# Patient Record
Sex: MALE | Race: WHITE | NOT HISPANIC OR LATINO | Employment: OTHER | RURAL
[De-identification: names, ages, dates, MRNs, and addresses within clinical notes are randomized per-mention and may not be internally consistent; named-entity substitution may affect disease eponyms.]

---

## 2017-01-11 ENCOUNTER — OFFICE VISIT (OUTPATIENT)
Dept: RETAIL CLINIC | Facility: CLINIC | Age: 72
End: 2017-01-11

## 2017-01-11 VITALS
SYSTOLIC BLOOD PRESSURE: 140 MMHG | OXYGEN SATURATION: 98 % | WEIGHT: 200 LBS | RESPIRATION RATE: 16 BRPM | HEART RATE: 79 BPM | HEIGHT: 69 IN | DIASTOLIC BLOOD PRESSURE: 90 MMHG | TEMPERATURE: 97.8 F | BODY MASS INDEX: 29.62 KG/M2

## 2017-01-11 DIAGNOSIS — B02.9 HERPES ZOSTER WITHOUT COMPLICATION: Primary | ICD-10-CM

## 2017-01-11 PROCEDURE — 99213 OFFICE O/P EST LOW 20 MIN: CPT | Performed by: NURSE PRACTITIONER

## 2017-01-11 RX ORDER — GABAPENTIN 100 MG/1
CAPSULE ORAL
Qty: 30 CAPSULE | Refills: 0 | Status: SHIPPED | OUTPATIENT
Start: 2017-01-11 | End: 2017-02-03

## 2017-01-11 RX ORDER — ACYCLOVIR 400 MG/1
800 TABLET ORAL
Qty: 70 TABLET | Refills: 0 | Status: SHIPPED | OUTPATIENT
Start: 2017-01-11 | End: 2017-01-18

## 2017-01-11 NOTE — MR AVS SNAPSHOT
Aj Card JrJeremy   1/11/2017 4:00 PM   Office Visit    Dept Phone:  [phone]   Encounter #:  [id number]    Provider:  CRISTÓBAL MORALES [place]   Department:  [facility]                Your Full Care Plan              Today's Medication Changes          These changes are accurate as of: 1/11/17  5:18 PM.  If you have any questions, ask your nurse or doctor.               New Medication(s)Ordered:     acyclovir 400 MG tablet   Commonly known as:  ZOVIRAX   Take 2 tablets by mouth 5 (Five) Times a Day for 7 days. Take no more than 5 doses a day.       gabapentin 100 MG capsule   Commonly known as:  NEURONTIN   1 cap po QHS, may increase to 2 caps after 24 hours, 3 caps after 48 hours if needed for pain associated with shingles            Where to Get Your Medications      These medications were sent to [facility] [address] [place] [address] DRIVE - [phone] [facility] [phone] [address] [address]     Phone:  [phone]     acyclovir 400 MG tablet    gabapentin 100 MG capsule                  Your Updated Medication List          This list is accurate as of: 1/11/17  5:18 PM.  Always use your most recent med list.                acyclovir 400 MG tablet   Commonly known as:  ZOVIRAX   Take 2 tablets by mouth 5 (Five) Times a Day for 7 days. Take no more than 5 doses a day.       ALPRAZolam 0.5 MG tablet   Commonly known as:  XANAX   TAKE ONE TABLET BY MOUTH TWICE DAILY AS NEEDED FOR  NERVES       amLODIPine 10 MG tablet   Commonly known as:  NORVASC   Take 1 tablet by mouth Daily.       aspirin 81 MG EC tablet       azelastine 0.1 % nasal spray   Commonly known as:  ASTELIN       cholecalciferol 1000 UNITS tablet   Commonly known as:  VITAMIN D3       clopidogrel 75 MG tablet   Commonly known as:  PLAVIX   Take 1 tablet by mouth Daily.       coenzyme Q10 100 MG capsule       esomeprazole 20 MG capsule   Commonly known as:  nexIUM        gabapentin 100 MG capsule   Commonly known as:  NEURONTIN   1 cap po QHS, may increase to 2 caps after 24 hours, 3 caps after 48 hours if needed for pain associated with shingles       glucose blood test strip       KRILL OIL OMEGA-3 PO       lisinopril 40 MG tablet   Commonly known as:  PRINIVIL,ZESTRIL   Take 1 tablet by mouth Daily.       metFORMIN 500 MG tablet   Commonly known as:  GLUCOPHAGE   Take 2 tablets by mouth 2 (Two) Times a Day With Meals.       metoprolol tartrate 50 MG tablet   Commonly known as:  LOPRESSOR   Take 1 tablet by mouth 2 (Two) Times a Day.       pantoprazole 40 MG EC tablet   Commonly known as:  PROTONIX   Take 1 tablet by mouth Daily.       pitavastatin calcium 2 MG tablet tablet   Commonly known as:  LIVALO   Take 1 tablet by mouth Every Night.       SAW PALMETTO PO       sucralfate 1 G tablet   Commonly known as:  CARAFATE   Take 1 tablet by mouth 2 (Two) Times a Day.       vitamin B-12 2500 MCG sublingual tablet tablet   Commonly known as:  CYANOCOBALAMIN       zolpidem 5 MG tablet   Commonly known as:  AMBIEN   Take 1 tablet by mouth At Night As Needed for sleep.               You Were Diagnosed With        Codes Comments    Herpes zoster without complication    -  Primary ICD-10-CM: B02.9  ICD-9-CM: 053.9       Instructions    Shingles  Shingles, which is also known as herpes zoster, is an infection that causes a painful skin rash and fluid-filled blisters. Shingles is not related to genital herpes, which is a sexually transmitted infection.     Shingles only develops in people who:  · Have had chickenpox.  · Have received the chickenpox vaccine. (This is rare.)  CAUSES  Shingles is caused by varicella-zoster virus (VZV). This is the same virus that causes chickenpox. After exposure to VZV, the virus stays in the body in an inactive (dormant) state. Shingles develops if the virus reactivates. This can happen many years after the initial exposure to VZV. It is not known what  causes this virus to reactivate.  RISK FACTORS  People who have had chickenpox or received the chickenpox vaccine are at risk for shingles. Infection is more common in people who:  · Are older than age 50.  · Have a weakened defense (immune) system, such as those with HIV, AIDS, or cancer.  · Are taking medicines that weaken the immune system, such as transplant medicines.  · Are under great stress.  SYMPTOMS  Early symptoms of this condition include itching, tingling, and pain in an area on your skin. Pain may be described as burning, stabbing, or throbbing.  A few days or weeks after symptoms start, a painful red rash appears, usually on one side of the body in a bandlike or beltlike pattern. The rash eventually turns into fluid-filled blisters that break open, scab over, and dry up in about 2-3 weeks.  At any time during the infection, you may also develop:  · A fever.  · Chills.  · A headache.  · An upset stomach.  DIAGNOSIS  This condition is diagnosed with a skin exam. Sometimes, skin or fluid samples are taken from the blisters before a diagnosis is made. These samples are examined under a microscope or sent to a lab for testing.  TREATMENT  There is no specific cure for this condition. Your health care provider will probably prescribe medicines to help you manage pain, recover more quickly, and avoid long-term problems. Medicines may include:  · Antiviral drugs.  · Anti-inflammatory drugs.  · Pain medicines.  If the area involved is on your face, you may be referred to a specialist, such as an eye doctor (ophthalmologist) or an ear, nose, and throat (ENT) doctor to help you avoid eye problems, chronic pain, or disability.  HOME CARE INSTRUCTIONS  Medicines  · Take medicines only as directed by your health care provider.  · Apply an anti-itch or numbing cream to the affected area as directed by your health care provider.  Blister and Rash Care  · Take a cool bath or apply cool compresses to the area of the  rash or blisters as directed by your health care provider. This may help with pain and itching.  · Keep your rash covered with a loose bandage (dressing). Wear loose-fitting clothing to help ease the pain of material rubbing against the rash.  · Keep your rash and blisters clean with mild soap and cool water or as directed by your health care provider.  · Check your rash every day for signs of infection. These include redness, swelling, and pain that lasts or increases.  · Do not pick your blisters.  · Do not scratch your rash.  General Instructions  · Rest as directed by your health care provider.  · Keep all follow-up visits as directed by your health care provider. This is important.  · Until your blisters scab over, your infection can cause chickenpox in people who have never had it or been vaccinated against it. To prevent this from happening, avoid contact with other people, especially:    Babies.    Pregnant women.    Children who have eczema.    Elderly people who have transplants.    People who have chronic illnesses, such as leukemia or AIDS.  SEEK MEDICAL CARE IF:  · Your pain is not relieved with prescribed medicines.  · Your pain does not get better after the rash heals.  · Your rash looks infected. Signs of infection include redness, swelling, and pain that lasts or increases.  SEEK IMMEDIATE MEDICAL CARE IF:  · The rash is on your face or nose.  · You have facial pain, pain around your eye area, or loss of feeling on one side of your face.  · You have ear pain or you have ringing in your ear.  · You have loss of taste.  · Your condition gets worse.     This information is not intended to replace advice given to you by your health care provider. Make sure you discuss any questions you have with your health care provider.     Document Released: 12/18/2006 Document Revised: 01/08/2016 Document Reviewed: 10/29/2015  [employer] Interactive Patient Education ©2016 [employer]       Patient Instructions  "History      Upcoming Appointments     Visit Type Date Time Department    OFFICE VISIT 1/11/2017  4:00 PM [facility]    OFFICE VISIT 3/14/2017  1:15 PM [facility]    OFFICE VISIT 4/25/2017  1:20 PM [facility]    OFFICE VISIT 6/14/2017  1:00 PM [facility]      MyChart Signup     Our records indicate that you have declined [facility] MyChart signup. If you would like to sign up for MyChart, please email [employer] [facility] [email or web address] or call [phone] to obtain an activation code.             Other Info from Your Visit           Your Appointments     Mar 14, 2017  1:15 PM CDT   Office Visit with Steff Rodriguez MD   [facility] INTERNAL MEDICINE (--)    [address]  [address] [place] [id number] [phone]           Arrive 15 minutes prior to appointment.            Apr 25, 2017  1:20 PM CDT   Office Visit with Andreas Aldana PA-C   [facility] GASTROENTEROLOGY (--)    [address] [place] [id number] [phone]           Arrive 15 minutes prior to appointment.            Jun 14, 2017  1:00 PM CDT   Office Visit with Steff Rodriguez MD   [facility] INTERNAL MEDICINE (--)    [address]  [address] [place] [id number] [phone]           Arrive 15 minutes prior to appointment.              Allergies     Adhesive Tape      Brilinta [Ticagrelor]      Crestor [Rosuvastatin Calcium]      Effient [Prasugrel]      Lipitor [Atorvastatin]  Swelling    Other      Cipro: Hives    Warfarin And Related      Cardizem [Diltiazem Hcl]  Rash    Celexa [Citalopram Hydrobromide]  Rash    Floxin [Ofloxacin]  Rash    Penicillins  Rash    Sulfa Antibiotics  Rash      Reason for Visit     Rash           Vital Signs     Blood Pressure Pulse Temperature Respirations Height Weight    140/90 79 97.8 °F (36.6 °C) (Tympanic) 16 69\" (175.3 cm) 200 lb (90.7 kg)    Oxygen " Saturation Body Mass Index Smoking Status             98% 29.53 kg/m2 Never Smoker         Problems and Diagnoses Noted     Herpes zoster without complication    -  Primary

## 2017-01-11 NOTE — PATIENT INSTRUCTIONS
Shingles  Shingles, which is also known as herpes zoster, is an infection that causes a painful skin rash and fluid-filled blisters. Shingles is not related to genital herpes, which is a sexually transmitted infection.     Shingles only develops in people who:  · Have had chickenpox.  · Have received the chickenpox vaccine. (This is rare.)  CAUSES  Shingles is caused by varicella-zoster virus (VZV). This is the same virus that causes chickenpox. After exposure to VZV, the virus stays in the body in an inactive (dormant) state. Shingles develops if the virus reactivates. This can happen many years after the initial exposure to VZV. It is not known what causes this virus to reactivate.  RISK FACTORS  People who have had chickenpox or received the chickenpox vaccine are at risk for shingles. Infection is more common in people who:  · Are older than age 50.  · Have a weakened defense (immune) system, such as those with HIV, AIDS, or cancer.  · Are taking medicines that weaken the immune system, such as transplant medicines.  · Are under great stress.  SYMPTOMS  Early symptoms of this condition include itching, tingling, and pain in an area on your skin. Pain may be described as burning, stabbing, or throbbing.  A few days or weeks after symptoms start, a painful red rash appears, usually on one side of the body in a bandlike or beltlike pattern. The rash eventually turns into fluid-filled blisters that break open, scab over, and dry up in about 2-3 weeks.  At any time during the infection, you may also develop:  · A fever.  · Chills.  · A headache.  · An upset stomach.  DIAGNOSIS  This condition is diagnosed with a skin exam. Sometimes, skin or fluid samples are taken from the blisters before a diagnosis is made. These samples are examined under a microscope or sent to a lab for testing.  TREATMENT  There is no specific cure for this condition. Your health care provider will probably prescribe medicines to help you  manage pain, recover more quickly, and avoid long-term problems. Medicines may include:  · Antiviral drugs.  · Anti-inflammatory drugs.  · Pain medicines.  If the area involved is on your face, you may be referred to a specialist, such as an eye doctor (ophthalmologist) or an ear, nose, and throat (ENT) doctor to help you avoid eye problems, chronic pain, or disability.  HOME CARE INSTRUCTIONS  Medicines  · Take medicines only as directed by your health care provider.  · Apply an anti-itch or numbing cream to the affected area as directed by your health care provider.  Blister and Rash Care  · Take a cool bath or apply cool compresses to the area of the rash or blisters as directed by your health care provider. This may help with pain and itching.  · Keep your rash covered with a loose bandage (dressing). Wear loose-fitting clothing to help ease the pain of material rubbing against the rash.  · Keep your rash and blisters clean with mild soap and cool water or as directed by your health care provider.  · Check your rash every day for signs of infection. These include redness, swelling, and pain that lasts or increases.  · Do not pick your blisters.  · Do not scratch your rash.  General Instructions  · Rest as directed by your health care provider.  · Keep all follow-up visits as directed by your health care provider. This is important.  · Until your blisters scab over, your infection can cause chickenpox in people who have never had it or been vaccinated against it. To prevent this from happening, avoid contact with other people, especially:    Babies.    Pregnant women.    Children who have eczema.    Elderly people who have transplants.    People who have chronic illnesses, such as leukemia or AIDS.  SEEK MEDICAL CARE IF:  · Your pain is not relieved with prescribed medicines.  · Your pain does not get better after the rash heals.  · Your rash looks infected. Signs of infection include redness, swelling, and pain  that lasts or increases.  SEEK IMMEDIATE MEDICAL CARE IF:  · The rash is on your face or nose.  · You have facial pain, pain around your eye area, or loss of feeling on one side of your face.  · You have ear pain or you have ringing in your ear.  · You have loss of taste.  · Your condition gets worse.     This information is not intended to replace advice given to you by your health care provider. Make sure you discuss any questions you have with your health care provider.     Document Released: 12/18/2006 Document Revised: 01/08/2016 Document Reviewed: 10/29/2015  [employer] Interactive Patient Education ©2016 [employer]

## 2017-01-11 NOTE — PROGRESS NOTES
"Kath Card Jr. is a 71 y.o. male.     HPI Comments: Rates rash pain: 8/10    Rash   This is a new problem. Episode onset: about a month. The problem is unchanged. Location: LLE and RLE. The rash is characterized by blistering (burning, itching). He was exposed to nothing. Pertinent negatives include no anorexia, congestion, cough, diarrhea, eye pain, facial edema, fatigue, fever, joint pain, nail changes, rhinorrhea, shortness of breath, sore throat or vomiting. Treatments tried: some old Zovirax cream his wife had. The treatment provided no relief. His past medical history is significant for varicella ( as a child).        The following portions of the patient's history were reviewed and updated as appropriate: allergies, current medications, past medical history and past social history.    Review of Systems   Constitutional: Negative for activity change, appetite change, fatigue and fever.   HENT: Negative for congestion, rhinorrhea and sore throat.    Eyes: Negative for pain.   Respiratory: Negative.  Negative for cough and shortness of breath.    Cardiovascular: Negative.    Gastrointestinal: Negative for anorexia, diarrhea, nausea and vomiting.   Musculoskeletal: Negative for joint pain.   Skin: Positive for rash. Negative for nail changes.   Hematological: Negative for adenopathy.   Psychiatric/Behavioral: Negative.        Objective    Visit Vitals   • /90   • Pulse 79   • Temp 97.8 °F (36.6 °C) (Tympanic)   • Resp 16   • Ht 69\" (175.3 cm)   • Wt 200 lb (90.7 kg)   • SpO2 98%   • BMI 29.53 kg/m2       Physical Exam   Constitutional: He is oriented to person, place, and time. He appears well-developed and well-nourished. No distress.   Cardiovascular: Normal rate and regular rhythm.    Pulmonary/Chest: Effort normal and breath sounds normal.   Neurological: He is alert and oriented to person, place, and time.   Skin: Rash noted. Rash is vesicular ( with erythemic base in dermatonal " "pattern and \"deep burn and pain\" in a line.  Small area with vesicles and erythemic base to right lower leg. ).        Psychiatric: He has a normal mood and affect. His behavior is normal.   Nursing note and vitals reviewed.      Assessment/Plan   Aj was seen today for rash.    Diagnoses and all orders for this visit:    Herpes zoster without complication  -     acyclovir (ZOVIRAX) 400 MG tablet; Take 2 tablets by mouth 5 (Five) Times a Day for 7 days. Take no more than 5 doses a day.  -     gabapentin (NEURONTIN) 100 MG capsule; 1 cap po QHS, may increase to 2 caps after 24 hours, 3 caps after 48 hours if needed for pain associated with shingles      Discussed possibility of zoster vs simplex, but with \"burning pain\", age of patient and hx of varicella, feel this is probably more Zoster.      F/U with PCP if symptoms persist/worsen.            "

## 2017-01-25 RX ORDER — ALPRAZOLAM 0.5 MG/1
TABLET ORAL
Qty: 30 TABLET | Refills: 0 | Status: SHIPPED | OUTPATIENT
Start: 2017-01-25 | End: 2017-02-03

## 2017-01-25 RX ORDER — FLUTICASONE PROPIONATE 50 MCG
2 SPRAY, SUSPENSION (ML) NASAL DAILY
Qty: 1 EACH | Refills: 0 | Status: SHIPPED | OUTPATIENT
Start: 2017-01-25 | End: 2017-02-03

## 2017-02-03 ENCOUNTER — OFFICE VISIT (OUTPATIENT)
Dept: RETAIL CLINIC | Facility: CLINIC | Age: 72
End: 2017-02-03

## 2017-02-03 VITALS
HEIGHT: 69 IN | BODY MASS INDEX: 29.62 KG/M2 | OXYGEN SATURATION: 99 % | SYSTOLIC BLOOD PRESSURE: 130 MMHG | HEART RATE: 80 BPM | DIASTOLIC BLOOD PRESSURE: 70 MMHG | TEMPERATURE: 97.4 F | WEIGHT: 200 LBS

## 2017-02-03 DIAGNOSIS — J01.90 ACUTE SINUSITIS, RECURRENCE NOT SPECIFIED, UNSPECIFIED LOCATION: Primary | ICD-10-CM

## 2017-02-03 DIAGNOSIS — R05.9 COUGHING: ICD-10-CM

## 2017-02-03 PROCEDURE — 99213 OFFICE O/P EST LOW 20 MIN: CPT | Performed by: NURSE PRACTITIONER

## 2017-02-03 PROCEDURE — 96372 THER/PROPH/DIAG INJ SC/IM: CPT | Performed by: NURSE PRACTITIONER

## 2017-02-03 RX ORDER — CEFTRIAXONE 1 G/1
1 INJECTION, POWDER, FOR SOLUTION INTRAMUSCULAR; INTRAVENOUS ONCE
Status: COMPLETED | OUTPATIENT
Start: 2017-02-03 | End: 2017-02-03

## 2017-02-03 RX ORDER — BENZONATATE 100 MG/1
CAPSULE ORAL
Qty: 30 CAPSULE | Refills: 0 | Status: SHIPPED | OUTPATIENT
Start: 2017-02-03 | End: 2017-06-14

## 2017-02-03 RX ORDER — AZITHROMYCIN 250 MG/1
TABLET, FILM COATED ORAL
Qty: 6 TABLET | Refills: 0 | Status: SHIPPED | OUTPATIENT
Start: 2017-02-03 | End: 2017-03-14

## 2017-02-03 RX ADMIN — CEFTRIAXONE 1 G: 1 INJECTION, POWDER, FOR SOLUTION INTRAMUSCULAR; INTRAVENOUS at 15:24

## 2017-02-03 NOTE — PROGRESS NOTES
Subjective   Aj Card Jr. is a 71 y.o. male.     Fever    This is a new problem. The current episode started yesterday. The problem has been unchanged. Maximum temperature: reports 101 this AM and took Tylenol. Associated symptoms include congestion, coughing, headaches, muscle aches ( x 24 hours) and a sore throat. Pertinent negatives include no abdominal pain, chest pain, diarrhea, ear pain, nausea, rash, sleepiness, urinary pain, vomiting or wheezing. He has tried acetaminophen for the symptoms. The treatment provided no relief.   Risk factors: sick contacts ( family members have had URI recently.  Wife recently treated with Rocephin and Zpak and he is requesting the same.)    Risk factors: no recent sickness and no recent travel    Cough   This is a new problem. The current episode started in the past 7 days. The problem has been gradually worsening. The problem occurs hourly. The cough is productive of purulent sputum. Associated symptoms include chills, ear congestion, a fever, headaches, myalgias, nasal congestion, rhinorrhea ( yellow) and a sore throat. Pertinent negatives include no chest pain, ear pain, heartburn, hemoptysis, postnasal drip, rash, shortness of breath, sweats, weight loss or wheezing. Nothing aggravates the symptoms. He has tried nothing for the symptoms.        The following portions of the patient's history were reviewed and updated as appropriate: allergies, current medications, past medical history and past social history.    Review of Systems   Constitutional: Positive for appetite change, chills and fever. Negative for activity change and weight loss.   HENT: Positive for congestion, rhinorrhea ( yellow), sinus pressure ( frontal) and sore throat. Negative for ear pain, postnasal drip, sneezing and trouble swallowing.    Eyes: Negative.    Respiratory: Positive for cough and chest tightness. Negative for hemoptysis, shortness of breath and wheezing.    Cardiovascular:  "Negative for chest pain, palpitations and leg swelling.   Gastrointestinal: Negative for abdominal pain, diarrhea, heartburn, nausea and vomiting.   Genitourinary: Negative for dysuria.   Musculoskeletal: Positive for myalgias. Negative for neck pain and neck stiffness.   Skin: Negative.  Negative for rash.   Neurological: Positive for headaches. Negative for dizziness.   Hematological: Negative for adenopathy.   Psychiatric/Behavioral: Negative.        Objective    Visit Vitals   • /70 (BP Location: Left arm, Patient Position: Sitting, Cuff Size: Adult)   • Pulse 80   • Temp 97.4 °F (36.3 °C) (Tympanic)   • Ht 69\" (175.3 cm)   • Wt 200 lb (90.7 kg)   • SpO2 99%   • BMI 29.53 kg/m2       Physical Exam   Constitutional: He is oriented to person, place, and time. He appears well-developed. Distressed:  does not appear to feel well.   HENT:   Head: Normocephalic and atraumatic.   Right Ear: Ear canal normal. Tympanic membrane is bulging. Tympanic membrane is not erythematous. A middle ear effusion ( small clear) is present.   Left Ear: Ear canal normal. Tympanic membrane is bulging. Tympanic membrane is not erythematous. A middle ear effusion ( small, clear) is present.   Nose: Mucosal edema ( erythemic, swollen, stuffed) and rhinorrhea ( purulent) present. Right sinus exhibits frontal sinus tenderness. Right sinus exhibits no maxillary sinus tenderness. Left sinus exhibits frontal sinus tenderness. Left sinus exhibits no maxillary sinus tenderness.   Mouth/Throat: Uvula is midline and mucous membranes are normal. Posterior oropharyngeal erythema ( injected with PND) present.   Eyes:   Conjunctiva watery     Neck: Normal range of motion. Neck supple.   Cardiovascular: Normal rate and regular rhythm.    Pulmonary/Chest: Effort normal. He has no wheezes. He has no rales.   Tight, congested cough     Lymphadenopathy:     He has no cervical adenopathy.   Neurological: He is alert and oriented to person, place, and " time.   Psychiatric: He has a normal mood and affect. His behavior is normal.   Nursing note and vitals reviewed.      Assessment/Plan   Aj was seen today for sore throat, fever, cough and generalized body aches.    Diagnoses and all orders for this visit:    Acute sinusitis, recurrence not specified, unspecified location  -     azithromycin (ZITHROMAX Z-SUAD) 250 MG tablet; Take 2 tablets the first day, then 1 tablet daily for 4 days.  -     cefTRIAXone (ROCEPHIN) injection 1 g; Inject 1 g into the shoulder, thigh, or buttocks 1 (One) Time.    Coughing  -     benzonatate (TESSALON PERLES) 100 MG capsule; 1-2 caps po TID prn cough        Push fluids  Rest  Tylenol as needed    PCP if symptoms persist/worsen

## 2017-02-03 NOTE — PATIENT INSTRUCTIONS

## 2017-03-07 DIAGNOSIS — E11.9 TYPE 2 DIABETES MELLITUS WITHOUT COMPLICATION, WITHOUT LONG-TERM CURRENT USE OF INSULIN (HCC): Primary | ICD-10-CM

## 2017-03-08 ENCOUNTER — LAB (OUTPATIENT)
Dept: LAB | Facility: CLINIC | Age: 72
End: 2017-03-08

## 2017-03-08 DIAGNOSIS — E11.9 TYPE 2 DIABETES MELLITUS WITHOUT COMPLICATION, WITHOUT LONG-TERM CURRENT USE OF INSULIN (HCC): ICD-10-CM

## 2017-03-08 LAB
ANION GAP SERPL CALCULATED.3IONS-SCNC: 14 MMOL/L (ref 5–15)
BUN BLD-MCNC: 16 MG/DL (ref 7–21)
BUN/CREAT SERPL: 18 (ref 7–25)
CALCIUM SPEC-SCNC: 9.8 MG/DL (ref 8.4–10.2)
CHLORIDE SERPL-SCNC: 99 MMOL/L (ref 95–110)
CO2 SERPL-SCNC: 26 MMOL/L (ref 22–31)
CREAT BLD-MCNC: 0.89 MG/DL (ref 0.7–1.3)
GFR SERPL CREATININE-BSD FRML MDRD: 84 ML/MIN/1.73 (ref 42–98)
GLUCOSE BLD-MCNC: 183 MG/DL (ref 60–100)
HBA1C MFR BLD: 7.12 % (ref 4–5.6)
POTASSIUM BLD-SCNC: 4.6 MMOL/L (ref 3.5–5.1)
SODIUM BLD-SCNC: 139 MMOL/L (ref 137–145)

## 2017-03-08 PROCEDURE — 80048 BASIC METABOLIC PNL TOTAL CA: CPT | Performed by: INTERNAL MEDICINE

## 2017-03-08 PROCEDURE — 83036 HEMOGLOBIN GLYCOSYLATED A1C: CPT | Performed by: INTERNAL MEDICINE

## 2017-03-14 ENCOUNTER — OFFICE VISIT (OUTPATIENT)
Dept: INTERNAL MEDICINE | Facility: CLINIC | Age: 72
End: 2017-03-14

## 2017-03-14 VITALS
SYSTOLIC BLOOD PRESSURE: 120 MMHG | BODY MASS INDEX: 29.02 KG/M2 | HEIGHT: 69 IN | DIASTOLIC BLOOD PRESSURE: 70 MMHG | WEIGHT: 195.9 LBS

## 2017-03-14 DIAGNOSIS — E11.9 TYPE 2 DIABETES MELLITUS WITHOUT COMPLICATION, WITHOUT LONG-TERM CURRENT USE OF INSULIN (HCC): Primary | ICD-10-CM

## 2017-03-14 DIAGNOSIS — I10 ESSENTIAL HYPERTENSION: ICD-10-CM

## 2017-03-14 DIAGNOSIS — F41.1 GENERALIZED ANXIETY DISORDER: ICD-10-CM

## 2017-03-14 PROBLEM — E78.2 MIXED HYPERLIPIDEMIA: Status: ACTIVE | Noted: 2017-03-14

## 2017-03-14 PROCEDURE — 99213 OFFICE O/P EST LOW 20 MIN: CPT | Performed by: INTERNAL MEDICINE

## 2017-03-14 PROCEDURE — G0009 ADMIN PNEUMOCOCCAL VACCINE: HCPCS | Performed by: INTERNAL MEDICINE

## 2017-03-14 PROCEDURE — 90732 PPSV23 VACC 2 YRS+ SUBQ/IM: CPT | Performed by: INTERNAL MEDICINE

## 2017-03-14 RX ORDER — ALPRAZOLAM 0.5 MG/1
0.5 TABLET ORAL 2 TIMES DAILY PRN
COMMUNITY
End: 2017-03-14 | Stop reason: SDUPTHER

## 2017-03-14 RX ORDER — TRIAMCINOLONE ACETONIDE 1 MG/G
CREAM TOPICAL
COMMUNITY
Start: 2017-03-01 | End: 2017-09-14

## 2017-03-14 RX ORDER — ESOMEPRAZOLE MAGNESIUM 40 MG/1
40 CAPSULE, DELAYED RELEASE ORAL
COMMUNITY
Start: 2017-03-11 | End: 2017-04-25 | Stop reason: SDUPTHER

## 2017-03-14 RX ORDER — METOPROLOL TARTRATE 50 MG/1
50 TABLET, FILM COATED ORAL 2 TIMES DAILY
Qty: 180 TABLET | Refills: 1 | Status: SHIPPED | OUTPATIENT
Start: 2017-03-14 | End: 2017-06-14 | Stop reason: SDUPTHER

## 2017-03-14 RX ORDER — CLOPIDOGREL BISULFATE 75 MG/1
75 TABLET ORAL DAILY
Qty: 90 TABLET | Refills: 1 | Status: SHIPPED | OUTPATIENT
Start: 2017-03-14 | End: 2017-06-14 | Stop reason: SDUPTHER

## 2017-03-14 RX ORDER — ALPRAZOLAM 0.5 MG/1
0.5 TABLET ORAL 2 TIMES DAILY PRN
Qty: 30 TABLET | Refills: 0 | Status: SHIPPED | OUTPATIENT
Start: 2017-03-14 | End: 2017-06-14 | Stop reason: SDUPTHER

## 2017-03-14 RX ORDER — FLUTICASONE PROPIONATE 50 MCG
2 SPRAY, SUSPENSION (ML) NASAL DAILY
Qty: 1 EACH | Refills: 5 | Status: SHIPPED | OUTPATIENT
Start: 2017-03-14 | End: 2017-04-13

## 2017-03-14 RX ORDER — AMLODIPINE BESYLATE 10 MG/1
10 TABLET ORAL DAILY
Qty: 90 TABLET | Refills: 1 | Status: SHIPPED | OUTPATIENT
Start: 2017-03-14 | End: 2017-06-14 | Stop reason: SDUPTHER

## 2017-03-14 RX ORDER — LISINOPRIL 40 MG/1
40 TABLET ORAL DAILY
Qty: 90 TABLET | Refills: 1 | Status: SHIPPED | OUTPATIENT
Start: 2017-03-14 | End: 2017-06-14 | Stop reason: SDUPTHER

## 2017-03-14 NOTE — PROGRESS NOTES
Subjective   Aj Card Jr. is a 71 y.o. male     Patient is in for recheck on HTN, DM and anxiety.    DM, type II.  Duration 1 year.  ADA compliant.  Quality uncontrolled.  On Metformin 1000 mg qam, 500 mg qpm.  He could not handle higher doses due to the GI side effects. /diarrhea/.  FBS is running between above 120.  HgbA1c 7.12 /03/08/2017/.  On ACE inhibitor for HTN.    Lipids. , , LDL 76, HDL 35. /12/21/2016/.  Patient could not tolerate multiple statins in the past.  He is taking red yeast rice OTC and denies any side effects to the medications.    Microvascular complications related to DM. No retinopathy. No peripheral neuropathy.  Patient recently had eye exam done in [place].  Macrovascular complications. CAD. No PAD.    BP is well controlled on multiple medications. Patient denies chest pain, dyspnea, orthopnea or edema in lower extremites.  He is compliant with his medications and takes them as prescribed.  Patient is physically active and has been exercising on regular basis.      Patient is still having problems with anxiety and takes Xanax on PRN basis. He could not tolerate multiple SSRI medications in the past.          Past Medical History   Diagnosis Date   • Acute posthemorrhagic anemia    • Allergic rhinitis    • Anxiety state    • Chest pain    • Coronary arteriosclerosis    • Diabetes mellitus      type 2   • Diverticular disease of colon    • Dysphagia    • Epigastric pain    • GERD (gastroesophageal reflux disease)      esophagitis on Dexilant. Pt feels Nexium working better      • History of echocardiogram      Small left atrial enlargement with mild CLVH.Ef of 55-60%.Mitral valve mildly thickened.Av thickened.Left ventricle mildly dilated.Tricuspid intact.Mild aortic insufficiency. 12/07/2015       • History of echocardiogram      Mild diastolic dysfunction and mild left atrial enlargement, otherwise normal echo. EF> 55% 01/03/2012       • Hypercholesterolemia     • Hypertension    • Insomnia    • Irritable bowel syndrome    • Osteoarthritis of multiple joints      Past Surgical History   Procedure Laterality Date   • Coronary angioplasty       Successful PTCA & stenting LAD was done w/ deployment of a 2.5mm x23 Xience stent w/ reduction of stenosis from 90% to less than 0% stenosis with good LILLIAM 3 flow 02/17/2012       • Appendectomy        [facility] 1995       • Cardiac catheterization       Successful PTCA of the OMB#2.Unsuccessful PTCA of the 1st OMB, secondary to difficulty in advancing the balloon. 12/08/2015       • Cholecystectomy       [facility], [place] 1993       • Endoscopy and colonoscopy       Diverticulum in sigmoid colon & descending colon. Internal & external hemorrhoids found. 10/01/2012       • Other surgical history       CORONARY ARTERY STENT    • Endoscopy       with biopsy.  Mildly severe esophagitis. Gastritis. Normal examined duodenum.Several biopsies obtained in the lower third of the esophagus.Several biopsies obtained in the gastric antrum. 05/06/2016       • Endoscopy       w tube. Normal esophagus. Gastritis in stomach. Biopsy taken. Gastric polyp found. Biopsy taken. Normal duodenum. 10/01/2012       • Hand surgery        Corrective osteotomy of ring finger metacarpal. Malunited fracture of left ring finger 05/21/1985       • Hernia repair       Laparoscopic hernia repair. prepertitoneal bilateral inguinal hernia repair with mesh. 10/15/2009      • Skin tag removal       Excision, viral skin tag,right upper eyelid 05/08/1995    • Colonoscopy  10/01/2012       Social History   Substance Use Topics   • Smoking status: Never Smoker   • Smokeless tobacco: Never Used   • Alcohol use No     Family History   Problem Relation Age of Onset   • Heart disease Other    • Hypertension Other    • Stroke Other    • Schizophrenia Sister      Allergies   Allergen Reactions   • Adhesive Tape    • Brilinta [Ticagrelor]    • Crestor  [Rosuvastatin Calcium]    • Effient [Prasugrel]    • Lipitor [Atorvastatin] Swelling   • Other      Cipro: Hives   • Warfarin And Related    • Cardizem [Diltiazem Hcl] Rash   • Celexa [Citalopram Hydrobromide] Rash   • Floxin [Ofloxacin] Rash   • Penicillins Rash   • Sulfa Antibiotics Rash     Current Outpatient Prescriptions on File Prior to Visit   Medication Sig Dispense Refill   • aspirin 81 MG EC tablet Take 162 mg by mouth daily.     • azelastine (ASTELIN) 0.1 % nasal spray 2 sprays into each nostril 2 (two) times a day. Use in each nostril as directed     • benzonatate (TESSALON PERLES) 100 MG capsule 1-2 caps po TID prn cough 30 capsule 0   • cholecalciferol (VITAMIN D3) 1000 UNITS tablet Take 1,000 Units by mouth daily.     • coenzyme Q10 100 MG capsule Take 100 mg by mouth daily.     • esomeprazole (nexIUM) 20 MG capsule Take 40 mg by mouth Every Morning Before Breakfast.     • KRILL OIL OMEGA-3 PO Take  by mouth daily.     • Saw Palmetto, Serenoa repens, (SAW PALMETTO PO) Take  by mouth daily.     • sucralfate (CARAFATE) 1 G tablet Take 1 tablet by mouth 2 (Two) Times a Day. 60 tablet 3   • vitamin B-12 (CYANOCOBALAMIN) 2500 MCG sublingual tablet tablet Place  under the tongue daily.     • [DISCONTINUED] amLODIPine (NORVASC) 10 MG tablet Take 1 tablet by mouth Daily. 90 tablet 1   • [DISCONTINUED] azithromycin (ZITHROMAX Z-SUAD) 250 MG tablet Take 2 tablets the first day, then 1 tablet daily for 4 days. 6 tablet 0   • [DISCONTINUED] clopidogrel (PLAVIX) 75 MG tablet Take 1 tablet by mouth Daily. 90 tablet 1   • [DISCONTINUED] glucose blood test strip 1 each by Other route. Use as instructed     • [DISCONTINUED] lisinopril (PRINIVIL,ZESTRIL) 40 MG tablet Take 1 tablet by mouth Daily. 90 tablet 1   • [DISCONTINUED] metFORMIN (GLUCOPHAGE) 500 MG tablet Take 2 tablets by mouth 2 (Two) Times a Day With Meals. 360 tablet 1   • [DISCONTINUED] metoprolol tartrate (LOPRESSOR) 50 MG tablet Take 1 tablet by mouth 2  (Two) Times a Day. 180 tablet 1   • [DISCONTINUED] pantoprazole (PROTONIX) 40 MG EC tablet Take 1 tablet by mouth Daily. 30 tablet 3   • [DISCONTINUED] pitavastatin calcium (LIVALO) 2 MG tablet tablet Take 1 tablet by mouth Every Night. 30 tablet 5     No current facility-administered medications on file prior to visit.      Review of Systems   HENT: Negative.    Eyes: Negative for photophobia and visual disturbance.   Respiratory: Negative for chest tightness, shortness of breath and wheezing.    Cardiovascular: Negative for chest pain and leg swelling.   Gastrointestinal: Negative for abdominal pain, constipation and diarrhea.   Endocrine: Negative for cold intolerance, heat intolerance, polydipsia and polyuria.   Musculoskeletal: Negative for back pain and joint swelling.   Neurological: Negative for dizziness, syncope and light-headedness.   Psychiatric/Behavioral: Negative for sleep disturbance. The patient is nervous/anxious.        Objective   Physical Exam   Constitutional: He is oriented to person, place, and time. He appears well-developed and well-nourished.   HENT:   Head: Normocephalic and atraumatic.   Nose: Nose normal.   Mouth/Throat: Oropharynx is clear and moist.   Eyes: Conjunctivae are normal.   Neck: Neck supple. No JVD present.   Cardiovascular: Normal rate and regular rhythm.    No murmur heard.  Pulmonary/Chest: Effort normal and breath sounds normal.   Abdominal: Soft. Bowel sounds are normal. He exhibits no mass.   Musculoskeletal: Normal range of motion.   Neurological: He is alert and oriented to person, place, and time. He has normal reflexes.   Skin: Skin is warm and dry.   Psychiatric: He has a normal mood and affect. His behavior is normal.   Nursing note and vitals reviewed.          Patient Active Problem List   Diagnosis   • Type 2 diabetes mellitus without complication, without long-term current use of insulin   • Essential hypertension   • Mixed hyperlipidemia   • Generalized  anxiety disorder               Assessment    Diagnosis Plan   1. Type 2 diabetes mellitus without complication, without long-term current use of insulin     2. Essential hypertension     3. Generalized anxiety disorder           Plan       1. Patient will continue Metformin.      Will add Januvia 100 mg daily.      Side effects of the medication discussed with the patient.      ADA diet reviewed.Goals of diabetic control reviewed.  2. Patient will continue Lisinopril, Norvasc and Metoprolol.      DASH.      1.5 gr Sodium restriction.      Continue ASA and Plavix for CAD/history of cardiac stenting.  3.  Refill given on Xanax.       Side effects of the medication reviewed with the patient.       Jose Roberto reviewed.        Follow up in 3 months.

## 2017-04-25 ENCOUNTER — OFFICE VISIT (OUTPATIENT)
Dept: GASTROENTEROLOGY | Facility: CLINIC | Age: 72
End: 2017-04-25

## 2017-04-25 VITALS
HEART RATE: 75 BPM | DIASTOLIC BLOOD PRESSURE: 76 MMHG | HEIGHT: 69 IN | SYSTOLIC BLOOD PRESSURE: 144 MMHG | BODY MASS INDEX: 29.18 KG/M2 | WEIGHT: 197 LBS

## 2017-04-25 DIAGNOSIS — K57.30 DIVERTICULOSIS OF LARGE INTESTINE WITHOUT HEMORRHAGE: ICD-10-CM

## 2017-04-25 DIAGNOSIS — R10.13 EPIGASTRIC PAIN: ICD-10-CM

## 2017-04-25 DIAGNOSIS — K21.00 GASTROESOPHAGEAL REFLUX DISEASE WITH ESOPHAGITIS: Primary | ICD-10-CM

## 2017-04-25 DIAGNOSIS — R12 HEARTBURN: ICD-10-CM

## 2017-04-25 PROCEDURE — 99213 OFFICE O/P EST LOW 20 MIN: CPT | Performed by: PHYSICIAN ASSISTANT

## 2017-04-25 RX ORDER — PANTOPRAZOLE SODIUM 40 MG/1
40 TABLET, DELAYED RELEASE ORAL DAILY
Qty: 30 TABLET | Refills: 3 | Status: SHIPPED | OUTPATIENT
Start: 2017-04-25 | End: 2017-08-29 | Stop reason: SDUPTHER

## 2017-04-25 RX ORDER — PANTOPRAZOLE SODIUM 40 MG/1
40 TABLET, DELAYED RELEASE ORAL AS NEEDED
COMMUNITY
End: 2017-04-25 | Stop reason: SDUPTHER

## 2017-04-25 RX ORDER — SUCRALFATE 1 G/1
1 TABLET ORAL 2 TIMES DAILY
Qty: 60 TABLET | Refills: 3 | Status: SHIPPED | OUTPATIENT
Start: 2017-04-25 | End: 2017-08-29 | Stop reason: SDUPTHER

## 2017-04-25 RX ORDER — ESOMEPRAZOLE MAGNESIUM 40 MG/1
40 CAPSULE, DELAYED RELEASE ORAL
Qty: 30 CAPSULE | Refills: 3 | Status: SHIPPED | OUTPATIENT
Start: 2017-04-25 | End: 2017-08-29 | Stop reason: SDUPTHER

## 2017-06-14 ENCOUNTER — OFFICE VISIT (OUTPATIENT)
Dept: INTERNAL MEDICINE | Facility: CLINIC | Age: 72
End: 2017-06-14

## 2017-06-14 ENCOUNTER — APPOINTMENT (OUTPATIENT)
Dept: LAB | Facility: HOSPITAL | Age: 72
End: 2017-06-14

## 2017-06-14 VITALS
SYSTOLIC BLOOD PRESSURE: 140 MMHG | DIASTOLIC BLOOD PRESSURE: 90 MMHG | HEIGHT: 69 IN | WEIGHT: 198.6 LBS | BODY MASS INDEX: 29.41 KG/M2

## 2017-06-14 DIAGNOSIS — E11.65 TYPE 2 DIABETES MELLITUS WITH HYPERGLYCEMIA, WITHOUT LONG-TERM CURRENT USE OF INSULIN (HCC): Primary | ICD-10-CM

## 2017-06-14 DIAGNOSIS — F41.1 ANXIETY, GENERALIZED: ICD-10-CM

## 2017-06-14 DIAGNOSIS — I10 ESSENTIAL HYPERTENSION: ICD-10-CM

## 2017-06-14 DIAGNOSIS — Z79.899 LONG-TERM USE OF HIGH-RISK MEDICATION: ICD-10-CM

## 2017-06-14 LAB
AMPHET+METHAMPHET UR QL: NEGATIVE
BARBITURATES UR QL SCN: NEGATIVE
BENZODIAZ UR QL SCN: POSITIVE
CANNABINOIDS SERPL QL: NEGATIVE
COCAINE UR QL: NEGATIVE
METHADONE UR QL SCN: NEGATIVE
OPIATES UR QL: NEGATIVE
OXYCODONE UR QL SCN: NEGATIVE

## 2017-06-14 PROCEDURE — 80307 DRUG TEST PRSMV CHEM ANLYZR: CPT | Performed by: INTERNAL MEDICINE

## 2017-06-14 PROCEDURE — 99213 OFFICE O/P EST LOW 20 MIN: CPT | Performed by: INTERNAL MEDICINE

## 2017-06-14 RX ORDER — METOPROLOL TARTRATE 50 MG/1
50 TABLET, FILM COATED ORAL 2 TIMES DAILY
Qty: 180 TABLET | Refills: 1 | Status: SHIPPED | OUTPATIENT
Start: 2017-06-14 | End: 2017-09-14 | Stop reason: SDUPTHER

## 2017-06-14 RX ORDER — CLOPIDOGREL BISULFATE 75 MG/1
75 TABLET ORAL DAILY
Qty: 90 TABLET | Refills: 1 | Status: SHIPPED | OUTPATIENT
Start: 2017-06-14 | End: 2018-03-15 | Stop reason: SDUPTHER

## 2017-06-14 RX ORDER — LISINOPRIL 40 MG/1
40 TABLET ORAL DAILY
Qty: 90 TABLET | Refills: 1 | Status: SHIPPED | OUTPATIENT
Start: 2017-06-14 | End: 2018-03-15 | Stop reason: SDUPTHER

## 2017-06-14 RX ORDER — AMLODIPINE BESYLATE 10 MG/1
10 TABLET ORAL DAILY
Qty: 90 TABLET | Refills: 1 | Status: SHIPPED | OUTPATIENT
Start: 2017-06-14 | End: 2017-09-14 | Stop reason: SDUPTHER

## 2017-06-14 RX ORDER — ALPRAZOLAM 0.5 MG/1
0.5 TABLET ORAL 2 TIMES DAILY PRN
Qty: 30 TABLET | Refills: 2 | Status: SHIPPED | OUTPATIENT
Start: 2017-06-14 | End: 2017-09-14 | Stop reason: SDUPTHER

## 2017-06-14 RX ORDER — METFORMIN HYDROCHLORIDE 500 MG/1
1000 TABLET, EXTENDED RELEASE ORAL
Qty: 180 TABLET | Refills: 1 | Status: SHIPPED | OUTPATIENT
Start: 2017-06-14 | End: 2017-06-27

## 2017-06-14 RX ORDER — GLIMEPIRIDE 2 MG/1
2 TABLET ORAL
Qty: 90 TABLET | Refills: 1 | Status: SHIPPED | OUTPATIENT
Start: 2017-06-14 | End: 2017-09-14 | Stop reason: SDUPTHER

## 2017-06-14 NOTE — PROGRESS NOTES
Subjective   Aj Card Jr. is a 71 y.o. male   Patient is in for recheck on HTN, DM and hyperlipidemia.    BP is controlled on Lisinopril, Metoprolol and Norvasc. He checks his BP at home, and it has been in normal range.  Patient denies chest pain, dyspnea, orthopnea or edema in lower extremities.  History of cardiac stenting in 2012.  On ASA and Plavix. Plavix was lowered to qod due to bruising.    DM, type II.  On Metformin 500 mg bid.  Complains of diarrhea since being on Metformin.  He could not tolerate Januvia either and discontinued medication.  Glucose is running between 150 and 180.  Signs of hyperglycemia. Polydipsia and polyuria.  HgbA1c 7.12 /0303/08/2017/.  Lipids. , , HDL 35, HDL 78. /12/21/2016/.  He could not tolerate multiple statins in the past due to myalgia, including Crestor and Pitavastatin.  Patient is taking OTC red yeast rice.  Microvascular complications related to DM. No retinopathy. No peripheral neuropathy.  Macrovascular complications. CAD. No PAD.      Patient is still having problems with anxiety and takes Xanax on PRN basis.  He denies depressed mood or panic attacks.  Denies any side effects to the medication.    Past Medical History:   Diagnosis Date   • Acute posthemorrhagic anemia    • Allergic rhinitis    • Anxiety state    • Chest pain    • Coronary arteriosclerosis    • Diabetes mellitus     type 2   • Diverticular disease of colon    • Dysphagia    • Epigastric pain    • GERD (gastroesophageal reflux disease)     esophagitis on Dexilant. Pt feels Nexium working better      • History of echocardiogram     Small left atrial enlargement with mild CLVH.Ef of 55-60%.Mitral valve mildly thickened.Av thickened.Left ventricle mildly dilated.Tricuspid intact.Mild aortic insufficiency. 12/07/2015       • History of echocardiogram     Mild diastolic dysfunction and mild left atrial enlargement, otherwise normal echo. EF> 55% 01/03/2012       • Hypercholesterolemia     • Hypertension    • Insomnia    • Irritable bowel syndrome    • Osteoarthritis of multiple joints      Past Surgical History:   Procedure Laterality Date   • APPENDECTOMY       [facility] 1995       • CARDIAC CATHETERIZATION      Successful PTCA of the OMB#2.Unsuccessful PTCA of the 1st OMB, secondary to difficulty in advancing the balloon. 12/08/2015       • CHOLECYSTECTOMY      [facility] 1993       • COLONOSCOPY  10/01/2012   • CORONARY ANGIOPLASTY      Successful PTCA & stenting LAD was done w/ deployment of a 2.5mm x23 Xience stent w/ reduction of stenosis from 90% to less than 0% stenosis with good LILLIAM 3 flow 02/17/2012       • ENDOSCOPY      with biopsy.  Mildly severe esophagitis. Gastritis. Normal examined duodenum.Several biopsies obtained in the lower third of the esophagus.Several biopsies obtained in the gastric antrum. 05/06/2016       • ENDOSCOPY      w tube. Normal esophagus. Gastritis in stomach. Biopsy taken. Gastric polyp found. Biopsy taken. Normal duodenum. 10/01/2012       • ENDOSCOPY AND COLONOSCOPY      Diverticulum in sigmoid colon & descending colon. Internal & external hemorrhoids found. 10/01/2012       • HAND SURGERY       Corrective osteotomy of ring finger metacarpal. Malunited fracture of left ring finger 05/21/1985       • HERNIA REPAIR      Laparoscopic hernia repair. prepertitoneal bilateral inguinal hernia repair with mesh. 10/15/2009      • OTHER SURGICAL HISTORY      CORONARY ARTERY STENT    • SKIN TAG REMOVAL      Excision, viral skin tag,right upper eyelid 05/08/1995        Social History   Substance Use Topics   • Smoking status: Never Smoker   • Smokeless tobacco: Never Used   • Alcohol use No     Family History   Problem Relation Age of Onset   • Heart disease Other    • Hypertension Other    • Stroke Other    • Schizophrenia Sister      Allergies   Allergen Reactions   • Adhesive Tape    • Brilinta [Ticagrelor]    • Crestor [Rosuvastatin  Calcium]    • Effient [Prasugrel]    • Januvia [Sitagliptin] Dizziness   • Lipitor [Atorvastatin] Swelling   • Other      Cipro: Hives   • Warfarin And Related    • Cardizem [Diltiazem Hcl] Rash   • Celexa [Citalopram Hydrobromide] Rash   • Floxin [Ofloxacin] Rash   • Penicillins Rash   • Sulfa Antibiotics Rash     Current Outpatient Prescriptions on File Prior to Visit   Medication Sig Dispense Refill   • aspirin 81 MG EC tablet Take 162 mg by mouth daily.     • azelastine (ASTELIN) 0.1 % nasal spray 2 sprays into each nostril 2 (two) times a day. Use in each nostril as directed     • cholecalciferol (VITAMIN D3) 1000 UNITS tablet Take 1,000 Units by mouth daily.     • coenzyme Q10 100 MG capsule Take 100 mg by mouth daily.     • esomeprazole (nexIUM) 40 MG capsule Take 1 capsule by mouth Every Morning Before Breakfast. 30 capsule 3   • KRILL OIL OMEGA-3 PO Take  by mouth daily.     • pantoprazole (PROTONIX) 40 MG EC tablet Take 1 tablet by mouth Daily. 30 tablet 3   • Saw Palmetto, Serenoa repens, (SAW PALMETTO PO) Take  by mouth daily.     • sucralfate (CARAFATE) 1 G tablet Take 1 tablet by mouth 2 (Two) Times a Day. 60 tablet 3   • triamcinolone (KENALOG) 0.1 % cream      • vitamin B-12 (CYANOCOBALAMIN) 2500 MCG sublingual tablet tablet Place  under the tongue daily.     • [DISCONTINUED] ALPRAZolam (XANAX) 0.5 MG tablet Take 1 tablet by mouth 2 (Two) Times a Day As Needed for Anxiety. 30 tablet 0   • [DISCONTINUED] amLODIPine (NORVASC) 10 MG tablet Take 1 tablet by mouth Daily. 90 tablet 1   • [DISCONTINUED] benzonatate (TESSALON PERLES) 100 MG capsule 1-2 caps po TID prn cough 30 capsule 0   • [DISCONTINUED] clopidogrel (PLAVIX) 75 MG tablet Take 1 tablet by mouth Daily. 90 tablet 1   • [DISCONTINUED] glucose blood test strip 1 each by Other route Daily. Use as instructed 100 each 5   • [DISCONTINUED] lisinopril (PRINIVIL,ZESTRIL) 40 MG tablet Take 1 tablet by mouth Daily. 90 tablet 1   • [DISCONTINUED]  metFORMIN (GLUCOPHAGE) 500 MG tablet 2 PO qa, 1 PO qpm 270 tablet 1   • [DISCONTINUED] metoprolol tartrate (LOPRESSOR) 50 MG tablet Take 1 tablet by mouth 2 (Two) Times a Day. 180 tablet 1     No current facility-administered medications on file prior to visit.      Review of Systems   Constitutional: Negative for activity change and fatigue.   HENT: Negative for congestion, mouth sores and nosebleeds.    Eyes: Negative for photophobia and visual disturbance.   Respiratory: Negative for chest tightness, shortness of breath and wheezing.    Cardiovascular: Negative for chest pain, palpitations and leg swelling.   Gastrointestinal: Positive for diarrhea. Negative for abdominal pain, constipation, nausea and vomiting.   Endocrine: Positive for polydipsia and polyuria. Negative for cold intolerance and heat intolerance.   Skin: Negative for rash.   Neurological: Negative for dizziness, light-headedness and headaches.   Psychiatric/Behavioral: Negative for sleep disturbance. The patient is not nervous/anxious.        Objective   Physical Exam   Constitutional: He appears well-developed and well-nourished.   HENT:   Head: Normocephalic and atraumatic.   Nose: Nose normal.   Mouth/Throat: Oropharynx is clear and moist.   Eyes: Pupils are equal, round, and reactive to light.   Neck: Neck supple. No JVD present. No thyromegaly present.   Cardiovascular: Normal rate and regular rhythm.    No murmur heard.  Pulmonary/Chest: Effort normal and breath sounds normal.   Abdominal: Soft. Bowel sounds are normal. He exhibits no mass.   Musculoskeletal: Normal range of motion. He exhibits no edema.   Neurological: He is alert.   Skin: Skin is warm and dry.   Psychiatric: He has a normal mood and affect. His behavior is normal.   Nursing note and vitals reviewed.          Patient Active Problem List   Diagnosis   • Type 2 diabetes mellitus without complication, without long-term current use of insulin   • Essential hypertension   •  Mixed hyperlipidemia   • Generalized anxiety disorder         Lab on 03/08/2017   Component Date Value Ref Range Status   • Hemoglobin A1C 03/08/2017 7.12* 4 - 5.6 % Final   • Glucose 03/08/2017 183* 60 - 100 mg/dL Final   • BUN 03/08/2017 16  7 - 21 mg/dL Final   • Creatinine 03/08/2017 0.89  0.70 - 1.30 mg/dL Final   • Sodium 03/08/2017 139  137 - 145 mmol/L Final   • Potassium 03/08/2017 4.6  3.5 - 5.1 mmol/L Final   • Chloride 03/08/2017 99  95 - 110 mmol/L Final   • CO2 03/08/2017 26.0  22.0 - 31.0 mmol/L Final   • Calcium 03/08/2017 9.8  8.4 - 10.2 mg/dL Final   • eGFR Non African Amer 03/08/2017 84  42 - 98 mL/min/1.73 Final   • BUN/Creatinine Ratio 03/08/2017 18.0  7.0 - 25.0 Final   • Anion Gap 03/08/2017 14.0  5.0 - 15.0 mmol/L Final   Office Visit on 12/14/2016   Component Date Value Ref Range Status   • Sodium 12/21/2016 139  137 - 145 mmol/L Final   • Potassium 12/21/2016 4.2  3.5 - 5.1 mmol/L Final   • Chloride 12/21/2016 98  95 - 110 mmol/L Final   • CO2 12/21/2016 27  22 - 31 mmol/L Final   • Anion Gap 12/21/2016 14.0  5.0 - 15.0 mmol/L Final   • Glucose 12/21/2016 163* 60 - 100 mg/dl Final   • BUN 12/21/2016 16  7 - 21 mg/dl Final   • Creatinine 12/21/2016 0.9  0.7 - 1.3 mg/dl Final   • GFR MDRD Non  12/21/2016 83  42 - 98 mL/min/1.73 sq.M Final   • GFR MDRD  12/21/2016 100* 42 - 98 mL/min/1.73 sq.M Final   • Calcium 12/21/2016 9.2  8.4 - 10.2 mg/dl Final   • Total Protein 12/21/2016 7.1  6.3 - 8.6 gm/dl Final   • Albumin 12/21/2016 4.0  3.4 - 4.8 gm/dl Final   • Total Bilirubin 12/21/2016 0.7  0.2 - 1.3 mg/dl Final   • Alkaline Phosphatase 12/21/2016 39  38 - 126 U/L Final   • AST (SGOT) 12/21/2016 40  17 - 59 U/L Final   • ALT (SGPT) 12/21/2016 63  21 - 72 U/L Final   • Total Cholesterol 12/21/2016 150  0 - 199 mg/dl Final   • Triglycerides 12/21/2016 186  20 - 199 mg/dl Final   • HDL Cholesterol 12/21/2016 35* 60 - 200 mg/dl Final   • LDL Cholesterol  12/21/2016 78   0 - 129 mg/dl Final   • Hemoglobin A1C 12/21/2016 7.6* 4.0 - 5.6 %TotHgb Final   • Creatinine, Urine 12/21/2016 178.2  mg/dl Final   • Albumin, U 12/21/2016 0.6  0.0 - 1.7 mg/dl Final   • Microalbumin/Creatinine Ratio 12/21/2016 3  0 - 30 mg/g Final           Assessment    Diagnosis Plan   1. Type 2 diabetes mellitus with hyperglycemia, without long-term current use of insulin  Hemoglobin A1c    Comprehensive Metabolic Panel   2. Essential hypertension     3. Anxiety, generalized     4. Long-term use of high-risk medication  Urine Drug Screen         Plan      1. Patient will be placed on Glimepiride 2 mg daily.      Side effects of the medication discussed with the patient.      Will change Metformin to Metformin XR. Hopefully he can tolerate it better.      ADA diet.      Advised about goals of diabetic control.      Counseled on weight loss.  2. Patient will continue Lisinopril, Metoprolol and Amlodipine.      DASH.      1.5 gr Sodium restriction.  3. Refill given on Xanax.      Advised about habit forming nature of the medication.      Jose Roberto reviewed.      Urine drug screen ordered.        Follow up in 3 months.

## 2017-06-27 RX ORDER — METFORMIN HYDROCHLORIDE 500 MG/1
500 TABLET, EXTENDED RELEASE ORAL 2 TIMES DAILY WITH MEALS
Qty: 180 TABLET | Refills: 1 | Status: SHIPPED | OUTPATIENT
Start: 2017-06-27 | End: 2017-09-14 | Stop reason: SDUPTHER

## 2017-08-29 ENCOUNTER — OFFICE VISIT (OUTPATIENT)
Dept: GASTROENTEROLOGY | Facility: CLINIC | Age: 72
End: 2017-08-29

## 2017-08-29 VITALS
HEART RATE: 69 BPM | WEIGHT: 204 LBS | SYSTOLIC BLOOD PRESSURE: 139 MMHG | BODY MASS INDEX: 30.21 KG/M2 | HEIGHT: 69 IN | DIASTOLIC BLOOD PRESSURE: 77 MMHG

## 2017-08-29 DIAGNOSIS — R12 HEARTBURN: ICD-10-CM

## 2017-08-29 DIAGNOSIS — R10.13 EPIGASTRIC PAIN: ICD-10-CM

## 2017-08-29 DIAGNOSIS — R11.0 NAUSEA: ICD-10-CM

## 2017-08-29 DIAGNOSIS — K21.00 GASTROESOPHAGEAL REFLUX DISEASE WITH ESOPHAGITIS: Primary | ICD-10-CM

## 2017-08-29 PROCEDURE — 99213 OFFICE O/P EST LOW 20 MIN: CPT | Performed by: PHYSICIAN ASSISTANT

## 2017-08-29 RX ORDER — ESOMEPRAZOLE MAGNESIUM 40 MG/1
40 CAPSULE, DELAYED RELEASE ORAL
Qty: 90 CAPSULE | Refills: 3 | Status: SHIPPED | OUTPATIENT
Start: 2017-08-29 | End: 2018-07-31 | Stop reason: SDUPTHER

## 2017-08-29 RX ORDER — SUCRALFATE 1 G/1
1 TABLET ORAL 2 TIMES DAILY
Qty: 180 TABLET | Refills: 3 | Status: SHIPPED | OUTPATIENT
Start: 2017-08-29 | End: 2018-07-31 | Stop reason: SDUPTHER

## 2017-08-29 RX ORDER — ZOLPIDEM TARTRATE 5 MG/1
5 TABLET ORAL NIGHTLY PRN
COMMUNITY
End: 2018-10-03

## 2017-08-29 RX ORDER — PANTOPRAZOLE SODIUM 40 MG/1
40 TABLET, DELAYED RELEASE ORAL DAILY
Qty: 90 TABLET | Refills: 3 | Status: SHIPPED | OUTPATIENT
Start: 2017-08-29 | End: 2018-03-15

## 2017-09-14 ENCOUNTER — OFFICE VISIT (OUTPATIENT)
Dept: INTERNAL MEDICINE | Facility: CLINIC | Age: 72
End: 2017-09-14

## 2017-09-14 VITALS
HEIGHT: 69 IN | BODY MASS INDEX: 30.32 KG/M2 | HEART RATE: 68 BPM | SYSTOLIC BLOOD PRESSURE: 110 MMHG | DIASTOLIC BLOOD PRESSURE: 80 MMHG | OXYGEN SATURATION: 97 % | WEIGHT: 204.7 LBS

## 2017-09-14 DIAGNOSIS — I10 ESSENTIAL HYPERTENSION: ICD-10-CM

## 2017-09-14 DIAGNOSIS — F41.1 GENERALIZED ANXIETY DISORDER: ICD-10-CM

## 2017-09-14 DIAGNOSIS — E11.9 TYPE 2 DIABETES MELLITUS WITHOUT COMPLICATION, WITHOUT LONG-TERM CURRENT USE OF INSULIN (HCC): Primary | ICD-10-CM

## 2017-09-14 PROCEDURE — 99213 OFFICE O/P EST LOW 20 MIN: CPT | Performed by: INTERNAL MEDICINE

## 2017-09-14 RX ORDER — AMLODIPINE BESYLATE 10 MG/1
10 TABLET ORAL DAILY
Qty: 90 TABLET | Refills: 1 | Status: SHIPPED | OUTPATIENT
Start: 2017-09-14 | End: 2018-03-15 | Stop reason: SDUPTHER

## 2017-09-14 RX ORDER — ALPRAZOLAM 0.5 MG/1
0.5 TABLET ORAL 2 TIMES DAILY PRN
Qty: 30 TABLET | Refills: 2 | Status: SHIPPED | OUTPATIENT
Start: 2017-09-14 | End: 2018-03-15 | Stop reason: SDUPTHER

## 2017-09-14 RX ORDER — METFORMIN HYDROCHLORIDE 500 MG/1
500 TABLET, EXTENDED RELEASE ORAL 2 TIMES DAILY WITH MEALS
Qty: 180 TABLET | Refills: 1 | Status: SHIPPED | OUTPATIENT
Start: 2017-09-14 | End: 2018-03-15 | Stop reason: SDUPTHER

## 2017-09-14 RX ORDER — GLIMEPIRIDE 2 MG/1
2 TABLET ORAL
Qty: 90 TABLET | Refills: 1 | Status: SHIPPED | OUTPATIENT
Start: 2017-09-14 | End: 2018-03-15 | Stop reason: SINTOL

## 2017-09-14 RX ORDER — METOPROLOL TARTRATE 50 MG/1
50 TABLET, FILM COATED ORAL 2 TIMES DAILY
Qty: 180 TABLET | Refills: 1 | Status: SHIPPED | OUTPATIENT
Start: 2017-09-14 | End: 2018-03-15 | Stop reason: SDUPTHER

## 2017-09-14 NOTE — PROGRESS NOTES
Subjective   Aj Card Jr. is a 71 y.o. male     Patient presents for recheck on HTN, DM and anxiety.    Patient was started on Glimepiride last viist. Metformin was changed to Metformin XR which he tolerates better.  Glucose is better controlled since then and has been in the range of 100 to 150.  Patient denies any hypoglycemia.  HgbA1c 7.12 /03/08/2017/.  Lipids. , , LDL 78, HDL 35.  Not able to tolerate multiple statins but taking red yeast rice OTC.      BP is well controlled on multiple meds and denies any cardiovascular complaints.  History of CAD and cardiac stenting.  He is seeing his cardiologist on regular basis.      Patient is taking Xanax for anxiety and insomnia.  Denies depressed mood.  Under stress due to health issues in his family.  He was unable to take SSRIs in the past.      Past Medical History:   Diagnosis Date   • Acute posthemorrhagic anemia    • Allergic rhinitis    • Anxiety state    • Chest pain    • Coronary arteriosclerosis    • Diabetes mellitus     type 2   • Diverticular disease of colon    • Dysphagia    • Epigastric pain    • GERD (gastroesophageal reflux disease)     esophagitis on Dexilant. Pt feels Nexium working better      • History of echocardiogram     Small left atrial enlargement with mild CLVH.Ef of 55-60%.Mitral valve mildly thickened.Av thickened.Left ventricle mildly dilated.Tricuspid intact.Mild aortic insufficiency. 12/07/2015       • History of echocardiogram     Mild diastolic dysfunction and mild left atrial enlargement, otherwise normal echo. EF> 55% 01/03/2012       • Hypercholesterolemia    • Hypertension    • Insomnia    • Irritable bowel syndrome    • Osteoarthritis of multiple joints      Past Surgical History:   Procedure Laterality Date   • APPENDECTOMY       [facility] 1995       • CARDIAC CATHETERIZATION      Successful PTCA of the OMB#2.Unsuccessful PTCA of the 1st OMB, secondary to difficulty in advancing the  balloon. 12/08/2015       • CHOLECYSTECTOMY      [facility] 1993       • COLONOSCOPY  10/01/2012   • CORONARY ANGIOPLASTY      Successful PTCA & stenting LAD was done w/ deployment of a 2.5mm x23 Xience stent w/ reduction of stenosis from 90% to less than 0% stenosis with good LILLIAM 3 flow 02/17/2012       • ENDOSCOPY      with biopsy.  Mildly severe esophagitis. Gastritis. Normal examined duodenum.Several biopsies obtained in the lower third of the esophagus.Several biopsies obtained in the gastric antrum. 05/06/2016       • ENDOSCOPY      w tube. Normal esophagus. Gastritis in stomach. Biopsy taken. Gastric polyp found. Biopsy taken. Normal duodenum. 10/01/2012       • ENDOSCOPY AND COLONOSCOPY      Diverticulum in sigmoid colon & descending colon. Internal & external hemorrhoids found. 10/01/2012       • HAND SURGERY       Corrective osteotomy of ring finger metacarpal. Malunited fracture of left ring finger 05/21/1985       • HERNIA REPAIR      Laparoscopic hernia repair. prepertitoneal bilateral inguinal hernia repair with mesh. 10/15/2009      • OTHER SURGICAL HISTORY      CORONARY ARTERY STENT    • SKIN TAG REMOVAL      Excision, viral skin tag,right upper eyelid 05/08/1995        Social History   Substance Use Topics   • Smoking status: Never Smoker   • Smokeless tobacco: Never Used   • Alcohol use No     Family History   Problem Relation Age of Onset   • Heart disease Other    • Hypertension Other    • Stroke Other    • Schizophrenia Sister      Allergies   Allergen Reactions   • Adhesive Tape    • Brilinta [Ticagrelor]    • Crestor [Rosuvastatin Calcium]    • Effient [Prasugrel]    • Januvia [Sitagliptin] Dizziness   • Lipitor [Atorvastatin] Swelling   • Other      Cipro: Hives   • Warfarin And Related    • Cardizem [Diltiazem Hcl] Rash   • Celexa [Citalopram Hydrobromide] Rash   • Floxin [Ofloxacin] Rash   • Penicillins Rash   • Sulfa Antibiotics Rash     Current Outpatient Prescriptions on  File Prior to Visit   Medication Sig Dispense Refill   • aspirin 81 MG EC tablet Take 162 mg by mouth daily.     • azelastine (ASTELIN) 0.1 % nasal spray 2 sprays into each nostril 2 (two) times a day. Use in each nostril as directed     • cholecalciferol (VITAMIN D3) 1000 UNITS tablet Take 1,000 Units by mouth daily.     • clopidogrel (PLAVIX) 75 MG tablet Take 1 tablet by mouth Daily. 90 tablet 1   • coenzyme Q10 100 MG capsule Take 100 mg by mouth daily.     • esomeprazole (nexIUM) 40 MG capsule Take 1 capsule by mouth Every Morning Before Breakfast. 90 capsule 3   • KRILL OIL OMEGA-3 PO Take  by mouth daily.     • pantoprazole (PROTONIX) 40 MG EC tablet Take 1 tablet by mouth Daily. 90 tablet 3   • Red Yeast Rice Extract (RED YEAST RICE PO) Take  by mouth Daily.     • Saw Palmetto, Serenoa repens, (SAW PALMETTO PO) Take  by mouth daily.     • sucralfate (CARAFATE) 1 g tablet Take 1 tablet by mouth 2 (Two) Times a Day. 180 tablet 3   • vitamin B-12 (CYANOCOBALAMIN) 2500 MCG sublingual tablet tablet Place  under the tongue daily.     • zolpidem (AMBIEN) 5 MG tablet Take 5 mg by mouth At Night As Needed for Sleep.     • [DISCONTINUED] ALPRAZolam (XANAX) 0.5 MG tablet Take 1 tablet by mouth 2 (Two) Times a Day As Needed for Anxiety. 30 tablet 2   • [DISCONTINUED] amLODIPine (NORVASC) 10 MG tablet Take 1 tablet by mouth Daily. 90 tablet 1   • [DISCONTINUED] glimepiride (AMARYL) 2 MG tablet Take 1 tablet by mouth Every Morning Before Breakfast. 90 tablet 1   • [DISCONTINUED] metFORMIN ER (GLUCOPHAGE-XR) 500 MG 24 hr tablet Take 1 tablet by mouth 2 (Two) Times a Day With Meals. 180 tablet 1   • [DISCONTINUED] metoprolol tartrate (LOPRESSOR) 50 MG tablet Take 1 tablet by mouth 2 (Two) Times a Day. 180 tablet 1   • glucose blood test strip 1 each by Other route Daily. Use as instructed 100 each 5   • lisinopril (PRINIVIL,ZESTRIL) 40 MG tablet Take 1 tablet by mouth Daily. 90 tablet 1   • [DISCONTINUED] triamcinolone  (KENALOG) 0.1 % cream        No current facility-administered medications on file prior to visit.      Review of Systems   Constitutional: Negative for activity change.   HENT: Negative for facial swelling, mouth sores and nosebleeds.    Eyes: Negative for photophobia and visual disturbance.   Respiratory: Negative for chest tightness and shortness of breath.    Cardiovascular: Negative for palpitations and leg swelling.   Gastrointestinal: Negative for abdominal pain, constipation and diarrhea.   Endocrine: Negative for cold intolerance, heat intolerance, polydipsia and polyuria.   Musculoskeletal: Negative for arthralgias, back pain and myalgias.   Neurological: Negative for dizziness, syncope and light-headedness.   Psychiatric/Behavioral: Positive for sleep disturbance.       Objective   Physical Exam   Constitutional: He appears well-developed and well-nourished.   HENT:   Head: Normocephalic and atraumatic.   Eyes: Conjunctivae are normal.   Neck: Neck supple. No JVD present. No thyromegaly present.   Cardiovascular: Normal rate and regular rhythm.    Pulmonary/Chest: Effort normal and breath sounds normal.   Abdominal: Soft. Bowel sounds are normal. He exhibits no mass.   Neurological: He is alert.   Psychiatric: He has a normal mood and affect. His behavior is normal. Thought content normal.   Nursing note and vitals reviewed.          Patient Active Problem List   Diagnosis   • Type 2 diabetes mellitus without complication, without long-term current use of insulin   • Essential hypertension   • Mixed hyperlipidemia   • Generalized anxiety disorder     Office Visit on 06/14/2017   Component Date Value Ref Range Status   • Amphetamine Screen, Urine 06/14/2017 Negative  Negative Final   • Barbiturates Screen, Urine 06/14/2017 Negative  Negative Final   • Benzodiazepine Screen, Urine 06/14/2017 Positive* Negative Final   • Cocaine Screen, Urine 06/14/2017 Negative  Negative Final   • Methadone Screen, Urine  06/14/2017 Negative  Negative Final   • Opiate Screen 06/14/2017 Negative  Negative Final   • Oxycodone Screen, Urine 06/14/2017 Negative  Negative Final   • THC, Screen, Urine 06/14/2017 Negative  Negative Final   Lab on 03/08/2017   Component Date Value Ref Range Status   • Hemoglobin A1C 03/08/2017 7.12* 4 - 5.6 % Final   • Glucose 03/08/2017 183* 60 - 100 mg/dL Final   • BUN 03/08/2017 16  7 - 21 mg/dL Final   • Creatinine 03/08/2017 0.89  0.70 - 1.30 mg/dL Final   • Sodium 03/08/2017 139  137 - 145 mmol/L Final   • Potassium 03/08/2017 4.6  3.5 - 5.1 mmol/L Final   • Chloride 03/08/2017 99  95 - 110 mmol/L Final   • CO2 03/08/2017 26.0  22.0 - 31.0 mmol/L Final   • Calcium 03/08/2017 9.8  8.4 - 10.2 mg/dL Final   • eGFR Non African Amer 03/08/2017 84  42 - 98 mL/min/1.73 Final   • BUN/Creatinine Ratio 03/08/2017 18.0  7.0 - 25.0 Final   • Anion Gap 03/08/2017 14.0  5.0 - 15.0 mmol/L Final             Assessment   Diagnosis Plan   1. Type 2 diabetes mellitus without complication, without long-term current use of insulin     2. Essential hypertension     3. Generalized anxiety disorder         Plan      1. Patient will continue Metformin XR and Glimepiride.      ADA diet.      Will recheck HgbA1c.      Discussed goals of diabetic control.  2. Current antihypertensive therapy is continued.      Counseled on nutrition and physical activity.  3. Patient is given new prescription for Xanax.      Side effects of the medication reviewed.      Patient signed [facility] consent for treatment with controlled substance and provider agreement.      Urine drug screen reviewed.      Follow up in 6 months or earlier if any problems.                    This document has been electronically signed by Steff Rodriguez MD on September 14, 2017 1:27 PM

## 2017-09-15 ENCOUNTER — LAB (OUTPATIENT)
Dept: LAB | Facility: CLINIC | Age: 72
End: 2017-09-15

## 2017-09-15 DIAGNOSIS — E11.65 TYPE 2 DIABETES MELLITUS WITH HYPERGLYCEMIA, WITHOUT LONG-TERM CURRENT USE OF INSULIN (HCC): ICD-10-CM

## 2017-09-15 LAB
ALBUMIN SERPL-MCNC: 4.5 G/DL (ref 3.4–4.8)
ALBUMIN/GLOB SERPL: 1.6 G/DL (ref 1.1–1.8)
ALP SERPL-CCNC: 39 U/L (ref 38–126)
ALT SERPL W P-5'-P-CCNC: 54 U/L (ref 21–72)
ANION GAP SERPL CALCULATED.3IONS-SCNC: 13 MMOL/L (ref 5–15)
AST SERPL-CCNC: 39 U/L (ref 17–59)
BILIRUB SERPL-MCNC: 0.9 MG/DL (ref 0.2–1.3)
BUN BLD-MCNC: 13 MG/DL (ref 7–21)
BUN/CREAT SERPL: 14.4 (ref 7–25)
CALCIUM SPEC-SCNC: 9.8 MG/DL (ref 8.4–10.2)
CHLORIDE SERPL-SCNC: 100 MMOL/L (ref 95–110)
CO2 SERPL-SCNC: 25 MMOL/L (ref 22–31)
CREAT BLD-MCNC: 0.9 MG/DL (ref 0.7–1.3)
GFR SERPL CREATININE-BSD FRML MDRD: 83 ML/MIN/1.73 (ref 42–98)
GLOBULIN UR ELPH-MCNC: 2.9 GM/DL (ref 2.3–3.5)
GLUCOSE BLD-MCNC: 185 MG/DL (ref 60–100)
HBA1C MFR BLD: 7.1 % (ref 4–5.6)
POTASSIUM BLD-SCNC: 4.9 MMOL/L (ref 3.5–5.1)
PROT SERPL-MCNC: 7.4 G/DL (ref 6.3–8.6)
SODIUM BLD-SCNC: 138 MMOL/L (ref 137–145)

## 2017-09-15 PROCEDURE — 80053 COMPREHEN METABOLIC PANEL: CPT | Performed by: INTERNAL MEDICINE

## 2017-09-15 PROCEDURE — 36415 COLL VENOUS BLD VENIPUNCTURE: CPT | Performed by: FAMILY MEDICINE

## 2017-09-15 PROCEDURE — 83036 HEMOGLOBIN GLYCOSYLATED A1C: CPT | Performed by: INTERNAL MEDICINE

## 2017-09-18 ENCOUNTER — TELEPHONE (OUTPATIENT)
Dept: INTERNAL MEDICINE | Facility: CLINIC | Age: 72
End: 2017-09-18

## 2017-09-18 NOTE — TELEPHONE ENCOUNTER
Spoke with pt let him know lab shows his average glucose came back high ,DR MEDINA recommends if bs are running high at home he needs to double his glimepiride

## 2017-11-07 ENCOUNTER — OFFICE VISIT (OUTPATIENT)
Dept: GASTROENTEROLOGY | Facility: CLINIC | Age: 72
End: 2017-11-07

## 2017-11-07 VITALS
SYSTOLIC BLOOD PRESSURE: 134 MMHG | BODY MASS INDEX: 30.07 KG/M2 | HEIGHT: 69 IN | HEART RATE: 69 BPM | WEIGHT: 203 LBS | DIASTOLIC BLOOD PRESSURE: 76 MMHG

## 2017-11-07 DIAGNOSIS — R11.0 NAUSEA: ICD-10-CM

## 2017-11-07 DIAGNOSIS — Z86.010 HISTORY OF COLON POLYPS: ICD-10-CM

## 2017-11-07 DIAGNOSIS — R19.7 DIARRHEA, UNSPECIFIED TYPE: ICD-10-CM

## 2017-11-07 DIAGNOSIS — K57.30 DIVERTICULOSIS OF LARGE INTESTINE WITHOUT HEMORRHAGE: ICD-10-CM

## 2017-11-07 DIAGNOSIS — K21.00 GASTROESOPHAGEAL REFLUX DISEASE WITH ESOPHAGITIS: Primary | ICD-10-CM

## 2017-11-07 DIAGNOSIS — R10.13 EPIGASTRIC PAIN: ICD-10-CM

## 2017-11-07 PROCEDURE — 99213 OFFICE O/P EST LOW 20 MIN: CPT | Performed by: PHYSICIAN ASSISTANT

## 2017-11-07 RX ORDER — DEXTROSE AND SODIUM CHLORIDE 5; .45 G/100ML; G/100ML
30 INJECTION, SOLUTION INTRAVENOUS CONTINUOUS PRN
Status: CANCELLED | OUTPATIENT
Start: 2017-12-11

## 2017-11-07 RX ORDER — POLYETHYLENE GLYCOL 3350 17 G/17G
POWDER, FOR SOLUTION ORAL
Qty: 255 G | Refills: 0 | Status: SHIPPED | OUTPATIENT
Start: 2017-11-07 | End: 2018-02-13

## 2017-11-07 NOTE — PROGRESS NOTES
Chief Complaint   Patient presents with   • Heartburn   • Nausea   • Abdominal Pain       ENDO PROCEDURE ORDERED: COLON, bx, diarrhea, diverticular dz    Subjective    Aj Cr Card Jr. is a 72 y.o. male. he is here today for follow-up.    History of Present Illness    Patient seen on a recheck of his GERD, nausea, epigastric pain, diverticular disease.  Last seen 8/29/17.  Patient states his GERD is doing fairly well, he is on Nexium, Carafate, Protonix.  He denied nausea, vomiting.  He's been having bouts of diarrhea.  Spicy foods seem to make his diarrhea worse.  He is due for follow-up endoscopic evaluation, history of polyps.  Last colonoscopy was in 2012.  Weight is down 1 pound since last visit.  He seen no blood in his stool.    Laboratory on 9/15/17 CMP showed glucose 185, otherwise normal.  A1c 7.1%.    A/P: GERD appears stable on current regimen.  Patient is due for follow-up colonoscopy, he would like to do this before a year.  He'll follow-up after the above, further pending clinical course and the results of the above.  Continue current medications.     The following portions of the patient's history were reviewed and updated as appropriate:   Past Medical History:   Diagnosis Date   • Acute posthemorrhagic anemia    • Allergic rhinitis    • Anxiety state    • Chest pain    • Coronary arteriosclerosis    • Diabetes mellitus     type 2   • Diverticular disease of colon    • Dysphagia    • Epigastric pain    • GERD (gastroesophageal reflux disease)     esophagitis on Dexilant. Pt feels Nexium working better      • History of echocardiogram     Small left atrial enlargement with mild CLVH.Ef of 55-60%.Mitral valve mildly thickened.Av thickened.Left ventricle mildly dilated.Tricuspid intact.Mild aortic insufficiency. 12/07/2015       • History of echocardiogram     Mild diastolic dysfunction and mild left atrial enlargement, otherwise normal echo. EF> 55% 01/03/2012       • Hypercholesterolemia     • Hypertension    • Insomnia    • Irritable bowel syndrome    • Osteoarthritis of multiple joints      Past Surgical History:   Procedure Laterality Date   • APPENDECTOMY       [facility] 1995       • CARDIAC CATHETERIZATION      Successful PTCA of the OMB#2.Unsuccessful PTCA of the 1st OMB, secondary to difficulty in advancing the balloon. 12/08/2015       • CHOLECYSTECTOMY      [facility] 1993       • COLONOSCOPY  10/01/2012   • CORONARY ANGIOPLASTY      Successful PTCA & stenting LAD was done w/ deployment of a 2.5mm x23 Xience stent w/ reduction of stenosis from 90% to less than 0% stenosis with good LILLIAM 3 flow 02/17/2012       • ENDOSCOPY      with biopsy.  Mildly severe esophagitis. Gastritis. Normal examined duodenum.Several biopsies obtained in the lower third of the esophagus.Several biopsies obtained in the gastric antrum. 05/06/2016       • ENDOSCOPY      w tube. Normal esophagus. Gastritis in stomach. Biopsy taken. Gastric polyp found. Biopsy taken. Normal duodenum. 10/01/2012       • ENDOSCOPY AND COLONOSCOPY      Diverticulum in sigmoid colon & descending colon. Internal & external hemorrhoids found. 10/01/2012       • HAND SURGERY       Corrective osteotomy of ring finger metacarpal. Malunited fracture of left ring finger 05/21/1985       • HERNIA REPAIR      Laparoscopic hernia repair. prepertitoneal bilateral inguinal hernia repair with mesh. 10/15/2009      • OTHER SURGICAL HISTORY      CORONARY ARTERY STENT    • SKIN TAG REMOVAL      Excision, viral skin tag,right upper eyelid 05/08/1995      Family History   Problem Relation Age of Onset   • Heart disease Other    • Hypertension Other    • Stroke Other    • Schizophrenia Sister        Allergies   Allergen Reactions   • Adhesive Tape    • Brilinta [Ticagrelor]    • Crestor [Rosuvastatin Calcium]    • Effient [Prasugrel]    • Januvia [Sitagliptin] Dizziness   • Lipitor [Atorvastatin] Swelling   • Other      Cipro:  Hives   • Warfarin And Related    • Cardizem [Diltiazem Hcl] Rash   • Celexa [Citalopram Hydrobromide] Rash   • Floxin [Ofloxacin] Rash   • Penicillins Rash   • Sulfa Antibiotics Rash     Social History     Social History   • Marital status:      Spouse name: N/A   • Number of children: N/A   • Years of education: N/A     Social History Main Topics   • Smoking status: Never Smoker   • Smokeless tobacco: Never Used   • Alcohol use No   • Drug use: No   • Sexual activity: Not Asked     Other Topics Concern   • None     Social History Narrative       Current Outpatient Prescriptions:   •  ALPRAZolam (XANAX) 0.5 MG tablet, Take 1 tablet by mouth 2 (Two) Times a Day As Needed for Anxiety., Disp: 30 tablet, Rfl: 2  •  amLODIPine (NORVASC) 10 MG tablet, Take 1 tablet by mouth Daily., Disp: 90 tablet, Rfl: 1  •  aspirin 81 MG EC tablet, Take 162 mg by mouth daily., Disp: , Rfl:   •  azelastine (ASTELIN) 0.1 % nasal spray, 2 sprays into each nostril 2 (two) times a day. Use in each nostril as directed, Disp: , Rfl:   •  cholecalciferol (VITAMIN D3) 1000 UNITS tablet, Take 1,000 Units by mouth daily., Disp: , Rfl:   •  clopidogrel (PLAVIX) 75 MG tablet, Take 1 tablet by mouth Daily., Disp: 90 tablet, Rfl: 1  •  coenzyme Q10 100 MG capsule, Take 100 mg by mouth daily., Disp: , Rfl:   •  esomeprazole (nexIUM) 40 MG capsule, Take 1 capsule by mouth Every Morning Before Breakfast., Disp: 90 capsule, Rfl: 3  •  glimepiride (AMARYL) 2 MG tablet, Take 1 tablet by mouth Every Morning Before Breakfast., Disp: 90 tablet, Rfl: 1  •  glucose blood test strip, 1 each by Other route Daily. Use as instructed, Disp: 100 each, Rfl: 5  •  KRILL OIL OMEGA-3 PO, Take  by mouth daily., Disp: , Rfl:   •  lisinopril (PRINIVIL,ZESTRIL) 40 MG tablet, Take 1 tablet by mouth Daily., Disp: 90 tablet, Rfl: 1  •  metFORMIN ER (GLUCOPHAGE-XR) 500 MG 24 hr tablet, Take 1 tablet by mouth 2 (Two) Times a Day With Meals., Disp: 180 tablet, Rfl: 1  •   "metoprolol tartrate (LOPRESSOR) 50 MG tablet, Take 1 tablet by mouth 2 (Two) Times a Day., Disp: 180 tablet, Rfl: 1  •  pantoprazole (PROTONIX) 40 MG EC tablet, Take 1 tablet by mouth Daily., Disp: 90 tablet, Rfl: 3  •  Red Yeast Rice Extract (RED YEAST RICE PO), Take  by mouth Daily., Disp: , Rfl:   •  Saw Palmetto, Serenoa repens, (SAW PALMETTO PO), Take  by mouth daily., Disp: , Rfl:   •  sucralfate (CARAFATE) 1 g tablet, Take 1 tablet by mouth 2 (Two) Times a Day., Disp: 180 tablet, Rfl: 3  •  vitamin B-12 (CYANOCOBALAMIN) 2500 MCG sublingual tablet tablet, Place  under the tongue daily., Disp: , Rfl:   •  zolpidem (AMBIEN) 5 MG tablet, Take 5 mg by mouth At Night As Needed for Sleep., Disp: , Rfl:   •  polyethylene glycol (MIRALAX) powder, As directed per instruction sheet for colonoscopy, Disp: 255 g, Rfl: 0  Review of Systems  Review of Systems       Objective    /76 (BP Location: Left arm)  Pulse 69  Ht 69\" (175.3 cm)  Wt 203 lb (92.1 kg)  BMI 29.98 kg/m2  Physical Exam   Constitutional: He is oriented to person, place, and time. He appears well-developed and well-nourished. No distress.   HENT:   Head: Normocephalic and atraumatic.   Eyes: EOM are normal. Pupils are equal, round, and reactive to light.   Neck: Normal range of motion.   Cardiovascular: Normal rate, regular rhythm and normal heart sounds.    Pulmonary/Chest: Effort normal and breath sounds normal.   Abdominal: Soft. Bowel sounds are normal. He exhibits no shifting dullness, no distension, no abdominal bruit, no ascites and no mass. There is no hepatosplenomegaly. There is tenderness. There is no rigidity, no rebound, no guarding and no CVA tenderness. No hernia. Hernia confirmed negative in the ventral area.   Obese, mild upper   Musculoskeletal: Normal range of motion.   Neurological: He is alert and oriented to person, place, and time.   Skin: Skin is warm and dry.   Psychiatric: He has a normal mood and affect. His behavior is " normal. Judgment and thought content normal.   Nursing note and vitals reviewed.    Assessment/Plan      1. Gastroesophageal reflux disease with esophagitis    2. Epigastric pain    3. Nausea    4. Diverticulosis of large intestine without hemorrhage    5. Diarrhea, unspecified type    6. History of colon polyps    .   Aj was seen today for heartburn, nausea and abdominal pain.    Diagnoses and all orders for this visit:    Gastroesophageal reflux disease with esophagitis    Epigastric pain    Nausea    Diverticulosis of large intestine without hemorrhage  -     Case Request; Standing  -     dextrose 5 % and sodium chloride 0.45 % infusion; Infuse 30 mL/hr into a venous catheter Continuous As Needed (Start Prior to Procedure).  -     Case Request    Diarrhea, unspecified type  -     Case Request; Standing  -     dextrose 5 % and sodium chloride 0.45 % infusion; Infuse 30 mL/hr into a venous catheter Continuous As Needed (Start Prior to Procedure).  -     Case Request    History of colon polyps  -     Case Request; Standing  -     dextrose 5 % and sodium chloride 0.45 % infusion; Infuse 30 mL/hr into a venous catheter Continuous As Needed (Start Prior to Procedure).  -     Case Request    Other orders  -     Obtain Informed Consent; Standing  -     POC Glucose Fingerstick; Standing  -     polyethylene glycol (MIRALAX) powder; As directed per instruction sheet for colonoscopy        Orders placed during this encounter include:  No orders of the defined types were placed in this encounter.      Medications prescribed:  New Medications Ordered This Visit   Medications   • polyethylene glycol (MIRALAX) powder     Sig: As directed per instruction sheet for colonoscopy     Dispense:  255 g     Refill:  0       Requested Prescriptions     Signed Prescriptions Disp Refills   • polyethylene glycol (MIRALAX) powder 255 g 0     Sig: As directed per instruction sheet for colonoscopy       Review and/or summary of lab tests,  radiology, procedures, medications. Review and summary of old records and obtaining of history. The risks and benefits of my recommendations, as well as other treatment options were discussed with the patient today. Questions were answered.    Follow-up: Return in about 3 months (around 2/7/2018), or if symptoms worsen or fail to improve.     COLONOSCOPY (N/A)      This document has been electronically signed by Andreas Aldana PA-C on November 27, 2017 6:24 PM      Results for orders placed or performed in visit on 09/15/17   Hemoglobin A1c   Result Value Ref Range    Hemoglobin A1C 7.1 (H) 4 - 5.6 %   Comprehensive Metabolic Panel   Result Value Ref Range    Glucose 185 (H) 60 - 100 mg/dL    BUN 13 7 - 21 mg/dL    Creatinine 0.90 0.70 - 1.30 mg/dL    Sodium 138 137 - 145 mmol/L    Potassium 4.9 3.5 - 5.1 mmol/L    Chloride 100 95 - 110 mmol/L    CO2 25.0 22.0 - 31.0 mmol/L    Calcium 9.8 8.4 - 10.2 mg/dL    Total Protein 7.4 6.3 - 8.6 g/dL    Albumin 4.50 3.40 - 4.80 g/dL    ALT (SGPT) 54 21 - 72 U/L    AST (SGOT) 39 17 - 59 U/L    Alkaline Phosphatase 39 38 - 126 U/L    Total Bilirubin 0.9 0.2 - 1.3 mg/dL    eGFR Non African Amer 83 42 - 98 mL/min/1.73    Globulin 2.9 2.3 - 3.5 gm/dL    A/G Ratio 1.6 1.1 - 1.8 g/dL    BUN/Creatinine Ratio 14.4 7.0 - 25.0    Anion Gap 13.0 5.0 - 15.0 mmol/L   Results for orders placed or performed in visit on 06/14/17   Urine Drug Screen   Result Value Ref Range    Amphetamine Screen, Urine Negative Negative    Barbiturates Screen, Urine Negative Negative    Benzodiazepine Screen, Urine Positive (A) Negative    Cocaine Screen, Urine Negative Negative    Methadone Screen, Urine Negative Negative    Opiate Screen Negative Negative    Oxycodone Screen, Urine Negative Negative    THC, Screen, Urine Negative Negative   Results for orders placed or performed in visit on 03/08/17   Hemoglobin A1c   Result Value Ref Range    Hemoglobin A1C 7.12 (H) 4 - 5.6 %   Basic Metabolic Panel    Result Value Ref Range    Glucose 183 (H) 60 - 100 mg/dL    BUN 16 7 - 21 mg/dL    Creatinine 0.89 0.70 - 1.30 mg/dL    Sodium 139 137 - 145 mmol/L    Potassium 4.6 3.5 - 5.1 mmol/L    Chloride 99 95 - 110 mmol/L    CO2 26.0 22.0 - 31.0 mmol/L    Calcium 9.8 8.4 - 10.2 mg/dL    eGFR Non African Amer 84 42 - 98 mL/min/1.73    BUN/Creatinine Ratio 18.0 7.0 - 25.0    Anion Gap 14.0 5.0 - 15.0 mmol/L   Results for orders placed or performed in visit on 12/14/16   Microalbumin / Creatinine Urine Ratio   Result Value Ref Range    Creatinine, Urine 178.2 mg/dl    Albumin, U 0.6 0.0 - 1.7 mg/dl    Microalbumin/Creatinine Ratio 3 0 - 30 mg/g   Hemoglobin A1c   Result Value Ref Range    Hemoglobin A1C 7.6 (H) 4.0 - 5.6 %TotHgb   Lipid Panel   Result Value Ref Range    Total Cholesterol 150 0 - 199 mg/dl    Triglycerides 186 20 - 199 mg/dl    HDL Cholesterol 35 (L) 60 - 200 mg/dl    LDL Cholesterol  78 0 - 129 mg/dl   Comprehensive Metabolic Panel   Result Value Ref Range    Sodium 139 137 - 145 mmol/L    Potassium 4.2 3.5 - 5.1 mmol/L    Chloride 98 95 - 110 mmol/L    CO2 27 22 - 31 mmol/L    Anion Gap 14.0 5.0 - 15.0 mmol/L    Glucose 163 (H) 60 - 100 mg/dl    BUN 16 7 - 21 mg/dl    Creatinine 0.9 0.7 - 1.3 mg/dl    GFR MDRD Non African American 83 42 - 98 mL/min/1.73 sq.M    GFR MDRD  100 (H) 42 - 98 mL/min/1.73 sq.M    Calcium 9.2 8.4 - 10.2 mg/dl    Total Protein 7.1 6.3 - 8.6 gm/dl    Albumin 4.0 3.4 - 4.8 gm/dl    Total Bilirubin 0.7 0.2 - 1.3 mg/dl    Alkaline Phosphatase 39 38 - 126 U/L    AST (SGOT) 40 17 - 59 U/L    ALT (SGPT) 63 21 - 72 U/L   Results for orders placed or performed during the hospital encounter of 05/06/16   Converted Surgical Pathology   Result Value Ref Range    Spec Descr 1 SPECIMEN(S): A ANTRAL BIOPSY     Specimen description 2 SPECIMEN(S): B ESOPHAGEAL BIOPSY    POCT glucose fingerstick   Result Value Ref Range    Glucose 138 (H) 60 - 100 mg/dl   Results for orders placed or  performed during the hospital encounter of 03/14/16   Hemoglobin A1c   Result Value Ref Range    Hemoglobin A1C 6.9 (H) 4.0 - 5.6 %TotHgb   Basic metabolic panel   Result Value Ref Range    Sodium 138 137 - 145 mmol/L    Potassium 4.8 3.5 - 5.1 mmol/L    Chloride 99 95 - 110 mmol/L    CO2 24 22 - 31 mmol/L    Anion Gap 15.0 5.0 - 15.0 mmol/L    Glucose 120 (H) 60 - 100 mg/dl    BUN 14 7 - 21 mg/dl    Creatinine 0.8 0.7 - 1.3 mg/dl    GFR MDRD Non African American 95 42 - 98 mL/min/1.73 sq.M    GFR MDRD  115 (H) 42 - 98 mL/min/1.73 sq.M    Calcium 9.0 8.4 - 10.2 mg/dl   Results for orders placed or performed during the hospital encounter of 12/06/15   proBNP   Result Value Ref Range    NT-proBNP 159 0 - 900 pg/ml   proBNP   Result Value Ref Range    NT-proBNP 307 0 - 900 pg/ml   CK MB   Result Value Ref Range    CKMB 0.4 0.0 - 5.0 ng/ml   CK MB   Result Value Ref Range    CKMB 0.5 0.0 - 5.0 ng/ml   CK MB   Result Value Ref Range    CKMB 0.4 0.0 - 5.0 ng/ml   CK MB   Result Value Ref Range    CKMB 0.9 0.0 - 5.0 ng/ml   CK MB   Result Value Ref Range    CKMB 0.9 0.0 - 5.0 ng/ml   CK MB   Result Value Ref Range    CKMB 0.9 0.0 - 5.0 ng/ml   Troponin   Result Value Ref Range    Troponin I 0.021 0.000 - 0.034 ng/ml   Troponin   Result Value Ref Range    Troponin I 0.021 0.000 - 0.034 ng/ml   Troponin   Result Value Ref Range    Troponin I 0.025 0.000 - 0.034 ng/ml   Troponin   Result Value Ref Range    Troponin I 0.045 (H) 0.000 - 0.034 ng/ml   Troponin   Result Value Ref Range    Troponin I 0.045 (H) 0.000 - 0.034 ng/ml   Troponin   Result Value Ref Range    Troponin I 0.047 (H) 0.000 - 0.034 ng/ml   Manual Differential   Result Value Ref Range    Neutrophil Rel % 70 37 - 80 %    Lymphocyte Rel % 22 10 - 50 %    Monocyte Rel % 6 0 - 12 %    Eosinophil Rel % 2 0 - 7 %    RBC Morphology Normochromic Slight Anisocytosis     Platelet Estimate low normal     Total Counted 100      *Note: Due to a large number  of results and/or encounters for the requested time period, some results have not been displayed. A complete set of results can be found in Results Review.       Some portions of this note have been dictated using voice recognition software and may contain errors and/or omissions.

## 2017-12-11 ENCOUNTER — HOSPITAL ENCOUNTER (OUTPATIENT)
Facility: HOSPITAL | Age: 72
Setting detail: HOSPITAL OUTPATIENT SURGERY
Discharge: HOME | End: 2017-12-11
Attending: INTERNAL MEDICINE | Admitting: INTERNAL MEDICINE

## 2017-12-11 ENCOUNTER — ANESTHESIA (OUTPATIENT)
Dept: GASTROENTEROLOGY | Facility: HOSPITAL | Age: 72
End: 2017-12-11

## 2017-12-11 ENCOUNTER — ANESTHESIA EVENT (OUTPATIENT)
Dept: GASTROENTEROLOGY | Facility: HOSPITAL | Age: 72
End: 2017-12-11

## 2017-12-11 VITALS
WEIGHT: 198.85 LBS | TEMPERATURE: 97.7 F | OXYGEN SATURATION: 98 % | DIASTOLIC BLOOD PRESSURE: 57 MMHG | BODY MASS INDEX: 29.45 KG/M2 | RESPIRATION RATE: 16 BRPM | HEART RATE: 69 BPM | HEIGHT: 69 IN | SYSTOLIC BLOOD PRESSURE: 97 MMHG

## 2017-12-11 DIAGNOSIS — Z86.010 HISTORY OF COLON POLYPS: ICD-10-CM

## 2017-12-11 DIAGNOSIS — R19.7 DIARRHEA, UNSPECIFIED TYPE: ICD-10-CM

## 2017-12-11 DIAGNOSIS — K57.30 DIVERTICULOSIS OF LARGE INTESTINE WITHOUT HEMORRHAGE: ICD-10-CM

## 2017-12-11 LAB — GLUCOSE BLDC GLUCOMTR-MCNC: 233 MG/DL (ref 70–130)

## 2017-12-11 PROCEDURE — 82962 GLUCOSE BLOOD TEST: CPT

## 2017-12-11 PROCEDURE — G0121 COLON CA SCRN NOT HI RSK IND: HCPCS | Performed by: INTERNAL MEDICINE

## 2017-12-11 PROCEDURE — 25010000002 PROPOFOL 10 MG/ML EMULSION: Performed by: NURSE ANESTHETIST, CERTIFIED REGISTERED

## 2017-12-11 RX ORDER — LIDOCAINE HYDROCHLORIDE 10 MG/ML
INJECTION, SOLUTION INFILTRATION; PERINEURAL AS NEEDED
Status: DISCONTINUED | OUTPATIENT
Start: 2017-12-11 | End: 2017-12-11 | Stop reason: SURG

## 2017-12-11 RX ORDER — DEXTROSE AND SODIUM CHLORIDE 5; .45 G/100ML; G/100ML
30 INJECTION, SOLUTION INTRAVENOUS CONTINUOUS PRN
Status: DISCONTINUED | OUTPATIENT
Start: 2017-12-11 | End: 2017-12-11

## 2017-12-11 RX ORDER — SODIUM CHLORIDE 9 MG/ML
30 INJECTION, SOLUTION INTRAVENOUS CONTINUOUS
Status: DISCONTINUED | OUTPATIENT
Start: 2017-12-11 | End: 2017-12-11 | Stop reason: HOSPADM

## 2017-12-11 RX ORDER — PROPOFOL 10 MG/ML
VIAL (ML) INTRAVENOUS AS NEEDED
Status: DISCONTINUED | OUTPATIENT
Start: 2017-12-11 | End: 2017-12-11 | Stop reason: SURG

## 2017-12-11 RX ADMIN — PROPOFOL 50 MG: 10 INJECTION, EMULSION INTRAVENOUS at 13:07

## 2017-12-11 RX ADMIN — PROPOFOL 100 MG: 10 INJECTION, EMULSION INTRAVENOUS at 13:05

## 2017-12-11 RX ADMIN — SODIUM CHLORIDE 30 ML/HR: 900 INJECTION, SOLUTION INTRAVENOUS at 11:30

## 2017-12-11 RX ADMIN — LIDOCAINE HYDROCHLORIDE 50 MG: 10 INJECTION, SOLUTION INFILTRATION; PERINEURAL at 13:05

## 2017-12-11 RX ADMIN — PROPOFOL 100 MG: 10 INJECTION, EMULSION INTRAVENOUS at 13:10

## 2017-12-11 NOTE — PLAN OF CARE
Problem: Patient Care Overview (Adult)  Goal: Plan of Care Review  Outcome: Outcome(s) achieved Date Met:  12/11/17  vss

## 2017-12-11 NOTE — H&P
Progress Notes  Encounter Date: 12/11/2017  Bladimir ramos  Gastroenterology   Expand All Collapse All    []Hide copied text      Chief Complaint   Patient presents with   • Heartburn   • Nausea   • Abdominal Pain         ENDO PROCEDURE ORDERED: COLON, bx, diarrhea, diverticular dz        Subjective        Aj Cr Card Jr. is a 72 y.o. male. he is here today for follow-up.     History of Present Illness     Patient seen on a recheck of his GERD, nausea, epigastric pain, diverticular disease.  Last seen 8/29/17.  Patient states his GERD is doing fairly well, he is on Nexium, Carafate, Protonix.  He denied nausea, vomiting.  He's been having bouts of diarrhea.  Spicy foods seem to make his diarrhea worse.  He is due for follow-up endoscopic evaluation, history of polyps.  Last colonoscopy was in 2012.  Weight is down 1 pound since last visit.  He seen no blood in his stool.     Laboratory on 9/15/17 CMP showed glucose 185, otherwise normal.  A1c 7.1%.     A/P: GERD appears stable on current regimen.  Patient is due for follow-up colonoscopy, he would like to do this before a year.  He'll follow-up after the above, further pending clinical course and the results of the above.  Continue current medications.      The following portions of the patient's history were reviewed and updated as appropriate:    Medical History         Past Medical History:   Diagnosis Date   • Acute posthemorrhagic anemia     • Allergic rhinitis     • Anxiety state     • Chest pain     • Coronary arteriosclerosis     • Diabetes mellitus       type 2   • Diverticular disease of colon     • Dysphagia     • Epigastric pain     • GERD (gastroesophageal reflux disease)       esophagitis on Dexilant. Pt feels Nexium working better      • History of echocardiogram       Small left atrial enlargement with mild CLVH.Ef of 55-60%.Mitral valve mildly thickened.Av thickened.Left ventricle mildly dilated.Tricuspid intact.Mild aortic insufficiency.  12/07/2015       • History of echocardiogram       Mild diastolic dysfunction and mild left atrial enlargement, otherwise normal echo. EF> 55% 01/03/2012       • Hypercholesterolemia     • Hypertension     • Insomnia     • Irritable bowel syndrome     • Osteoarthritis of multiple joints            Surgical History          Past Surgical History:   Procedure Laterality Date   • APPENDECTOMY          [facility] 1995       • CARDIAC CATHETERIZATION         Successful PTCA of the OMB#2.Unsuccessful PTCA of the 1st OMB, secondary to difficulty in advancing the balloon. 12/08/2015       • CHOLECYSTECTOMY         [facility] 1993       • COLONOSCOPY   10/01/2012   • CORONARY ANGIOPLASTY         Successful PTCA & stenting LAD was done w/ deployment of a 2.5mm x23 Xience stent w/ reduction of stenosis from 90% to less than 0% stenosis with good LILLIAM 3 flow 02/17/2012       • ENDOSCOPY         with biopsy.  Mildly severe esophagitis. Gastritis. Normal examined duodenum.Several biopsies obtained in the lower third of the esophagus.Several biopsies obtained in the gastric antrum. 05/06/2016       • ENDOSCOPY         w tube. Normal esophagus. Gastritis in stomach. Biopsy taken. Gastric polyp found. Biopsy taken. Normal duodenum. 10/01/2012       • ENDOSCOPY AND COLONOSCOPY         Diverticulum in sigmoid colon & descending colon. Internal & external hemorrhoids found. 10/01/2012       • HAND SURGERY          Corrective osteotomy of ring finger metacarpal. Malunited fracture of left ring finger 05/21/1985       • HERNIA REPAIR         Laparoscopic hernia repair. prepertitoneal bilateral inguinal hernia repair with mesh. 10/15/2009      • OTHER SURGICAL HISTORY         CORONARY ARTERY STENT    • SKIN TAG REMOVAL         Excision, viral skin tag,right upper eyelid 05/08/1995                Family History   Problem Relation Age of Onset   • Heart disease Other     • Hypertension Other     • Stroke Other      • Schizophrenia Sister                 Allergies   Allergen Reactions   • Adhesive Tape     • Brilinta [Ticagrelor]     • Crestor [Rosuvastatin Calcium]     • Effient [Prasugrel]     • Januvia [Sitagliptin] Dizziness   • Lipitor [Atorvastatin] Swelling   • Other         Cipro: Hives   • Warfarin And Related     • Cardizem [Diltiazem Hcl] Rash   • Celexa [Citalopram Hydrobromide] Rash   • Floxin [Ofloxacin] Rash   • Penicillins Rash   • Sulfa Antibiotics Rash       Social History    Social History            Social History   • Marital status:        Spouse name: N/A   • Number of children: N/A   • Years of education: N/A           Social History Main Topics   • Smoking status: Never Smoker   • Smokeless tobacco: Never Used   • Alcohol use No   • Drug use: No   • Sexual activity: Not Asked           Other Topics Concern   • None      Social History Narrative            Current Outpatient Prescriptions:   •  ALPRAZolam (XANAX) 0.5 MG tablet, Take 1 tablet by mouth 2 (Two) Times a Day As Needed for Anxiety., Disp: 30 tablet, Rfl: 2  •  amLODIPine (NORVASC) 10 MG tablet, Take 1 tablet by mouth Daily., Disp: 90 tablet, Rfl: 1  •  aspirin 81 MG EC tablet, Take 162 mg by mouth daily., Disp: , Rfl:   •  azelastine (ASTELIN) 0.1 % nasal spray, 2 sprays into each nostril 2 (two) times a day. Use in each nostril as directed, Disp: , Rfl:   •  cholecalciferol (VITAMIN D3) 1000 UNITS tablet, Take 1,000 Units by mouth daily., Disp: , Rfl:   •  clopidogrel (PLAVIX) 75 MG tablet, Take 1 tablet by mouth Daily., Disp: 90 tablet, Rfl: 1  •  coenzyme Q10 100 MG capsule, Take 100 mg by mouth daily., Disp: , Rfl:   •  esomeprazole (nexIUM) 40 MG capsule, Take 1 capsule by mouth Every Morning Before Breakfast., Disp: 90 capsule, Rfl: 3  •  glimepiride (AMARYL) 2 MG tablet, Take 1 tablet by mouth Every Morning Before Breakfast., Disp: 90 tablet, Rfl: 1  •  glucose blood test strip, 1 each by Other route Daily. Use as instructed,  "Disp: 100 each, Rfl: 5  •  KRILL OIL OMEGA-3 PO, Take  by mouth daily., Disp: , Rfl:   •  lisinopril (PRINIVIL,ZESTRIL) 40 MG tablet, Take 1 tablet by mouth Daily., Disp: 90 tablet, Rfl: 1  •  metFORMIN ER (GLUCOPHAGE-XR) 500 MG 24 hr tablet, Take 1 tablet by mouth 2 (Two) Times a Day With Meals., Disp: 180 tablet, Rfl: 1  •  metoprolol tartrate (LOPRESSOR) 50 MG tablet, Take 1 tablet by mouth 2 (Two) Times a Day., Disp: 180 tablet, Rfl: 1  •  pantoprazole (PROTONIX) 40 MG EC tablet, Take 1 tablet by mouth Daily., Disp: 90 tablet, Rfl: 3  •  Red Yeast Rice Extract (RED YEAST RICE PO), Take  by mouth Daily., Disp: , Rfl:   •  Saw Palmetto, Serenoa repens, (SAW PALMETTO PO), Take  by mouth daily., Disp: , Rfl:   •  sucralfate (CARAFATE) 1 g tablet, Take 1 tablet by mouth 2 (Two) Times a Day., Disp: 180 tablet, Rfl: 3  •  vitamin B-12 (CYANOCOBALAMIN) 2500 MCG sublingual tablet tablet, Place  under the tongue daily., Disp: , Rfl:   •  zolpidem (AMBIEN) 5 MG tablet, Take 5 mg by mouth At Night As Needed for Sleep., Disp: , Rfl:   •  polyethylene glycol (MIRALAX) powder, As directed per instruction sheet for colonoscopy, Disp: 255 g, Rfl: 0  Review of Systems  Review of Systems                        Objective        /76 (BP Location: Left arm)  Pulse 69  Ht 69\" (175.3 cm)  Wt 203 lb (92.1 kg)  BMI 29.98 kg/m2  Physical Exam   Constitutional: He is oriented to person, place, and time. He appears well-developed and well-nourished. No distress.   HENT:   Head: Normocephalic and atraumatic.   Eyes: EOM are normal. Pupils are equal, round, and reactive to light.   Neck: Normal range of motion.   Cardiovascular: Normal rate, regular rhythm and normal heart sounds.    Pulmonary/Chest: Effort normal and breath sounds normal.   Abdominal: Soft. Bowel sounds are normal. He exhibits no shifting dullness, no distension, no abdominal bruit, no ascites and no mass. There is no hepatosplenomegaly. There is tenderness. There " is no rigidity, no rebound, no guarding and no CVA tenderness. No hernia. Hernia confirmed negative in the ventral area.   Obese, mild upper   Musculoskeletal: Normal range of motion.   Neurological: He is alert and oriented to person, place, and time.   Skin: Skin is warm and dry.   Psychiatric: He has a normal mood and affect. His behavior is normal. Judgment and thought content normal.   Nursing note and vitals reviewed.        Assessment/Plan           1. Gastroesophageal reflux disease with esophagitis    2. Epigastric pain    3. Nausea    4. Diverticulosis of large intestine without hemorrhage    5. Diarrhea, unspecified type    6. History of colon polyps    .   Aj was seen today for heartburn, nausea and abdominal pain.     Diagnoses and all orders for this visit:     Gastroesophageal reflux disease with esophagitis     Epigastric pain     Nausea     Diverticulosis of large intestine without hemorrhage  -     Case Request; Standing  -     dextrose 5 % and sodium chloride 0.45 % infusion; Infuse 30 mL/hr into a venous catheter Continuous As Needed (Start Prior to Procedure).  -     Case Request     Diarrhea, unspecified type  -     Case Request; Standing  -     dextrose 5 % and sodium chloride 0.45 % infusion; Infuse 30 mL/hr into a venous catheter Continuous As Needed (Start Prior to Procedure).  -     Case Request     History of colon polyps  -     Case Request; Standing  -     dextrose 5 % and sodium chloride 0.45 % infusion; Infuse 30 mL/hr into a venous catheter Continuous As Needed (Start Prior to Procedure).  -     Case Request     Other orders  -     Obtain Informed Consent; Standing  -     POC Glucose Fingerstick; Standing  -     polyethylene glycol (MIRALAX) powder; As directed per instruction sheet for colonoscopy           Orders placed during this encounter include:  No orders of the defined types were placed in this encounter.        Medications prescribed:       New Medications Ordered This  Visit   Medications   • polyethylene glycol (MIRALAX) powder       Sig: As directed per instruction sheet for colonoscopy       Dispense:  255 g       Refill:  0         Requested Prescriptions             Signed Prescriptions Disp Refills   • polyethylene glycol (MIRALAX) powder 255 g 0       Sig: As directed per instruction sheet for colonoscopy         Review and/or summary of lab tests, radiology, procedures, medications. Review and summary of old records and obtaining of history. The risks and benefits of my recommendations, as well as other treatment options were discussed with the patient today. Questions were answered.     Follow-up: Return in about 3 months (around 2/7/2018), or if symptoms worsen or fail to improve.     COLONOSCOPY (N/A)              This document has been electronically signed by Bladimir Michael MD on December 11, 2017 11:06 AM            Results for orders placed or performed in visit on 09/15/17   Hemoglobin A1c   Result Value Ref Range     Hemoglobin A1C 7.1 (H) 4 - 5.6 %   Comprehensive Metabolic Panel   Result Value Ref Range     Glucose 185 (H) 60 - 100 mg/dL     BUN 13 7 - 21 mg/dL     Creatinine 0.90 0.70 - 1.30 mg/dL     Sodium 138 137 - 145 mmol/L     Potassium 4.9 3.5 - 5.1 mmol/L     Chloride 100 95 - 110 mmol/L     CO2 25.0 22.0 - 31.0 mmol/L     Calcium 9.8 8.4 - 10.2 mg/dL     Total Protein 7.4 6.3 - 8.6 g/dL     Albumin 4.50 3.40 - 4.80 g/dL     ALT (SGPT) 54 21 - 72 U/L     AST (SGOT) 39 17 - 59 U/L     Alkaline Phosphatase 39 38 - 126 U/L     Total Bilirubin 0.9 0.2 - 1.3 mg/dL     eGFR Non African Amer 83 42 - 98 mL/min/1.73     Globulin 2.9 2.3 - 3.5 gm/dL     A/G Ratio 1.6 1.1 - 1.8 g/dL     BUN/Creatinine Ratio 14.4 7.0 - 25.0     Anion Gap 13.0 5.0 - 15.0 mmol/L   Results for orders placed or performed in visit on 06/14/17   Urine Drug Screen   Result Value Ref Range     Amphetamine Screen, Urine Negative Negative     Barbiturates Screen, Urine Negative Negative      Benzodiazepine Screen, Urine Positive (A) Negative     Cocaine Screen, Urine Negative Negative     Methadone Screen, Urine Negative Negative     Opiate Screen Negative Negative     Oxycodone Screen, Urine Negative Negative     THC, Screen, Urine Negative Negative   Results for orders placed or performed in visit on 03/08/17   Hemoglobin A1c   Result Value Ref Range     Hemoglobin A1C 7.12 (H) 4 - 5.6 %   Basic Metabolic Panel   Result Value Ref Range     Glucose 183 (H) 60 - 100 mg/dL     BUN 16 7 - 21 mg/dL     Creatinine 0.89 0.70 - 1.30 mg/dL     Sodium 139 137 - 145 mmol/L     Potassium 4.6 3.5 - 5.1 mmol/L     Chloride 99 95 - 110 mmol/L     CO2 26.0 22.0 - 31.0 mmol/L     Calcium 9.8 8.4 - 10.2 mg/dL     eGFR Non African Amer 84 42 - 98 mL/min/1.73     BUN/Creatinine Ratio 18.0 7.0 - 25.0     Anion Gap 14.0 5.0 - 15.0 mmol/L   Results for orders placed or performed in visit on 12/14/16   Microalbumin / Creatinine Urine Ratio   Result Value Ref Range     Creatinine, Urine 178.2 mg/dl     Albumin, U 0.6 0.0 - 1.7 mg/dl     Microalbumin/Creatinine Ratio 3 0 - 30 mg/g   Hemoglobin A1c   Result Value Ref Range     Hemoglobin A1C 7.6 (H) 4.0 - 5.6 %TotHgb   Lipid Panel   Result Value Ref Range     Total Cholesterol 150 0 - 199 mg/dl     Triglycerides 186 20 - 199 mg/dl     HDL Cholesterol 35 (L) 60 - 200 mg/dl     LDL Cholesterol  78 0 - 129 mg/dl   Comprehensive Metabolic Panel   Result Value Ref Range     Sodium 139 137 - 145 mmol/L     Potassium 4.2 3.5 - 5.1 mmol/L     Chloride 98 95 - 110 mmol/L     CO2 27 22 - 31 mmol/L     Anion Gap 14.0 5.0 - 15.0 mmol/L     Glucose 163 (H) 60 - 100 mg/dl     BUN 16 7 - 21 mg/dl     Creatinine 0.9 0.7 - 1.3 mg/dl     GFR MDRD Non African American 83 42 - 98 mL/min/1.73 sq.M     GFR MDRD  100 (H) 42 - 98 mL/min/1.73 sq.M     Calcium 9.2 8.4 - 10.2 mg/dl     Total Protein 7.1 6.3 - 8.6 gm/dl     Albumin 4.0 3.4 - 4.8 gm/dl     Total Bilirubin 0.7 0.2 - 1.3  mg/dl     Alkaline Phosphatase 39 38 - 126 U/L     AST (SGOT) 40 17 - 59 U/L     ALT (SGPT) 63 21 - 72 U/L   Results for orders placed or performed during the hospital encounter of 05/06/16   Converted Surgical Pathology   Result Value Ref Range     Spec Descr 1 SPECIMEN(S): A ANTRAL BIOPSY       Specimen description 2 SPECIMEN(S): B ESOPHAGEAL BIOPSY     POCT glucose fingerstick   Result Value Ref Range     Glucose 138 (H) 60 - 100 mg/dl   Results for orders placed or performed during the hospital encounter of 03/14/16   Hemoglobin A1c   Result Value Ref Range     Hemoglobin A1C 6.9 (H) 4.0 - 5.6 %TotHgb   Basic metabolic panel   Result Value Ref Range     Sodium 138 137 - 145 mmol/L     Potassium 4.8 3.5 - 5.1 mmol/L     Chloride 99 95 - 110 mmol/L     CO2 24 22 - 31 mmol/L     Anion Gap 15.0 5.0 - 15.0 mmol/L     Glucose 120 (H) 60 - 100 mg/dl     BUN 14 7 - 21 mg/dl     Creatinine 0.8 0.7 - 1.3 mg/dl     GFR MDRD Non African American 95 42 - 98 mL/min/1.73 sq.M     GFR MDRD  115 (H) 42 - 98 mL/min/1.73 sq.M     Calcium 9.0 8.4 - 10.2 mg/dl   Results for orders placed or performed during the hospital encounter of 12/06/15   proBNP   Result Value Ref Range     NT-proBNP 159 0 - 900 pg/ml   proBNP   Result Value Ref Range     NT-proBNP 307 0 - 900 pg/ml   CK MB   Result Value Ref Range     CKMB 0.4 0.0 - 5.0 ng/ml   CK MB   Result Value Ref Range     CKMB 0.5 0.0 - 5.0 ng/ml   CK MB   Result Value Ref Range     CKMB 0.4 0.0 - 5.0 ng/ml   CK MB   Result Value Ref Range     CKMB 0.9 0.0 - 5.0 ng/ml   CK MB   Result Value Ref Range     CKMB 0.9 0.0 - 5.0 ng/ml   CK MB   Result Value Ref Range     CKMB 0.9 0.0 - 5.0 ng/ml   Troponin   Result Value Ref Range     Troponin I 0.021 0.000 - 0.034 ng/ml   Troponin   Result Value Ref Range     Troponin I 0.021 0.000 - 0.034 ng/ml   Troponin   Result Value Ref Range     Troponin I 0.025 0.000 - 0.034 ng/ml   Troponin   Result Value Ref Range     Troponin I  0.045 (H) 0.000 - 0.034 ng/ml   Troponin   Result Value Ref Range     Troponin I 0.045 (H) 0.000 - 0.034 ng/ml   Troponin   Result Value Ref Range     Troponin I 0.047 (H) 0.000 - 0.034 ng/ml   Manual Differential   Result Value Ref Range     Neutrophil Rel % 70 37 - 80 %     Lymphocyte Rel % 22 10 - 50 %     Monocyte Rel % 6 0 - 12 %     Eosinophil Rel % 2 0 - 7 %     RBC Morphology Normochromic Slight Anisocytosis       Platelet Estimate low normal       Total Counted 100        *Note: Due to a large number of results and/or encounters for the requested time period, some results have not been displayed. A complete set of results can be found in Results Review.         Some portions of this note have been dictated using voice recognition software and may contain errors and/or omissions.            Office Visit on 11/7/2017              Detailed Report

## 2017-12-11 NOTE — PLAN OF CARE
Problem: Patient Care Overview (Adult)  Goal: Plan of Care Review    12/11/17 1312   Coping/Psychosocial Response Interventions   Plan Of Care Reviewed With patient   Patient Care Overview   Progress no change   Outcome Evaluation   Outcome Summary/Follow up Plan pt tolerated well         Problem: GI Endoscopy (Adult)  Goal: Signs and Symptoms of Listed Potential Problems Will be Absent or Manageable (GI Endoscopy)    12/11/17 1312   GI Endoscopy   Problems Assessed (GI Endoscopy) all   Problems Present (GI Endoscopy) none

## 2017-12-11 NOTE — ANESTHESIA PREPROCEDURE EVALUATION
Anesthesia Evaluation     NPO Solid Status: > 8 hours  NPO Liquid Status: > 6 hours     Airway   no difficulty expected  Dental      Pulmonary - normal exam   Cardiovascular     (+) hypertension well controlled, CAD, hyperlipidemia      Neuro/Psych  GI/Hepatic/Renal/Endo    (+)  GERD, diabetes mellitus,     Musculoskeletal     Abdominal  - normal exam   Substance History      OB/GYN          Other                                        Anesthesia Plan    ASA 3     MAC     intravenous induction     Plan discussed with CRNA.

## 2017-12-11 NOTE — ANESTHESIA POSTPROCEDURE EVALUATION
Patient: Aj Card Jr.    Procedure Summary     Date Anesthesia Start Anesthesia Stop Room / Location    12/11/17 1305 1317 [facility] ENDOSCOPY 3 / [facility] ENDOSCOPY       Procedure Diagnosis Surgeon Provider    COLONOSCOPY (N/A ) History of colon polyps; Diverticulosis of large intestine without hemorrhage; Diarrhea, unspecified type  (History of colon polyps [Z86.010]; Diverticulosis of large intestine without hemorrhage [K57.30]; Diarrhea, unspecified type [R19.7]) MD Vinicio Collado CRNA          Anesthesia Type: MAC  Last vitals  BP   146/76 (12/11/17 1126)   Temp   97.4 °F (36.3 °C) (12/11/17 1126)   Pulse   68 (12/11/17 1126)   Resp   18 (12/11/17 1126)     SpO2   98 % (12/11/17 1126)     Post Anesthesia Care and Evaluation    Patient location during evaluation: PACU  Patient participation: complete - patient participated  Level of consciousness: awake and alert  Pain score: 1  Pain management: adequate  Airway patency: patent  Anesthetic complications: No anesthetic complications  PONV Status: none  Cardiovascular status: acceptable  Respiratory status: acceptable  Hydration status: acceptable

## 2018-02-13 ENCOUNTER — OFFICE VISIT (OUTPATIENT)
Dept: GASTROENTEROLOGY | Facility: CLINIC | Age: 73
End: 2018-02-13

## 2018-02-13 VITALS
BODY MASS INDEX: 30.07 KG/M2 | HEIGHT: 69 IN | HEART RATE: 76 BPM | DIASTOLIC BLOOD PRESSURE: 84 MMHG | WEIGHT: 203 LBS | SYSTOLIC BLOOD PRESSURE: 134 MMHG

## 2018-02-13 DIAGNOSIS — K21.00 GASTROESOPHAGEAL REFLUX DISEASE WITH ESOPHAGITIS: Primary | ICD-10-CM

## 2018-02-13 DIAGNOSIS — R10.13 EPIGASTRIC PAIN: ICD-10-CM

## 2018-02-13 DIAGNOSIS — K57.30 DIVERTICULOSIS OF LARGE INTESTINE WITHOUT HEMORRHAGE: ICD-10-CM

## 2018-02-13 DIAGNOSIS — Z86.010 HISTORY OF COLON POLYPS: ICD-10-CM

## 2018-02-13 PROCEDURE — 99213 OFFICE O/P EST LOW 20 MIN: CPT | Performed by: PHYSICIAN ASSISTANT

## 2018-02-13 NOTE — PROGRESS NOTES
Chief Complaint   Patient presents with   • Heartburn   • Abdominal Pain   • Nausea   • Diarrhea   • Diverticulosis       ENDO PROCEDURE ORDERED:    Subjective    Aj Card Jr. is a 72 y.o. male. he is here today for follow-up.    History of Present Illness    HISTORY OF PRESENT ILLNESS: Patient seen on a recheck of his GERD, epigastric pain, diverticulosis, diarrhea. Last seen 11/07/2017. Patient underwent colonoscopy on 12/11/2017. Showed diverticula in the sigmoid colon, external and internal hemorrhoids, good prep. Recommend repeat colonoscopy in 5 years.    Patient states his GERD does reasonably well. Spicy foods seem to bother him. His wife states he gets up multiple times through the night to eat. Patient does have reflux at night. He takes Nexium, Protonix, Carafate. He denied dysphagia. He is on MiraLax for chronic constipation. Bowel movements are without blood or mucus. Weight is stable. He had previous colon polyps in 2012.    ASSESSMENT AND PLAN:   1.  Patient with diverticulosis. He is instructed on a high-fiber/diverticular diet. Recommend repeat colonoscopy in 5 years given his history of polyps.  2.  Longstanding GERD. Again discussed the importance of dietary modification and weight loss. I suggested he break his PPI's up and not take them at the same time.  3.  He will follow up in 4 months, sooner if needed.       The following portions of the patient's history were reviewed and updated as appropriate:   Past Medical History:   Diagnosis Date   • Acute posthemorrhagic anemia    • Allergic rhinitis    • Anxiety state    • Chest pain    • Coronary arteriosclerosis    • Diabetes mellitus     type 2   • Diverticular disease of colon    • Dysphagia    • Epigastric pain    • GERD (gastroesophageal reflux disease)     esophagitis on Dexilant. Pt feels Nexium working better      • History of echocardiogram     Small left atrial enlargement with mild CLVH.Ef of 55-60%.Mitral valve mildly  thickened.Av thickened.Left ventricle mildly dilated.Tricuspid intact.Mild aortic insufficiency. 12/07/2015       • History of echocardiogram     Mild diastolic dysfunction and mild left atrial enlargement, otherwise normal echo. EF> 55% 01/03/2012       • Hypercholesterolemia    • Hypertension    • Insomnia    • Irritable bowel syndrome    • Osteoarthritis of multiple joints      Past Surgical History:   Procedure Laterality Date   • APPENDECTOMY       [facility] 1995       • CARDIAC CATHETERIZATION      Successful PTCA of the OMB#2.Unsuccessful PTCA of the 1st OMB, secondary to difficulty in advancing the balloon. 12/08/2015       • CHOLECYSTECTOMY      [facility] 1993       • COLONOSCOPY  10/01/2012   • COLONOSCOPY N/A 12/11/2017    Procedure: COLONOSCOPY;  Surgeon: Bladimir Michael MD;  Location: [facility] ENDOSCOPY;  Service:    • CORONARY ANGIOPLASTY      Successful PTCA & stenting LAD was done w/ deployment of a 2.5mm x23 Xience stent w/ reduction of stenosis from 90% to less than 0% stenosis with good LILLIAM 3 flow 02/17/2012       • ENDOSCOPY      with biopsy.  Mildly severe esophagitis. Gastritis. Normal examined duodenum.Several biopsies obtained in the lower third of the esophagus.Several biopsies obtained in the gastric antrum. 05/06/2016       • ENDOSCOPY      w tube. Normal esophagus. Gastritis in stomach. Biopsy taken. Gastric polyp found. Biopsy taken. Normal duodenum. 10/01/2012       • ENDOSCOPY AND COLONOSCOPY      Diverticulum in sigmoid colon & descending colon. Internal & external hemorrhoids found. 10/01/2012       • HAND SURGERY       Corrective osteotomy of ring finger metacarpal. Malunited fracture of left ring finger 05/21/1985       • HERNIA REPAIR      Laparoscopic hernia repair. prepertitoneal bilateral inguinal hernia repair with mesh. 10/15/2009      • OTHER SURGICAL HISTORY      CORONARY ARTERY STENT    • SKIN TAG REMOVAL      Excision, viral skin tag,right  upper eyelid 05/08/1995      Family History   Problem Relation Age of Onset   • Heart disease Other    • Hypertension Other    • Stroke Other    • Schizophrenia Sister        Allergies   Allergen Reactions   • Brilinta [Ticagrelor] Shortness Of Breath   • Effient [Prasugrel] Shortness Of Breath   • Warfarin And Related Shortness Of Breath   • Januvia [Sitagliptin] Dizziness   • Lipitor [Atorvastatin] Swelling   • Other      Cipro: Hives   • Adhesive Tape Rash   • Cardizem [Diltiazem Hcl] Rash   • Celexa [Citalopram Hydrobromide] Rash   • Crestor [Rosuvastatin Calcium] Rash   • Floxin [Ofloxacin] Rash   • Penicillins Rash   • Sulfa Antibiotics Rash     Social History     Social History   • Marital status:      Spouse name: N/A   • Number of children: N/A   • Years of education: N/A     Social History Main Topics   • Smoking status: Never Smoker   • Smokeless tobacco: Never Used   • Alcohol use No   • Drug use: No   • Sexual activity: Not Asked     Other Topics Concern   • None     Social History Narrative       Current Outpatient Prescriptions:   •  ALPRAZolam (XANAX) 0.5 MG tablet, Take 1 tablet by mouth 2 (Two) Times a Day As Needed for Anxiety., Disp: 30 tablet, Rfl: 2  •  amLODIPine (NORVASC) 10 MG tablet, Take 1 tablet by mouth Daily., Disp: 90 tablet, Rfl: 1  •  aspirin 81 MG EC tablet, Take 162 mg by mouth daily., Disp: , Rfl:   •  azelastine (ASTELIN) 0.1 % nasal spray, 2 sprays into each nostril 2 (two) times a day. Use in each nostril as directed, Disp: , Rfl:   •  cholecalciferol (VITAMIN D3) 1000 UNITS tablet, Take 1,000 Units by mouth daily., Disp: , Rfl:   •  clopidogrel (PLAVIX) 75 MG tablet, Take 1 tablet by mouth Daily. (Patient taking differently: Take 75 mg by mouth Every Other Day.), Disp: 90 tablet, Rfl: 1  •  coenzyme Q10 100 MG capsule, Take 100 mg by mouth daily., Disp: , Rfl:   •  esomeprazole (nexIUM) 40 MG capsule, Take 1 capsule by mouth Every Morning Before Breakfast., Disp: 90  "capsule, Rfl: 3  •  glimepiride (AMARYL) 2 MG tablet, Take 1 tablet by mouth Every Morning Before Breakfast., Disp: 90 tablet, Rfl: 1  •  glucose blood test strip, 1 each by Other route Daily. Use as instructed, Disp: 100 each, Rfl: 5  •  KRILL OIL OMEGA-3 PO, Take  by mouth daily., Disp: , Rfl:   •  lisinopril (PRINIVIL,ZESTRIL) 40 MG tablet, Take 1 tablet by mouth Daily. (Patient taking differently: Take 40 mg by mouth 2 (Two) Times a Day.), Disp: 90 tablet, Rfl: 1  •  metFORMIN ER (GLUCOPHAGE-XR) 500 MG 24 hr tablet, Take 1 tablet by mouth 2 (Two) Times a Day With Meals., Disp: 180 tablet, Rfl: 1  •  metoprolol tartrate (LOPRESSOR) 50 MG tablet, Take 1 tablet by mouth 2 (Two) Times a Day., Disp: 180 tablet, Rfl: 1  •  pantoprazole (PROTONIX) 40 MG EC tablet, Take 1 tablet by mouth Daily., Disp: 90 tablet, Rfl: 3  •  Red Yeast Rice Extract (RED YEAST RICE PO), Take  by mouth Daily., Disp: , Rfl:   •  Saw Palmetto, Serenoa repens, (SAW PALMETTO PO), Take  by mouth daily., Disp: , Rfl:   •  sucralfate (CARAFATE) 1 g tablet, Take 1 tablet by mouth 2 (Two) Times a Day., Disp: 180 tablet, Rfl: 3  •  vitamin B-12 (CYANOCOBALAMIN) 2500 MCG sublingual tablet tablet, Place  under the tongue Every Other Day., Disp: , Rfl:   •  zolpidem (AMBIEN) 5 MG tablet, Take 5 mg by mouth At Night As Needed for Sleep., Disp: , Rfl:   Review of Systems  Review of Systems       Objective    /84 (BP Location: Left arm)  Pulse 76  Ht 175.3 cm (69.02\")  Wt 92.1 kg (203 lb)  BMI 29.96 kg/m2  Physical Exam   Constitutional: He is oriented to person, place, and time. He appears well-developed and well-nourished. No distress.   HENT:   Head: Normocephalic and atraumatic.   Eyes: EOM are normal. Pupils are equal, round, and reactive to light.   Neck: Normal range of motion.   Cardiovascular: Normal rate, regular rhythm and normal heart sounds.    Pulmonary/Chest: Effort normal and breath sounds normal.   Abdominal: Soft. Bowel sounds " are normal. He exhibits no shifting dullness, no distension, no abdominal bruit, no ascites and no mass. There is no hepatosplenomegaly. There is tenderness. There is no rigidity, no rebound, no guarding and no CVA tenderness. No hernia. Hernia confirmed negative in the ventral area.   Obese, mild upper   Musculoskeletal: Normal range of motion.   Neurological: He is alert and oriented to person, place, and time.   Skin: Skin is warm and dry.   Psychiatric: He has a normal mood and affect. His behavior is normal. Judgment and thought content normal.   Nursing note and vitals reviewed.    Assessment/Plan      1. Gastroesophageal reflux disease with esophagitis    2. Epigastric pain    3. Diverticulosis of large intestine without hemorrhage    4. History of colon polyps    .   Aj was seen today for heartburn, abdominal pain, nausea, diarrhea and diverticulosis.    Diagnoses and all orders for this visit:    Gastroesophageal reflux disease with esophagitis    Epigastric pain    Diverticulosis of large intestine without hemorrhage    History of colon polyps        Orders placed during this encounter include:  No orders of the defined types were placed in this encounter.      Medications prescribed:  No orders of the defined types were placed in this encounter.    Discontinued Medications       Reason for Discontinue    polyethylene glycol (MIRALAX) powder *Therapy completed        Requested Prescriptions      No prescriptions requested or ordered in this encounter       Review and/or summary of lab tests, radiology, procedures, medications. Review and summary of old records and obtaining of history. The risks and benefits of my recommendations, as well as other treatment options were discussed with the patient today. Questions were answered.    Follow-up: Return in about 4 months (around 6/13/2018), or if symptoms worsen or fail to improve.     * Surgery not found *      This document has been electronically signed  by Andreas Aldana PA-C on February 16, 2018 1:12 PM      Results for orders placed or performed during the hospital encounter of 12/11/17   POC Glucose Fingerstick   Result Value Ref Range    Glucose 233 (H) 70 - 130 mg/dL   Results for orders placed or performed in visit on 09/15/17   Hemoglobin A1c   Result Value Ref Range    Hemoglobin A1C 7.1 (H) 4 - 5.6 %   Comprehensive Metabolic Panel   Result Value Ref Range    Glucose 185 (H) 60 - 100 mg/dL    BUN 13 7 - 21 mg/dL    Creatinine 0.90 0.70 - 1.30 mg/dL    Sodium 138 137 - 145 mmol/L    Potassium 4.9 3.5 - 5.1 mmol/L    Chloride 100 95 - 110 mmol/L    CO2 25.0 22.0 - 31.0 mmol/L    Calcium 9.8 8.4 - 10.2 mg/dL    Total Protein 7.4 6.3 - 8.6 g/dL    Albumin 4.50 3.40 - 4.80 g/dL    ALT (SGPT) 54 21 - 72 U/L    AST (SGOT) 39 17 - 59 U/L    Alkaline Phosphatase 39 38 - 126 U/L    Total Bilirubin 0.9 0.2 - 1.3 mg/dL    eGFR Non African Amer 83 42 - 98 mL/min/1.73    Globulin 2.9 2.3 - 3.5 gm/dL    A/G Ratio 1.6 1.1 - 1.8 g/dL    BUN/Creatinine Ratio 14.4 7.0 - 25.0    Anion Gap 13.0 5.0 - 15.0 mmol/L   Results for orders placed or performed in visit on 06/14/17   Urine Drug Screen   Result Value Ref Range    Amphetamine Screen, Urine Negative Negative    Barbiturates Screen, Urine Negative Negative    Benzodiazepine Screen, Urine Positive (A) Negative    Cocaine Screen, Urine Negative Negative    Methadone Screen, Urine Negative Negative    Opiate Screen Negative Negative    Oxycodone Screen, Urine Negative Negative    THC, Screen, Urine Negative Negative   Results for orders placed or performed in visit on 03/08/17   Hemoglobin A1c   Result Value Ref Range    Hemoglobin A1C 7.12 (H) 4 - 5.6 %   Basic Metabolic Panel   Result Value Ref Range    Glucose 183 (H) 60 - 100 mg/dL    BUN 16 7 - 21 mg/dL    Creatinine 0.89 0.70 - 1.30 mg/dL    Sodium 139 137 - 145 mmol/L    Potassium 4.6 3.5 - 5.1 mmol/L    Chloride 99 95 - 110 mmol/L    CO2 26.0 22.0 - 31.0 mmol/L     Calcium 9.8 8.4 - 10.2 mg/dL    eGFR Non African Amer 84 42 - 98 mL/min/1.73    BUN/Creatinine Ratio 18.0 7.0 - 25.0    Anion Gap 14.0 5.0 - 15.0 mmol/L   Results for orders placed or performed in visit on 12/14/16   Microalbumin / Creatinine Urine Ratio   Result Value Ref Range    Creatinine, Urine 178.2 mg/dl    Albumin, U 0.6 0.0 - 1.7 mg/dl    Microalbumin/Creatinine Ratio 3 0 - 30 mg/g   Hemoglobin A1c   Result Value Ref Range    Hemoglobin A1C 7.6 (H) 4.0 - 5.6 %TotHgb   Lipid Panel   Result Value Ref Range    Total Cholesterol 150 0 - 199 mg/dl    Triglycerides 186 20 - 199 mg/dl    HDL Cholesterol 35 (L) 60 - 200 mg/dl    LDL Cholesterol  78 0 - 129 mg/dl   Comprehensive Metabolic Panel   Result Value Ref Range    Sodium 139 137 - 145 mmol/L    Potassium 4.2 3.5 - 5.1 mmol/L    Chloride 98 95 - 110 mmol/L    CO2 27 22 - 31 mmol/L    Anion Gap 14.0 5.0 - 15.0 mmol/L    Glucose 163 (H) 60 - 100 mg/dl    BUN 16 7 - 21 mg/dl    Creatinine 0.9 0.7 - 1.3 mg/dl    GFR MDRD Non African American 83 42 - 98 mL/min/1.73 sq.M    GFR MDRD  100 (H) 42 - 98 mL/min/1.73 sq.M    Calcium 9.2 8.4 - 10.2 mg/dl    Total Protein 7.1 6.3 - 8.6 gm/dl    Albumin 4.0 3.4 - 4.8 gm/dl    Total Bilirubin 0.7 0.2 - 1.3 mg/dl    Alkaline Phosphatase 39 38 - 126 U/L    AST (SGOT) 40 17 - 59 U/L    ALT (SGPT) 63 21 - 72 U/L   Results for orders placed or performed during the hospital encounter of 05/06/16   Converted Surgical Pathology   Result Value Ref Range    Spec Descr 1 SPECIMEN(S): A ANTRAL BIOPSY     Specimen description 2 SPECIMEN(S): B ESOPHAGEAL BIOPSY    POCT glucose fingerstick   Result Value Ref Range    Glucose 138 (H) 60 - 100 mg/dl   Results for orders placed or performed during the hospital encounter of 03/14/16   Hemoglobin A1c   Result Value Ref Range    Hemoglobin A1C 6.9 (H) 4.0 - 5.6 %TotHgb   Basic metabolic panel   Result Value Ref Range    Sodium 138 137 - 145 mmol/L    Potassium 4.8 3.5 - 5.1  mmol/L    Chloride 99 95 - 110 mmol/L    CO2 24 22 - 31 mmol/L    Anion Gap 15.0 5.0 - 15.0 mmol/L    Glucose 120 (H) 60 - 100 mg/dl    BUN 14 7 - 21 mg/dl    Creatinine 0.8 0.7 - 1.3 mg/dl    GFR MDRD Non African American 95 42 - 98 mL/min/1.73 sq.M    GFR MDRD  115 (H) 42 - 98 mL/min/1.73 sq.M    Calcium 9.0 8.4 - 10.2 mg/dl   Results for orders placed or performed during the hospital encounter of 12/06/15   proBNP   Result Value Ref Range    NT-proBNP 159 0 - 900 pg/ml   proBNP   Result Value Ref Range    NT-proBNP 307 0 - 900 pg/ml   CK MB   Result Value Ref Range    CKMB 0.4 0.0 - 5.0 ng/ml   CK MB   Result Value Ref Range    CKMB 0.5 0.0 - 5.0 ng/ml   CK MB   Result Value Ref Range    CKMB 0.4 0.0 - 5.0 ng/ml   CK MB   Result Value Ref Range    CKMB 0.9 0.0 - 5.0 ng/ml   CK MB   Result Value Ref Range    CKMB 0.9 0.0 - 5.0 ng/ml   CK MB   Result Value Ref Range    CKMB 0.9 0.0 - 5.0 ng/ml   Troponin   Result Value Ref Range    Troponin I 0.021 0.000 - 0.034 ng/ml   Troponin   Result Value Ref Range    Troponin I 0.021 0.000 - 0.034 ng/ml   Troponin   Result Value Ref Range    Troponin I 0.025 0.000 - 0.034 ng/ml   Troponin   Result Value Ref Range    Troponin I 0.045 (H) 0.000 - 0.034 ng/ml   Troponin   Result Value Ref Range    Troponin I 0.045 (H) 0.000 - 0.034 ng/ml   Troponin   Result Value Ref Range    Troponin I 0.047 (H) 0.000 - 0.034 ng/ml   Manual Differential   Result Value Ref Range    Neutrophil Rel % 70 37 - 80 %    Lymphocyte Rel % 22 10 - 50 %    Monocyte Rel % 6 0 - 12 %    Eosinophil Rel % 2 0 - 7 %    RBC Morphology Normochromic Slight Anisocytosis     Platelet Estimate low normal     Total Counted 100      *Note: Due to a large number of results and/or encounters for the requested time period, some results have not been displayed. A complete set of results can be found in Results Review.       Some portions of this note have been dictated using voice recognition software and  may contain errors and/or omissions.

## 2018-03-15 ENCOUNTER — OFFICE VISIT (OUTPATIENT)
Dept: INTERNAL MEDICINE | Facility: CLINIC | Age: 73
End: 2018-03-15

## 2018-03-15 VITALS
HEIGHT: 69 IN | DIASTOLIC BLOOD PRESSURE: 70 MMHG | BODY MASS INDEX: 29.51 KG/M2 | WEIGHT: 199.2 LBS | SYSTOLIC BLOOD PRESSURE: 110 MMHG

## 2018-03-15 DIAGNOSIS — I10 ESSENTIAL HYPERTENSION: ICD-10-CM

## 2018-03-15 DIAGNOSIS — E11.9 TYPE 2 DIABETES MELLITUS WITHOUT COMPLICATION, WITHOUT LONG-TERM CURRENT USE OF INSULIN (HCC): Primary | ICD-10-CM

## 2018-03-15 DIAGNOSIS — E78.2 MIXED HYPERLIPIDEMIA: ICD-10-CM

## 2018-03-15 DIAGNOSIS — Z23 IMMUNIZATION DUE: ICD-10-CM

## 2018-03-15 DIAGNOSIS — F41.1 GENERALIZED ANXIETY DISORDER: ICD-10-CM

## 2018-03-15 PROCEDURE — 90670 PCV13 VACCINE IM: CPT | Performed by: INTERNAL MEDICINE

## 2018-03-15 PROCEDURE — 99214 OFFICE O/P EST MOD 30 MIN: CPT | Performed by: INTERNAL MEDICINE

## 2018-03-15 PROCEDURE — G0009 ADMIN PNEUMOCOCCAL VACCINE: HCPCS | Performed by: INTERNAL MEDICINE

## 2018-03-15 RX ORDER — AMLODIPINE BESYLATE 10 MG/1
10 TABLET ORAL DAILY
Qty: 90 TABLET | Refills: 1 | Status: SHIPPED | OUTPATIENT
Start: 2018-03-15 | End: 2018-10-03 | Stop reason: SDUPTHER

## 2018-03-15 RX ORDER — CLOPIDOGREL BISULFATE 75 MG/1
TABLET ORAL
Qty: 90 TABLET | Refills: 1 | Status: SHIPPED | OUTPATIENT
Start: 2018-03-15 | End: 2018-10-03 | Stop reason: SDUPTHER

## 2018-03-15 RX ORDER — METOPROLOL TARTRATE 50 MG/1
50 TABLET, FILM COATED ORAL EVERY 12 HOURS SCHEDULED
Qty: 180 TABLET | Refills: 1 | Status: SHIPPED | OUTPATIENT
Start: 2018-03-15 | End: 2018-10-03 | Stop reason: SDUPTHER

## 2018-03-15 RX ORDER — ALPRAZOLAM 0.5 MG/1
0.5 TABLET ORAL 2 TIMES DAILY PRN
Qty: 30 TABLET | Refills: 2 | Status: SHIPPED | OUTPATIENT
Start: 2018-03-15 | End: 2018-07-16 | Stop reason: SDUPTHER

## 2018-03-15 RX ORDER — LISINOPRIL 40 MG/1
40 TABLET ORAL DAILY
Qty: 90 TABLET | Refills: 1 | Status: SHIPPED | OUTPATIENT
Start: 2018-03-15 | End: 2018-10-03 | Stop reason: SDUPTHER

## 2018-03-15 RX ORDER — AZELASTINE 1 MG/ML
2 SPRAY, METERED NASAL DAILY
Qty: 3 EACH | Refills: 1 | Status: SHIPPED | OUTPATIENT
Start: 2018-03-15 | End: 2018-04-16 | Stop reason: SDUPTHER

## 2018-03-15 RX ORDER — METFORMIN HYDROCHLORIDE 500 MG/1
500 TABLET, EXTENDED RELEASE ORAL 2 TIMES DAILY WITH MEALS
Qty: 180 TABLET | Refills: 1 | Status: SHIPPED | OUTPATIENT
Start: 2018-03-15 | End: 2018-10-03 | Stop reason: SDUPTHER

## 2018-03-15 NOTE — PROGRESS NOTES
Subjective   Aj Card Jr. is a 72 y.o. male       Patient is in for recheck on HTN, DM, hyperlipidemia and anxiety.    He has been having more problems with his DM with FBS running between 150 and 200.  He discontinued Glimepiride due to the side effects /rash/. He is on Metformin 500 mg bid but could not tolerate higher doses due to diarrhea.  Januvia was tried some time ago but caused rash as well.    Diabetes   He presents for his follow-up diabetic visit. He has type 2 diabetes mellitus. His disease course has been worsening. Hypoglycemia symptoms include nervousness/anxiousness. Associated symptoms include blurred vision. Pertinent negatives for diabetes include no chest pain, no foot paresthesias and no foot ulcerations. Pertinent negatives for diabetic complications include no peripheral neuropathy or retinopathy. Risk factors for coronary artery disease include diabetes mellitus, dyslipidemia and hypertension. Current diabetic treatment includes oral agent (monotherapy) (Metformin). He is compliant with treatment most of the time. He is following a generally healthy diet. He participates in exercise intermittently. His home blood glucose trend is increasing steadily. His breakfast blood glucose range is generally 140-180 mg/dl. An ACE inhibitor/angiotensin II receptor blocker is being taken. He does not see a podiatrist.Eye exam is current.   Hypertension   This is a chronic problem. The problem is controlled. Associated symptoms include anxiety and blurred vision. Pertinent negatives include no chest pain or palpitations. There are no associated agents to hypertension. Risk factors for coronary artery disease include diabetes mellitus and dyslipidemia. Past treatments include ACE inhibitors, beta blockers and calcium channel blockers. The current treatment provides significant improvement. There is no history of renovascular disease or retinopathy. Current antihypertension treatment includes  ACE inhibitors, beta blockers and calcium channel blockers.   Hyperlipidemia   This is a chronic problem. Recent lipid tests were reviewed and are variable. Exacerbating diseases include diabetes. He has no history of liver disease. Pertinent negatives include no chest pain or leg pain. Treatments tried: red yeast rice, fish oil. The current treatment provides moderate improvement of lipids. Risk factors for coronary artery disease include diabetes mellitus, dyslipidemia and hypertension.   Anxiety   Presents for follow-up visit. Symptoms include excessive worry, insomnia and nervous/anxious behavior. Patient reports no chest pain or palpitations. Symptoms occur occasionally. The severity of symptoms is mild.                   Past Medical History:   Diagnosis Date   • Acute posthemorrhagic anemia    • Allergic rhinitis    • Anxiety state    • Chest pain    • Coronary arteriosclerosis    • Diabetes mellitus     type 2   • Diverticular disease of colon    • Dysphagia    • Epigastric pain    • GERD (gastroesophageal reflux disease)     esophagitis on Dexilant. Pt feels Nexium working better      • History of echocardiogram     Small left atrial enlargement with mild CLVH.Ef of 55-60%.Mitral valve mildly thickened.Av thickened.Left ventricle mildly dilated.Tricuspid intact.Mild aortic insufficiency. 12/07/2015       • History of echocardiogram     Mild diastolic dysfunction and mild left atrial enlargement, otherwise normal echo. EF> 55% 01/03/2012       • Hypercholesterolemia    • Hypertension    • Insomnia    • Irritable bowel syndrome    • Osteoarthritis of multiple joints      Past Surgical History:   Procedure Laterality Date   • APPENDECTOMY       [facility] 1995       • CARDIAC CATHETERIZATION      Successful PTCA of the OMB#2.Unsuccessful PTCA of the 1st OMB, secondary to difficulty in advancing the balloon. 12/08/2015       • CHOLECYSTECTOMY      [facility] 1993       • COLONOSCOPY   10/01/2012   • COLONOSCOPY N/A 12/11/2017    Procedure: COLONOSCOPY;  Surgeon: Bladimir Michael MD;  Location: [facility] ENDOSCOPY;  Service:    • CORONARY ANGIOPLASTY      Successful PTCA & stenting LAD was done w/ deployment of a 2.5mm x23 Xience stent w/ reduction of stenosis from 90% to less than 0% stenosis with good LILLIAM 3 flow 02/17/2012       • ENDOSCOPY      with biopsy.  Mildly severe esophagitis. Gastritis. Normal examined duodenum.Several biopsies obtained in the lower third of the esophagus.Several biopsies obtained in the gastric antrum. 05/06/2016       • ENDOSCOPY      w tube. Normal esophagus. Gastritis in stomach. Biopsy taken. Gastric polyp found. Biopsy taken. Normal duodenum. 10/01/2012       • ENDOSCOPY AND COLONOSCOPY      Diverticulum in sigmoid colon & descending colon. Internal & external hemorrhoids found. 10/01/2012       • HAND SURGERY       Corrective osteotomy of ring finger metacarpal. Malunited fracture of left ring finger 05/21/1985       • HERNIA REPAIR      Laparoscopic hernia repair. prepertitoneal bilateral inguinal hernia repair with mesh. 10/15/2009      • OTHER SURGICAL HISTORY      CORONARY ARTERY STENT    • SKIN TAG REMOVAL      Excision, viral skin tag,right upper eyelid 05/08/1995        Social History   Substance Use Topics   • Smoking status: Never Smoker   • Smokeless tobacco: Never Used   • Alcohol use No     Family History   Problem Relation Age of Onset   • Heart disease Other    • Hypertension Other    • Stroke Other    • Schizophrenia Sister      Allergies   Allergen Reactions   • Brilinta [Ticagrelor] Shortness Of Breath   • Effient [Prasugrel] Shortness Of Breath   • Warfarin And Related Shortness Of Breath   • Januvia [Sitagliptin] Dizziness   • Lipitor [Atorvastatin] Swelling   • Other      Cipro: Hives   • Adhesive Tape Rash   • Cardizem [Diltiazem Hcl] Rash   • Celexa [Citalopram Hydrobromide] Rash   • Crestor [Rosuvastatin Calcium] Rash   • Floxin [Ofloxacin]  Rash   • Penicillins Rash   • Sulfa Antibiotics Rash     Current Outpatient Prescriptions on File Prior to Visit   Medication Sig Dispense Refill   • aspirin 81 MG EC tablet Take 162 mg by mouth daily.     • azelastine (ASTELIN) 0.1 % nasal spray 2 sprays into each nostril 2 (two) times a day. Use in each nostril as directed     • cholecalciferol (VITAMIN D3) 1000 UNITS tablet Take 1,000 Units by mouth daily.     • coenzyme Q10 100 MG capsule Take 100 mg by mouth daily.     • esomeprazole (nexIUM) 40 MG capsule Take 1 capsule by mouth Every Morning Before Breakfast. 90 capsule 3   • KRILL OIL OMEGA-3 PO Take  by mouth daily.     • Red Yeast Rice Extract (RED YEAST RICE PO) Take  by mouth Daily.     • Saw Palmetto, Serenoa repens, (SAW PALMETTO PO) Take  by mouth daily.     • sucralfate (CARAFATE) 1 g tablet Take 1 tablet by mouth 2 (Two) Times a Day. 180 tablet 3   • vitamin B-12 (CYANOCOBALAMIN) 2500 MCG sublingual tablet tablet Place  under the tongue Every Other Day.     • zolpidem (AMBIEN) 5 MG tablet Take 5 mg by mouth At Night As Needed for Sleep.     • [DISCONTINUED] ALPRAZolam (XANAX) 0.5 MG tablet Take 1 tablet by mouth 2 (Two) Times a Day As Needed for Anxiety. 30 tablet 2   • [DISCONTINUED] amLODIPine (NORVASC) 10 MG tablet Take 1 tablet by mouth Daily. 90 tablet 1   • [DISCONTINUED] clopidogrel (PLAVIX) 75 MG tablet Take 1 tablet by mouth Daily. (Patient taking differently: Take 75 mg by mouth Every Other Day.) 90 tablet 1   • [DISCONTINUED] glimepiride (AMARYL) 2 MG tablet Take 1 tablet by mouth Every Morning Before Breakfast. 90 tablet 1   • [DISCONTINUED] glucose blood test strip 1 each by Other route Daily. Use as instructed 100 each 5   • [DISCONTINUED] lisinopril (PRINIVIL,ZESTRIL) 40 MG tablet Take 1 tablet by mouth Daily. (Patient taking differently: Take 40 mg by mouth 2 (Two) Times a Day.) 90 tablet 1   • [DISCONTINUED] metFORMIN ER (GLUCOPHAGE-XR) 500 MG 24 hr tablet Take 1 tablet by mouth 2  (Two) Times a Day With Meals. 180 tablet 1   • [DISCONTINUED] metoprolol tartrate (LOPRESSOR) 50 MG tablet Take 1 tablet by mouth 2 (Two) Times a Day. 180 tablet 1   • [DISCONTINUED] pantoprazole (PROTONIX) 40 MG EC tablet Take 1 tablet by mouth Daily. 90 tablet 3     No current facility-administered medications on file prior to visit.      Review of Systems   Constitutional: Negative for activity change.   HENT: Negative.    Eyes: Positive for blurred vision. Negative for photophobia.   Respiratory: Negative for chest tightness.    Cardiovascular: Negative for chest pain, palpitations and leg swelling.   Gastrointestinal: Negative for abdominal pain, constipation and diarrhea.   Endocrine: Negative for cold intolerance and heat intolerance.   Musculoskeletal: Negative for back pain.   Skin: Negative for color change and rash.   Neurological: Negative for light-headedness and numbness.   Psychiatric/Behavioral: The patient is nervous/anxious and has insomnia.        Objective   Physical Exam   Constitutional: He appears well-developed and well-nourished.   HENT:   Head: Normocephalic and atraumatic.   Nose: Nose normal.   Mouth/Throat: Oropharynx is clear and moist.   Eyes: Conjunctivae are normal. Pupils are equal, round, and reactive to light. No scleral icterus.   Neck: Neck supple. No JVD present. No thyromegaly present.   Cardiovascular: Normal rate and regular rhythm.    Pulmonary/Chest: Effort normal and breath sounds normal.   Abdominal: Soft. Bowel sounds are normal. He exhibits no mass.   Musculoskeletal: Normal range of motion. He exhibits no edema.       Neurological Sensory Findings -  Unaltered sharp/dull right ankle/foot discrimination and unaltered sharp/dull left ankle/foot discrimination.  Vascular Status -  His right foot exhibits normal right foot vasculature . His left foot exhibits normal left foot vasculature .  Skin Integrity  -  He has non-callous left foot..  Neurological: He is alert. He  has normal reflexes.   Skin: Skin is warm.   Psychiatric: His behavior is normal. Thought content normal.           Patient Active Problem List   Diagnosis   • Type 2 diabetes mellitus without complication, without long-term current use of insulin   • Essential hypertension   • Mixed hyperlipidemia   • Generalized anxiety disorder   • History of colon polyps   • Diverticulosis of large intestine without hemorrhage   • Diarrhea           Admission on 12/11/2017, Discharged on 12/11/2017   Component Date Value Ref Range Status   • Glucose 12/11/2017 233* 70 - 130 mg/dL Final   Lab on 09/15/2017   Component Date Value Ref Range Status   • Hemoglobin A1C 09/15/2017 7.1* 4 - 5.6 % Final   • Glucose 09/15/2017 185* 60 - 100 mg/dL Final   • BUN 09/15/2017 13  7 - 21 mg/dL Final   • Creatinine 09/15/2017 0.90  0.70 - 1.30 mg/dL Final   • Sodium 09/15/2017 138  137 - 145 mmol/L Final   • Potassium 09/15/2017 4.9  3.5 - 5.1 mmol/L Final   • Chloride 09/15/2017 100  95 - 110 mmol/L Final   • CO2 09/15/2017 25.0  22.0 - 31.0 mmol/L Final   • Calcium 09/15/2017 9.8  8.4 - 10.2 mg/dL Final   • Total Protein 09/15/2017 7.4  6.3 - 8.6 g/dL Final   • Albumin 09/15/2017 4.50  3.40 - 4.80 g/dL Final   • ALT (SGPT) 09/15/2017 54  21 - 72 U/L Final   • AST (SGOT) 09/15/2017 39  17 - 59 U/L Final   • Alkaline Phosphatase 09/15/2017 39  38 - 126 U/L Final   • Total Bilirubin 09/15/2017 0.9  0.2 - 1.3 mg/dL Final   • eGFR Non African Amer 09/15/2017 83  42 - 98 mL/min/1.73 Final   • Globulin 09/15/2017 2.9  2.3 - 3.5 gm/dL Final   • A/G Ratio 09/15/2017 1.6  1.1 - 1.8 g/dL Final   • BUN/Creatinine Ratio 09/15/2017 14.4  7.0 - 25.0 Final   • Anion Gap 09/15/2017 13.0  5.0 - 15.0 mmol/L Final             Assessment/Plan   Aj was seen today for diabetes and follow-up.    Diagnoses and all orders for this visit:    Type 2 diabetes mellitus without complication, without long-term current use of insulin  -     Comprehensive Metabolic  Panel  -     Hemoglobin A1c  -     Microalbumin / Creatinine Urine Ratio - Urine, Clean Catch    Essential hypertension  -     Comprehensive Metabolic Panel    Mixed hyperlipidemia  -     Comprehensive Metabolic Panel  -     Lipid Panel    Generalized anxiety disorder    Immunization due  -     Pneumococcal Conjugate Vaccine 13-Valent All (PCV13)    Other orders  -     amLODIPine (NORVASC) 10 MG tablet; Take 1 tablet by mouth Daily.  -     clopidogrel (PLAVIX) 75 MG tablet; 1 PO QOD  -     lisinopril (PRINIVIL,ZESTRIL) 40 MG tablet; Take 1 tablet by mouth Daily.  -     metFORMIN ER (GLUCOPHAGE-XR) 500 MG 24 hr tablet; Take 1 tablet by mouth 2 (Two) Times a Day With Meals.  -     metoprolol tartrate (LOPRESSOR) 50 MG tablet; Take 1 tablet by mouth Every 12 (Twelve) Hours.  -     glucose blood test strip; Check glucose bid  -     azelastine (ASTELIN) 0.1 % nasal spray; 2 sprays into each nostril Daily. Use in each nostril as directed  -     ALPRAZolam (XANAX) 0.5 MG tablet; Take 1 tablet by mouth 2 (Two) Times a Day As Needed for Anxiety.  -     Liraglutide (VICTOZA) 18 MG/3ML solution pen-injector injection; Inject 0.6 mg under the skin Daily.           Plan      1. Patient will be placed on Victoza 0.6 mg daily.      Medication benefit and side effects reviewed.      Metformin 500 mg bid continued.      ADA diet.      Will recheck HgbA1c.      Diabetic foot exam performed and reviewed diabetic foot care.  2. Current antihypertensive therapy continued with Lisinopril, Amlodipine and Metoprolol.      DASH.      1.5 gr Sodium restriction.      Discussed physical activity and regular exercise.  3. Lipids will be rechecked.      He is on red yeast rice and fish oil OTC.      Patient could not tolerate multiple statins ion the past.  4. Xanax 0.5 mg bid PRN.      It has been working well for him and tolerates it well.      Advised about habit forming nature of prescribed medication. Risk of tolerance and dependence  discussed.      Jose Roberto obtained and is consistent.      Urine drug screen reviewed on the chart.  5. Prevnar given today.      Follow up in 1 month.                      This document has been electronically signed by Steff Rodriguez MD on March 15, 2018 8:07 PM

## 2018-04-10 LAB
ALBUMIN UR-MCNC: <0.6 MG/L
CREAT UR-MCNC: 119.4 MG/DL
MICROALBUMIN/CREAT UR: NORMAL MG/G (ref 0–30)

## 2018-04-10 PROCEDURE — 82570 ASSAY OF URINE CREATININE: CPT | Performed by: INTERNAL MEDICINE

## 2018-04-10 PROCEDURE — 82043 UR ALBUMIN QUANTITATIVE: CPT | Performed by: INTERNAL MEDICINE

## 2018-04-10 PROCEDURE — 36415 COLL VENOUS BLD VENIPUNCTURE: CPT | Performed by: FAMILY MEDICINE

## 2018-04-10 PROCEDURE — 80061 LIPID PANEL: CPT | Performed by: INTERNAL MEDICINE

## 2018-04-10 PROCEDURE — 83036 HEMOGLOBIN GLYCOSYLATED A1C: CPT | Performed by: INTERNAL MEDICINE

## 2018-04-10 PROCEDURE — 80053 COMPREHEN METABOLIC PANEL: CPT | Performed by: INTERNAL MEDICINE

## 2018-04-11 LAB
ALBUMIN SERPL-MCNC: 4.4 G/DL (ref 3.4–4.8)
ALBUMIN/GLOB SERPL: 1.4 G/DL (ref 1.1–1.8)
ALP SERPL-CCNC: 44 U/L (ref 38–126)
ALT SERPL W P-5'-P-CCNC: 56 U/L (ref 21–72)
ANION GAP SERPL CALCULATED.3IONS-SCNC: 15 MMOL/L (ref 5–15)
ARTICHOKE IGE QN: 80 MG/DL (ref 1–129)
AST SERPL-CCNC: 37 U/L (ref 17–59)
BILIRUB SERPL-MCNC: 0.5 MG/DL (ref 0.2–1.3)
BUN BLD-MCNC: 12 MG/DL (ref 7–21)
BUN/CREAT SERPL: 15 (ref 7–25)
CALCIUM SPEC-SCNC: 9.6 MG/DL (ref 8.4–10.2)
CHLORIDE SERPL-SCNC: 98 MMOL/L (ref 95–110)
CHOLEST SERPL-MCNC: 144 MG/DL (ref 0–199)
CO2 SERPL-SCNC: 27 MMOL/L (ref 22–31)
CREAT BLD-MCNC: 0.8 MG/DL (ref 0.7–1.3)
GFR SERPL CREATININE-BSD FRML MDRD: 95 ML/MIN/1.73 (ref 42–98)
GLOBULIN UR ELPH-MCNC: 3.2 GM/DL (ref 2.3–3.5)
GLUCOSE BLD-MCNC: 171 MG/DL (ref 60–100)
HBA1C MFR BLD: 7.6 % (ref 4–5.6)
HDLC SERPL-MCNC: 28 MG/DL (ref 60–200)
LDLC/HDLC SERPL: 2.5 {RATIO} (ref 0–3.55)
POTASSIUM BLD-SCNC: 4.2 MMOL/L (ref 3.5–5.1)
PROT SERPL-MCNC: 7.6 G/DL (ref 6.3–8.6)
SODIUM BLD-SCNC: 140 MMOL/L (ref 137–145)
TRIGL SERPL-MCNC: 230 MG/DL (ref 20–199)

## 2018-04-12 ENCOUNTER — TELEPHONE (OUTPATIENT)
Dept: INTERNAL MEDICINE | Facility: CLINIC | Age: 73
End: 2018-04-12

## 2018-04-12 NOTE — TELEPHONE ENCOUNTER
SPOKE WITH PT LET HIM KNOW LAB SHOWED HBA1C WAS ELEVATED BUT DR MEDINA HAS ALREADY STARTED HIM ON VICTOZA AND I ASK IF HE WAS USING IT PT RESPONDED THAT HE WAS I LET HIM KNOW DR MEDINA WANTS HIM TO KEEP APPT THIS MONTH BRING ALL MEDICATIONS TO THIS APPT .AND I LET HIM KNOW HIS CHOLESTEROL WAS OK

## 2018-04-16 ENCOUNTER — OFFICE VISIT (OUTPATIENT)
Dept: INTERNAL MEDICINE | Facility: CLINIC | Age: 73
End: 2018-04-16

## 2018-04-16 VITALS
DIASTOLIC BLOOD PRESSURE: 70 MMHG | HEIGHT: 69 IN | SYSTOLIC BLOOD PRESSURE: 120 MMHG | WEIGHT: 195.8 LBS | BODY MASS INDEX: 29 KG/M2

## 2018-04-16 DIAGNOSIS — E78.2 MIXED HYPERLIPIDEMIA: ICD-10-CM

## 2018-04-16 DIAGNOSIS — E11.9 TYPE 2 DIABETES MELLITUS WITHOUT COMPLICATION, WITHOUT LONG-TERM CURRENT USE OF INSULIN (HCC): Primary | ICD-10-CM

## 2018-04-16 DIAGNOSIS — I10 ESSENTIAL HYPERTENSION: ICD-10-CM

## 2018-04-16 PROCEDURE — 99213 OFFICE O/P EST LOW 20 MIN: CPT | Performed by: INTERNAL MEDICINE
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
## 2018-07-09 ENCOUNTER — LAB (OUTPATIENT)
Dept: LAB | Facility: CLINIC | Age: 73
End: 2018-07-09

## 2018-07-09 DIAGNOSIS — E11.8 TYPE 2 DIABETES MELLITUS WITH COMPLICATION, WITH LONG-TERM CURRENT USE OF INSULIN (HCC): Primary | ICD-10-CM

## 2018-07-09 DIAGNOSIS — Z79.4 TYPE 2 DIABETES MELLITUS WITH COMPLICATION, WITH LONG-TERM CURRENT USE OF INSULIN (HCC): ICD-10-CM

## 2018-07-09 DIAGNOSIS — E11.8 TYPE 2 DIABETES MELLITUS WITH COMPLICATION, WITH LONG-TERM CURRENT USE OF INSULIN (HCC): ICD-10-CM

## 2018-07-09 DIAGNOSIS — Z79.4 TYPE 2 DIABETES MELLITUS WITH COMPLICATION, WITH LONG-TERM CURRENT USE OF INSULIN (HCC): Primary | ICD-10-CM

## 2018-07-09 LAB
ALBUMIN SERPL-MCNC: 4.3 G/DL (ref 3.4–4.8)
ALBUMIN/GLOB SERPL: 1.4 G/DL (ref 1.1–1.8)
ALP SERPL-CCNC: 33 U/L (ref 38–126)
ALT SERPL W P-5'-P-CCNC: 39 U/L (ref 21–72)
ANION GAP SERPL CALCULATED.3IONS-SCNC: 13 MMOL/L (ref 5–15)
AST SERPL-CCNC: 27 U/L (ref 17–59)
BASOPHILS # BLD AUTO: 0.02 10*3/MM3 (ref 0–0.2)
BASOPHILS NFR BLD AUTO: 0.4 % (ref 0–2)
BILIRUB SERPL-MCNC: 0.5 MG/DL (ref 0.2–1.3)
BUN BLD-MCNC: 14 MG/DL (ref 7–21)
BUN/CREAT SERPL: 15.9 (ref 7–25)
CALCIUM SPEC-SCNC: 9.3 MG/DL (ref 8.4–10.2)
CHLORIDE SERPL-SCNC: 99 MMOL/L (ref 95–110)
CO2 SERPL-SCNC: 26 MMOL/L (ref 22–31)
CREAT BLD-MCNC: 0.88 MG/DL (ref 0.7–1.3)
DEPRECATED RDW RBC AUTO: 43.4 FL (ref 35.1–43.9)
EOSINOPHIL # BLD AUTO: 0.2 10*3/MM3 (ref 0–0.7)
EOSINOPHIL NFR BLD AUTO: 3.8 % (ref 0–7)
ERYTHROCYTE [DISTWIDTH] IN BLOOD BY AUTOMATED COUNT: 14.1 % (ref 11.5–14.5)
GFR SERPL CREATININE-BSD FRML MDRD: 85 ML/MIN/1.73 (ref 42–98)
GLOBULIN UR ELPH-MCNC: 3.1 GM/DL (ref 2.3–3.5)
GLUCOSE BLD-MCNC: 153 MG/DL (ref 60–100)
HBA1C MFR BLD: 6.6 % (ref 4–5.6)
HCT VFR BLD AUTO: 41 % (ref 39–49)
HGB BLD-MCNC: 14.1 G/DL (ref 13.7–17.3)
IMM GRANULOCYTES # BLD: 0.02 10*3/MM3 (ref 0–0.02)
IMM GRANULOCYTES NFR BLD: 0.4 % (ref 0–0.5)
LYMPHOCYTES # BLD AUTO: 1.19 10*3/MM3 (ref 0.6–4.2)
LYMPHOCYTES NFR BLD AUTO: 22.8 % (ref 10–50)
MCH RBC QN AUTO: 29 PG (ref 26.5–34)
MCHC RBC AUTO-ENTMCNC: 34.4 G/DL (ref 31.5–36.3)
MCV RBC AUTO: 84.4 FL (ref 80–98)
MONOCYTES # BLD AUTO: 0.36 10*3/MM3 (ref 0–0.9)
MONOCYTES NFR BLD AUTO: 6.9 % (ref 0–12)
NEUTROPHILS # BLD AUTO: 3.43 10*3/MM3 (ref 2–8.6)
NEUTROPHILS NFR BLD AUTO: 65.7 % (ref 37–80)
PLATELET # BLD AUTO: 225 10*3/MM3 (ref 150–450)
PMV BLD AUTO: 8.8 FL (ref 8–12)
POTASSIUM BLD-SCNC: 4.7 MMOL/L (ref 3.5–5.1)
PROT SERPL-MCNC: 7.4 G/DL (ref 6.3–8.6)
RBC # BLD AUTO: 4.86 10*6/MM3 (ref 4.37–5.74)
SODIUM BLD-SCNC: 138 MMOL/L (ref 137–145)
WBC NRBC COR # BLD: 5.22 10*3/MM3 (ref 3.2–9.8)

## 2018-07-09 PROCEDURE — 85025 COMPLETE CBC W/AUTO DIFF WBC: CPT | Performed by: INTERNAL MEDICINE

## 2018-07-09 PROCEDURE — 36415 COLL VENOUS BLD VENIPUNCTURE: CPT | Performed by: FAMILY MEDICINE

## 2018-07-09 PROCEDURE — 83036 HEMOGLOBIN GLYCOSYLATED A1C: CPT | Performed by: INTERNAL MEDICINE

## 2018-07-09 PROCEDURE — 80053 COMPREHEN METABOLIC PANEL: CPT | Performed by: INTERNAL MEDICINE

## 2018-07-10 ENCOUNTER — TELEPHONE (OUTPATIENT)
Dept: INTERNAL MEDICINE | Facility: CLINIC | Age: 73
End: 2018-07-10

## 2018-07-16 ENCOUNTER — OFFICE VISIT (OUTPATIENT)
Dept: INTERNAL MEDICINE | Facility: CLINIC | Age: 73
End: 2018-07-16

## 2018-07-16 VITALS
HEART RATE: 85 BPM | WEIGHT: 192.2 LBS | HEIGHT: 69 IN | SYSTOLIC BLOOD PRESSURE: 116 MMHG | DIASTOLIC BLOOD PRESSURE: 68 MMHG | OXYGEN SATURATION: 99 % | BODY MASS INDEX: 28.47 KG/M2

## 2018-07-16 DIAGNOSIS — E11.9 TYPE 2 DIABETES MELLITUS WITHOUT COMPLICATION, WITHOUT LONG-TERM CURRENT USE OF INSULIN (HCC): Primary | ICD-10-CM

## 2018-07-16 DIAGNOSIS — F41.1 GENERALIZED ANXIETY DISORDER: ICD-10-CM

## 2018-07-16 DIAGNOSIS — I10 ESSENTIAL HYPERTENSION: ICD-10-CM

## 2018-07-16 PROCEDURE — 99213 OFFICE O/P EST LOW 20 MIN: CPT | Performed by: INTERNAL MEDICINE

## 2018-07-16 RX ORDER — ALPRAZOLAM 0.5 MG/1
0.5 TABLET ORAL 2 TIMES DAILY PRN
Qty: 30 TABLET | Refills: 2 | Status: SHIPPED | OUTPATIENT
Start: 2018-07-16 | End: 2018-10-03 | Stop reason: SDUPTHER

## 2018-07-31 ENCOUNTER — OFFICE VISIT (OUTPATIENT)
Dept: GASTROENTEROLOGY | Facility: CLINIC | Age: 73
End: 2018-07-31

## 2018-07-31 VITALS
DIASTOLIC BLOOD PRESSURE: 80 MMHG | HEART RATE: 72 BPM | BODY MASS INDEX: 28.58 KG/M2 | HEIGHT: 69 IN | SYSTOLIC BLOOD PRESSURE: 134 MMHG | WEIGHT: 193 LBS

## 2018-07-31 DIAGNOSIS — K21.00 GASTROESOPHAGEAL REFLUX DISEASE WITH ESOPHAGITIS: Primary | ICD-10-CM

## 2018-07-31 DIAGNOSIS — Z86.010 HISTORY OF COLON POLYPS: ICD-10-CM

## 2018-07-31 DIAGNOSIS — R12 HEARTBURN: ICD-10-CM

## 2018-07-31 DIAGNOSIS — R10.13 EPIGASTRIC PAIN: ICD-10-CM

## 2018-07-31 DIAGNOSIS — K57.30 DIVERTICULOSIS OF LARGE INTESTINE WITHOUT HEMORRHAGE: ICD-10-CM

## 2018-07-31 PROCEDURE — 99214 OFFICE O/P EST MOD 30 MIN: CPT | Performed by: PHYSICIAN ASSISTANT

## 2018-07-31 RX ORDER — PANTOPRAZOLE SODIUM 40 MG/1
TABLET, DELAYED RELEASE ORAL
COMMUNITY
Start: 2018-07-26 | End: 2018-07-31

## 2018-07-31 RX ORDER — SUCRALFATE 1 G/1
1 TABLET ORAL 2 TIMES DAILY PRN
Qty: 180 TABLET | Refills: 3 | Status: SHIPPED | OUTPATIENT
Start: 2018-07-31 | End: 2018-10-03 | Stop reason: SDUPTHER

## 2018-07-31 RX ORDER — ESOMEPRAZOLE MAGNESIUM 40 MG/1
40 CAPSULE, DELAYED RELEASE ORAL
Qty: 90 CAPSULE | Refills: 3 | Status: SHIPPED | OUTPATIENT
Start: 2018-07-31 | End: 2018-10-03 | Stop reason: SDUPTHER
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
## 2018-10-03 ENCOUNTER — OFFICE VISIT (OUTPATIENT)
Dept: FAMILY MEDICINE CLINIC | Facility: CLINIC | Age: 73
End: 2018-10-03

## 2018-10-03 VITALS
SYSTOLIC BLOOD PRESSURE: 120 MMHG | BODY MASS INDEX: 28.85 KG/M2 | OXYGEN SATURATION: 95 % | HEIGHT: 69 IN | HEART RATE: 87 BPM | WEIGHT: 194.8 LBS | DIASTOLIC BLOOD PRESSURE: 68 MMHG | TEMPERATURE: 98.6 F

## 2018-10-03 DIAGNOSIS — Z02.83 ENCOUNTER FOR DRUG SCREENING: ICD-10-CM

## 2018-10-03 DIAGNOSIS — K21.00 GASTROESOPHAGEAL REFLUX DISEASE WITH ESOPHAGITIS: ICD-10-CM

## 2018-10-03 DIAGNOSIS — Z51.81 ENCOUNTER FOR THERAPEUTIC DRUG LEVEL MONITORING: ICD-10-CM

## 2018-10-03 DIAGNOSIS — E78.2 MIXED HYPERLIPIDEMIA: ICD-10-CM

## 2018-10-03 DIAGNOSIS — R12 HEARTBURN: ICD-10-CM

## 2018-10-03 DIAGNOSIS — I25.119 CORONARY ARTERY DISEASE WITH ANGINA PECTORIS, UNSPECIFIED VESSEL OR LESION TYPE, UNSPECIFIED WHETHER NATIVE OR TRANSPLANTED HEART (HCC): ICD-10-CM

## 2018-10-03 DIAGNOSIS — Z00.00 GENERAL MEDICAL EXAMINATION: ICD-10-CM

## 2018-10-03 DIAGNOSIS — I10 ESSENTIAL HYPERTENSION: ICD-10-CM

## 2018-10-03 DIAGNOSIS — E11.9 TYPE 2 DIABETES MELLITUS WITHOUT COMPLICATION, WITHOUT LONG-TERM CURRENT USE OF INSULIN (HCC): Primary | ICD-10-CM

## 2018-10-03 DIAGNOSIS — E66.3 OVERWEIGHT: ICD-10-CM

## 2018-10-03 DIAGNOSIS — Z13.29 ENCOUNTER FOR SCREENING FOR ENDOCRINE DISORDER: ICD-10-CM

## 2018-10-03 DIAGNOSIS — R10.13 EPIGASTRIC PAIN: ICD-10-CM

## 2018-10-03 DIAGNOSIS — F41.1 GENERALIZED ANXIETY DISORDER: ICD-10-CM

## 2018-10-03 DIAGNOSIS — E55.9 VITAMIN D DEFICIENCY: ICD-10-CM

## 2018-10-03 PROCEDURE — 99214 OFFICE O/P EST MOD 30 MIN: CPT | Performed by: FAMILY MEDICINE

## 2018-10-03 RX ORDER — ALPRAZOLAM 0.5 MG/1
0.5 TABLET ORAL 2 TIMES DAILY PRN
Qty: 30 TABLET | Refills: 2 | Status: SHIPPED | OUTPATIENT
Start: 2018-10-03 | End: 2019-04-01 | Stop reason: SDUPTHER

## 2018-10-03 RX ORDER — METOPROLOL TARTRATE 50 MG/1
50 TABLET, FILM COATED ORAL EVERY 12 HOURS SCHEDULED
Qty: 180 TABLET | Refills: 1 | Status: SHIPPED | OUTPATIENT
Start: 2018-10-03 | End: 2019-04-01 | Stop reason: SDUPTHER

## 2018-10-03 RX ORDER — ESOMEPRAZOLE MAGNESIUM 40 MG/1
40 CAPSULE, DELAYED RELEASE ORAL
Qty: 90 CAPSULE | Refills: 3 | Status: SHIPPED | OUTPATIENT
Start: 2018-10-03 | End: 2019-04-01 | Stop reason: SDUPTHER

## 2018-10-03 RX ORDER — LISINOPRIL 40 MG/1
40 TABLET ORAL DAILY
Qty: 90 TABLET | Refills: 3 | Status: SHIPPED | OUTPATIENT
Start: 2018-10-03 | End: 2019-04-01 | Stop reason: SDUPTHER

## 2018-10-03 RX ORDER — METFORMIN HYDROCHLORIDE 500 MG/1
500 TABLET, EXTENDED RELEASE ORAL 2 TIMES DAILY WITH MEALS
Qty: 180 TABLET | Refills: 3 | Status: SHIPPED | OUTPATIENT
Start: 2018-10-03 | End: 2018-12-03 | Stop reason: SDUPTHER

## 2018-10-03 RX ORDER — CLOPIDOGREL BISULFATE 75 MG/1
TABLET ORAL
Qty: 90 TABLET | Refills: 3 | Status: SHIPPED | OUTPATIENT
Start: 2018-10-03 | End: 2019-04-01 | Stop reason: SDUPTHER

## 2018-10-03 RX ORDER — SUCRALFATE 1 G/1
1 TABLET ORAL 2 TIMES DAILY PRN
Qty: 180 TABLET | Refills: 3 | Status: SHIPPED | OUTPATIENT
Start: 2018-10-03 | End: 2019-04-01 | Stop reason: SDUPTHER

## 2018-10-03 RX ORDER — PANTOPRAZOLE SODIUM 40 MG/1
40 TABLET, DELAYED RELEASE ORAL DAILY
COMMUNITY
End: 2019-10-16

## 2018-10-03 RX ORDER — AMLODIPINE BESYLATE 10 MG/1
10 TABLET ORAL DAILY
Qty: 90 TABLET | Refills: 3 | Status: SHIPPED | OUTPATIENT
Start: 2018-10-03 | End: 2019-04-01 | Stop reason: SDUPTHER

## 2018-10-04 ENCOUNTER — LAB (OUTPATIENT)
Dept: LAB | Facility: HOSPITAL | Age: 73
End: 2018-10-04

## 2018-10-04 DIAGNOSIS — E55.9 VITAMIN D DEFICIENCY: ICD-10-CM

## 2018-10-04 DIAGNOSIS — Z13.29 ENCOUNTER FOR SCREENING FOR ENDOCRINE DISORDER: ICD-10-CM

## 2018-10-04 DIAGNOSIS — Z02.83 ENCOUNTER FOR DRUG SCREENING: ICD-10-CM

## 2018-10-04 DIAGNOSIS — Z51.81 ENCOUNTER FOR THERAPEUTIC DRUG LEVEL MONITORING: ICD-10-CM

## 2018-10-04 DIAGNOSIS — I25.119 CORONARY ARTERY DISEASE WITH ANGINA PECTORIS, UNSPECIFIED VESSEL OR LESION TYPE, UNSPECIFIED WHETHER NATIVE OR TRANSPLANTED HEART (HCC): ICD-10-CM

## 2018-10-04 DIAGNOSIS — Z00.00 GENERAL MEDICAL EXAMINATION: ICD-10-CM

## 2018-10-04 DIAGNOSIS — I10 ESSENTIAL HYPERTENSION: ICD-10-CM

## 2018-10-04 DIAGNOSIS — E11.9 TYPE 2 DIABETES MELLITUS WITHOUT COMPLICATION, WITHOUT LONG-TERM CURRENT USE OF INSULIN (HCC): ICD-10-CM

## 2018-10-04 LAB
BASOPHILS # BLD AUTO: 0.03 10*3/MM3 (ref 0–0.2)
BASOPHILS NFR BLD AUTO: 0.5 % (ref 0–2)
DEPRECATED RDW RBC AUTO: 41.2 FL (ref 35.1–43.9)
EOSINOPHIL # BLD AUTO: 0.19 10*3/MM3 (ref 0–0.7)
EOSINOPHIL NFR BLD AUTO: 3 % (ref 0–7)
ERYTHROCYTE [DISTWIDTH] IN BLOOD BY AUTOMATED COUNT: 13.7 % (ref 11.5–14.5)
HBA1C MFR BLD: 7 % (ref 4–5.6)
HCT VFR BLD AUTO: 42 % (ref 39–49)
HGB BLD-MCNC: 14.3 G/DL (ref 13.7–17.3)
IMM GRANULOCYTES # BLD: 0.03 10*3/MM3 (ref 0–0.02)
IMM GRANULOCYTES NFR BLD: 0.5 % (ref 0–0.5)
LYMPHOCYTES # BLD AUTO: 1.24 10*3/MM3 (ref 0.6–4.2)
LYMPHOCYTES NFR BLD AUTO: 19.3 % (ref 10–50)
MCH RBC QN AUTO: 28.5 PG (ref 26.5–34)
MCHC RBC AUTO-ENTMCNC: 34 G/DL (ref 31.5–36.3)
MCV RBC AUTO: 83.8 FL (ref 80–98)
MONOCYTES # BLD AUTO: 0.31 10*3/MM3 (ref 0–0.9)
MONOCYTES NFR BLD AUTO: 4.8 % (ref 0–12)
NEUTROPHILS # BLD AUTO: 4.62 10*3/MM3 (ref 2–8.6)
NEUTROPHILS NFR BLD AUTO: 71.9 % (ref 37–80)
PLATELET # BLD AUTO: 208 10*3/MM3 (ref 150–450)
PMV BLD AUTO: 9.6 FL (ref 8–12)
RBC # BLD AUTO: 5.01 10*6/MM3 (ref 4.37–5.74)
WBC NRBC COR # BLD: 6.42 10*3/MM3 (ref 3.2–9.8)

## 2018-10-04 PROCEDURE — 85025 COMPLETE CBC W/AUTO DIFF WBC: CPT

## 2018-10-04 PROCEDURE — 80307 DRUG TEST PRSMV CHEM ANLYZR: CPT

## 2018-10-04 PROCEDURE — 83036 HEMOGLOBIN GLYCOSYLATED A1C: CPT

## 2018-10-04 PROCEDURE — 82306 VITAMIN D 25 HYDROXY: CPT

## 2018-10-04 PROCEDURE — 80053 COMPREHEN METABOLIC PANEL: CPT

## 2018-10-04 PROCEDURE — 84481 FREE ASSAY (FT-3): CPT

## 2018-10-04 PROCEDURE — 82043 UR ALBUMIN QUANTITATIVE: CPT

## 2018-10-04 PROCEDURE — 82607 VITAMIN B-12: CPT

## 2018-10-04 PROCEDURE — 82570 ASSAY OF URINE CREATININE: CPT

## 2018-10-04 PROCEDURE — 84439 ASSAY OF FREE THYROXINE: CPT

## 2018-10-04 PROCEDURE — 80061 LIPID PANEL: CPT

## 2018-10-04 PROCEDURE — 84443 ASSAY THYROID STIM HORMONE: CPT

## 2018-10-05 LAB
25(OH)D3 SERPL-MCNC: 50 NG/ML (ref 30–100)
ALBUMIN SERPL-MCNC: 4.5 G/DL (ref 3.4–4.8)
ALBUMIN UR-MCNC: 0.7 MG/L
ALBUMIN/GLOB SERPL: 1.4 G/DL (ref 1.1–1.8)
ALP SERPL-CCNC: 42 U/L (ref 38–126)
ALT SERPL W P-5'-P-CCNC: 60 U/L (ref 21–72)
AMPHET+METHAMPHET UR QL: NEGATIVE
ANION GAP SERPL CALCULATED.3IONS-SCNC: 12 MMOL/L (ref 5–15)
ARTICHOKE IGE QN: 83 MG/DL (ref 1–129)
AST SERPL-CCNC: 45 U/L (ref 17–59)
BARBITURATES UR QL SCN: NEGATIVE
BENZODIAZ UR QL SCN: NEGATIVE
BILIRUB SERPL-MCNC: 0.5 MG/DL (ref 0.2–1.3)
BUN BLD-MCNC: 14 MG/DL (ref 7–21)
BUN/CREAT SERPL: 15.6 (ref 7–25)
CALCIUM SPEC-SCNC: 9.6 MG/DL (ref 8.4–10.2)
CANNABINOIDS SERPL QL: NEGATIVE
CHLORIDE SERPL-SCNC: 97 MMOL/L (ref 95–110)
CHOLEST SERPL-MCNC: 162 MG/DL (ref 0–199)
CO2 SERPL-SCNC: 27 MMOL/L (ref 22–31)
COCAINE UR QL: NEGATIVE
CREAT BLD-MCNC: 0.9 MG/DL (ref 0.7–1.3)
CREAT UR-MCNC: 172.6 MG/DL
GFR SERPL CREATININE-BSD FRML MDRD: 82 ML/MIN/1.73 (ref 42–98)
GLOBULIN UR ELPH-MCNC: 3.3 GM/DL (ref 2.3–3.5)
GLUCOSE BLD-MCNC: 179 MG/DL (ref 60–100)
HDLC SERPL-MCNC: 31 MG/DL (ref 60–200)
LDLC/HDLC SERPL: ABNORMAL {RATIO} (ref 0–3.55)
METHADONE UR QL SCN: NEGATIVE
MICROALBUMIN/CREAT UR: 4.1 MG/G (ref 0–30)
OPIATES UR QL: NEGATIVE
OXYCODONE UR QL SCN: NEGATIVE
POTASSIUM BLD-SCNC: 4.7 MMOL/L (ref 3.5–5.1)
PROT SERPL-MCNC: 7.8 G/DL (ref 6.3–8.6)
SODIUM BLD-SCNC: 136 MMOL/L (ref 137–145)
T3FREE SERPL-MCNC: 3.1 PG/ML (ref 2–4.4)
T4 FREE SERPL-MCNC: 0.87 NG/DL (ref 0.78–2.19)
TRIGL SERPL-MCNC: 426 MG/DL (ref 20–199)
TSH SERPL DL<=0.05 MIU/L-ACNC: 1.73 MIU/ML (ref 0.46–4.68)
VIT B12 BLD-MCNC: 908 PG/ML (ref 239–931)

## 2018-10-08 RX ORDER — ATORVASTATIN CALCIUM 20 MG/1
20 TABLET, FILM COATED ORAL DAILY
Qty: 30 TABLET | Refills: 3 | Status: SHIPPED | OUTPATIENT
Start: 2018-10-08 | End: 2019-04-01 | Stop reason: SDUPTHER
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
## 2018-10-31 ENCOUNTER — OFFICE VISIT (OUTPATIENT)
Dept: FAMILY MEDICINE CLINIC | Facility: CLINIC | Age: 73
End: 2018-10-31

## 2018-10-31 VITALS
HEIGHT: 69 IN | TEMPERATURE: 97.6 F | SYSTOLIC BLOOD PRESSURE: 118 MMHG | WEIGHT: 195.6 LBS | HEART RATE: 88 BPM | OXYGEN SATURATION: 96 % | DIASTOLIC BLOOD PRESSURE: 68 MMHG | BODY MASS INDEX: 28.97 KG/M2

## 2018-10-31 DIAGNOSIS — I10 ESSENTIAL HYPERTENSION: ICD-10-CM

## 2018-10-31 DIAGNOSIS — Z00.00 GENERAL MEDICAL EXAMINATION: Primary | ICD-10-CM

## 2018-10-31 DIAGNOSIS — F41.1 GENERALIZED ANXIETY DISORDER: ICD-10-CM

## 2018-10-31 DIAGNOSIS — I25.119 CORONARY ARTERY DISEASE WITH ANGINA PECTORIS, UNSPECIFIED VESSEL OR LESION TYPE, UNSPECIFIED WHETHER NATIVE OR TRANSPLANTED HEART (HCC): ICD-10-CM

## 2018-10-31 DIAGNOSIS — L20.9 ATOPIC DERMATITIS, UNSPECIFIED TYPE: ICD-10-CM

## 2018-10-31 DIAGNOSIS — E66.3 OVERWEIGHT: ICD-10-CM

## 2018-10-31 DIAGNOSIS — E55.9 VITAMIN D DEFICIENCY: ICD-10-CM

## 2018-10-31 DIAGNOSIS — Z23 NEED FOR INFLUENZA VACCINATION: ICD-10-CM

## 2018-10-31 DIAGNOSIS — E11.9 TYPE 2 DIABETES MELLITUS WITHOUT COMPLICATION, WITHOUT LONG-TERM CURRENT USE OF INSULIN (HCC): ICD-10-CM

## 2018-10-31 DIAGNOSIS — E78.2 MIXED HYPERLIPIDEMIA: ICD-10-CM

## 2018-10-31 PROCEDURE — 90686 IIV4 VACC NO PRSV 0.5 ML IM: CPT | Performed by: FAMILY MEDICINE

## 2018-10-31 PROCEDURE — 90471 IMMUNIZATION ADMIN: CPT | Performed by: FAMILY MEDICINE

## 2018-10-31 PROCEDURE — 99214 OFFICE O/P EST MOD 30 MIN: CPT | Performed by: FAMILY MEDICINE

## 2018-10-31 NOTE — PATIENT INSTRUCTIONS
Call me back in week if rash is better with eucrisa. So I can send to pharmacy       Get labwork in January 2018

## 2018-12-03 RX ORDER — METFORMIN HYDROCHLORIDE 500 MG/1
500 TABLET, EXTENDED RELEASE ORAL 2 TIMES DAILY WITH MEALS
Qty: 180 TABLET | Refills: 3 | Status: SHIPPED | OUTPATIENT
Start: 2018-12-03 | End: 2019-02-22

## 2018-12-04 DIAGNOSIS — E11.8 CONTROLLED DIABETES MELLITUS TYPE 2 WITH COMPLICATIONS, UNSPECIFIED WHETHER LONG TERM INSULIN USE (HCC): Primary | ICD-10-CM

## 2019-01-15 ENCOUNTER — OFFICE VISIT (OUTPATIENT)
Dept: GASTROENTEROLOGY | Facility: CLINIC | Age: 74
End: 2019-01-15

## 2019-01-15 VITALS
WEIGHT: 197 LBS | HEART RATE: 89 BPM | HEIGHT: 69 IN | BODY MASS INDEX: 29.18 KG/M2 | SYSTOLIC BLOOD PRESSURE: 140 MMHG | DIASTOLIC BLOOD PRESSURE: 72 MMHG

## 2019-01-15 DIAGNOSIS — K21.00 GASTROESOPHAGEAL REFLUX DISEASE WITH ESOPHAGITIS: Primary | ICD-10-CM

## 2019-01-15 DIAGNOSIS — R10.13 EPIGASTRIC PAIN: ICD-10-CM

## 2019-01-15 DIAGNOSIS — Z86.010 HISTORY OF COLON POLYPS: ICD-10-CM

## 2019-01-15 DIAGNOSIS — K57.30 DIVERTICULOSIS OF LARGE INTESTINE WITHOUT HEMORRHAGE: ICD-10-CM

## 2019-01-15 PROCEDURE — 99213 OFFICE O/P EST LOW 20 MIN: CPT | Performed by: PHYSICIAN ASSISTANT

## 2019-01-15 NOTE — PROGRESS NOTES
Chief Complaint   Patient presents with   • Heartburn   • Abdominal Pain   • Diverticulosis   • Hx of colon polyps       ENDO PROCEDURE ORDERED:    Subjective    Aj Card Jr. is a 73 y.o. male. he is here today for follow-up.    History of Present Illness    Patient seen on a recheck of his GERD, epigastric pain, diverticulosis with history of colon polyp. Last seen 07/31/2018. Patient is doing well, accompanied by his wife. GERD does reasonably well on the Nexium, Protonix, Carafate. He denied nausea, vomiting, dysphagia. He still has occasional breakthrough symptoms and occasional abdominal pain. Bowels are moving without blood or mucus. Weight is up 4 pounds since last visit. He states he has been eating a little too much lately. Last colonoscopy showed diverticular disease 12/11/2017.    Laboratory on 10/04/2018 showed a negative urine drug screen, normal B12, vitamin D, TSH, T3, T4, CBC. Cholesterol panel showed triglycerides 426. A1c 7.0. CMP showed glucose 179, sodium 136, otherwise normal.     ASSESSMENT/PLAN: Patient with chronic GERD, diverticulosis with some functional overlay, appears stable on current regimen. Will continue current medications. Encouraged dietary modification and weight loss. Encouraged him to continue aggressive treatment of his blood sugars and cholesterol. As long as he is doing well, we will plan followup in 6 months, sooner if needed.       The following portions of the patient's history were reviewed and updated as appropriate:   Past Medical History:   Diagnosis Date   • Acute posthemorrhagic anemia    • Allergic rhinitis    • Anxiety state    • Chest pain    • Coronary arteriosclerosis    • Diabetes mellitus (CMS/HCC)     type 2   • Diverticular disease of colon    • Dysphagia    • Epigastric pain    • GERD (gastroesophageal reflux disease)     esophagitis on Dexilant. Pt feels Nexium working better      • History of echocardiogram     Small left atrial enlargement  with mild CLVH.Ef of 55-60%.Mitral valve mildly thickened.Av thickened.Left ventricle mildly dilated.Tricuspid intact.Mild aortic insufficiency. 12/07/2015       • History of echocardiogram     Mild diastolic dysfunction and mild left atrial enlargement, otherwise normal echo. EF> 55% 01/03/2012       • Hypercholesterolemia    • Hypertension    • Insomnia    • Irritable bowel syndrome    • Osteoarthritis of multiple joints      Past Surgical History:   Procedure Laterality Date   • APPENDECTOMY       [facility] 1995       • CARDIAC CATHETERIZATION      Successful PTCA of the OMB#2.Unsuccessful PTCA of the 1st OMB, secondary to difficulty in advancing the balloon. 12/08/2015       • CHOLECYSTECTOMY      [facility] 1993       • COLONOSCOPY  10/01/2012   • COLONOSCOPY N/A 12/11/2017    Procedure: COLONOSCOPY;  Surgeon: Bladimir Michael MD;  Location: [facility] ENDOSCOPY;  Service:    • CORONARY ANGIOPLASTY      Successful PTCA & stenting LAD was done w/ deployment of a 2.5mm x23 Xience stent w/ reduction of stenosis from 90% to less than 0% stenosis with good LILLIAM 3 flow 02/17/2012       • ENDOSCOPY      with biopsy.  Mildly severe esophagitis. Gastritis. Normal examined duodenum.Several biopsies obtained in the lower third of the esophagus.Several biopsies obtained in the gastric antrum. 05/06/2016       • ENDOSCOPY      w tube. Normal esophagus. Gastritis in stomach. Biopsy taken. Gastric polyp found. Biopsy taken. Normal duodenum. 10/01/2012       • ENDOSCOPY AND COLONOSCOPY      Diverticulum in sigmoid colon & descending colon. Internal & external hemorrhoids found. 10/01/2012       • HAND SURGERY       Corrective osteotomy of ring finger metacarpal. Malunited fracture of left ring finger 05/21/1985       • HERNIA REPAIR      Laparoscopic hernia repair. prepertitoneal bilateral inguinal hernia repair with mesh. 10/15/2009      • OTHER SURGICAL HISTORY      CORONARY ARTERY STENT    • SKIN TAG  REMOVAL      Excision, viral skin tag,right upper eyelid 05/08/1995      Family History   Problem Relation Age of Onset   • Heart disease Other    • Hypertension Other    • Stroke Other    • Schizophrenia Sister        Allergies   Allergen Reactions   • Brilinta [Ticagrelor] Shortness Of Breath   • Effient [Prasugrel] Shortness Of Breath   • Warfarin And Related Shortness Of Breath   • Lipitor [Atorvastatin] Swelling   • Other      Cipro: Hives   • Adhesive Tape Rash   • Cardizem [Diltiazem Hcl] Rash   • Celexa [Citalopram Hydrobromide] Rash   • Ciprofloxacin Swelling   • Crestor [Rosuvastatin Calcium] Rash   • Floxin [Ofloxacin] Rash   • Januvia [Sitagliptin] Rash   • Penicillins Rash   • Sulfa Antibiotics Rash     Social History     Socioeconomic History   • Marital status:      Spouse name: Not on file   • Number of children: Not on file   • Years of education: Not on file   • Highest education level: Not on file   Tobacco Use   • Smoking status: Never Smoker   • Smokeless tobacco: Never Used   Substance and Sexual Activity   • Alcohol use: No   • Drug use: No       Current Outpatient Medications:   •  ALPRAZolam (XANAX) 0.5 MG tablet, Take 1 tablet by mouth 2 (Two) Times a Day As Needed for Anxiety., Disp: 30 tablet, Rfl: 2  •  amLODIPine (NORVASC) 10 MG tablet, Take 1 tablet by mouth Daily., Disp: 90 tablet, Rfl: 3  •  aspirin 81 MG EC tablet, Take 162 mg by mouth daily., Disp: , Rfl:   •  atorvastatin (LIPITOR) 20 MG tablet, Take 1 tablet by mouth Daily., Disp: 30 tablet, Rfl: 3  •  azelastine (ASTELIN) 0.1 % nasal spray, 2 sprays into each nostril 2 (two) times a day. Use in each nostril as directed, Disp: , Rfl:   •  cholecalciferol (VITAMIN D3) 1000 UNITS tablet, Take 1,000 Units by mouth daily., Disp: , Rfl:   •  clopidogrel (PLAVIX) 75 MG tablet, 1 PO QOD, Disp: 90 tablet, Rfl: 3  •  coenzyme Q10 100 MG capsule, Take 100 mg by mouth daily., Disp: , Rfl:   •  Crisaborole (EUCRISA) 2 % ointment,  "Apply 1 application topically 2 (Two) Times a Day., Disp: 60 g, Rfl: 3  •  esomeprazole (nexIUM) 40 MG capsule, Take 1 capsule by mouth Every Morning Before Breakfast., Disp: 90 capsule, Rfl: 3  •  glucose blood test strip, 1 each by Other route Daily. Check glucose daily, Disp: 100 each, Rfl: 5  •  KRILL OIL OMEGA-3 PO, Take  by mouth daily., Disp: , Rfl:   •  Liraglutide (VICTOZA) 18 MG/3ML solution pen-injector injection, Inject 1.8 mg under the skin into the appropriate area as directed Daily., Disp: 3 pen, Rfl: 3  •  lisinopril (PRINIVIL,ZESTRIL) 40 MG tablet, Take 1 tablet by mouth Daily., Disp: 90 tablet, Rfl: 3  •  metFORMIN ER (GLUCOPHAGE-XR) 500 MG 24 hr tablet, Take 1 tablet by mouth 2 (Two) Times a Day With Meals., Disp: 180 tablet, Rfl: 3  •  metoprolol tartrate (LOPRESSOR) 50 MG tablet, Take 1 tablet by mouth Every 12 (Twelve) Hours., Disp: 180 tablet, Rfl: 1  •  pantoprazole (PROTONIX) 40 MG EC tablet, Take 40 mg by mouth Daily., Disp: , Rfl:   •  Red Yeast Rice Extract (RED YEAST RICE PO), Take  by mouth Daily., Disp: , Rfl:   •  Saw Palmetto, Serenoa repens, (SAW PALMETTO PO), Take  by mouth daily., Disp: , Rfl:   •  sucralfate (CARAFATE) 1 g tablet, Take 1 tablet by mouth 2 (Two) Times a Day As Needed (nausea, abd pain)., Disp: 180 tablet, Rfl: 3  •  vitamin B-12 (CYANOCOBALAMIN) 2500 MCG sublingual tablet tablet, Place  under the tongue Every Other Day., Disp: , Rfl:   Review of Systems  Review of Systems       Objective    /72 (BP Location: Left arm)   Pulse 89   Ht 175.3 cm (69\")   Wt 89.4 kg (197 lb)   BMI 29.09 kg/m²   Physical Exam   Constitutional: He is oriented to person, place, and time. He appears well-developed and well-nourished. No distress.   HENT:   Head: Normocephalic and atraumatic.   Eyes: EOM are normal. Pupils are equal, round, and reactive to light.   Neck: Normal range of motion.   Cardiovascular: Normal rate, regular rhythm and normal heart sounds. "   Pulmonary/Chest: Effort normal and breath sounds normal.   Abdominal: Soft. Bowel sounds are normal. He exhibits no shifting dullness, no distension, no abdominal bruit, no ascites and no mass. There is no hepatosplenomegaly. There is tenderness. There is no rigidity, no rebound, no guarding and no CVA tenderness. No hernia. Hernia confirmed negative in the ventral area.   Obese, mild upper   Musculoskeletal: Normal range of motion.   Neurological: He is alert and oriented to person, place, and time.   Skin: Skin is warm and dry.   Psychiatric: He has a normal mood and affect. His behavior is normal. Judgment and thought content normal.   Nursing note and vitals reviewed.    Assessment/Plan      1. Gastroesophageal reflux disease with esophagitis    2. Epigastric pain    3. Diverticulosis of large intestine without hemorrhage    4. History of colon polyps    .   Aj was seen today for heartburn, abdominal pain, diverticulosis and hx of colon polyps.    Diagnoses and all orders for this visit:    Gastroesophageal reflux disease with esophagitis    Epigastric pain    Diverticulosis of large intestine without hemorrhage    History of colon polyps        Orders placed during this encounter include:  No orders of the defined types were placed in this encounter.      Medications prescribed:  No orders of the defined types were placed in this encounter.      Requested Prescriptions      No prescriptions requested or ordered in this encounter       Review and/or summary of lab tests, radiology, procedures, medications. Review and summary of old records and obtaining of history. The risks and benefits of my recommendations, as well as other treatment options were discussed with the patient today. Questions were answered.    Follow-up: Return in about 6 months (around 7/15/2019), or if symptoms worsen or fail to improve.     * Surgery not found *      This document has been electronically signed by Andreas Aldana  VIRGEN on January 17, 2019 5:17 PM      Results for orders placed or performed in visit on 10/04/18   CBC Auto Differential   Result Value Ref Range    WBC 6.42 3.20 - 9.80 10*3/mm3    RBC 5.01 4.37 - 5.74 10*6/mm3    Hemoglobin 14.3 13.7 - 17.3 g/dL    Hematocrit 42.0 39.0 - 49.0 %    MCV 83.8 80.0 - 98.0 fL    MCH 28.5 26.5 - 34.0 pg    MCHC 34.0 31.5 - 36.3 g/dL    RDW 13.7 11.5 - 14.5 %    RDW-SD 41.2 35.1 - 43.9 fl    MPV 9.6 8.0 - 12.0 fL    Platelets 208 150 - 450 10*3/mm3    Neutrophil % 71.9 37.0 - 80.0 %    Lymphocyte % 19.3 10.0 - 50.0 %    Monocyte % 4.8 0.0 - 12.0 %    Eosinophil % 3.0 0.0 - 7.0 %    Basophil % 0.5 0.0 - 2.0 %    Immature Grans % 0.5 0.0 - 0.5 %    Neutrophils, Absolute 4.62 2.00 - 8.60 10*3/mm3    Lymphocytes, Absolute 1.24 0.60 - 4.20 10*3/mm3    Monocytes, Absolute 0.31 0.00 - 0.90 10*3/mm3    Eosinophils, Absolute 0.19 0.00 - 0.70 10*3/mm3    Basophils, Absolute 0.03 0.00 - 0.20 10*3/mm3    Immature Grans, Absolute 0.03 (H) 0.00 - 0.02 10*3/mm3   Microalbumin / Creatinine Urine Ratio - Urine, Clean Catch   Result Value Ref Range    Microalbumin/Creatinine Ratio 4.1 0.0 - 30.0 mg/g    Creatinine, Urine 172.6 mg/dL    Microalbumin, Urine 0.7 mg/L   Urine Drug Screen - Urine, Clean Catch   Result Value Ref Range    Amphetamine Screen, Urine Negative Negative    Barbiturates Screen, Urine Negative Negative    Benzodiazepine Screen, Urine Negative Negative    Cocaine Screen, Urine Negative Negative    Methadone Screen, Urine Negative Negative    Opiate Screen Negative Negative    Oxycodone Screen, Urine Negative Negative    THC, Screen, Urine Negative Negative   Vitamin D 25 Hydroxy   Result Value Ref Range    25 Hydroxy, Vitamin D 50.0 30.0 - 100.0 ng/ml   T3, Free   Result Value Ref Range    T3, Free 3.1 2.0 - 4.4 pg/mL   TSH   Result Value Ref Range    TSH 1.730 0.460 - 4.680 mIU/mL   T4, Free   Result Value Ref Range    Free T4 0.87 0.78 - 2.19 ng/dL   Hemoglobin A1c   Result Value Ref  Range    Hemoglobin A1C 7.0 (H) 4 - 5.6 %   Vitamin B12   Result Value Ref Range    Vitamin B-12 908 239 - 931 pg/mL   Lipid Panel   Result Value Ref Range    Total Cholesterol 162 0 - 199 mg/dL    Triglycerides 426 (H) 20 - 199 mg/dL    HDL Cholesterol 31 (L) 60 - 200 mg/dL    LDL Cholesterol  83 1 - 129 mg/dL    LDL/HDL Ratio  0.00 - 3.55   Comprehensive Metabolic Panel   Result Value Ref Range    Glucose 179 (H) 60 - 100 mg/dL    BUN 14 7 - 21 mg/dL    Creatinine 0.90 0.70 - 1.30 mg/dL    Sodium 136 (L) 137 - 145 mmol/L    Potassium 4.7 3.5 - 5.1 mmol/L    Chloride 97 95 - 110 mmol/L    CO2 27.0 22.0 - 31.0 mmol/L    Calcium 9.6 8.4 - 10.2 mg/dL    Total Protein 7.8 6.3 - 8.6 g/dL    Albumin 4.50 3.40 - 4.80 g/dL    ALT (SGPT) 60 21 - 72 U/L    AST (SGOT) 45 17 - 59 U/L    Alkaline Phosphatase 42 38 - 126 U/L    Total Bilirubin 0.5 0.2 - 1.3 mg/dL    eGFR Non African Amer 82 42 - 98 mL/min/1.73    Globulin 3.3 2.3 - 3.5 gm/dL    A/G Ratio 1.4 1.1 - 1.8 g/dL    BUN/Creatinine Ratio 15.6 7.0 - 25.0    Anion Gap 12.0 5.0 - 15.0 mmol/L   Results for orders placed or performed in visit on 07/09/18   CBC Auto Differential   Result Value Ref Range    WBC 5.22 3.20 - 9.80 10*3/mm3    RBC 4.86 4.37 - 5.74 10*6/mm3    Hemoglobin 14.1 13.7 - 17.3 g/dL    Hematocrit 41.0 39.0 - 49.0 %    MCV 84.4 80.0 - 98.0 fL    MCH 29.0 26.5 - 34.0 pg    MCHC 34.4 31.5 - 36.3 g/dL    RDW 14.1 11.5 - 14.5 %    RDW-SD 43.4 35.1 - 43.9 fl    MPV 8.8 8.0 - 12.0 fL    Platelets 225 150 - 450 10*3/mm3    Neutrophil % 65.7 37.0 - 80.0 %    Lymphocyte % 22.8 10.0 - 50.0 %    Monocyte % 6.9 0.0 - 12.0 %    Eosinophil % 3.8 0.0 - 7.0 %    Basophil % 0.4 0.0 - 2.0 %    Immature Grans % 0.4 0.0 - 0.5 %    Neutrophils, Absolute 3.43 2.00 - 8.60 10*3/mm3    Lymphocytes, Absolute 1.19 0.60 - 4.20 10*3/mm3    Monocytes, Absolute 0.36 0.00 - 0.90 10*3/mm3    Eosinophils, Absolute 0.20 0.00 - 0.70 10*3/mm3    Basophils, Absolute 0.02 0.00 - 0.20 10*3/mm3     Immature Grans, Absolute 0.02 0.00 - 0.02 10*3/mm3   Hemoglobin A1c   Result Value Ref Range    Hemoglobin A1C 6.6 (H) 4 - 5.6 %   Comprehensive metabolic panel   Result Value Ref Range    Glucose 153 (H) 60 - 100 mg/dL    BUN 14 7 - 21 mg/dL    Creatinine 0.88 0.70 - 1.30 mg/dL    Sodium 138 137 - 145 mmol/L    Potassium 4.7 3.5 - 5.1 mmol/L    Chloride 99 95 - 110 mmol/L    CO2 26.0 22.0 - 31.0 mmol/L    Calcium 9.3 8.4 - 10.2 mg/dL    Total Protein 7.4 6.3 - 8.6 g/dL    Albumin 4.30 3.40 - 4.80 g/dL    ALT (SGPT) 39 21 - 72 U/L    AST (SGOT) 27 17 - 59 U/L    Alkaline Phosphatase 33 (L) 38 - 126 U/L    Total Bilirubin 0.5 0.2 - 1.3 mg/dL    eGFR Non African Amer 85 42 - 98 mL/min/1.73    Globulin 3.1 2.3 - 3.5 gm/dL    A/G Ratio 1.4 1.1 - 1.8 g/dL    BUN/Creatinine Ratio 15.9 7.0 - 25.0    Anion Gap 13.0 5.0 - 15.0 mmol/L     *Note: Due to a large number of results and/or encounters for the requested time period, some results have not been displayed. A complete set of results can be found in Results Review.       Some portions of this note have been dictated using voice recognition software and may contain errors and/or omissions.

## 2019-01-15 NOTE — PATIENT INSTRUCTIONS

## 2019-02-19 ENCOUNTER — LAB (OUTPATIENT)
Dept: LAB | Facility: HOSPITAL | Age: 74
End: 2019-02-19

## 2019-02-19 DIAGNOSIS — Z00.00 GENERAL MEDICAL EXAMINATION: ICD-10-CM

## 2019-02-19 LAB
ALBUMIN SERPL-MCNC: 4.7 G/DL (ref 3.4–4.8)
ALBUMIN/GLOB SERPL: 1.5 G/DL (ref 1.1–1.8)
ALP SERPL-CCNC: 43 U/L (ref 38–126)
ALT SERPL W P-5'-P-CCNC: 68 U/L (ref 21–72)
ANION GAP SERPL CALCULATED.3IONS-SCNC: 10 MMOL/L (ref 5–15)
ARTICHOKE IGE QN: 98 MG/DL (ref 1–129)
AST SERPL-CCNC: 57 U/L (ref 17–59)
BASOPHILS # BLD AUTO: 0.04 10*3/MM3 (ref 0–0.2)
BASOPHILS NFR BLD AUTO: 0.8 % (ref 0–1.5)
BILIRUB SERPL-MCNC: 0.5 MG/DL (ref 0.2–1.3)
BUN BLD-MCNC: 13 MG/DL (ref 7–21)
BUN/CREAT SERPL: 16.7 (ref 7–25)
CALCIUM SPEC-SCNC: 10.1 MG/DL (ref 8.4–10.2)
CHLORIDE SERPL-SCNC: 97 MMOL/L (ref 95–110)
CHOLEST SERPL-MCNC: 168 MG/DL (ref 0–199)
CO2 SERPL-SCNC: 29 MMOL/L (ref 22–31)
CREAT BLD-MCNC: 0.78 MG/DL (ref 0.7–1.3)
DEPRECATED RDW RBC AUTO: 40.2 FL (ref 37–54)
EOSINOPHIL # BLD AUTO: 0.19 10*3/MM3 (ref 0–0.4)
EOSINOPHIL NFR BLD AUTO: 3.7 % (ref 0.3–6.2)
ERYTHROCYTE [DISTWIDTH] IN BLOOD BY AUTOMATED COUNT: 13.4 % (ref 12.3–15.4)
GFR SERPL CREATININE-BSD FRML MDRD: 97 ML/MIN/1.73 (ref 42–98)
GLOBULIN UR ELPH-MCNC: 3.1 GM/DL (ref 2.3–3.5)
GLUCOSE BLD-MCNC: 180 MG/DL (ref 60–100)
HBA1C MFR BLD: 7.9 % (ref 4–5.6)
HCT VFR BLD AUTO: 41.9 % (ref 37.5–51)
HDLC SERPL-MCNC: 31 MG/DL (ref 60–200)
HGB BLD-MCNC: 14.1 G/DL (ref 13–17.7)
IMM GRANULOCYTES # BLD AUTO: 0.02 10*3/MM3 (ref 0–0.05)
IMM GRANULOCYTES NFR BLD AUTO: 0.4 % (ref 0–0.5)
LDLC/HDLC SERPL: 2.42 {RATIO} (ref 0–3.55)
LYMPHOCYTES # BLD AUTO: 1.58 10*3/MM3 (ref 0.7–3.1)
LYMPHOCYTES NFR BLD AUTO: 30.7 % (ref 19.6–45.3)
MCH RBC QN AUTO: 28 PG (ref 26.6–33)
MCHC RBC AUTO-ENTMCNC: 33.7 G/DL (ref 31.5–35.7)
MCV RBC AUTO: 83.1 FL (ref 79–97)
MONOCYTES # BLD AUTO: 0.37 10*3/MM3 (ref 0.1–0.9)
MONOCYTES NFR BLD AUTO: 7.2 % (ref 5–12)
NEUTROPHILS # BLD AUTO: 2.95 10*3/MM3 (ref 1.4–7)
NEUTROPHILS NFR BLD AUTO: 57.2 % (ref 42.7–76)
NRBC BLD AUTO-RTO: 0 /100 WBC (ref 0–0)
PLATELET # BLD AUTO: 212 10*3/MM3 (ref 140–450)
PMV BLD AUTO: 9.2 FL (ref 6–12)
POTASSIUM BLD-SCNC: 4.5 MMOL/L (ref 3.5–5.1)
PROT SERPL-MCNC: 7.8 G/DL (ref 6.3–8.6)
RBC # BLD AUTO: 5.04 10*6/MM3 (ref 4.14–5.8)
SODIUM BLD-SCNC: 136 MMOL/L (ref 137–145)
TRIGL SERPL-MCNC: 310 MG/DL (ref 20–199)
VIT B12 BLD-MCNC: >1000 PG/ML (ref 239–931)
WBC NRBC COR # BLD: 5.15 10*3/MM3 (ref 3.4–10.8)

## 2019-02-19 PROCEDURE — 82607 VITAMIN B-12: CPT

## 2019-02-19 PROCEDURE — 83036 HEMOGLOBIN GLYCOSYLATED A1C: CPT

## 2019-02-19 PROCEDURE — 80053 COMPREHEN METABOLIC PANEL: CPT

## 2019-02-19 PROCEDURE — 85025 COMPLETE CBC W/AUTO DIFF WBC: CPT

## 2019-02-19 PROCEDURE — 80061 LIPID PANEL: CPT

## 2019-02-22 ENCOUNTER — OFFICE VISIT (OUTPATIENT)
Dept: ENDOCRINOLOGY | Facility: CLINIC | Age: 74
End: 2019-02-22

## 2019-02-22 VITALS
HEART RATE: 89 BPM | OXYGEN SATURATION: 97 % | HEIGHT: 69 IN | BODY MASS INDEX: 29.18 KG/M2 | SYSTOLIC BLOOD PRESSURE: 120 MMHG | DIASTOLIC BLOOD PRESSURE: 70 MMHG | WEIGHT: 197 LBS

## 2019-02-22 DIAGNOSIS — I25.10 CORONARY ARTERY DISEASE INVOLVING NATIVE HEART WITHOUT ANGINA PECTORIS, UNSPECIFIED VESSEL OR LESION TYPE: ICD-10-CM

## 2019-02-22 DIAGNOSIS — E11.65 TYPE 2 DIABETES MELLITUS WITH HYPERGLYCEMIA, WITHOUT LONG-TERM CURRENT USE OF INSULIN (HCC): Primary | ICD-10-CM

## 2019-02-22 PROCEDURE — 99214 OFFICE O/P EST MOD 30 MIN: CPT | Performed by: INTERNAL MEDICINE

## 2019-02-22 RX ORDER — LANCING DEVICE
EACH MISCELLANEOUS
Qty: 1 EACH | Refills: 1 | Status: ON HOLD | OUTPATIENT
Start: 2019-02-22

## 2019-02-22 RX ORDER — GLUCOSAMINE HCL/CHONDROITIN SU 500-400 MG
CAPSULE ORAL
Qty: 120 EACH | Refills: 11 | Status: ON HOLD | OUTPATIENT
Start: 2019-02-22

## 2019-02-22 NOTE — PROGRESS NOTES
"Aj Card Jr. is a 73 y.o. male who presents for  evaluation of   Chief Complaint   Patient presents with   • Diabetes       Referring provider   Primary Care Provider    Andreas Martinez MD    Duration since age 71 years old     Timing - Diabetes is Constant    Quality -  needs improvement    Severity -  moderate    Complications - CAD     Current symptoms/problems  none     Alleviating Factors: Compliance       Side Effects  none    Current diet  in general, a \"healthy\" diet      Current exercise walking    Current monitoring regimen: home blood tests - checking 2x daily   Home blood sugar records: elevated fasting     Hypoglycemia none    Past Medical History:   Diagnosis Date   • Acute posthemorrhagic anemia    • Allergic rhinitis    • Anxiety state    • Chest pain    • Coronary arteriosclerosis    • Diabetes mellitus (CMS/HCC)     type 2   • Diverticular disease of colon    • Dysphagia    • Epigastric pain    • GERD (gastroesophageal reflux disease)     esophagitis on Dexilant. Pt feels Nexium working better      • History of echocardiogram     Small left atrial enlargement with mild CLVH.Ef of 55-60%.Mitral valve mildly thickened.Av thickened.Left ventricle mildly dilated.Tricuspid intact.Mild aortic insufficiency. 12/07/2015       • History of echocardiogram     Mild diastolic dysfunction and mild left atrial enlargement, otherwise normal echo. EF> 55% 01/03/2012       • Hypercholesterolemia    • Hypertension    • Insomnia    • Irritable bowel syndrome    • Osteoarthritis of multiple joints      Family History   Problem Relation Age of Onset   • Heart disease Other    • Hypertension Other    • Stroke Other    • Schizophrenia Sister      Social History     Tobacco Use   • Smoking status: Never Smoker   • Smokeless tobacco: Never Used   Substance Use Topics   • Alcohol use: No   • Drug use: No         Current Outpatient Medications:   •  ALPRAZolam (XANAX) 0.5 MG tablet, Take 1 tablet by mouth 2 " (Two) Times a Day As Needed for Anxiety., Disp: 30 tablet, Rfl: 2  •  amLODIPine (NORVASC) 10 MG tablet, Take 1 tablet by mouth Daily., Disp: 90 tablet, Rfl: 3  •  aspirin 81 MG EC tablet, Take 162 mg by mouth daily., Disp: , Rfl:   •  atorvastatin (LIPITOR) 20 MG tablet, Take 1 tablet by mouth Daily., Disp: 30 tablet, Rfl: 3  •  azelastine (ASTELIN) 0.1 % nasal spray, 2 sprays into each nostril 2 (two) times a day. Use in each nostril as directed, Disp: , Rfl:   •  cholecalciferol (VITAMIN D3) 1000 UNITS tablet, Take 1,000 Units by mouth daily., Disp: , Rfl:   •  clopidogrel (PLAVIX) 75 MG tablet, 1 PO QOD, Disp: 90 tablet, Rfl: 3  •  coenzyme Q10 100 MG capsule, Take 100 mg by mouth daily., Disp: , Rfl:   •  Crisaborole (EUCRISA) 2 % ointment, Apply 1 application topically 2 (Two) Times a Day., Disp: 60 g, Rfl: 3  •  esomeprazole (nexIUM) 40 MG capsule, Take 1 capsule by mouth Every Morning Before Breakfast., Disp: 90 capsule, Rfl: 3  •  glucose blood test strip, 1 each by Other route Daily. Check glucose daily, Disp: 100 each, Rfl: 5  •  KRILL OIL OMEGA-3 PO, Take  by mouth daily., Disp: , Rfl:   •  Liraglutide (VICTOZA) 18 MG/3ML solution pen-injector injection, Inject 1.8 mg under the skin into the appropriate area as directed Daily., Disp: 3 pen, Rfl: 3  •  lisinopril (PRINIVIL,ZESTRIL) 40 MG tablet, Take 1 tablet by mouth Daily., Disp: 90 tablet, Rfl: 3  •  metFORMIN ER (GLUCOPHAGE-XR) 500 MG 24 hr tablet, Take 1 tablet by mouth 2 (Two) Times a Day With Meals., Disp: 180 tablet, Rfl: 3  •  metoprolol tartrate (LOPRESSOR) 50 MG tablet, Take 1 tablet by mouth Every 12 (Twelve) Hours., Disp: 180 tablet, Rfl: 1  •  pantoprazole (PROTONIX) 40 MG EC tablet, Take 40 mg by mouth Daily., Disp: , Rfl:   •  Red Yeast Rice Extract (RED YEAST RICE PO), Take  by mouth Daily., Disp: , Rfl:   •  Saw Palmetto, Serenoa repens, (SAW PALMETTO PO), Take  by mouth daily., Disp: , Rfl:   •  sucralfate (CARAFATE) 1 g tablet, Take 1  tablet by mouth 2 (Two) Times a Day As Needed (nausea, abd pain)., Disp: 180 tablet, Rfl: 3  •  vitamin B-12 (CYANOCOBALAMIN) 2500 MCG sublingual tablet tablet, Place  under the tongue Every Other Day., Disp: , Rfl:     Review of Systems    Review of Systems   Constitutional: Positive for fatigue. Negative for activity change, appetite change, chills, diaphoresis, fever and unexpected weight change.   HENT: Negative for congestion, dental problem, drooling, ear discharge, ear pain, facial swelling, mouth sores, postnasal drip, rhinorrhea, sinus pressure, sore throat, tinnitus, trouble swallowing and voice change.    Eyes: Negative for photophobia, pain, discharge, redness, itching and visual disturbance.   Respiratory: Negative for apnea, cough, choking, chest tightness, shortness of breath, wheezing and stridor.    Cardiovascular: Negative for chest pain, palpitations and leg swelling.   Gastrointestinal: Negative for abdominal distention, abdominal pain, constipation, diarrhea, nausea and vomiting.   Endocrine: Negative for cold intolerance, heat intolerance, polydipsia, polyphagia and polyuria.   Genitourinary: Negative for decreased urine volume, difficulty urinating, dysuria, flank pain, frequency, hematuria and urgency.   Musculoskeletal: Negative for arthralgias, back pain, gait problem, joint swelling, myalgias, neck pain and neck stiffness.   Skin: Negative for color change, pallor, rash and wound.   Allergic/Immunologic: Negative for immunocompromised state.   Neurological: Negative for dizziness, tremors, seizures, syncope, facial asymmetry, speech difficulty, weakness, light-headedness, numbness and headaches.   Hematological: Negative for adenopathy.   Psychiatric/Behavioral: Negative for agitation, behavioral problems, confusion, decreased concentration, dysphoric mood, hallucinations, self-injury, sleep disturbance and suicidal ideas. The patient is not nervous/anxious and is not hyperactive.        "  Objective:   /70   Pulse 89   Ht 175.3 cm (69\")   Wt 89.4 kg (197 lb)   SpO2 97%   BMI 29.09 kg/m²     Physical Exam   Constitutional: He is oriented to person, place, and time. He appears well-developed and well-nourished. He is cooperative.   HENT:   Head: Normocephalic and atraumatic.   Right Ear: External ear normal.   Left Ear: External ear normal.   Nose: Nose normal.   Mouth/Throat: Oropharynx is clear and moist. No oropharyngeal exudate.   Eyes: Conjunctivae and EOM are normal. Pupils are equal, round, and reactive to light. No scleral icterus. Right eye exhibits normal extraocular motion. Left eye exhibits normal extraocular motion.   Neck: Neck supple. No JVD present. No muscular tenderness present. No tracheal deviation, no edema and no erythema present. No thyromegaly present.   Cardiovascular: Normal rate, regular rhythm, normal heart sounds and intact distal pulses. Exam reveals no gallop and no friction rub.   No murmur heard.  Pulmonary/Chest: Effort normal and breath sounds normal. No stridor. No respiratory distress. He has no decreased breath sounds. He has no wheezes. He has no rhonchi. He has no rales. He exhibits no tenderness.   Abdominal: Soft. Bowel sounds are normal. He exhibits no distension and no mass. There is no hepatomegaly. There is no tenderness. There is no rebound and no guarding. No hernia.   Musculoskeletal: Normal range of motion. He exhibits no edema, tenderness or deformity.   Lymphadenopathy:     He has no cervical adenopathy.   Neurological: He is alert and oriented to person, place, and time. He has normal reflexes. No cranial nerve deficit. He exhibits normal muscle tone. Coordination normal.   Skin: Skin is warm. No rash noted. No erythema. No pallor.   Psychiatric: He has a normal mood and affect. His behavior is normal. Judgment and thought content normal.   Nursing note and vitals reviewed.      Lab Review    Results for orders placed or performed in " visit on 02/19/19   CBC Auto Differential   Result Value Ref Range    WBC 5.15 3.40 - 10.80 10*3/mm3    RBC 5.04 4.14 - 5.80 10*6/mm3    Hemoglobin 14.1 13.0 - 17.7 g/dL    Hematocrit 41.9 37.5 - 51.0 %    MCV 83.1 79.0 - 97.0 fL    MCH 28.0 26.6 - 33.0 pg    MCHC 33.7 31.5 - 35.7 g/dL    RDW 13.4 12.3 - 15.4 %    RDW-SD 40.2 37.0 - 54.0 fl    MPV 9.2 6.0 - 12.0 fL    Platelets 212 140 - 450 10*3/mm3    Neutrophil % 57.2 42.7 - 76.0 %    Lymphocyte % 30.7 19.6 - 45.3 %    Monocyte % 7.2 5.0 - 12.0 %    Eosinophil % 3.7 0.3 - 6.2 %    Basophil % 0.8 0.0 - 1.5 %    Immature Grans % 0.4 0.0 - 0.5 %    Neutrophils, Absolute 2.95 1.40 - 7.00 10*3/mm3    Lymphocytes, Absolute 1.58 0.70 - 3.10 10*3/mm3    Monocytes, Absolute 0.37 0.10 - 0.90 10*3/mm3    Eosinophils, Absolute 0.19 0.00 - 0.40 10*3/mm3    Basophils, Absolute 0.04 0.00 - 0.20 10*3/mm3    Immature Grans, Absolute 0.02 0.00 - 0.05 10*3/mm3    nRBC 0.0 0.0 - 0.0 /100 WBC   Comprehensive Metabolic Panel   Result Value Ref Range    Glucose 180 (H) 60 - 100 mg/dL    BUN 13 7 - 21 mg/dL    Creatinine 0.78 0.70 - 1.30 mg/dL    Sodium 136 (L) 137 - 145 mmol/L    Potassium 4.5 3.5 - 5.1 mmol/L    Chloride 97 95 - 110 mmol/L    CO2 29.0 22.0 - 31.0 mmol/L    Calcium 10.1 8.4 - 10.2 mg/dL    Total Protein 7.8 6.3 - 8.6 g/dL    Albumin 4.70 3.40 - 4.80 g/dL    ALT (SGPT) 68 21 - 72 U/L    AST (SGOT) 57 17 - 59 U/L    Alkaline Phosphatase 43 38 - 126 U/L    Total Bilirubin 0.5 0.2 - 1.3 mg/dL    eGFR Non African Amer 97 42 - 98 mL/min/1.73    Globulin 3.1 2.3 - 3.5 gm/dL    A/G Ratio 1.5 1.1 - 1.8 g/dL    BUN/Creatinine Ratio 16.7 7.0 - 25.0    Anion Gap 10.0 5.0 - 15.0 mmol/L   Hemoglobin A1c   Result Value Ref Range    Hemoglobin A1C 7.9 (H) 4 - 5.6 %   Lipid Panel   Result Value Ref Range    Total Cholesterol 168 0 - 199 mg/dL    Triglycerides 310 (H) 20 - 199 mg/dL    HDL Cholesterol 31 (L) 60 - 200 mg/dL    LDL Cholesterol  98 1 - 129 mg/dL    LDL/HDL Ratio 2.42 0.00  - 3.55   Vitamin B12   Result Value Ref Range    Vitamin B-12 >1,000 (H) 239 - 931 pg/mL         Assessment/Plan       ICD-10-CM ICD-9-CM   1. Type 2 diabetes mellitus with hyperglycemia, without long-term current use of insulin (CMS/[facility]) E11.65 250.00     790.29   2. Coronary artery disease involving native heart without angina pectoris, unspecified vessel or lesion type I25.10 414.01         I reviewed and summarized records from Andreas Martinez MD from 2019 and I reviewed / ordered labs.   From review of records :    Pt has type 2 Diabetes with CAD     Glycemic Management:   Lab Results   Component Value Date    HGBA1C 7.9 (H) 02/19/2019    HGBA1C 7.0 (H) 10/04/2018    HGBA1C 6.6 (H) 07/09/2018     Lab Results   Component Value Date    GLUCOSE 180 (H) 02/19/2019    BUN 13 02/19/2019    CREATININE 0.78 02/19/2019    EGFRIFNONA 97 02/19/2019    BCR 16.7 02/19/2019    K 4.5 02/19/2019    CO2 29.0 02/19/2019    CALCIUM 10.1 02/19/2019    ALBUMIN 4.70 02/19/2019    AST 57 02/19/2019    ALT 68 02/19/2019    ANIONGAP 10.0 02/19/2019     Lab Results   Component Value Date    WBC 5.15 02/19/2019    HGB 14.1 02/19/2019    HCT 41.9 02/19/2019    MCV 83.1 02/19/2019     02/19/2019     Metformin 500 mg ER - stop     Synjardy XR 10/1000 mg w bkfast    Continue victoza 1.8 mg daily         Lipid Management  Lab Results   Component Value Date    CHOL 168 02/19/2019    CHOL 162 10/04/2018    CHOL 144 04/10/2018     Lab Results   Component Value Date    TRIG 310 (H) 02/19/2019    TRIG 426 (H) 10/04/2018    TRIG 230 (H) 04/10/2018     Lab Results   Component Value Date    HDL 31 (L) 02/19/2019    HDL 31 (L) 10/04/2018    HDL 28 (L) 04/10/2018     No components found for: LDLCALC  Lab Results   Component Value Date    LDL 98 02/19/2019    LDL 83 10/04/2018    LDL 80 04/10/2018       On atorvastatin       Blood Pressure Management:    Vitals:    02/22/19 1427   BP: 120/70   Pulse: 89   SpO2: 97%     Lab Results   Component  Value Date    GLUCOSE 180 (H) 02/19/2019    CALCIUM 10.1 02/19/2019     (L) 02/19/2019    K 4.5 02/19/2019    CO2 29.0 02/19/2019    CL 97 02/19/2019    BUN 13 02/19/2019    CREATININE 0.78 02/19/2019    EGFRIFNONA 97 02/19/2019    BCR 16.7 02/19/2019    ANIONGAP 10.0 02/19/2019     Controlled on ACE I regimen           Microvascular Complication Monitoring:      Eye Exam Evaluation  Within 1 year     -----------    Last Microalbumin-Proteinuria Assessment    Lab Results   Component Value Date    MALBCRERATIO 4.1 10/04/2018    MALBCRERATIO  04/10/2018      Comment:      Unable to calculate    MALBCRERATIO 3 12/21/2016       No results found for: UTPCR    -----------      Neuropathy        Weight Related:   Wt Readings from Last 3 Encounters:   02/22/19 89.4 kg (197 lb)   01/15/19 89.4 kg (197 lb)   10/31/18 88.7 kg (195 lb 9.6 oz)     Body mass index is 29.09 kg/m².        Diet interventions: moderate (500 kCal/d) deficit diet.      Bone Health    Lab Results   Component Value Date    CALCIUM 10.1 02/19/2019    HAMC69TD 50.0 10/04/2018       Thyroid Health    Lab Results   Component Value Date    TSH 1.730 10/04/2018    TSH 2.41 11/26/2015         Other Diabetes Related Aspects       Lab Results   Component Value Date    [id number] >1,000 (H) 02/19/2019           No orders of the defined types were placed in this encounter.        A copy of my note was sent to Andreas Martinez MD    Please see my above opinion and suggestions.

## 2019-03-05 ENCOUNTER — TELEPHONE (OUTPATIENT)
Dept: ENDOCRINOLOGY | Facility: CLINIC | Age: 74
End: 2019-03-05

## 2019-03-05 NOTE — TELEPHONE ENCOUNTER
Pt wants a phone call back to discuss some meds. SynJardy XR(possible allergy), and Victoza(no longer formulary). He would like to stay on Victoza, [employer] will need a call to approve it. Please call, thanks! Call cell or house.

## 2019-03-05 NOTE — TELEPHONE ENCOUNTER
Pt wants a phone call back to discuss some meds. SynJardy XR(possible allergy), and Victoza(no longer formulary). He would like to stay on Victoza, [employer] will need a call to approve it. Please call, thanks! Call cell or house.         Documentation      Patient is on Trulicity now so the Victoza is already taken care of . But the patient got a rash and hives afer taking th synjardy. What to do.???  Thanks, Cherelle

## 2019-03-11 ENCOUNTER — TELEPHONE (OUTPATIENT)
Dept: FAMILY MEDICINE CLINIC | Facility: CLINIC | Age: 74
End: 2019-03-11

## 2019-03-11 NOTE — TELEPHONE ENCOUNTER
TURNER BELLA IS NEEDING TO BE CALLED BACK..THE CALL LAST WK WAS ON HIS CELL PHONE AND IT DID NOT HAVE A GOOD CONNECTION.he DID NOT UNDERSTAND ALL HE WAS TOLD?  HE IS STILL USING THE SAMPLES OF VICTOZA RIGHT NOW AND HAS PICKED UP THE TRULICITY..HE WAS WANTING TO GET THIS VICTOZIA P.A. ??  PLEASE CALL HIS HOUSE PH  [phone]

## 2019-03-12 ENCOUNTER — TELEPHONE (OUTPATIENT)
Dept: ENDOCRINOLOGY | Facility: CLINIC | Age: 74
End: 2019-03-12

## 2019-03-13 RX ORDER — METFORMIN HYDROCHLORIDE 500 MG/1
TABLET, EXTENDED RELEASE ORAL
Qty: 120 TABLET | Refills: 11 | Status: SHIPPED | OUTPATIENT
Start: 2019-03-13 | End: 2019-04-01 | Stop reason: SDUPTHER

## 2019-03-14 ENCOUNTER — TELEPHONE (OUTPATIENT)
Dept: ENDOCRINOLOGY | Facility: CLINIC | Age: 74
End: 2019-03-14

## 2019-03-30 PROBLEM — Z01.00 ENCOUNTER FOR EYE EXAM: Status: ACTIVE | Noted: 2019-03-30

## 2019-04-01 ENCOUNTER — OFFICE VISIT (OUTPATIENT)
Dept: FAMILY MEDICINE CLINIC | Facility: CLINIC | Age: 74
End: 2019-04-01

## 2019-04-01 VITALS
HEART RATE: 82 BPM | OXYGEN SATURATION: 97 % | HEIGHT: 69 IN | WEIGHT: 194.4 LBS | SYSTOLIC BLOOD PRESSURE: 120 MMHG | TEMPERATURE: 98.7 F | BODY MASS INDEX: 28.79 KG/M2 | DIASTOLIC BLOOD PRESSURE: 80 MMHG

## 2019-04-01 DIAGNOSIS — R12 HEARTBURN: ICD-10-CM

## 2019-04-01 DIAGNOSIS — I25.119 CORONARY ARTERY DISEASE WITH ANGINA PECTORIS, UNSPECIFIED VESSEL OR LESION TYPE, UNSPECIFIED WHETHER NATIVE OR TRANSPLANTED HEART (HCC): ICD-10-CM

## 2019-04-01 DIAGNOSIS — K57.30 DIVERTICULOSIS OF LARGE INTESTINE WITHOUT HEMORRHAGE: ICD-10-CM

## 2019-04-01 DIAGNOSIS — E55.9 VITAMIN D DEFICIENCY: ICD-10-CM

## 2019-04-01 DIAGNOSIS — E11.9 TYPE 2 DIABETES MELLITUS WITHOUT COMPLICATION, WITHOUT LONG-TERM CURRENT USE OF INSULIN (HCC): Primary | ICD-10-CM

## 2019-04-01 DIAGNOSIS — Z01.00 ENCOUNTER FOR EYE EXAM: ICD-10-CM

## 2019-04-01 DIAGNOSIS — R10.13 EPIGASTRIC PAIN: ICD-10-CM

## 2019-04-01 DIAGNOSIS — K21.00 GASTROESOPHAGEAL REFLUX DISEASE WITH ESOPHAGITIS: ICD-10-CM

## 2019-04-01 DIAGNOSIS — F41.9 ANXIETY: ICD-10-CM

## 2019-04-01 DIAGNOSIS — E66.3 OVERWEIGHT: ICD-10-CM

## 2019-04-01 DIAGNOSIS — F41.1 GENERALIZED ANXIETY DISORDER: ICD-10-CM

## 2019-04-01 DIAGNOSIS — I10 ESSENTIAL HYPERTENSION: ICD-10-CM

## 2019-04-01 PROCEDURE — 99214 OFFICE O/P EST MOD 30 MIN: CPT | Performed by: FAMILY MEDICINE

## 2019-04-01 RX ORDER — ASPIRIN 81 MG/1
162 TABLET ORAL DAILY
Qty: 90 TABLET | Refills: 3 | Status: SHIPPED | OUTPATIENT
Start: 2019-04-01 | End: 2023-03-03

## 2019-04-01 RX ORDER — MELATONIN
1000 DAILY
Qty: 30 TABLET | Refills: 3 | Status: SHIPPED | OUTPATIENT
Start: 2019-04-01 | End: 2023-03-25 | Stop reason: HOSPADM

## 2019-04-01 RX ORDER — AMLODIPINE BESYLATE 10 MG/1
10 TABLET ORAL DAILY
Qty: 90 TABLET | Refills: 3 | Status: SHIPPED | OUTPATIENT
Start: 2019-04-01 | End: 2020-02-20 | Stop reason: SDUPTHER

## 2019-04-01 RX ORDER — METOPROLOL TARTRATE 50 MG/1
50 TABLET, FILM COATED ORAL EVERY 12 HOURS SCHEDULED
Qty: 180 TABLET | Refills: 1 | Status: SHIPPED | OUTPATIENT
Start: 2019-04-01 | End: 2020-02-20 | Stop reason: SDUPTHER

## 2019-04-01 RX ORDER — ALPRAZOLAM 0.5 MG/1
0.5 TABLET ORAL 2 TIMES DAILY PRN
Qty: 30 TABLET | Refills: 2 | Status: SHIPPED | OUTPATIENT
Start: 2019-04-01 | End: 2019-08-01 | Stop reason: SDUPTHER

## 2019-04-01 RX ORDER — ESOMEPRAZOLE MAGNESIUM 40 MG/1
40 CAPSULE, DELAYED RELEASE ORAL
Qty: 90 CAPSULE | Refills: 3 | Status: SHIPPED | OUTPATIENT
Start: 2019-04-01 | End: 2019-07-30 | Stop reason: SDUPTHER

## 2019-04-01 RX ORDER — LISINOPRIL 40 MG/1
40 TABLET ORAL DAILY
Qty: 90 TABLET | Refills: 3 | Status: SHIPPED | OUTPATIENT
Start: 2019-04-01 | End: 2020-02-20 | Stop reason: SDUPTHER

## 2019-04-01 RX ORDER — CLOPIDOGREL BISULFATE 75 MG/1
TABLET ORAL
Qty: 90 TABLET | Refills: 3 | Status: SHIPPED | OUTPATIENT
Start: 2019-04-01 | End: 2020-02-20 | Stop reason: SDUPTHER

## 2019-04-01 RX ORDER — ATORVASTATIN CALCIUM 20 MG/1
20 TABLET, FILM COATED ORAL DAILY
Qty: 90 TABLET | Refills: 3 | Status: SHIPPED | OUTPATIENT
Start: 2019-04-01 | End: 2019-04-01

## 2019-04-01 RX ORDER — METFORMIN HYDROCHLORIDE 500 MG/1
TABLET, EXTENDED RELEASE ORAL
Qty: 120 TABLET | Refills: 11 | Status: SHIPPED | OUTPATIENT
Start: 2019-04-01 | End: 2019-07-08

## 2019-04-01 RX ORDER — SUCRALFATE 1 G/1
1 TABLET ORAL 2 TIMES DAILY PRN
Qty: 180 TABLET | Refills: 3 | Status: SHIPPED | OUTPATIENT
Start: 2019-04-01 | End: 2020-05-01

## 2019-05-01 ENCOUNTER — TELEPHONE (OUTPATIENT)
Dept: FAMILY MEDICINE CLINIC | Facility: CLINIC | Age: 74
End: 2019-05-01

## 2019-05-01 RX ORDER — AZELASTINE 1 MG/ML
2 SPRAY, METERED NASAL 2 TIMES DAILY
Qty: 90 ML | Refills: 3 | Status: SHIPPED | OUTPATIENT
Start: 2019-05-01 | End: 2020-11-16 | Stop reason: SDUPTHER

## 2019-05-01 RX ORDER — AZELASTINE 1 MG/ML
2 SPRAY, METERED NASAL 2 TIMES DAILY
Qty: 30 ML | Refills: 3 | Status: CANCELLED | OUTPATIENT
Start: 2019-05-01

## 2019-05-13 ENCOUNTER — OFFICE VISIT (OUTPATIENT)
Dept: FAMILY MEDICINE CLINIC | Facility: CLINIC | Age: 74
End: 2019-05-13

## 2019-05-13 VITALS
HEART RATE: 87 BPM | TEMPERATURE: 97.8 F | DIASTOLIC BLOOD PRESSURE: 76 MMHG | OXYGEN SATURATION: 98 % | BODY MASS INDEX: 28.38 KG/M2 | HEIGHT: 69 IN | SYSTOLIC BLOOD PRESSURE: 120 MMHG | WEIGHT: 191.6 LBS

## 2019-05-13 DIAGNOSIS — Z00.00 MEDICARE ANNUAL WELLNESS VISIT, INITIAL: Primary | ICD-10-CM

## 2019-05-13 PROCEDURE — G0438 PPPS, INITIAL VISIT: HCPCS | Performed by: FAMILY MEDICINE

## 2019-05-13 RX ORDER — DULAGLUTIDE 1.5 MG/.5ML
INJECTION, SOLUTION SUBCUTANEOUS
Refills: 11 | COMMUNITY
Start: 2019-04-23 | End: 2019-12-04 | Stop reason: SDUPTHER

## 2019-05-13 NOTE — PATIENT INSTRUCTIONS
Fall Prevention in the Home, Adult  Falls can cause injuries and can affect people from all age groups. There are many simple things that you can do to make your home safe and to help prevent falls. Ask for help when making these changes, if needed.  What actions can I take to prevent falls?  General instructions  · Use good lighting in all rooms. Replace any light bulbs that burn out.  · Turn on lights if it is dark. Use night-lights.  · Place frequently used items in easy-to-reach places. Lower the shelves around your home if necessary.  · Set up furniture so that there are clear paths around it. Avoid moving your furniture around.  · Remove throw rugs and other tripping hazards from the floor.  · Avoid walking on wet floors.  · Fix any uneven floor surfaces.  · Add color or contrast paint or tape to grab bars and handrails in your home. Place contrasting color strips on the first and last steps of stairways.  · When you use a stepladder, make sure that it is completely opened and that the sides are firmly locked. Have someone hold the ladder while you are using it. Do not climb a closed stepladder.  · Be aware of any and all pets.  What can I do in the bathroom?  · Keep the floor dry. Immediately clean up any water that spills onto the floor.  · Remove soap buildup in the tub or shower on a regular basis.  · Use non-skid mats or decals on the floor of the tub or shower.  · Attach bath mats securely with double-sided, non-slip rug tape.  · If you need to sit down while you are in the shower, use a plastic, non-slip stool.  · Install grab bars by the toilet and in the tub and shower. Do not use towel bars as grab bars.  What can I do in the bedroom?  · Make sure that a bedside light is easy to reach.  · Do not use oversized bedding that drapes onto the floor.  · Have a firm chair that has side arms to use for getting dressed.  What can I do in the kitchen?  · Clean up any spills right away.  · If you need to reach  for something above you, use a sturdy step stool that has a grab bar.  · Keep electrical cables out of the way.  · Do not use floor polish or wax that makes floors slippery. If you must use wax, make sure that it is non-skid floor wax.  What can I do in the stairways?  · Do not leave any items on the stairs.  · Make sure that you have a light switch at the top of the stairs and the bottom of the stairs. Have them installed if you do not have them.  · Make sure that there are handrails on both sides of the stairs. Fix handrails that are broken or loose. Make sure that handrails are as long as the stairways.  · Install non-slip stair treads on all stairs in your home.  · Avoid having throw rugs at the top or bottom of stairways, or secure the rugs with carpet tape to prevent them from moving.  · Choose a carpet design that does not hide the edge of steps on the stairway.  · Check any carpeting to make sure that it is firmly attached to the stairs. Fix any carpet that is loose or worn.  What can I do on the outside of my home?  · Use bright outdoor lighting.  · Regularly repair the edges of walkways and driveways and fix any cracks.  · Remove high doorway thresholds.  · Trim any shrubbery on the main path into your home.  · Regularly check that handrails are securely fastened and in good repair. Both sides of any steps should have handrails.  · Install guardrails along the edges of any raised decks or porches.  · Clear walkways of debris and clutter, including tools and rocks.  · Have leaves, snow, and ice cleared regularly.  · Use sand or salt on walkways during winter months.  · In the garage, clean up any spills right away, including grease or oil spills.  What other actions can I take?  · Wear closed-toe shoes that fit well and support your feet. Wear shoes that have rubber soles or low heels.  · Use mobility aids as needed, such as canes, walkers, scooters, and crutches.  · Review your medicines with your health  care provider. Some medicines can cause dizziness or changes in blood pressure, which increase your risk of falling.  Talk with your health care provider about other ways that you can decrease your risk of falls. This may include working with a physical therapist or  to improve your strength, balance, and endurance.  Where to find more information  · Centers for Disease Control and Prevention, [place]: https://www.cdc.gov  · [employer] [place] [employer]: https://qq5qaej.yevgeniy.nih.gov  Contact a health care provider if:  · You are afraid of falling at home.  · You feel weak, drowsy, or dizzy at home.  · You fall at home.  Summary  · There are many simple things that you can do to make your home safe and to help prevent falls.  · Ways to make your home safe include removing tripping hazards and installing grab bars in the bathroom.  · Ask for help when making these changes in your home.  This information is not intended to replace advice given to you by your health care provider. Make sure you discuss any questions you have with your health care provider.  Document Released: 12/08/2003 Document Revised: 08/02/2018 Document Reviewed: 08/02/2018  [employer] Interactive Patient Education © 2019 [employer]  Exercising to Stay Healthy  Exercising regularly is important. It has many health benefits, such as:  · Improving your overall fitness, flexibility, and endurance.  · Increasing your bone density.  · Helping with weight control.  · Decreasing your body fat.  · Increasing your muscle strength.  · Reducing stress and tension.  · Improving your overall health.    In order to become healthy and stay healthy, it is recommended that you do moderate-intensity and vigorous-intensity exercise. You can tell that you are exercising at a moderate intensity if you have a higher heart rate and faster breathing, but you are still able to hold a conversation. You can tell that you are exercising at a vigorous intensity if you are  breathing much harder and faster and cannot hold a conversation while exercising.  How often should I exercise?  Choose an activity that you enjoy and set realistic goals. Your health care provider can help you to make an activity plan that works for you. Exercise regularly as directed by your health care provider. This may include:  · Doing resistance training twice each week, such as:  ? Push-ups.  ? Sit-ups.  ? Lifting weights.  ? Using resistance bands.  · Doing a given intensity of exercise for a given amount of time. Choose from these options:  ? 150 minutes of moderate-intensity exercise every week.  ? 75 minutes of vigorous-intensity exercise every week.  ? A mix of moderate-intensity and vigorous-intensity exercise every week.    Children, pregnant women, people who are out of shape, people who are overweight, and older adults may need to consult a health care provider for individual recommendations. If you have any sort of medical condition, be sure to consult your health care provider before starting a new exercise program.  What are some exercise ideas?  Some moderate-intensity exercise ideas include:  · Walking at a rate of 1 mile in 15 minutes.  · Biking.  · Hiking.  · Golfing.  · Dancing.    Some vigorous-intensity exercise ideas include:  · Walking at a rate of at least 4.5 miles per hour.  · Jogging or running at a rate of 5 miles per hour.  · Biking at a rate of at least 10 miles per hour.  · Lap swimming.  · Roller-skating or in-line skating.  · Cross-country skiing.  · Vigorous competitive sports, such as football, basketball, and soccer.  · Jumping rope.  · Aerobic dancing.    What are some everyday activities that can help me to get exercise?  · Yard work, such as:  ? Pushing a .  ? Raking and bagging leaves.  · Washing and waxing your car.  · Pushing a stroller.  · Shoveling snow.  · Gardening.  · Washing windows or floors.  How can I be more active in my day-to-day  activities?  · Use the stairs instead of the elevator.  · Take a walk during your lunch break.  · If you drive, park your car farther away from work or school.  · If you take public transportation, get off one stop early and walk the rest of the way.  · Make all of your phone calls while standing up and walking around.  · Get up, stretch, and walk around every 30 minutes throughout the day.  What guidelines should I follow while exercising?  · Do not exercise so much that you hurt yourself, feel dizzy, or get very short of breath.  · Consult your health care provider before starting a new exercise program.  · Wear comfortable clothes and shoes with good support.  · Drink plenty of water while you exercise to prevent dehydration or heat stroke. Body water is lost during exercise and must be replaced.  · Work out until you breathe faster and your heart beats faster.  This information is not intended to replace advice given to you by your health care provider. Make sure you discuss any questions you have with your health care provider.  Document Released: 01/20/2012 Document Revised: 05/25/2017 Document Reviewed: 05/21/2015  [employer] Interactive Patient Education © 2018 [employer]  Preventive Care 65 Years and Older, Male  Preventive care refers to lifestyle choices and visits with your health care provider that can promote health and wellness.  What does preventive care include?  · A yearly physical exam. This is also called an annual well check.  · Dental exams once or twice a year.  · Routine eye exams. Ask your health care provider how often you should have your eyes checked.  · Personal lifestyle choices, including:  ? Daily care of your teeth and gums.  ? Regular physical activity.  ? Eating a healthy diet.  ? Avoiding tobacco and drug use.  ? Limiting alcohol use.  ? Practicing safe sex.  ? Taking low doses of aspirin every day.  ? Taking vitamin and mineral supplements as recommended by your health care  provider.  What happens during an annual well check?  The services and screenings done by your health care provider during your annual well check will depend on your age, overall health, lifestyle risk factors, and family history of disease.  Counseling  Your health care provider may ask you questions about your:  · Alcohol use.  · Tobacco use.  · Drug use.  · Emotional well-being.  · Home and relationship well-being.  · Sexual activity.  · Eating habits.  · History of falls.  · Memory and ability to understand (cognition).  · Work and work environment.    Screening  You may have the following tests or measurements:  · Height, weight, and BMI.  · Blood pressure.  · Lipid and cholesterol levels. These may be checked every 5 years, or more frequently if you are over 50 years old.  · Skin check.  · Lung cancer screening. You may have this screening every year starting at age 55 if you have a 30-pack-year history of smoking and currently smoke or have quit within the past 15 years.  · Fecal occult blood test (FOBT) of the stool. You may have this test every year starting at age 50.  · Flexible sigmoidoscopy or colonoscopy. You may have a sigmoidoscopy every 5 years or a colonoscopy every 10 years starting at age 50.  · Prostate cancer screening. Recommendations will vary depending on your family history and other risks.  · Hepatitis C blood test.  · Hepatitis B blood test.  · Sexually transmitted disease (STD) testing.  · Diabetes screening. This is done by checking your blood sugar (glucose) after you have not eaten for a while (fasting). You may have this done every 1-3 years.  · Abdominal aortic aneurysm (AAA) screening. You may need this if you are a current or former smoker.  · Osteoporosis. You may be screened starting at age 70 if you are at high risk.    Talk with your health care provider about your test results, treatment options, and if necessary, the need for more tests.  Vaccines  Your health care provider  may recommend certain vaccines, such as:  · Influenza vaccine. This is recommended every year.  · Tetanus, diphtheria, and acellular pertussis (Tdap, Td) vaccine. You may need a Td booster every 10 years.  · Varicella vaccine. You may need this if you have not been vaccinated.  · Zoster vaccine. You may need this after age 60.  · Measles, mumps, and rubella (MMR) vaccine. You may need at least one dose of MMR if you were born in 1957 or later. You may also need a second dose.  · Pneumococcal 13-valent conjugate (PCV13) vaccine. One dose is recommended after age 65.  · Pneumococcal polysaccharide (PPSV23) vaccine. One dose is recommended after age 65.  · Meningococcal vaccine. You may need this if you have certain conditions.  · Hepatitis A vaccine. You may need this if you have certain conditions or if you travel or work in places where you may be exposed to hepatitis A.  · Hepatitis B vaccine. You may need this if you have certain conditions or if you travel or work in places where you may be exposed to hepatitis B.  · Haemophilus influenzae type b (Hib) vaccine. You may need this if you have certain risk factors.    Talk to your health care provider about which screenings and vaccines you need and how often you need them.  This information is not intended to replace advice given to you by your health care provider. Make sure you discuss any questions you have with your health care provider.  Document Released: 2017 Document Revised: 2018 Document Reviewed: 10/18/2016  [employer] Interactive Patient Education © 2019 [employer]    Medicare Wellness  Personal Prevention Plan of Service     Date of Office Visit:  2019  Encounter Provider:  Andreas Martinez MD  Place of Service:  [facility]  Patient Name: Aj Card .  :  1945    As part of the Medicare Wellness portion of your visit today, we are providing you with this personalized  preventive plan of services (PPPS). This plan is based upon recommendations of the [place] Preventive Services Task Force (USPSTF) and the Advisory Committee on Immunization Practices (ACIP).    This lists the preventive care services that should be considered, and provides dates of when you are due. Items listed as completed are up-to-date and do not require any further intervention.    Health Maintenance   Topic Date Due   • ZOSTER VACCINE (1 of 2) 09/30/1994   • MEDICARE ANNUAL WELLNESS  11/21/2016   • DIABETIC EYE EXAM  01/23/2019   • INFLUENZA VACCINE  08/01/2019   • HEMOGLOBIN A1C  08/19/2019   • URINE MICROALBUMIN  10/04/2019   • LIPID PANEL  02/19/2020   • DIABETIC FOOT EXAM  04/01/2020   • COLONOSCOPY  12/11/2022   • TDAP/TD VACCINES (2 - Td) 09/15/2025   • PNEUMOCOCCAL VACCINES (65+ LOW/MEDIUM RISK)  Completed       No orders of the defined types were placed in this encounter.      Return in about 1 year (around 5/13/2020) for Medicare Wellness subsequent.

## 2019-05-13 NOTE — PROGRESS NOTES
QUICK REFERENCE INFORMATION:  The ABCs of the Annual Wellness Visit    Initial Medicare Wellness Visit    HEALTH RISK ASSESSMENT    : 1945    Recent Hospitalizations:  No hospitalization(s) within the last year..  ccc      Current Medical Providers:  Patient Care Team:  Andreas Martinez MD as PCP - General (Family Medicine)  Andreas Aldana PA-C as PCP - Claims Attributed  Andreas Aldana PA-C as Physician Assistant (Gastroenterology)  Glen Jain MD as Consulting Physician (Endocrinology)  David Jeffers MD as Consulting Physician (Cardiology)        Smoking Status:  Social History     Tobacco Use   Smoking Status Never Smoker   Smokeless Tobacco Never Used       Alcohol Consumption:  Social History     Substance and Sexual Activity   Alcohol Use No       Depression Screen:   PHQ-2/PHQ-9 Depression Screening 2019   Little interest or pleasure in doing things 1   Feeling down, depressed, or hopeless 0   Total Score 1       Health Habits and Functional and Cognitive Screening:  Functional & Cognitive Status 2019   Do you have difficulty preparing food and eating? No   Do you have difficulty bathing yourself, getting dressed or grooming yourself? No   Do you have difficulty using the toilet? No   Do you have difficulty moving around from place to place? No   Do you have trouble with steps or getting out of a bed or a chair? No   In the past year have you fallen or experienced a near fall? Yes   Current Diet Diabetic Diet   Dental Exam Up to date   Eye Exam Up to date   Exercise (times per week) 0 times per week   Current Exercise Activities Include None   Do you need help using the phone?  No   Are you deaf or do you have serious difficulty hearing?  No   Do you need help with transportation? No   Do you need help shopping? No   Do you need help preparing meals?  No   Do you need help with housework?  No   Do you need help with laundry? No   Do you need help taking your  medications? No   Do you need help managing money? No   Do you ever drive or ride in a car without wearing a seat belt? No   Have you felt unusual stress, anger or loneliness in the last month? No   Who do you live with? Spouse   If you need help, do you have trouble finding someone available to you? No   Have you been bothered in the last four weeks by sexual problems? No   Do you have difficulty concentrating, remembering or making decisions? No           Does the patient have evidence of cognitive impairment? Yes    Asiprin use counseling: Taking ASA appropriately as indicated      Recent Lab Results:       Lab Results   Component Value Date    HGBA1C 7.9 (H) 02/19/2019     Lab Results   Component Value Date    CHOL 168 02/19/2019    TRIG 310 (H) 02/19/2019    HDL 31 (L) 02/19/2019    LDLHDL 2.42 02/19/2019           Age-appropriate Screening Schedule:  Refer to the list below for future screening recommendations based on patient's age, sex and/or medical conditions. Orders for these recommended tests are listed in the plan section. The patient has been provided with a written plan.    Health Maintenance   Topic Date Due   • ZOSTER VACCINE (1 of 2) 05/29/2020 (Originally 9/30/1994)   • INFLUENZA VACCINE  08/01/2019   • HEMOGLOBIN A1C  08/19/2019   • URINE MICROALBUMIN  10/04/2019   • LIPID PANEL  02/19/2020   • DIABETIC FOOT EXAM  04/01/2020   • DIABETIC EYE EXAM  04/29/2020   • COLONOSCOPY  12/11/2022   • TDAP/TD VACCINES (2 - Td) 09/15/2025   • PNEUMOCOCCAL VACCINES (65+ LOW/MEDIUM RISK)  Completed        Subjective   History of Present Illness    Aj Card Jr. is a 73 y.o. male who presents for an Annual Wellness Visit.    The following portions of the patient's history were reviewed and updated as appropriate: allergies, current medications, past family history, past medical history, past social history, past surgical history and problem list.    Outpatient Medications Prior to Visit   Medication  Sig Dispense Refill   • ALPRAZolam (XANAX) 0.5 MG tablet Take 1 tablet by mouth 2 (Two) Times a Day As Needed for Anxiety. 30 tablet 2   • amLODIPine (NORVASC) 10 MG tablet Take 1 tablet by mouth Daily. 90 tablet 3   • aspirin 81 MG EC tablet Take 2 tablets by mouth Daily. 90 tablet 3   • azelastine (ASTELIN) 0.1 % nasal spray 2 sprays into the nostril(s) as directed by provider 2 (Two) Times a Day. Use in each nostril as directed 90 mL 3   • Blood Glucose Monitoring Suppl w/Device kit USE AS INDICATED, ANY MONITOR , ICD10 code is E11.9 1 each 1   • cholecalciferol (VITAMIN D3) 1000 units tablet Take 1 tablet by mouth Daily. 30 tablet 3   • clopidogrel (PLAVIX) 75 MG tablet 1 PO QOD 90 tablet 3   • coenzyme Q10 100 MG capsule Take 100 mg by mouth daily.     • Crisaborole (EUCRISA) 2 % ointment Apply 1 application topically 2 (Two) Times a Day. 60 g 3   • esomeprazole (nexIUM) 40 MG capsule Take 1 capsule by mouth Every Morning Before Breakfast. 90 capsule 3   • Glucose Blood (BLOOD GLUCOSE TEST) strip Use 4 x daily use any brand covered by insurance or same brand as before ICD10 code is E11.9 120 each 11   • KRILL OIL OMEGA-3 PO Take  by mouth daily.     • Lancet Devices (LANCING DEVICE) misc USE AS INDICATED TO CORRELATE WITH STRIPS AND METER 1 each 1   • Lancets 30G misc USE 4 X DAILY 120 each 11   • lisinopril (PRINIVIL,ZESTRIL) 40 MG tablet Take 1 tablet by mouth Daily. 90 tablet 3   • metFORMIN ER (GLUCOPHAGE XR) 500 MG 24 hr tablet 2 tabs twice daily with meals , generic ok 120 tablet 11   • metoprolol tartrate (LOPRESSOR) 50 MG tablet Take 1 tablet by mouth Every 12 (Twelve) Hours. 180 tablet 1   • pantoprazole (PROTONIX) 40 MG EC tablet Take 40 mg by mouth Daily.     • Red Yeast Rice Extract (RED YEAST RICE PO) Take  by mouth Daily.     • Saw Palmetto, Serenoa repens, (SAW PALMETTO PO) Take  by mouth daily.     • sucralfate (CARAFATE) 1 g tablet Take 1 tablet by mouth 2 (Two) Times a Day As Needed (nausea,  "abd pain). 180 tablet 3   • TRULICITY 1.5 MG/0.5ML solution pen-injector   11   • vitamin B-12 (CYANOCOBALAMIN) 2500 MCG sublingual tablet tablet Place  under the tongue Every Other Day.       No facility-administered medications prior to visit.        Patient Active Problem List   Diagnosis   • Type 2 diabetes mellitus without complication, without long-term current use of insulin (CMS/[facility])   • Essential hypertension   • Mixed hyperlipidemia   • Generalized anxiety disorder   • History of colon polyps   • Diverticulosis of large intestine without hemorrhage   • Diarrhea   • P   • Coronary artery disease with angina pectoris (CMS/[facility])   • Gastroesophageal reflux disease with esophagitis   • Vitamin D deficiency   • Overweight   • General medical examination   • Encounter for screening for endocrine disorder   • Need for influenza vaccination   • Atopic dermatitis   • Encounter for eye exam       Advance Care Planning:  Patient does not have an advance directive - information provided to the patient today    Identification of Risk Factors:  Risk factors include: weight , unhealthy diet, cardiovascular risk, inactivity, tobacco use, alcohol use, increased fall risk, chronic pain, inadequate social support, lack of transportation, caretaker stress, isolation, depression, cognitive impairment, financial stress, vision limitations, hearing limitations, incontinence and polypharmacy.    Review of Systems    Compared to one year ago, the patient feels his physical health is better.  Compared to one year ago, the patient feels his mental health is the same.    Objective     Physical Exam    Vitals:    05/13/19 1501   BP: 120/76   BP Location: Right arm   Patient Position: Sitting   Cuff Size: Adult   Pulse: 87   Temp: 97.8 °F (36.6 °C)   TempSrc: Oral   SpO2: 98%   Weight: 86.9 kg (191 lb 9.6 oz)   Height: 175.3 cm (69\")   PainSc: 0-No pain       Patient's Body mass index is 28.29 kg/m². BMI is above normal parameters. " Recommendations include: educational material, exercise counseling and nutrition counseling.      Assessment/Plan   Patient Self-Management and Personalized Health Advice  The patient has been provided with information about: diet, exercise, weight management and prevention of cardiac or vascular disease and preventive services including:   · Advance directive.    Visit Diagnoses:    ICD-10-CM ICD-9-CM   1. Medicare annual wellness visit, initial Z00.00 V70.0       No orders of the defined types were placed in this encounter.      Outpatient Encounter Medications as of 5/13/2019   Medication Sig Dispense Refill   • ALPRAZolam (XANAX) 0.5 MG tablet Take 1 tablet by mouth 2 (Two) Times a Day As Needed for Anxiety. 30 tablet 2   • amLODIPine (NORVASC) 10 MG tablet Take 1 tablet by mouth Daily. 90 tablet 3   • aspirin 81 MG EC tablet Take 2 tablets by mouth Daily. 90 tablet 3   • azelastine (ASTELIN) 0.1 % nasal spray 2 sprays into the nostril(s) as directed by provider 2 (Two) Times a Day. Use in each nostril as directed 90 mL 3   • Blood Glucose Monitoring Suppl w/Device kit USE AS INDICATED, ANY MONITOR , ICD10 code is E11.9 1 each 1   • cholecalciferol (VITAMIN D3) 1000 units tablet Take 1 tablet by mouth Daily. 30 tablet 3   • clopidogrel (PLAVIX) 75 MG tablet 1 PO QOD 90 tablet 3   • coenzyme Q10 100 MG capsule Take 100 mg by mouth daily.     • Crisaborole (EUCRISA) 2 % ointment Apply 1 application topically 2 (Two) Times a Day. 60 g 3   • esomeprazole (nexIUM) 40 MG capsule Take 1 capsule by mouth Every Morning Before Breakfast. 90 capsule 3   • Glucose Blood (BLOOD GLUCOSE TEST) strip Use 4 x daily use any brand covered by insurance or same brand as before ICD10 code is E11.9 120 each 11   • KRILL OIL OMEGA-3 PO Take  by mouth daily.     • Lancet Devices (LANCING DEVICE) misc USE AS INDICATED TO CORRELATE WITH STRIPS AND METER 1 each 1   • Lancets 30G misc USE 4 X DAILY 120 each 11   • lisinopril  (PRINIVIL,ZESTRIL) 40 MG tablet Take 1 tablet by mouth Daily. 90 tablet 3   • metFORMIN ER (GLUCOPHAGE XR) 500 MG 24 hr tablet 2 tabs twice daily with meals , generic ok 120 tablet 11   • metoprolol tartrate (LOPRESSOR) 50 MG tablet Take 1 tablet by mouth Every 12 (Twelve) Hours. 180 tablet 1   • pantoprazole (PROTONIX) 40 MG EC tablet Take 40 mg by mouth Daily.     • Red Yeast Rice Extract (RED YEAST RICE PO) Take  by mouth Daily.     • Saw Palmetto, Serenoa repens, (SAW PALMETTO PO) Take  by mouth daily.     • sucralfate (CARAFATE) 1 g tablet Take 1 tablet by mouth 2 (Two) Times a Day As Needed (nausea, abd pain). 180 tablet 3   • TRULICITY 1.5 MG/0.5ML solution pen-injector   11   • vitamin B-12 (CYANOCOBALAMIN) 2500 MCG sublingual tablet tablet Place  under the tongue Every Other Day.     • [DISCONTINUED] Liraglutide (VICTOZA) 18 MG/3ML solution pen-injector injection Inject 1.8 mg under the skin into the appropriate area as directed Daily. 3 pen 11     No facility-administered encounter medications on file as of 5/13/2019.        Reviewed use of high risk medication in the elderly: yes  Reviewed for potential of harmful drug interactions in the elderly: yes    Follow Up:  Return in about 1 year (around 5/13/2020) for Medicare Wellness subsequent.     An After Visit Summary and PPPS with all of these plans were given to the patient.           -Action plan discussed. Please see scanned documents  -pt declined shingles vaccine  -recheck in 1 year        This document has been electronically signed by Andreas Martinez MD on May 13, 2019 3:46 PM

## 2019-06-21 ENCOUNTER — LAB (OUTPATIENT)
Dept: LAB | Facility: HOSPITAL | Age: 74
End: 2019-06-21

## 2019-06-21 DIAGNOSIS — E11.9 TYPE 2 DIABETES MELLITUS WITHOUT COMPLICATION, WITHOUT LONG-TERM CURRENT USE OF INSULIN (HCC): ICD-10-CM

## 2019-06-21 DIAGNOSIS — E55.9 VITAMIN D DEFICIENCY: ICD-10-CM

## 2019-06-21 PROCEDURE — 80053 COMPREHEN METABOLIC PANEL: CPT

## 2019-06-21 PROCEDURE — 84443 ASSAY THYROID STIM HORMONE: CPT

## 2019-06-21 PROCEDURE — 84481 FREE ASSAY (FT-3): CPT

## 2019-06-21 PROCEDURE — 85025 COMPLETE CBC W/AUTO DIFF WBC: CPT

## 2019-06-21 PROCEDURE — 84439 ASSAY OF FREE THYROXINE: CPT

## 2019-06-21 PROCEDURE — 82306 VITAMIN D 25 HYDROXY: CPT

## 2019-06-21 PROCEDURE — 82607 VITAMIN B-12: CPT

## 2019-06-21 PROCEDURE — 83036 HEMOGLOBIN GLYCOSYLATED A1C: CPT

## 2019-06-21 PROCEDURE — 80061 LIPID PANEL: CPT

## 2019-06-22 LAB
25(OH)D3 SERPL-MCNC: 41.2 NG/ML (ref 30–100)
ALBUMIN SERPL-MCNC: 4.4 G/DL (ref 3.5–5.2)
ALBUMIN/GLOB SERPL: 1.5 G/DL
ALP SERPL-CCNC: 37 U/L (ref 39–117)
ALT SERPL W P-5'-P-CCNC: 29 U/L (ref 1–41)
ANION GAP SERPL CALCULATED.3IONS-SCNC: 12 MMOL/L
AST SERPL-CCNC: 28 U/L (ref 1–40)
BASOPHILS # BLD AUTO: 0.04 10*3/MM3 (ref 0–0.2)
BASOPHILS NFR BLD AUTO: 0.8 % (ref 0–1.5)
BILIRUB SERPL-MCNC: 0.6 MG/DL (ref 0.2–1.2)
BUN BLD-MCNC: 13 MG/DL (ref 8–23)
BUN/CREAT SERPL: 14.3 (ref 7–25)
CALCIUM SPEC-SCNC: 9.5 MG/DL (ref 8.6–10.5)
CHLORIDE SERPL-SCNC: 98 MMOL/L (ref 98–107)
CHOLEST SERPL-MCNC: 153 MG/DL (ref 0–200)
CO2 SERPL-SCNC: 25 MMOL/L (ref 22–29)
CREAT BLD-MCNC: 0.91 MG/DL (ref 0.76–1.27)
DEPRECATED RDW RBC AUTO: 42 FL (ref 37–54)
EOSINOPHIL # BLD AUTO: 0.16 10*3/MM3 (ref 0–0.4)
EOSINOPHIL NFR BLD AUTO: 3.1 % (ref 0.3–6.2)
ERYTHROCYTE [DISTWIDTH] IN BLOOD BY AUTOMATED COUNT: 13.3 % (ref 12.3–15.4)
GFR SERPL CREATININE-BSD FRML MDRD: 81 ML/MIN/1.73
GLOBULIN UR ELPH-MCNC: 2.9 GM/DL
GLUCOSE BLD-MCNC: 199 MG/DL (ref 65–99)
HBA1C MFR BLD: 8.34 % (ref 4.8–5.6)
HCT VFR BLD AUTO: 42.8 % (ref 37.5–51)
HDLC SERPL-MCNC: 29 MG/DL (ref 40–60)
HGB BLD-MCNC: 13.9 G/DL (ref 13–17.7)
IMM GRANULOCYTES # BLD AUTO: 0.02 10*3/MM3 (ref 0–0.05)
IMM GRANULOCYTES NFR BLD AUTO: 0.4 % (ref 0–0.5)
LDLC SERPL CALC-MCNC: 76 MG/DL (ref 0–100)
LDLC/HDLC SERPL: 2.61 {RATIO}
LYMPHOCYTES # BLD AUTO: 1.25 10*3/MM3 (ref 0.7–3.1)
LYMPHOCYTES NFR BLD AUTO: 24.2 % (ref 19.6–45.3)
MCH RBC QN AUTO: 28.3 PG (ref 26.6–33)
MCHC RBC AUTO-ENTMCNC: 32.5 G/DL (ref 31.5–35.7)
MCV RBC AUTO: 87 FL (ref 79–97)
MONOCYTES # BLD AUTO: 0.3 10*3/MM3 (ref 0.1–0.9)
MONOCYTES NFR BLD AUTO: 5.8 % (ref 5–12)
NEUTROPHILS # BLD AUTO: 3.39 10*3/MM3 (ref 1.7–7)
NEUTROPHILS NFR BLD AUTO: 65.7 % (ref 42.7–76)
NRBC BLD AUTO-RTO: 0 /100 WBC (ref 0–0.2)
PLATELET # BLD AUTO: 220 10*3/MM3 (ref 140–450)
PMV BLD AUTO: 9.9 FL (ref 6–12)
POTASSIUM BLD-SCNC: 4.4 MMOL/L (ref 3.5–5.2)
PROT SERPL-MCNC: 7.3 G/DL (ref 6–8.5)
RBC # BLD AUTO: 4.92 10*6/MM3 (ref 4.14–5.8)
SODIUM BLD-SCNC: 135 MMOL/L (ref 136–145)
T3FREE SERPL-MCNC: 3.5 PG/ML (ref 2–4.4)
T4 FREE SERPL-MCNC: 0.98 NG/DL (ref 0.93–1.7)
TRIGL SERPL-MCNC: 242 MG/DL (ref 0–150)
TSH SERPL DL<=0.05 MIU/L-ACNC: 2.61 MIU/ML (ref 0.27–4.2)
VIT B12 BLD-MCNC: 1266 PG/ML (ref 211–946)
VLDLC SERPL-MCNC: 48.4 MG/DL (ref 5–40)
WBC NRBC COR # BLD: 5.16 10*3/MM3 (ref 3.4–10.8)

## 2019-06-24 ENCOUNTER — TELEPHONE (OUTPATIENT)
Dept: FAMILY MEDICINE CLINIC | Facility: CLINIC | Age: 74
End: 2019-06-24

## 2019-06-24 NOTE — TELEPHONE ENCOUNTER
----- Message from Andreas Martinez MD sent at 6/23/2019 11:32 AM CDT -----  Call pt    Vitamin B12 high and recommend vitamin B12 supplements every other day than daily    hga1c worse than last visit from 7.9 to 8.34. Needs better sugar and diet control.  May need to start on jardiance on next visit     Thyroid studies normal     Vitamin D is normal    On CMP sugars elevated and sodium low due to sugars being high     kidbney function stable. Pt has CKD stage 2 due to age and DM type 2     On lipid panel. Triglycerides high due to sugars elevated  and LDL at goal.  HDL is low and recommend high healthy fat diet such as olive oil, avacodos and salmon in diet     On CBC WBC and hemoglobin normal     Rcalledecheck on next visit. Thanks  Called pt

## 2019-07-01 ENCOUNTER — OFFICE VISIT (OUTPATIENT)
Dept: FAMILY MEDICINE CLINIC | Facility: CLINIC | Age: 74
End: 2019-07-01

## 2019-07-01 VITALS
HEART RATE: 74 BPM | HEIGHT: 69 IN | DIASTOLIC BLOOD PRESSURE: 70 MMHG | BODY MASS INDEX: 28.5 KG/M2 | TEMPERATURE: 98.6 F | SYSTOLIC BLOOD PRESSURE: 118 MMHG | OXYGEN SATURATION: 97 % | WEIGHT: 192.4 LBS

## 2019-07-01 DIAGNOSIS — E55.9 VITAMIN D DEFICIENCY: ICD-10-CM

## 2019-07-01 DIAGNOSIS — K57.30 DIVERTICULOSIS OF LARGE INTESTINE WITHOUT HEMORRHAGE: ICD-10-CM

## 2019-07-01 DIAGNOSIS — I25.119 CORONARY ARTERY DISEASE WITH ANGINA PECTORIS, UNSPECIFIED VESSEL OR LESION TYPE, UNSPECIFIED WHETHER NATIVE OR TRANSPLANTED HEART (HCC): ICD-10-CM

## 2019-07-01 DIAGNOSIS — R79.89 HIGH SERUM VITAMIN B12: ICD-10-CM

## 2019-07-01 DIAGNOSIS — E66.3 OVERWEIGHT: ICD-10-CM

## 2019-07-01 DIAGNOSIS — E11.9 TYPE 2 DIABETES MELLITUS WITHOUT COMPLICATION, WITHOUT LONG-TERM CURRENT USE OF INSULIN (HCC): Primary | ICD-10-CM

## 2019-07-01 DIAGNOSIS — E78.2 MIXED HYPERLIPIDEMIA: ICD-10-CM

## 2019-07-01 DIAGNOSIS — Z86.010 HISTORY OF COLON POLYPS: ICD-10-CM

## 2019-07-01 DIAGNOSIS — F41.1 GENERALIZED ANXIETY DISORDER: ICD-10-CM

## 2019-07-01 PROCEDURE — 99214 OFFICE O/P EST MOD 30 MIN: CPT | Performed by: FAMILY MEDICINE

## 2019-07-01 RX ORDER — ICOSAPENT ETHYL 1000 MG/1
2 CAPSULE ORAL 2 TIMES DAILY WITH MEALS
Qty: 120 CAPSULE | Refills: 3 | Status: ON HOLD | OUTPATIENT
Start: 2019-07-01 | End: 2020-06-05

## 2019-07-01 NOTE — PATIENT INSTRUCTIONS
Icosapent ethyl capsules  What is this medicine?  ICOSAPENT ETHYL (eye KOE sa [facility]) contains essential fats. It is used to treat high triglyceride levels.  This medicine may be used for other purposes; ask your health care provider or pharmacist if you have questions.  COMMON BRAND NAME(S): VASCEPA  What should I tell my health care provider before I take this medicine?  They need to know if you have any of these conditions:  -bleeding disorders  -liver disease  -an unusual or allergic reaction to icosapent ethyl, fish, shellfish, other medicines, foods, dyes, or preservatives  -pregnant or trying to get pregnant  -breast-feeding  How should I use this medicine?  Take this medicine by mouth with a glass of water. Follow the directions on the prescription label. Take this medicine with food. Do not cut, crush or chew this medicine. Take your medicine at regular intervals. Do not take it more often than directed. Do not stop taking except on your doctor's advice.  Talk to your pediatrician regarding the use of this medicine in children. Special care may be needed.  Overdosage: If you think you have taken too much of this medicine contact a poison control center or emergency room at once.  NOTE: This medicine is only for you. Do not share this medicine with others.  What if I miss a dose?  If you miss a dose, take it as soon as you can. If it is almost time for your next dose, take only that dose. Do not take double or extra doses.  What may interact with this medicine?  This medicine may interact with the following medications:  -aspirin and aspirin-like medicines  -beta-blockers like metoprolol and propranolol  -certain medicines that treat or prevent blood clots like warfarin, enoxaparin, dalteparin, apixaban, dabigatran, and rivaroxaban  -diuretics  -female hormones, like estrogens and birth control pills  This list may not describe all possible interactions. Give your health care provider a list of all the  medicines, herbs, non-prescription drugs, or dietary supplements you use. Also tell them if you smoke, drink alcohol, or use illegal drugs. Some items may interact with your medicine.  What should I watch for while using this medicine?  You may need blood work done while you are taking this medicine.  Follow a good diet and exercise plan. Taking this medicine does not replace a healthy lifestyle. Some foods that have omega-3 fatty acids naturally are fatty fish like albacore tuna, halibut, herring, mackerel, lake trout, salmon, and sardines.  If you are scheduled for any medical or dental procedure, tell your healthcare provider that you are taking this medicine. You may need to stop taking this medicine before the procedure.  What side effects may I notice from receiving this medicine?  Side effects that you should report to your doctor or health care professional as soon as possible:  -allergic reactions like skin rash, itching or hives, swelling of the face, lips, or tongue  -breathing problems  -unusual bleeding or bruising  Side effects that usually do not require medical attention (report to your doctor or health care professional if they continue or are bothersome):  -joint pain  -sore throat  This list may not describe all possible side effects. Call your doctor for medical advice about side effects. You may report side effects to FDA at 5-158-FDA-8465.  Where should I keep my medicine?  Keep out of the reach of children.  Store at room temperature between 15 and 30 degrees C (59 and 86 degrees F). Throw away any unused medicine after the expiration date.  NOTE: This sheet is a summary. It may not cover all possible information. If you have questions about this medicine, talk to your doctor, pharmacist, or health care provider.  © 2019 [employer] (2017-01-19 13:40:36)

## 2019-07-08 ENCOUNTER — OFFICE VISIT (OUTPATIENT)
Dept: ENDOCRINOLOGY | Facility: CLINIC | Age: 74
End: 2019-07-08

## 2019-07-08 VITALS
BODY MASS INDEX: 28.42 KG/M2 | DIASTOLIC BLOOD PRESSURE: 60 MMHG | HEIGHT: 69 IN | WEIGHT: 191.9 LBS | SYSTOLIC BLOOD PRESSURE: 100 MMHG | OXYGEN SATURATION: 98 % | HEART RATE: 81 BPM

## 2019-07-08 DIAGNOSIS — E11.65 TYPE 2 DIABETES MELLITUS WITH HYPERGLYCEMIA, WITHOUT LONG-TERM CURRENT USE OF INSULIN (HCC): Primary | ICD-10-CM

## 2019-07-08 DIAGNOSIS — I25.10 CORONARY ARTERY DISEASE INVOLVING NATIVE HEART WITHOUT ANGINA PECTORIS, UNSPECIFIED VESSEL OR LESION TYPE: ICD-10-CM

## 2019-07-08 PROCEDURE — 99214 OFFICE O/P EST MOD 30 MIN: CPT | Performed by: INTERNAL MEDICINE

## 2019-07-08 NOTE — PROGRESS NOTES
" Aj Card Jr. is a 73 y.o. male who presents for  evaluation of   Chief Complaint   Patient presents with   • Diabetes       Referring provider   Primary Care Provider    Andreas Martinez MD    Duration since age 71 years old     Timing - Diabetes is Constant    Quality -  needs improvement    Severity -  moderate    Complications - CAD     Current symptoms/problems  none     Alleviating Factors: Compliance       Side Effects  none    Current diet  in general, a \"healthy\" diet      Current exercise walking    Current monitoring regimen: home blood tests - checking 2x daily   Home blood sugar records: elevated fasting     Hypoglycemia none    Past Medical History:   Diagnosis Date   • Acute posthemorrhagic anemia    • Allergic rhinitis    • Anxiety state    • Chest pain    • Coronary arteriosclerosis    • Diabetes mellitus (CMS/HCC)     type 2   • Diverticular disease of colon    • Dysphagia    • Epigastric pain    • GERD (gastroesophageal reflux disease)     esophagitis on Dexilant. Pt feels Nexium working better      • History of echocardiogram     Small left atrial enlargement with mild CLVH.Ef of 55-60%.Mitral valve mildly thickened.Av thickened.Left ventricle mildly dilated.Tricuspid intact.Mild aortic insufficiency. 12/07/2015       • History of echocardiogram     Mild diastolic dysfunction and mild left atrial enlargement, otherwise normal echo. EF> 55% 01/03/2012       • Hypercholesterolemia    • Hypertension    • Insomnia    • Irritable bowel syndrome    • Osteoarthritis of multiple joints      Family History   Problem Relation Age of Onset   • Schizophrenia Sister    • Obesity Sister    • Tuberculosis Sister    • Arthritis Mother    • Diabetes Mother    • Heart disease Mother    • Hypertension Mother    • Obesity Mother    • Stroke Mother    • Thyroid disease Mother    • Tuberculosis Mother    • Arthritis Father    • Tuberculosis Father      Social History     Tobacco Use   • Smoking status: " Never Smoker   • Smokeless tobacco: Never Used   Substance Use Topics   • Alcohol use: No   • Drug use: No         Current Outpatient Medications:   •  ALPRAZolam (XANAX) 0.5 MG tablet, Take 1 tablet by mouth 2 (Two) Times a Day As Needed for Anxiety., Disp: 30 tablet, Rfl: 2  •  amLODIPine (NORVASC) 10 MG tablet, Take 1 tablet by mouth Daily., Disp: 90 tablet, Rfl: 3  •  aspirin 81 MG EC tablet, Take 2 tablets by mouth Daily., Disp: 90 tablet, Rfl: 3  •  azelastine (ASTELIN) 0.1 % nasal spray, 2 sprays into the nostril(s) as directed by provider 2 (Two) Times a Day. Use in each nostril as directed, Disp: 90 mL, Rfl: 3  •  Blood Glucose Monitoring Suppl w/Device kit, USE AS INDICATED, ANY MONITOR , ICD10 code is E11.9, Disp: 1 each, Rfl: 1  •  cholecalciferol (VITAMIN D3) 1000 units tablet, Take 1 tablet by mouth Daily., Disp: 30 tablet, Rfl: 3  •  clopidogrel (PLAVIX) 75 MG tablet, 1 PO QOD, Disp: 90 tablet, Rfl: 3  •  coenzyme Q10 100 MG capsule, Take 100 mg by mouth daily., Disp: , Rfl:   •  Crisaborole (EUCRISA) 2 % ointment, Apply 1 application topically 2 (Two) Times a Day., Disp: 60 g, Rfl: 3  •  Empagliflozin-metFORMIN HCl (SYNJARDY PO), Take  by mouth. Dose unknown but patient could not take it anyway. Made him sick, Disp: , Rfl:   •  esomeprazole (nexIUM) 40 MG capsule, Take 1 capsule by mouth Every Morning Before Breakfast., Disp: 90 capsule, Rfl: 3  •  Glucose Blood (BLOOD GLUCOSE TEST) strip, Use 4 x daily use any brand covered by insurance or same brand as before ICD10 code is E11.9, Disp: 120 each, Rfl: 11  •  icosapent ethyl (VASCEPA) 1 g capsule capsule, Take 2 g by mouth 2 (Two) Times a Day With Meals., Disp: 120 capsule, Rfl: 3  •  KRILL OIL OMEGA-3 PO, Take  by mouth daily., Disp: , Rfl:   •  Lancet Devices (LANCING DEVICE) misc, USE AS INDICATED TO CORRELATE WITH STRIPS AND METER, Disp: 1 each, Rfl: 1  •  Lancets 30G misc, USE 4 X DAILY, Disp: 120 each, Rfl: 11  •  lisinopril (PRINIVIL,ZESTRIL)  40 MG tablet, Take 1 tablet by mouth Daily., Disp: 90 tablet, Rfl: 3  •  metoprolol tartrate (LOPRESSOR) 50 MG tablet, Take 1 tablet by mouth Every 12 (Twelve) Hours., Disp: 180 tablet, Rfl: 1  •  pantoprazole (PROTONIX) 40 MG EC tablet, Take 40 mg by mouth Daily., Disp: , Rfl:   •  Red Yeast Rice Extract (RED YEAST RICE PO), Take  by mouth Daily., Disp: , Rfl:   •  Saw Palmetto, Serenoa repens, (SAW PALMETTO PO), Take  by mouth daily., Disp: , Rfl:   •  sucralfate (CARAFATE) 1 g tablet, Take 1 tablet by mouth 2 (Two) Times a Day As Needed (nausea, abd pain)., Disp: 180 tablet, Rfl: 3  •  TRULICITY 1.5 MG/0.5ML solution pen-injector, , Disp: , Rfl: 11  •  vitamin B-12 (CYANOCOBALAMIN) 2500 MCG sublingual tablet tablet, Place  under the tongue Every Other Day., Disp: , Rfl:     Review of Systems    Review of Systems   Constitutional: Positive for fatigue. Negative for activity change, appetite change, chills, diaphoresis, fever and unexpected weight change.   HENT: Negative for congestion, dental problem, drooling, ear discharge, ear pain, facial swelling, mouth sores, postnasal drip, rhinorrhea, sinus pressure, sore throat, tinnitus, trouble swallowing and voice change.    Eyes: Negative for photophobia, pain, discharge, redness, itching and visual disturbance.   Respiratory: Negative for apnea, cough, choking, chest tightness, shortness of breath, wheezing and stridor.    Cardiovascular: Negative for chest pain, palpitations and leg swelling.   Gastrointestinal: Negative for abdominal distention, abdominal pain, constipation, diarrhea, nausea and vomiting.   Endocrine: Negative for cold intolerance, heat intolerance, polydipsia, polyphagia and polyuria.   Genitourinary: Negative for decreased urine volume, difficulty urinating, dysuria, flank pain, frequency, hematuria and urgency.   Musculoskeletal: Negative for arthralgias, back pain, gait problem, joint swelling, myalgias, neck pain and neck stiffness.   Skin:  "Negative for color change, pallor, rash and wound.   Allergic/Immunologic: Negative for immunocompromised state.   Neurological: Negative for dizziness, tremors, seizures, syncope, facial asymmetry, speech difficulty, weakness, light-headedness, numbness and headaches.   Hematological: Negative for adenopathy.   Psychiatric/Behavioral: Negative for agitation, behavioral problems, confusion, decreased concentration, dysphoric mood, hallucinations, self-injury, sleep disturbance and suicidal ideas. The patient is not nervous/anxious and is not hyperactive.         Objective:   /60   Pulse 81   Ht 175.3 cm (69\")   Wt 87 kg (191 lb 14.4 oz)   SpO2 98%   BMI 28.34 kg/m²     Physical Exam   Constitutional: He is oriented to person, place, and time. He appears well-developed and well-nourished. He is cooperative.   HENT:   Head: Normocephalic and atraumatic.   Right Ear: External ear normal.   Left Ear: External ear normal.   Nose: Nose normal.   Mouth/Throat: Oropharynx is clear and moist. No oropharyngeal exudate.   Eyes: Conjunctivae and EOM are normal. Pupils are equal, round, and reactive to light. No scleral icterus. Right eye exhibits normal extraocular motion. Left eye exhibits normal extraocular motion.   Neck: Neck supple. No JVD present. No muscular tenderness present. No tracheal deviation, no edema and no erythema present. No thyromegaly present.   Cardiovascular: Normal rate, regular rhythm, normal heart sounds and intact distal pulses. Exam reveals no gallop and no friction rub.   No murmur heard.  Pulmonary/Chest: Effort normal and breath sounds normal. No stridor. No respiratory distress. He has no decreased breath sounds. He has no wheezes. He has no rhonchi. He has no rales. He exhibits no tenderness.   Abdominal: Soft. Bowel sounds are normal. He exhibits no distension and no mass. There is no hepatomegaly. There is no tenderness. There is no rebound and no guarding. No hernia. "   Musculoskeletal: Normal range of motion. He exhibits no edema, tenderness or deformity.   Lymphadenopathy:     He has no cervical adenopathy.   Neurological: He is alert and oriented to person, place, and time. He has normal reflexes. No cranial nerve deficit. He exhibits normal muscle tone. Coordination normal.   Skin: Skin is warm. No rash noted. No erythema. No pallor.   Psychiatric: He has a normal mood and affect. His behavior is normal. Judgment and thought content normal.   Nursing note and vitals reviewed.      Lab Review    Results for orders placed or performed in visit on 06/21/19   CBC Auto Differential   Result Value Ref Range    WBC 5.16 3.40 - 10.80 10*3/mm3    RBC 4.92 4.14 - 5.80 10*6/mm3    Hemoglobin 13.9 13.0 - 17.7 g/dL    Hematocrit 42.8 37.5 - 51.0 %    MCV 87.0 79.0 - 97.0 fL    MCH 28.3 26.6 - 33.0 pg    MCHC 32.5 31.5 - 35.7 g/dL    RDW 13.3 12.3 - 15.4 %    RDW-SD 42.0 37.0 - 54.0 fl    MPV 9.9 6.0 - 12.0 fL    Platelets 220 140 - 450 10*3/mm3    Neutrophil % 65.7 42.7 - 76.0 %    Lymphocyte % 24.2 19.6 - 45.3 %    Monocyte % 5.8 5.0 - 12.0 %    Eosinophil % 3.1 0.3 - 6.2 %    Basophil % 0.8 0.0 - 1.5 %    Immature Grans % 0.4 0.0 - 0.5 %    Neutrophils, Absolute 3.39 1.70 - 7.00 10*3/mm3    Lymphocytes, Absolute 1.25 0.70 - 3.10 10*3/mm3    Monocytes, Absolute 0.30 0.10 - 0.90 10*3/mm3    Eosinophils, Absolute 0.16 0.00 - 0.40 10*3/mm3    Basophils, Absolute 0.04 0.00 - 0.20 10*3/mm3    Immature Grans, Absolute 0.02 0.00 - 0.05 10*3/mm3    nRBC 0.0 0.0 - 0.2 /100 WBC   Comprehensive Metabolic Panel   Result Value Ref Range    Glucose 199 (H) 65 - 99 mg/dL    BUN 13 8 - 23 mg/dL    Creatinine 0.91 0.76 - 1.27 mg/dL    Sodium 135 (L) 136 - 145 mmol/L    Potassium 4.4 3.5 - 5.2 mmol/L    Chloride 98 98 - 107 mmol/L    CO2 25.0 22.0 - 29.0 mmol/L    Calcium 9.5 8.6 - 10.5 mg/dL    Total Protein 7.3 6.0 - 8.5 g/dL    Albumin 4.40 3.50 - 5.20 g/dL    ALT (SGPT) 29 1 - 41 U/L    AST (SGOT) 28  1 - 40 U/L    Alkaline Phosphatase 37 (L) 39 - 117 U/L    Total Bilirubin 0.6 0.2 - 1.2 mg/dL    eGFR Non African Amer 81 >60 mL/min/1.73    Globulin 2.9 gm/dL    A/G Ratio 1.5 g/dL    BUN/Creatinine Ratio 14.3 7.0 - 25.0    Anion Gap 12.0 mmol/L   Hemoglobin A1c   Result Value Ref Range    Hemoglobin A1C 8.34 (H) 4.80 - 5.60 %   Lipid Panel   Result Value Ref Range    Total Cholesterol 153 0 - 200 mg/dL    Triglycerides 242 (H) 0 - 150 mg/dL    HDL Cholesterol 29 (L) 40 - 60 mg/dL    LDL Cholesterol  76 0 - 100 mg/dL    VLDL Cholesterol 48.4 (H) 5 - 40 mg/dL    LDL/HDL Ratio 2.61    TSH   Result Value Ref Range    TSH 2.610 0.270 - 4.200 mIU/mL   T4, Free   Result Value Ref Range    Free T4 0.98 0.93 - 1.70 ng/dL   T3, Free   Result Value Ref Range    T3, Free 3.50 2.00 - 4.40 pg/mL   Vitamin D 25 Hydroxy   Result Value Ref Range    25 Hydroxy, Vitamin D 41.2 30.0 - 100.0 ng/ml   Vitamin B12   Result Value Ref Range    Vitamin B-12 1,266 (H) 211 - 946 pg/mL         Assessment/Plan       ICD-10-CM ICD-9-CM   1. Type 2 diabetes mellitus with hyperglycemia, without long-term current use of insulin (CMS/[facility]) E11.65 250.00     790.29   2. Coronary artery disease involving native heart without angina pectoris, unspecified vessel or lesion type I25.10 414.01         I reviewed and summarized records from Andreas Martinez MD from 2019 and I reviewed / ordered labs.   From review of records :    Pt has type 2 Diabetes with CAD     Glycemic Management:   Lab Results   Component Value Date    HGBA1C 8.34 (H) 06/21/2019    HGBA1C 7.9 (H) 02/19/2019    HGBA1C 7.0 (H) 10/04/2018     Lab Results   Component Value Date    GLUCOSE 199 (H) 06/21/2019    BUN 13 06/21/2019    CREATININE 0.91 06/21/2019    EGFRIFNONA 81 06/21/2019    BCR 14.3 06/21/2019    K 4.4 06/21/2019    CO2 25.0 06/21/2019    CALCIUM 9.5 06/21/2019    ALBUMIN 4.40 06/21/2019    AST 28 06/21/2019    ALT 29 06/21/2019    ANIONGAP 12.0 06/21/2019     Lab Results    Component Value Date    WBC 5.16 06/21/2019    HGB 13.9 06/21/2019    HCT 42.8 06/21/2019    MCV 87.0 06/21/2019     06/21/2019          Synjardy XR 10/1000 mg w bkfast - couldn't tolerate -- go back to metformin alone - afraid of side effect    Trulicity 0.75 mg weekly - increase to 1.5 mg weekly     Add lantus 10 units qhs         Lipid Management  Lab Results   Component Value Date    CHOL 153 06/21/2019    CHOL 168 02/19/2019    CHOL 162 10/04/2018     Lab Results   Component Value Date    TRIG 242 (H) 06/21/2019    TRIG 310 (H) 02/19/2019    TRIG 426 (H) 10/04/2018     Lab Results   Component Value Date    HDL 29 (L) 06/21/2019    HDL 31 (L) 02/19/2019    HDL 31 (L) 10/04/2018     No components found for: LDLCALC  Lab Results   Component Value Date    LDL 76 06/21/2019    LDL 98 02/19/2019    LDL 83 10/04/2018       On atorvastatin       Blood Pressure Management:    Vitals:    07/08/19 1330   BP: 100/60   Pulse: 81   SpO2: 98%     Lab Results   Component Value Date    GLUCOSE 199 (H) 06/21/2019    CALCIUM 9.5 06/21/2019     (L) 06/21/2019    K 4.4 06/21/2019    CO2 25.0 06/21/2019    CL 98 06/21/2019    BUN 13 06/21/2019    CREATININE 0.91 06/21/2019    EGFRIFNONA 81 06/21/2019    BCR 14.3 06/21/2019    ANIONGAP 12.0 06/21/2019     Controlled on ACE I regimen           Microvascular Complication Monitoring:      Eye Exam Evaluation  Within 1 year     -----------    Last Microalbumin-Proteinuria Assessment    Lab Results   Component Value Date    MALBCRERATIO 4.1 10/04/2018    MALBCRERATIO  04/10/2018      Comment:      Unable to calculate    MALBCRERATIO 3 12/21/2016       No results found for: UTPCR    -----------      Neuropathy        Weight Related:   Wt Readings from Last 3 Encounters:   07/08/19 87 kg (191 lb 14.4 oz)   07/01/19 87.3 kg (192 lb 6.4 oz)   05/13/19 86.9 kg (191 lb 9.6 oz)     Body mass index is 28.34 kg/m².        Diet interventions: moderate (500 kCal/d) deficit  diet.      Bone Health    Lab Results   Component Value Date    CALCIUM 9.5 06/21/2019    [id number] 41.2 06/21/2019       Thyroid Health    Lab Results   Component Value Date    TSH 2.610 06/21/2019    TSH 1.730 10/04/2018    TSH 2.41 11/26/2015         Other Diabetes Related Aspects       Lab Results   Component Value Date    [id number] 1,266 (H) 06/21/2019           No orders of the defined types were placed in this encounter.        A copy of my note was sent to Andreas Martinez MD    Please see my above opinion and suggestions.

## 2019-07-29 RX ORDER — ALPRAZOLAM 0.5 MG/1
TABLET ORAL
Qty: 30 TABLET | Refills: 0 | OUTPATIENT
Start: 2019-07-29

## 2019-07-30 ENCOUNTER — OFFICE VISIT (OUTPATIENT)
Dept: GASTROENTEROLOGY | Facility: CLINIC | Age: 74
End: 2019-07-30

## 2019-07-30 VITALS
DIASTOLIC BLOOD PRESSURE: 60 MMHG | SYSTOLIC BLOOD PRESSURE: 108 MMHG | WEIGHT: 189 LBS | BODY MASS INDEX: 27.99 KG/M2 | HEIGHT: 69 IN | HEART RATE: 67 BPM

## 2019-07-30 DIAGNOSIS — K21.00 GASTROESOPHAGEAL REFLUX DISEASE WITH ESOPHAGITIS: Primary | ICD-10-CM

## 2019-07-30 DIAGNOSIS — R10.13 EPIGASTRIC PAIN: ICD-10-CM

## 2019-07-30 DIAGNOSIS — K57.30 DIVERTICULOSIS OF LARGE INTESTINE WITHOUT HEMORRHAGE: ICD-10-CM

## 2019-07-30 DIAGNOSIS — R12 HEARTBURN: ICD-10-CM

## 2019-07-30 PROCEDURE — 99213 OFFICE O/P EST LOW 20 MIN: CPT | Performed by: PHYSICIAN ASSISTANT

## 2019-07-30 RX ORDER — ESOMEPRAZOLE MAGNESIUM 40 MG/1
40 CAPSULE, DELAYED RELEASE ORAL
Qty: 90 CAPSULE | Refills: 3 | Status: SHIPPED | OUTPATIENT
Start: 2019-07-30 | End: 2019-10-16 | Stop reason: SDUPTHER

## 2019-07-30 NOTE — PATIENT INSTRUCTIONS

## 2019-07-30 NOTE — PROGRESS NOTES
Chief Complaint   Patient presents with   • Heartburn   • Abdominal Pain   • Diverticulosis   • Hx Of Colon Polyps       ENDO PROCEDURE ORDERED:    Subjective    Aj Card Jr. is a 73 y.o. male. he is here today for follow-up.    History of Present Illness    Patient seen on a recheck of his GERD, epigastric pain, diverticular disease, history of colon polyps. Last seen 01/15/2019. Patient has been doing well. They are using MiraLAX as needed for chronic constipation. He denied blood or mucus in his stool. Weight is down 8 pounds since last visit. Last colonoscopy 12/11/2017 showed diverticulosis. He has a history of colon polyps. GERD is doing fairly well on the Nexium, Protonix, Carafate. He denied nausea, vomiting, dysphagia.     Laboratory on 06/21/2019 B12 was high at 1266, normal vitamin D, TSH, T3, T4, CBC. Cholesterol panel showed triglycerides 242. A1C 8.34%. CMP showed a glucose 199, sodium 135, alkaline phosphatase 37, otherwise normal.     A/P: Patient with chronic GERD, he appears stable on current regimen. I discussed trying to reduce some of his medications. He was encouraged to avoid gastric irritants. Encouraged to continue dietary modification and weight loss. I refilled his Nexium with a 90-day supply. He may continue the MiraLAX as needed. Otherwise his laboratories were fairly normal. Will plan follow-up in 6 months, sooner if needed, further pending clinical course and the results of the above.        The following portions of the patient's history were reviewed and updated as appropriate:   Past Medical History:   Diagnosis Date   • Acute posthemorrhagic anemia    • Allergic rhinitis    • Anxiety state    • Chest pain    • Coronary arteriosclerosis    • Diabetes mellitus (CMS/HCC)     type 2   • Diverticular disease of colon    • Dysphagia    • Epigastric pain    • GERD (gastroesophageal reflux disease)     esophagitis on Dexilant. Pt feels Nexium working better      • History of  echocardiogram     Small left atrial enlargement with mild CLVH.Ef of 55-60%.Mitral valve mildly thickened.Av thickened.Left ventricle mildly dilated.Tricuspid intact.Mild aortic insufficiency. 12/07/2015       • History of echocardiogram     Mild diastolic dysfunction and mild left atrial enlargement, otherwise normal echo. EF> 55% 01/03/2012       • Hypercholesterolemia    • Hypertension    • Insomnia    • Irritable bowel syndrome    • Osteoarthritis of multiple joints      Past Surgical History:   Procedure Laterality Date   • APPENDECTOMY       [facility] 1995       • CARDIAC CATHETERIZATION      Successful PTCA of the OMB#2.Unsuccessful PTCA of the 1st OMB, secondary to difficulty in advancing the balloon. 12/08/2015       • CHOLECYSTECTOMY      [facility] [place] 1993       • COLONOSCOPY  10/01/2012   • COLONOSCOPY N/A 12/11/2017    Procedure: COLONOSCOPY;  Surgeon: Bladimir Michael MD;  Location: [facility] ENDOSCOPY;  Service:    • CORONARY ANGIOPLASTY      Successful PTCA & stenting LAD was done w/ deployment of a 2.5mm x23 Xience stent w/ reduction of stenosis from 90% to less than 0% stenosis with good LILLIAM 3 flow 02/17/2012       • ENDOSCOPY      with biopsy.  Mildly severe esophagitis. Gastritis. Normal examined duodenum.Several biopsies obtained in the lower third of the esophagus.Several biopsies obtained in the gastric antrum. 05/06/2016       • ENDOSCOPY      w tube. Normal esophagus. Gastritis in stomach. Biopsy taken. Gastric polyp found. Biopsy taken. Normal duodenum. 10/01/2012       • ENDOSCOPY AND COLONOSCOPY      Diverticulum in sigmoid colon & descending colon. Internal & external hemorrhoids found. 10/01/2012       • HAND SURGERY       Corrective osteotomy of ring finger metacarpal. Malunited fracture of left ring finger 05/21/1985       • HERNIA REPAIR      Laparoscopic hernia repair. prepertitoneal bilateral inguinal hernia repair with mesh. 10/15/2009      • OTHER SURGICAL  HISTORY      CORONARY ARTERY STENT    • SKIN TAG REMOVAL      Excision, viral skin tag,right upper eyelid 05/08/1995      Family History   Problem Relation Age of Onset   • Schizophrenia Sister    • Obesity Sister    • Tuberculosis Sister    • Arthritis Mother    • Diabetes Mother    • Heart disease Mother    • Hypertension Mother    • Obesity Mother    • Stroke Mother    • Thyroid disease Mother    • Tuberculosis Mother    • Arthritis Father    • Tuberculosis Father        Allergies   Allergen Reactions   • Brilinta [Ticagrelor] Shortness Of Breath   • Effient [Prasugrel] Shortness Of Breath   • Warfarin And Related Shortness Of Breath   • Ciprofloxacin Hives   • Lipitor [Atorvastatin] Swelling   • Adhesive Tape Rash   • Cardizem [Diltiazem Hcl] Rash   • Celexa [Citalopram Hydrobromide] Rash   • Crestor [Rosuvastatin Calcium] Rash   • Floxin [Ofloxacin] Rash   • Januvia [Sitagliptin] Rash   • Penicillins Rash   • Sulfa Antibiotics Rash     Social History     Socioeconomic History   • Marital status:      Spouse name: Not on file   • Number of children: Not on file   • Years of education: Not on file   • Highest education level: Not on file   Tobacco Use   • Smoking status: Never Smoker   • Smokeless tobacco: Never Used   Substance and Sexual Activity   • Alcohol use: No   • Drug use: No   • Sexual activity: Defer     Current Medications:  Prior to Admission medications    Medication Sig Start Date End Date Taking? Authorizing Provider   ALPRAZolam (XANAX) 0.5 MG tablet Take 1 tablet by mouth 2 (Two) Times a Day As Needed for Anxiety. 4/1/19  Yes Andreas Martinez MD   amLODIPine (NORVASC) 10 MG tablet Take 1 tablet by mouth Daily. 4/1/19  Yes Andreas Martinez MD   aspirin 81 MG EC tablet Take 2 tablets by mouth Daily. 4/1/19  Yes Andreas Martinez MD   azelastine (ASTELIN) 0.1 % nasal spray 2 sprays into the nostril(s) as directed by provider 2 (Two) Times a Day. Use in each nostril as directed 5/1/19  Yes  Andreas Martinez MD   Blood Glucose Monitoring Suppl w/Device kit USE AS INDICATED, ANY MONITOR , ICD10 code is E11.9 2/22/19  Yes Glen Jain MD   cholecalciferol (VITAMIN D3) 1000 units tablet Take 1 tablet by mouth Daily. 4/1/19  Yes Andreas Martinez MD   clopidogrel (PLAVIX) 75 MG tablet 1 PO QOD 4/1/19  Yes Andreas Martinez MD   coenzyme Q10 100 MG capsule Take 100 mg by mouth daily.   Yes Yazan Yancey MD   Crisaborole (EUCRISA) 2 % ointment Apply 1 application topically 2 (Two) Times a Day. 11/13/18  Yes Andreas Martinez MD   esomeprazole (nexIUM) 40 MG capsule Take 1 capsule by mouth Every Morning Before Breakfast. 4/1/19  Yes Andreas Martinez MD   Glucose Blood (BLOOD GLUCOSE TEST) strip Use 4 x daily use any brand covered by insurance or same brand as before ICD10 code is E11.9 2/22/19  Yes Glen Jain MD   icosapent ethyl (VASCEPA) 1 g capsule capsule Take 2 g by mouth 2 (Two) Times a Day With Meals. 7/1/19  Yes Andreas Martinez MD   KRILL OIL OMEGA-3 PO Take  by mouth daily.   Yes Yazan Yancey MD   Lancet Devices (LANCING DEVICE) misc USE AS INDICATED TO CORRELATE WITH STRIPS AND METER 2/22/19  Yes Glen Jain MD   Lancets 30G misc USE 4 X DAILY 2/22/19  Yes Glen Jain MD   lisinopril (PRINIVIL,ZESTRIL) 40 MG tablet Take 1 tablet by mouth Daily. 4/1/19  Yes Andreas Martinez MD   metoprolol tartrate (LOPRESSOR) 50 MG tablet Take 1 tablet by mouth Every 12 (Twelve) Hours. 4/1/19  Yes Andreas Martinez MD   pantoprazole (PROTONIX) 40 MG EC tablet Take 40 mg by mouth Daily.   Yes Yazan Yancey MD   Red Yeast Rice Extract (RED YEAST RICE PO) Take  by mouth Daily.   Yes Provider, Historical, MD   Saw Palmetto, Serenoa repens, (SAW PALMETTO PO) Take  by mouth daily.   Yes Yazan Yancey MD   sucralfate (CARAFATE) 1 g tablet Take 1 tablet by mouth 2 (Two) Times a Day As Needed (nausea, abd pain). 4/1/19  Yes Andreas Martinez,  "MD   TRULICITY 1.5 MG/0.5ML solution pen-injector  4/23/19  Yes Provider, MD Yazan   vitamin B-12 (CYANOCOBALAMIN) 2500 MCG sublingual tablet tablet Place  under the tongue Every Other Day.   Yes Provider, Yazan, MD     Review of Systems  Review of Systems       Objective    /60 (BP Location: Left arm)   Pulse 67   Ht 175.3 cm (69\")   Wt 85.7 kg (189 lb)   BMI 27.91 kg/m²   Physical Exam   Constitutional: He is oriented to person, place, and time. He appears well-developed and well-nourished. No distress.   HENT:   Head: Normocephalic and atraumatic.   Eyes: EOM are normal. Pupils are equal, round, and reactive to light.   Neck: Normal range of motion.   Cardiovascular: Normal rate, regular rhythm and normal heart sounds.   Pulmonary/Chest: Effort normal and breath sounds normal.   Abdominal: Soft. Bowel sounds are normal. He exhibits no shifting dullness, no distension, no abdominal bruit, no ascites and no mass. There is no hepatosplenomegaly. There is tenderness. There is no rigidity, no rebound, no guarding and no CVA tenderness. No hernia. Hernia confirmed negative in the ventral area.   Obese, mild upper   Musculoskeletal: Normal range of motion.   Neurological: He is alert and oriented to person, place, and time.   Skin: Skin is warm and dry.   Psychiatric: He has a normal mood and affect. His behavior is normal. Judgment and thought content normal.   Nursing note and vitals reviewed.    Assessment/Plan      1. Gastroesophageal reflux disease with esophagitis    2. Epigastric pain    3. Diverticulosis of large intestine without hemorrhage    4. Heartburn    .   Aj was seen today for heartburn, abdominal pain, diverticulosis and hx of colon polyps.    Diagnoses and all orders for this visit:    Gastroesophageal reflux disease with esophagitis  -     esomeprazole (nexIUM) 40 MG capsule; Take 1 capsule by mouth Every Morning Before Breakfast.    Epigastric pain  -     esomeprazole " (nexIUM) 40 MG capsule; Take 1 capsule by mouth Every Morning Before Breakfast.    Diverticulosis of large intestine without hemorrhage    Heartburn  -     esomeprazole (nexIUM) 40 MG capsule; Take 1 capsule by mouth Every Morning Before Breakfast.        Orders placed during this encounter include:  No orders of the defined types were placed in this encounter.      Medications prescribed:  New Medications Ordered This Visit   Medications   • esomeprazole (nexIUM) 40 MG capsule     Sig: Take 1 capsule by mouth Every Morning Before Breakfast.     Dispense:  90 capsule     Refill:  3       Requested Prescriptions     Signed Prescriptions Disp Refills   • esomeprazole (nexIUM) 40 MG capsule 90 capsule 3     Sig: Take 1 capsule by mouth Every Morning Before Breakfast.       Review and/or summary of lab tests, radiology, procedures, medications. Review and summary of old records and obtaining of history. The risks and benefits of my recommendations, as well as other treatment options were discussed with the patient today. Questions were answered.    Follow-up: Return in about 6 months (around 1/30/2020), or if symptoms worsen or fail to improve.     * Surgery not found *      This document has been electronically signed by Andreas Aldana PA-C on July 31, 2019 5:22 PM      Results for orders placed or performed in visit on 06/21/19   CBC Auto Differential   Result Value Ref Range    WBC 5.16 3.40 - 10.80 10*3/mm3    RBC 4.92 4.14 - 5.80 10*6/mm3    Hemoglobin 13.9 13.0 - 17.7 g/dL    Hematocrit 42.8 37.5 - 51.0 %    MCV 87.0 79.0 - 97.0 fL    MCH 28.3 26.6 - 33.0 pg    MCHC 32.5 31.5 - 35.7 g/dL    RDW 13.3 12.3 - 15.4 %    RDW-SD 42.0 37.0 - 54.0 fl    MPV 9.9 6.0 - 12.0 fL    Platelets 220 140 - 450 10*3/mm3    Neutrophil % 65.7 42.7 - 76.0 %    Lymphocyte % 24.2 19.6 - 45.3 %    Monocyte % 5.8 5.0 - 12.0 %    Eosinophil % 3.1 0.3 - 6.2 %    Basophil % 0.8 0.0 - 1.5 %    Immature Grans % 0.4 0.0 - 0.5 %     Neutrophils, Absolute 3.39 1.70 - 7.00 10*3/mm3    Lymphocytes, Absolute 1.25 0.70 - 3.10 10*3/mm3    Monocytes, Absolute 0.30 0.10 - 0.90 10*3/mm3    Eosinophils, Absolute 0.16 0.00 - 0.40 10*3/mm3    Basophils, Absolute 0.04 0.00 - 0.20 10*3/mm3    Immature Grans, Absolute 0.02 0.00 - 0.05 10*3/mm3    nRBC 0.0 0.0 - 0.2 /100 WBC   Vitamin D 25 Hydroxy   Result Value Ref Range    25 Hydroxy, Vitamin D 41.2 30.0 - 100.0 ng/ml   T3, Free   Result Value Ref Range    T3, Free 3.50 2.00 - 4.40 pg/mL   TSH   Result Value Ref Range    TSH 2.610 0.270 - 4.200 mIU/mL   T4, Free   Result Value Ref Range    Free T4 0.98 0.93 - 1.70 ng/dL   Hemoglobin A1c   Result Value Ref Range    Hemoglobin A1C 8.34 (H) 4.80 - 5.60 %   Vitamin B12   Result Value Ref Range    Vitamin B-12 1,266 (H) 211 - 946 pg/mL   Lipid Panel   Result Value Ref Range    Total Cholesterol 153 0 - 200 mg/dL    Triglycerides 242 (H) 0 - 150 mg/dL    HDL Cholesterol 29 (L) 40 - 60 mg/dL    LDL Cholesterol  76 0 - 100 mg/dL    VLDL Cholesterol 48.4 (H) 5 - 40 mg/dL    LDL/HDL Ratio 2.61    Comprehensive Metabolic Panel   Result Value Ref Range    Glucose 199 (H) 65 - 99 mg/dL    BUN 13 8 - 23 mg/dL    Creatinine 0.91 0.76 - 1.27 mg/dL    Sodium 135 (L) 136 - 145 mmol/L    Potassium 4.4 3.5 - 5.2 mmol/L    Chloride 98 98 - 107 mmol/L    CO2 25.0 22.0 - 29.0 mmol/L    Calcium 9.5 8.6 - 10.5 mg/dL    Total Protein 7.3 6.0 - 8.5 g/dL    Albumin 4.40 3.50 - 5.20 g/dL    ALT (SGPT) 29 1 - 41 U/L    AST (SGOT) 28 1 - 40 U/L    Alkaline Phosphatase 37 (L) 39 - 117 U/L    Total Bilirubin 0.6 0.2 - 1.2 mg/dL    eGFR Non African Amer 81 >60 mL/min/1.73    Globulin 2.9 gm/dL    A/G Ratio 1.5 g/dL    BUN/Creatinine Ratio 14.3 7.0 - 25.0    Anion Gap 12.0 mmol/L   Results for orders placed or performed in visit on 02/19/19   CBC Auto Differential   Result Value Ref Range    WBC 5.15 3.40 - 10.80 10*3/mm3    RBC 5.04 4.14 - 5.80 10*6/mm3    Hemoglobin 14.1 13.0 - 17.7 g/dL     Hematocrit 41.9 37.5 - 51.0 %    MCV 83.1 79.0 - 97.0 fL    MCH 28.0 26.6 - 33.0 pg    MCHC 33.7 31.5 - 35.7 g/dL    RDW 13.4 12.3 - 15.4 %    RDW-SD 40.2 37.0 - 54.0 fl    MPV 9.2 6.0 - 12.0 fL    Platelets 212 140 - 450 10*3/mm3    Neutrophil % 57.2 42.7 - 76.0 %    Lymphocyte % 30.7 19.6 - 45.3 %    Monocyte % 7.2 5.0 - 12.0 %    Eosinophil % 3.7 0.3 - 6.2 %    Basophil % 0.8 0.0 - 1.5 %    Immature Grans % 0.4 0.0 - 0.5 %    Neutrophils, Absolute 2.95 1.40 - 7.00 10*3/mm3    Lymphocytes, Absolute 1.58 0.70 - 3.10 10*3/mm3    Monocytes, Absolute 0.37 0.10 - 0.90 10*3/mm3    Eosinophils, Absolute 0.19 0.00 - 0.40 10*3/mm3    Basophils, Absolute 0.04 0.00 - 0.20 10*3/mm3    Immature Grans, Absolute 0.02 0.00 - 0.05 10*3/mm3    nRBC 0.0 0.0 - 0.0 /100 WBC   Hemoglobin A1c   Result Value Ref Range    Hemoglobin A1C 7.9 (H) 4 - 5.6 %   Vitamin B12   Result Value Ref Range    Vitamin B-12 >1,000 (H) 239 - 931 pg/mL   Lipid Panel   Result Value Ref Range    Total Cholesterol 168 0 - 199 mg/dL    Triglycerides 310 (H) 20 - 199 mg/dL    HDL Cholesterol 31 (L) 60 - 200 mg/dL    LDL Cholesterol  98 1 - 129 mg/dL    LDL/HDL Ratio 2.42 0.00 - 3.55   Comprehensive Metabolic Panel   Result Value Ref Range    Glucose 180 (H) 60 - 100 mg/dL    BUN 13 7 - 21 mg/dL    Creatinine 0.78 0.70 - 1.30 mg/dL    Sodium 136 (L) 137 - 145 mmol/L    Potassium 4.5 3.5 - 5.1 mmol/L    Chloride 97 95 - 110 mmol/L    CO2 29.0 22.0 - 31.0 mmol/L    Calcium 10.1 8.4 - 10.2 mg/dL    Total Protein 7.8 6.3 - 8.6 g/dL    Albumin 4.70 3.40 - 4.80 g/dL    ALT (SGPT) 68 21 - 72 U/L    AST (SGOT) 57 17 - 59 U/L    Alkaline Phosphatase 43 38 - 126 U/L    Total Bilirubin 0.5 0.2 - 1.3 mg/dL    eGFR Non African Amer 97 42 - 98 mL/min/1.73    Globulin 3.1 2.3 - 3.5 gm/dL    A/G Ratio 1.5 1.1 - 1.8 g/dL    BUN/Creatinine Ratio 16.7 7.0 - 25.0    Anion Gap 10.0 5.0 - 15.0 mmol/L     *Note: Due to a large number of results and/or encounters for the requested time  period, some results have not been displayed. A complete set of results can be found in Results Review.       Some portions of this note have been dictated using voice recognition software and may contain errors and/or omissions.

## 2019-08-01 ENCOUNTER — OFFICE VISIT (OUTPATIENT)
Dept: FAMILY MEDICINE CLINIC | Facility: CLINIC | Age: 74
End: 2019-08-01

## 2019-08-01 VITALS
DIASTOLIC BLOOD PRESSURE: 70 MMHG | SYSTOLIC BLOOD PRESSURE: 120 MMHG | TEMPERATURE: 97.9 F | BODY MASS INDEX: 28.26 KG/M2 | HEART RATE: 69 BPM | HEIGHT: 69 IN | OXYGEN SATURATION: 98 % | WEIGHT: 190.8 LBS

## 2019-08-01 DIAGNOSIS — K21.00 GASTROESOPHAGEAL REFLUX DISEASE WITH ESOPHAGITIS: ICD-10-CM

## 2019-08-01 DIAGNOSIS — E55.9 VITAMIN D DEFICIENCY: ICD-10-CM

## 2019-08-01 DIAGNOSIS — I25.119 CORONARY ARTERY DISEASE WITH ANGINA PECTORIS, UNSPECIFIED VESSEL OR LESION TYPE, UNSPECIFIED WHETHER NATIVE OR TRANSPLANTED HEART (HCC): ICD-10-CM

## 2019-08-01 DIAGNOSIS — F41.1 GENERALIZED ANXIETY DISORDER: ICD-10-CM

## 2019-08-01 DIAGNOSIS — E11.9 TYPE 2 DIABETES MELLITUS WITHOUT COMPLICATION, WITHOUT LONG-TERM CURRENT USE OF INSULIN (HCC): ICD-10-CM

## 2019-08-01 DIAGNOSIS — E78.2 MIXED HYPERLIPIDEMIA: ICD-10-CM

## 2019-08-01 DIAGNOSIS — E66.3 OVERWEIGHT: Primary | ICD-10-CM

## 2019-08-01 DIAGNOSIS — I10 ESSENTIAL HYPERTENSION: ICD-10-CM

## 2019-08-01 PROCEDURE — 99214 OFFICE O/P EST MOD 30 MIN: CPT | Performed by: FAMILY MEDICINE

## 2019-08-01 RX ORDER — METFORMIN HYDROCHLORIDE 500 MG/1
500 TABLET, EXTENDED RELEASE ORAL
Qty: 90 TABLET | Refills: 3 | Status: SHIPPED | OUTPATIENT
Start: 2019-08-01 | End: 2020-02-20 | Stop reason: SDUPTHER

## 2019-08-01 RX ORDER — ALPRAZOLAM 0.5 MG/1
0.5 TABLET ORAL NIGHTLY PRN
Qty: 30 TABLET | Refills: 0 | Status: SHIPPED | OUTPATIENT
Start: 2019-08-01 | End: 2020-02-20 | Stop reason: SDUPTHER

## 2019-09-04 ENCOUNTER — OFFICE VISIT (OUTPATIENT)
Dept: FAMILY MEDICINE CLINIC | Facility: CLINIC | Age: 74
End: 2019-09-04

## 2019-09-04 VITALS
HEIGHT: 69 IN | HEART RATE: 75 BPM | WEIGHT: 191.2 LBS | OXYGEN SATURATION: 97 % | BODY MASS INDEX: 28.32 KG/M2 | DIASTOLIC BLOOD PRESSURE: 70 MMHG | SYSTOLIC BLOOD PRESSURE: 118 MMHG | TEMPERATURE: 98.6 F

## 2019-09-04 DIAGNOSIS — I10 ESSENTIAL HYPERTENSION: ICD-10-CM

## 2019-09-04 DIAGNOSIS — Z86.010 HISTORY OF COLON POLYPS: ICD-10-CM

## 2019-09-04 DIAGNOSIS — E11.9 TYPE 2 DIABETES MELLITUS WITHOUT COMPLICATION, WITHOUT LONG-TERM CURRENT USE OF INSULIN (HCC): ICD-10-CM

## 2019-09-04 DIAGNOSIS — E78.2 MIXED HYPERLIPIDEMIA: ICD-10-CM

## 2019-09-04 DIAGNOSIS — R79.89 HIGH SERUM VITAMIN B12: ICD-10-CM

## 2019-09-04 DIAGNOSIS — I25.119 CORONARY ARTERY DISEASE WITH ANGINA PECTORIS, UNSPECIFIED VESSEL OR LESION TYPE, UNSPECIFIED WHETHER NATIVE OR TRANSPLANTED HEART (HCC): ICD-10-CM

## 2019-09-04 DIAGNOSIS — E66.3 OVERWEIGHT: Primary | ICD-10-CM

## 2019-09-04 DIAGNOSIS — F41.1 GENERALIZED ANXIETY DISORDER: ICD-10-CM

## 2019-09-04 DIAGNOSIS — E55.9 VITAMIN D DEFICIENCY: ICD-10-CM

## 2019-09-04 PROCEDURE — 99214 OFFICE O/P EST MOD 30 MIN: CPT | Performed by: FAMILY MEDICINE

## 2019-09-04 RX ORDER — LOTEPREDNOL ETABONATE 5 MG/ML
1 SUSPENSION/ DROPS OPHTHALMIC DAILY
COMMUNITY
Start: 2019-09-03 | End: 2023-03-03

## 2019-10-09 NOTE — PROGRESS NOTES
Subjective:  Aj Card Jr. is a 74 y.o. male who presents for       Patient Active Problem List   Diagnosis   • Type 2 diabetes mellitus without complication, without long-term current use of insulin (CMS/[facility])   • Essential hypertension   • Mixed hyperlipidemia   • Generalized anxiety disorder   • History of colon polyps   • Diverticulosis of large intestine without hemorrhage   • Diarrhea   • P   • Coronary artery disease with angina pectoris (CMS/[facility])   • Gastroesophageal reflux disease with esophagitis   • Vitamin D deficiency   • Overweight   • General medical examination   • Encounter for screening for endocrine disorder   • Need for influenza vaccination   • Atopic dermatitis   • Encounter for eye exam   • High serum vitamin B12           Current Outpatient Medications:   •  ALPRAZolam (XANAX) 0.5 MG tablet, Take 1 tablet by mouth At Night As Needed for Anxiety., Disp: 30 tablet, Rfl: 0  •  amLODIPine (NORVASC) 10 MG tablet, Take 1 tablet by mouth Daily., Disp: 90 tablet, Rfl: 3  •  aspirin 81 MG EC tablet, Take 2 tablets by mouth Daily., Disp: 90 tablet, Rfl: 3  •  azelastine (ASTELIN) 0.1 % nasal spray, 2 sprays into the nostril(s) as directed by provider 2 (Two) Times a Day. Use in each nostril as directed, Disp: 90 mL, Rfl: 3  •  Blood Glucose Monitoring Suppl w/Device kit, USE AS INDICATED, ANY MONITOR , ICD10 code is E11.9, Disp: 1 each, Rfl: 1  •  cholecalciferol (VITAMIN D3) 1000 units tablet, Take 1 tablet by mouth Daily., Disp: 30 tablet, Rfl: 3  •  clopidogrel (PLAVIX) 75 MG tablet, 1 PO QOD, Disp: 90 tablet, Rfl: 3  •  coenzyme Q10 100 MG capsule, Take 100 mg by mouth daily., Disp: , Rfl:   •  Crisaborole (EUCRISA) 2 % ointment, Apply 1 application topically 2 (Two) Times a Day., Disp: 60 g, Rfl: 3  •  esomeprazole (nexIUM) 40 MG capsule, Take 1 capsule by mouth Every Morning Before Breakfast., Disp: 90 capsule, Rfl: 3  •  Glucose Blood (BLOOD GLUCOSE TEST) strip, Use 4 x daily use  any brand covered by insurance or same brand as before ICD10 code is E11.9, Disp: 120 each, Rfl: 11  •  icosapent ethyl (VASCEPA) 1 g capsule capsule, Take 2 g by mouth 2 (Two) Times a Day With Meals., Disp: 120 capsule, Rfl: 3  •  KRILL OIL OMEGA-3 PO, Take  by mouth daily., Disp: , Rfl:   •  Lancet Devices (LANCING DEVICE) misc, USE AS INDICATED TO CORRELATE WITH STRIPS AND METER, Disp: 1 each, Rfl: 1  •  Lancets 30G misc, USE 4 X DAILY, Disp: 120 each, Rfl: 11  •  lisinopril (PRINIVIL,ZESTRIL) 40 MG tablet, Take 1 tablet by mouth Daily., Disp: 90 tablet, Rfl: 3  •  LOTEMAX 0.5 % ophthalmic suspension, , Disp: , Rfl:   •  metFORMIN ER (GLUCOPHAGE-XR) 500 MG 24 hr tablet, Take 1 tablet by mouth Daily With Breakfast., Disp: 90 tablet, Rfl: 3  •  metoprolol tartrate (LOPRESSOR) 50 MG tablet, Take 1 tablet by mouth Every 12 (Twelve) Hours., Disp: 180 tablet, Rfl: 1  •  Red Yeast Rice Extract (RED YEAST RICE PO), Take  by mouth Daily., Disp: , Rfl:   •  Saw Palmetto, Serenoa repens, (SAW PALMETTO PO), Take  by mouth daily., Disp: , Rfl:   •  sucralfate (CARAFATE) 1 g tablet, Take 1 tablet by mouth 2 (Two) Times a Day As Needed (nausea, abd pain)., Disp: 180 tablet, Rfl: 3  •  TRULICITY 1.5 MG/0.5ML solution pen-injector, , Disp: , Rfl: 11  •  vitamin B-12 (CYANOCOBALAMIN) 2500 MCG sublingual tablet tablet, Place  under the tongue Every Other Day., Disp: , Rfl:     HPI     Pt is 72 yo male with history of GERD DM type 2, Vitamin B12 deficiency, HLP, HTN, ADRIENNE, CAD sp stent,  Esophagitis, Gastritis, sp appendectomy sp cholecystectomy sp hernia repair surgery, sp hand surgery/ cataract surgery bilateral      9/4/19 pt is here for recheck and followup.  He is doing well. He had cararact surgery about 2 weeks ago. He is on lotemax eye drops.    He has been having issues with vision and burning on right eye.  Has appt with Dr. Mays next Monday.       10/16/19 pt is here for recheck and followup. No chest pain. He has been  helping his wife who recently fell.   He is doing well on anxiety.  He continues Vascepa. Anxiety is stable      Hypertension   This is a chronic problem. The current episode started more than 1 year ago. The problem is controlled. Pertinent negatives include no anxiety, blurred vision, chest pain, headaches, malaise/fatigue, neck pain, palpitations, peripheral edema, PND, shortness of breath or sweats. Risk factors for coronary artery disease include diabetes mellitus, dyslipidemia, sedentary lifestyle and male gender. There are no compliance problems.  Hypertensive end-organ damage includes CAD/MI. There is no history of angina, kidney disease, CVA, heart failure, left ventricular hypertrophy, PVD or retinopathy. There is no history of chronic renal disease, coarctation of the aorta, hyperaldosteronism, hypercortisolism, hyperparathyroidism, a hypertension causing med, pheochromocytoma, renovascular disease, sleep apnea or a thyroid problem.   Diabetes   He presents for his follow-up diabetic visit. He has type 2 diabetes mellitus. His disease course has been stable. Hypoglycemia symptoms include nervousness/anxiousness. Pertinent negatives for hypoglycemia include no confusion, dizziness, headaches or sweats. Pertinent negatives for diabetes include no blurred vision, no chest pain, no fatigue, no foot paresthesias, no foot ulcerations, no polydipsia, no polyphagia, no polyuria, no visual change, no weakness and no weight loss. Pertinent negatives for hypoglycemia complications include no blackouts and no hospitalization. Symptoms are stable. Pertinent negatives for diabetic complications include no CVA, impotence, PVD or retinopathy. Risk factors for coronary artery disease include diabetes mellitus, dyslipidemia, hypertension and male sex. Current diabetic treatment includes oral agent (dual therapy). He is compliant with treatment most of the time. His weight is stable. An ACE inhibitor/angiotensin II receptor  blocker is being taken. He does not see a podiatrist.Eye exam is not current.   Anxiety   Presents for follow-up visit. Symptoms include excessive worry and nervous/anxious behavior. Patient reports no chest pain, compulsions, confusion, decreased concentration, depressed mood, dizziness, dry mouth, feeling of choking, hyperventilation, impotence, insomnia, irritability, malaise, muscle tension, nausea, obsessions, palpitations, panic, restlessness, shortness of breath or suicidal ideas.     Review of Systems  Review of Systems   Constitutional: Positive for activity change and fatigue. Negative for appetite change, chills, diaphoresis and fever.   HENT: Negative for congestion, postnasal drip, rhinorrhea, sinus pressure, sinus pain, sneezing, sore throat, trouble swallowing and voice change.    Respiratory: Positive for shortness of breath. Negative for cough, choking, chest tightness, wheezing and stridor.    Cardiovascular: Negative for chest pain.   Gastrointestinal: Negative for diarrhea, nausea and vomiting.   Musculoskeletal: Positive for arthralgias.   Neurological: Positive for weakness and numbness. Negative for headaches.   Psychiatric/Behavioral: The patient is nervous/anxious.        Patient Active Problem List   Diagnosis   • Type 2 diabetes mellitus without complication, without long-term current use of insulin (CMS/[facility])   • Essential hypertension   • Mixed hyperlipidemia   • Generalized anxiety disorder   • History of colon polyps   • Diverticulosis of large intestine without hemorrhage   • Diarrhea   • P   • Coronary artery disease with angina pectoris (CMS/[facility])   • Gastroesophageal reflux disease with esophagitis   • Vitamin D deficiency   • Overweight   • General medical examination   • Encounter for screening for endocrine disorder   • Need for influenza vaccination   • Atopic dermatitis   • Encounter for eye exam   • High serum vitamin B12     Past Surgical History:   Procedure Laterality Date    • APPENDECTOMY       [facility] 1995       • CARDIAC CATHETERIZATION      Successful PTCA of the OMB#2.Unsuccessful PTCA of the 1st OMB, secondary to difficulty in advancing the balloon. 12/08/2015       • CHOLECYSTECTOMY      [facility] 1993       • COLONOSCOPY  10/01/2012   • COLONOSCOPY N/A 12/11/2017    Procedure: COLONOSCOPY;  Surgeon: Bladimir Michael MD;  Location: [facility] ENDOSCOPY;  Service:    • CORONARY ANGIOPLASTY      Successful PTCA & stenting LAD was done w/ deployment of a 2.5mm x23 Xience stent w/ reduction of stenosis from 90% to less than 0% stenosis with good LILLIAM 3 flow 02/17/2012       • ENDOSCOPY      with biopsy.  Mildly severe esophagitis. Gastritis. Normal examined duodenum.Several biopsies obtained in the lower third of the esophagus.Several biopsies obtained in the gastric antrum. 05/06/2016       • ENDOSCOPY      w tube. Normal esophagus. Gastritis in stomach. Biopsy taken. Gastric polyp found. Biopsy taken. Normal duodenum. 10/01/2012       • ENDOSCOPY AND COLONOSCOPY      Diverticulum in sigmoid colon & descending colon. Internal & external hemorrhoids found. 10/01/2012       • HAND SURGERY       Corrective osteotomy of ring finger metacarpal. Malunited fracture of left ring finger 05/21/1985       • HERNIA REPAIR      Laparoscopic hernia repair. prepertitoneal bilateral inguinal hernia repair with mesh. 10/15/2009      • OTHER SURGICAL HISTORY      CORONARY ARTERY STENT    • SKIN TAG REMOVAL      Excision, viral skin tag,right upper eyelid 05/08/1995      Social History     Socioeconomic History   • Marital status:      Spouse name: Not on file   • Number of children: Not on file   • Years of education: Not on file   • Highest education level: Not on file   Tobacco Use   • Smoking status: Never Smoker   • Smokeless tobacco: Never Used   Substance and Sexual Activity   • Alcohol use: No   • Drug use: No   • Sexual activity: Defer     Family History    Problem Relation Age of Onset   • Schizophrenia Sister    • Obesity Sister    • Tuberculosis Sister    • Arthritis Mother    • Diabetes Mother    • Heart disease Mother    • Hypertension Mother    • Obesity Mother    • Stroke Mother    • Thyroid disease Mother    • Tuberculosis Mother    • Arthritis Father    • Tuberculosis Father      Office Visit on 07/08/2019   Component Date Value Ref Range Status   • WBC 10/11/2019 6.36  3.40 - 10.80 10*3/mm3 Final   • RBC 10/11/2019 4.85  4.14 - 5.80 10*6/mm3 Final   • Hemoglobin 10/11/2019 13.9  13.0 - 17.7 g/dL Final   • Hematocrit 10/11/2019 40.5  37.5 - 51.0 % Final   • MCV 10/11/2019 83.5  79.0 - 97.0 fL Final   • MCH 10/11/2019 28.7  26.6 - 33.0 pg Final   • MCHC 10/11/2019 34.3  31.5 - 35.7 g/dL Final   • RDW 10/11/2019 13.6  12.3 - 15.4 % Final   • RDW-SD 10/11/2019 40.3  37.0 - 54.0 fl Final   • MPV 10/11/2019 9.6  6.0 - 12.0 fL Final   • Platelets 10/11/2019 248  140 - 450 10*3/mm3 Final   • Neutrophil % 10/11/2019 71.2  42.7 - 76.0 % Final   • Lymphocyte % 10/11/2019 18.4* 19.6 - 45.3 % Final   • Monocyte % 10/11/2019 7.1  5.0 - 12.0 % Final   • Eosinophil % 10/11/2019 2.0  0.3 - 6.2 % Final   • Basophil % 10/11/2019 0.5  0.0 - 1.5 % Final   • Immature Grans % 10/11/2019 0.8* 0.0 - 0.5 % Final   • Neutrophils, Absolute 10/11/2019 4.53  1.70 - 7.00 10*3/mm3 Final   • Lymphocytes, Absolute 10/11/2019 1.17  0.70 - 3.10 10*3/mm3 Final   • Monocytes, Absolute 10/11/2019 0.45  0.10 - 0.90 10*3/mm3 Final   • Eosinophils, Absolute 10/11/2019 0.13  0.00 - 0.40 10*3/mm3 Final   • Basophils, Absolute 10/11/2019 0.03  0.00 - 0.20 10*3/mm3 Final   • Immature Grans, Absolute 10/11/2019 0.05  0.00 - 0.05 10*3/mm3 Final   • nRBC 10/11/2019 0.0  0.0 - 0.2 /100 WBC Final   • Glucose 10/11/2019 172* 65 - 99 mg/dL Final   • BUN 10/11/2019 13  8 - 23 mg/dL Final   • Creatinine 10/11/2019 0.85  0.76 - 1.27 mg/dL Final   • Sodium 10/11/2019 134* 136 - 145 mmol/L Final   • Potassium  10/11/2019 4.7  3.5 - 5.2 mmol/L Final   • Chloride 10/11/2019 96* 98 - 107 mmol/L Final   • CO2 10/11/2019 25.9  22.0 - 29.0 mmol/L Final   • Calcium 10/11/2019 9.1  8.6 - 10.5 mg/dL Final   • Total Protein 10/11/2019 7.5  6.0 - 8.5 g/dL Final   • Albumin 10/11/2019 4.70  3.50 - 5.20 g/dL Final   • ALT (SGPT) 10/11/2019 26  1 - 41 U/L Final   • AST (SGOT) 10/11/2019 20  1 - 40 U/L Final   • Alkaline Phosphatase 10/11/2019 35* 39 - 117 U/L Final   • Total Bilirubin 10/11/2019 0.5  0.2 - 1.2 mg/dL Final   • eGFR Non African Amer 10/11/2019 88  >60 mL/min/1.73 Final   • Globulin 10/11/2019 2.8  gm/dL Final   • A/G Ratio 10/11/2019 1.7  g/dL Final   • BUN/Creatinine Ratio 10/11/2019 15.3  7.0 - 25.0 Final   • Anion Gap 10/11/2019 12.1  5.0 - 15.0 mmol/L Final   • Hemoglobin A1C 10/11/2019 7.97* 4.80 - 5.60 % Final   • Total Cholesterol 10/11/2019 136  0 - 200 mg/dL Final   • Triglycerides 10/11/2019 193* 0 - 150 mg/dL Final   • HDL Cholesterol 10/11/2019 28* 40 - 60 mg/dL Final   • LDL Cholesterol  10/11/2019 69  0 - 100 mg/dL Final   • VLDL Cholesterol 10/11/2019 38.6  5 - 40 mg/dL Final   • LDL/HDL Ratio 10/11/2019 2.48   Final   • Microalbumin/Creatinine Ratio 10/11/2019    Final    Unable to calculate   • Creatinine, Urine 10/11/2019 196.8  mg/dL Final   • Microalbumin, Urine 10/11/2019 <1.2  mg/dL Final   • TSH 10/11/2019 2.430  0.270 - 4.200 uIU/mL Final   • Vitamin B-12 10/11/2019 570  211 - 946 pg/mL Final   Lab on 06/21/2019   Component Date Value Ref Range Status   • WBC 06/21/2019 5.16  3.40 - 10.80 10*3/mm3 Final   • RBC 06/21/2019 4.92  4.14 - 5.80 10*6/mm3 Final   • Hemoglobin 06/21/2019 13.9  13.0 - 17.7 g/dL Final   • Hematocrit 06/21/2019 42.8  37.5 - 51.0 % Final   • MCV 06/21/2019 87.0  79.0 - 97.0 fL Final   • MCH 06/21/2019 28.3  26.6 - 33.0 pg Final   • MCHC 06/21/2019 32.5  31.5 - 35.7 g/dL Final   • RDW 06/21/2019 13.3  12.3 - 15.4 % Final   • RDW-SD 06/21/2019 42.0  37.0 - 54.0 fl Final   • MPV  06/21/2019 9.9  6.0 - 12.0 fL Final   • Platelets 06/21/2019 220  140 - 450 10*3/mm3 Final   • Neutrophil % 06/21/2019 65.7  42.7 - 76.0 % Final   • Lymphocyte % 06/21/2019 24.2  19.6 - 45.3 % Final   • Monocyte % 06/21/2019 5.8  5.0 - 12.0 % Final   • Eosinophil % 06/21/2019 3.1  0.3 - 6.2 % Final   • Basophil % 06/21/2019 0.8  0.0 - 1.5 % Final   • Immature Grans % 06/21/2019 0.4  0.0 - 0.5 % Final   • Neutrophils, Absolute 06/21/2019 3.39  1.70 - 7.00 10*3/mm3 Final   • Lymphocytes, Absolute 06/21/2019 1.25  0.70 - 3.10 10*3/mm3 Final   • Monocytes, Absolute 06/21/2019 0.30  0.10 - 0.90 10*3/mm3 Final   • Eosinophils, Absolute 06/21/2019 0.16  0.00 - 0.40 10*3/mm3 Final   • Basophils, Absolute 06/21/2019 0.04  0.00 - 0.20 10*3/mm3 Final   • Immature Grans, Absolute 06/21/2019 0.02  0.00 - 0.05 10*3/mm3 Final   • nRBC 06/21/2019 0.0  0.0 - 0.2 /100 WBC Final   • Glucose 06/21/2019 199* 65 - 99 mg/dL Final   • BUN 06/21/2019 13  8 - 23 mg/dL Final   • Creatinine 06/21/2019 0.91  0.76 - 1.27 mg/dL Final   • Sodium 06/21/2019 135* 136 - 145 mmol/L Final   • Potassium 06/21/2019 4.4  3.5 - 5.2 mmol/L Final   • Chloride 06/21/2019 98  98 - 107 mmol/L Final   • CO2 06/21/2019 25.0  22.0 - 29.0 mmol/L Final   • Calcium 06/21/2019 9.5  8.6 - 10.5 mg/dL Final   • Total Protein 06/21/2019 7.3  6.0 - 8.5 g/dL Final   • Albumin 06/21/2019 4.40  3.50 - 5.20 g/dL Final   • ALT (SGPT) 06/21/2019 29  1 - 41 U/L Final   • AST (SGOT) 06/21/2019 28  1 - 40 U/L Final   • Alkaline Phosphatase 06/21/2019 37* 39 - 117 U/L Final   • Total Bilirubin 06/21/2019 0.6  0.2 - 1.2 mg/dL Final   • eGFR Non African Amer 06/21/2019 81  >60 mL/min/1.73 Final   • Globulin 06/21/2019 2.9  gm/dL Final   • A/G Ratio 06/21/2019 1.5  g/dL Final   • BUN/Creatinine Ratio 06/21/2019 14.3  7.0 - 25.0 Final   • Anion Gap 06/21/2019 12.0  mmol/L Final   • Hemoglobin A1C 06/21/2019 8.34* 4.80 - 5.60 % Final   • Total Cholesterol 06/21/2019 153  0 - 200 mg/dL  Final   • Triglycerides 2019 242* 0 - 150 mg/dL Final   • HDL Cholesterol 2019 29* 40 - 60 mg/dL Final   • LDL Cholesterol  2019 76  0 - 100 mg/dL Final   • VLDL Cholesterol 2019 48.4* 5 - 40 mg/dL Final   • LDL/HDL Ratio 2019 2.61   Final   • TSH 2019 2.610  0.270 - 4.200 mIU/mL Final   • Free T4 2019 0.98  0.93 - 1.70 ng/dL Final   • T3, Free 2019 3.50  2.00 - 4.40 pg/mL Final   • 25 Hydroxy, Vitamin D 2019 41.2  30.0 - 100.0 ng/ml Final   • Vitamin B-12 2019 1,266* 211 - 946 pg/mL Final      Echo - Converted                        [facility]                               ECHOCARDIOGRAM        NAME:  TURNER BLANCO   UNIT #:  [id number]  :  1945                ACCT #:  [id number]  SEX:  M                         ROOM:  [facility]        DATE:  2015  PRIMARY CARE PHYSICIAN:    ORDERING DOCTOR:   DENA MEDINA MD  REASON FOR ECHO:  Chest    INTERPRETING  pain                       CARDIOLOGIST: David Jeffers MD  PRIOR ECHO:  Y             DATE OF PRIOR ECHO:                             2010  PATIENT PROCEDURE #:       LOCATION OF STUDY:  [id number]                      [facility]  DICTATED:  2015      TRANSCRIBED:  0405                       2015 1014                            QUANTITATIVE MEASUREMENTS     LA =     39      (<40)      LA/AO Ratio   1.1  Ao =     36      (<37)      AoV Open      18    (>15)  IVS=     12      (<11)      LVPW          12    (<11)  LVEDd=   52      (38-56)    LVEDs         37    (22-40)  RV=      13      (<26)      LVOT          1.9  EF=      50-55   %          %F.S.         30    (24-46)     Max. Pulmonary Gradient   8    mmHg  RV Systolic Pressure      34   mmHg     MV Area            3.2   cm2  Mean MV Gradient   1     mmHg  MV p 1/2 t         68    msec     MICHAEL (Doppler)      2.0   cm2  MICHAEL  (Planimeter)         cm2  Max. AV Gradient   10    mmHg  Mean AV Gradient   5     mmHg  AI p 1/2 t         625   msec        REASON FOR THE ECHOCARDIOGRAM:  Chest pain, history of atherosclerotic  coronary artery disease with previous PTCA and stenting of the left  anterior descending artery and the right coronary artery, elevated  troponin suggestive for non Q myocardial infarction and mitral and  tricuspid regurgitation.     FINDINGS:  There is small left atrial enlargement with mild concentric  left ventricular hypertrophy with top normal aortic root size.     There is mild concentric left ventricular hypertrophy noted with  preserved left ventricular systolic function with an estimated ejection  fraction of 55% to 60%.     Mitral valve appears to be mildly thickened with adequate opening.  Aortic valve appears to be thickened and calcified suggestive of aortic  sclerosis. The left ventricle appears to be mildly dilated. Tricuspid  valve appears to be structurally intact with adequate opening.     Color flow Doppler reveals mild aortic insufficiency, mild mitral  regurgitation and mild tricuspid regurgitation with right ventricular  systolic pressure of 34. There is mild pulmonic insufficiency noted.     Pericardium appears to be normal without any evidence of effusion and  there is no intracardiac mass or smoke echo contrast noted suggestive of  intracardiac thrombus.        LUIS SPARKS MD  Electronically Signed  12/15/2015 22:02:41  By LUIS SPARKS MD     [id number]/[id number]  [id number]  cc:   LUIS SPARKS MD                               ECHOCARDIOGRAM  Page #page    [unfilled]  Immunization History   Administered Date(s) Administered   • FLUARIX/FLUZONE/AFLURIA/FLULAVAL QUAD 10/31/2018   • Flu Vaccine High Dose PF 65YR+ 11/02/2016   • Pneumococcal Conjugate 13-Valent (PCV13) 03/15/2018   • Pneumococcal Polysaccharide (PPSV23) 08/15/2005, 03/14/2017   • Pneumococcal, Unspecified 04/14/2015   • Tetanus  "09/15/2015   • influenza Split 12/08/2015       The following portions of the patient's history were reviewed and updated as appropriate: allergies, current medications, past family history, past medical history, past social history, past surgical history and problem list.        Physical Exam  /70 (BP Location: Left arm, Patient Position: Sitting, Cuff Size: Adult)   Pulse 81   Temp 97.8 °F (36.6 °C) (Oral)   Ht 175.3 cm (69\")   Wt 87.8 kg (193 lb 9.6 oz)   SpO2 98%   BMI 28.59 kg/m²     Physical Exam   Constitutional: He is oriented to person, place, and time. He appears well-developed and well-nourished.   HENT:   Head: Normocephalic and atraumatic.   Right Ear: External ear normal.   Eyes: Conjunctivae and EOM are normal. Pupils are equal, round, and reactive to light.   Neck: Normal range of motion. Neck supple.   Cardiovascular: Normal rate, regular rhythm and normal heart sounds.   No murmur heard.  Pulmonary/Chest: Effort normal and breath sounds normal. No respiratory distress.   Abdominal: Soft. Bowel sounds are normal. He exhibits no distension. There is no tenderness.   Musculoskeletal: Normal range of motion. He exhibits tenderness. He exhibits no edema or deformity.   Neurological: He is alert and oriented to person, place, and time. No cranial nerve deficit.   Skin: Skin is warm. No rash noted. He is not diaphoretic. No erythema. No pallor.   Psychiatric: He has a normal mood and affect. His behavior is normal.   Nursing note and vitals reviewed.      Assessment/Plan    Diagnosis Plan   1. Vitamin D deficiency     2. Overweight     3. Mixed hyperlipidemia     4. History of colon polyps     5. High serum vitamin B12     6. Generalized anxiety disorder     7. Essential hypertension     8. Coronary artery disease with angina pectoris, unspecified vessel or lesion type, unspecified whether native or transplanted heart (CMS/[facility])     9. Gastroesophageal reflux disease with esophagitis  " esomeprazole (nexIUM) 40 MG capsule   10. Epigastric pain  esomeprazole (nexIUM) 40 MG capsule   11. Heartburn  esomeprazole (nexIUM) 40 MG capsule        -went over labwork today  -recommend flu vaccination - advised to get at pharmacy   -high vitamin b12 . Supplement every other day.    -advised pt to be safe and call with any questions or concerns. All questions addressed today  -DM type 2-  Pt could   not tolerate synjardy 100/1000 mg daily.  Endocrinology following. Continue metformin ER a  Will switch to trulicity. He will let me know in a few weeks if he can tolerate it. If so he will contact office and can refill medication  -refilled medicaitons. Will  try jardiance 10 mg daily.  Samples given today   -HLP  -  Red yeast rice.  Will try Vascepa 1 gram PO BID  -GERD -continue nexium. Gastroenterology following   -overweight - weght loss information provided. Counseled >5 minutes  -for anxiety - pt is on xanax. PT prefers to stay on xanax. FRANK printed and on file refilled xanax  REF number [id number]  -hypertension - continue norvasc 10 mg pO q daily and lopressor 50 mg every 12 hours and lisinopril 40 mg PO q daily  -CAD- Cardiology following. Aspirin lipitor,  lopressor, lisinopril.    -for vitamin D deficiency check Vitamin D pills   -for pruritic rash on left leg - eucrisa ointment samples given today. If helpfdng pt will call back and let me know if helping  -recommend diabetic eye exam  -diabetic foot exam today.   -advised pt to be safe and call with questions and concerns  -advised to go to ER or call 911 if symptoms worrisome or severe  -advised to folllowup with referrals and Specialist   -recheck in2 months        This document has been electronically signed by Andreas Martinez MD on October 16, 2019 2:51 PM

## 2019-10-11 ENCOUNTER — APPOINTMENT (OUTPATIENT)
Dept: LAB | Facility: HOSPITAL | Age: 74
End: 2019-10-11

## 2019-10-11 LAB
ALBUMIN SERPL-MCNC: 4.7 G/DL (ref 3.5–5.2)
ALBUMIN UR-MCNC: <1.2 MG/DL
ALBUMIN/GLOB SERPL: 1.7 G/DL
ALP SERPL-CCNC: 35 U/L (ref 39–117)
ALT SERPL W P-5'-P-CCNC: 26 U/L (ref 1–41)
ANION GAP SERPL CALCULATED.3IONS-SCNC: 12.1 MMOL/L (ref 5–15)
AST SERPL-CCNC: 20 U/L (ref 1–40)
BASOPHILS # BLD AUTO: 0.03 10*3/MM3 (ref 0–0.2)
BASOPHILS NFR BLD AUTO: 0.5 % (ref 0–1.5)
BILIRUB SERPL-MCNC: 0.5 MG/DL (ref 0.2–1.2)
BUN BLD-MCNC: 13 MG/DL (ref 8–23)
BUN/CREAT SERPL: 15.3 (ref 7–25)
CALCIUM SPEC-SCNC: 9.1 MG/DL (ref 8.6–10.5)
CHLORIDE SERPL-SCNC: 96 MMOL/L (ref 98–107)
CHOLEST SERPL-MCNC: 136 MG/DL (ref 0–200)
CO2 SERPL-SCNC: 25.9 MMOL/L (ref 22–29)
CREAT BLD-MCNC: 0.85 MG/DL (ref 0.76–1.27)
CREAT UR-MCNC: 196.8 MG/DL
DEPRECATED RDW RBC AUTO: 40.3 FL (ref 37–54)
EOSINOPHIL # BLD AUTO: 0.13 10*3/MM3 (ref 0–0.4)
EOSINOPHIL NFR BLD AUTO: 2 % (ref 0.3–6.2)
ERYTHROCYTE [DISTWIDTH] IN BLOOD BY AUTOMATED COUNT: 13.6 % (ref 12.3–15.4)
GFR SERPL CREATININE-BSD FRML MDRD: 88 ML/MIN/1.73
GLOBULIN UR ELPH-MCNC: 2.8 GM/DL
GLUCOSE BLD-MCNC: 172 MG/DL (ref 65–99)
HBA1C MFR BLD: 7.97 % (ref 4.8–5.6)
HCT VFR BLD AUTO: 40.5 % (ref 37.5–51)
HDLC SERPL-MCNC: 28 MG/DL (ref 40–60)
HGB BLD-MCNC: 13.9 G/DL (ref 13–17.7)
IMM GRANULOCYTES # BLD AUTO: 0.05 10*3/MM3 (ref 0–0.05)
IMM GRANULOCYTES NFR BLD AUTO: 0.8 % (ref 0–0.5)
LDLC SERPL CALC-MCNC: 69 MG/DL (ref 0–100)
LDLC/HDLC SERPL: 2.48 {RATIO}
LYMPHOCYTES # BLD AUTO: 1.17 10*3/MM3 (ref 0.7–3.1)
LYMPHOCYTES NFR BLD AUTO: 18.4 % (ref 19.6–45.3)
MCH RBC QN AUTO: 28.7 PG (ref 26.6–33)
MCHC RBC AUTO-ENTMCNC: 34.3 G/DL (ref 31.5–35.7)
MCV RBC AUTO: 83.5 FL (ref 79–97)
MICROALBUMIN/CREAT UR: NORMAL MG/G{CREAT}
MONOCYTES # BLD AUTO: 0.45 10*3/MM3 (ref 0.1–0.9)
MONOCYTES NFR BLD AUTO: 7.1 % (ref 5–12)
NEUTROPHILS # BLD AUTO: 4.53 10*3/MM3 (ref 1.7–7)
NEUTROPHILS NFR BLD AUTO: 71.2 % (ref 42.7–76)
NRBC BLD AUTO-RTO: 0 /100 WBC (ref 0–0.2)
PLATELET # BLD AUTO: 248 10*3/MM3 (ref 140–450)
PMV BLD AUTO: 9.6 FL (ref 6–12)
POTASSIUM BLD-SCNC: 4.7 MMOL/L (ref 3.5–5.2)
PROT SERPL-MCNC: 7.5 G/DL (ref 6–8.5)
RBC # BLD AUTO: 4.85 10*6/MM3 (ref 4.14–5.8)
SODIUM BLD-SCNC: 134 MMOL/L (ref 136–145)
TRIGL SERPL-MCNC: 193 MG/DL (ref 0–150)
TSH SERPL DL<=0.05 MIU/L-ACNC: 2.43 UIU/ML (ref 0.27–4.2)
VIT B12 BLD-MCNC: 570 PG/ML (ref 211–946)
VLDLC SERPL-MCNC: 38.6 MG/DL (ref 5–40)
WBC NRBC COR # BLD: 6.36 10*3/MM3 (ref 3.4–10.8)

## 2019-10-11 PROCEDURE — 84443 ASSAY THYROID STIM HORMONE: CPT | Performed by: INTERNAL MEDICINE

## 2019-10-11 PROCEDURE — 80053 COMPREHEN METABOLIC PANEL: CPT | Performed by: INTERNAL MEDICINE

## 2019-10-11 PROCEDURE — 80061 LIPID PANEL: CPT | Performed by: INTERNAL MEDICINE

## 2019-10-11 PROCEDURE — 85025 COMPLETE CBC W/AUTO DIFF WBC: CPT | Performed by: INTERNAL MEDICINE

## 2019-10-11 PROCEDURE — 82607 VITAMIN B-12: CPT | Performed by: INTERNAL MEDICINE

## 2019-10-11 PROCEDURE — 82570 ASSAY OF URINE CREATININE: CPT | Performed by: INTERNAL MEDICINE

## 2019-10-11 PROCEDURE — 83036 HEMOGLOBIN GLYCOSYLATED A1C: CPT | Performed by: INTERNAL MEDICINE

## 2019-10-11 PROCEDURE — 82043 UR ALBUMIN QUANTITATIVE: CPT | Performed by: INTERNAL MEDICINE

## 2019-10-16 ENCOUNTER — OFFICE VISIT (OUTPATIENT)
Dept: FAMILY MEDICINE CLINIC | Facility: CLINIC | Age: 74
End: 2019-10-16

## 2019-10-16 VITALS
WEIGHT: 193.6 LBS | HEART RATE: 81 BPM | DIASTOLIC BLOOD PRESSURE: 70 MMHG | HEIGHT: 69 IN | TEMPERATURE: 97.8 F | OXYGEN SATURATION: 98 % | SYSTOLIC BLOOD PRESSURE: 118 MMHG | BODY MASS INDEX: 28.68 KG/M2

## 2019-10-16 DIAGNOSIS — Z86.010 HISTORY OF COLON POLYPS: ICD-10-CM

## 2019-10-16 DIAGNOSIS — R79.89 HIGH SERUM VITAMIN B12: ICD-10-CM

## 2019-10-16 DIAGNOSIS — I25.119 CORONARY ARTERY DISEASE WITH ANGINA PECTORIS, UNSPECIFIED VESSEL OR LESION TYPE, UNSPECIFIED WHETHER NATIVE OR TRANSPLANTED HEART (HCC): ICD-10-CM

## 2019-10-16 DIAGNOSIS — E78.2 MIXED HYPERLIPIDEMIA: ICD-10-CM

## 2019-10-16 DIAGNOSIS — K21.00 GASTROESOPHAGEAL REFLUX DISEASE WITH ESOPHAGITIS: ICD-10-CM

## 2019-10-16 DIAGNOSIS — R12 HEARTBURN: ICD-10-CM

## 2019-10-16 DIAGNOSIS — F41.1 GENERALIZED ANXIETY DISORDER: ICD-10-CM

## 2019-10-16 DIAGNOSIS — E66.3 OVERWEIGHT: ICD-10-CM

## 2019-10-16 DIAGNOSIS — I10 ESSENTIAL HYPERTENSION: ICD-10-CM

## 2019-10-16 DIAGNOSIS — E55.9 VITAMIN D DEFICIENCY: Primary | ICD-10-CM

## 2019-10-16 DIAGNOSIS — R10.13 EPIGASTRIC PAIN: ICD-10-CM

## 2019-10-16 PROCEDURE — 99214 OFFICE O/P EST MOD 30 MIN: CPT | Performed by: FAMILY MEDICINE

## 2019-10-16 RX ORDER — ESOMEPRAZOLE MAGNESIUM 40 MG/1
40 CAPSULE, DELAYED RELEASE ORAL
Qty: 90 CAPSULE | Refills: 3 | Status: SHIPPED | OUTPATIENT
Start: 2019-10-16 | End: 2020-01-28 | Stop reason: SDUPTHER

## 2019-12-04 RX ORDER — DULAGLUTIDE 1.5 MG/.5ML
1.5 INJECTION, SOLUTION SUBCUTANEOUS WEEKLY
Qty: 4 PEN | Refills: 3 | Status: SHIPPED | OUTPATIENT
Start: 2019-12-04 | End: 2020-02-10

## 2020-01-28 ENCOUNTER — OFFICE VISIT (OUTPATIENT)
Dept: GASTROENTEROLOGY | Facility: CLINIC | Age: 75
End: 2020-01-28

## 2020-01-28 VITALS
SYSTOLIC BLOOD PRESSURE: 130 MMHG | DIASTOLIC BLOOD PRESSURE: 74 MMHG | BODY MASS INDEX: 29.62 KG/M2 | HEART RATE: 79 BPM | HEIGHT: 69 IN | WEIGHT: 200 LBS | OXYGEN SATURATION: 96 %

## 2020-01-28 DIAGNOSIS — K21.00 GASTROESOPHAGEAL REFLUX DISEASE WITH ESOPHAGITIS: Primary | ICD-10-CM

## 2020-01-28 DIAGNOSIS — R10.13 EPIGASTRIC PAIN: ICD-10-CM

## 2020-01-28 DIAGNOSIS — K57.30 DIVERTICULOSIS OF LARGE INTESTINE WITHOUT HEMORRHAGE: ICD-10-CM

## 2020-01-28 DIAGNOSIS — R12 HEARTBURN: ICD-10-CM

## 2020-01-28 PROCEDURE — 99213 OFFICE O/P EST LOW 20 MIN: CPT | Performed by: PHYSICIAN ASSISTANT

## 2020-01-28 RX ORDER — ESOMEPRAZOLE MAGNESIUM 40 MG/1
40 CAPSULE, DELAYED RELEASE ORAL
Qty: 90 CAPSULE | Refills: 3 | Status: SHIPPED | OUTPATIENT
Start: 2020-01-28 | End: 2020-08-17 | Stop reason: SDUPTHER

## 2020-02-01 NOTE — PROGRESS NOTES
Subjective:  Aj Card Jr. is a 74 y.o. male who presents for       Patient Active Problem List   Diagnosis   • Type 2 diabetes mellitus without complication, without long-term current use of insulin (CMS/[facility])   • Essential hypertension   • Mixed hyperlipidemia   • Generalized anxiety disorder   • History of colon polyps   • Diverticulosis of large intestine without hemorrhage   • Diarrhea   • P   • Coronary artery disease with angina pectoris (CMS/[facility])   • Gastroesophageal reflux disease with esophagitis   • Vitamin D deficiency   • Overweight   • General medical examination   • Encounter for screening for endocrine disorder   • Need for influenza vaccination   • Atopic dermatitis   • Encounter for eye exam   • High serum vitamin B12           Current Outpatient Medications:   •  ALPRAZolam (XANAX) 0.5 MG tablet, Take 1 tablet by mouth At Night As Needed for Anxiety., Disp: 30 tablet, Rfl: 0  •  amLODIPine (NORVASC) 10 MG tablet, Take 1 tablet by mouth Daily., Disp: 90 tablet, Rfl: 3  •  aspirin 81 MG EC tablet, Take 2 tablets by mouth Daily., Disp: 90 tablet, Rfl: 3  •  azelastine (ASTELIN) 0.1 % nasal spray, 2 sprays into the nostril(s) as directed by provider 2 (Two) Times a Day. Use in each nostril as directed, Disp: 90 mL, Rfl: 3  •  Blood Glucose Monitoring Suppl w/Device kit, USE AS INDICATED, ANY MONITOR , ICD10 code is E11.9, Disp: 1 each, Rfl: 1  •  cholecalciferol (VITAMIN D3) 1000 units tablet, Take 1 tablet by mouth Daily., Disp: 30 tablet, Rfl: 3  •  clopidogrel (PLAVIX) 75 MG tablet, 1 PO QOD, Disp: 90 tablet, Rfl: 3  •  coenzyme Q10 100 MG capsule, Take 100 mg by mouth daily., Disp: , Rfl:   •  Crisaborole (EUCRISA) 2 % ointment, Apply 1 application topically 2 (Two) Times a Day., Disp: 60 g, Rfl: 3  •  esomeprazole (nexIUM) 40 MG capsule, Take 1 capsule by mouth Every Morning Before Breakfast., Disp: 90 capsule, Rfl: 3  •  Glucose Blood (BLOOD GLUCOSE TEST) strip, Use 4 x daily use  any brand covered by insurance or same brand as before ICD10 code is E11.9, Disp: 120 each, Rfl: 11  •  icosapent ethyl (VASCEPA) 1 g capsule capsule, Take 2 g by mouth 2 (Two) Times a Day With Meals., Disp: 120 capsule, Rfl: 3  •  KRILL OIL OMEGA-3 PO, Take  by mouth daily., Disp: , Rfl:   •  Lancet Devices (LANCING DEVICE) misc, USE AS INDICATED TO CORRELATE WITH STRIPS AND METER, Disp: 1 each, Rfl: 1  •  Lancets 30G misc, USE 4 X DAILY, Disp: 120 each, Rfl: 11  •  lisinopril (PRINIVIL,ZESTRIL) 40 MG tablet, Take 1 tablet by mouth Daily., Disp: 90 tablet, Rfl: 3  •  LOTEMAX 0.5 % ophthalmic suspension, , Disp: , Rfl:   •  metFORMIN ER (GLUCOPHAGE-XR) 500 MG 24 hr tablet, Take 1 tablet by mouth Daily With Breakfast., Disp: 90 tablet, Rfl: 3  •  metoprolol tartrate (LOPRESSOR) 50 MG tablet, Take 1 tablet by mouth Every 12 (Twelve) Hours., Disp: 180 tablet, Rfl: 1  •  Red Yeast Rice Extract (RED YEAST RICE PO), Take  by mouth Daily., Disp: , Rfl:   •  Saw Palmetto, Serenoa repens, (SAW PALMETTO PO), Take  by mouth daily., Disp: , Rfl:   •  sucralfate (CARAFATE) 1 g tablet, Take 1 tablet by mouth 2 (Two) Times a Day As Needed (nausea, abd pain)., Disp: 180 tablet, Rfl: 3  •  TRULICITY 1.5 MG/0.5ML solution pen-injector, Inject 1.5 mg under the skin into the appropriate area as directed 1 (One) Time Per Week., Disp: 4 pen, Rfl: 3  •  vitamin B-12 (CYANOCOBALAMIN) 2500 MCG sublingual tablet tablet, Place  under the tongue Every Other Day., Disp: , Rfl:     Pt is 72 yo male with history of GERD DM type 2, Vitamin B12 deficiency, HLP, HTN, ADRIENNE, CAD sp stent,  Esophagitis, Gastritis, sp appendectomy sp cholecystectomy sp hernia repair surgery, sp hand surgery/ cataract surgery bilateral      9/4/19 pt is here for recheck and followup.  He is doing well. He had cararact surgery about 2 weeks ago. He is on lotemax eye drops.    He has been having issues with vision and burning on right eye.  Has appt with Dr. Mays next  Monday.        10/16/19 pt is here for recheck and followup. No chest pain. He has been helping his wife who recently fell.   He is doing well on anxiety.  He continues Vascepa. Anxiety is stable     2/20/20 pt is here for recheck and followup. Saw Endocrinology on 2/10/20 and pt is off synjardy XR 10/1000 mg daily and is nw on trulicity 0.75 weekly increased to 1.5 weekly. Now is on Toujeo 12 untis daily along with novoling. His main issue is right foot pain for past 2 months. It hurts on the ball/bottom of right foot. Denies any trauma          Foot Injury    The incident occurred more than 1 week ago. The incident occurred at home. The pain is present in the right foot. The quality of the pain is described as aching. The pain is at a severity of 3/10. The pain is mild. The pain has been constant since onset. Associated symptoms include numbness. Pertinent negatives include no inability to bear weight, loss of sensation, muscle weakness or tingling. The symptoms are aggravated by movement and palpation. He has tried nothing for the symptoms. The treatment provided no relief.   Hypertension   This is a chronic problem. The current episode started more than 1 year ago. The problem is controlled. Pertinent negatives include no anxiety, blurred vision, chest pain, headaches, malaise/fatigue, neck pain, palpitations, peripheral edema, PND, shortness of breath or sweats. Risk factors for coronary artery disease include diabetes mellitus, dyslipidemia, sedentary lifestyle and male gender. There are no compliance problems.  Hypertensive end-organ damage includes CAD/MI. There is no history of angina, kidney disease, CVA, heart failure, left ventricular hypertrophy, PVD or retinopathy. There is no history of chronic renal disease, coarctation of the aorta, hyperaldosteronism, hypercortisolism, hyperparathyroidism, a hypertension causing med, pheochromocytoma, renovascular disease, sleep apnea or a thyroid  problem.   Diabetes   He presents for his follow-up diabetic visit. He has type 2 diabetes mellitus. His disease course has been stable. Hypoglycemia symptoms include nervousness/anxiousness. Pertinent negatives for hypoglycemia include no confusion, dizziness, headaches or sweats. Pertinent negatives for diabetes include no blurred vision, no chest pain, no fatigue, no foot paresthesias, no foot ulcerations, no polydipsia, no polyphagia, no polyuria, no visual change, no weakness and no weight loss. Pertinent negatives for hypoglycemia complications include no blackouts and no hospitalization. Symptoms are stable. Pertinent negatives for diabetic complications include no CVA, impotence, PVD or retinopathy. Risk factors for coronary artery disease include diabetes mellitus, dyslipidemia, hypertension and male sex. Current diabetic treatment includes oral agent (dual therapy). He is compliant with treatment most of the time. His weight is stable. An ACE inhibitor/angiotensin II receptor blocker is being taken. He does not see a podiatrist.Eye exam is not current.   Anxiety   Presents for follow-up visit. Symptoms include excessive worry and nervous/anxious behavior. Patient reports no chest pain, compulsions, confusion, decreased concentration, depressed mood, dizziness, dry mouth, feeling of choking, hyperventilation, impotence, insomnia, irritability, malaise, muscle tension, nausea, obsessions, palpitations, panic, restlessness, shortness of breath or suicidal ideas.     Review of Systems  Review of Systems   Constitutional: Positive for activity change and fatigue. Negative for appetite change, chills, diaphoresis and fever.   HENT: Negative for congestion, postnasal drip, rhinorrhea, sinus pressure, sinus pain, sneezing, sore throat, trouble swallowing and voice change.    Respiratory: Negative for cough, choking, chest tightness, shortness of breath, wheezing and stridor.    Cardiovascular: Negative for chest  pain.   Gastrointestinal: Negative for diarrhea, nausea and vomiting.   Musculoskeletal: Positive for arthralgias and back pain.   Neurological: Positive for weakness and numbness. Negative for tingling and headaches.   Psychiatric/Behavioral: Positive for agitation and behavioral problems. The patient is nervous/anxious.        Patient Active Problem List   Diagnosis   • Type 2 diabetes mellitus without complication, without long-term current use of insulin (CMS/[facility])   • Essential hypertension   • Mixed hyperlipidemia   • Generalized anxiety disorder   • History of colon polyps   • Diverticulosis of large intestine without hemorrhage   • Diarrhea   • P   • Coronary artery disease with angina pectoris (CMS/[facility])   • Gastroesophageal reflux disease with esophagitis   • Vitamin D deficiency   • Overweight   • General medical examination   • Encounter for screening for endocrine disorder   • Need for influenza vaccination   • Atopic dermatitis   • Encounter for eye exam   • High serum vitamin B12     Past Surgical History:   Procedure Laterality Date   • APPENDECTOMY       [facility] 1995       • CARDIAC CATHETERIZATION      Successful PTCA of the OMB#2.Unsuccessful PTCA of the 1st OMB, secondary to difficulty in advancing the balloon. 12/08/2015       • CHOLECYSTECTOMY      [facility] 1993       • COLONOSCOPY  10/01/2012   • COLONOSCOPY N/A 12/11/2017    Procedure: COLONOSCOPY;  Surgeon: Bladimir Michael MD;  Location: [facility] ENDOSCOPY;  Service:    • CORONARY ANGIOPLASTY      Successful PTCA & stenting LAD was done w/ deployment of a 2.5mm x23 Xience stent w/ reduction of stenosis from 90% to less than 0% stenosis with good LILLIAM 3 flow 02/17/2012       • ENDOSCOPY      with biopsy.  Mildly severe esophagitis. Gastritis. Normal examined duodenum.Several biopsies obtained in the lower third of the esophagus.Several biopsies obtained in the gastric antrum. 05/06/2016       • ENDOSCOPY      w  tube. Normal esophagus. Gastritis in stomach. Biopsy taken. Gastric polyp found. Biopsy taken. Normal duodenum. 10/01/2012       • ENDOSCOPY AND COLONOSCOPY      Diverticulum in sigmoid colon & descending colon. Internal & external hemorrhoids found. 10/01/2012       • HAND SURGERY       Corrective osteotomy of ring finger metacarpal. Malunited fracture of left ring finger 05/21/1985       • HERNIA REPAIR      Laparoscopic hernia repair. prepertitoneal bilateral inguinal hernia repair with mesh. 10/15/2009      • OTHER SURGICAL HISTORY      CORONARY ARTERY STENT    • SKIN TAG REMOVAL      Excision, viral skin tag,right upper eyelid 05/08/1995      Social History     Socioeconomic History   • Marital status:      Spouse name: Not on file   • Number of children: Not on file   • Years of education: Not on file   • Highest education level: Not on file   Tobacco Use   • Smoking status: Never Smoker   • Smokeless tobacco: Never Used   Substance and Sexual Activity   • Alcohol use: No   • Drug use: No   • Sexual activity: Defer     Family History   Problem Relation Age of Onset   • Schizophrenia Sister    • Obesity Sister    • Tuberculosis Sister    • Arthritis Mother    • Diabetes Mother    • Heart disease Mother    • Hypertension Mother    • Obesity Mother    • Stroke Mother    • Thyroid disease Mother    • Tuberculosis Mother    • Arthritis Father    • Tuberculosis Father      No visits with results within 6 Month(s) from this visit.   Latest known visit with results is:   Office Visit on 07/08/2019   Component Date Value Ref Range Status   • WBC 10/11/2019 6.36  3.40 - 10.80 10*3/mm3 Final   • RBC 10/11/2019 4.85  4.14 - 5.80 10*6/mm3 Final   • Hemoglobin 10/11/2019 13.9  13.0 - 17.7 g/dL Final   • Hematocrit 10/11/2019 40.5  37.5 - 51.0 % Final   • MCV 10/11/2019 83.5  79.0 - 97.0 fL Final   • MCH 10/11/2019 28.7  26.6 - 33.0 pg Final   • MCHC 10/11/2019 34.3  31.5 - 35.7 g/dL Final   • RDW 10/11/2019 13.6   12.3 - 15.4 % Final   • RDW-SD 10/11/2019 40.3  37.0 - 54.0 fl Final   • MPV 10/11/2019 9.6  6.0 - 12.0 fL Final   • Platelets 10/11/2019 248  140 - 450 10*3/mm3 Final   • Neutrophil % 10/11/2019 71.2  42.7 - 76.0 % Final   • Lymphocyte % 10/11/2019 18.4* 19.6 - 45.3 % Final   • Monocyte % 10/11/2019 7.1  5.0 - 12.0 % Final   • Eosinophil % 10/11/2019 2.0  0.3 - 6.2 % Final   • Basophil % 10/11/2019 0.5  0.0 - 1.5 % Final   • Immature Grans % 10/11/2019 0.8* 0.0 - 0.5 % Final   • Neutrophils, Absolute 10/11/2019 4.53  1.70 - 7.00 10*3/mm3 Final   • Lymphocytes, Absolute 10/11/2019 1.17  0.70 - 3.10 10*3/mm3 Final   • Monocytes, Absolute 10/11/2019 0.45  0.10 - 0.90 10*3/mm3 Final   • Eosinophils, Absolute 10/11/2019 0.13  0.00 - 0.40 10*3/mm3 Final   • Basophils, Absolute 10/11/2019 0.03  0.00 - 0.20 10*3/mm3 Final   • Immature Grans, Absolute 10/11/2019 0.05  0.00 - 0.05 10*3/mm3 Final   • nRBC 10/11/2019 0.0  0.0 - 0.2 /100 WBC Final   • Glucose 10/11/2019 172* 65 - 99 mg/dL Final   • BUN 10/11/2019 13  8 - 23 mg/dL Final   • Creatinine 10/11/2019 0.85  0.76 - 1.27 mg/dL Final   • Sodium 10/11/2019 134* 136 - 145 mmol/L Final   • Potassium 10/11/2019 4.7  3.5 - 5.2 mmol/L Final   • Chloride 10/11/2019 96* 98 - 107 mmol/L Final   • CO2 10/11/2019 25.9  22.0 - 29.0 mmol/L Final   • Calcium 10/11/2019 9.1  8.6 - 10.5 mg/dL Final   • Total Protein 10/11/2019 7.5  6.0 - 8.5 g/dL Final   • Albumin 10/11/2019 4.70  3.50 - 5.20 g/dL Final   • ALT (SGPT) 10/11/2019 26  1 - 41 U/L Final   • AST (SGOT) 10/11/2019 20  1 - 40 U/L Final   • Alkaline Phosphatase 10/11/2019 35* 39 - 117 U/L Final   • Total Bilirubin 10/11/2019 0.5  0.2 - 1.2 mg/dL Final   • eGFR Non African Amer 10/11/2019 88  >60 mL/min/1.73 Final   • Globulin 10/11/2019 2.8  gm/dL Final   • A/G Ratio 10/11/2019 1.7  g/dL Final   • BUN/Creatinine Ratio 10/11/2019 15.3  7.0 - 25.0 Final   • Anion Gap 10/11/2019 12.1  5.0 - 15.0 mmol/L Final   • Hemoglobin A1C  10/11/2019 7.97* 4.80 - 5.60 % Final   • Total Cholesterol 10/11/2019 136  0 - 200 mg/dL Final   • Triglycerides 10/11/2019 193* 0 - 150 mg/dL Final   • HDL Cholesterol 10/11/2019 28* 40 - 60 mg/dL Final   • LDL Cholesterol  10/11/2019 69  0 - 100 mg/dL Final   • VLDL Cholesterol 10/11/2019 38.6  5 - 40 mg/dL Final   • LDL/HDL Ratio 10/11/2019 2.48   Final   • Microalbumin/Creatinine Ratio 10/11/2019    Final    Unable to calculate   • Creatinine, Urine 10/11/2019 196.8  mg/dL Final   • Microalbumin, Urine 10/11/2019 <1.2  mg/dL Final   • TSH 10/11/2019 2.430  0.270 - 4.200 uIU/mL Final   • Vitamin B-12 10/11/2019 570  211 - 946 pg/mL Final      Echo - Converted                        [facility]                               ECHOCARDIOGRAM        NAME:  TURNER BLANCO   UNIT #:  [id number]  :  1945                ACCT #:  [id number]  SEX:  M                         ROOM:  314         DATE:  2015  PRIMARY CARE PHYSICIAN:    ORDERING DOCTOR:   DENA MEDINA MD  REASON FOR ECHO:  Chest    INTERPRETING  pain                       CARDIOLOGIST: David Jeffers MD  PRIOR ECHO:  Y             DATE OF PRIOR ECHO:                             2010  PATIENT PROCEDURE #:       LOCATION OF STUDY:  [id number]                      [facility]  DICTATED:  2015      TRANSCRIBED:  0405                       2015 1014                            QUANTITATIVE MEASUREMENTS     LA =     39      (<40)      LA/AO Ratio   1.1  Ao =     36      (<37)      AoV Open      18    (>15)  IVS=     12      (<11)      LVPW          12    (<11)  LVEDd=   52      (38-56)    LVEDs         37    (22-40)  RV=      13      (<26)      LVOT          1.9  EF=      50-55   %          %F.S.         30    (24-46)     Max. Pulmonary Gradient   8    mmHg  RV Systolic Pressure      34   mmHg     MV Area            3.2   cm2  Mean MV Gradient    1     mmHg  MV p 1/2 t         68    msec     MICHAEL (Doppler)      2.0   cm2  MICHAEL (Planimeter)         cm2  Max. AV Gradient   10    mmHg  Mean AV Gradient   5     mmHg  AI p 1/2 t         625   msec        REASON FOR THE ECHOCARDIOGRAM:  Chest pain, history of atherosclerotic  coronary artery disease with previous PTCA and stenting of the left  anterior descending artery and the right coronary artery, elevated  troponin suggestive for non Q myocardial infarction and mitral and  tricuspid regurgitation.     FINDINGS:  There is small left atrial enlargement with mild concentric  left ventricular hypertrophy with top normal aortic root size.     There is mild concentric left ventricular hypertrophy noted with  preserved left ventricular systolic function with an estimated ejection  fraction of 55% to 60%.     Mitral valve appears to be mildly thickened with adequate opening.  Aortic valve appears to be thickened and calcified suggestive of aortic  sclerosis. The left ventricle appears to be mildly dilated. Tricuspid  valve appears to be structurally intact with adequate opening.     Color flow Doppler reveals mild aortic insufficiency, mild mitral  regurgitation and mild tricuspid regurgitation with right ventricular  systolic pressure of 34. There is mild pulmonic insufficiency noted.     Pericardium appears to be normal without any evidence of effusion and  there is no intracardiac mass or smoke echo contrast noted suggestive of  intracardiac thrombus.        LUIS SPARKS MD  Electronically Signed  12/15/2015 22:02:41  By LUIS SPARKS MD     [id number]/[id number]  [id number]  cc:   LUIS SPARKS MD                               ECHOCARDIOGRAM  Page #page    @[email or web address]  Immunization History   Administered Date(s) Administered   • FLUARIX/FLUZONE/AFLURIA/FLULAVAL QUAD 10/31/2018   • Fluzone High Dose =>65 Years ([employer] ONLY) 11/02/2016   • Pneumococcal Conjugate 13-Valent (PCV13) 03/15/2018   • Pneumococcal  "Polysaccharide (PPSV23) 08/15/2005, 03/14/2017   • Pneumococcal, Unspecified 04/14/2015   • Tetanus 09/15/2015   • influenza Split 12/08/2015       The following portions of the patient's history were reviewed and updated as appropriate: allergies, current medications, past family history, past medical history, past social history, past surgical history and problem list.        Physical Exam  /60 (BP Location: Left arm, Patient Position: Sitting, Cuff Size: Adult)   Pulse 78   Temp 97.9 °F (36.6 °C) (Oral)   Ht 175.3 cm (69\")   Wt 88.9 kg (196 lb)   SpO2 94%   BMI 28.94 kg/m²       Physical Exam   Constitutional: He is oriented to person, place, and time. He appears well-developed and well-nourished.   HENT:   Head: Normocephalic and atraumatic.   Right Ear: External ear normal.   Eyes: Pupils are equal, round, and reactive to light. Conjunctivae and EOM are normal.   Neck: Normal range of motion. Neck supple.   Cardiovascular: Normal rate, regular rhythm and normal heart sounds.   No murmur heard.  Pulmonary/Chest: Effort normal and breath sounds normal. No respiratory distress.   Abdominal: Soft. Bowel sounds are normal. He exhibits no distension. There is no tenderness.   Musculoskeletal: Normal range of motion. He exhibits tenderness. He exhibits no edema or deformity.        Neurological: He is alert and oriented to person, place, and time. No cranial nerve deficit.   Skin: Skin is warm. No rash noted. He is not diaphoretic. No erythema. No pallor.   Psychiatric: He has a normal mood and affect. His behavior is normal.   Nursing note and vitals reviewed.      Assessment/Plan    Diagnosis Plan   1. Overweight     2. Vitamin D deficiency     3. Type 2 diabetes mellitus without complication, without long-term current use of insulin (CMS/[facility])     4. High serum vitamin B12     5. History of colon polyps     6. Mixed hyperlipidemia     7. Gastroesophageal reflux disease with esophagitis     8. Essential " hypertension     9. Generalized anxiety disorder     10. Coronary artery disease with angina pectoris, unspecified vessel or lesion type, unspecified whether native or transplanted heart (CMS/HCC)          -went over labwork today  -right foot pain - get x-ray of foot referral to Podiatry   -recommend flu vaccination - advised to get at pharmacy   -high vitamin b12 . Supplement every other day.    -advised pt to be safe and call with any questions or concerns. All questions addressed today  -DM type 2-  Pt could   not tolerate synjardy 100/1000 mg daily.  trulicity 0.75 mg to 1.5 mg daily. On toujeo 12 units at bedtime and novolon 4 units with meals  He will let me know in a few weeks if he can tolerate it. If so he will contact office and can refill medication  -refilled medicaitons. Will  try jardiance 10 mg daily.  Samples given today   -HLP  -  Red yeast rice.  Will try Vascepa 1 gram PO BID  -GERD -continue nexium. Gastroenterology following   -overweight - weght loss information provided. Counseled >5 minutes  -for anxiety - pt is on xanax. PT prefers to stay on xanax. FRANK printed and on file refilled xanax  REF number [id number]  -hypertension - continue norvasc 10 mg pO q daily and lopressor 50 mg every 12 hours and lisinopril 40 mg PO q daily  -CAD- Cardiology following. Aspirin lipitor,  lopressor, lisinopril.    -for vitamin D deficiency check Vitamin D pills   -for pruritic rash on left leg - eucrisa ointment samples given today. If helpfdng pt will call back and let me know if helping  -recommend diabetic eye exam  -diabetic foot exam today.   -advised pt to be safe and call with questions and concerns  -advised to go to ER or call 911 if symptoms worrisome or severe  -advised to folllowup with referrals and Specialist   -recheck  In 6 weeks           This document has been electronically signed by Andreas Martinez MD on February 20, 2020 7:27 AM

## 2020-02-10 ENCOUNTER — OFFICE VISIT (OUTPATIENT)
Dept: ENDOCRINOLOGY | Facility: CLINIC | Age: 75
End: 2020-02-10

## 2020-02-10 VITALS
HEIGHT: 69 IN | HEART RATE: 81 BPM | SYSTOLIC BLOOD PRESSURE: 130 MMHG | WEIGHT: 195.4 LBS | OXYGEN SATURATION: 99 % | DIASTOLIC BLOOD PRESSURE: 76 MMHG | BODY MASS INDEX: 28.94 KG/M2

## 2020-02-10 DIAGNOSIS — I25.119 CORONARY ARTERY DISEASE WITH ANGINA PECTORIS, UNSPECIFIED VESSEL OR LESION TYPE, UNSPECIFIED WHETHER NATIVE OR TRANSPLANTED HEART (HCC): ICD-10-CM

## 2020-02-10 DIAGNOSIS — I10 ESSENTIAL HYPERTENSION: ICD-10-CM

## 2020-02-10 DIAGNOSIS — E11.65 TYPE 2 DIABETES MELLITUS WITH HYPERGLYCEMIA, WITHOUT LONG-TERM CURRENT USE OF INSULIN (HCC): Primary | ICD-10-CM

## 2020-02-10 DIAGNOSIS — E78.2 MIXED HYPERLIPIDEMIA: ICD-10-CM

## 2020-02-10 PROCEDURE — 99214 OFFICE O/P EST MOD 30 MIN: CPT | Performed by: INTERNAL MEDICINE

## 2020-02-10 RX ORDER — CALCIUM CARB/VITAMIN D3/VIT K1 500-100-40
TABLET,CHEWABLE ORAL
Qty: 120 EACH | Refills: 11 | Status: ON HOLD | OUTPATIENT
Start: 2020-02-10

## 2020-02-10 NOTE — PROGRESS NOTES
"Aj Card Jr. is a 74 y.o. male who presents for  evaluation of   Chief Complaint   Patient presents with   • Diabetes       Referring provider   Primary Care Provider    Andreas Martinez MD    Duration since age 71 years old     Timing - Diabetes is Constant    Quality -  Financial aid     Severity -  moderate    Complications - CAD     Current symptoms/problems  none     Alleviating Factors: Compliance       Side Effects  none    Current diet  in general, a \"healthy\" diet      Current exercise walking    Current monitoring regimen: home blood tests - checking 2x daily   Home blood sugar records: elevated fasting     Hypoglycemia none    Past Medical History:   Diagnosis Date   • Acute posthemorrhagic anemia    • Allergic rhinitis    • Anxiety state    • Chest pain    • Coronary arteriosclerosis    • Diabetes mellitus (CMS/HCC)     type 2   • Diverticular disease of colon    • Dysphagia    • Epigastric pain    • GERD (gastroesophageal reflux disease)     esophagitis on Dexilant. Pt feels Nexium working better      • History of echocardiogram     Small left atrial enlargement with mild CLVH.Ef of 55-60%.Mitral valve mildly thickened.Av thickened.Left ventricle mildly dilated.Tricuspid intact.Mild aortic insufficiency. 12/07/2015       • History of echocardiogram     Mild diastolic dysfunction and mild left atrial enlargement, otherwise normal echo. EF> 55% 01/03/2012       • Hypercholesterolemia    • Hypertension    • Insomnia    • Irritable bowel syndrome    • Osteoarthritis of multiple joints      Family History   Problem Relation Age of Onset   • Schizophrenia Sister    • Obesity Sister    • Tuberculosis Sister    • Arthritis Mother    • Diabetes Mother    • Heart disease Mother    • Hypertension Mother    • Obesity Mother    • Stroke Mother    • Thyroid disease Mother    • Tuberculosis Mother    • Arthritis Father    • Tuberculosis Father      Social History     Tobacco Use   • Smoking status: Never " Smoker   • Smokeless tobacco: Never Used   Substance Use Topics   • Alcohol use: No   • Drug use: No         Current Outpatient Medications:   •  ALPRAZolam (XANAX) 0.5 MG tablet, Take 1 tablet by mouth At Night As Needed for Anxiety., Disp: 30 tablet, Rfl: 0  •  amLODIPine (NORVASC) 10 MG tablet, Take 1 tablet by mouth Daily., Disp: 90 tablet, Rfl: 3  •  aspirin 81 MG EC tablet, Take 2 tablets by mouth Daily., Disp: 90 tablet, Rfl: 3  •  azelastine (ASTELIN) 0.1 % nasal spray, 2 sprays into the nostril(s) as directed by provider 2 (Two) Times a Day. Use in each nostril as directed, Disp: 90 mL, Rfl: 3  •  Blood Glucose Monitoring Suppl w/Device kit, USE AS INDICATED, ANY MONITOR , ICD10 code is E11.9, Disp: 1 each, Rfl: 1  •  cholecalciferol (VITAMIN D3) 1000 units tablet, Take 1 tablet by mouth Daily., Disp: 30 tablet, Rfl: 3  •  clopidogrel (PLAVIX) 75 MG tablet, 1 PO QOD, Disp: 90 tablet, Rfl: 3  •  coenzyme Q10 100 MG capsule, Take 100 mg by mouth daily., Disp: , Rfl:   •  Crisaborole (EUCRISA) 2 % ointment, Apply 1 application topically 2 (Two) Times a Day., Disp: 60 g, Rfl: 3  •  esomeprazole (nexIUM) 40 MG capsule, Take 1 capsule by mouth Every Morning Before Breakfast., Disp: 90 capsule, Rfl: 3  •  Glucose Blood (BLOOD GLUCOSE TEST) strip, Use 4 x daily use any brand covered by insurance or same brand as before ICD10 code is E11.9, Disp: 120 each, Rfl: 11  •  icosapent ethyl (VASCEPA) 1 g capsule capsule, Take 2 g by mouth 2 (Two) Times a Day With Meals., Disp: 120 capsule, Rfl: 3  •  KRILL OIL OMEGA-3 PO, Take  by mouth daily., Disp: , Rfl:   •  Lancet Devices (LANCING DEVICE) misc, USE AS INDICATED TO CORRELATE WITH STRIPS AND METER, Disp: 1 each, Rfl: 1  •  Lancets 30G misc, USE 4 X DAILY, Disp: 120 each, Rfl: 11  •  lisinopril (PRINIVIL,ZESTRIL) 40 MG tablet, Take 1 tablet by mouth Daily., Disp: 90 tablet, Rfl: 3  •  LOTEMAX 0.5 % ophthalmic suspension, , Disp: , Rfl:   •  metFORMIN ER (GLUCOPHAGE-XR)  500 MG 24 hr tablet, Take 1 tablet by mouth Daily With Breakfast., Disp: 90 tablet, Rfl: 3  •  metoprolol tartrate (LOPRESSOR) 50 MG tablet, Take 1 tablet by mouth Every 12 (Twelve) Hours., Disp: 180 tablet, Rfl: 1  •  Red Yeast Rice Extract (RED YEAST RICE PO), Take  by mouth Daily., Disp: , Rfl:   •  Saw Palmetto, Serenoa repens, (SAW PALMETTO PO), Take  by mouth daily., Disp: , Rfl:   •  sucralfate (CARAFATE) 1 g tablet, Take 1 tablet by mouth 2 (Two) Times a Day As Needed (nausea, abd pain)., Disp: 180 tablet, Rfl: 3  •  TRULICITY 1.5 MG/0.5ML solution pen-injector, Inject 1.5 mg under the skin into the appropriate area as directed 1 (One) Time Per Week., Disp: 4 pen, Rfl: 3  •  vitamin B-12 (CYANOCOBALAMIN) 2500 MCG sublingual tablet tablet, Place  under the tongue Every Other Day., Disp: , Rfl:     Review of Systems    Review of Systems   Constitutional: Positive for fatigue. Negative for activity change, appetite change, chills, diaphoresis, fever and unexpected weight change.   HENT: Negative for congestion, dental problem, drooling, ear discharge, ear pain, facial swelling, mouth sores, postnasal drip, rhinorrhea, sinus pressure, sore throat, tinnitus, trouble swallowing and voice change.    Eyes: Negative for photophobia, pain, discharge, redness, itching and visual disturbance.   Respiratory: Negative for apnea, cough, choking, chest tightness, shortness of breath, wheezing and stridor.    Cardiovascular: Negative for chest pain, palpitations and leg swelling.   Gastrointestinal: Negative for abdominal distention, abdominal pain, constipation, diarrhea, nausea and vomiting.   Endocrine: Negative for cold intolerance, heat intolerance, polydipsia, polyphagia and polyuria.   Genitourinary: Negative for decreased urine volume, difficulty urinating, dysuria, flank pain, frequency, hematuria and urgency.   Musculoskeletal: Negative for arthralgias, back pain, gait problem, joint swelling, myalgias, neck pain  "and neck stiffness.   Skin: Negative for color change, pallor, rash and wound.   Allergic/Immunologic: Negative for immunocompromised state.   Neurological: Negative for dizziness, tremors, seizures, syncope, facial asymmetry, speech difficulty, weakness, light-headedness, numbness and headaches.   Hematological: Negative for adenopathy.   Psychiatric/Behavioral: Negative for agitation, behavioral problems, confusion, decreased concentration, dysphoric mood, hallucinations, self-injury, sleep disturbance and suicidal ideas. The patient is not nervous/anxious and is not hyperactive.         Objective:   /76   Pulse 81   Ht 175.3 cm (69\")   Wt 88.6 kg (195 lb 6.4 oz)   SpO2 99%   BMI 28.86 kg/m²     Physical Exam   Constitutional: He is oriented to person, place, and time. He appears well-developed and well-nourished. He is cooperative.   HENT:   Head: Normocephalic and atraumatic.   Right Ear: External ear normal.   Left Ear: External ear normal.   Nose: Nose normal.   Mouth/Throat: Oropharynx is clear and moist. No oropharyngeal exudate.   Eyes: Pupils are equal, round, and reactive to light. Conjunctivae and EOM are normal. No scleral icterus. Right eye exhibits normal extraocular motion. Left eye exhibits normal extraocular motion.   Neck: Neck supple. No JVD present. No muscular tenderness present. No tracheal deviation, no edema and no erythema present. No thyromegaly present.   Cardiovascular: Normal rate, regular rhythm, normal heart sounds and intact distal pulses. Exam reveals no gallop and no friction rub.   No murmur heard.  Pulmonary/Chest: Effort normal and breath sounds normal. No stridor. No respiratory distress. He has no decreased breath sounds. He has no wheezes. He has no rhonchi. He has no rales. He exhibits no tenderness.   Abdominal: Soft. Bowel sounds are normal. He exhibits no distension and no mass. There is no hepatomegaly. There is no tenderness. There is no rebound and no " guarding. No hernia.   Musculoskeletal: Normal range of motion. He exhibits no edema, tenderness or deformity.   Lymphadenopathy:     He has no cervical adenopathy.   Neurological: He is alert and oriented to person, place, and time. He has normal reflexes. No cranial nerve deficit. He exhibits normal muscle tone. Coordination normal.   Skin: Skin is warm. No rash noted. No erythema. No pallor.   Psychiatric: He has a normal mood and affect. His behavior is normal. Judgment and thought content normal.   Nursing note and vitals reviewed.      Lab Review    Results for orders placed or performed in visit on 07/08/19   CBC Auto Differential   Result Value Ref Range    WBC 6.36 3.40 - 10.80 10*3/mm3    RBC 4.85 4.14 - 5.80 10*6/mm3    Hemoglobin 13.9 13.0 - 17.7 g/dL    Hematocrit 40.5 37.5 - 51.0 %    MCV 83.5 79.0 - 97.0 fL    MCH 28.7 26.6 - 33.0 pg    MCHC 34.3 31.5 - 35.7 g/dL    RDW 13.6 12.3 - 15.4 %    RDW-SD 40.3 37.0 - 54.0 fl    MPV 9.6 6.0 - 12.0 fL    Platelets 248 140 - 450 10*3/mm3    Neutrophil % 71.2 42.7 - 76.0 %    Lymphocyte % 18.4 (L) 19.6 - 45.3 %    Monocyte % 7.1 5.0 - 12.0 %    Eosinophil % 2.0 0.3 - 6.2 %    Basophil % 0.5 0.0 - 1.5 %    Immature Grans % 0.8 (H) 0.0 - 0.5 %    Neutrophils, Absolute 4.53 1.70 - 7.00 10*3/mm3    Lymphocytes, Absolute 1.17 0.70 - 3.10 10*3/mm3    Monocytes, Absolute 0.45 0.10 - 0.90 10*3/mm3    Eosinophils, Absolute 0.13 0.00 - 0.40 10*3/mm3    Basophils, Absolute 0.03 0.00 - 0.20 10*3/mm3    Immature Grans, Absolute 0.05 0.00 - 0.05 10*3/mm3    nRBC 0.0 0.0 - 0.2 /100 WBC   Comprehensive Metabolic Panel   Result Value Ref Range    Glucose 172 (H) 65 - 99 mg/dL    BUN 13 8 - 23 mg/dL    Creatinine 0.85 0.76 - 1.27 mg/dL    Sodium 134 (L) 136 - 145 mmol/L    Potassium 4.7 3.5 - 5.2 mmol/L    Chloride 96 (L) 98 - 107 mmol/L    CO2 25.9 22.0 - 29.0 mmol/L    Calcium 9.1 8.6 - 10.5 mg/dL    Total Protein 7.5 6.0 - 8.5 g/dL    Albumin 4.70 3.50 - 5.20 g/dL    ALT  (SGPT) 26 1 - 41 U/L    AST (SGOT) 20 1 - 40 U/L    Alkaline Phosphatase 35 (L) 39 - 117 U/L    Total Bilirubin 0.5 0.2 - 1.2 mg/dL    eGFR Non African Amer 88 >60 mL/min/1.73    Globulin 2.8 gm/dL    A/G Ratio 1.7 g/dL    BUN/Creatinine Ratio 15.3 7.0 - 25.0    Anion Gap 12.1 5.0 - 15.0 mmol/L   Hemoglobin A1c   Result Value Ref Range    Hemoglobin A1C 7.97 (H) 4.80 - 5.60 %   Lipid Panel   Result Value Ref Range    Total Cholesterol 136 0 - 200 mg/dL    Triglycerides 193 (H) 0 - 150 mg/dL    HDL Cholesterol 28 (L) 40 - 60 mg/dL    LDL Cholesterol  69 0 - 100 mg/dL    VLDL Cholesterol 38.6 5 - 40 mg/dL    LDL/HDL Ratio 2.48    Microalbumin / Creatinine Urine Ratio - Urine, Clean Catch   Result Value Ref Range    Microalbumin/Creatinine Ratio      Creatinine, Urine 196.8 mg/dL    Microalbumin, Urine <1.2 mg/dL   TSH   Result Value Ref Range    TSH 2.430 0.270 - 4.200 uIU/mL   Vitamin B12   Result Value Ref Range    Vitamin B-12 570 211 - 946 pg/mL         Assessment/Plan       ICD-10-CM ICD-9-CM   1. Type 2 diabetes mellitus with hyperglycemia, without long-term current use of insulin (CMS/[facility]) E11.65 250.00     790.29   2. Mixed hyperlipidemia E78.2 272.2   3. Essential hypertension I10 401.9   4. Coronary artery disease with angina pectoris, unspecified vessel or lesion type, unspecified whether native or transplanted heart (CMS/[facility]) I25.119 414.00     413.9         I reviewed and summarized records from Andreas Martinez MD from 2019 and I reviewed / ordered labs.   From review of records :    Pt has type 2 Diabetes with CAD     Glycemic Management:   Lab Results   Component Value Date    HGBA1C 7.97 (H) 10/11/2019    HGBA1C 8.34 (H) 06/21/2019    HGBA1C 7.9 (H) 02/19/2019     Lab Results   Component Value Date    GLUCOSE 172 (H) 10/11/2019    BUN 13 10/11/2019    CREATININE 0.85 10/11/2019    EGFRIFNONA 88 10/11/2019    BCR 15.3 10/11/2019    K 4.7 10/11/2019    CO2 25.9 10/11/2019    CALCIUM 9.1 10/11/2019     ALBUMIN 4.70 10/11/2019    AST 20 10/11/2019    ALT 26 10/11/2019    ANIONGAP 12.1 10/11/2019     Lab Results   Component Value Date    WBC 6.36 10/11/2019    HGB 13.9 10/11/2019    HCT 40.5 10/11/2019    MCV 83.5 10/11/2019     10/11/2019          Synjardy XR 10/1000 mg w bkfast - couldn't tolerate -- go back to metformin alone - afraid of side effect    Trulicity 0.75 mg weekly - increase to 1.5 mg weekly - not paid for     Change to     toujeo 12 units daily and novolin R 4 units with meals       Lipid Management  Lab Results   Component Value Date    CHOL 136 10/11/2019    CHOL 153 06/21/2019    CHOL 168 02/19/2019     Lab Results   Component Value Date    TRIG 193 (H) 10/11/2019    TRIG 242 (H) 06/21/2019    TRIG 310 (H) 02/19/2019     Lab Results   Component Value Date    HDL 28 (L) 10/11/2019    HDL 29 (L) 06/21/2019    HDL 31 (L) 02/19/2019     No components found for: LDLCALC  Lab Results   Component Value Date    LDL 69 10/11/2019    LDL 76 06/21/2019    LDL 98 02/19/2019       On atorvastatin       Blood Pressure Management:    Vitals:    02/10/20 1430   BP: 130/76   Pulse: 81   SpO2: 99%     Lab Results   Component Value Date    GLUCOSE 172 (H) 10/11/2019    CALCIUM 9.1 10/11/2019     (L) 10/11/2019    K 4.7 10/11/2019    CO2 25.9 10/11/2019    CL 96 (L) 10/11/2019    BUN 13 10/11/2019    CREATININE 0.85 10/11/2019    EGFRIFNONA 88 10/11/2019    BCR 15.3 10/11/2019    ANIONGAP 12.1 10/11/2019     Controlled on ACE I regimen           Microvascular Complication Monitoring:      Eye Exam Evaluation  Within 1 year     -----------    Last Microalbumin-Proteinuria Assessment    Lab Results   Component Value Date    MALBCRERATIO  10/11/2019      Comment:      Unable to calculate    MALBCRERATIO 4.1 10/04/2018    MALBCRERATIO  04/10/2018      Comment:      Unable to calculate       No results found for: UTPCR    -----------      Neuropathy        Weight Related:   Wt Readings from Last 3  Encounters:   02/10/20 88.6 kg (195 lb 6.4 oz)   01/28/20 90.7 kg (200 lb)   10/16/19 87.8 kg (193 lb 9.6 oz)     Body mass index is 28.86 kg/m².        Diet interventions: moderate (500 kCal/d) deficit diet.      Bone Health    Lab Results   Component Value Date    CALCIUM 9.1 10/11/2019    [id number] 41.2 06/21/2019       Thyroid Health    Lab Results   Component Value Date    TSH 2.430 10/11/2019    TSH 2.610 06/21/2019    TSH 1.730 10/04/2018         Other Diabetes Related Aspects       Lab Results   Component Value Date    [id number] 570 10/11/2019           No orders of the defined types were placed in this encounter.        A copy of my note was sent to Andreas Martinez MD    Please see my above opinion and suggestions.

## 2020-02-20 ENCOUNTER — OFFICE VISIT (OUTPATIENT)
Dept: FAMILY MEDICINE CLINIC | Facility: CLINIC | Age: 75
End: 2020-02-20

## 2020-02-20 VITALS
OXYGEN SATURATION: 94 % | BODY MASS INDEX: 29.03 KG/M2 | SYSTOLIC BLOOD PRESSURE: 110 MMHG | HEIGHT: 69 IN | TEMPERATURE: 97.9 F | DIASTOLIC BLOOD PRESSURE: 60 MMHG | WEIGHT: 196 LBS | HEART RATE: 78 BPM

## 2020-02-20 DIAGNOSIS — R79.89 HIGH SERUM VITAMIN B12: ICD-10-CM

## 2020-02-20 DIAGNOSIS — K21.00 GASTROESOPHAGEAL REFLUX DISEASE WITH ESOPHAGITIS: ICD-10-CM

## 2020-02-20 DIAGNOSIS — F41.1 GAD (GENERALIZED ANXIETY DISORDER): ICD-10-CM

## 2020-02-20 DIAGNOSIS — I10 ESSENTIAL HYPERTENSION: ICD-10-CM

## 2020-02-20 DIAGNOSIS — E66.3 OVERWEIGHT: ICD-10-CM

## 2020-02-20 DIAGNOSIS — E78.2 MIXED HYPERLIPIDEMIA: ICD-10-CM

## 2020-02-20 DIAGNOSIS — G89.29 OTHER CHRONIC PAIN: ICD-10-CM

## 2020-02-20 DIAGNOSIS — F41.1 GENERALIZED ANXIETY DISORDER: ICD-10-CM

## 2020-02-20 DIAGNOSIS — E55.9 VITAMIN D DEFICIENCY: ICD-10-CM

## 2020-02-20 DIAGNOSIS — M79.671 RIGHT FOOT PAIN: Primary | ICD-10-CM

## 2020-02-20 DIAGNOSIS — E11.9 TYPE 2 DIABETES MELLITUS WITHOUT COMPLICATION, WITHOUT LONG-TERM CURRENT USE OF INSULIN (HCC): ICD-10-CM

## 2020-02-20 DIAGNOSIS — I25.119 CORONARY ARTERY DISEASE WITH ANGINA PECTORIS, UNSPECIFIED VESSEL OR LESION TYPE, UNSPECIFIED WHETHER NATIVE OR TRANSPLANTED HEART (HCC): ICD-10-CM

## 2020-02-20 DIAGNOSIS — Z86.010 HISTORY OF COLON POLYPS: ICD-10-CM

## 2020-02-20 PROCEDURE — 99214 OFFICE O/P EST MOD 30 MIN: CPT | Performed by: FAMILY MEDICINE

## 2020-02-20 RX ORDER — CLOPIDOGREL BISULFATE 75 MG/1
TABLET ORAL
Qty: 90 TABLET | Refills: 3 | Status: SHIPPED | OUTPATIENT
Start: 2020-02-20 | End: 2020-06-15 | Stop reason: ALTCHOICE

## 2020-02-20 RX ORDER — METOPROLOL TARTRATE 50 MG/1
50 TABLET, FILM COATED ORAL EVERY 12 HOURS SCHEDULED
Qty: 180 TABLET | Refills: 1 | Status: SHIPPED | OUTPATIENT
Start: 2020-02-20 | End: 2020-08-05 | Stop reason: SDUPTHER

## 2020-02-20 RX ORDER — AMLODIPINE BESYLATE 10 MG/1
10 TABLET ORAL DAILY
Qty: 90 TABLET | Refills: 3 | Status: SHIPPED | OUTPATIENT
Start: 2020-02-20 | End: 2020-05-18 | Stop reason: SDUPTHER

## 2020-02-20 RX ORDER — ALPRAZOLAM 0.5 MG/1
0.5 TABLET ORAL NIGHTLY PRN
Qty: 30 TABLET | Refills: 0 | Status: SHIPPED | OUTPATIENT
Start: 2020-02-20 | End: 2020-02-20 | Stop reason: SDUPTHER

## 2020-02-20 RX ORDER — METFORMIN HYDROCHLORIDE 500 MG/1
500 TABLET, EXTENDED RELEASE ORAL
Qty: 90 TABLET | Refills: 3 | Status: SHIPPED | OUTPATIENT
Start: 2020-02-20 | End: 2020-05-08 | Stop reason: SDUPTHER

## 2020-02-20 RX ORDER — ALPRAZOLAM 0.5 MG/1
0.5 TABLET ORAL NIGHTLY PRN
Qty: 30 TABLET | Refills: 0 | Status: SHIPPED | OUTPATIENT
Start: 2020-02-20 | End: 2020-05-18 | Stop reason: SDUPTHER

## 2020-02-20 RX ORDER — LISINOPRIL 40 MG/1
40 TABLET ORAL DAILY
Qty: 90 TABLET | Refills: 3 | Status: ON HOLD | OUTPATIENT
Start: 2020-02-20 | End: 2020-06-06 | Stop reason: SDUPTHER

## 2020-02-20 NOTE — PATIENT INSTRUCTIONS
Nowak Neuralgia    Nowak neuralgia is foot pain that affects the ball of the foot and the area near the toes. Nowak neuralgia occurs when part of a nerve in the foot (digital nerve) is under too much pressure (compressed). When this happens over a long period of time, the nerve can thicken (neuroma) and cause pain. Pain usually occurs between the third and fourth toes.   Nowak neuralgia can come and go but may get worse over time.  What are the causes?  This condition is caused by doing the same things over and over with your foot, such as:  · Activities such as running or jumping.  · Wearing shoes that are too tight.  What increases the risk?  You may be at higher risk for Nowak neuralgia if you:  · Are female.  · Wear high heels.  · Wear shoes that are narrow or tight.  · Do activities that repeatedly stretch your toes, such as:  ? Running.  ? Ballet.  ? Long-distance walking.  What are the signs or symptoms?  The first symptom of Nowak neuralgia is pain that spreads from the ball of the foot to the toes. It may feel like you are walking on a marble. Pain usually gets worse with walking and goes away at night. Other symptoms may include numbness and cramping of your toes. Both feet are equally affected, but rarely at the same time.  How is this diagnosed?  This condition is diagnosed based on your symptoms, your medical history, and a physical exam. Your health care provider may:  · Squeeze your foot just behind your toe.  · Ask you to move your toes to check for pain.  · Ask about your physical activity level.  You also may have imaging tests, such as an X-ray, ultrasound, or MRI.  How is this treated?  Treatment depends on how severe your condition is and what causes it. Treatment may involve:  · Wearing different shoes that are not too tight, are low-heeled, and provide good support. For some people, this is the only treatment needed.  · Wearing an over-the-counter or custom supportive pad (orthotic)  under the front of your foot.  · Getting injections of numbing medicine and anti-inflammatory medicine (steroid) in the nerve.  · Having surgery to remove part of the thickened nerve.  Follow these instructions at home:  Managing pain, stiffness, and swelling    · Massage your foot as needed.  · Wear orthotics as told by your health care provider.  · If directed, put ice on your foot:  ? Put ice in a plastic bag.  ? Place a towel between your skin and the bag.  ? Leave the ice on for 20 minutes, 2-3 times a day.  · Avoid activities that cause pain or make pain worse. If you play sports, ask your health care provider when it is safe for you to return to sports.  · Raise (elevate) your foot above the level of your heart while lying down and, when possible, while sitting.  General instructions  · Take over-the-counter and prescription medicines only as told by your health care provider.  · Do not drive or use heavy machinery while taking prescription pain medicine.  · Wear shoes that:  ? Have soft soles.  ? Have a wide toe area.  ? Provide arch support.  ? Do not pinch or squeeze your feet.  ? Have room for your orthotics, if applicable.  · Keep all follow-up visits as told by your health care provider. This is important.  Contact a health care provider if:  · Your symptoms get worse or do not get better with treatment and home care.  Summary  · Nowak neuralgia is foot pain that affects the ball of the foot and the area near the toes. Pain usually occurs between the third and fourth toes, gets worse with walking, and goes away at night.  · Nowak neuralgia occurs when part of a nerve in the foot (digital nerve) is under too much pressure. When this happens over a long period of time, the nerve can thicken (neuroma) and cause pain.  · This condition is caused by doing the same things over and over with your foot, such as running or jumping, wearing shoes that are too tight, or wearing high heels.  · Treatment may  involve wearing low-heeled shoes that are not too tight, wearing a supportive pad (orthotic) under the front of your foot, getting injections in the nerve, or having surgery to remove part of the thickened nerve.  This information is not intended to replace advice given to you by your health care provider. Make sure you discuss any questions you have with your health care provider.  Document Released: 03/26/2002 Document Revised: 01/01/2019 Document Reviewed: 01/01/2019  [employer] Interactive Patient Education © 2020 [employer]

## 2020-02-25 PROBLEM — Z23 NEED FOR INFLUENZA VACCINATION: Status: RESOLVED | Noted: 2018-10-31 | Resolved: 2020-02-25

## 2020-02-25 PROBLEM — Z01.00 ENCOUNTER FOR EYE EXAM: Status: RESOLVED | Noted: 2019-03-30 | Resolved: 2020-02-25

## 2020-02-25 PROBLEM — R19.7 DIARRHEA: Status: RESOLVED | Noted: 2017-11-07 | Resolved: 2020-02-25

## 2020-02-25 PROBLEM — Z00.00 GENERAL MEDICAL EXAMINATION: Status: RESOLVED | Noted: 2018-10-03 | Resolved: 2020-02-25

## 2020-02-26 ENCOUNTER — LAB (OUTPATIENT)
Dept: LAB | Facility: HOSPITAL | Age: 75
End: 2020-02-26

## 2020-02-26 DIAGNOSIS — G89.29 OTHER CHRONIC PAIN: ICD-10-CM

## 2020-02-26 DIAGNOSIS — E11.9 TYPE 2 DIABETES MELLITUS WITHOUT COMPLICATION, WITHOUT LONG-TERM CURRENT USE OF INSULIN (HCC): ICD-10-CM

## 2020-02-26 DIAGNOSIS — I10 ESSENTIAL HYPERTENSION: ICD-10-CM

## 2020-02-26 DIAGNOSIS — F41.1 GAD (GENERALIZED ANXIETY DISORDER): ICD-10-CM

## 2020-02-26 LAB
ALBUMIN SERPL-MCNC: 4.6 G/DL (ref 3.5–5.2)
ALBUMIN/GLOB SERPL: 1.5 G/DL
ALP SERPL-CCNC: 35 U/L (ref 39–117)
ALT SERPL W P-5'-P-CCNC: 57 U/L (ref 1–41)
AMPHET+METHAMPHET UR QL: NEGATIVE
AMPHETAMINES UR QL: NEGATIVE
ANION GAP SERPL CALCULATED.3IONS-SCNC: 14.2 MMOL/L (ref 5–15)
AST SERPL-CCNC: 42 U/L (ref 1–40)
BARBITURATES UR QL SCN: NEGATIVE
BASOPHILS # BLD AUTO: 0.04 10*3/MM3 (ref 0–0.2)
BASOPHILS NFR BLD AUTO: 0.8 % (ref 0–1.5)
BENZODIAZ UR QL SCN: POSITIVE
BILIRUB SERPL-MCNC: 0.5 MG/DL (ref 0.2–1.2)
BUN BLD-MCNC: 16 MG/DL (ref 8–23)
BUN/CREAT SERPL: 15.1 (ref 7–25)
BUPRENORPHINE SERPL-MCNC: NEGATIVE NG/ML
CALCIUM SPEC-SCNC: 9.9 MG/DL (ref 8.6–10.5)
CANNABINOIDS SERPL QL: NEGATIVE
CHLORIDE SERPL-SCNC: 98 MMOL/L (ref 98–107)
CHOLEST SERPL-MCNC: 159 MG/DL (ref 0–200)
CO2 SERPL-SCNC: 25.8 MMOL/L (ref 22–29)
COCAINE UR QL: NEGATIVE
CREAT BLD-MCNC: 1.06 MG/DL (ref 0.76–1.27)
DEPRECATED RDW RBC AUTO: 41.1 FL (ref 37–54)
EOSINOPHIL # BLD AUTO: 0.18 10*3/MM3 (ref 0–0.4)
EOSINOPHIL NFR BLD AUTO: 3.4 % (ref 0.3–6.2)
ERYTHROCYTE [DISTWIDTH] IN BLOOD BY AUTOMATED COUNT: 14.1 % (ref 12.3–15.4)
GFR SERPL CREATININE-BSD FRML MDRD: 68 ML/MIN/1.73
GLOBULIN UR ELPH-MCNC: 3 GM/DL
GLUCOSE BLD-MCNC: 230 MG/DL (ref 65–99)
HBA1C MFR BLD: 8.34 % (ref 4.8–5.6)
HCT VFR BLD AUTO: 41 % (ref 37.5–51)
HDLC SERPL-MCNC: 30 MG/DL (ref 40–60)
HGB BLD-MCNC: 14.5 G/DL (ref 13–17.7)
IMM GRANULOCYTES # BLD AUTO: 0.03 10*3/MM3 (ref 0–0.05)
IMM GRANULOCYTES NFR BLD AUTO: 0.6 % (ref 0–0.5)
LDLC SERPL CALC-MCNC: 73 MG/DL (ref 0–100)
LDLC/HDLC SERPL: 2.43 {RATIO}
LYMPHOCYTES # BLD AUTO: 1.4 10*3/MM3 (ref 0.7–3.1)
LYMPHOCYTES NFR BLD AUTO: 26.8 % (ref 19.6–45.3)
MCH RBC QN AUTO: 29.1 PG (ref 26.6–33)
MCHC RBC AUTO-ENTMCNC: 35.4 G/DL (ref 31.5–35.7)
MCV RBC AUTO: 82.3 FL (ref 79–97)
METHADONE UR QL SCN: NEGATIVE
MONOCYTES # BLD AUTO: 0.35 10*3/MM3 (ref 0.1–0.9)
MONOCYTES NFR BLD AUTO: 6.7 % (ref 5–12)
NEUTROPHILS # BLD AUTO: 3.23 10*3/MM3 (ref 1.7–7)
NEUTROPHILS NFR BLD AUTO: 61.7 % (ref 42.7–76)
NRBC BLD AUTO-RTO: 0 /100 WBC (ref 0–0.2)
OPIATES UR QL: NEGATIVE
OXYCODONE UR QL SCN: NEGATIVE
PCP UR QL SCN: NEGATIVE
PLATELET # BLD AUTO: 216 10*3/MM3 (ref 140–450)
PMV BLD AUTO: 10.1 FL (ref 6–12)
POTASSIUM BLD-SCNC: 4.8 MMOL/L (ref 3.5–5.2)
PROPOXYPH UR QL: NEGATIVE
PROT SERPL-MCNC: 7.6 G/DL (ref 6–8.5)
RBC # BLD AUTO: 4.98 10*6/MM3 (ref 4.14–5.8)
SODIUM BLD-SCNC: 138 MMOL/L (ref 136–145)
TRICYCLICS UR QL SCN: NEGATIVE
TRIGL SERPL-MCNC: 280 MG/DL (ref 0–150)
VIT B12 BLD-MCNC: 1168 PG/ML (ref 211–946)
VLDLC SERPL-MCNC: 56 MG/DL (ref 5–40)
WBC NRBC COR # BLD: 5.23 10*3/MM3 (ref 3.4–10.8)

## 2020-02-26 PROCEDURE — 85025 COMPLETE CBC W/AUTO DIFF WBC: CPT

## 2020-02-26 PROCEDURE — 80306 DRUG TEST PRSMV INSTRMNT: CPT

## 2020-02-26 PROCEDURE — 80053 COMPREHEN METABOLIC PANEL: CPT

## 2020-02-26 PROCEDURE — 82607 VITAMIN B-12: CPT

## 2020-02-26 PROCEDURE — 83036 HEMOGLOBIN GLYCOSYLATED A1C: CPT

## 2020-02-26 PROCEDURE — 80061 LIPID PANEL: CPT

## 2020-02-27 ENCOUNTER — TELEPHONE (OUTPATIENT)
Dept: FAMILY MEDICINE CLINIC | Facility: CLINIC | Age: 75
End: 2020-02-27

## 2020-02-28 DIAGNOSIS — M25.561 ACUTE PAIN OF RIGHT KNEE: Primary | ICD-10-CM

## 2020-03-02 ENCOUNTER — OFFICE VISIT (OUTPATIENT)
Dept: ORTHOPEDIC SURGERY | Facility: CLINIC | Age: 75
End: 2020-03-02

## 2020-03-02 VITALS — WEIGHT: 197.1 LBS | BODY MASS INDEX: 29.19 KG/M2 | HEIGHT: 69 IN

## 2020-03-02 DIAGNOSIS — M17.11 PRIMARY OSTEOARTHRITIS OF RIGHT KNEE: ICD-10-CM

## 2020-03-02 DIAGNOSIS — S83.241A TEAR OF MEDIAL MENISCUS OF RIGHT KNEE, CURRENT, UNSPECIFIED TEAR TYPE, INITIAL ENCOUNTER: ICD-10-CM

## 2020-03-02 DIAGNOSIS — M25.561 ACUTE PAIN OF RIGHT KNEE: Primary | ICD-10-CM

## 2020-03-02 PROCEDURE — 20610 DRAIN/INJ JOINT/BURSA W/O US: CPT | Performed by: ORTHOPAEDIC SURGERY

## 2020-03-02 PROCEDURE — 99203 OFFICE O/P NEW LOW 30 MIN: CPT | Performed by: ORTHOPAEDIC SURGERY

## 2020-03-02 NOTE — PROGRESS NOTES
Aj Card Jr. is a 74 y.o. male   Primary provider:  Andreas Martinez MD       Chief Complaint   Patient presents with   • Right Knee - Pain, Initial Evaluation       HISTORY OF PRESENT ILLNESS: Patient is being seen today for right knee pain. Pain level of 2/10.  About a week or so he fell out of the bed about 3 feet off the ground.  He got back up it really did not bother him to a couple days later he started increasing pain and swelling in his right knee.  No history of problems prior to that he is fairly active and really has not bothered him previously.  Does have occasional sensation of catching and buckling enough to make it want to fall.  Is been otherwise fairly healthy and maintains a very active lifestyle    Pain   This is a new problem. The current episode started 1 to 4 weeks ago (a week and a half ago). The problem occurs constantly. Associated symptoms include arthralgias, joint swelling and a rash. Pertinent negatives include no abdominal pain, chest pain, chills, fever, nausea or vomiting. Associated symptoms comments: Aching, clicking/popping/snapping, and swelling.. The symptoms are aggravated by standing and walking (Sitting and driving.).        CONCURRENT MEDICAL HISTORY:    Past Medical History:   Diagnosis Date   • Acute posthemorrhagic anemia    • Allergic rhinitis    • Anxiety state    • Chest pain    • Coronary arteriosclerosis    • Diabetes mellitus (CMS/HCC)     type 2   • Diverticular disease of colon    • Dysphagia    • Epigastric pain    • GERD (gastroesophageal reflux disease)     esophagitis on Dexilant. Pt feels Nexium working better      • History of echocardiogram     Small left atrial enlargement with mild CLVH.Ef of 55-60%.Mitral valve mildly thickened.Av thickened.Left ventricle mildly dilated.Tricuspid intact.Mild aortic insufficiency. 12/07/2015       • History of echocardiogram     Mild diastolic dysfunction and mild left atrial enlargement, otherwise normal  echo. EF> 55% 01/03/2012       • Hypercholesterolemia    • Hypertension    • Insomnia    • Irritable bowel syndrome    • Osteoarthritis of multiple joints        Allergies   Allergen Reactions   • Brilinta [Ticagrelor] Shortness Of Breath   • Effient [Prasugrel] Shortness Of Breath   • Warfarin And Related Shortness Of Breath   • Ciprofloxacin Hives   • Lipitor [Atorvastatin] Swelling   • Latex Itching   • Adhesive Tape Rash   • Cardizem [Diltiazem Hcl] Rash   • Celexa [Citalopram Hydrobromide] Rash   • Crestor [Rosuvastatin Calcium] Rash   • Floxin [Ofloxacin] Rash   • Januvia [Sitagliptin] Rash   • Penicillins Rash   • Sulfa Antibiotics Rash         Current Outpatient Medications:   •  ALPRAZolam (XANAX) 0.5 MG tablet, Take 1 tablet by mouth At Night As Needed for Anxiety., Disp: 30 tablet, Rfl: 0  •  amLODIPine (NORVASC) 10 MG tablet, Take 1 tablet by mouth Daily., Disp: 90 tablet, Rfl: 3  •  aspirin 81 MG EC tablet, Take 2 tablets by mouth Daily., Disp: 90 tablet, Rfl: 3  •  azelastine (ASTELIN) 0.1 % nasal spray, 2 sprays into the nostril(s) as directed by provider 2 (Two) Times a Day. Use in each nostril as directed, Disp: 90 mL, Rfl: 3  •  Blood Glucose Monitoring Suppl w/Device kit, USE AS INDICATED, ANY MONITOR , ICD10 code is E11.9, Disp: 1 each, Rfl: 1  •  cholecalciferol (VITAMIN D3) 1000 units tablet, Take 1 tablet by mouth Daily., Disp: 30 tablet, Rfl: 3  •  clopidogrel (PLAVIX) 75 MG tablet, 1 PO QOD, Disp: 90 tablet, Rfl: 3  •  coenzyme Q10 100 MG capsule, Take 100 mg by mouth daily., Disp: , Rfl:   •  Crisaborole (EUCRISA) 2 % ointment, Apply 1 application topically 2 (Two) Times a Day., Disp: 60 g, Rfl: 3  •  esomeprazole (nexIUM) 40 MG capsule, Take 1 capsule by mouth Every Morning Before Breakfast., Disp: 90 capsule, Rfl: 3  •  Glucose Blood (BLOOD GLUCOSE TEST) strip, Use 4 x daily use any brand covered by insurance or same brand as before ICD10 code is E11.9, Disp: 120 each, Rfl: 11  •   "icosapent ethyl (VASCEPA) 1 g capsule capsule, Take 2 g by mouth 2 (Two) Times a Day With Meals., Disp: 120 capsule, Rfl: 3  •  Insulin Glargine, 2 Unit Dial, (TOUJEO MAX SOLOSTAR) 300 UNIT/ML solution pen-injector injection, 12 units daily, Disp: 1 pen, Rfl: 11  •  insulin regular (HUMULIN R) 100 UNIT/ML injection, 4 units with meals, Disp: 1 each, Rfl: 12  •  Insulin Syringe 31G X 5/16\" 0.3 ML misc, 4 x daily, Disp: 120 each, Rfl: 11  •  KRILL OIL OMEGA-3 PO, Take  by mouth daily., Disp: , Rfl:   •  Lancet Devices (LANCING DEVICE) misc, USE AS INDICATED TO CORRELATE WITH STRIPS AND METER, Disp: 1 each, Rfl: 1  •  Lancets 30G misc, USE 4 X DAILY, Disp: 120 each, Rfl: 11  •  lisinopril (PRINIVIL,ZESTRIL) 40 MG tablet, Take 1 tablet by mouth Daily., Disp: 90 tablet, Rfl: 3  •  metFORMIN ER (GLUCOPHAGE-XR) 500 MG 24 hr tablet, Take 1 tablet by mouth Daily With Breakfast., Disp: 90 tablet, Rfl: 3  •  metoprolol tartrate (LOPRESSOR) 50 MG tablet, Take 1 tablet by mouth Every 12 (Twelve) Hours., Disp: 180 tablet, Rfl: 1  •  Red Yeast Rice Extract (RED YEAST RICE PO), Take  by mouth Daily., Disp: , Rfl:   •  Saw Palmetto, Serenoa repens, (SAW PALMETTO PO), Take  by mouth daily., Disp: , Rfl:   •  sucralfate (CARAFATE) 1 g tablet, Take 1 tablet by mouth 2 (Two) Times a Day As Needed (nausea, abd pain)., Disp: 180 tablet, Rfl: 3  •  vitamin B-12 (CYANOCOBALAMIN) 2500 MCG sublingual tablet tablet, Place  under the tongue Every Other Day., Disp: , Rfl:   •  LOTEMAX 0.5 % ophthalmic suspension, , Disp: , Rfl:     Past Surgical History:   Procedure Laterality Date   • APPENDECTOMY       [facility] 1995       • CARDIAC CATHETERIZATION      Successful PTCA of the OMB#2.Unsuccessful PTCA of the 1st OMB, secondary to difficulty in advancing the balloon. 12/08/2015       • CHOLECYSTECTOMY      [facility] 1993       • COLONOSCOPY  10/01/2012   • COLONOSCOPY N/A 12/11/2017    Procedure: COLONOSCOPY;  " Surgeon: Bladimir Michael MD;  Location: [facility] ENDOSCOPY;  Service:    • CORONARY ANGIOPLASTY      Successful PTCA & stenting LAD was done w/ deployment of a 2.5mm x23 Xience stent w/ reduction of stenosis from 90% to less than 0% stenosis with good LILLIAM 3 flow 02/17/2012       • ENDOSCOPY      with biopsy.  Mildly severe esophagitis. Gastritis. Normal examined duodenum.Several biopsies obtained in the lower third of the esophagus.Several biopsies obtained in the gastric antrum. 05/06/2016       • ENDOSCOPY      w tube. Normal esophagus. Gastritis in stomach. Biopsy taken. Gastric polyp found. Biopsy taken. Normal duodenum. 10/01/2012       • ENDOSCOPY AND COLONOSCOPY      Diverticulum in sigmoid colon & descending colon. Internal & external hemorrhoids found. 10/01/2012       • HAND SURGERY       Corrective osteotomy of ring finger metacarpal. Malunited fracture of left ring finger 05/21/1985       • HERNIA REPAIR      Laparoscopic hernia repair. prepertitoneal bilateral inguinal hernia repair with mesh. 10/15/2009      • OTHER SURGICAL HISTORY      CORONARY ARTERY STENT    • SKIN TAG REMOVAL      Excision, viral skin tag,right upper eyelid 05/08/1995        Family History   Problem Relation Age of Onset   • Clotting disorder Other    • Lung disease Other    • Schizophrenia Sister    • Obesity Sister    • Tuberculosis Sister    • Arthritis Mother    • Diabetes Mother    • Heart disease Mother    • Hypertension Mother    • Obesity Mother    • Stroke Mother    • Thyroid disease Mother    • Tuberculosis Mother    • Arthritis Father    • Tuberculosis Father        Social History     Socioeconomic History   • Marital status:      Spouse name: Not on file   • Number of children: Not on file   • Years of education: Not on file   • Highest education level: Not on file   Tobacco Use   • Smoking status: Never Smoker   • Smokeless tobacco: Never Used   Substance and Sexual Activity   • Alcohol use: No   • Drug use: No   "  • Sexual activity: Defer        Review of Systems   Constitutional: Positive for activity change. Negative for chills and fever.   HENT: Positive for postnasal drip and tinnitus. Negative for facial swelling.    Eyes: Negative for photophobia.        Dry eyes   Respiratory: Negative.  Negative for apnea and shortness of breath.    Cardiovascular: Negative.  Negative for chest pain and leg swelling.   Gastrointestinal: Negative.  Negative for abdominal pain, nausea and vomiting.   Endocrine: Negative.    Genitourinary: Negative.  Negative for dysuria.   Musculoskeletal: Positive for arthralgias and joint swelling.   Skin: Positive for rash. Negative for color change.   Allergic/Immunologic: Negative.    Neurological: Negative.  Negative for seizures and syncope.   Hematological: Negative.    Psychiatric/Behavioral: Positive for sleep disturbance. Negative for behavioral problems and dysphoric mood. The patient is nervous/anxious.          PHYSICAL EXAMINATION:       Ht 175.3 cm (69\")   Wt 89.4 kg (197 lb 1.6 oz)   BMI 29.11 kg/m²     Physical Exam   Constitutional: He is oriented to person, place, and time. He appears well-developed.   HENT:   Head: Normocephalic and atraumatic.   Eyes: Pupils are equal, round, and reactive to light. EOM are normal.   Neck: Neck supple. No tracheal deviation present.   Pulmonary/Chest: Effort normal.   Musculoskeletal: He exhibits edema and tenderness. He exhibits no deformity.   Neurological: He is alert and oriented to person, place, and time.   Skin: Skin is warm and dry. No erythema.   Psychiatric: He has a normal mood and affect.       GAIT:     []  Normal  []  Antalgic    Assistive device: [x]  None  []  Walker     []  Crutches  []  Cane     []  Wheelchair  []  Stretcher    Ortho Exam  1+ effusion.  Mild/contracture probably 3 to 4 degrees of full extension is lacking.  Tender over the medial joint line posterior medial joint line.  Sandi's is positive.  Calf is " negative.  He is neurovascular intact distally.  Good capillary refill distally.  Ligaments are grossly stable.        No results found.  Bring x-rays show    ASSESSMENT:  Large Joint Arthrocentesis: R knee  Date/Time: 3/2/2020 4:01 PM  Consent given by: patient  Site marked: site marked  Timeout: Immediately prior to procedure a time out was called to verify the correct patient, procedure, equipment, support staff and site/side marked as required   Supporting Documentation  Indications: pain   Procedure Details  Location: knee - R knee  Preparation: Patient was prepped and draped in the usual sterile fashion  Needle size: 22 G  Approach: anteromedial  Medications administered: 4 mL lidocaine 1 %, 2 mL triamcinolone acetonide 40 MG/ML  Patient tolerance: patient tolerated the procedure well with no immediate complications          Diagnoses and all orders for this visit:    Acute pain of right knee  -     Large Joint Arthrocentesis: R knee    Primary osteoarthritis of right knee    Tear of medial meniscus of right knee, current, unspecified tear type, initial encounter          PLAN certainly the concern is a meniscal tear.  I explained this to he and his wife.  Were going to try an intra-articular injection today as above.  He will ice tonight limit his twisting work on extending his leg and follow-up in 3 weeks.  If not significant better would proceed with MRI scan at that point.  He will call me sooner with any problems.    Body mass index is 29.11 kg/m².    No follow-ups on file.      This document has been electronically signed by Jonathan Shah MD on March 2, 2020 4:32 PM

## 2020-03-16 ENCOUNTER — TELEPHONE (OUTPATIENT)
Dept: FAMILY MEDICINE CLINIC | Facility: CLINIC | Age: 75
End: 2020-03-16

## 2020-03-16 DIAGNOSIS — R74.8 ELEVATED LIVER ENZYMES: Primary | ICD-10-CM

## 2020-03-16 NOTE — TELEPHONE ENCOUNTER
Gave pt results and recommendations. Pt would like US of Liver and voiced understanding on taking B12 only 2 days a week.

## 2020-03-16 NOTE — TELEPHONE ENCOUNTER
Made aware of US of Liver 03/20/2020 must [facility] by 7:45 and nothing to eat or drink after midnight.

## 2020-03-16 NOTE — TELEPHONE ENCOUNTER
----- Message from Andreas Martinez MD sent at 2/26/2020  8:39 PM CST -----  Please call pt    Vitamin b12 high and have pt take b12 supplements 2 days a week instead of every other day    hga1c at 8.34.  Goal <7./0 needs to watch sugar and carb intake     On CMP glucose high and liver enzymes elevated. Recommend US of liver. Let  Me know if pt agreeable to this     On lipid panel triglycerides high related to sugars. HDL low continue heart healthy diet more vegetables and lean protein     CBC shows normal hemoglobin     Recheck on next visit. Thanks

## 2020-03-27 ENCOUNTER — TELEPHONE (OUTPATIENT)
Dept: FAMILY MEDICINE CLINIC | Facility: CLINIC | Age: 75
End: 2020-03-27

## 2020-03-27 NOTE — TELEPHONE ENCOUNTER
----- Message from Andreas Martinez MD sent at 3/27/2020  9:07 AM CDT -----  Regarding: US of liver   Please call pt    Pt has fatty liver based on recent US of liver  Recommend weight loss, healthy diet    May need to see Gastroenterology in future    Recheck on next visit    30-May-2018 20:33

## 2020-03-30 ENCOUNTER — TELEPHONE (OUTPATIENT)
Dept: ENDOCRINOLOGY | Facility: CLINIC | Age: 75
End: 2020-03-30

## 2020-03-31 DIAGNOSIS — R74.8 ELEVATED LIVER ENZYMES: ICD-10-CM

## 2020-04-30 DIAGNOSIS — R12 HEARTBURN: ICD-10-CM

## 2020-04-30 DIAGNOSIS — K21.00 GASTROESOPHAGEAL REFLUX DISEASE WITH ESOPHAGITIS: ICD-10-CM

## 2020-04-30 DIAGNOSIS — R10.13 EPIGASTRIC PAIN: ICD-10-CM

## 2020-05-01 RX ORDER — SUCRALFATE 1 G/1
TABLET ORAL
Qty: 180 TABLET | Refills: 0 | Status: SHIPPED | OUTPATIENT
Start: 2020-05-01 | End: 2021-01-04 | Stop reason: SDUPTHER

## 2020-05-08 ENCOUNTER — OFFICE VISIT (OUTPATIENT)
Dept: ENDOCRINOLOGY | Facility: CLINIC | Age: 75
End: 2020-05-08

## 2020-05-08 VITALS
OXYGEN SATURATION: 98 % | SYSTOLIC BLOOD PRESSURE: 128 MMHG | WEIGHT: 195.2 LBS | HEART RATE: 74 BPM | DIASTOLIC BLOOD PRESSURE: 70 MMHG | HEIGHT: 69 IN | BODY MASS INDEX: 28.91 KG/M2

## 2020-05-08 DIAGNOSIS — E78.2 MIXED HYPERLIPIDEMIA: ICD-10-CM

## 2020-05-08 DIAGNOSIS — E11.65 TYPE 2 DIABETES MELLITUS WITH HYPERGLYCEMIA, WITHOUT LONG-TERM CURRENT USE OF INSULIN (HCC): Primary | ICD-10-CM

## 2020-05-08 DIAGNOSIS — I10 ESSENTIAL HYPERTENSION: ICD-10-CM

## 2020-05-08 DIAGNOSIS — K76.0 NAFLD (NONALCOHOLIC FATTY LIVER DISEASE): ICD-10-CM

## 2020-05-08 PROCEDURE — 99214 OFFICE O/P EST MOD 30 MIN: CPT | Performed by: INTERNAL MEDICINE

## 2020-05-08 RX ORDER — METFORMIN HYDROCHLORIDE 500 MG/1
500 TABLET, EXTENDED RELEASE ORAL
Qty: 90 TABLET | Refills: 3 | Status: SHIPPED | OUTPATIENT
Start: 2020-05-08 | End: 2020-05-18 | Stop reason: SDUPTHER

## 2020-05-08 NOTE — PROGRESS NOTES
" Aj Card Jr. is a 74 y.o. male who presents for  evaluation of   Chief Complaint   Patient presents with   • Diabetes     IN OFFICE VISIT    Referring provider   Primary Care Provider    Andreas Martinez MD    Duration since age 71 years old     Timing - Diabetes is Constant    Quality -  Financial aid     Severity -  moderate    Complications - CAD     Current symptoms/problems  none     Alleviating Factors: Compliance       Side Effects  none    Current diet  in general, a \"healthy\" diet      Current exercise walking    Current monitoring regimen: home blood tests - checking 2x daily   Home blood sugar records: elevated fasting     Hypoglycemia none    Past Medical History:   Diagnosis Date   • Acute posthemorrhagic anemia    • Allergic rhinitis    • Anxiety state    • Chest pain    • Coronary arteriosclerosis    • Diabetes mellitus (CMS/HCC)     type 2   • Diverticular disease of colon    • Dysphagia    • Epigastric pain    • GERD (gastroesophageal reflux disease)     esophagitis on Dexilant. Pt feels Nexium working better      • History of echocardiogram     Small left atrial enlargement with mild CLVH.Ef of 55-60%.Mitral valve mildly thickened.Av thickened.Left ventricle mildly dilated.Tricuspid intact.Mild aortic insufficiency. 12/07/2015       • History of echocardiogram     Mild diastolic dysfunction and mild left atrial enlargement, otherwise normal echo. EF> 55% 01/03/2012       • Hypercholesterolemia    • Hypertension    • Insomnia    • Irritable bowel syndrome    • Osteoarthritis of multiple joints      Family History   Problem Relation Age of Onset   • Clotting disorder Other    • Lung disease Other    • Schizophrenia Sister    • Obesity Sister    • Tuberculosis Sister    • Arthritis Mother    • Diabetes Mother    • Heart disease Mother    • Hypertension Mother    • Obesity Mother    • Stroke Mother    • Thyroid disease Mother    • Tuberculosis Mother    • Arthritis Father    • " "Tuberculosis Father      Social History     Tobacco Use   • Smoking status: Never Smoker   • Smokeless tobacco: Never Used   Substance Use Topics   • Alcohol use: No   • Drug use: No         Current Outpatient Medications:   •  ALPRAZolam (XANAX) 0.5 MG tablet, Take 1 tablet by mouth At Night As Needed for Anxiety., Disp: 30 tablet, Rfl: 0  •  amLODIPine (NORVASC) 10 MG tablet, Take 1 tablet by mouth Daily., Disp: 90 tablet, Rfl: 3  •  aspirin 81 MG EC tablet, Take 2 tablets by mouth Daily., Disp: 90 tablet, Rfl: 3  •  azelastine (ASTELIN) 0.1 % nasal spray, 2 sprays into the nostril(s) as directed by provider 2 (Two) Times a Day. Use in each nostril as directed, Disp: 90 mL, Rfl: 3  •  Blood Glucose Monitoring Suppl w/Device kit, USE AS INDICATED, ANY MONITOR , ICD10 code is E11.9, Disp: 1 each, Rfl: 1  •  cholecalciferol (VITAMIN D3) 1000 units tablet, Take 1 tablet by mouth Daily., Disp: 30 tablet, Rfl: 3  •  clopidogrel (PLAVIX) 75 MG tablet, 1 PO QOD, Disp: 90 tablet, Rfl: 3  •  coenzyme Q10 100 MG capsule, Take 100 mg by mouth daily., Disp: , Rfl:   •  Crisaborole (EUCRISA) 2 % ointment, Apply 1 application topically 2 (Two) Times a Day., Disp: 60 g, Rfl: 3  •  esomeprazole (nexIUM) 40 MG capsule, Take 1 capsule by mouth Every Morning Before Breakfast., Disp: 90 capsule, Rfl: 3  •  Glucose Blood (BLOOD GLUCOSE TEST) strip, Use 4 x daily use any brand covered by insurance or same brand as before ICD10 code is E11.9, Disp: 120 each, Rfl: 11  •  icosapent ethyl (VASCEPA) 1 g capsule capsule, Take 2 g by mouth 2 (Two) Times a Day With Meals., Disp: 120 capsule, Rfl: 3  •  insulin regular (HUMULIN R) 100 UNIT/ML injection, 4 units with meals, Disp: 1 each, Rfl: 12  •  Insulin Syringe 31G X 5/16\" 0.3 ML misc, 4 x daily, Disp: 120 each, Rfl: 11  •  KRILL OIL OMEGA-3 PO, Take  by mouth daily., Disp: , Rfl:   •  Lancet Devices (LANCING DEVICE) misc, USE AS INDICATED TO CORRELATE WITH STRIPS AND METER, Disp: 1 each, " Rfl: 1  •  Lancets 30G misc, USE 4 X DAILY, Disp: 120 each, Rfl: 11  •  lisinopril (PRINIVIL,ZESTRIL) 40 MG tablet, Take 1 tablet by mouth Daily., Disp: 90 tablet, Rfl: 3  •  LOTEMAX 0.5 % ophthalmic suspension, , Disp: , Rfl:   •  metFORMIN ER (GLUCOPHAGE-XR) 500 MG 24 hr tablet, Take 1 tablet by mouth Daily With Breakfast., Disp: 90 tablet, Rfl: 3  •  metoprolol tartrate (LOPRESSOR) 50 MG tablet, Take 1 tablet by mouth Every 12 (Twelve) Hours., Disp: 180 tablet, Rfl: 1  •  Red Yeast Rice Extract (RED YEAST RICE PO), Take  by mouth Daily., Disp: , Rfl:   •  Saw Palmetto, Serenoa repens, (SAW PALMETTO PO), Take  by mouth daily., Disp: , Rfl:   •  sucralfate (CARAFATE) 1 g tablet, TAKE 1 TABLET BY MOUTH TWICE DAILY AS NEEDED FOR NAUSEA AND FOR ABDOMINAL PAIN, Disp: 180 tablet, Rfl: 0  •  vitamin B-12 (CYANOCOBALAMIN) 2500 MCG sublingual tablet tablet, Place  under the tongue Every Other Day., Disp: , Rfl:     Review of Systems    Review of Systems   Constitutional: Positive for fatigue. Negative for activity change, appetite change, chills, diaphoresis, fever and unexpected weight change.   HENT: Negative for congestion, dental problem, drooling, ear discharge, ear pain, facial swelling, mouth sores, postnasal drip, rhinorrhea, sinus pressure, sore throat, tinnitus, trouble swallowing and voice change.    Eyes: Negative for photophobia, pain, discharge, redness, itching and visual disturbance.   Respiratory: Negative for apnea, cough, choking, chest tightness, shortness of breath, wheezing and stridor.    Cardiovascular: Negative for chest pain, palpitations and leg swelling.   Gastrointestinal: Negative for abdominal distention, abdominal pain, constipation, diarrhea, nausea and vomiting.   Endocrine: Negative for cold intolerance, heat intolerance, polydipsia, polyphagia and polyuria.   Genitourinary: Negative for decreased urine volume, difficulty urinating, dysuria, flank pain, frequency, hematuria and urgency.  "  Musculoskeletal: Negative for arthralgias, back pain, gait problem, joint swelling, myalgias, neck pain and neck stiffness.   Skin: Negative for color change, pallor, rash and wound.   Allergic/Immunologic: Negative for immunocompromised state.   Neurological: Negative for dizziness, tremors, seizures, syncope, facial asymmetry, speech difficulty, weakness, light-headedness, numbness and headaches.   Hematological: Negative for adenopathy.   Psychiatric/Behavioral: Negative for agitation, behavioral problems, confusion, decreased concentration, dysphoric mood, hallucinations, self-injury, sleep disturbance and suicidal ideas. The patient is not nervous/anxious and is not hyperactive.         Objective:   /70   Pulse 74   Ht 175.3 cm (69\")   Wt 88.5 kg (195 lb 3.2 oz)   SpO2 98%   BMI 28.83 kg/m²     Physical Exam   Constitutional: He is oriented to person, place, and time. He appears well-developed and well-nourished. He is cooperative.   HENT:   Head: Normocephalic and atraumatic.   Right Ear: External ear normal.   Left Ear: External ear normal.   Nose: Nose normal.   Mouth/Throat: Oropharynx is clear and moist. No oropharyngeal exudate.   Eyes: Pupils are equal, round, and reactive to light. Conjunctivae and EOM are normal. No scleral icterus. Right eye exhibits normal extraocular motion. Left eye exhibits normal extraocular motion.   Neck: Neck supple. No JVD present. No muscular tenderness present. No tracheal deviation, no edema and no erythema present. No thyromegaly present.   Cardiovascular: Normal rate, regular rhythm, normal heart sounds and intact distal pulses. Exam reveals no gallop and no friction rub.   No murmur heard.  Pulmonary/Chest: Effort normal and breath sounds normal. No stridor. No respiratory distress. He has no decreased breath sounds. He has no wheezes. He has no rhonchi. He has no rales. He exhibits no tenderness.   Abdominal: Soft. Bowel sounds are normal. He exhibits no " distension and no mass. There is no hepatomegaly. There is no tenderness. There is no rebound and no guarding. No hernia.   Musculoskeletal: Normal range of motion. He exhibits no edema, tenderness or deformity.   Lymphadenopathy:     He has no cervical adenopathy.   Neurological: He is alert and oriented to person, place, and time. He has normal reflexes. No cranial nerve deficit. He exhibits normal muscle tone. Coordination normal.   Skin: Skin is warm. No rash noted. No erythema. No pallor.   Psychiatric: He has a normal mood and affect. His behavior is normal. Judgment and thought content normal.   Nursing note and vitals reviewed.      Lab Review          Assessment/Plan       ICD-10-CM ICD-9-CM   1. Type 2 diabetes mellitus with hyperglycemia, without long-term current use of insulin (CMS/[facility]) E11.65 250.00     790.29   2. Mixed hyperlipidemia E78.2 272.2   3. Essential hypertension I10 401.9         I reviewed and summarized records from Andreas Martinez MD from 2019 and I reviewed / ordered labs.   From review of records :    Pt has type 2 Diabetes with CAD     Glycemic Management:   Lab Results   Component Value Date    HGBA1C 8.34 (H) 02/26/2020    HGBA1C 7.97 (H) 10/11/2019    HGBA1C 8.34 (H) 06/21/2019     Lab Results   Component Value Date    GLUCOSE 230 (H) 02/26/2020    BUN 16 02/26/2020    CREATININE 1.06 02/26/2020    EGFRIFNONA 68 02/26/2020    BCR 15.1 02/26/2020    K 4.8 02/26/2020    CO2 25.8 02/26/2020    CALCIUM 9.9 02/26/2020    ALBUMIN 4.60 02/26/2020    AST 42 (H) 02/26/2020    ALT 57 (H) 02/26/2020    ANIONGAP 14.2 02/26/2020     Lab Results   Component Value Date    WBC 5.23 02/26/2020    HGB 14.5 02/26/2020    HCT 41.0 02/26/2020    MCV 82.3 02/26/2020     02/26/2020          Synjardy XR 10/1000 mg w bkfast - couldn't tolerate -- go back to metformin alone - afraid of side effect    Trulicity 0.75 mg weekly - increase to 1.5 mg weekly - not paid for     Change to     toujeo 12  units daily CHANGE  To tresiba due to side effects      and novolin R 4 units with meals ( he will increase supper to 6 units )    No alvarez monitoring        Lipid Management  Lab Results   Component Value Date    CHOL 159 02/26/2020    CHOL 136 10/11/2019    CHOL 153 06/21/2019     Lab Results   Component Value Date    TRIG 280 (H) 02/26/2020    TRIG 193 (H) 10/11/2019    TRIG 242 (H) 06/21/2019     Lab Results   Component Value Date    HDL 30 (L) 02/26/2020    HDL 28 (L) 10/11/2019    HDL 29 (L) 06/21/2019     No components found for: LDLCALC  Lab Results   Component Value Date    LDL 73 02/26/2020    LDL 69 10/11/2019    LDL 76 06/21/2019       On atorvastatin       Blood Pressure Management:    Vitals:    05/08/20 1413   BP: 128/70   Pulse: 74   SpO2: 98%     Lab Results   Component Value Date    GLUCOSE 230 (H) 02/26/2020    CALCIUM 9.9 02/26/2020     02/26/2020    K 4.8 02/26/2020    CO2 25.8 02/26/2020    CL 98 02/26/2020    BUN 16 02/26/2020    CREATININE 1.06 02/26/2020    EGFRIFNONA 68 02/26/2020    BCR 15.1 02/26/2020    ANIONGAP 14.2 02/26/2020     Controlled on ACE I regimen           Microvascular Complication Monitoring:      Eye Exam Evaluation  Within 1 year     -----------    Last Microalbumin-Proteinuria Assessment    Lab Results   Component Value Date    MALBCRERATIO  10/11/2019      Comment:      Unable to calculate    MALBCRERATIO 4.1 10/04/2018    MALBCRERATIO  04/10/2018      Comment:      Unable to calculate       No results found for: UTPCR    -----------      Neuropathy        Weight Related:   Wt Readings from Last 3 Encounters:   05/08/20 88.5 kg (195 lb 3.2 oz)   03/02/20 89.4 kg (197 lb 1.6 oz)   02/20/20 88.9 kg (196 lb)     Body mass index is 28.83 kg/m².        Diet interventions: moderate (500 kCal/d) deficit diet.      Bone Health    Lab Results   Component Value Date    CALCIUM 9.9 02/26/2020    FTWB55SF 41.2 06/21/2019       Thyroid Health    Lab Results   Component Value  Date    TSH 2.430 10/11/2019    TSH 2.610 06/21/2019    TSH 1.730 10/04/2018         Other Diabetes Related Aspects       Lab Results   Component Value Date    [id number] 1,168 (H) 02/26/2020     FATTY LIVER    Weight loss   Vitamin E 200 units daily - could have been considered       No orders of the defined types were placed in this encounter.        A copy of my note was sent to Andreas Martinez MD    Please see my above opinion and suggestions.

## 2020-05-11 NOTE — PROGRESS NOTES
Subjective:  Aj Card Jr. is a 74 y.o. male who presents for       Patient Active Problem List   Diagnosis   • Type 2 diabetes mellitus without complication, without long-term current use of insulin (CMS/[facility])   • Essential hypertension   • Mixed hyperlipidemia   • Generalized anxiety disorder   • History of colon polyps   • Diverticulosis of large intestine without hemorrhage   • Coronary artery disease with angina pectoris (CMS/[facility])   • Gastroesophageal reflux disease with esophagitis   • Vitamin D deficiency   • Overweight   • Encounter for screening for endocrine disorder   • Atopic dermatitis   • High serum vitamin B12   • Acute pain of right knee   • Tear of medial meniscus of right knee, current           Current Outpatient Medications:   •  ALPRAZolam (XANAX) 0.5 MG tablet, Take 1 tablet by mouth At Night As Needed for Anxiety., Disp: 30 tablet, Rfl: 0  •  amLODIPine (NORVASC) 10 MG tablet, Take 1 tablet by mouth Daily., Disp: 90 tablet, Rfl: 3  •  aspirin 81 MG EC tablet, Take 2 tablets by mouth Daily., Disp: 90 tablet, Rfl: 3  •  azelastine (ASTELIN) 0.1 % nasal spray, 2 sprays into the nostril(s) as directed by provider 2 (Two) Times a Day. Use in each nostril as directed, Disp: 90 mL, Rfl: 3  •  Blood Glucose Monitoring Suppl w/Device kit, USE AS INDICATED, ANY MONITOR , ICD10 code is E11.9, Disp: 1 each, Rfl: 1  •  cholecalciferol (VITAMIN D3) 1000 units tablet, Take 1 tablet by mouth Daily., Disp: 30 tablet, Rfl: 3  •  clopidogrel (PLAVIX) 75 MG tablet, 1 PO QOD, Disp: 90 tablet, Rfl: 3  •  coenzyme Q10 100 MG capsule, Take 100 mg by mouth daily., Disp: , Rfl:   •  Crisaborole (EUCRISA) 2 % ointment, Apply 1 application topically 2 (Two) Times a Day., Disp: 60 g, Rfl: 3  •  esomeprazole (nexIUM) 40 MG capsule, Take 1 capsule by mouth Every Morning Before Breakfast., Disp: 90 capsule, Rfl: 3  •  Glucose Blood (BLOOD GLUCOSE TEST) strip, Use 4 x daily use any brand covered by insurance or  "same brand as before ICD10 code is E11.9, Disp: 120 each, Rfl: 11  •  icosapent ethyl (VASCEPA) 1 g capsule capsule, Take 2 g by mouth 2 (Two) Times a Day With Meals., Disp: 120 capsule, Rfl: 3  •  insulin regular (HUMULIN R) 100 UNIT/ML injection, 4 units with meals, Disp: 1 each, Rfl: 12  •  Insulin Syringe 31G X 5/16\" 0.3 ML misc, 4 x daily, Disp: 120 each, Rfl: 11  •  KRILL OIL OMEGA-3 PO, Take  by mouth daily., Disp: , Rfl:   •  Lancet Devices (LANCING DEVICE) misc, USE AS INDICATED TO CORRELATE WITH STRIPS AND METER, Disp: 1 each, Rfl: 1  •  Lancets 30G misc, USE 4 X DAILY, Disp: 120 each, Rfl: 11  •  lisinopril (PRINIVIL,ZESTRIL) 40 MG tablet, Take 1 tablet by mouth Daily., Disp: 90 tablet, Rfl: 3  •  LOTEMAX 0.5 % ophthalmic suspension, , Disp: , Rfl:   •  metFORMIN ER (GLUCOPHAGE-XR) 500 MG 24 hr tablet, Take 1 tablet by mouth Daily With Breakfast., Disp: 90 tablet, Rfl: 3  •  metoprolol tartrate (LOPRESSOR) 50 MG tablet, Take 1 tablet by mouth Every 12 (Twelve) Hours., Disp: 180 tablet, Rfl: 1  •  Red Yeast Rice Extract (RED YEAST RICE PO), Take  by mouth Daily., Disp: , Rfl:   •  Saw Palmetto, Serenoa repens, (SAW PALMETTO PO), Take  by mouth daily., Disp: , Rfl:   •  sucralfate (CARAFATE) 1 g tablet, TAKE 1 TABLET BY MOUTH TWICE DAILY AS NEEDED FOR NAUSEA AND FOR ABDOMINAL PAIN, Disp: 180 tablet, Rfl: 0  •  vitamin B-12 (CYANOCOBALAMIN) 2500 MCG sublingual tablet tablet, Place  under the tongue Every Other Day., Disp: , Rfl:     HPI        Pt is 72 yo male with history of GERD DM type 2, Vitamin B12 deficiency, HLP, HTN, ADRIENNE, CAD sp stent,  Esophagitis, Gastritis, sp appendectomy sp cholecystectomy sp hernia repair surgery, sp hand surgery/ cataract surgery bilateral        2/20/20 pt is here for recheck and followup. Saw Endocrinology on 2/10/20 and pt is off synjardy XR 10/1000 mg daily and is nw on trulicity 0.75 weekly increased to 1.5 weekly. Now is on Toujeo 12 untis daily along with novoling. His " main issue is right foot pain for past 2 months. It hurts on the ball/bottom of right foot. Denies any trauma     5/18/20 in office visit today for recheck on HTN, ADRIENNE, DM type 2.  Saw Endocrinology on  5/8/20 for his DM type 2. Pt continues to take synjardy XR 10/1000 mg with breakfast along with trulicity 0.75 mg that was increased to 1.5 mg. His toujeo was changed to tresiba 12 units daily.  He also takes novoon R 4 units with meals. He will be due for new labwork soon. He is needing refills on medications today. Doing well overall. No chest pain no diizziness. Sugars is stable. Xanax helps with anxiety.             Foot Injury    The incident occurred more than 1 week ago. The incident occurred at home. The pain is present in the right foot. The quality of the pain is described as aching. The pain is at a severity of 3/10. The pain is mild. The pain has been constant since onset. Associated symptoms include numbness. Pertinent negatives include no inability to bear weight, loss of sensation, muscle weakness or tingling. The symptoms are aggravated by movement and palpation. He has tried nothing for the symptoms. The treatment provided no relief.   Hypertension   This is a chronic problem. The current episode started more than 1 year ago. The problem is controlled. Pertinent negatives include no anxiety, blurred vision, chest pain, headaches, malaise/fatigue, neck pain, palpitations, peripheral edema, PND, shortness of breath or sweats. Risk factors for coronary artery disease include diabetes mellitus, dyslipidemia, sedentary lifestyle and male gender. There are no compliance problems.  Hypertensive end-organ damage includes CAD/MI. There is no history of angina, kidney disease, CVA, heart failure, left ventricular hypertrophy, PVD or retinopathy. There is no history of chronic renal disease, coarctation of the aorta, hyperaldosteronism, hypercortisolism, hyperparathyroidism, a hypertension causing  med, pheochromocytoma, renovascular disease, sleep apnea or a thyroid problem.   Diabetes   He presents for his follow-up diabetic visit. He has type 2 diabetes mellitus. His disease course has been stable. Hypoglycemia symptoms include nervousness/anxiousness. Pertinent negatives for hypoglycemia include no confusion, dizziness, headaches or sweats. Pertinent negatives for diabetes include no blurred vision, no chest pain, no fatigue, no foot paresthesias, no foot ulcerations, no polydipsia, no polyphagia, no polyuria, no visual change, no weakness and no weight loss. Pertinent negatives for hypoglycemia complications include no blackouts and no hospitalization. Symptoms are stable. Pertinent negatives for diabetic complications include no CVA, impotence, PVD or retinopathy. Risk factors for coronary artery disease include diabetes mellitus, dyslipidemia, hypertension and male sex. Current diabetic treatment includes oral agent (dual therapy). He is compliant with treatment most of the time. His weight is stable. An ACE inhibitor/angiotensin II receptor blocker is being taken. He does not see a podiatrist.Eye exam is not current.   Anxiety   Presents for follow-up visit. Symptoms include excessive worry and nervous/anxious behavior. Patient reports no chest pain, compulsions, confusion, decreased concentration, depressed mood, dizziness, dry mouth, feeling of choking, hyperventilation, impotence, insomnia, irritability, malaise, muscle tension, nausea, obsessions, palpitations, panic, restlessness, shortness of breath or suicidal ideas.     Review of Systems  Review of Systems   Constitutional: Positive for activity change and fatigue. Negative for appetite change, chills, diaphoresis and fever.   HENT: Negative for congestion, postnasal drip, rhinorrhea, sinus pressure, sinus pain, sneezing, sore throat, trouble swallowing and voice change.    Respiratory: Negative for cough, choking, chest tightness, shortness of  breath, wheezing and stridor.    Cardiovascular: Negative for chest pain.   Gastrointestinal: Negative for diarrhea, nausea and vomiting.   Musculoskeletal: Positive for arthralgias and back pain.   Neurological: Positive for weakness and numbness. Negative for headaches.   Psychiatric/Behavioral: Positive for agitation and behavioral problems. The patient is nervous/anxious.        Patient Active Problem List   Diagnosis   • Type 2 diabetes mellitus without complication, without long-term current use of insulin (CMS/[facility])   • Essential hypertension   • Mixed hyperlipidemia   • Generalized anxiety disorder   • History of colon polyps   • Diverticulosis of large intestine without hemorrhage   • Coronary artery disease with angina pectoris (CMS/[facility])   • Gastroesophageal reflux disease with esophagitis   • Vitamin D deficiency   • Overweight   • Encounter for screening for endocrine disorder   • Atopic dermatitis   • High serum vitamin B12   • Acute pain of right knee   • Tear of medial meniscus of right knee, current     Past Surgical History:   Procedure Laterality Date   • APPENDECTOMY       [facility] 1995       • CARDIAC CATHETERIZATION      Successful PTCA of the OMB#2.Unsuccessful PTCA of the 1st OMB, secondary to difficulty in advancing the balloon. 12/08/2015       • CHOLECYSTECTOMY      [facility] 1993       • COLONOSCOPY  10/01/2012   • COLONOSCOPY N/A 12/11/2017    Procedure: COLONOSCOPY;  Surgeon: Bladimir Michael MD;  Location: [facility] ENDOSCOPY;  Service:    • CORONARY ANGIOPLASTY      Successful PTCA & stenting LAD was done w/ deployment of a 2.5mm x23 Xience stent w/ reduction of stenosis from 90% to less than 0% stenosis with good LILLIAM 3 flow 02/17/2012       • ENDOSCOPY      with biopsy.  Mildly severe esophagitis. Gastritis. Normal examined duodenum.Several biopsies obtained in the lower third of the esophagus.Several biopsies obtained in the gastric antrum. 05/06/2016        • ENDOSCOPY      w tube. Normal esophagus. Gastritis in stomach. Biopsy taken. Gastric polyp found. Biopsy taken. Normal duodenum. 10/01/2012       • ENDOSCOPY AND COLONOSCOPY      Diverticulum in sigmoid colon & descending colon. Internal & external hemorrhoids found. 10/01/2012       • HAND SURGERY       Corrective osteotomy of ring finger metacarpal. Malunited fracture of left ring finger 05/21/1985       • HERNIA REPAIR      Laparoscopic hernia repair. prepertitoneal bilateral inguinal hernia repair with mesh. 10/15/2009      • OTHER SURGICAL HISTORY      CORONARY ARTERY STENT    • SKIN TAG REMOVAL      Excision, viral skin tag,right upper eyelid 05/08/1995      Social History     Socioeconomic History   • Marital status:      Spouse name: Not on file   • Number of children: Not on file   • Years of education: Not on file   • Highest education level: Not on file   Tobacco Use   • Smoking status: Never Smoker   • Smokeless tobacco: Never Used   Substance and Sexual Activity   • Alcohol use: No   • Drug use: No   • Sexual activity: Defer     Family History   Problem Relation Age of Onset   • Clotting disorder Other    • Lung disease Other    • Schizophrenia Sister    • Obesity Sister    • Tuberculosis Sister    • Arthritis Mother    • Diabetes Mother    • Heart disease Mother    • Hypertension Mother    • Obesity Mother    • Stroke Mother    • Thyroid disease Mother    • Tuberculosis Mother    • Arthritis Father    • Tuberculosis Father      Lab on 02/26/2020   Component Date Value Ref Range Status   • WBC 02/26/2020 5.23  3.40 - 10.80 10*3/mm3 Final   • RBC 02/26/2020 4.98  4.14 - 5.80 10*6/mm3 Final   • Hemoglobin 02/26/2020 14.5  13.0 - 17.7 g/dL Final   • Hematocrit 02/26/2020 41.0  37.5 - 51.0 % Final   • MCV 02/26/2020 82.3  79.0 - 97.0 fL Final   • MCH 02/26/2020 29.1  26.6 - 33.0 pg Final   • MCHC 02/26/2020 35.4  31.5 - 35.7 g/dL Final   • RDW 02/26/2020 14.1  12.3 - 15.4 % Final   • RDW-SD  02/26/2020 41.1  37.0 - 54.0 fl Final   • MPV 02/26/2020 10.1  6.0 - 12.0 fL Final   • Platelets 02/26/2020 216  140 - 450 10*3/mm3 Final   • Neutrophil % 02/26/2020 61.7  42.7 - 76.0 % Final   • Lymphocyte % 02/26/2020 26.8  19.6 - 45.3 % Final   • Monocyte % 02/26/2020 6.7  5.0 - 12.0 % Final   • Eosinophil % 02/26/2020 3.4  0.3 - 6.2 % Final   • Basophil % 02/26/2020 0.8  0.0 - 1.5 % Final   • Immature Grans % 02/26/2020 0.6* 0.0 - 0.5 % Final   • Neutrophils, Absolute 02/26/2020 3.23  1.70 - 7.00 10*3/mm3 Final   • Lymphocytes, Absolute 02/26/2020 1.40  0.70 - 3.10 10*3/mm3 Final   • Monocytes, Absolute 02/26/2020 0.35  0.10 - 0.90 10*3/mm3 Final   • Eosinophils, Absolute 02/26/2020 0.18  0.00 - 0.40 10*3/mm3 Final   • Basophils, Absolute 02/26/2020 0.04  0.00 - 0.20 10*3/mm3 Final   • Immature Grans, Absolute 02/26/2020 0.03  0.00 - 0.05 10*3/mm3 Final   • nRBC 02/26/2020 0.0  0.0 - 0.2 /100 WBC Final   • Glucose 02/26/2020 230* 65 - 99 mg/dL Final   • BUN 02/26/2020 16  8 - 23 mg/dL Final   • Creatinine 02/26/2020 1.06  0.76 - 1.27 mg/dL Final   • Sodium 02/26/2020 138  136 - 145 mmol/L Final   • Potassium 02/26/2020 4.8  3.5 - 5.2 mmol/L Final   • Chloride 02/26/2020 98  98 - 107 mmol/L Final   • CO2 02/26/2020 25.8  22.0 - 29.0 mmol/L Final   • Calcium 02/26/2020 9.9  8.6 - 10.5 mg/dL Final   • Total Protein 02/26/2020 7.6  6.0 - 8.5 g/dL Final   • Albumin 02/26/2020 4.60  3.50 - 5.20 g/dL Final   • ALT (SGPT) 02/26/2020 57* 1 - 41 U/L Final   • AST (SGOT) 02/26/2020 42* 1 - 40 U/L Final   • Alkaline Phosphatase 02/26/2020 35* 39 - 117 U/L Final   • Total Bilirubin 02/26/2020 0.5  0.2 - 1.2 mg/dL Final   • eGFR Non African Amer 02/26/2020 68  >60 mL/min/1.73 Final   • Globulin 02/26/2020 3.0  gm/dL Final   • A/G Ratio 02/26/2020 1.5  g/dL Final   • BUN/Creatinine Ratio 02/26/2020 15.1  7.0 - 25.0 Final   • Anion Gap 02/26/2020 14.2  5.0 - 15.0 mmol/L Final   • Hemoglobin A1C 02/26/2020 8.34* 4.80 - 5.60 %  Final   • Total Cholesterol 02/26/2020 159  0 - 200 mg/dL Final   • Triglycerides 02/26/2020 280* 0 - 150 mg/dL Final   • HDL Cholesterol 02/26/2020 30* 40 - 60 mg/dL Final   • LDL Cholesterol  02/26/2020 73  0 - 100 mg/dL Final   • VLDL Cholesterol 02/26/2020 56* 5 - 40 mg/dL Final   • LDL/HDL Ratio 02/26/2020 2.43   Final   • Vitamin B-12 02/26/2020 1,168* 211 - 946 pg/mL Final   • THC, Screen, Urine 02/26/2020 Negative  Negative Final   • Phencyclidine (PCP), Urine 02/26/2020 Negative  Negative Final   • Cocaine Screen, Urine 02/26/2020 Negative  Negative Final   • Methamphetamine, Ur 02/26/2020 Negative  Negative Final   • Opiate Screen 02/26/2020 Negative  Negative Final   • Amphetamine Screen, Urine 02/26/2020 Negative  Negative Final   • Benzodiazepine Screen, Urine 02/26/2020 Positive* Negative Final   • Tricyclic Antidepressants Screen 02/26/2020 Negative  Negative Final   • Methadone Screen, Urine 02/26/2020 Negative  Negative Final   • Barbiturates Screen, Urine 02/26/2020 Negative  Negative Final   • Oxycodone Screen, Urine 02/26/2020 Negative  Negative Final   • Propoxyphene Screen 02/26/2020 Negative  Negative Final   • Buprenorphine, Screen, Urine 02/26/2020 Negative  Negative Final      XR Knee 1 or 2 View Right  Lateral view right knee    Lateral view taken shows mild patellofemoral arthritic changes without   comparison.  No obvious acute findings.  Bone quality appears intact    Impression: Mild DJD patellofemoral joint right  XR Knee Bilateral AP Standing  Standing AP bilateral knee without comparison    Medial joint space narrowing seen left greater than right with spurring   medially consistent with moderate osteoarthritis left greater than right    Impression: DJD bilateral knees as above    [unfilled]  Immunization History   Administered Date(s) Administered   • FLUARIX/FLUZONE/AFLURIA/FLULAVAL QUAD 10/31/2018   • Fluzone High Dose =>65 Years ([employer] ONLY) 11/02/2016   • Pneumococcal  "Conjugate 13-Valent (PCV13) 03/15/2018   • Pneumococcal Polysaccharide (PPSV23) 08/15/2005, 03/14/2017   • Pneumococcal, Unspecified 04/14/2015   • Tetanus 09/15/2015   • influenza Split 12/08/2015       The following portions of the patient's history were reviewed and updated as appropriate: allergies, current medications, past family history, past medical history, past social history, past surgical history and problem list.        Physical Exam  /62 (BP Location: Left arm, Patient Position: Sitting, Cuff Size: Adult)   Pulse 68   Temp 98.9 °F (37.2 °C) (Temporal)   Ht 175.3 cm (69\")   Wt 88.5 kg (195 lb)   SpO2 98%   BMI 28.80 kg/m²     Physical Exam   Constitutional: He is oriented to person, place, and time. He appears well-developed and well-nourished.   HENT:   Head: Normocephalic and atraumatic.   Right Ear: External ear normal.   Eyes: Pupils are equal, round, and reactive to light. Conjunctivae and EOM are normal.   Neck: Normal range of motion. Neck supple.   Cardiovascular: Normal rate, regular rhythm and normal heart sounds.   No murmur heard.  Pulmonary/Chest: Effort normal and breath sounds normal. No respiratory distress.   Abdominal: Soft. Bowel sounds are normal. He exhibits no distension. There is no tenderness.   Musculoskeletal: Normal range of motion. He exhibits no edema or deformity.   Neurological: He is alert and oriented to person, place, and time. No cranial nerve deficit.   Skin: Skin is warm. No rash noted. He is not diaphoretic. No erythema. No pallor.   Psychiatric: He has a normal mood and affect. His behavior is normal.   Nursing note and vitals reviewed.      Assessment/Plan    Diagnosis Plan   1. Type 2 diabetes mellitus without complication, without long-term current use of insulin (CMS/[facility])     2. Vitamin D deficiency     3. Overweight     4. Mixed hyperlipidemia     5. History of colon polyps     6. Generalized anxiety disorder     7. Gastroesophageal reflux disease " with esophagitis     8. Essential hypertension     9. Coronary artery disease with angina pectoris, unspecified vessel or lesion type, unspecified whether native or transplanted heart (CMS/HCC)            -went over labwork today  -right foot pain - get x-ray of foot referral to Podiatry   -recommend flu vaccination - advised to get at pharmacy   -high vitamin b12 . Supplement every other day.    -advised pt to be safe and call with any questions or concerns. All questions addressed today  -DM type 2-  Pt could   not tolerate synjardy 100/1000 mg daily.  trulicity 0.75 mg to 1.5 mg daily. On toujeo 12 units at bedtime and novolon 4 units with meals  He will let me know in a few weeks if he can tolerate it. If so he will contact office and can refill medication  -refilled medicaitons. Will  try jardiance 10 mg daily.  Samples given today   -HLP  -  Red yeast rice.  Will try Vascepa 1 gram PO BID  -GERD -continue nexium. Gastroenterology following   -overweight - weght loss information provided. Counseled >5 minutes  -for anxiety - pt is on xanax. PT prefers to stay on xanax. FRANK printed and on file refilled xanax  REF number [id number]  -hypertension - continue norvasc 10 mg pO q daily and lopressor 50 mg every 12 hours and lisinopril 40 mg PO q daily  -CAD- Cardiology following. Aspirin lipitor,  lopressor, lisinopril.    -for vitamin D deficiency check Vitamin D pills   -for pruritic rash on left leg - eucrisa ointment samples given today. If helpfdng pt will call back and let me know if helping  -recommend diabetic eye exam  -advised pt to be safe and call with questions and concerns  -advised to go to ER or call 911 if symptoms worrisome or severe  -advised to folllowup with referrals and Specialist   -advised pt to be safe during COVID-19 pandemic         This document has been electronically signed by Andreas Martinez MD on May 18, 2020 07:43

## 2020-05-18 ENCOUNTER — OFFICE VISIT (OUTPATIENT)
Dept: FAMILY MEDICINE CLINIC | Facility: CLINIC | Age: 75
End: 2020-05-18

## 2020-05-18 VITALS
OXYGEN SATURATION: 98 % | HEIGHT: 69 IN | WEIGHT: 195 LBS | SYSTOLIC BLOOD PRESSURE: 114 MMHG | BODY MASS INDEX: 28.88 KG/M2 | TEMPERATURE: 98.9 F | DIASTOLIC BLOOD PRESSURE: 62 MMHG | HEART RATE: 68 BPM

## 2020-05-18 DIAGNOSIS — I10 ESSENTIAL HYPERTENSION: ICD-10-CM

## 2020-05-18 DIAGNOSIS — I25.119 CORONARY ARTERY DISEASE WITH ANGINA PECTORIS, UNSPECIFIED VESSEL OR LESION TYPE, UNSPECIFIED WHETHER NATIVE OR TRANSPLANTED HEART (HCC): ICD-10-CM

## 2020-05-18 DIAGNOSIS — Z11.59 NEED FOR HEPATITIS C SCREENING TEST: Primary | ICD-10-CM

## 2020-05-18 DIAGNOSIS — E78.2 MIXED HYPERLIPIDEMIA: ICD-10-CM

## 2020-05-18 DIAGNOSIS — Z86.010 HISTORY OF COLON POLYPS: ICD-10-CM

## 2020-05-18 DIAGNOSIS — E11.9 TYPE 2 DIABETES MELLITUS WITHOUT COMPLICATION, WITHOUT LONG-TERM CURRENT USE OF INSULIN (HCC): ICD-10-CM

## 2020-05-18 DIAGNOSIS — E66.3 OVERWEIGHT: ICD-10-CM

## 2020-05-18 DIAGNOSIS — K21.00 GASTROESOPHAGEAL REFLUX DISEASE WITH ESOPHAGITIS: ICD-10-CM

## 2020-05-18 DIAGNOSIS — F41.1 GAD (GENERALIZED ANXIETY DISORDER): ICD-10-CM

## 2020-05-18 DIAGNOSIS — R79.89 HIGH SERUM VITAMIN B12: ICD-10-CM

## 2020-05-18 DIAGNOSIS — G89.29 OTHER CHRONIC PAIN: ICD-10-CM

## 2020-05-18 DIAGNOSIS — E55.9 VITAMIN D DEFICIENCY: ICD-10-CM

## 2020-05-18 DIAGNOSIS — F41.1 GENERALIZED ANXIETY DISORDER: ICD-10-CM

## 2020-05-18 PROCEDURE — 99214 OFFICE O/P EST MOD 30 MIN: CPT | Performed by: FAMILY MEDICINE

## 2020-05-18 RX ORDER — AMLODIPINE BESYLATE 10 MG/1
10 TABLET ORAL DAILY
Qty: 90 TABLET | Refills: 3 | Status: SHIPPED | OUTPATIENT
Start: 2020-05-18 | End: 2020-06-15 | Stop reason: ALTCHOICE

## 2020-05-18 RX ORDER — ALPRAZOLAM 0.5 MG/1
0.5 TABLET ORAL NIGHTLY PRN
Qty: 30 TABLET | Refills: 0 | Status: SHIPPED | OUTPATIENT
Start: 2020-05-18 | End: 2020-06-15 | Stop reason: SDUPTHER

## 2020-05-18 RX ORDER — METFORMIN HYDROCHLORIDE 500 MG/1
500 TABLET, EXTENDED RELEASE ORAL
Qty: 90 TABLET | Refills: 3 | Status: SHIPPED | OUTPATIENT
Start: 2020-05-18 | End: 2020-06-15 | Stop reason: SDUPTHER

## 2020-05-27 ENCOUNTER — LAB (OUTPATIENT)
Dept: LAB | Facility: HOSPITAL | Age: 75
End: 2020-05-27

## 2020-05-27 DIAGNOSIS — R07.9 CHEST PAIN, UNSPECIFIED TYPE: ICD-10-CM

## 2020-05-27 DIAGNOSIS — Z13.29 SCREENING FOR THYROID DISORDER: ICD-10-CM

## 2020-05-27 DIAGNOSIS — R07.9 CHEST PAIN, UNSPECIFIED TYPE: Primary | ICD-10-CM

## 2020-05-27 DIAGNOSIS — I25.10 ATHEROSCLEROSIS OF NATIVE CORONARY ARTERY OF NATIVE HEART, ANGINA PRESENCE UNSPECIFIED: ICD-10-CM

## 2020-05-27 LAB
ALBUMIN SERPL-MCNC: 4.6 G/DL (ref 3.5–5.2)
ALBUMIN/GLOB SERPL: 1.5 G/DL
ALP SERPL-CCNC: 34 U/L (ref 39–117)
ALT SERPL W P-5'-P-CCNC: 33 U/L (ref 1–41)
ANION GAP SERPL CALCULATED.3IONS-SCNC: 10 MMOL/L (ref 5–15)
AST SERPL-CCNC: 27 U/L (ref 1–40)
BASOPHILS # BLD AUTO: 0.04 10*3/MM3 (ref 0–0.2)
BASOPHILS NFR BLD AUTO: 0.6 % (ref 0–1.5)
BILIRUB SERPL-MCNC: 0.4 MG/DL (ref 0.2–1.2)
BUN BLD-MCNC: 20 MG/DL (ref 8–23)
BUN/CREAT SERPL: 21.1 (ref 7–25)
CALCIUM SPEC-SCNC: 9.4 MG/DL (ref 8.6–10.5)
CHLORIDE SERPL-SCNC: 99 MMOL/L (ref 98–107)
CO2 SERPL-SCNC: 26 MMOL/L (ref 22–29)
CREAT BLD-MCNC: 0.95 MG/DL (ref 0.76–1.27)
D-DIMER, QUANTITATIVE (MAD,POW, STR): 320 NG/ML (FEU) (ref 0–470)
DEPRECATED RDW RBC AUTO: 42.2 FL (ref 37–54)
EOSINOPHIL # BLD AUTO: 0.18 10*3/MM3 (ref 0–0.4)
EOSINOPHIL NFR BLD AUTO: 2.5 % (ref 0.3–6.2)
ERYTHROCYTE [DISTWIDTH] IN BLOOD BY AUTOMATED COUNT: 13.6 % (ref 12.3–15.4)
GFR SERPL CREATININE-BSD FRML MDRD: 77 ML/MIN/1.73
GLOBULIN UR ELPH-MCNC: 3.1 GM/DL
GLUCOSE BLD-MCNC: 254 MG/DL (ref 65–99)
HCT VFR BLD AUTO: 40.6 % (ref 37.5–51)
HGB BLD-MCNC: 13.8 G/DL (ref 13–17.7)
IMM GRANULOCYTES # BLD AUTO: 0.04 10*3/MM3 (ref 0–0.05)
IMM GRANULOCYTES NFR BLD AUTO: 0.6 % (ref 0–0.5)
LYMPHOCYTES # BLD AUTO: 1.53 10*3/MM3 (ref 0.7–3.1)
LYMPHOCYTES NFR BLD AUTO: 21.4 % (ref 19.6–45.3)
MCH RBC QN AUTO: 28.9 PG (ref 26.6–33)
MCHC RBC AUTO-ENTMCNC: 34 G/DL (ref 31.5–35.7)
MCV RBC AUTO: 84.9 FL (ref 79–97)
MONOCYTES # BLD AUTO: 0.48 10*3/MM3 (ref 0.1–0.9)
MONOCYTES NFR BLD AUTO: 6.7 % (ref 5–12)
NEUTROPHILS # BLD AUTO: 4.87 10*3/MM3 (ref 1.7–7)
NEUTROPHILS NFR BLD AUTO: 68.2 % (ref 42.7–76)
NRBC BLD AUTO-RTO: 0 /100 WBC (ref 0–0.2)
PLATELET # BLD AUTO: 198 10*3/MM3 (ref 140–450)
PMV BLD AUTO: 8.7 FL (ref 6–12)
POTASSIUM BLD-SCNC: 4.5 MMOL/L (ref 3.5–5.2)
PROT SERPL-MCNC: 7.7 G/DL (ref 6–8.5)
RBC # BLD AUTO: 4.78 10*6/MM3 (ref 4.14–5.8)
SODIUM BLD-SCNC: 135 MMOL/L (ref 136–145)
TROPONIN T SERPL-MCNC: <0.01 NG/ML (ref 0–0.03)
TSH SERPL DL<=0.05 MIU/L-ACNC: 2.49 UIU/ML (ref 0.27–4.2)
WBC NRBC COR # BLD: 7.14 10*3/MM3 (ref 3.4–10.8)

## 2020-05-27 PROCEDURE — 80053 COMPREHEN METABOLIC PANEL: CPT

## 2020-05-27 PROCEDURE — 85379 FIBRIN DEGRADATION QUANT: CPT

## 2020-05-27 PROCEDURE — 84443 ASSAY THYROID STIM HORMONE: CPT

## 2020-05-27 PROCEDURE — 84484 ASSAY OF TROPONIN QUANT: CPT

## 2020-05-27 PROCEDURE — 85025 COMPLETE CBC W/AUTO DIFF WBC: CPT

## 2020-05-27 PROCEDURE — 36415 COLL VENOUS BLD VENIPUNCTURE: CPT

## 2020-06-05 ENCOUNTER — HOSPITAL ENCOUNTER (OUTPATIENT)
Facility: HOSPITAL | Age: 75
Setting detail: OBSERVATION
Discharge: HOME | End: 2020-06-06
Attending: EMERGENCY MEDICINE | Admitting: FAMILY MEDICINE

## 2020-06-05 ENCOUNTER — APPOINTMENT (OUTPATIENT)
Dept: GENERAL RADIOLOGY | Facility: HOSPITAL | Age: 75
End: 2020-06-05

## 2020-06-05 ENCOUNTER — APPOINTMENT (OUTPATIENT)
Dept: CARDIOLOGY | Facility: HOSPITAL | Age: 75
End: 2020-06-05

## 2020-06-05 DIAGNOSIS — Z78.9 IMPAIRED MOBILITY AND ACTIVITIES OF DAILY LIVING: ICD-10-CM

## 2020-06-05 DIAGNOSIS — I25.10 ATHEROSCLEROSIS OF NATIVE CORONARY ARTERY OF NATIVE HEART, ANGINA PRESENCE UNSPECIFIED: ICD-10-CM

## 2020-06-05 DIAGNOSIS — I48.91 ATRIAL FIBRILLATION WITH RVR (HCC): Primary | ICD-10-CM

## 2020-06-05 DIAGNOSIS — Z98.61 HISTORY OF PTCA: ICD-10-CM

## 2020-06-05 DIAGNOSIS — R07.9 CHEST PAIN, UNSPECIFIED TYPE: ICD-10-CM

## 2020-06-05 DIAGNOSIS — I48.0 PAF (PAROXYSMAL ATRIAL FIBRILLATION) (HCC): ICD-10-CM

## 2020-06-05 DIAGNOSIS — I10 BENIGN HYPERTENSION: ICD-10-CM

## 2020-06-05 DIAGNOSIS — Z74.09 IMPAIRED MOBILITY AND ACTIVITIES OF DAILY LIVING: ICD-10-CM

## 2020-06-05 DIAGNOSIS — I11.9 BENIGN HYPERTENSIVE HEART DISEASE WITHOUT HEART FAILURE: ICD-10-CM

## 2020-06-05 PROBLEM — K21.9 GERD (GASTROESOPHAGEAL REFLUX DISEASE): Status: ACTIVE | Noted: 2020-06-05

## 2020-06-05 LAB
ALBUMIN SERPL-MCNC: 4.3 G/DL (ref 3.5–5.2)
ALBUMIN/GLOB SERPL: 1.3 G/DL
ALP SERPL-CCNC: 38 U/L (ref 39–117)
ALT SERPL W P-5'-P-CCNC: 29 U/L (ref 1–41)
ANION GAP SERPL CALCULATED.3IONS-SCNC: 15 MMOL/L (ref 5–15)
AST SERPL-CCNC: 27 U/L (ref 1–40)
BASOPHILS # BLD AUTO: 0.04 10*3/MM3 (ref 0–0.2)
BASOPHILS NFR BLD AUTO: 0.7 % (ref 0–1.5)
BH CV ECHO MEAS - ACS: 1.7 CM
BH CV ECHO MEAS - AO ISTHMUS: 2.7 CM
BH CV ECHO MEAS - AO MAX PG (FULL): 8.5 MMHG
BH CV ECHO MEAS - AO MAX PG: 10.4 MMHG
BH CV ECHO MEAS - AO MEAN PG (FULL): 5 MMHG
BH CV ECHO MEAS - AO MEAN PG: 6 MMHG
BH CV ECHO MEAS - AO ROOT AREA (BSA CORRECTED): 2
BH CV ECHO MEAS - AO ROOT AREA: 11.9 CM^2
BH CV ECHO MEAS - AO ROOT DIAM: 3.9 CM
BH CV ECHO MEAS - AO V2 MAX: 161 CM/SEC
BH CV ECHO MEAS - AO V2 MEAN: 120 CM/SEC
BH CV ECHO MEAS - AO V2 VTI: 42.7 CM
BH CV ECHO MEAS - ASC AORTA: 2.9 CM
BH CV ECHO MEAS - AVA(I,A): 1.3 CM^2
BH CV ECHO MEAS - AVA(I,D): 1.3 CM^2
BH CV ECHO MEAS - AVA(V,A): 1.3 CM^2
BH CV ECHO MEAS - AVA(V,D): 1.3 CM^2
BH CV ECHO MEAS - BSA(HAYCOCK): 2 M^2
BH CV ECHO MEAS - BSA: 2 M^2
BH CV ECHO MEAS - BZI_BMI: 26.9 KILOGRAMS/M^2
BH CV ECHO MEAS - BZI_METRIC_HEIGHT: 175.3 CM
BH CV ECHO MEAS - BZI_METRIC_WEIGHT: 82.6 KG
BH CV ECHO MEAS - EDV(CUBED): 109.2 ML
BH CV ECHO MEAS - EDV(MOD-SP2): 68.4 ML
BH CV ECHO MEAS - EDV(MOD-SP4): 82.5 ML
BH CV ECHO MEAS - EDV(TEICH): 106.5 ML
BH CV ECHO MEAS - EF(CUBED): 69.1 %
BH CV ECHO MEAS - EF(MOD-SP2): 53.1 %
BH CV ECHO MEAS - EF(MOD-SP4): 46.2 %
BH CV ECHO MEAS - EF(TEICH): 60.7 %
BH CV ECHO MEAS - ESV(CUBED): 33.7 ML
BH CV ECHO MEAS - ESV(MOD-SP2): 32.1 ML
BH CV ECHO MEAS - ESV(MOD-SP4): 44.4 ML
BH CV ECHO MEAS - ESV(TEICH): 41.9 ML
BH CV ECHO MEAS - FS: 32.4 %
BH CV ECHO MEAS - IVS/LVPW: 1.2
BH CV ECHO MEAS - IVSD: 1.2 CM
BH CV ECHO MEAS - LA DIMENSION: 3.6 CM
BH CV ECHO MEAS - LA/AO: 0.92
BH CV ECHO MEAS - LV DIASTOLIC VOL/BSA (35-75): 41.6 ML/M^2
BH CV ECHO MEAS - LV MASS(C)D: 183.8 GRAMS
BH CV ECHO MEAS - LV MASS(C)DI: 92.6 GRAMS/M^2
BH CV ECHO MEAS - LV MAX PG: 1.8 MMHG
BH CV ECHO MEAS - LV MEAN PG: 1 MMHG
BH CV ECHO MEAS - LV SYSTOLIC VOL/BSA (12-30): 22.4 ML/M^2
BH CV ECHO MEAS - LV V1 MAX: 67.8 CM/SEC
BH CV ECHO MEAS - LV V1 MEAN: 50 CM/SEC
BH CV ECHO MEAS - LV V1 VTI: 17.8 CM
BH CV ECHO MEAS - LVIDD: 4.8 CM
BH CV ECHO MEAS - LVIDS: 3.2 CM
BH CV ECHO MEAS - LVLD AP2: 7.4 CM
BH CV ECHO MEAS - LVLD AP4: 7.5 CM
BH CV ECHO MEAS - LVLS AP2: 6.3 CM
BH CV ECHO MEAS - LVLS AP4: 6.8 CM
BH CV ECHO MEAS - LVOT AREA (M): 3.1 CM^2
BH CV ECHO MEAS - LVOT AREA: 3.1 CM^2
BH CV ECHO MEAS - LVOT DIAM: 2 CM
BH CV ECHO MEAS - LVPWD: 0.96 CM
BH CV ECHO MEAS - MR MAX PG: 60.5 MMHG
BH CV ECHO MEAS - MR MAX VEL: 389 CM/SEC
BH CV ECHO MEAS - MV A MAX VEL: 70.3 CM/SEC
BH CV ECHO MEAS - MV DEC SLOPE: 415 CM/SEC^2
BH CV ECHO MEAS - MV E MAX VEL: 62.1 CM/SEC
BH CV ECHO MEAS - MV E/A: 0.88
BH CV ECHO MEAS - MV MAX PG: 2.7 MMHG
BH CV ECHO MEAS - MV MEAN PG: 1 MMHG
BH CV ECHO MEAS - MV P1/2T MAX VEL: 65.2 CM/SEC
BH CV ECHO MEAS - MV P1/2T: 46 MSEC
BH CV ECHO MEAS - MV V2 MAX: 81.8 CM/SEC
BH CV ECHO MEAS - MV V2 MEAN: 43.5 CM/SEC
BH CV ECHO MEAS - MV V2 VTI: 26.5 CM
BH CV ECHO MEAS - MVA P1/2T LCG: 3.4 CM^2
BH CV ECHO MEAS - MVA(P1/2T): 4.8 CM^2
BH CV ECHO MEAS - MVA(VTI): 2.1 CM^2
BH CV ECHO MEAS - PA MAX PG: 4.1 MMHG
BH CV ECHO MEAS - PA V2 MAX: 101 CM/SEC
BH CV ECHO MEAS - RAP SYSTOLE: 5 MMHG
BH CV ECHO MEAS - RVDD: 2.5 CM
BH CV ECHO MEAS - RVSP: 19.6 MMHG
BH CV ECHO MEAS - SI(AO): 257 ML/M^2
BH CV ECHO MEAS - SI(CUBED): 38.1 ML/M^2
BH CV ECHO MEAS - SI(LVOT): 28.2 ML/M^2
BH CV ECHO MEAS - SI(MOD-SP2): 18.3 ML/M^2
BH CV ECHO MEAS - SI(MOD-SP4): 19.2 ML/M^2
BH CV ECHO MEAS - SI(TEICH): 32.5 ML/M^2
BH CV ECHO MEAS - SV(AO): 510.1 ML
BH CV ECHO MEAS - SV(CUBED): 75.5 ML
BH CV ECHO MEAS - SV(LVOT): 55.9 ML
BH CV ECHO MEAS - SV(MOD-SP2): 36.3 ML
BH CV ECHO MEAS - SV(MOD-SP4): 38.1 ML
BH CV ECHO MEAS - SV(TEICH): 64.6 ML
BH CV ECHO MEAS - TR MAX VEL: 191 CM/SEC
BILIRUB SERPL-MCNC: 0.3 MG/DL (ref 0.2–1.2)
BILIRUB UR QL STRIP: NEGATIVE
BUN BLD-MCNC: 15 MG/DL (ref 8–23)
BUN/CREAT SERPL: 16 (ref 7–25)
CALCIUM SPEC-SCNC: 9.6 MG/DL (ref 8.6–10.5)
CHLORIDE SERPL-SCNC: 97 MMOL/L (ref 98–107)
CLARITY UR: CLEAR
CO2 SERPL-SCNC: 25 MMOL/L (ref 22–29)
COLOR UR: YELLOW
CREAT BLD-MCNC: 0.94 MG/DL (ref 0.76–1.27)
DEPRECATED RDW RBC AUTO: 41.3 FL (ref 37–54)
EOSINOPHIL # BLD AUTO: 0.14 10*3/MM3 (ref 0–0.4)
EOSINOPHIL NFR BLD AUTO: 2.4 % (ref 0.3–6.2)
ERYTHROCYTE [DISTWIDTH] IN BLOOD BY AUTOMATED COUNT: 13.3 % (ref 12.3–15.4)
GFR SERPL CREATININE-BSD FRML MDRD: 78 ML/MIN/1.73
GLOBULIN UR ELPH-MCNC: 3.2 GM/DL
GLUCOSE BLD-MCNC: 287 MG/DL (ref 65–99)
GLUCOSE BLDC GLUCOMTR-MCNC: 149 MG/DL (ref 70–130)
GLUCOSE BLDC GLUCOMTR-MCNC: 178 MG/DL (ref 70–130)
GLUCOSE BLDC GLUCOMTR-MCNC: 211 MG/DL (ref 70–130)
GLUCOSE UR STRIP-MCNC: ABNORMAL MG/DL
HCT VFR BLD AUTO: 40.4 % (ref 37.5–51)
HGB BLD-MCNC: 13.8 G/DL (ref 13–17.7)
HGB UR QL STRIP.AUTO: NEGATIVE
HOLD SPECIMEN: NORMAL
HOLD SPECIMEN: NORMAL
IMM GRANULOCYTES # BLD AUTO: 0.04 10*3/MM3 (ref 0–0.05)
IMM GRANULOCYTES NFR BLD AUTO: 0.7 % (ref 0–0.5)
KETONES UR QL STRIP: NEGATIVE
LEUKOCYTE ESTERASE UR QL STRIP.AUTO: NEGATIVE
LYMPHOCYTES # BLD AUTO: 1.64 10*3/MM3 (ref 0.7–3.1)
LYMPHOCYTES NFR BLD AUTO: 28.2 % (ref 19.6–45.3)
MAXIMAL PREDICTED HEART RATE: 145 BPM
MCH RBC QN AUTO: 28.7 PG (ref 26.6–33)
MCHC RBC AUTO-ENTMCNC: 34.2 G/DL (ref 31.5–35.7)
MCV RBC AUTO: 84 FL (ref 79–97)
MONOCYTES # BLD AUTO: 0.39 10*3/MM3 (ref 0.1–0.9)
MONOCYTES NFR BLD AUTO: 6.7 % (ref 5–12)
NEUTROPHILS # BLD AUTO: 3.56 10*3/MM3 (ref 1.7–7)
NEUTROPHILS NFR BLD AUTO: 61.3 % (ref 42.7–76)
NITRITE UR QL STRIP: NEGATIVE
NRBC BLD AUTO-RTO: 0 /100 WBC (ref 0–0.2)
NT-PROBNP SERPL-MCNC: 171.3 PG/ML (ref 5–1800)
PH UR STRIP.AUTO: 6 [PH] (ref 5–9)
PLATELET # BLD AUTO: 213 10*3/MM3 (ref 140–450)
PMV BLD AUTO: 9.1 FL (ref 6–12)
POTASSIUM BLD-SCNC: 4.1 MMOL/L (ref 3.5–5.2)
PROT SERPL-MCNC: 7.5 G/DL (ref 6–8.5)
PROT UR QL STRIP: NEGATIVE
RBC # BLD AUTO: 4.81 10*6/MM3 (ref 4.14–5.8)
SODIUM BLD-SCNC: 137 MMOL/L (ref 136–145)
SP GR UR STRIP: 1.02 (ref 1–1.03)
STRESS TARGET HR: 123 BPM
TROPONIN T SERPL-MCNC: <0.01 NG/ML (ref 0–0.03)
TROPONIN T SERPL-MCNC: <0.01 NG/ML (ref 0–0.03)
UROBILINOGEN UR QL STRIP: ABNORMAL
WBC NRBC COR # BLD: 5.81 10*3/MM3 (ref 3.4–10.8)
WHOLE BLOOD HOLD SPECIMEN: NORMAL

## 2020-06-05 PROCEDURE — G0378 HOSPITAL OBSERVATION PER HR: HCPCS

## 2020-06-05 PROCEDURE — 99284 EMERGENCY DEPT VISIT MOD MDM: CPT

## 2020-06-05 PROCEDURE — 80053 COMPREHEN METABOLIC PANEL: CPT | Performed by: EMERGENCY MEDICINE

## 2020-06-05 PROCEDURE — 93005 ELECTROCARDIOGRAM TRACING: CPT | Performed by: EMERGENCY MEDICINE

## 2020-06-05 PROCEDURE — 81003 URINALYSIS AUTO W/O SCOPE: CPT | Performed by: EMERGENCY MEDICINE

## 2020-06-05 PROCEDURE — 93306 TTE W/DOPPLER COMPLETE: CPT

## 2020-06-05 PROCEDURE — 93010 ELECTROCARDIOGRAM REPORT: CPT | Performed by: INTERNAL MEDICINE

## 2020-06-05 PROCEDURE — 71045 X-RAY EXAM CHEST 1 VIEW: CPT

## 2020-06-05 PROCEDURE — 97162 PT EVAL MOD COMPLEX 30 MIN: CPT | Performed by: PHYSICAL THERAPIST

## 2020-06-05 PROCEDURE — 83880 ASSAY OF NATRIURETIC PEPTIDE: CPT | Performed by: EMERGENCY MEDICINE

## 2020-06-05 PROCEDURE — 25010000002 ENOXAPARIN PER 10 MG: Performed by: STUDENT IN AN ORGANIZED HEALTH CARE EDUCATION/TRAINING PROGRAM

## 2020-06-05 PROCEDURE — 96372 THER/PROPH/DIAG INJ SC/IM: CPT

## 2020-06-05 PROCEDURE — 84484 ASSAY OF TROPONIN QUANT: CPT | Performed by: EMERGENCY MEDICINE

## 2020-06-05 PROCEDURE — 82962 GLUCOSE BLOOD TEST: CPT

## 2020-06-05 PROCEDURE — 97165 OT EVAL LOW COMPLEX 30 MIN: CPT

## 2020-06-05 PROCEDURE — 99219 PR INITIAL OBSERVATION CARE/DAY 50 MINUTES: CPT | Performed by: STUDENT IN AN ORGANIZED HEALTH CARE EDUCATION/TRAINING PROGRAM

## 2020-06-05 PROCEDURE — 85025 COMPLETE CBC W/AUTO DIFF WBC: CPT | Performed by: EMERGENCY MEDICINE

## 2020-06-05 PROCEDURE — 93005 ELECTROCARDIOGRAM TRACING: CPT | Performed by: STUDENT IN AN ORGANIZED HEALTH CARE EDUCATION/TRAINING PROGRAM

## 2020-06-05 PROCEDURE — 63710000001 INSULIN ASPART PER 5 UNITS: Performed by: STUDENT IN AN ORGANIZED HEALTH CARE EDUCATION/TRAINING PROGRAM

## 2020-06-05 PROCEDURE — 36415 COLL VENOUS BLD VENIPUNCTURE: CPT | Performed by: EMERGENCY MEDICINE

## 2020-06-05 PROCEDURE — 96374 THER/PROPH/DIAG INJ IV PUSH: CPT

## 2020-06-05 RX ORDER — ALPRAZOLAM 0.5 MG/1
0.5 TABLET ORAL NIGHTLY PRN
Status: DISCONTINUED | OUTPATIENT
Start: 2020-06-05 | End: 2020-06-06 | Stop reason: HOSPADM

## 2020-06-05 RX ORDER — ACETAMINOPHEN 160 MG/5ML
650 SOLUTION ORAL EVERY 4 HOURS PRN
Status: DISCONTINUED | OUTPATIENT
Start: 2020-06-05 | End: 2020-06-06 | Stop reason: HOSPADM

## 2020-06-05 RX ORDER — LANOLIN ALCOHOL/MO/W.PET/CERES
400 CREAM (GRAM) TOPICAL 2 TIMES DAILY
Status: DISCONTINUED | OUTPATIENT
Start: 2020-06-05 | End: 2020-06-06 | Stop reason: HOSPADM

## 2020-06-05 RX ORDER — ACETAMINOPHEN 325 MG/1
650 TABLET ORAL EVERY 4 HOURS PRN
Status: DISCONTINUED | OUTPATIENT
Start: 2020-06-05 | End: 2020-06-06 | Stop reason: HOSPADM

## 2020-06-05 RX ORDER — ISOSORBIDE MONONITRATE 30 MG/1
30 TABLET, EXTENDED RELEASE ORAL
Status: DISCONTINUED | OUTPATIENT
Start: 2020-06-05 | End: 2020-06-06 | Stop reason: HOSPADM

## 2020-06-05 RX ORDER — MELATONIN
1000 DAILY
Status: DISCONTINUED | OUTPATIENT
Start: 2020-06-05 | End: 2020-06-06 | Stop reason: HOSPADM

## 2020-06-05 RX ORDER — DILTIAZEM HCL IN NACL,ISO-OSM 125 MG/125
5-15 PLASTIC BAG, INJECTION (ML) INTRAVENOUS CONTINUOUS
Status: DISCONTINUED | OUTPATIENT
Start: 2020-06-05 | End: 2020-06-06 | Stop reason: HOSPADM

## 2020-06-05 RX ORDER — ASPIRIN 81 MG/1
324 TABLET, CHEWABLE ORAL ONCE
Status: COMPLETED | OUTPATIENT
Start: 2020-06-05 | End: 2020-06-05

## 2020-06-05 RX ORDER — DILTIAZEM HYDROCHLORIDE 5 MG/ML
10 INJECTION INTRAVENOUS ONCE
Status: COMPLETED | OUTPATIENT
Start: 2020-06-05 | End: 2020-06-05

## 2020-06-05 RX ORDER — ACETAMINOPHEN 650 MG/1
650 SUPPOSITORY RECTAL EVERY 4 HOURS PRN
Status: DISCONTINUED | OUTPATIENT
Start: 2020-06-05 | End: 2020-06-06 | Stop reason: HOSPADM

## 2020-06-05 RX ORDER — PROPAFENONE HYDROCHLORIDE 150 MG/1
150 TABLET, COATED ORAL EVERY 8 HOURS SCHEDULED
Status: DISCONTINUED | OUTPATIENT
Start: 2020-06-05 | End: 2020-06-06 | Stop reason: HOSPADM

## 2020-06-05 RX ORDER — NICOTINE POLACRILEX 4 MG
15 LOZENGE BUCCAL
Status: DISCONTINUED | OUTPATIENT
Start: 2020-06-05 | End: 2020-06-06 | Stop reason: HOSPADM

## 2020-06-05 RX ORDER — SODIUM CHLORIDE 0.9 % (FLUSH) 0.9 %
10 SYRINGE (ML) INJECTION AS NEEDED
Status: DISCONTINUED | OUTPATIENT
Start: 2020-06-05 | End: 2020-06-06 | Stop reason: HOSPADM

## 2020-06-05 RX ORDER — PANTOPRAZOLE SODIUM 40 MG/1
40 TABLET, DELAYED RELEASE ORAL EVERY MORNING
Status: DISCONTINUED | OUTPATIENT
Start: 2020-06-05 | End: 2020-06-06 | Stop reason: HOSPADM

## 2020-06-05 RX ORDER — LISINOPRIL 40 MG/1
40 TABLET ORAL DAILY
Status: DISCONTINUED | OUTPATIENT
Start: 2020-06-05 | End: 2020-06-06

## 2020-06-05 RX ORDER — DILTIAZEM HYDROCHLORIDE 60 MG/1
60 TABLET, FILM COATED ORAL EVERY 8 HOURS SCHEDULED
Status: DISCONTINUED | OUTPATIENT
Start: 2020-06-05 | End: 2020-06-05

## 2020-06-05 RX ORDER — SUCRALFATE 1 G/1
1 TABLET ORAL 2 TIMES DAILY PRN
Status: DISCONTINUED | OUTPATIENT
Start: 2020-06-05 | End: 2020-06-06 | Stop reason: HOSPADM

## 2020-06-05 RX ORDER — SODIUM CHLORIDE 0.9 % (FLUSH) 0.9 %
10 SYRINGE (ML) INJECTION EVERY 12 HOURS SCHEDULED
Status: DISCONTINUED | OUTPATIENT
Start: 2020-06-05 | End: 2020-06-06 | Stop reason: HOSPADM

## 2020-06-05 RX ORDER — ASPIRIN 81 MG/1
81 TABLET ORAL DAILY
Status: DISCONTINUED | OUTPATIENT
Start: 2020-06-05 | End: 2020-06-06 | Stop reason: HOSPADM

## 2020-06-05 RX ORDER — CLOPIDOGREL BISULFATE 75 MG/1
75 TABLET ORAL DAILY
Status: DISCONTINUED | OUTPATIENT
Start: 2020-06-05 | End: 2020-06-05

## 2020-06-05 RX ORDER — METOPROLOL TARTRATE 50 MG/1
50 TABLET, FILM COATED ORAL EVERY 12 HOURS SCHEDULED
Status: DISCONTINUED | OUTPATIENT
Start: 2020-06-05 | End: 2020-06-06 | Stop reason: HOSPADM

## 2020-06-05 RX ORDER — DEXTROSE MONOHYDRATE 25 G/50ML
25 INJECTION, SOLUTION INTRAVENOUS
Status: DISCONTINUED | OUTPATIENT
Start: 2020-06-05 | End: 2020-06-06 | Stop reason: HOSPADM

## 2020-06-05 RX ORDER — DILTIAZEM HYDROCHLORIDE 5 MG/ML
INJECTION INTRAVENOUS
Status: COMPLETED
Start: 2020-06-05 | End: 2020-06-05

## 2020-06-05 RX ADMIN — ENOXAPARIN SODIUM 40 MG: 40 INJECTION SUBCUTANEOUS at 08:38

## 2020-06-05 RX ADMIN — PANTOPRAZOLE SODIUM 40 MG: 40 TABLET, DELAYED RELEASE ORAL at 08:38

## 2020-06-05 RX ADMIN — CLOPIDOGREL BISULFATE 75 MG: 75 TABLET ORAL at 08:39

## 2020-06-05 RX ADMIN — MELATONIN 1000 UNITS: at 08:38

## 2020-06-05 RX ADMIN — INSULIN ASPART 4 UNITS: 100 INJECTION, SOLUTION INTRAVENOUS; SUBCUTANEOUS at 08:39

## 2020-06-05 RX ADMIN — DILTIAZEM HYDROCHLORIDE 10 MG: 5 INJECTION INTRAVENOUS at 02:15

## 2020-06-05 RX ADMIN — SODIUM CHLORIDE, PRESERVATIVE FREE 10 ML: 5 INJECTION INTRAVENOUS at 21:12

## 2020-06-05 RX ADMIN — INSULIN ASPART 3 UNITS: 100 INJECTION, SOLUTION INTRAVENOUS; SUBCUTANEOUS at 12:01

## 2020-06-05 RX ADMIN — APIXABAN 5 MG: 5 TABLET, FILM COATED ORAL at 21:10

## 2020-06-05 RX ADMIN — DILTIAZEM HYDROCHLORIDE 30 MG: 30 TABLET, FILM COATED ORAL at 21:10

## 2020-06-05 RX ADMIN — Medication 5 MG/HR: at 02:48

## 2020-06-05 RX ADMIN — LISINOPRIL 40 MG: 40 TABLET ORAL at 11:32

## 2020-06-05 RX ADMIN — INSULIN ASPART 4 UNITS: 100 INJECTION, SOLUTION INTRAVENOUS; SUBCUTANEOUS at 12:01

## 2020-06-05 RX ADMIN — METOPROLOL TARTRATE 50 MG: 50 TABLET, FILM COATED ORAL at 08:39

## 2020-06-05 RX ADMIN — INSULIN ASPART 4 UNITS: 100 INJECTION, SOLUTION INTRAVENOUS; SUBCUTANEOUS at 17:00

## 2020-06-05 RX ADMIN — DILTIAZEM HYDROCHLORIDE 60 MG: 60 TABLET, FILM COATED ORAL at 12:59

## 2020-06-05 RX ADMIN — ALPRAZOLAM 0.5 MG: 0.5 TABLET ORAL at 22:51

## 2020-06-05 RX ADMIN — ISOSORBIDE MONONITRATE 30 MG: 30 TABLET, EXTENDED RELEASE ORAL at 19:14

## 2020-06-05 RX ADMIN — SODIUM CHLORIDE, PRESERVATIVE FREE 10 ML: 5 INJECTION INTRAVENOUS at 08:39

## 2020-06-05 RX ADMIN — ASPIRIN 81 MG: 81 TABLET, COATED ORAL at 08:39

## 2020-06-05 RX ADMIN — ASPIRIN 81 MG 324 MG: 81 TABLET ORAL at 02:13

## 2020-06-05 RX ADMIN — INSULIN ASPART 2 UNITS: 100 INJECTION, SOLUTION INTRAVENOUS; SUBCUTANEOUS at 08:38

## 2020-06-05 RX ADMIN — Medication 400 MG: at 21:10

## 2020-06-05 NOTE — PROGRESS NOTES
FAMILY MEDICINE RESIDENCY  TRANSFER OF CARE/ACCEPTANCE NOTE    PATIENT NAME: Aj Card Jr.  : 1945  MRN: [id number]     Active Hospital Problems    Diagnosis  POA   • **Atrial fibrillation with RVR (CMS/[facility]) [I48.91]  Yes   • GERD (gastroesophageal reflux disease) [K21.9]  Yes   • Coronary artery disease with angina pectoris (CMS/[facility]) [I25.119]  Yes   • Essential hypertension [I10]  Yes   • Generalized anxiety disorder [F41.1]  Yes   • Mixed hyperlipidemia [E78.2]  Yes   • Type 2 diabetes mellitus without complication, without long-term current use of insulin (CMS/[facility]) [E11.9]  Yes      Resolved Hospital Problems   No resolved problems to display.       Patient seen and examined by me on 20 at 7:15 AM.  Interim History:  Patient reported chest pain around 6:40 AM after which a repeat EKG was ordered.  By the time I examined patient, the chest pain had resolved.  Patient endorsed soreness on the left side of his chest.  I have noted the following changes since admission: Chest pain which resolved by the time I evaluated the patient.    I have reviewed the H&P, diagnostic data and plan of care for Aj Card Jr., case discussed with the admitting provider and/or FM service team.  I will be taking over care of this patient during the current hospitalization.      Mike Cedeno MD  20  07:30                This document has been electronically signed by Mike Cedeno MD on 2020 07:33

## 2020-06-05 NOTE — PLAN OF CARE
Problem: Patient Care Overview  Goal: Plan of Care Review  Outcome: Ongoing (interventions implemented as appropriate)  Flowsheets (Taken 6/5/2020 3541)  Plan of Care Reviewed With: patient  Outcome Summary: OT Eval completed on this date. Pt pleasant and agreeable to therapy. Bed Mobility: Independent Transfers: Indendent LBD: Independent Grooming: Independent. Pt demos indpendence with the completion of ADL's and functional mobility. Pt has no skilled OT needs at this time. Recommend d/c home with assist. Will d/c OT orders.

## 2020-06-05 NOTE — ED NOTES
Attempted to give report, nurse reports they are having to call a nurse in so it will be a little bit before someone can take report.      Sherie Jefferson RN  06/05/20 [id number]

## 2020-06-05 NOTE — THERAPY DISCHARGE NOTE
Patient Name: Aj Card Jr.  : 1945    MRN: [id number]                              Today's Date: 2020       Admit Date: 2020    Visit Dx:     ICD-10-CM ICD-9-CM   1. Atrial fibrillation with RVR (CMS/HCC) I48.91 427.31   2. Chest pain, unspecified type R07.9 786.50     Patient Active Problem List   Diagnosis   • Type 2 diabetes mellitus without complication, without long-term current use of insulin (CMS/HCC)   • Essential hypertension   • Mixed hyperlipidemia   • Generalized anxiety disorder   • History of colon polyps   • Diverticulosis of large intestine without hemorrhage   • Coronary artery disease with angina pectoris (CMS/HCC)   • Gastroesophageal reflux disease with esophagitis   • Vitamin D deficiency   • Overweight   • Encounter for screening for endocrine disorder   • Atopic dermatitis   • High serum vitamin B12   • Acute pain of right knee   • Tear of medial meniscus of right knee, current   • Atrial fibrillation with RVR (CMS/HCC)   • GERD (gastroesophageal reflux disease)     Past Medical History:   Diagnosis Date   • Acute posthemorrhagic anemia    • Allergic rhinitis    • Anxiety state    • Chest pain    • Coronary arteriosclerosis    • Diabetes mellitus (CMS/HCC)     type 2   • Diverticular disease of colon    • Dysphagia    • Epigastric pain    • GERD (gastroesophageal reflux disease)     esophagitis on Dexilant. Pt feels Nexium working better      • History of echocardiogram     Small left atrial enlargement with mild CLVH.Ef of 55-60%.Mitral valve mildly thickened.Av thickened.Left ventricle mildly dilated.Tricuspid intact.Mild aortic insufficiency. 2015       • History of echocardiogram     Mild diastolic dysfunction and mild left atrial enlargement, otherwise normal echo. EF> 55% 2012       • Hypercholesterolemia    • Hypertension    • Insomnia    • Irritable bowel syndrome    • Osteoarthritis of multiple joints      Past Surgical History:   Procedure  Laterality Date   • APPENDECTOMY       [facility] 1995       • CARDIAC CATHETERIZATION      Successful PTCA of the OMB#2.Unsuccessful PTCA of the 1st OMB, secondary to difficulty in advancing the balloon. 12/08/2015       • CHOLECYSTECTOMY      [facility] 1993       • COLONOSCOPY  10/01/2012   • COLONOSCOPY N/A 12/11/2017    Procedure: COLONOSCOPY;  Surgeon: Bladimir Michael MD;  Location: [facility] ENDOSCOPY;  Service:    • CORONARY ANGIOPLASTY      Successful PTCA & stenting LAD was done w/ deployment of a 2.5mm x23 Xience stent w/ reduction of stenosis from 90% to less than 0% stenosis with good LILLIAM 3 flow 02/17/2012       • ENDOSCOPY      with biopsy.  Mildly severe esophagitis. Gastritis. Normal examined duodenum.Several biopsies obtained in the lower third of the esophagus.Several biopsies obtained in the gastric antrum. 05/06/2016       • ENDOSCOPY      w tube. Normal esophagus. Gastritis in stomach. Biopsy taken. Gastric polyp found. Biopsy taken. Normal duodenum. 10/01/2012       • ENDOSCOPY AND COLONOSCOPY      Diverticulum in sigmoid colon & descending colon. Internal & external hemorrhoids found. 10/01/2012       • HAND SURGERY       Corrective osteotomy of ring finger metacarpal. Malunited fracture of left ring finger 05/21/1985       • HERNIA REPAIR      Laparoscopic hernia repair. prepertitoneal bilateral inguinal hernia repair with mesh. 10/15/2009      • OTHER SURGICAL HISTORY      CORONARY ARTERY STENT    • SKIN TAG REMOVAL      Excision, viral skin tag,right upper eyelid 05/08/1995      General Information     Row Name 06/05/20 1446          PT Evaluation Time/Intention    Document Type  evaluation  -BS     Mode of Treatment  individual therapy;physical therapy  -BS     Row Name 06/05/20 1446          General Information    Patient Profile Reviewed?  yes  -BS     Prior Level of Function  independent:;all household mobility;community mobility;ADL's;cooking;dressing  -BS      Existing Precautions/Restrictions  no known precautions/restrictions  -BS     Barriers to Rehab  none identified  -BS     Row Name 06/05/20 1446          Relationship/Environment    Lives With  spouse  -BS     Name(s) of Who Lives With Patient  Dalia Card, wife  -BS     Row Name 06/05/20 1446          Resource/Environmental Concerns    Current Living Arrangements  home/apartment/condo  -BS     Row Name 06/05/20 1446          Home Main Entrance    Number of Stairs, Main Entrance  one  -BS     Stair Railings, Main Entrance  none  -BS     Row Name 06/05/20 1446          Stairs Within Home, Primary    Number of Stairs, Within Home, Primary  none  -BS     Row Name 06/05/20 1446          Cognitive Assessment/Intervention- PT/OT    Orientation Status (Cognition)  oriented x 4  -BS       User Key  (r) = Recorded By, (t) = Taken By, (c) = Cosigned By    Initials Name Provider Type    Yogesh Morgan, PT Physical Therapist        Mobility     Row Name 06/05/20 1446          Bed Mobility Assessment/Treatment    Bed Mobility Assessment/Treatment  bed mobility (all) activities  -BS     [place] Level (Bed Mobility)  independent  -BS     Row Name 06/05/20 1446          Sit-Stand Transfer    Sit-Stand [place] (Transfers)  independent  -BS     Assistive Device (Sit-Stand Transfers)  other (see comments) no AD  -BS     Row Name 06/05/20 1446          Gait/Stairs Assessment/Training    Gait/Stairs Assessment/Training  gait/ambulation independence  -BS     [place] Level (Gait)  independent  -BS     Distance in Feet (Gait)  120' x 1, no AD   -BS     Pattern (Gait)  step-through  -BS     Deviations/Abnormal Patterns (Gait)  rachelle decreased  -BS     [place] Level (Stairs)  not tested  -BS       User Key  (r) = Recorded By, (t) = Taken By, (c) = Cosigned By    Initials Name Provider Type    Yogesh Morgan PT Physical Therapist        Obj/Interventions     Row Name 06/05/20 1446          General ROM    GENERAL  ROM COMMENTS  B LE AROM WFL  -BS     [facility] Name 06/05/20 1446          MMT (Manual Muscle Testing)    General MMT Comments  B LE MMT 5/5  -BS     [facility] Name 06/05/20 144          Static Sitting Balance    Level of [place] (Unsupported Sitting, Static Balance)  independent  -BS     [facility] Name 06/05/20 1446          Dynamic Sitting Balance    Level of [place], Reaches Outside Midline (Sitting, Dynamic Balance)  independent  -BS     Row Name 06/05/20 144          Static Standing Balance    Level of [place] (Supported Standing, Static Balance)  independent  -BS     Comment (Supported Standing, Static Balance)  Romberg stance eo x 20 sec, no LOB episodes  -BS     [facility] Name 06/05/20 1446          Dynamic Standing Balance    Level of [place], Reaches Outside Midline (Standing, Dynamic Balance)  independent  -BS     Row Name 06/05/20 144          Sensory Assessment/Intervention    Sensory General Assessment  no sensation deficits identified  -       User Key  (r) = Recorded By, (t) = Taken By, (c) = Cosigned By    Initials Name Provider Type    Yogesh Morgan, PT Physical Therapist        Goals/Plan    No documentation.       Clinical Impression     Row Name 06/05/20 [facility]          Pain Assessment    Additional Documentation  Pain Scale: Numbers Pre/Post-Treatment (Group)  -BS     Row Name 06/05/20 [facility]          Pain Scale: Numbers Pre/Post-Treatment    Pain Scale: Numbers, Pretreatment  0/10 - no pain  -BS     Pain Scale: Numbers, Post-Treatment  0/10 - no pain  -BS     Row Name 06/05/20 [facility]          Plan of Care Review    Plan of Care Reviewed With  patient  -BS     Progress  no change  -BS     Row Name 06/05/20 [facility]          Physical Therapy Clinical Impression    Criteria for Skilled Interventions Met (PT Clinical Impression)  no;no problems identified which require skilled intervention  -BS     [facility] Name 06/05/20 1446          Vital Signs    Pre Systolic BP Rehab  119  -BS     Pre Treatment  Diastolic BP  91  -BS     Pretreatment Heart Rate (beats/min)  67  -BS     Pre SpO2 (%)  100  -BS     O2 Delivery Pre Treatment  room air  -BS     Pre Patient Position  Supine  -BS     Intra Patient Position  Standing  -BS     Post Patient Position  Supine  -BS     Row Name 06/05/20 1446          Positioning and Restraints    Pre-Treatment Position  in bed  -BS     Post Treatment Position  bed  -BS     In Bed  notified nsg  -BS       User Key  (r) = Recorded By, (t) = Taken By, (c) = Cosigned By    Initials Name Provider Type    Yogesh Morgan, PT Physical Therapist        Outcome Measures     Row Name 06/05/20 1446          How much help from another person do you currently need...    Turning from your back to your side while in flat bed without using bedrails?  4  -BS     Moving from lying on back to sitting on the side of a flat bed without bedrails?  4  -BS     Moving to and from a bed to a chair (including a wheelchair)?  4  -BS     Standing up from a chair using your arms (e.g., wheelchair, bedside chair)?  4  -BS     Climbing 3-5 steps with a railing?  4  -BS     To walk in hospital room?  4  -BS     AM-PAC 6 Clicks Score (PT)  24  -BS     Row Name 06/05/20 1446          Functional Assessment    Outcome Measure Options  AM-PAC 6 Clicks Basic Mobility (PT)  -BS       User Key  (r) = Recorded By, (t) = Taken By, (c) = Cosigned By    Initials Name Provider Type    Yogesh Morgan, PT Physical Therapist          PT Recommendation and Plan     Outcome Summary/Treatment Plan (PT)  Anticipated Discharge Disposition (PT): home  Plan of Care Reviewed With: patient  Progress: no change     Time Calculation:   PT Charges     Row Name 06/05/20 1446             Time Calculation    Start Time  1446  -BS      Stop Time  1505  -BS      Time Calculation (min)  19 min  -BS      PT Received On  06/05/20  -BS      PT Goal Re-Cert Due Date  06/05/20  -BS        User Key  (r) = Recorded By, (t) = Taken By, (c) = Cosigned By     Initials Name Provider Type    Yogesh Morgan, PT Physical Therapist        Therapy Charges for Today     Code Description Service Date Service Provider Modifiers Qty    10764691551 HC PT EVAL MOD COMPLEXITY 1 6/5/2020 Yogesh Lechuga, PT GP 1          PT G-Codes  Outcome Measure Options: AM-PAC 6 Clicks Basic Mobility (PT)  AM-PAC 6 Clicks Score (PT): 24    PT Discharge Summary  Anticipated Discharge Disposition (PT): home  Reason for Discharge: Independent    Yogesh Lechuga, PT  6/5/2020

## 2020-06-05 NOTE — ED PROVIDER NOTES
Subjective   Patient presents with complaint of chest pain this evening as well as palpitations.  Patient notes shortness of breath and substernal chest discomfort.  Patient states is been for about 2 weeks, he has had actually seen Dr. Jeffers this week and was started on diltiazem for his palpitations.  Patient states he actually was not having them at the time that he was seen by Dr. Jeffers.  States he did not continue with the medication as he was feeling somewhat weak and dizzy with the medication.  Patient noted his heart rate got up to approximately 170 at home today so he presented to the ER for further evaluation.  Patient is in the 120-140 range at this time, irregularly irregular with new onset atrial fibrillation.  There does not appear to be a record of this in the past, nor does the patient know of any atrial fibrillation in the past.  No nausea vomiting, no radiation of the pain.  Patient does have a significant coronary arterial sclerosis history.  Patient is on long term Plavix.  Patient is on no other anticoagulants at this time.  Multiple allergies to anticoagulants.          Review of Systems   Constitutional: Negative.  Negative for appetite change, chills and fever.   HENT: Negative.  Negative for congestion.    Eyes: Negative.  Negative for photophobia and visual disturbance.   Respiratory: Negative.  Negative for cough, chest tightness and shortness of breath.    Cardiovascular: Positive for chest pain and palpitations.   Gastrointestinal: Negative.  Negative for abdominal pain, constipation, diarrhea, nausea and vomiting.   Endocrine: Negative.    Genitourinary: Negative.  Negative for decreased urine volume, dysuria, flank pain and hematuria.   Musculoskeletal: Negative.  Negative for arthralgias, back pain, myalgias, neck pain and neck stiffness.   Skin: Negative.  Negative for pallor.   Neurological: Negative.  Negative for dizziness, syncope, weakness, light-headedness, numbness and  headaches.   Psychiatric/Behavioral: Negative.  Negative for confusion and suicidal ideas. The patient is not nervous/anxious.    All other systems reviewed and are negative.      Past Medical History:   Diagnosis Date   • Acute posthemorrhagic anemia    • Allergic rhinitis    • Anxiety state    • Chest pain    • Coronary arteriosclerosis    • Diabetes mellitus (CMS/HCC)     type 2   • Diverticular disease of colon    • Dysphagia    • Epigastric pain    • GERD (gastroesophageal reflux disease)     esophagitis on Dexilant. Pt feels Nexium working better      • History of echocardiogram     Small left atrial enlargement with mild CLVH.Ef of 55-60%.Mitral valve mildly thickened.Av thickened.Left ventricle mildly dilated.Tricuspid intact.Mild aortic insufficiency. 12/07/2015       • History of echocardiogram     Mild diastolic dysfunction and mild left atrial enlargement, otherwise normal echo. EF> 55% 01/03/2012       • Hypercholesterolemia    • Hypertension    • Insomnia    • Irritable bowel syndrome    • Osteoarthritis of multiple joints        Allergies   Allergen Reactions   • Brilinta [Ticagrelor] Shortness Of Breath   • Effient [Prasugrel] Shortness Of Breath   • Warfarin And Related Shortness Of Breath   • Ciprofloxacin Hives   • Lipitor [Atorvastatin] Swelling   • Latex Itching   • Adhesive Tape Rash   • Cardizem [Diltiazem Hcl] Rash   • Celexa [Citalopram Hydrobromide] Rash   • Crestor [Rosuvastatin Calcium] Rash   • Floxin [Ofloxacin] Rash   • Januvia [Sitagliptin] Rash   • Penicillins Rash   • Sulfa Antibiotics Rash       Past Surgical History:   Procedure Laterality Date   • APPENDECTOMY       [facility] 1995       • CARDIAC CATHETERIZATION      Successful PTCA of the OMB#2.Unsuccessful PTCA of the 1st OMB, secondary to difficulty in advancing the balloon. 12/08/2015       • CHOLECYSTECTOMY      [facility] 1993       • COLONOSCOPY  10/01/2012   • COLONOSCOPY N/A 12/11/2017     Procedure: COLONOSCOPY;  Surgeon: Bladimir Michael MD;  Location: [facility] ENDOSCOPY;  Service:    • CORONARY ANGIOPLASTY      Successful PTCA & stenting LAD was done w/ deployment of a 2.5mm x23 Xience stent w/ reduction of stenosis from 90% to less than 0% stenosis with good LILLIAM 3 flow 02/17/2012       • ENDOSCOPY      with biopsy.  Mildly severe esophagitis. Gastritis. Normal examined duodenum.Several biopsies obtained in the lower third of the esophagus.Several biopsies obtained in the gastric antrum. 05/06/2016       • ENDOSCOPY      w tube. Normal esophagus. Gastritis in stomach. Biopsy taken. Gastric polyp found. Biopsy taken. Normal duodenum. 10/01/2012       • ENDOSCOPY AND COLONOSCOPY      Diverticulum in sigmoid colon & descending colon. Internal & external hemorrhoids found. 10/01/2012       • HAND SURGERY       Corrective osteotomy of ring finger metacarpal. Malunited fracture of left ring finger 05/21/1985       • HERNIA REPAIR      Laparoscopic hernia repair. prepertitoneal bilateral inguinal hernia repair with mesh. 10/15/2009      • OTHER SURGICAL HISTORY      CORONARY ARTERY STENT    • SKIN TAG REMOVAL      Excision, viral skin tag,right upper eyelid 05/08/1995        Family History   Problem Relation Age of Onset   • Clotting disorder Other    • Lung disease Other    • Schizophrenia Sister    • Obesity Sister    • Tuberculosis Sister    • Arthritis Mother    • Diabetes Mother    • Heart disease Mother    • Hypertension Mother    • Obesity Mother    • Stroke Mother    • Thyroid disease Mother    • Tuberculosis Mother    • Arthritis Father    • Tuberculosis Father        Social History     Socioeconomic History   • Marital status:      Spouse name: Not on file   • Number of children: Not on file   • Years of education: Not on file   • Highest education level: Not on file   Tobacco Use   • Smoking status: Never Smoker   • Smokeless tobacco: Never Used   Substance and Sexual Activity   • Alcohol  use: No   • Drug use: No   • Sexual activity: Defer           Objective   Physical Exam   Constitutional: He is oriented to person, place, and time. He appears well-developed and well-nourished.  Non-toxic appearance. He does not appear ill. He appears distressed.   HENT:   Head: Normocephalic and atraumatic.   Eyes: Pupils are equal, round, and reactive to light. Conjunctivae and EOM are normal.   Neck: Normal range of motion. Neck supple. No JVD present.   Cardiovascular: Normal heart sounds and intact distal pulses. An irregularly irregular rhythm present. Tachycardia present. Exam reveals no gallop and no friction rub.   No murmur heard.  Pulmonary/Chest: Effort normal. No respiratory distress. He has no wheezes. He has no rales. He exhibits no tenderness.   Abdominal: Soft. Bowel sounds are normal. He exhibits no distension and no mass. There is no tenderness. There is no rebound and no guarding.   Musculoskeletal: Normal range of motion.   Neurological: He is alert and oriented to person, place, and time.   Skin: Skin is warm and dry. Capillary refill takes less than 2 seconds.   Psychiatric: His behavior is normal. Judgment and thought content normal. His mood appears anxious.   Nursing note and vitals reviewed.      ECG 12 Lead    Date/Time: 6/5/2020 2:20 AM  Performed by: Edson Harding MD  Authorized by: Edson Harding MD   Interpreted by physician  Comparison: not compared with previous ECG   Rhythm: atrial fibrillation  Rate: tachycardic  BPM: 121  Clinical impression: non-specific ECG                 ED Course                                 Labs Reviewed   COMPREHENSIVE METABOLIC PANEL - Abnormal; Notable for the following components:       Result Value    Glucose 287 (*)     Chloride 97 (*)     Alkaline Phosphatase 38 (*)     All other components within normal limits    Narrative:     GFR Normal >60  Chronic Kidney Disease <60  Kidney Failure <15     CBC WITH AUTO DIFFERENTIAL - Abnormal;  Notable for the following components:    Immature Grans % 0.7 (*)     All other components within normal limits   TROPONIN (IN-HOUSE) - Normal    Narrative:     Troponin T Reference Range:  <= 0.03 ng/mL-   Negative for AMI  >0.03 ng/mL-     Abnormal for myocardial necrosis.  Clinicians would have to utilize clinical acumen, EKG, Troponin and serial changes to determine if it is an Acute Myocardial Infarction or myocardial injury due to an underlying chronic condition.       Results may be falsely decreased if patient taking Biotin.     BNP (IN-HOUSE) - Normal    Narrative:     Among patients with dyspnea, NT-proBNP is highly sensitive for the detection of acute congestive heart failure. In addition NT-proBNP of <300 pg/ml effectively rules out acute congestive heart failure with 99% negative predictive value.    Results may be falsely decreased if patient taking Biotin.     RAINBOW DRAW    Narrative:     The following orders were created for panel order [place] Draw.  Procedure                               Abnormality         Status                     ---------                               -----------         ------                     Light Blue Top[[id number]]                                   Final result               Green Top (Gel)[[id number]]                                  Final result               Lavender Top[[id number]]                                     Final result               Gold Top - SST[[id number]]                                   Final result                 Please view results for these tests on the individual orders.   TROPONIN (IN-HOUSE)   URINALYSIS W/ MICROSCOPIC IF INDICATED (NO CULTURE)   CBC AND DIFFERENTIAL    Narrative:     The following orders were created for panel order CBC & Differential.  Procedure                               Abnormality         Status                     ---------                               -----------         ------                     CBC Auto  Differential[[id number]]        Abnormal            Final result                 Please view results for these tests on the individual orders.   LIGHT BLUE TOP   GREEN TOP   LAVENDER TOP   GOLD TOP - SST       XR Chest 1 View   Final Result   No active disease.      Electronically signed by:  Arie Loya  6/5/2020 2:41 AM CDT   Workstation: [id number]        Patient with new onset A. fib with RVR.  Patient is improved with diltiazem in the ED.  Patient's current rate in the 90s.  Patient with chest pain early with negative Trope at this juncture.  Patient be admitted for further cardiology consultation.          MDM    Final diagnoses:   Atrial fibrillation with RVR (CMS/HCC)   Chest pain, unspecified type            Edson Harding MD  06/05/20 0311

## 2020-06-05 NOTE — H&P
HISTORY AND PHYSICAL  NAME: Aj Card Jr.  : 1945  MRN: [id number]    DATE OF ADMISSION:  2020     DATE & TIME SEEN: 20 at 0404    PCP: Andreas Martinez MD    CODE STATUS: Full code    CHIEF COMPLAINT:  Tachycardia and chest pressure    HPI:  Aj Card Jr. is a 74 y.o. male with a CMH of type II DM, hypertension, hyperlipidemia, ADRIENNE, CAD, and GERD who presents complaining of tachycardia and chest pressure.  Overnight he was awoken from sleep due to his heart racing with associated chest pain that was described as pressure that is left-sided, nonradiating, and without any exacerbating or relieving factors.  He states he did take a dose of Cardizem of unknown strength which helped.  This Cardizem was given to him by his cardiologist Dr. Jeffers a few weeks ago.  In  he had this same feeling of his heart racing for which he saw Dr. Jeffers and was started on medication and then due to stability of condition was told he could stop the medication.  A few weeks ago he visited Dr. Jeffers's office due to another episode of tachycardia and associated chest pressure and at that time Dr. Jeffers restarted him on Cardizem.  However, patient states that he does not tolerate the Cardizem due to feeling dizzy when he is active and therefore stopped the medication.  He has a history of MI in  at which time he had his second or third stent placed by Dr. Jeffers.      CONCURRENT MEDICAL HISTORY:  Past Medical History:   Diagnosis Date   • Acute posthemorrhagic anemia    • Allergic rhinitis    • Anxiety state    • Chest pain    • Coronary arteriosclerosis    • Diabetes mellitus (CMS/HCC)     type 2   • Diverticular disease of colon    • Dysphagia    • Epigastric pain    • GERD (gastroesophageal reflux disease)     esophagitis on Dexilant. Pt feels Nexium working better      • History of echocardiogram     Small left atrial enlargement with mild CLVH.Ef of 55-60%.Mitral valve  mildly thickened.Av thickened.Left ventricle mildly dilated.Tricuspid intact.Mild aortic insufficiency. 12/07/2015       • History of echocardiogram     Mild diastolic dysfunction and mild left atrial enlargement, otherwise normal echo. EF> 55% 01/03/2012       • Hypercholesterolemia    • Hypertension    • Insomnia    • Irritable bowel syndrome    • Osteoarthritis of multiple joints        PAST SURGICAL HISTORY:  Past Surgical History:   Procedure Laterality Date   • APPENDECTOMY       [facility] 1995       • CARDIAC CATHETERIZATION      Successful PTCA of the OMB#2.Unsuccessful PTCA of the 1st OMB, secondary to difficulty in advancing the balloon. 12/08/2015       • CHOLECYSTECTOMY      [facility] 1993       • COLONOSCOPY  10/01/2012   • COLONOSCOPY N/A 12/11/2017    Procedure: COLONOSCOPY;  Surgeon: Bladimir Michael MD;  Location: [facility] ENDOSCOPY;  Service:    • CORONARY ANGIOPLASTY      Successful PTCA & stenting LAD was done w/ deployment of a 2.5mm x23 Xience stent w/ reduction of stenosis from 90% to less than 0% stenosis with good LILLIAM 3 flow 02/17/2012       • ENDOSCOPY      with biopsy.  Mildly severe esophagitis. Gastritis. Normal examined duodenum.Several biopsies obtained in the lower third of the esophagus.Several biopsies obtained in the gastric antrum. 05/06/2016       • ENDOSCOPY      w tube. Normal esophagus. Gastritis in stomach. Biopsy taken. Gastric polyp found. Biopsy taken. Normal duodenum. 10/01/2012       • ENDOSCOPY AND COLONOSCOPY      Diverticulum in sigmoid colon & descending colon. Internal & external hemorrhoids found. 10/01/2012       • HAND SURGERY       Corrective osteotomy of ring finger metacarpal. Malunited fracture of left ring finger 05/21/1985       • HERNIA REPAIR      Laparoscopic hernia repair. prepertitoneal bilateral inguinal hernia repair with mesh. 10/15/2009      • OTHER SURGICAL HISTORY      CORONARY ARTERY STENT    • SKIN TAG REMOVAL       Excision, viral skin tag,right upper eyelid 05/08/1995        FAMILY HISTORY:  Family History   Problem Relation Age of Onset   • Clotting disorder Other    • Lung disease Other    • Schizophrenia Sister    • Obesity Sister    • Tuberculosis Sister    • Arthritis Mother    • Diabetes Mother    • Heart disease Mother    • Hypertension Mother    • Obesity Mother    • Stroke Mother    • Thyroid disease Mother    • Tuberculosis Mother    • Arthritis Father    • Tuberculosis Father         SOCIAL HISTORY:  Social History     Socioeconomic History   • Marital status:      Spouse name: Not on file   • Number of children: Not on file   • Years of education: Not on file   • Highest education level: Not on file   Tobacco Use   • Smoking status: Never Smoker   • Smokeless tobacco: Never Used   Substance and Sexual Activity   • Alcohol use: No   • Drug use: No   • Sexual activity: Defer       HOME MEDICATIONS:  Prior to Admission medications    Medication Sig Start Date End Date Taking? Authorizing Provider   ALPRAZolam (XANAX) 0.5 MG tablet Take 1 tablet by mouth At Night As Needed for Anxiety. 5/18/20   Andreas Martinez MD   amLODIPine (NORVASC) 10 MG tablet Take 1 tablet by mouth Daily. 5/18/20   Andreas Martinez MD   aspirin 81 MG EC tablet Take 2 tablets by mouth Daily. 4/1/19   Andreas Martinez MD   azelastine (ASTELIN) 0.1 % nasal spray 2 sprays into the nostril(s) as directed by provider 2 (Two) Times a Day. Use in each nostril as directed 5/1/19   Andreas Martinez MD   Blood Glucose Monitoring Suppl w/Device kit USE AS INDICATED, ANY MONITOR , ICD10 code is E11.9 2/22/19   Glen Jain MD   cholecalciferol (VITAMIN D3) 1000 units tablet Take 1 tablet by mouth Daily. 4/1/19   Andreas Martinez MD   clopidogrel (PLAVIX) 75 MG tablet 1 PO QOD 2/20/20   Andreas Martinez MD   coenzyme Q10 100 MG capsule Take 100 mg by mouth daily.    Provider, MD Yazan   Crisaborole (EUCRISA) 2 % ointment Apply 1  "application topically 2 (Two) Times a Day. 11/13/18   Andreas Martinez MD   esomeprazole (nexIUM) 40 MG capsule Take 1 capsule by mouth Every Morning Before Breakfast. 1/28/20   Andreas Aldana PA-C   Glucose Blood (BLOOD GLUCOSE TEST) strip Use 4 x daily use any brand covered by insurance or same brand as before ICD10 code is E11.9 2/22/19   Glen Jain MD   icosapent ethyl (VASCEPA) 1 g capsule capsule Take 2 g by mouth 2 (Two) Times a Day With Meals. 7/1/19   Andreas Martinez MD   insulin regular (HUMULIN R) 100 UNIT/ML injection 4 units with meals 2/10/20   Glen Jain MD   Insulin Syringe 31G X 5/16\" 0.3 ML misc 4 x daily 2/10/20   Glen Jain MD   KRILL OIL OMEGA-3 PO Take  by mouth daily.    Yazan Yancey MD   Lancet Devices (LANCING DEVICE) misc USE AS INDICATED TO CORRELATE WITH STRIPS AND METER 2/22/19   Glen Jain MD   Lancets 30G misc USE 4 X DAILY 2/22/19   Glen Jain MD   lisinopril (PRINIVIL,ZESTRIL) 40 MG tablet Take 1 tablet by mouth Daily. 2/20/20   Andreas Martinez MD   LOTEMAX 0.5 % ophthalmic suspension  9/3/19   Yazan Yancey MD   metFORMIN ER (GLUCOPHAGE-XR) 500 MG 24 hr tablet Take 1 tablet by mouth Daily With Breakfast. 5/18/20   Andreas Martinez MD   metoprolol tartrate (LOPRESSOR) 50 MG tablet Take 1 tablet by mouth Every 12 (Twelve) Hours. 2/20/20   Andreas Martinez MD   Red Yeast Rice Extract (RED YEAST RICE PO) Take  by mouth Daily.    Provider, Historical, MD   Saw Palmetto, Serenoa repens, (SAW PALMETTO PO) Take  by mouth daily.    Yazan Yancey MD   sucralfate (CARAFATE) 1 g tablet TAKE 1 TABLET BY MOUTH TWICE DAILY AS NEEDED FOR NAUSEA AND FOR ABDOMINAL PAIN 5/1/20   Andreas Martinez MD   vitamin B-12 (CYANOCOBALAMIN) 2500 MCG sublingual tablet tablet Place  under the tongue Every Other Day.    Provider, Historical, MD       ALLERGIES:  Brilinta [ticagrelor]; Effient [prasugrel]; " Warfarin and related; Ciprofloxacin; Lipitor [atorvastatin]; Latex; Adhesive tape; Cardizem [diltiazem hcl]; Celexa [citalopram hydrobromide]; Crestor [rosuvastatin calcium]; Floxin [ofloxacin]; Januvia [sitagliptin]; Penicillins; and Sulfa antibiotics    REVIEW OF SYSTEMS  Review of Systems   Constitutional: Negative for activity change, appetite change and fever.   HENT: Negative for congestion and trouble swallowing.    Respiratory: Negative for chest tightness and shortness of breath.    Cardiovascular: Positive for chest pain (Pressure) and palpitations. Negative for leg swelling.   Gastrointestinal: Negative for abdominal distention, abdominal pain, nausea and vomiting.   Genitourinary: Negative for difficulty urinating and dysuria.   Musculoskeletal: Negative for arthralgias and myalgias.   Skin: Negative for color change and pallor.   Neurological: Negative for weakness, light-headedness and headaches.   Psychiatric/Behavioral: Negative for agitation and behavioral problems.       PHYSICAL EXAM:  Temp:  [97.3 °F (36.3 °C)-98.4 °F (36.9 °C)] 97.3 °F (36.3 °C)  Heart Rate:  [] 83  Resp:  [18-20] 18  BP: (105-135)/(54-84) 119/54  Body mass index is 26.83 kg/m².  Physical Exam   Constitutional: He is oriented to person, place, and time. He appears well-developed and well-nourished.   HENT:   Head: Normocephalic and atraumatic.   Right Ear: External ear normal.   Left Ear: External ear normal.   Nose: Nose normal.   Eyes: Pupils are equal, round, and reactive to light. EOM are normal.   Neck: Normal range of motion. Neck supple.   Cardiovascular: Normal rate and normal heart sounds. An irregularly irregular rhythm present. Exam reveals no gallop and no friction rub.   No murmur heard.  Pulmonary/Chest: Effort normal and breath sounds normal. No respiratory distress. He has no wheezes. He has no rales.   Abdominal: Soft. Bowel sounds are normal. He exhibits no distension. There is no tenderness.      Musculoskeletal: Normal range of motion. He exhibits no edema.   Neurological: He is alert and oriented to person, place, and time.   Skin: Skin is warm. Capillary refill takes less than 2 seconds. No erythema.   Psychiatric: He has a normal mood and affect. His behavior is normal.       DIAGNOSTIC DATA:   Lab Results (last 24 hours)     Procedure Component Value Units Date/Time    Extra Tubes [[id number]] Collected:  06/05/20 0508    Specimen:  Blood, Venous Line Updated:  06/05/20 0615    Narrative:       The following orders were created for panel order Extra Tubes.  Procedure                               Abnormality         Status                     ---------                               -----------         ------                     Lavender Top[[id number]]                                     Final result                 Please view results for these tests on the individual orders.    Lavender Top [[id number]] Collected:  06/05/20 0508    Specimen:  Blood Updated:  06/05/20 0615     Extra Tube hold for add-on     Comment: Auto resulted       Urinalysis With Microscopic If Indicated (No Culture) - Urine, Clean Catch [[id number]]  (Abnormal) Collected:  06/05/20 0534    Specimen:  Urine, Clean Catch Updated:  06/05/20 0543     Color, UA Yellow     Appearance, UA Clear     pH, UA 6.0     Specific Gravity, UA 1.019     Glucose,  mg/dL (2+)     Ketones, UA Negative     Bilirubin, UA Negative     Blood, UA Negative     Protein, UA Negative     Leuk Esterase, UA Negative     Nitrite, UA Negative     Urobilinogen, UA 0.2 E.U./dL    Narrative:       Urine microscopic not indicated.    Troponin [[id number]]  (Normal) Collected:  06/05/20 0508    Specimen:  Blood Updated:  06/05/20 0531     Troponin T <0.010 ng/mL     Narrative:       Troponin T Reference Range:  <= 0.03 ng/mL-   Negative for AMI  >0.03 ng/mL-     Abnormal for myocardial necrosis.  Clinicians would have to utilize clinical acumen, EKG, Troponin and  serial changes to determine if it is an Acute Myocardial Infarction or myocardial injury due to an underlying chronic condition.       Results may be falsely decreased if patient taking Biotin.      [place] Draw [[id number]] Collected:  06/05/20 0206    Specimen:  Blood Updated:  06/05/20 0315    Narrative:       The following orders were created for panel order [place] Draw.  Procedure                               Abnormality         Status                     ---------                               -----------         ------                     Light Blue Top[[id number]]                                   Final result               Green Top (Gel)[[id number]]                                  Final result               Lavender Top[[id number]]                                     Final result               Gold Top - SST[[id number]]                                   Final result                 Please view results for these tests on the individual orders.    Light Blue Top [[id number]] Collected:  06/05/20 0206    Specimen:  Blood Updated:  06/05/20 0315     Extra Tube hold for add-on     Comment: Auto resulted       Green Top (Gel) [[id number]] Collected:  06/05/20 0207    Specimen:  Blood Updated:  06/05/20 0315     Extra Tube Hold for add-ons.     Comment: Auto resulted.       Lavender Top [[id number]] Collected:  06/05/20 0207    Specimen:  Blood Updated:  06/05/20 0315     Extra Tube hold for add-on     Comment: Auto resulted       Gold Top - SST [[id number]] Collected:  06/05/20 0207    Specimen:  Blood Updated:  06/05/20 0315     Extra Tube Hold for add-ons.     Comment: Auto resulted.       Troponin [[id number]]  (Normal) Collected:  06/05/20 0207    Specimen:  Blood Updated:  06/05/20 0238     Troponin T <0.010 ng/mL     Narrative:       Troponin T Reference Range:  <= 0.03 ng/mL-   Negative for AMI  >0.03 ng/mL-     Abnormal for myocardial necrosis.  Clinicians would have to utilize clinical acumen, EKG, Troponin  and serial changes to determine if it is an Acute Myocardial Infarction or myocardial injury due to an underlying chronic condition.       Results may be falsely decreased if patient taking Biotin.      Comprehensive Metabolic Panel [[id number]]  (Abnormal) Collected:  06/05/20 0207    Specimen:  Blood Updated:  06/05/20 0238     Glucose 287 mg/dL      BUN 15 mg/dL      Creatinine 0.94 mg/dL      Sodium 137 mmol/L      Potassium 4.1 mmol/L      Chloride 97 mmol/L      CO2 25.0 mmol/L      Calcium 9.6 mg/dL      Total Protein 7.5 g/dL      Albumin 4.30 g/dL      ALT (SGPT) 29 U/L      AST (SGOT) 27 U/L      Alkaline Phosphatase 38 U/L      Total Bilirubin 0.3 mg/dL      eGFR Non African Amer 78 mL/min/1.73      Globulin 3.2 gm/dL      A/G Ratio 1.3 g/dL      BUN/Creatinine Ratio 16.0     Anion Gap 15.0 mmol/L     Narrative:       GFR Normal >60  Chronic Kidney Disease <60  Kidney Failure <15      BNP [[id number]]  (Normal) Collected:  06/05/20 0207    Specimen:  Blood Updated:  06/05/20 0234     proBNP 171.3 pg/mL     Narrative:       Among patients with dyspnea, NT-proBNP is highly sensitive for the detection of acute congestive heart failure. In addition NT-proBNP of <300 pg/ml effectively rules out acute congestive heart failure with 99% negative predictive value.    Results may be falsely decreased if patient taking Biotin.      CBC & Differential [[id number]] Collected:  06/05/20 0207    Specimen:  Blood Updated:  06/05/20 0215    Narrative:       The following orders were created for panel order CBC & Differential.  Procedure                               Abnormality         Status                     ---------                               -----------         ------                     CBC Auto Differential[[id number]]        Abnormal            Final result                 Please view results for these tests on the individual orders.    CBC Auto Differential [[id number]]  (Abnormal) Collected:  06/05/20 0207     Specimen:  Blood Updated:  06/05/20 0215     WBC 5.81 10*3/mm3      RBC 4.81 10*6/mm3      Hemoglobin 13.8 g/dL      Hematocrit 40.4 %      MCV 84.0 fL      MCH 28.7 pg      MCHC 34.2 g/dL      RDW 13.3 %      RDW-SD 41.3 fl      MPV 9.1 fL      Platelets 213 10*3/mm3      Neutrophil % 61.3 %      Lymphocyte % 28.2 %      Monocyte % 6.7 %      Eosinophil % 2.4 %      Basophil % 0.7 %      Immature Grans % 0.7 %      Neutrophils, Absolute 3.56 10*3/mm3      Lymphocytes, Absolute 1.64 10*3/mm3      Monocytes, Absolute 0.39 10*3/mm3      Eosinophils, Absolute 0.14 10*3/mm3      Basophils, Absolute 0.04 10*3/mm3      Immature Grans, Absolute 0.04 10*3/mm3      nRBC 0.0 /100 WBC            Imaging Results (Last 24 Hours)     Procedure Component Value Units Date/Time    XR Chest 1 View [[id number]] Collected:  06/05/20 0210     Updated:  06/05/20 0242    Narrative:         CHEST 1 VIEW on 6/5/2020     CLINICAL INDICATION: Chest pain, tachycardia    COMPARISON: 12/7/2015    FINDINGS: The lungs are clear. There is mild elevation of the  right hemidiaphragm. Cardiac, hilar and mediastinal contours are  within normal limits. Pulmonary vascularity is within normal  limits. No bony abnormality is noted.      Impression:       No active disease.    Electronically signed by:  Arie Loya  6/5/2020 2:41 AM CDT  Workstation: [id number]            I reviewed the patient's new clinical results.    ASSESSMENT AND PLAN: This is a 74 y.o. male with:    Active Hospital Problems    Diagnosis POA   • **Atrial fibrillation with RVR (CMS/HCC) [I48.91] Yes     -Tachycardic to 138 with EKG showing atrial fibrillation on admission.  Rate controlled on Cardizem drip  -Continue Cardizem drip.  Attempt a different agent to avoid patient stated side effects of dizziness with oral Cardizem to improve compliance  - Echo  -Telemetry  - Consider consulting Dr. Jeffers. Due to Cardizem side efeects      • GERD (gastroesophageal reflux disease) [K21.9]  Yes     - Protonix     • Coronary artery disease with angina pectoris (CMS/[facility]) [I25.119] Yes     - Continue home medications     • Essential hypertension [I10] Yes     - Continue home medications. Will hold Norvasc as patient is unsure if should continue taking. Restart if BP uncontrolled     • Generalized anxiety disorder [F41.1] Yes     - Alprazolam as needed     • Mixed hyperlipidemia [E78.2] Yes     - Consider staring statin     • Type 2 diabetes mellitus without complication, without long-term current use of insulin (CMS/[facility]) [E11.9] Yes     -Continue home dose of 4 units with meals  -ISS         DVT prophylaxis: Lovenox     Aj Card Jr. and I have discussed pain goals for this hospitalization after reviewing his current clinical condition, medical history and prior pain experiences.  The goal is to keep the pain level 1.  To help achieve this, I plan to order morphine as needed.    [facility] # [id number], reviewed and consistent with patient reported medications.    Expected Length of Stay: Where: current living arrangements and When:  1-2 days    I discussed the patient's findings and my recommendations with patient and primary care team.     Teodora Shafer MD is the attending on record at time of admission, She is aware of the patient's status and agrees with the above history and physical.          This document has been electronically signed by Chris Valdovinos MD on June 5, 2020 07:02

## 2020-06-05 NOTE — CONSULTS
Cardiology Consultation Note.        Patient Name: Aj Card Jr.  Age/Sex: 74 y.o. male  : 1945  MRN: [id number]    Date of consultation: 2020  Consulting Physician: David Jeffers MD  Primary care Physician: Andreas Martinez MD  Requesting Physician:  Mike Cedeno MD     Reason for consultation: Chest pain paroxysmal atrial fibrillation history of atherosclerotic coronary artery disease    Subjective:       Chief Complaint: Chest pain.    History of Present Illness:  Aj Card Jr. is a 74 y.o. male     Body mass index is 26.83 kg/m². with past medical history significant for atherosclerotic coronary artery disease with PTCA and stenting of the circumflex artery done in  with deployment of a 2.5 mm x 23 mm Cypher drug-eluting stent, aborted inferior wall myocardial infarction in 2010 with PTCA and stenting of the 100% occluded right coronary artery, PTCA and stenting of the left anterior descending artery with deployment of a 2.5 mm x 23 mm Xience drug-eluting stent done in 2007, last coronary angiogram done in  with PTCA of the obtuse marginal branch using a 2.5 mm balloon, with sustained patency in the left anterior descending artery site of previous angioplasty and stenting with the first diagonal branch ostial 40% stenosis and sustained patency in the right coronary artery with 30% stenosis, ramus intermedius branch with 30 to 40% stenosis, history of arterial hypertension, hypertensive heart disease, paroxysmal atrial fibrillation maintaining sinus rhythm, osteoarthritis, allergic rhinitis, gastroesophageal reflux disease, irritable bowel syndrome, anemia, insulin requiring diabetes, vitamin D deficiency, and chronic pain.    Patient on outpatient evaluation had complained of having symptoms of palpitation on and off.  Patient has been maintained in normal sinus rhythm for a prolonged duration without any recurrence of atrial  fibrillation.    Patient was recently started on Cardizem for symptoms of palpitation.  Patient presented to the emergency room with symptoms of palpitation associated with symptoms of chest discomfort.  Patient symptoms of chest discomfort was pleuritic in nature.  Patient on further questioning denies any symptoms of exertional chest pain.  Patient denies any radiation.  Patient denies any associated diaphoresis.  Patient description of the chest discomfort is of a different quality than the one which he had prior to his previous stent placement.    Patient was found to be in atrial fibrillation and converted to normal sinus rhythm after initiation of IV Cardizem.  Patient troponin was negative.  Patient resting electrocardiogram did not show any acute ST-T wave changes.  Patient blood pressure has been labile.  Patient denies any symptoms of headache.  Patient denies any hemoptysis hematuria bright red blood per rectum.  Patient denies any nausea vomiting.    Patient 10 point review of system except for what stated in the history of present illness and negative.          Concurrent  Medical History:  1.  Chest pain symptomatic palpitation.  2.  Paroxysmal atrial fibrillation.  3.  Atherosclerotic coronary artery disease.  4.  Last coronary angiogram done in December 2015 had revealed:  A.  Sustained patency in the left anterior descending artery site of previous angioplasty and stenting  B.  First diagonal branch in the proximal portion towards ostium with 40% stenosis.  C.  Sustained patency in the right coronary artery site of previous angioplasty and stenting with 30% stenosis.  D.  Sustained patency in the obtuse marginal branch #2 site of previous angioplasty and stenting with the distal and of the obtuse marginal branch with 95% stenosis with successful PTCA with a 2.5 mm balloon.  E.  Ramus intermediate branch in the proximal portion with 30 to 40% stenosis.  F.  First obtuse marginal branch which was a  small caliber vessel with 95% stenosis treated medically.  4.  PTCA and stenting of the left anterior descending artery with deployment of a 2.5 mm x 23 mm Xience drug-eluting stent done in February 2007.  5.  PTCA and stenting of the circumflex artery done in 2005 with deployment of a 2.5 mm x 23 mm Cypher drug-eluting stent.  6.  Aborted inferior wall myocardial infarction with PTCA and stenting of the right coronary artery done in 2010.  7.  Arterial hypertension.  8.  Hypertensive heart disease with concentric left ventricular hypertrophy with diastolic dysfunction.  9.  Nonrheumatic mild mitral and nonrheumatic mild tricuspid regurgitation.  10.  Hyperlipidemia with intolerance to statin.  11.  Gastroesophageal reflux disease.  12.  Anxiety.  13.  Allergic rhinitis.  14.  Chronic pain.  15.  Osteoarthritis.  16.  History of anemia.  17.  Osteoarthritis.  18.  Irritable bowel syndrome.  19.  History of anemia.  20.  Insulin requiring diabetes.  21.  Vitamin D deficiency.      Past Surgical History:  1.  Coronary angiogram done in 2005, 2007, 2010 and 2015.  2.  Excisional biopsy of the forehead lesion.  3.  Appendectomy.  4.  Cholecystectomy done at [facility].  5.  Colonoscopy.  6.  Esophagogastroduodenoscopy.  7.  Hernia repair bilateral..  8.  Ring finger surgery.      Family History: Family history significant for premature atherosclerotic coronary artery disease in mother.      Social History: Denies any tobacco alcohol abuse.       Cardiac Risk Factors:   1.  Male.  2.  Arterial hypertension.  3.  Hyperlipidemia.  4.  Family history for coronary artery disease.    Allergies:  Allergies   Allergen Reactions   • Brilinta [Ticagrelor] Shortness Of Breath   • Effient [Prasugrel] Shortness Of Breath   • Warfarin And Related Shortness Of Breath   • Ciprofloxacin Hives   • Lipitor [Atorvastatin] Swelling   • Latex Itching   • Adhesive Tape Rash   • Cardizem [Diltiazem Hcl] Rash   • Celexa [Citalopram  "Hydrobromide] Rash   • Crestor [Rosuvastatin Calcium] Rash   • Floxin [Ofloxacin] Rash   • Januvia [Sitagliptin] Rash   • Penicillins Rash   • Sulfa Antibiotics Rash       Medication:  Medications Prior to Admission   Medication Sig Dispense Refill Last Dose   • ALPRAZolam (XANAX) 0.5 MG tablet Take 1 tablet by mouth At Night As Needed for Anxiety. 30 tablet 0 6/4/2020 at Unknown time   • amLODIPine (NORVASC) 10 MG tablet Take 1 tablet by mouth Daily. 90 tablet 3 6/4/2020 at Unknown time   • aspirin 81 MG EC tablet Take 2 tablets by mouth Daily. 90 tablet 3 6/4/2020 at Unknown time   • azelastine (ASTELIN) 0.1 % nasal spray 2 sprays into the nostril(s) as directed by provider 2 (Two) Times a Day. Use in each nostril as directed 90 mL 3 6/4/2020 at Unknown time   • Blood Glucose Monitoring Suppl w/Device kit USE AS INDICATED, ANY MONITOR , ICD10 code is E11.9 1 each 1 6/4/2020 at Unknown time   • cholecalciferol (VITAMIN D3) 1000 units tablet Take 1 tablet by mouth Daily. 30 tablet 3 6/4/2020 at Unknown time   • clopidogrel (PLAVIX) 75 MG tablet 1 PO QOD 90 tablet 3 6/4/2020 at Unknown time   • coenzyme Q10 100 MG capsule Take 100 mg by mouth daily.   6/4/2020 at Unknown time   • esomeprazole (nexIUM) 40 MG capsule Take 1 capsule by mouth Every Morning Before Breakfast. 90 capsule 3 6/4/2020 at Unknown time   • Glucose Blood (BLOOD GLUCOSE TEST) strip Use 4 x daily use any brand covered by insurance or same brand as before ICD10 code is E11.9 120 each 11 6/4/2020 at Unknown time   • insulin regular (HUMULIN R) 100 UNIT/ML injection 4 units with meals 1 each 12 6/4/2020 at Unknown time   • Insulin Syringe 31G X 5/16\" 0.3 ML misc 4 x daily 120 each 11 6/4/2020 at Unknown time   • KRILL OIL OMEGA-3 PO Take  by mouth daily.   6/4/2020 at Unknown time   • Lancet Devices (LANCING DEVICE) misc USE AS INDICATED TO CORRELATE WITH STRIPS AND METER 1 each 1 6/4/2020 at Unknown time   • Lancets 30G misc USE 4 X DAILY 120 each 11 " 6/4/2020 at Unknown time   • lisinopril (PRINIVIL,ZESTRIL) 40 MG tablet Take 1 tablet by mouth Daily. 90 tablet 3 6/4/2020 at Unknown time   • LOTEMAX 0.5 % ophthalmic suspension    6/4/2020 at Unknown time   • metFORMIN ER (GLUCOPHAGE-XR) 500 MG 24 hr tablet Take 1 tablet by mouth Daily With Breakfast. 90 tablet 3 6/4/2020 at Unknown time   • metoprolol tartrate (LOPRESSOR) 50 MG tablet Take 1 tablet by mouth Every 12 (Twelve) Hours. 180 tablet 1 6/4/2020 at Unknown time   • Red Yeast Rice Extract (RED YEAST RICE PO) Take  by mouth Daily.   6/4/2020 at Unknown time   • Saw Palmetto, Serenoa repens, (SAW PALMETTO PO) Take  by mouth daily.   6/4/2020 at Unknown time   • sucralfate (CARAFATE) 1 g tablet TAKE 1 TABLET BY MOUTH TWICE DAILY AS NEEDED FOR NAUSEA AND FOR ABDOMINAL PAIN 180 tablet 0 6/4/2020 at Unknown time   • vitamin B-12 (CYANOCOBALAMIN) 2500 MCG sublingual tablet tablet Place  under the tongue Every Other Day.   6/4/2020 at Unknown time           Review of Systems:       Constitutional:  Denies recent weight loss, weight gain, fever or chills, no change in exercise tolerance.     HENT:  Denies any hearing loss, epistaxis, hoarseness, or difficulty speaking.     Eyes: Wears eyeglasses or contact lenses     Respiratory:  Denies dyspnea with exertion,no cough, wheezing, or hemoptysis.     Cardiovascular: Positive for palpitation and chest pain.  Negative for orthopnea, PND, peripheral edema, syncope, or claudication.     Gastrointestinal:  Denies change in bowel habits, dyspepsia, ulcer disease, hematochezia, or melena.  No nausea, no vomiting, no hematemesis, no diarrhea or constipation.    Endocrine: Negative for cold intolerance, heat intolerance, polydipsia, polyphagia or polyuria. Denies any history of weight change or unintended weight loss.    Genitourinary: Negative for hematuria.  No frequent urination or nocturia.      Musculoskeletal: Denies any history of arthritic symptoms or back problems .   No joint pain, joint stiffness, joint swelling, muscle pain, muscle weakness or neck pain.    Skin:  Denies any change in hair or nails, rashes, or skin lesions.     Allergic/Immunologic: Negative.  Negative for environmental allergies, food allergies or immunocompromised state.     Neurological:  Denies any history of recurrent headaches, strokes, TIA, or seizure disorder.     Hematological: Denies excessive bleeding, easy bruising, fatigue, lymphadenopathy or petechiae or any bleeding disorders.     Psychiatric/Behavioral: Denies any history of depression, substance abuse, or change in cognitive function. Denies any psychomotor reaction or tangential thought.  No depression, homicidal ideations or suicidal ideations.          Objective:     Objective:  Temp:  [97.3 °F (36.3 °C)-98.4 °F (36.9 °C)] 97.5 °F (36.4 °C)  Heart Rate:  [] 60  Resp:  [18-20] 18  BP: ()/(54-84) 111/78      Body mass index is 26.83 kg/m².           Physical Exam:   General Appearance:    Alert, oriented, cooperative, in no acute distress.   Head:    Normocephalic, atraumatic, without obvious abnormality.   Eyes:           ISA.  Lids and lashes normal, conjunctivae and sclerae normal, no icterus, no pallor.   Ears:    Ears appear intact with no abnormalities noted.   Throat:   Mucous membranes pink and moist.   Neck:   Supple, trachea midline, no carotid bruit, no organomegaly or JVD.   Lungs:     Clear to auscultation and percussion, respirations regular, even and unlabored. No wheezes, rales or rhonchi.    Heart:    Regular rhythm and normal rate, normal S1 and S2, no            murmur, no gallop, no rub, no click.   Abdomen:     Soft, non-tender, non-distended, no guarding, no rebound tenderness, normal bowel sounds in all four quadrants, no masses, liver and spleen nonpalpable.   Genitalia:    Deferred.   Extremities:   Moves all extremities well, no edema, no cyanosis, no              redness, no clubbing.   Pulses:    Pulses palpable and equal bilaterally.   Skin:   Moist and warm. No bleeding, bruising or rash.   Neurologic/Psychiatric:   Alert and oriented to person, place, and time.  Motor, power and tone in upper and lower extremities are grossly intact. No focal neurological deficits. Normal cognitive function. No psychomotor reaction or tangential thought. No depression, homicidal ideations and suicidal ideations.       Medication Review:   Current Facility-Administered Medications   Medication Dose Route Frequency Provider Last Rate Last Dose   • acetaminophen (TYLENOL) tablet 650 mg  650 mg Oral Q4H PRN Chris Valdovinos MD        Or   • acetaminophen (TYLENOL) 160 MG/5ML solution 650 mg  650 mg Oral Q4H PRN Chris Valdovinos MD        Or   • acetaminophen (TYLENOL) suppository 650 mg  650 mg Rectal Q4H PRN Chris Valdovinos MD       • ALPRAZolam (XANAX) tablet 0.5 mg  0.5 mg Oral Nightly PRN Chris Valdovinos MD       • aspirin EC tablet 81 mg  81 mg Oral Daily Chris Valdovinos MD   81 mg at 06/05/20 0839   • cholecalciferol (VITAMIN D3) tablet 1,000 Units  1,000 Units Oral Daily Chris Valdovinos MD   1,000 Units at 06/05/20 0838   • clopidogrel (PLAVIX) tablet 75 mg  75 mg Oral Daily Chris Valdovinos MD   75 mg at 06/05/20 0839   • dextrose (D50W) 25 g/ 50mL Intravenous Solution 25 g  25 g Intravenous Q15 Min PRN Chris Valdovinos MD       • dextrose (GLUTOSE) oral gel 15 g  15 g Oral Q15 Min PRN Chris Valdovinos MD       • dilTIAZem (CARDIZEM) 125 mg in 125 mL 0.7% sodium chloride (1 mg/mL) infusion  5-15 mg/hr Intravenous Continuous Chris Valdovinos MD   Stopped at 06/05/20 1004   • dilTIAZem (CARDIZEM) tablet 60 mg  60 mg Oral Q8H Mike Cedeno MD   60 mg at 06/05/20 1259   • enoxaparin (LOVENOX) syringe 40 mg  40 mg Subcutaneous Q24H Chris Valdovinos MD   40 mg at 06/05/20 0838   • glucagon (human recombinant) (GLUCAGEN DIAGNOSTIC) injection 1 mg  1 mg Subcutaneous Q15 Min  Chris Richter MD       • insulin aspart (novoLOG) injection 0-7 Units  0-7 Units Subcutaneous TID AC Chris Valdovinos MD   3 Units at 06/05/20 1201   • insulin aspart (novoLOG) injection 4 Units  4 Units Subcutaneous TID With Meals Chris Valdovinos MD   4 Units at 06/05/20 1201   • lisinopril (PRINIVIL,ZESTRIL) tablet 40 mg  40 mg Oral Daily Chris Valdovinos MD   40 mg at 06/05/20 1132   • metoprolol tartrate (LOPRESSOR) tablet 50 mg  50 mg Oral Q12H Chris Valdovinos MD   50 mg at 06/05/20 0839   • nitroglycerin (NITROSTAT) ointment 1 inch  1 inch Topical Once Chris Valdovinos MD   Stopped at 06/05/20 0225   • pantoprazole (PROTONIX) EC tablet 40 mg  40 mg Oral QAM Chris Valdovinos MD   40 mg at 06/05/20 0838   • sodium chloride 0.9 % flush 10 mL  10 mL Intravenous Chris Richter MD       • sodium chloride 0.9 % flush 10 mL  10 mL Intravenous Q12H Chris Valdovinos MD   10 mL at 06/05/20 0839   • sodium chloride 0.9 % flush 10 mL  10 mL Intravenous Chris Richter MD       • sucralfate (CARAFATE) tablet 1 g  1 g Oral BID PRChris Mosquera MD           Lab Review:     Results from last 7 days   Lab Units 06/05/20  0207   SODIUM mmol/L 137   POTASSIUM mmol/L 4.1   CHLORIDE mmol/L 97*   CO2 mmol/L 25.0   BUN mg/dL 15   CREATININE mg/dL 0.94   CALCIUM mg/dL 9.6   BILIRUBIN mg/dL 0.3   ALK PHOS U/L 38*   ALT (SGPT) U/L 29   AST (SGOT) U/L 27   GLUCOSE mg/dL 287*     Results from last 7 days   Lab Units 06/05/20  0508 06/05/20  0207   TROPONIN T ng/mL <0.010 <0.010         Results from last 7 days   Lab Units 06/05/20  0207   WBC 10*3/mm3 5.81   HEMOGLOBIN g/dL 13.8   HEMATOCRIT % 40.4   PLATELETS 10*3/mm3 213                           EKG:   ECG/EMG Results (last 24 hours)     Procedure Component Value Units Date/Time    ECG 12 Lead [[id number]] Resulted:  06/05/20 0316     Updated:  06/05/20 0316    ECG 12 Lead [[id number]] Collected:  06/05/20 0149      Updated:  06/05/20 0524    ECG 12 Lead [[id number]] Collected:  06/05/20 0650     Updated:  06/05/20 0650    Narrative:       Test Reason : chest pain  Blood Pressure : **/** mmHG  Vent. Rate : 085 BPM     Atrial Rate : 277 BPM     P-R Int : 000 ms          QRS Dur : 070 ms      QT Int : 378 ms       P-R-T Axes : 000 039 031 degrees     QTc Int : 449 ms    Atrial fibrillation  Abnormal ECG  When compared with ECG of 05-JUN-2020 01:49,  No significant change was found    Referred By:             Confirmed By:     ECG 12 Lead [[id number]] Collected:  06/05/20 1046     Updated:  06/05/20 1047    Narrative:       Test Reason : change in rhythm  Blood Pressure : **/** mmHG  Vent. Rate : 057 BPM     Atrial Rate : 057 BPM     P-R Int : 184 ms          QRS Dur : 070 ms      QT Int : 430 ms       P-R-T Axes : 068 053 061 degrees     QTc Int : 418 ms    Sinus bradycardia with marked sinus arrhythmia  Otherwise normal ECG  When compared with ECG of 05-JUN-2020 06:50,  Sinus rhythm has replaced Atrial fibrillation  Vent. rate has decreased BY  28 BPM    Referred By:             Confirmed By:     SCANNED EKG [[id number]] Resulted:  06/05/20      Updated:  06/05/20 1056    SCANNED EKG [[id number]] Resulted:  06/05/20      Updated:  06/05/20 1056    Adult Transthoracic Echo Complete W/ Cont if Necessary Per Protocol [[id number]] Collected:  06/05/20 0847     Updated:  06/05/20 1113     BSA 2.0 m^2      RVIDd 2.5 cm      IVSd 1.2 cm      LVIDd 4.8 cm      LVIDs 3.2 cm      LVPWd 0.96 cm      IVS/LVPW 1.2     FS 32.4 %      EDV(Teich) 106.5 ml      ESV(Teich) 41.9 ml      EF(Teich) 60.7 %      EDV(cubed) 109.2 ml      ESV(cubed) 33.7 ml      EF(cubed) 69.1 %      LV mass(C)d 183.8 grams      LV mass(C)dI 92.6 grams/m^2      SV(Teich) 64.6 ml      SI(Teich) 32.5 ml/m^2      SV(cubed) 75.5 ml      SI(cubed) 38.1 ml/m^2      Ao root diam 3.9 cm      Ao root area 11.9 cm^2      ACS 1.7 cm      LA dimension 3.6 cm      asc Aorta Diam 2.9 cm       Ao Isth Diam 2.7 cm      LA/Ao 0.92     LVOT diam 2.0 cm      LVOT area 3.1 cm^2      LVOT area(traced) 3.1 cm^2      LVLd ap4 7.5 cm      EDV(MOD-sp4) 82.5 ml      LVLs ap4 6.8 cm      ESV(MOD-sp4) 44.4 ml      EF(MOD-sp4) 46.2 %      LVLd ap2 7.4 cm      EDV(MOD-sp2) 68.4 ml      LVLs ap2 6.3 cm      ESV(MOD-sp2) 32.1 ml      EF(MOD-sp2) 53.1 %      SV(MOD-sp4) 38.1 ml      SI(MOD-sp4) 19.2 ml/m^2      SV(MOD-sp2) 36.3 ml      SI(MOD-sp2) 18.3 ml/m^2      Ao root area (BSA corrected) 2.0     LV Velásquez Vol (BSA corrected) 41.6 ml/m^2      LV Sys Vol (BSA corrected) 22.4 ml/m^2      MV E max gita 62.1 cm/sec      MV A max gita 70.3 cm/sec      MV E/A 0.88     MV V2 max 81.8 cm/sec      MV max PG 2.7 mmHg      MV V2 mean 43.5 cm/sec      MV mean PG 1.0 mmHg      MV V2 VTI 26.5 cm      MVA(VTI) 2.1 cm^2      MV P1/2t max gita 65.2 cm/sec      MV P1/2t 46.0 msec      MVA(P1/2t) 4.8 cm^2      MV dec slope 415.0 cm/sec^2      Ao pk gita 161.0 cm/sec      Ao max PG 10.4 mmHg      Ao max PG (full) 8.5 mmHg      Ao V2 mean 120.0 cm/sec      Ao mean PG 6.0 mmHg      Ao mean PG (full) 5.0 mmHg      Ao V2 VTI 42.7 cm      MICHAEL(I,A) 1.3 cm^2      MICHAEL(I,D) 1.3 cm^2      MICHAEL(V,A) 1.3 cm^2      MICHAEL(V,D) 1.3 cm^2      LV V1 max PG 1.8 mmHg      LV V1 mean PG 1.0 mmHg      LV V1 max 67.8 cm/sec      LV V1 mean 50.0 cm/sec      LV V1 VTI 17.8 cm      MR max gita 389.0 cm/sec      MR max PG 60.5 mmHg      SV(Ao) 510.1 ml      SI(Ao) 257.0 ml/m^2      SV(LVOT) 55.9 ml      SI(LVOT) 28.2 ml/m^2      PA V2 max 101.0 cm/sec      PA max PG 4.1 mmHg      TR max gita 191.0 cm/sec      RVSP(TR) 19.6 mmHg      RAP systole 5.0 mmHg      MVA P1/2T LCG 3.4 cm^2       CV ECHO PHOEBE - BZI_BMI 26.9 kilograms/m^2       CV ECHO PHOEBE - BSA(HAYCOCK) 2.0 m^2       CV ECHO PHOEBE - BZI_METRIC_WEIGHT 82.6 kg       CV ECHO PHOEBE - BZI_METRIC_HEIGHT 175.3 cm      Target HR (85%) 123 bpm      Max. Pred. HR (100%) 145 bpm           ECHO:         Imaging:  Imaging Results (Last 24 Hours)     Procedure Component Value Units Date/Time    XR Chest 1 View [[id number]] Collected:  06/05/20 0210     Updated:  06/05/20 0242    Narrative:         CHEST 1 VIEW on 6/5/2020     CLINICAL INDICATION: Chest pain, tachycardia    COMPARISON: 12/7/2015    FINDINGS: The lungs are clear. There is mild elevation of the  right hemidiaphragm. Cardiac, hilar and mediastinal contours are  within normal limits. Pulmonary vascularity is within normal  limits. No bony abnormality is noted.      Impression:       No active disease.    Electronically signed by:  Arie Loya  6/5/2020 2:41 AM CDT  Workstation: [id number]          I personally viewed and interpreted the patient's EKG/Telemetry data.    Assessment:   1.  Paroxysmal atrial fibrillation.  2.  Chest pain with history of atherosclerotic coronary artery disease.  3.  Labile arterial hypertension.  4.  Hypertensive heart disease.  5.  History of insulin requiring diabetes.          Plan:   1.  Symptomatic palpitation.  Patient has symptoms of palpitation and was found to be in atrial fibrillation with rapid ventricular response and subsequently converted to normal sinus rhythm.  Patient is currently on metoprolol and p.o. Cardizem.  Have discussed with the patient need for anticoagulation to prevent thromboembolic event.  Patient has had previous history of anemia and gastrointestinal bleeding.  After prolonged discussion explaining the risk-benefit treatment option the plan is to consider anticoagulation with Eliquis 5 mg twice a day.  Patient would be continued on the metoprolol and p.o. Cardizem and would add Rythmol 150 mg 3 times a day along with magnesium oxide and would keep the potassium and magnesium in therapeutic range.  Have discussed with the patient treatment option for radiofrequency ablation if the patient continues to have recurrence of symptomatic paroxysmal atrial fibrillation.  Patient would undergo a  thyroid function test to rule out hyperthyroidism.    2.  Chest pain.  Patient does have history of documented atherosclerotic coronary artery disease with previous aborted inferior wall myocardial infarction in 2010 and previous PTCA and stenting of the left anterior descending artery with a last coronary angiogram done in 2015 with PTCA of the obtuse marginal branch.  Patient chest pain is very atypical.  Patient has been recommended outpatient nuclear Cardiolite stress test.  Risk-benefit treatment option for the Lexiscan Cardiolite stress test were discussed with the patient and an informed consent was obtained.  Patient would undergo the Lexiscan Cardiolite stress test next week on outpatient basis on discharge from the hospital.  Patient would be started on isosorbide 30 mg daily.    3.  Labile arterial hypertension.  Patient blood pressure has been on the low side.  Patient is currently on lisinopril 40 mg along with metoprolol 50 mg and Cardizem 30 mg.  Would decrease the lisinopril to 10 mg and would follow the blood pressure.  Patient has been counseled to decrease her salt intake and to continue with the present dose of the metoprolol and the Cardizem.    4.  Hypertensive heart disease with nonrheumatic mitral and tricuspid regurgitation.  Clinically at the present time patient is euvolemic and not in congestive heart failure..    5.  Hyperlipidemia.  Patient has been counseled on low-fat low-cholesterol diet.  Patient has not been able to tolerate statin in the past.  Patient would undergo a repeat lipid profile check and would consider Zetia 10 mg daily.    6.  Insulin requiring diabetes.  Patient has been counseled on [employer] diet.    7.  Gastroesophageal reflux disease.  Patient is currently on Protonix.    8.  History of anemia.  Patient's hemoglobin is 12.4.  Patient has not had any episodes of any bleeding diathesis of hemoptysis hematuria bright red blood per rectum.    Thank  you for the consultation.    The above plan of management were discussed with the patient.  Patient if remained stable would be discharged for outpatient ischemic work-up with a Lexiscan Cardiolite stress test which would be scheduled next week on outpatient basis.    Dr. Rodriguez to cover for cardiology if needed.      Time: time spent in face-to-face evaluation of greater than 55 minutes and interacting and formulating examining and discussing the plan with the patient with 50% of greater time spent in face-to-face interaction.    David Jeffers MD  06/05/20  14:08  Dictated utilizing Dragon dictation.

## 2020-06-05 NOTE — ED NOTES
Dr. Valdovinos at bedside at this time speaking with pt. Will transport pt when he is finished.      Sherie Jefferson RN  06/05/20 [id number]

## 2020-06-05 NOTE — PLAN OF CARE
Pt is alert and oriented , telebox on to allow for more freedom of movement. Urine output sufficient . VS are stable , cardizem running at 10mg/hr . Patient resting comfortably . Waiting on Family Practice to see patient on the unit

## 2020-06-05 NOTE — THERAPY DISCHARGE NOTE
Acute Care - Occupational Therapy Initial Eval/Discharge  [facility]     Patient Name: Aj Card Jr.  : 1945  MRN: [id number]  Today's Date: 2020  Onset of Illness/Injury or Date of Surgery: 20  Date of Referral to OT: 20         Admit Date: 2020       ICD-10-CM ICD-9-CM   1. Atrial fibrillation with RVR (CMS/HCC) I48.91 427.31   2. Chest pain, unspecified type R07.9 786.50   3. Impaired mobility and activities of daily living Z74.09 799.89     Patient Active Problem List   Diagnosis   • Type 2 diabetes mellitus without complication, without long-term current use of insulin (CMS/HCC)   • Essential hypertension   • Mixed hyperlipidemia   • Generalized anxiety disorder   • History of colon polyps   • Diverticulosis of large intestine without hemorrhage   • Coronary artery disease with angina pectoris (CMS/HCC)   • Gastroesophageal reflux disease with esophagitis   • Vitamin D deficiency   • Overweight   • Encounter for screening for endocrine disorder   • Atopic dermatitis   • High serum vitamin B12   • Acute pain of right knee   • Tear of medial meniscus of right knee, current   • Atrial fibrillation with RVR (CMS/HCC)   • GERD (gastroesophageal reflux disease)     Past Medical History:   Diagnosis Date   • Acute posthemorrhagic anemia    • Allergic rhinitis    • Anxiety state    • Chest pain    • Coronary arteriosclerosis    • Diabetes mellitus (CMS/HCC)     type 2   • Diverticular disease of colon    • Dysphagia    • Epigastric pain    • GERD (gastroesophageal reflux disease)     esophagitis on Dexilant. Pt feels Nexium working better      • History of echocardiogram     Small left atrial enlargement with mild CLVH.Ef of 55-60%.Mitral valve mildly thickened.Av thickened.Left ventricle mildly dilated.Tricuspid intact.Mild aortic insufficiency. 2015       • History of echocardiogram     Mild diastolic dysfunction and mild left atrial enlargement, otherwise normal  echo. EF> 55% 01/03/2012       • Hypercholesterolemia    • Hypertension    • Insomnia    • Irritable bowel syndrome    • Osteoarthritis of multiple joints      Past Surgical History:   Procedure Laterality Date   • APPENDECTOMY       [facility] 1995       • CARDIAC CATHETERIZATION      Successful PTCA of the OMB#2.Unsuccessful PTCA of the 1st OMB, secondary to difficulty in advancing the balloon. 12/08/2015       • CHOLECYSTECTOMY      [place] 1993       • COLONOSCOPY  10/01/2012   • COLONOSCOPY N/A 12/11/2017    Procedure: COLONOSCOPY;  Surgeon: Bladimir Michael MD;  Location: [facility] ENDOSCOPY;  Service:    • CORONARY ANGIOPLASTY      Successful PTCA & stenting LAD was done w/ deployment of a 2.5mm x23 Xience stent w/ reduction of stenosis from 90% to less than 0% stenosis with good LILLIAM 3 flow 02/17/2012       • ENDOSCOPY      with biopsy.  Mildly severe esophagitis. Gastritis. Normal examined duodenum.Several biopsies obtained in the lower third of the esophagus.Several biopsies obtained in the gastric antrum. 05/06/2016       • ENDOSCOPY      w tube. Normal esophagus. Gastritis in stomach. Biopsy taken. Gastric polyp found. Biopsy taken. Normal duodenum. 10/01/2012       • ENDOSCOPY AND COLONOSCOPY      Diverticulum in sigmoid colon & descending colon. Internal & external hemorrhoids found. 10/01/2012       • HAND SURGERY       Corrective osteotomy of ring finger metacarpal. Malunited fracture of left ring finger 05/21/1985       • HERNIA REPAIR      Laparoscopic hernia repair. prepertitoneal bilateral inguinal hernia repair with mesh. 10/15/2009      • OTHER SURGICAL HISTORY      CORONARY ARTERY STENT    • SKIN TAG REMOVAL      Excision, viral skin tag,right upper eyelid 05/08/1995           OT ASSESSMENT FLOWSHEET (last 12 hours)      Occupational Therapy Evaluation     Row Name 06/05/20 2557                   OT Evaluation Time/Intention    Subjective Information  no complaints   -        Document Type  evaluation  -        Mode of Treatment  individual therapy;occupational therapy  -        Patient Effort  good  -           General Information    Patient Profile Reviewed?  yes  -        Onset of Illness/Injury or Date of Surgery  06/05/20  -        Patient Observations  alert;cooperative;agree to therapy  -        Patient/Family Observations  None present  -        General Observations of Patient  Supine in bed  -        Prior Level of Function  independent:;all household mobility;community mobility;gait;transfer;ADL's;cooking;cleaning;driving  -        Existing Precautions/Restrictions  no known precautions/restrictions  -        Risks Reviewed  patient:;LOB;nausea/vomiting;dizziness;increased discomfort;change in vital signs;increased drainage;lines disloged  -        Benefits Reviewed  patient:;improve function;increase independence;increase strength;increase balance;decrease pain;decrease risk of DVT;improve skin integrity;increase knowledge  -           Relationship/Environment    Lives With  spouse  -           Resource/Environmental Concerns    Current Living Arrangements  home/apartment/condo  -        Resource/Environmental Concerns  none  -           Home Main Entrance    Number of Stairs, Main Entrance  one  -           Stairs Within Home, Primary    Number of Stairs, Within Home, Primary  none  -           Cognitive Assessment/Interventions    Additional Documentation  Cognitive Assessment/Intervention (Group)  [facility]           Cognitive Assessment/Intervention- PT/OT    Affect/Mental Status (Cognitive)  [facility]        Orientation Status (Cognition)  oriented x 4  -        Follows Commands (Cognition)  [facility]        Cognitive Function (Cognitive)  [facility]           Bed Mobility Assessment/Treatment    Bed Mobility Assessment/Treatment  bed mobility (all) activities  -        [place] Level (Bed Mobility)  independent  -            Transfer Assessment/Treatment    Transfer Assessment/Treatment  sit-stand transfer;toilet transfer  -           Sit-Stand Transfer    Sit-Stand [place] (Transfers)  independent  -JH           Toilet Transfer    Type (Toilet Transfer)  sit-stand;stand-sit  -        [place] Level (Toilet Transfer)  [facility]           ADL Assessment/Intervention    BADL Assessment/Intervention  toileting;grooming;lower body dressing  -           Lower Body Dressing Assessment/Training    Lower Body Dressing [place] Level  lower body dressing skills;independent  -JH        Lower Body Dressing Position  edge of bed sitting  [facility]           Grooming Assessment/Training    [place] Level (Grooming)  grooming skills;independent  -JH        Grooming Position  edge of bed sitting  [facility]           Toileting Assessment/Training    [place] Level (Toileting)  toileting skills;independent  -JH        Toileting Position  supported sitting  -           General ROM    GENERAL ROM COMMENTS  BUE ROM WFL  -           MMT (Manual Muscle Testing)    General MMT Comments  BUE MMT Strength 5/5 [facility]           Sensory Assessment/Intervention    Sensory General Assessment  no sensation deficits identified  -           Positioning and Restraints    Pre-Treatment Position  in bed  -        Post Treatment Position  bed  [facility]        In Bed  supine;call light within reach;notified [facility]           Pain Assessment    Additional Documentation  Pain Scale: Numbers Pre/Post-Treatment (Group)  [facility]           Pain Scale: Numbers Pre/Post-Treatment    Pain Scale: Numbers, Pretreatment  0/10 - no pain  -        Pain Scale: Numbers, Post-Treatment  0/10 - no pain  -           Plan of Care Review    Plan of Care Reviewed With  patient  -           Clinical Impression (OT)    Date of Referral to OT  06/05/20  -        OT Diagnosis  Impaired Mobility and ADL's  -        Criteria for Skilled Therapeutic Interventions  Met (OT Eval)  no;treatment indicated;no problems identified which require skilled intervention  -        Anticipated Discharge Disposition (OT)  home with assist  -           Vital Signs    Pre Systolic BP Rehab  117  -JH        Pre Treatment Diastolic BP  67  -JH        Post Systolic BP Rehab  127  -JH        Post Treatment Diastolic BP  63  -JH        Pretreatment Heart Rate (beats/min)  60  -JH        Posttreatment Heart Rate (beats/min)  61  -JH        Pre SpO2 (%)  97  -JH        O2 Delivery Pre Treatment  room air  -        Post SpO2 (%)  98  -JH        O2 Delivery Post Treatment  room air  -        Pre Patient Position  Supine  -        Post Patient Position  Sitting  -           Planned OT Interventions    Planned Therapy Interventions (OT Eval)  activity tolerance training;adaptive equipment training;BADL retraining;transfer/mobility retraining;strengthening exercise;ROM/therapeutic exercise;patient/caregiver education/training;occupation/activity based interventions;neuromuscular control/coordination retraining;functional balance retraining  -           Discharge Summary (Occupational Therapy)    Additional Documentation  Discharge Summary, OT Eval (Group)  -           Discharge Summary, OT Eval    Reason for Discharge (OT Discharge Summary)  no further needs identified  -          User Key  (r) = Recorded By, (t) = Taken By, (c) = Cosigned By    Initials Name Effective Dates     Shukri Enamorado OT 09/10/19 -           Occupational Therapy Education                 Title: PT OT SLP Therapies (In Progress)     Topic: Occupational Therapy (In Progress)     Point: ADL training (Not Started)     Description:   Instruct learner(s) on proper safety adaptation and remediation techniques during self care or transfers.   Instruct in proper use of assistive devices.              Learner Progress:   Not documented in this visit.          Point: Home exercise program (Not Started)     Description:    Instruct learner(s) on appropriate technique for monitoring, assisting and/or progressing therapeutic exercises/activities.              Learner Progress:   Not documented in this visit.          Point: Precautions (Done)     Description:   Instruct learner(s) on prescribed precautions during self-care and functional transfers.              Learning Progress Summary           Patient Acceptance, TOMMY, VU by  at 6/5/2020 1650    Comment:  Pt educated on role of OT, d/c recs, and POC                   Point: Body mechanics (Not Started)     Description:   Instruct learner(s) on proper positioning and spine alignment during self-care, functional mobility activities and/or exercises.              Learner Progress:   Not documented in this visit.                      User Key     Initials Effective Dates Name Provider Type Discipline     09/10/19 -  Shukri Enamorado OT Occupational Therapist OT                OT Recommendation and Plan  Outcome Summary/Treatment Plan (OT)  Anticipated Discharge Disposition (OT): home with assist  Reason for Discharge (OT Discharge Summary): no further needs identified  Planned Therapy Interventions (OT Eval): activity tolerance training, adaptive equipment training, BADL retraining, transfer/mobility retraining, strengthening exercise, ROM/therapeutic exercise, patient/caregiver education/training, occupation/activity based interventions, neuromuscular control/coordination retraining, functional balance retraining  Plan of Care Review  Plan of Care Reviewed With: patient  Plan of Care Reviewed With: patient  Outcome Summary: OT Eval completed on this date. Pt pleasant and agreeable to therapy. Bed Mobility: Independent Transfers: Indendent LBD: Independent Grooming: Independent. Pt demos indpendence with the completion of ADL's and functional mobility. Pt has no skilled OT needs at this time. Recommend d/c home with assist. Will d/c OT orders.         Outcome Measures     Row Name  06/05/20 1635             How much help from another is currently needed...    Putting on and taking off regular lower body clothing?  4  -      Bathing (including washing, rinsing, and drying)  4  -      Toileting (which includes using toilet bed pan or urinal)  4  -      Putting on and taking off regular upper body clothing  4  -      Taking care of personal grooming (such as brushing teeth)  4  -      Eating meals  4  -      [facility] 6 Clicks Score (OT)  24  -         Functional Assessment    Outcome Measure Options  [facility] 6 Clicks Daily Activity (OT)  -        User Key  (r) = Recorded By, (t) = Taken By, (c) = Cosigned By    Initials Name Provider Type    Shukri Starr OT Occupational Therapist          Time Calculation:   Time Calculation- OT     Row Name 06/05/20 1733             Time Calculation-     OT Start Time  1635  -      OT Stop Time  1700  -      OT Time Calculation (min)  25 min  -      OT Received On  06/05/20  -        User Key  (r) = Recorded By, (t) = Taken By, (c) = Cosigned By    Initials Name Provider Type    Shukri Starr OT Occupational Therapist        Therapy Suggested Charges     Code   Minutes Charges    None           Therapy Charges for Today     Code Description Service Date Service Provider Modifiers Qty    [id number] HC OT EVAL LOW COMPLEXITY 2 6/5/2020 Shukri Enamorado OT GO 1               OT Discharge Summary  Anticipated Discharge Disposition (OT): home with assist    Shukri Enamorado OT  6/5/2020

## 2020-06-05 NOTE — ACP (ADVANCE CARE PLANNING)
The patient wishes to be full code and for his wife, Dalia Card, to make decisions for him in the event that he is unable to.           This document has been electronically signed by Chris Valdovinos MD on June 5, 2020 04:32

## 2020-06-06 ENCOUNTER — READMISSION MANAGEMENT (OUTPATIENT)
Dept: CALL CENTER | Facility: HOSPITAL | Age: 75
End: 2020-06-06

## 2020-06-06 VITALS
DIASTOLIC BLOOD PRESSURE: 63 MMHG | HEIGHT: 69 IN | OXYGEN SATURATION: 94 % | TEMPERATURE: 97.4 F | BODY MASS INDEX: 26.97 KG/M2 | WEIGHT: 182.1 LBS | SYSTOLIC BLOOD PRESSURE: 125 MMHG | HEART RATE: 61 BPM | RESPIRATION RATE: 18 BRPM

## 2020-06-06 PROBLEM — I48.91 ATRIAL FIBRILLATION WITH RVR (HCC): Status: RESOLVED | Noted: 2020-06-05 | Resolved: 2020-06-06

## 2020-06-06 LAB
ANION GAP SERPL CALCULATED.3IONS-SCNC: 11 MMOL/L (ref 5–15)
BASOPHILS # BLD AUTO: 0.04 10*3/MM3 (ref 0–0.2)
BASOPHILS NFR BLD AUTO: 0.7 % (ref 0–1.5)
BUN BLD-MCNC: 17 MG/DL (ref 8–23)
BUN/CREAT SERPL: 17 (ref 7–25)
CALCIUM SPEC-SCNC: 8.9 MG/DL (ref 8.6–10.5)
CHLORIDE SERPL-SCNC: 98 MMOL/L (ref 98–107)
CO2 SERPL-SCNC: 26 MMOL/L (ref 22–29)
CREAT BLD-MCNC: 1 MG/DL (ref 0.76–1.27)
DEPRECATED RDW RBC AUTO: 41.4 FL (ref 37–54)
EOSINOPHIL # BLD AUTO: 0.2 10*3/MM3 (ref 0–0.4)
EOSINOPHIL NFR BLD AUTO: 3.3 % (ref 0.3–6.2)
ERYTHROCYTE [DISTWIDTH] IN BLOOD BY AUTOMATED COUNT: 13.5 % (ref 12.3–15.4)
GFR SERPL CREATININE-BSD FRML MDRD: 73 ML/MIN/1.73
GLUCOSE BLD-MCNC: 247 MG/DL (ref 65–99)
GLUCOSE BLDC GLUCOMTR-MCNC: 186 MG/DL (ref 70–130)
GLUCOSE BLDC GLUCOMTR-MCNC: 196 MG/DL (ref 70–130)
HCT VFR BLD AUTO: 36.1 % (ref 37.5–51)
HGB BLD-MCNC: 12.4 G/DL (ref 13–17.7)
IMM GRANULOCYTES # BLD AUTO: 0.06 10*3/MM3 (ref 0–0.05)
IMM GRANULOCYTES NFR BLD AUTO: 1 % (ref 0–0.5)
LYMPHOCYTES # BLD AUTO: 1.87 10*3/MM3 (ref 0.7–3.1)
LYMPHOCYTES NFR BLD AUTO: 31.2 % (ref 19.6–45.3)
MCH RBC QN AUTO: 28.8 PG (ref 26.6–33)
MCHC RBC AUTO-ENTMCNC: 34.3 G/DL (ref 31.5–35.7)
MCV RBC AUTO: 84 FL (ref 79–97)
MONOCYTES # BLD AUTO: 0.49 10*3/MM3 (ref 0.1–0.9)
MONOCYTES NFR BLD AUTO: 8.2 % (ref 5–12)
NEUTROPHILS # BLD AUTO: 3.34 10*3/MM3 (ref 1.7–7)
NEUTROPHILS NFR BLD AUTO: 55.6 % (ref 42.7–76)
NRBC BLD AUTO-RTO: 0 /100 WBC (ref 0–0.2)
PLATELET # BLD AUTO: 192 10*3/MM3 (ref 140–450)
PMV BLD AUTO: 9.2 FL (ref 6–12)
POTASSIUM BLD-SCNC: 4.2 MMOL/L (ref 3.5–5.2)
RBC # BLD AUTO: 4.3 10*6/MM3 (ref 4.14–5.8)
SODIUM BLD-SCNC: 135 MMOL/L (ref 136–145)
WBC NRBC COR # BLD: 6 10*3/MM3 (ref 3.4–10.8)

## 2020-06-06 PROCEDURE — 85025 COMPLETE CBC W/AUTO DIFF WBC: CPT | Performed by: STUDENT IN AN ORGANIZED HEALTH CARE EDUCATION/TRAINING PROGRAM

## 2020-06-06 PROCEDURE — 63710000001 INSULIN ASPART PER 5 UNITS: Performed by: STUDENT IN AN ORGANIZED HEALTH CARE EDUCATION/TRAINING PROGRAM

## 2020-06-06 PROCEDURE — G0378 HOSPITAL OBSERVATION PER HR: HCPCS

## 2020-06-06 PROCEDURE — 82962 GLUCOSE BLOOD TEST: CPT

## 2020-06-06 PROCEDURE — 99217 PR OBSERVATION CARE DISCHARGE MANAGEMENT: CPT | Performed by: STUDENT IN AN ORGANIZED HEALTH CARE EDUCATION/TRAINING PROGRAM

## 2020-06-06 PROCEDURE — 80048 BASIC METABOLIC PNL TOTAL CA: CPT | Performed by: STUDENT IN AN ORGANIZED HEALTH CARE EDUCATION/TRAINING PROGRAM

## 2020-06-06 RX ORDER — PROPAFENONE HYDROCHLORIDE 150 MG/1
150 TABLET, COATED ORAL EVERY 8 HOURS SCHEDULED
Qty: 90 TABLET | Refills: 0 | Status: SHIPPED | OUTPATIENT
Start: 2020-06-06 | End: 2020-07-06

## 2020-06-06 RX ORDER — LISINOPRIL 10 MG/1
10 TABLET ORAL DAILY
Qty: 30 TABLET | Refills: 0 | Status: SHIPPED | OUTPATIENT
Start: 2020-06-06 | End: 2021-01-26 | Stop reason: SDUPTHER

## 2020-06-06 RX ORDER — ISOSORBIDE MONONITRATE 30 MG/1
30 TABLET, EXTENDED RELEASE ORAL
Qty: 30 TABLET | Refills: 0 | Status: SHIPPED | OUTPATIENT
Start: 2020-06-07 | End: 2021-01-26 | Stop reason: SDUPTHER

## 2020-06-06 RX ORDER — LISINOPRIL 10 MG/1
10 TABLET ORAL DAILY
Status: DISCONTINUED | OUTPATIENT
Start: 2020-06-06 | End: 2020-06-06 | Stop reason: HOSPADM

## 2020-06-06 RX ORDER — LANOLIN ALCOHOL/MO/W.PET/CERES
400 CREAM (GRAM) TOPICAL 2 TIMES DAILY
Qty: 30 TABLET | Refills: 1 | Status: SHIPPED | OUTPATIENT
Start: 2020-06-06 | End: 2020-07-06

## 2020-06-06 RX ADMIN — INSULIN ASPART 4 UNITS: 100 INJECTION, SOLUTION INTRAVENOUS; SUBCUTANEOUS at 12:08

## 2020-06-06 RX ADMIN — MELATONIN 1000 UNITS: at 09:25

## 2020-06-06 RX ADMIN — INSULIN ASPART 2 UNITS: 100 INJECTION, SOLUTION INTRAVENOUS; SUBCUTANEOUS at 08:30

## 2020-06-06 RX ADMIN — PANTOPRAZOLE SODIUM 40 MG: 40 TABLET, DELAYED RELEASE ORAL at 09:25

## 2020-06-06 RX ADMIN — DILTIAZEM HYDROCHLORIDE 30 MG: 30 TABLET, FILM COATED ORAL at 14:08

## 2020-06-06 RX ADMIN — SODIUM CHLORIDE, PRESERVATIVE FREE 10 ML: 5 INJECTION INTRAVENOUS at 09:26

## 2020-06-06 RX ADMIN — LISINOPRIL 10 MG: 10 TABLET ORAL at 09:24

## 2020-06-06 RX ADMIN — PROPAFENONE HYDROCHLORIDE 150 MG: 150 TABLET, FILM COATED ORAL at 14:08

## 2020-06-06 RX ADMIN — INSULIN ASPART 2 UNITS: 100 INJECTION, SOLUTION INTRAVENOUS; SUBCUTANEOUS at 12:07

## 2020-06-06 RX ADMIN — Medication 400 MG: at 09:25

## 2020-06-06 RX ADMIN — INSULIN ASPART 4 UNITS: 100 INJECTION, SOLUTION INTRAVENOUS; SUBCUTANEOUS at 08:30

## 2020-06-06 RX ADMIN — ISOSORBIDE MONONITRATE 30 MG: 30 TABLET, EXTENDED RELEASE ORAL at 09:24

## 2020-06-06 RX ADMIN — APIXABAN 5 MG: 5 TABLET, FILM COATED ORAL at 09:24

## 2020-06-06 RX ADMIN — ASPIRIN 81 MG: 81 TABLET, COATED ORAL at 09:25

## 2020-06-06 RX ADMIN — METOPROLOL TARTRATE 50 MG: 50 TABLET, FILM COATED ORAL at 09:25

## 2020-06-06 RX ADMIN — PROPAFENONE HYDROCHLORIDE 150 MG: 150 TABLET, FILM COATED ORAL at 07:15

## 2020-06-06 RX ADMIN — DILTIAZEM HYDROCHLORIDE 30 MG: 30 TABLET, FILM COATED ORAL at 06:49

## 2020-06-06 NOTE — DISCHARGE SUMMARY
"    DISCHARGE SUMMARY    PATIENT NAME: Aj Card Jr.       PHYSICIAN: Mike Cedeno MD  : 1945  MRN: [id number]    ADMITTED: 2020     DISCHARGED: 2020    ADMISSION DIAGNOSES:  Active Hospital Problems    Diagnosis  POA   • GERD (gastroesophageal reflux disease) [K21.9]  Yes   • Coronary artery disease with angina pectoris (CMS/HCC) [I25.119]  Yes   • Essential hypertension [I10]  Yes   • Generalized anxiety disorder [F41.1]  Yes   • Mixed hyperlipidemia [E78.2]  Yes   • Type 2 diabetes mellitus without complication, without long-term current use of insulin (CMS/HCC) [E11.9]  Yes      Resolved Hospital Problems    Diagnosis Date Resolved POA   • **Atrial fibrillation with RVR (CMS/HCC) [I48.91] 2020 Yes     DISCHARGE DIAGNOSES:   Active Hospital Problems    Diagnosis  POA   • GERD (gastroesophageal reflux disease) [K21.9]  Yes   • Coronary artery disease with angina pectoris (CMS/HCC) [I25.119]  Yes   • Essential hypertension [I10]  Yes   • Generalized anxiety disorder [F41.1]  Yes   • Mixed hyperlipidemia [E78.2]  Yes   • Type 2 diabetes mellitus without complication, without long-term current use of insulin (CMS/HCC) [E11.9]  Yes      Resolved Hospital Problems    Diagnosis Date Resolved POA   • **Atrial fibrillation with RVR (CMS/HCC) [I48.91] 2020 Yes       SERVICE: Family Medicine Residency  Attending: Jake Moon MD  Resident: Mike Cedeno MD    CONSULTS:   Consult Orders (all) (From admission, onward)     Start     Ordered    20 1217  Inpatient Cardiology Consult  Once     Specialty:  Cardiology  Provider:  David Jeffers MD    20 1217                PROCEDURES:   None    HISTORY OF PRESENT ILLNESS:   HPI written by Chris Valdovinos MD on 20 at 0404    \"Aj Card Jr. is a 74 y.o. male with a CMH of type II DM, hypertension, hyperlipidemia, ADRIENNE, CAD, and GERD who presents complaining of tachycardia and chest pressure.  Overnight " "he was awoken from sleep due to his heart racing with associated chest pain that was described as pressure that is left-sided, nonradiating, and without any exacerbating or relieving factors.  He states he did take a dose of Cardizem of unknown strength which helped.  This Cardizem was given to him by his cardiologist Dr. Jeffers a few weeks ago.  In 2010 he had this same feeling of his heart racing for which he saw Dr. Jeffers and was started on medication and then due to stability of condition was told he could stop the medication.  A few weeks ago he visited Dr. Jeffers's office due to another episode of tachycardia and associated chest pressure and at that time Dr. Jeffers restarted him on Cardizem.  However, patient states that he does not tolerate the Cardizem due to feeling dizzy when he is active and therefore stopped the medication.  He has a history of MI in 2005 at which time he had his second or third stent placed by Dr. Jeffers.\"    DIAGNOSTIC DATA:   Lab Results (last 24 hours)     Procedure Component Value Units Date/Time    POC Glucose Once [[id number]]  (Abnormal) Collected:  06/06/20 1138    Specimen:  Blood Updated:  06/06/20 1152     Glucose 196 mg/dL      Comment: Sliding Scale AdminOperator: [id number] JUDSTEPHANIE GAMINGMeter ID: [id number]       POC Glucose Once [[id number]]  (Abnormal) Collected:  06/06/20 0648    Specimen:  Blood Updated:  06/06/20 0701     Glucose 186 mg/dL      Comment: Result Not ConfirmedOperator: [id number] YELENA BANDAMeter ID: [id number]       Basic Metabolic Panel [[id number]]  (Abnormal) Collected:  06/06/20 0340    Specimen:  Blood Updated:  06/06/20 0416     Glucose 247 mg/dL      BUN 17 mg/dL      Creatinine 1.00 mg/dL      Sodium 135 mmol/L      Potassium 4.2 mmol/L      Chloride 98 mmol/L      CO2 26.0 mmol/L      Calcium 8.9 mg/dL      eGFR Non African Amer 73 mL/min/1.73      BUN/Creatinine Ratio 17.0     Anion Gap 11.0 mmol/L     Narrative:       GFR Normal " >60  Chronic Kidney Disease <60  Kidney Failure <15      CBC & Differential [[id number]] Collected:  06/06/20 0340    Specimen:  Blood Updated:  06/06/20 0350    Narrative:       The following orders were created for panel order CBC & Differential.  Procedure                               Abnormality         Status                     ---------                               -----------         ------                     CBC Auto Differential[[id number]]        Abnormal            Final result                 Please view results for these tests on the individual orders.    CBC Auto Differential [[id number]]  (Abnormal) Collected:  06/06/20 0340    Specimen:  Blood Updated:  06/06/20 0350     WBC 6.00 10*3/mm3      RBC 4.30 10*6/mm3      Hemoglobin 12.4 g/dL      Hematocrit 36.1 %      MCV 84.0 fL      MCH 28.8 pg      MCHC 34.3 g/dL      RDW 13.5 %      RDW-SD 41.4 fl      MPV 9.2 fL      Platelets 192 10*3/mm3      Neutrophil % 55.6 %      Lymphocyte % 31.2 %      Monocyte % 8.2 %      Eosinophil % 3.3 %      Basophil % 0.7 %      Immature Grans % 1.0 %      Neutrophils, Absolute 3.34 10*3/mm3      Lymphocytes, Absolute 1.87 10*3/mm3      Monocytes, Absolute 0.49 10*3/mm3      Eosinophils, Absolute 0.20 10*3/mm3      Basophils, Absolute 0.04 10*3/mm3      Immature Grans, Absolute 0.06 10*3/mm3      nRBC 0.0 /100 WBC     POC Glucose Once [[id number]]  (Abnormal) Collected:  06/05/20 1701    Specimen:  Blood Updated:  06/05/20 1714     Glucose 149 mg/dL      Comment: : [id number] LILLIE Ventura ID: [id number]              HOSPITAL COURSE:  Patient was started on Cardizem drip for his atrial fibrillation which eventually converted to normal sinus rhythm.  His troponin was negative x3.  Echo revealed grade 1 diastolic dysfunction.  Patient was then transitioned to oral Cardizem which he tolerated well.  Patient was monitored till the afternoon given some hypotensive episodes earlier in the morning.  Patient  maintain adequate blood pressure thereafter and was clinically stable for discharge.  All medications were adjusted as per Dr. Jeffers's recommendation prior to discharge. Patient scheduled to follow-up with Dr. Jeffers cardiology in 1 week.     DISCHARGE CONDITION:   stable    DISPOSITION:  Home or Self Care    DISCHARGE MEDICATIONS     Discharge Medications      New Medications      Instructions Start Date   apixaban 5 MG tablet tablet  Commonly known as:  ELIQUIS   5 mg, Oral, 2 times daily      dilTIAZem 30 MG tablet  Commonly known as:  CARDIZEM   30 mg, Oral, Every 8 Hours Scheduled      isosorbide mononitrate 30 MG 24 hr tablet  Commonly known as:  IMDUR   30 mg, Oral, Every 24 Hours Scheduled   Start Date:  June 7, 2020     Magnesium Oxide 400 (240 Mg) MG tablet   400 mg, Oral, 2 Times Daily      propafenone 150 MG tablet  Commonly known as:  RYTHMOL   150 mg, Oral, Every 8 Hours Scheduled         Changes to Medications      Instructions Start Date   lisinopril 10 MG tablet  Commonly known as:  PRINIVIL,ZESTRIL  What changed:    · medication strength  · how much to take   10 mg, Oral, Daily         Continue These Medications      Instructions Start Date   ALPRAZolam 0.5 MG tablet  Commonly known as:  XANAX   0.5 mg, Oral, Nightly PRN      amLODIPine 10 MG tablet  Commonly known as:  NORVASC   10 mg, Oral, Daily      aspirin 81 MG EC tablet   162 mg, Oral, Daily      azelastine 0.1 % nasal spray  Commonly known as:  ASTELIN   2 sprays, Nasal, 2 Times Daily, Use in each nostril as directed       Blood Glucose Monitoring Suppl w/Device kit   USE AS INDICATED, ANY MONITOR , ICD10 code is E11.9      Blood Glucose Test strip   Use 4 x daily use any brand covered by insurance or same brand as before ICD10 code is E11.9      cholecalciferol 25 MCG (1000 UT) tablet  Commonly known as:  VITAMIN D3   1,000 Units, Oral, Daily      clopidogrel 75 MG tablet  Commonly known as:  PLAVIX   1 PO QOD      coenzyme Q10 100 MG  "capsule   100 mg, Oral, Daily      esomeprazole 40 MG capsule  Commonly known as:  nexIUM   40 mg, Oral, Every Morning Before Breakfast      insulin regular 100 UNIT/ML injection  Commonly known as:  HumuLIN R   4 units with meals      Insulin Syringe 31G X 5/16\" 0.3 ML misc   4 x daily      KRILL OIL OMEGA-3 PO   Oral, Daily      Lancets 30G misc   USE 4 X DAILY      Lancing Device misc   USE AS INDICATED TO CORRELATE WITH STRIPS AND METER      Lotemax 0.5 % ophthalmic suspension  Generic drug:  loteprednol   No dose, route, or frequency recorded.      metFORMIN  MG 24 hr tablet  Commonly known as:  GLUCOPHAGE-XR   500 mg, Oral, Daily With Breakfast      metoprolol tartrate 50 MG tablet  Commonly known as:  LOPRESSOR   50 mg, Oral, Every 12 Hours Scheduled      RED YEAST RICE PO   Oral, Daily      SAW PALMETTO PO   Oral, Daily      sucralfate 1 g tablet  Commonly known as:  CARAFATE   TAKE 1 TABLET BY MOUTH TWICE DAILY AS NEEDED FOR NAUSEA AND FOR ABDOMINAL PAIN      vitamin B-12 2500 MCG sublingual tablet tablet  Commonly known as:  CYANOCOBALAMIN   Sublingual, Every Other Day             INSTRUCTIONS:  Activity:   Activity Instructions     Activity as Tolerated          Diet:   Diet Instructions     Diet: Cardiac, Consistent Carbohydrate      Discharge Diet:   Cardiac  Consistent Carbohydrate             FOLLOW UP:   Additional Instructions for the Follow-ups that You Need to Schedule     Discharge Follow-up with Specified Provider: Dr. Jeffers (Cardiology); 1 Week   As directed      To:  Dr. Jeffers (Cardiology)    Follow Up:  1 Week    Follow Up Details:  Follow up with Dr. Jeffers to discuss Lexiscan cardiolite stress test           Follow-up Information     Andreas Martinez MD .    Specialty:  Family Medicine  Contact information:  [address]  [facility] [address] [phone]                   PENDING TEST RESULTS AT DISCHARGE      Time: >30 minutes was spent in discharge planning, " medication reconciliation and coordination of care for this patient.    Jake Moon MD is the attending at time of discharge, He is aware of the patient's status and agrees with the above discharge summary.              This document has been electronically signed by Mike Cedeno MD on June 6, 2020 14:29

## 2020-06-06 NOTE — PROGRESS NOTES
FAMILY MEDICINE DAILY PROGRESS NOTE  NAME: Aj Card Jr.  : 1945  MRN: [id number]     LOS: 0 days     PROVIDER OF SERVICE: Mike Cedeno MD    Chief Complaint: Atrial fibrillation with RVR (CMS/HCC)    Subjective:   No acute overnight events.  Patient has no complaints at this time.  He was resting comfortably this morning.  Denies any chest pain or palpitations.    Review of Systems:   Review of Systems   Constitutional: Negative for activity change, appetite change, chills, fatigue and fever.   HENT: Negative for congestion, sinus pressure, sinus pain and sore throat.    Eyes: Negative for photophobia and visual disturbance.   Respiratory: Negative for cough, choking, shortness of breath and wheezing.    Cardiovascular: Negative for chest pain, palpitations and leg swelling.   Gastrointestinal: Negative for abdominal distention, abdominal pain, constipation, diarrhea, nausea and vomiting.   Genitourinary: Negative for difficulty urinating and dysuria.   Musculoskeletal: Negative for arthralgias, back pain, gait problem, joint swelling and myalgias.   Skin: Negative for pallor and rash.   Neurological: Negative for dizziness, seizures, syncope, weakness, light-headedness, numbness and headaches.   Psychiatric/Behavioral: Negative for agitation, confusion and decreased concentration.       Objective:     Vital Signs  Temp:  [97.5 °F (36.4 °C)-97.9 °F (36.6 °C)] 97.7 °F (36.5 °C)  Heart Rate:  [] 61  Resp:  [18] 18  BP: ()/(52-78) 114/57    Physical Exam  Physical Exam   Constitutional: He is oriented to person, place, and time. He appears well-developed and well-nourished. No distress.   HENT:   Head: Normocephalic and atraumatic.   Right Ear: External ear normal.   Left Ear: External ear normal.   Nose: Nose normal.   Mouth/Throat: Oropharynx is clear and moist.   Eyes: Pupils are equal, round, and reactive to light. Conjunctivae and EOM are normal.   Neck: Normal range of motion.  Neck supple.   Cardiovascular: Normal rate, regular rhythm, normal heart sounds and intact distal pulses. Exam reveals no gallop and no friction rub.   No murmur heard.  Pulmonary/Chest: Effort normal and breath sounds normal. No respiratory distress. He has no wheezes.   Abdominal: Soft. Bowel sounds are normal. He exhibits no distension. There is no tenderness. There is no guarding.   Musculoskeletal: Normal range of motion.   Neurological: He is alert and oriented to person, place, and time. No cranial nerve deficit. Coordination normal.   Skin: Skin is warm and dry.   Psychiatric: He has a normal mood and affect. His behavior is normal. Judgment and thought content normal.       Medication Review    Current Facility-Administered Medications:   •  acetaminophen (TYLENOL) tablet 650 mg, 650 mg, Oral, Q4H PRN **OR** acetaminophen (TYLENOL) 160 MG/5ML solution 650 mg, 650 mg, Oral, Q4H PRN **OR** acetaminophen (TYLENOL) suppository 650 mg, 650 mg, Rectal, Q4H PRN, Chris Valdovinos MD  •  ALPRAZolam (XANAX) tablet 0.5 mg, 0.5 mg, Oral, Nightly PRN, Chris Valdovinos MD, 0.5 mg at 06/05/20 2251  •  apixaban (ELIQUIS) tablet 5 mg, 5 mg, Oral, Q12H, David Jeffers MD, 5 mg at 06/05/20 2110  •  aspirin EC tablet 81 mg, 81 mg, Oral, Daily, Chris Valdovinos MD, 81 mg at 06/05/20 0839  •  cholecalciferol (VITAMIN D3) tablet 1,000 Units, 1,000 Units, Oral, Daily, Chris Valdovinos MD, 1,000 Units at 06/05/20 0838  •  dextrose (D50W) 25 g/ 50mL Intravenous Solution 25 g, 25 g, Intravenous, Q15 Min PRN, Chris Valdovinos MD  •  dextrose (GLUTOSE) oral gel 15 g, 15 g, Oral, Q15 Min PRN, Chris Valdovinos MD  •  dilTIAZem (CARDIZEM) 125 mg in 125 mL 0.7% sodium chloride (1 mg/mL) infusion, 5-15 mg/hr, Intravenous, Continuous, Chris Valdovinos MD, Stopped at 06/05/20 1004  •  dilTIAZem (CARDIZEM) tablet 30 mg, 30 mg, Oral, Q8H, David Jeffers MD, 30 mg at 06/06/20 0649  •  glucagon (human recombinant)  (GLUCAGEN DIAGNOSTIC) injection 1 mg, 1 mg, Subcutaneous, Q15 Min PRN, Chris Valdovinos MD  •  insulin aspart (novoLOG) injection 0-7 Units, 0-7 Units, Subcutaneous, TID AC, Chris Valdovinos MD, 3 Units at 06/05/20 1201  •  insulin aspart (novoLOG) injection 4 Units, 4 Units, Subcutaneous, TID With Meals, Chris Valdovinos MD, 4 Units at 06/05/20 1700  •  isosorbide mononitrate (IMDUR) 24 hr tablet 30 mg, 30 mg, Oral, Q24H, David Jeffers MD, 30 mg at 06/05/20 1914  •  lisinopril (PRINIVIL,ZESTRIL) tablet 10 mg, 10 mg, Oral, Daily, David Jeffers MD  •  Magnesium Oxide tablet 400 mg, 400 mg, Oral, BID, David Jeffers MD, 400 mg at 06/05/20 2110  •  metoprolol tartrate (LOPRESSOR) tablet 50 mg, 50 mg, Oral, Q12H, Chris Valdovinos MD, 50 mg at 06/05/20 0839  •  nitroglycerin (NITROSTAT) ointment 1 inch, 1 inch, Topical, Once, Chris Valdovinos MD, Stopped at 06/05/20 0225  •  pantoprazole (PROTONIX) EC tablet 40 mg, 40 mg, Oral, QAM, Chris Valdovinos MD, 40 mg at 06/05/20 0838  •  propafenone (RYTHMOL) tablet 150 mg, 150 mg, Oral, Q8H, David Jeffers MD, 150 mg at 06/06/20 0715  •  sodium chloride 0.9 % flush 10 mL, 10 mL, Intravenous, PRN, Chris Valdovinos MD  •  sodium chloride 0.9 % flush 10 mL, 10 mL, Intravenous, Q12H, Chris Valdovinos MD, 10 mL at 06/05/20 2112  •  sodium chloride 0.9 % flush 10 mL, 10 mL, Intravenous, PRN, Chris Valdovinos MD  •  sucralfate (CARAFATE) tablet 1 g, 1 g, Oral, BID PRN, Chris Valdovinos MD     Diagnostic Data    Lab Results (last 24 hours)     Procedure Component Value Units Date/Time    POC Glucose Once [[id number]]  (Abnormal) Collected:  06/06/20 0648    Specimen:  Blood Updated:  06/06/20 0701     Glucose 186 mg/dL      Comment: Result Not ConfirmedOperator: [id number] YELENA DAUGHERTYNIFERMeter ID: [id number]       Basic Metabolic Panel [[id number]]  (Abnormal) Collected:  06/06/20 0340    Specimen:  Blood Updated:  06/06/20 0419      Glucose 247 mg/dL      BUN 17 mg/dL      Creatinine 1.00 mg/dL      Sodium 135 mmol/L      Potassium 4.2 mmol/L      Chloride 98 mmol/L      CO2 26.0 mmol/L      Calcium 8.9 mg/dL      eGFR Non African Amer 73 mL/min/1.73      BUN/Creatinine Ratio 17.0     Anion Gap 11.0 mmol/L     Narrative:       GFR Normal >60  Chronic Kidney Disease <60  Kidney Failure <15      CBC & Differential [[id number]] Collected:  06/06/20 0340    Specimen:  Blood Updated:  06/06/20 0350    Narrative:       The following orders were created for panel order CBC & Differential.  Procedure                               Abnormality         Status                     ---------                               -----------         ------                     CBC Auto Differential[[id number]]        Abnormal            Final result                 Please view results for these tests on the individual orders.    CBC Auto Differential [[id number]]  (Abnormal) Collected:  06/06/20 0340    Specimen:  Blood Updated:  06/06/20 0350     WBC 6.00 10*3/mm3      RBC 4.30 10*6/mm3      Hemoglobin 12.4 g/dL      Hematocrit 36.1 %      MCV 84.0 fL      MCH 28.8 pg      MCHC 34.3 g/dL      RDW 13.5 %      RDW-SD 41.4 fl      MPV 9.2 fL      Platelets 192 10*3/mm3      Neutrophil % 55.6 %      Lymphocyte % 31.2 %      Monocyte % 8.2 %      Eosinophil % 3.3 %      Basophil % 0.7 %      Immature Grans % 1.0 %      Neutrophils, Absolute 3.34 10*3/mm3      Lymphocytes, Absolute 1.87 10*3/mm3      Monocytes, Absolute 0.49 10*3/mm3      Eosinophils, Absolute 0.20 10*3/mm3      Basophils, Absolute 0.04 10*3/mm3      Immature Grans, Absolute 0.06 10*3/mm3      nRBC 0.0 /100 WBC     POC Glucose Once [[id number]]  (Abnormal) Collected:  06/05/20 1701    Specimen:  Blood Updated:  06/05/20 1714     Glucose 149 mg/dL      Comment: : [id number] LILLIE Ventura ID: [id number]       POC Glucose Once [[id number]]  (Abnormal) Collected:  06/05/20 1131    Specimen:  Blood  Updated:  06/05/20 1145     Glucose 211 mg/dL      Comment: Sliding Scale AdminOperator: [id number] LILLIE GAMINGMeter ID: [id number]       POC Glucose Once [[id number]]  (Abnormal) Collected:  06/05/20 0807    Specimen:  Blood Updated:  06/05/20 0829     Glucose 178 mg/dL      Comment: Sliding Scale AdminOperator: [id number] LILLIE GAMINGMeter ID: [id number]              Imaging Results (Last 24 Hours)     ** No results found for the last 24 hours. **          I reviewed the patient's new clinical results.    Assessment/Plan:     Active Hospital Problems    Diagnosis   • **Atrial fibrillation with RVR (CMS/HCC)     -Patient in sinus rhythm  -Oral Cardizem  -Oral metoprolol  -Echo reflective of grade 1 diastolic dysfunction  -Anticipate discharge today with Cardiology recommendations for medical management and outpatient follow up     • GERD (gastroesophageal reflux disease)     - Protonix     • Coronary artery disease with angina pectoris (CMS/[facility])     - Continue home medications     • Essential hypertension     -We will discharge on low dosage of lisinopril per cardiology recommendations     • Generalized anxiety disorder     - Alprazolam as needed     • Mixed hyperlipidemia     - Consider staring statin     • Type 2 diabetes mellitus without complication, without long-term current use of insulin (CMS/[facility])     -Continue home dose of 4 units with meals  -ISS           DVT prophylaxis: Eliquis  Code Status and Medical Interventions:   Ordered at: 06/05/20 0639     Code Status:    CPR     Medical Interventions (Level of Support Prior to Arrest):    Full       Plan for disposition:Anticipate discharge today      Time: 15 min               This document has been electronically signed by Mike Cedeno MD on June 6, 2020 08:00

## 2020-06-06 NOTE — OUTREACH NOTE
Prep Survey      Responses   [facility] patient discharged from?  [place]   Is LACE score < 7 ?  Yes   Eligibility  [facility]   Date of Admission  06/05/20   Date of Discharge  06/06/20   Discharge Disposition  Home or Self Care   Discharge diagnosis  Afib with RVR   COVID-19 Test Status  Not tested   Does the patient have one of the following disease processes/diagnoses(primary or secondary)?  Other   Does the patient have Home health ordered?  No   Is there a DME ordered?  No   Prep survey completed?  Yes          Praveena Armstrong RN

## 2020-06-06 NOTE — PLAN OF CARE
Problem: Patient Care Overview  Goal: Plan of Care Review  Flowsheets  Taken 6/6/2020 0345  Progress: no change  Taken 6/5/2020 2000  Plan of Care Reviewed With: patient

## 2020-06-06 NOTE — NURSING NOTE
Pt pulled out iv by accident, dr robles on floor. MD aware and stated pt may be going home today and its okay to hold off on IV at this time.

## 2020-06-08 ENCOUNTER — TRANSITIONAL CARE MANAGEMENT TELEPHONE ENCOUNTER (OUTPATIENT)
Dept: CALL CENTER | Facility: HOSPITAL | Age: 75
End: 2020-06-08

## 2020-06-08 NOTE — OUTREACH NOTE
Call Center TCM Note      Responses   [facility] patient discharged from?  [place]   COVID-19 Test Status  Not tested   Does the patient have one of the following disease processes/diagnoses(primary or secondary)?  Other   TCM attempt successful?  Yes   Call start time  1143   Call end time  1148   Discharge diagnosis  Afib with RVR   Meds reviewed with patient/caregiver?  Yes   Is the patient having any side effects they believe may be caused by any medication additions or changes?  No   Does the patient have all medications ordered at discharge?  No   Prescription comments  Wife states they realized when they got home from [facility] there was no rx for Propafenone (Rythmol). Wife was calling phar to see if they have this. I instructed her to call [facility] or me back if phar did not receive this rx.  Pt is sched for TCM on 06/15/2020          Noemi Aguilar MA    6/8/2020, 11:51

## 2020-06-08 NOTE — PROGRESS NOTES
Subjective:  Aj Card Jr. is a 74 y.o. male who presents for       Patient Active Problem List   Diagnosis   • Type 2 diabetes mellitus without complication, without long-term current use of insulin (CMS/Formerly [facility])   • Essential hypertension   • Mixed hyperlipidemia   • Generalized anxiety disorder   • History of colon polyps   • Diverticulosis of large intestine without hemorrhage   • Coronary artery disease with angina pectoris (CMS/Formerly [facility])   • Gastroesophageal reflux disease with esophagitis   • Vitamin D deficiency   • Overweight   • Encounter for screening for endocrine disorder   • Atopic dermatitis   • High serum vitamin B12   • Acute pain of right knee   • Tear of medial meniscus of right knee, current   • GERD (gastroesophageal reflux disease)   • PAF (paroxysmal atrial fibrillation) (CMS/Formerly [facility])           Current Outpatient Medications:   •  ALPRAZolam (XANAX) 0.5 MG tablet, Take 1 tablet by mouth At Night As Needed for Anxiety., Disp: 30 tablet, Rfl: 0  •  amLODIPine (NORVASC) 10 MG tablet, Take 1 tablet by mouth Daily., Disp: 90 tablet, Rfl: 3  •  apixaban (ELIQUIS) 5 MG tablet tablet, Take 1 tablet by mouth 2 (two) times a day for 30 days. Indications: Atrial Fibrillation, Disp: 60 tablet, Rfl: 1  •  aspirin 81 MG EC tablet, Take 2 tablets by mouth Daily., Disp: 90 tablet, Rfl: 3  •  azelastine (ASTELIN) 0.1 % nasal spray, 2 sprays into the nostril(s) as directed by provider 2 (Two) Times a Day. Use in each nostril as directed, Disp: 90 mL, Rfl: 3  •  Blood Glucose Monitoring Suppl w/Device kit, USE AS INDICATED, ANY MONITOR , ICD10 code is E11.9, Disp: 1 each, Rfl: 1  •  cholecalciferol (VITAMIN D3) 1000 units tablet, Take 1 tablet by mouth Daily., Disp: 30 tablet, Rfl: 3  •  clopidogrel (PLAVIX) 75 MG tablet, 1 PO QOD, Disp: 90 tablet, Rfl: 3  •  coenzyme Q10 100 MG capsule, Take 100 mg by mouth daily., Disp: , Rfl:   •  dilTIAZem (CARDIZEM) 30 MG tablet, Take 1 tablet by mouth Every 8 (Eight)  "Hours for 30 days., Disp: 90 tablet, Rfl: 0  •  esomeprazole (nexIUM) 40 MG capsule, Take 1 capsule by mouth Every Morning Before Breakfast., Disp: 90 capsule, Rfl: 3  •  Glucose Blood (BLOOD GLUCOSE TEST) strip, Use 4 x daily use any brand covered by insurance or same brand as before ICD10 code is E11.9, Disp: 120 each, Rfl: 11  •  insulin regular (HUMULIN R) 100 UNIT/ML injection, 4 units with meals, Disp: 1 each, Rfl: 12  •  Insulin Syringe 31G X 5/16\" 0.3 ML misc, 4 x daily, Disp: 120 each, Rfl: 11  •  isosorbide mononitrate (IMDUR) 30 MG 24 hr tablet, Take 1 tablet by mouth Daily for 30 days., Disp: 30 tablet, Rfl: 0  •  KRILL OIL OMEGA-3 PO, Take  by mouth daily., Disp: , Rfl:   •  Lancet Devices (LANCING DEVICE) misc, USE AS INDICATED TO CORRELATE WITH STRIPS AND METER, Disp: 1 each, Rfl: 1  •  Lancets 30G misc, USE 4 X DAILY, Disp: 120 each, Rfl: 11  •  lisinopril (PRINIVIL,ZESTRIL) 10 MG tablet, Take 1 tablet by mouth Daily for 30 days., Disp: 30 tablet, Rfl: 0  •  LOTEMAX 0.5 % ophthalmic suspension, , Disp: , Rfl:   •  Magnesium Oxide 400 (240 Mg) MG tablet, Take 1 tablet by mouth 2 (Two) Times a Day for 30 days., Disp: 30 tablet, Rfl: 1  •  metFORMIN ER (GLUCOPHAGE-XR) 500 MG 24 hr tablet, Take 1 tablet by mouth Daily With Breakfast., Disp: 90 tablet, Rfl: 3  •  metoprolol tartrate (LOPRESSOR) 50 MG tablet, Take 1 tablet by mouth Every 12 (Twelve) Hours., Disp: 180 tablet, Rfl: 1  •  propafenone (RYTHMOL) 150 MG tablet, Take 1 tablet by mouth Every 8 (Eight) Hours for 30 days., Disp: 90 tablet, Rfl: 0  •  Red Yeast Rice Extract (RED YEAST RICE PO), Take  by mouth Daily., Disp: , Rfl:   •  Saw Palmetto, Serenoa repens, (SAW PALMETTO PO), Take  by mouth daily., Disp: , Rfl:   •  sucralfate (CARAFATE) 1 g tablet, TAKE 1 TABLET BY MOUTH TWICE DAILY AS NEEDED FOR NAUSEA AND FOR ABDOMINAL PAIN, Disp: 180 tablet, Rfl: 0  •  vitamin B-12 (CYANOCOBALAMIN) 2500 MCG sublingual tablet tablet, Place  under the tongue " Every Other Day., Disp: , Rfl:       Pt is 74 yo male with history of GERD DM type 2, Vitamin B12 deficiency, HLP, HTN, ADRIENNE, CAD sp stent x 3 ,  Esophagitis, Gastritis, sp appendectomy sp cholecystectomy sp hernia repair surgery, sp hand surgery/ cataract surgery bilateral            5/18/20 in office visit today for recheck on HTN, ADRIENNE, DM type 2.  Saw Endocrinology on  5/8/20 for his DM type 2. Pt continues to take synjardy XR 10/1000 mg with breakfast along with trulicity 0.75 mg that was increased to 1.5 mg. His toujeo was changed to tresiba 12 units daily.  He also takes novoon R 4 units with meals. He will be due for new labwork soon. He is needing refills on medications today. Doing well overall. No chest pain no diizziness. Sugars is stable. Xanax helps with anxiety.      6/15/20 in office visit and hospital followup. Pt recently admitted to [facility] on 6/5/20 for atrial fibrillation, tachycardia and chest pressure.  He saw his Cardiologist a few weeks prior and was  given PO Cardizem . He did take Cardizme which helped but made hims feel dizzy. DR Jeffers is 's cardiologist.  At [facility] he was started on a cardizem drip.  His troponin was negative x 3.  Echo revealed diastolic dysfunction.  He was transitioned to oral cardizem. He had several hypotensive episodes.  His medicaitons were adjusted prior to dischage. Cardiology was consulted.  He was discharged on eliquis 5 mg PO BID, cardzidem 30 mg every 8 hours imdur 30 mg PO q daily.  Magnesium oxide 400 mg PO BID and propafenone 150 mg every 8 hours. Currently he is doing okay clinically.  HR is controlled no chest pain no dizziness. Needs refill on Xanax.  He is supposed have a stress test per Cardiology. But has not been scheduled yet. He has yet to see Cardiology since being discharged from hospital. He does get restless at night. Xanax helps with sleep.  Wife states he snores in sleep.         Atrial Fibrillation   Presents for follow-up visit. Symptoms  include dizziness, palpitations, shortness of breath and weakness. Symptoms are negative for an AICD problem, bradycardia, chest pain, hemodynamic instability, hypertension, hypotension, pacemaker problem and tachycardia. The symptoms have been stable. Past medical history includes atrial fibrillation. There are no medication compliance problems.   Hypertension   This is a chronic problem. The current episode started more than 1 year ago. The problem is controlled. Pertinent negatives include no anxiety, blurred vision, chest pain, headaches, malaise/fatigue, neck pain, palpitations, peripheral edema, PND, shortness of breath or sweats. Risk factors for coronary artery disease include diabetes mellitus, dyslipidemia, sedentary lifestyle and male gender. There are no compliance problems.  Hypertensive end-organ damage includes CAD/MI. There is no history of angina, kidney disease, CVA, heart failure, left ventricular hypertrophy, PVD or retinopathy. There is no history of chronic renal disease, coarctation of the aorta, hyperaldosteronism, hypercortisolism, hyperparathyroidism, a hypertension causing med, pheochromocytoma, renovascular disease, sleep apnea or a thyroid problem.   Diabetes   He presents for his follow-up diabetic visit. He has type 2 diabetes mellitus. His disease course has been stable. Hypoglycemia symptoms include nervousness/anxiousness. Pertinent negatives for hypoglycemia include no confusion, dizziness, headaches or sweats. Pertinent negatives for diabetes include no blurred vision, no chest pain, no fatigue, no foot paresthesias, no foot ulcerations, no polydipsia, no polyphagia, no polyuria, no visual change, no weakness and no weight loss. Pertinent negatives for hypoglycemia complications include no blackouts and no hospitalization. Symptoms are stable. Pertinent negatives for diabetic complications include no CVA, impotence, PVD or retinopathy. Risk factors for coronary artery disease  include diabetes mellitus, dyslipidemia, hypertension and male sex. Current diabetic treatment includes oral agent (dual therapy). He is compliant with treatment most of the time. His weight is stable. An ACE inhibitor/angiotensin II receptor blocker is being taken. He does not see a podiatrist.Eye exam is not current.   Anxiety   Presents for follow-up visit. Symptoms include excessive worry and nervous/anxious behavior. Patient reports no chest pain, compulsions, confusion, decreased concentration, depressed mood, dizziness, dry mouth, feeling of choking, hyperventilation, impotence, insomnia, irritability, malaise, muscle tension, nausea, obsessions, palpitations, panic, restlessness, shortness of breath or suicidal ideas.     Review of Systems  Review of Systems   Constitutional: Positive for activity change and fatigue. Negative for appetite change, chills, diaphoresis and fever.   HENT: Negative for congestion, postnasal drip, rhinorrhea, sinus pressure, sinus pain, sneezing, sore throat, trouble swallowing and voice change.    Respiratory: Positive for chest tightness and shortness of breath. Negative for cough, choking, wheezing and stridor.    Cardiovascular: Positive for palpitations. Negative for chest pain.   Gastrointestinal: Negative for diarrhea, nausea and vomiting.   Neurological: Positive for dizziness, weakness and numbness. Negative for headaches.   Psychiatric/Behavioral: The patient is nervous/anxious.        Patient Active Problem List   Diagnosis   • Type 2 diabetes mellitus without complication, without long-term current use of insulin (CMS/Former[facility])   • Essential hypertension   • Mixed hyperlipidemia   • Generalized anxiety disorder   • History of colon polyps   • Diverticulosis of large intestine without hemorrhage   • Coronary artery disease with angina pectoris (CMS/[facility])   • Gastroesophageal reflux disease with esophagitis   • Vitamin D deficiency   • Overweight   • Encounter for screening  for endocrine disorder   • Atopic dermatitis   • High serum vitamin B12   • Acute pain of right knee   • Tear of medial meniscus of right knee, current   • GERD (gastroesophageal reflux disease)   • PAF (paroxysmal atrial fibrillation) (CMS/HCC)     Past Surgical History:   Procedure Laterality Date   • APPENDECTOMY       [facility] 1995       • CARDIAC CATHETERIZATION      Successful PTCA of the OMB#2.Unsuccessful PTCA of the 1st OMB, secondary to difficulty in advancing the balloon. 12/08/2015       • CHOLECYSTECTOMY      [facility] 1993       • COLONOSCOPY  10/01/2012   • COLONOSCOPY N/A 12/11/2017    Procedure: COLONOSCOPY;  Surgeon: Bladimir Michael MD;  Location: [facility] ENDOSCOPY;  Service:    • CORONARY ANGIOPLASTY      Successful PTCA & stenting LAD was done w/ deployment of a 2.5mm x23 Xience stent w/ reduction of stenosis from 90% to less than 0% stenosis with good LILLIAM 3 flow 02/17/2012       • ENDOSCOPY      with biopsy.  Mildly severe esophagitis. Gastritis. Normal examined duodenum.Several biopsies obtained in the lower third of the esophagus.Several biopsies obtained in the gastric antrum. 05/06/2016       • ENDOSCOPY      w tube. Normal esophagus. Gastritis in stomach. Biopsy taken. Gastric polyp found. Biopsy taken. Normal duodenum. 10/01/2012       • ENDOSCOPY AND COLONOSCOPY      Diverticulum in sigmoid colon & descending colon. Internal & external hemorrhoids found. 10/01/2012       • HAND SURGERY       Corrective osteotomy of ring finger metacarpal. Malunited fracture of left ring finger 05/21/1985       • HERNIA REPAIR      Laparoscopic hernia repair. prepertitoneal bilateral inguinal hernia repair with mesh. 10/15/2009      • OTHER SURGICAL HISTORY      CORONARY ARTERY STENT    • SKIN TAG REMOVAL      Excision, viral skin tag,right upper eyelid 05/08/1995      Social History     Socioeconomic History   • Marital status:      Spouse name: Not on file   • Number  of children: Not on file   • Years of education: Not on file   • Highest education level: Not on file   Tobacco Use   • Smoking status: Never Smoker   • Smokeless tobacco: Never Used   Substance and Sexual Activity   • Alcohol use: No   • Drug use: No   • Sexual activity: Defer     Family History   Problem Relation Age of Onset   • Clotting disorder Other    • Lung disease Other    • Schizophrenia Sister    • Obesity Sister    • Tuberculosis Sister    • Arthritis Mother    • Diabetes Mother    • Heart disease Mother    • Hypertension Mother    • Obesity Mother    • Stroke Mother    • Thyroid disease Mother    • Tuberculosis Mother    • Arthritis Father    • Tuberculosis Father      Admission on 06/05/2020, Discharged on 06/06/2020   Component Date Value Ref Range Status   • Troponin T 06/05/2020 <0.010  0.000 - 0.030 ng/mL Final   • Troponin T 06/05/2020 <0.010  0.000 - 0.030 ng/mL Final   • Glucose 06/05/2020 287* 65 - 99 mg/dL Final   • BUN 06/05/2020 15  8 - 23 mg/dL Final   • Creatinine 06/05/2020 0.94  0.76 - 1.27 mg/dL Final   • Sodium 06/05/2020 137  136 - 145 mmol/L Final   • Potassium 06/05/2020 4.1  3.5 - 5.2 mmol/L Final   • Chloride 06/05/2020 97* 98 - 107 mmol/L Final   • CO2 06/05/2020 25.0  22.0 - 29.0 mmol/L Final   • Calcium 06/05/2020 9.6  8.6 - 10.5 mg/dL Final   • Total Protein 06/05/2020 7.5  6.0 - 8.5 g/dL Final   • Albumin 06/05/2020 4.30  3.50 - 5.20 g/dL Final   • ALT (SGPT) 06/05/2020 29  1 - 41 U/L Final   • AST (SGOT) 06/05/2020 27  1 - 40 U/L Final   • Alkaline Phosphatase 06/05/2020 38* 39 - 117 U/L Final   • Total Bilirubin 06/05/2020 0.3  0.2 - 1.2 mg/dL Final   • eGFR Non African Amer 06/05/2020 78  >60 mL/min/1.73 Final   • Globulin 06/05/2020 3.2  gm/dL Final   • A/G Ratio 06/05/2020 1.3  g/dL Final   • BUN/Creatinine Ratio 06/05/2020 16.0  7.0 - 25.0 Final   • Anion Gap 06/05/2020 15.0  5.0 - 15.0 mmol/L Final   • proBNP 06/05/2020 171.3  5.0-1,800.0 pg/mL Final   • Color, UA  06/05/2020 Yellow  Yellow, Straw, Dark Yellow, Renay Final   • Appearance, UA 06/05/2020 Clear  Clear Final   • pH, UA 06/05/2020 6.0  5.0 - 9.0 Final   • Specific Gravity, UA 06/05/2020 1.019  1.003 - 1.030 Final   • Glucose, UA 06/05/2020 500 mg/dL (2+)* Negative Final   • Ketones, UA 06/05/2020 Negative  Negative Final   • Bilirubin, UA 06/05/2020 Negative  Negative Final   • Blood, UA 06/05/2020 Negative  Negative Final   • Protein, UA 06/05/2020 Negative  Negative Final   • Leuk Esterase, UA 06/05/2020 Negative  Negative Final   • Nitrite, UA 06/05/2020 Negative  Negative Final   • Urobilinogen, UA 06/05/2020 0.2 E.U./dL  0.2 - 1.0 E.U./dL Final   • Extra Tube 06/05/2020 hold for add-on   Final    Auto resulted   • Extra Tube 06/05/2020 Hold for add-ons.   Final    Auto resulted.   • Extra Tube 06/05/2020 hold for add-on   Final    Auto resulted   • Extra Tube 06/05/2020 Hold for add-ons.   Final    Auto resulted.   • WBC 06/05/2020 5.81  3.40 - 10.80 10*3/mm3 Final   • RBC 06/05/2020 4.81  4.14 - 5.80 10*6/mm3 Final   • Hemoglobin 06/05/2020 13.8  13.0 - 17.7 g/dL Final   • Hematocrit 06/05/2020 40.4  37.5 - 51.0 % Final   • MCV 06/05/2020 84.0  79.0 - 97.0 fL Final   • MCH 06/05/2020 28.7  26.6 - 33.0 pg Final   • MCHC 06/05/2020 34.2  31.5 - 35.7 g/dL Final   • RDW 06/05/2020 13.3  12.3 - 15.4 % Final   • RDW-SD 06/05/2020 41.3  37.0 - 54.0 fl Final   • MPV 06/05/2020 9.1  6.0 - 12.0 fL Final   • Platelets 06/05/2020 213  140 - 450 10*3/mm3 Final   • Neutrophil % 06/05/2020 61.3  42.7 - 76.0 % Final   • Lymphocyte % 06/05/2020 28.2  19.6 - 45.3 % Final   • Monocyte % 06/05/2020 6.7  5.0 - 12.0 % Final   • Eosinophil % 06/05/2020 2.4  0.3 - 6.2 % Final   • Basophil % 06/05/2020 0.7  0.0 - 1.5 % Final   • Immature Grans % 06/05/2020 0.7* 0.0 - 0.5 % Final   • Neutrophils, Absolute 06/05/2020 3.56  1.70 - 7.00 10*3/mm3 Final   • Lymphocytes, Absolute 06/05/2020 1.64  0.70 - 3.10 10*3/mm3 Final   • Monocytes,  Absolute 06/05/2020 0.39  0.10 - 0.90 10*3/mm3 Final   • Eosinophils, Absolute 06/05/2020 0.14  0.00 - 0.40 10*3/mm3 Final   • Basophils, Absolute 06/05/2020 0.04  0.00 - 0.20 10*3/mm3 Final   • Immature Grans, Absolute 06/05/2020 0.04  0.00 - 0.05 10*3/mm3 Final   • nRBC 06/05/2020 0.0  0.0 - 0.2 /100 WBC Final   • Extra Tube 06/05/2020 hold for add-on   Final    Auto resulted   • BSA 06/05/2020 2.0  m^2 Final   • RVIDd 06/05/2020 2.5  cm Final   • IVSd 06/05/2020 1.2  cm Final   • LVIDd 06/05/2020 4.8  cm Final   • LVIDs 06/05/2020 3.2  cm Final   • LVPWd 06/05/2020 0.96  cm Final   • IVS/LVPW 06/05/2020 1.2   Final   • FS 06/05/2020 32.4  % Final   • EDV(Teich) 06/05/2020 106.5  ml Final   • ESV(Teich) 06/05/2020 41.9  ml Final   • EF(Teich) 06/05/2020 60.7  % Final   • EDV(cubed) 06/05/2020 109.2  ml Final   • ESV(cubed) 06/05/2020 33.7  ml Final   • EF(cubed) 06/05/2020 69.1  % Final   • LV mass(C)d 06/05/2020 183.8  grams Final   • LV mass(C)dI 06/05/2020 92.6  grams/m^2 Final   • SV(Teich) 06/05/2020 64.6  ml Final   • SI(Teich) 06/05/2020 32.5  ml/m^2 Final   • SV(cubed) 06/05/2020 75.5  ml Final   • SI(cubed) 06/05/2020 38.1  ml/m^2 Final   • Ao root diam 06/05/2020 3.9  cm Final   • Ao root area 06/05/2020 11.9  cm^2 Final   • ACS 06/05/2020 1.7  cm Final   • LA dimension 06/05/2020 3.6  cm Final   • asc Aorta Diam 06/05/2020 2.9  cm Final   • Ao Isth Diam 06/05/2020 2.7  cm Final   • LA/Ao 06/05/2020 0.92   Final   • LVOT diam 06/05/2020 2.0  cm Final   • LVOT area 06/05/2020 3.1  cm^2 Final   • LVOT area(traced) 06/05/2020 3.1  cm^2 Final   • LVLd ap4 06/05/2020 7.5  cm Final   • EDV(MOD-sp4) 06/05/2020 82.5  ml Final   • LVLs ap4 06/05/2020 6.8  cm Final   • ESV(MOD-sp4) 06/05/2020 44.4  ml Final   • EF(MOD-sp4) 06/05/2020 46.2  % Final   • LVLd ap2 06/05/2020 7.4  cm Final   • EDV(MOD-sp2) 06/05/2020 68.4  ml Final   • LVLs ap2 06/05/2020 6.3  cm Final   • ESV(MOD-sp2) 06/05/2020 32.1  ml Final   •  EF(MOD-sp2) 06/05/2020 53.1  % Final   • SV(MOD-sp4) 06/05/2020 38.1  ml Final   • SI(MOD-sp4) 06/05/2020 19.2  ml/m^2 Final   • SV(MOD-sp2) 06/05/2020 36.3  ml Final   • SI(MOD-sp2) 06/05/2020 18.3  ml/m^2 Final   • Ao root area (BSA corrected) 06/05/2020 2.0   Final   • LV Velásquez Vol (BSA corrected) 06/05/2020 41.6  ml/m^2 Final   • LV Sys Vol (BSA corrected) 06/05/2020 22.4  ml/m^2 Final   • MV E max gita 06/05/2020 62.1  cm/sec Final   • MV A max gita 06/05/2020 70.3  cm/sec Final   • MV E/A 06/05/2020 0.88   Final   • MV V2 max 06/05/2020 81.8  cm/sec Final   • MV max PG 06/05/2020 2.7  mmHg Final   • MV V2 mean 06/05/2020 43.5  cm/sec Final   • MV mean PG 06/05/2020 1.0  mmHg Final   • MV V2 VTI 06/05/2020 26.5  cm Final   • MVA(VTI) 06/05/2020 2.1  cm^2 Final   • MV P1/2t max gita 06/05/2020 65.2  cm/sec Final   • MV P1/2t 06/05/2020 46.0  msec Final   • MVA(P1/2t) 06/05/2020 4.8  cm^2 Final   • MV dec slope 06/05/2020 415.0  cm/sec^2 Final   • Ao pk gita 06/05/2020 161.0  cm/sec Final   • Ao max PG 06/05/2020 10.4  mmHg Final   • Ao max PG (full) 06/05/2020 8.5  mmHg Final   • Ao V2 mean 06/05/2020 120.0  cm/sec Final   • Ao mean PG 06/05/2020 6.0  mmHg Final   • Ao mean PG (full) 06/05/2020 5.0  mmHg Final   • Ao V2 VTI 06/05/2020 42.7  cm Final   • MICHAEL(I,A) 06/05/2020 1.3  cm^2 Final   • MICHAEL(I,D) 06/05/2020 1.3  cm^2 Final   • MICHAEL(V,A) 06/05/2020 1.3  cm^2 Final   • MICHAEL(V,D) 06/05/2020 1.3  cm^2 Final   • LV V1 max PG 06/05/2020 1.8  mmHg Final   • LV V1 mean PG 06/05/2020 1.0  mmHg Final   • LV V1 max 06/05/2020 67.8  cm/sec Final   • LV V1 mean 06/05/2020 50.0  cm/sec Final   • LV V1 VTI 06/05/2020 17.8  cm Final   • MR max gita 06/05/2020 389.0  cm/sec Final   • MR max PG 06/05/2020 60.5  mmHg Final   • SV(Ao) 06/05/2020 510.1  ml Final   • SI(Ao) 06/05/2020 257.0  ml/m^2 Final   • SV(LVOT) 06/05/2020 55.9  ml Final   • SI(LVOT) 06/05/2020 28.2  ml/m^2 Final   • PA V2 max 06/05/2020 101.0  cm/sec Final   • PA  max PG 06/05/2020 4.1  mmHg Final   • TR max gita 06/05/2020 191.0  cm/sec Final   • RVSP(TR) 06/05/2020 19.6  mmHg Final   • RAP systole 06/05/2020 5.0  mmHg Final   • MVA P1/2T LCG 06/05/2020 3.4  cm^2 Final   • BH CV ECHO PHOEBE - BZI_BMI 06/05/2020 26.9  kilograms/m^2 Final   • BH CV ECHO PHOEBE - BSA([place]) 06/05/2020 2.0  m^2 Final   • BH CV ECHO PHOEBE - BZI_METRIC_WEIGHT 06/05/2020 82.6  kg Final   • BH CV ECHO PHOEBE - BZI_METRIC_HEIGHT 06/05/2020 175.3  cm Final   • Target HR (85%) 06/05/2020 123  bpm Final   • Max. Pred. HR (100%) 06/05/2020 145  bpm Final   • Glucose 06/05/2020 178* 70 - 130 mg/dL Final    Sliding Scale AdminOperator: [id number] [employer] Appser ID: [id number]   • Glucose 06/05/2020 211* 70 - 130 mg/dL Final    Sliding Scale AdminOperator: [id number] [employer] ID: [id number]   • Glucose 06/05/2020 149* 70 - 130 mg/dL Final    : [id number] [employer] Appser ID: [id number]   • Glucose 06/06/2020 247* 65 - 99 mg/dL Final   • BUN 06/06/2020 17  8 - 23 mg/dL Final   • Creatinine 06/06/2020 1.00  0.76 - 1.27 mg/dL Final   • Sodium 06/06/2020 135* 136 - 145 mmol/L Final   • Potassium 06/06/2020 4.2  3.5 - 5.2 mmol/L Final   • Chloride 06/06/2020 98  98 - 107 mmol/L Final   • CO2 06/06/2020 26.0  22.0 - 29.0 mmol/L Final   • Calcium 06/06/2020 8.9  8.6 - 10.5 mg/dL Final   • eGFR Non African Amer 06/06/2020 73  >60 mL/min/1.73 Final   • BUN/Creatinine Ratio 06/06/2020 17.0  7.0 - 25.0 Final   • Anion Gap 06/06/2020 11.0  5.0 - 15.0 mmol/L Final   • WBC 06/06/2020 6.00  3.40 - 10.80 10*3/mm3 Final   • RBC 06/06/2020 4.30  4.14 - 5.80 10*6/mm3 Final   • Hemoglobin 06/06/2020 12.4* 13.0 - 17.7 g/dL Final   • Hematocrit 06/06/2020 36.1* 37.5 - 51.0 % Final   • MCV 06/06/2020 84.0  79.0 - 97.0 fL Final   • MCH 06/06/2020 28.8  26.6 - 33.0 pg Final   • MCHC 06/06/2020 34.3  31.5 - 35.7 g/dL Final   • RDW 06/06/2020 13.5  12.3 - 15.4 % Final   • RDW-SD 06/06/2020 41.4  37.0 - 54.0 fl  Final   • MPV 06/06/2020 9.2  6.0 - 12.0 fL Final   • Platelets 06/06/2020 192  140 - 450 10*3/mm3 Final   • Neutrophil % 06/06/2020 55.6  42.7 - 76.0 % Final   • Lymphocyte % 06/06/2020 31.2  19.6 - 45.3 % Final   • Monocyte % 06/06/2020 8.2  5.0 - 12.0 % Final   • Eosinophil % 06/06/2020 3.3  0.3 - 6.2 % Final   • Basophil % 06/06/2020 0.7  0.0 - 1.5 % Final   • Immature Grans % 06/06/2020 1.0* 0.0 - 0.5 % Final   • Neutrophils, Absolute 06/06/2020 3.34  1.70 - 7.00 10*3/mm3 Final   • Lymphocytes, Absolute 06/06/2020 1.87  0.70 - 3.10 10*3/mm3 Final   • Monocytes, Absolute 06/06/2020 0.49  0.10 - 0.90 10*3/mm3 Final   • Eosinophils, Absolute 06/06/2020 0.20  0.00 - 0.40 10*3/mm3 Final   • Basophils, Absolute 06/06/2020 0.04  0.00 - 0.20 10*3/mm3 Final   • Immature Grans, Absolute 06/06/2020 0.06* 0.00 - 0.05 10*3/mm3 Final   • nRBC 06/06/2020 0.0  0.0 - 0.2 /100 WBC Final   • Glucose 06/06/2020 186* 70 - 130 mg/dL Final    Result Not ConfirmedOperator: [id number] YELENA BANDAMeter ID: [id number]   • Glucose 06/06/2020 196* 70 - 130 mg/dL Final    Sliding Scale AdminOperator: [id number] LILLIE GAMINGMetmeli ID: [id number]   Lab on 05/27/2020   Component Date Value Ref Range Status   • Glucose 05/27/2020 254* 65 - 99 mg/dL Final   • BUN 05/27/2020 20  8 - 23 mg/dL Final   • Creatinine 05/27/2020 0.95  0.76 - 1.27 mg/dL Final   • Sodium 05/27/2020 135* 136 - 145 mmol/L Final   • Potassium 05/27/2020 4.5  3.5 - 5.2 mmol/L Final   • Chloride 05/27/2020 99  98 - 107 mmol/L Final   • CO2 05/27/2020 26.0  22.0 - 29.0 mmol/L Final   • Calcium 05/27/2020 9.4  8.6 - 10.5 mg/dL Final   • Total Protein 05/27/2020 7.7  6.0 - 8.5 g/dL Final   • Albumin 05/27/2020 4.60  3.50 - 5.20 g/dL Final   • ALT (SGPT) 05/27/2020 33  1 - 41 U/L Final   • AST (SGOT) 05/27/2020 27  1 - 40 U/L Final   • Alkaline Phosphatase 05/27/2020 34* 39 - 117 U/L Final   • Total Bilirubin 05/27/2020 0.4  0.2 - 1.2 mg/dL Final   • eGFR Non African Amer  05/27/2020 77  >60 mL/min/1.73 Final   • Globulin 05/27/2020 3.1  gm/dL Final   • A/G Ratio 05/27/2020 1.5  g/dL Final   • BUN/Creatinine Ratio 05/27/2020 21.1  7.0 - 25.0 Final   • Anion Gap 05/27/2020 10.0  5.0 - 15.0 mmol/L Final   • D-Dimer, Quantitative 05/27/2020 320  0 - 470 ng/mL (FEU) Final   • Troponin T 05/27/2020 <0.010  0.000 - 0.030 ng/mL Final   • TSH 05/27/2020 2.490  0.270 - 4.200 uIU/mL Final    TSH results may be falsely decreased if patient taking Biotin.   • WBC 05/27/2020 7.14  3.40 - 10.80 10*3/mm3 Final   • RBC 05/27/2020 4.78  4.14 - 5.80 10*6/mm3 Final   • Hemoglobin 05/27/2020 13.8  13.0 - 17.7 g/dL Final   • Hematocrit 05/27/2020 40.6  37.5 - 51.0 % Final   • MCV 05/27/2020 84.9  79.0 - 97.0 fL Final   • MCH 05/27/2020 28.9  26.6 - 33.0 pg Final   • MCHC 05/27/2020 34.0  31.5 - 35.7 g/dL Final   • RDW 05/27/2020 13.6  12.3 - 15.4 % Final   • RDW-SD 05/27/2020 42.2  37.0 - 54.0 fl Final   • MPV 05/27/2020 8.7  6.0 - 12.0 fL Final   • Platelets 05/27/2020 198  140 - 450 10*3/mm3 Final   • Neutrophil % 05/27/2020 68.2  42.7 - 76.0 % Final   • Lymphocyte % 05/27/2020 21.4  19.6 - 45.3 % Final   • Monocyte % 05/27/2020 6.7  5.0 - 12.0 % Final   • Eosinophil % 05/27/2020 2.5  0.3 - 6.2 % Final   • Basophil % 05/27/2020 0.6  0.0 - 1.5 % Final   • Immature Grans % 05/27/2020 0.6* 0.0 - 0.5 % Final   • Neutrophils, Absolute 05/27/2020 4.87  1.70 - 7.00 10*3/mm3 Final   • Lymphocytes, Absolute 05/27/2020 1.53  0.70 - 3.10 10*3/mm3 Final   • Monocytes, Absolute 05/27/2020 0.48  0.10 - 0.90 10*3/mm3 Final   • Eosinophils, Absolute 05/27/2020 0.18  0.00 - 0.40 10*3/mm3 Final   • Basophils, Absolute 05/27/2020 0.04  0.00 - 0.20 10*3/mm3 Final   • Immature Grans, Absolute 05/27/2020 0.04  0.00 - 0.05 10*3/mm3 Final   • nRBC 05/27/2020 0.0  0.0 - 0.2 /100 WBC Final   Lab on 02/26/2020   Component Date Value Ref Range Status   • WBC 02/26/2020 5.23  3.40 - 10.80 10*3/mm3 Final   • RBC 02/26/2020 4.98   4.14 - 5.80 10*6/mm3 Final   • Hemoglobin 02/26/2020 14.5  13.0 - 17.7 g/dL Final   • Hematocrit 02/26/2020 41.0  37.5 - 51.0 % Final   • MCV 02/26/2020 82.3  79.0 - 97.0 fL Final   • MCH 02/26/2020 29.1  26.6 - 33.0 pg Final   • MCHC 02/26/2020 35.4  31.5 - 35.7 g/dL Final   • RDW 02/26/2020 14.1  12.3 - 15.4 % Final   • RDW-SD 02/26/2020 41.1  37.0 - 54.0 fl Final   • MPV 02/26/2020 10.1  6.0 - 12.0 fL Final   • Platelets 02/26/2020 216  140 - 450 10*3/mm3 Final   • Neutrophil % 02/26/2020 61.7  42.7 - 76.0 % Final   • Lymphocyte % 02/26/2020 26.8  19.6 - 45.3 % Final   • Monocyte % 02/26/2020 6.7  5.0 - 12.0 % Final   • Eosinophil % 02/26/2020 3.4  0.3 - 6.2 % Final   • Basophil % 02/26/2020 0.8  0.0 - 1.5 % Final   • Immature Grans % 02/26/2020 0.6* 0.0 - 0.5 % Final   • Neutrophils, Absolute 02/26/2020 3.23  1.70 - 7.00 10*3/mm3 Final   • Lymphocytes, Absolute 02/26/2020 1.40  0.70 - 3.10 10*3/mm3 Final   • Monocytes, Absolute 02/26/2020 0.35  0.10 - 0.90 10*3/mm3 Final   • Eosinophils, Absolute 02/26/2020 0.18  0.00 - 0.40 10*3/mm3 Final   • Basophils, Absolute 02/26/2020 0.04  0.00 - 0.20 10*3/mm3 Final   • Immature Grans, Absolute 02/26/2020 0.03  0.00 - 0.05 10*3/mm3 Final   • nRBC 02/26/2020 0.0  0.0 - 0.2 /100 WBC Final   • Glucose 02/26/2020 230* 65 - 99 mg/dL Final   • BUN 02/26/2020 16  8 - 23 mg/dL Final   • Creatinine 02/26/2020 1.06  0.76 - 1.27 mg/dL Final   • Sodium 02/26/2020 138  136 - 145 mmol/L Final   • Potassium 02/26/2020 4.8  3.5 - 5.2 mmol/L Final   • Chloride 02/26/2020 98  98 - 107 mmol/L Final   • CO2 02/26/2020 25.8  22.0 - 29.0 mmol/L Final   • Calcium 02/26/2020 9.9  8.6 - 10.5 mg/dL Final   • Total Protein 02/26/2020 7.6  6.0 - 8.5 g/dL Final   • Albumin 02/26/2020 4.60  3.50 - 5.20 g/dL Final   • ALT (SGPT) 02/26/2020 57* 1 - 41 U/L Final   • AST (SGOT) 02/26/2020 42* 1 - 40 U/L Final   • Alkaline Phosphatase 02/26/2020 35* 39 - 117 U/L Final   • Total Bilirubin 02/26/2020 0.5   0.2 - 1.2 mg/dL Final   • eGFR Non African Amer 02/26/2020 68  >60 mL/min/1.73 Final   • Globulin 02/26/2020 3.0  gm/dL Final   • A/G Ratio 02/26/2020 1.5  g/dL Final   • BUN/Creatinine Ratio 02/26/2020 15.1  7.0 - 25.0 Final   • Anion Gap 02/26/2020 14.2  5.0 - 15.0 mmol/L Final   • Hemoglobin A1C 02/26/2020 8.34* 4.80 - 5.60 % Final   • Total Cholesterol 02/26/2020 159  0 - 200 mg/dL Final   • Triglycerides 02/26/2020 280* 0 - 150 mg/dL Final   • HDL Cholesterol 02/26/2020 30* 40 - 60 mg/dL Final   • LDL Cholesterol  02/26/2020 73  0 - 100 mg/dL Final   • VLDL Cholesterol 02/26/2020 56* 5 - 40 mg/dL Final   • LDL/HDL Ratio 02/26/2020 2.43   Final   • Vitamin B-12 02/26/2020 1,168* 211 - 946 pg/mL Final   • THC, Screen, Urine 02/26/2020 Negative  Negative Final   • Phencyclidine (PCP), Urine 02/26/2020 Negative  Negative Final   • Cocaine Screen, Urine 02/26/2020 Negative  Negative Final   • Methamphetamine, Ur 02/26/2020 Negative  Negative Final   • Opiate Screen 02/26/2020 Negative  Negative Final   • Amphetamine Screen, Urine 02/26/2020 Negative  Negative Final   • Benzodiazepine Screen, Urine 02/26/2020 Positive* Negative Final   • Tricyclic Antidepressants Screen 02/26/2020 Negative  Negative Final   • Methadone Screen, Urine 02/26/2020 Negative  Negative Final   • Barbiturates Screen, Urine 02/26/2020 Negative  Negative Final   • Oxycodone Screen, Urine 02/26/2020 Negative  Negative Final   • Propoxyphene Screen 02/26/2020 Negative  Negative Final   • Buprenorphine, Screen, Urine 02/26/2020 Negative  Negative Final      Adult Transthoracic Echo Complete W/ Cont if Necessary Per Protocol  · Right ventricular cavity is borderline dilated.  · Left ventricular wall thickness is consistent with mild concentric   hypertrophy.  · Left ventricular diastolic dysfunction (grade I) consistent with   impaired relaxation.  · There is mild calcification of the aortic valve mainly affecting the   non, left and right  "coronary cusp(s).     XR Chest 1 View  Narrative: CHEST 1 VIEW on 6/5/2020     CLINICAL INDICATION: Chest pain, tachycardia    COMPARISON: 12/7/2015    FINDINGS: The lungs are clear. There is mild elevation of the  right hemidiaphragm. Cardiac, hilar and mediastinal contours are  within normal limits. Pulmonary vascularity is within normal  limits. No bony abnormality is noted.  Impression: No active disease.    Electronically signed by:  Arie Loya  6/5/2020 2:41 AM CDT  Workstation: [id number]    @[email or web address]@  Immunization History   Administered Date(s) Administered   • FLUARIX/FLUZONE/AFLURIA/FLULAVAL QUAD 10/31/2018   • Fluzone High Dose =>65 Years ([facility] ONLY) 11/02/2016   • Pneumococcal Conjugate 13-Valent (PCV13) 03/15/2018   • Pneumococcal Polysaccharide (PPSV23) 08/15/2005, 03/14/2017   • Pneumococcal, Unspecified 04/14/2015   • Tetanus 09/15/2015   • influenza Split 12/08/2015       The following portions of the patient's history were reviewed and updated as appropriate: allergies, current medications, past family history, past medical history, past social history, past surgical history and problem list.        Physical Exam  /62 (BP Location: Left arm, Patient Position: Sitting, Cuff Size: Adult)   Pulse 62   Temp 98.4 °F (36.9 °C) (Temporal)   Ht 175.3 cm (69\")   Wt 88.9 kg (196 lb)   SpO2 98%   BMI 28.94 kg/m²     Physical Exam   Constitutional: He is oriented to person, place, and time. He appears well-developed and well-nourished.   HENT:   Head: Normocephalic and atraumatic.   Right Ear: External ear normal.   Eyes: Pupils are equal, round, and reactive to light. Conjunctivae and EOM are normal.   Neck: Normal range of motion. Neck supple.   Cardiovascular: Normal rate and normal heart sounds.   No murmur heard.  Irregularly irregular    Pulmonary/Chest: Effort normal and breath sounds normal. No respiratory distress.   Abdominal: Soft. Bowel sounds are normal. He exhibits no " distension. There is no tenderness.   Musculoskeletal: Normal range of motion. He exhibits tenderness. He exhibits no edema or deformity.   Neurological: He is alert and oriented to person, place, and time. No cranial nerve deficit.   Skin: Skin is warm. No rash noted. He is not diaphoretic. No erythema. No pallor.   Psychiatric: He has a normal mood and affect. His behavior is normal.   Nursing note and vitals reviewed.      Assessment/Plan    Diagnosis Plan   1. Type 2 diabetes mellitus without complication, without long-term current use of insulin (CMS/[facility])     2. Vitamin D deficiency     3. Generalized anxiety disorder     4. Essential hypertension     5. Coronary artery disease with angina pectoris, unspecified vessel or lesion type, unspecified whether native or transplanted heart (CMS/[facility])     6. Overweight     7. Mixed hyperlipidemia     8. PAF (paroxysmal atrial fibrillation) (CMS/[facility])             -advised pt o get labowrk  -recommend diabetic eye exam   -high vitamin b12 . Supplement every other day.    -advised pt to be safe and call with any questions or concerns. All questions addressed today  -DM type 2-  Endocrinology following  Pt could   not tolerate synjardy 100/1000 mg daily. off  trulicity 0.75 mg to 1.5 mg daily. On humlulin 4 units with meals.  Gave samples of tresiba. Today.    -refilled medicaitons. Will  try jardiance 10 mg daily.  Samples given today   -HLP  -  Red yeast rice.  Will try Vascepa 1 gram PO BID  -GERD -continue nexium. Gastroenterology following   -overweight - weght loss information provided. Counseled >5 minutes BMI at 26.9   -ADRIENNE - pt is on xanax. PT prefers to stay on xanax. FRANK printed and on file refilled xanax  REF number [id number]  -hypertension -on norvasc 10 mg pO q daily and lopressor 50 mg every 12 hours and lisinopril 10 mg PO q daily  -pAF - cardiology following -currently rate controlled on cardizem 30 mg PO every 8 hours, eliquis 5 mg PO BId,  loptressor 50  mg every 12 hours,  rythmol 150 mg every 8 hours and imdur 30 mg PO q daily.   -CAD- sp stent  Cardiology following. Aspirin 81 mg daily.  Off plavix 75 mg QOD,  Lopressor 50 mg PO BID, lisinopril 10 mg daily.  On imdur 30 mg PO q daily.  Off lipitor.  Advised pt to call regarding Cardiology   -for vitamin D deficiency check Vitamin D pills   -for pruritic rash on left leg - eucrisa ointment samples given today. If helpfdng pt will call back and let me know if helping  -recommend sleep study. Pt declined   -advised pt to be safe and call with questions and concerns  -advised to go to ER or call 911 if symptoms worrisome or severe  -advised to folllowup with referrals and Specialist   -advised pt to be safe during COVID-19 pandemic   -total time with pt >25 minutes   -recheck in 4 weeks         This document has been electronically signed by Andreas Martinez MD on Fadumo 15, 2020 10:10

## 2020-06-15 ENCOUNTER — OFFICE VISIT (OUTPATIENT)
Dept: FAMILY MEDICINE CLINIC | Facility: CLINIC | Age: 75
End: 2020-06-15

## 2020-06-15 VITALS
WEIGHT: 196 LBS | SYSTOLIC BLOOD PRESSURE: 136 MMHG | HEART RATE: 62 BPM | TEMPERATURE: 98.4 F | BODY MASS INDEX: 29.03 KG/M2 | OXYGEN SATURATION: 98 % | HEIGHT: 69 IN | DIASTOLIC BLOOD PRESSURE: 62 MMHG

## 2020-06-15 DIAGNOSIS — R79.89 HIGH SERUM VITAMIN B12: ICD-10-CM

## 2020-06-15 DIAGNOSIS — Z86.010 HISTORY OF COLON POLYPS: ICD-10-CM

## 2020-06-15 DIAGNOSIS — I48.0 PAF (PAROXYSMAL ATRIAL FIBRILLATION) (HCC): Primary | ICD-10-CM

## 2020-06-15 DIAGNOSIS — E11.9 TYPE 2 DIABETES MELLITUS WITHOUT COMPLICATION, WITHOUT LONG-TERM CURRENT USE OF INSULIN (HCC): ICD-10-CM

## 2020-06-15 DIAGNOSIS — F41.1 GAD (GENERALIZED ANXIETY DISORDER): ICD-10-CM

## 2020-06-15 DIAGNOSIS — I25.119 CORONARY ARTERY DISEASE WITH ANGINA PECTORIS, UNSPECIFIED VESSEL OR LESION TYPE, UNSPECIFIED WHETHER NATIVE OR TRANSPLANTED HEART (HCC): ICD-10-CM

## 2020-06-15 DIAGNOSIS — F41.1 GENERALIZED ANXIETY DISORDER: ICD-10-CM

## 2020-06-15 DIAGNOSIS — E55.9 VITAMIN D DEFICIENCY: ICD-10-CM

## 2020-06-15 DIAGNOSIS — E78.2 MIXED HYPERLIPIDEMIA: ICD-10-CM

## 2020-06-15 DIAGNOSIS — E66.3 OVERWEIGHT: ICD-10-CM

## 2020-06-15 DIAGNOSIS — K21.00 GASTROESOPHAGEAL REFLUX DISEASE WITH ESOPHAGITIS: ICD-10-CM

## 2020-06-15 DIAGNOSIS — I10 ESSENTIAL HYPERTENSION: ICD-10-CM

## 2020-06-15 PROCEDURE — 99214 OFFICE O/P EST MOD 30 MIN: CPT | Performed by: FAMILY MEDICINE

## 2020-06-15 RX ORDER — METFORMIN HYDROCHLORIDE 500 MG/1
500 TABLET, EXTENDED RELEASE ORAL
Qty: 90 TABLET | Refills: 3 | Status: SHIPPED | OUTPATIENT
Start: 2020-06-15 | End: 2020-08-06

## 2020-06-15 RX ORDER — ALPRAZOLAM 0.5 MG/1
0.5 TABLET ORAL NIGHTLY PRN
Qty: 30 TABLET | Refills: 0 | Status: SHIPPED | OUTPATIENT
Start: 2020-06-15 | End: 2020-08-05 | Stop reason: SDUPTHER

## 2020-06-15 NOTE — PATIENT INSTRUCTIONS

## 2020-06-16 ENCOUNTER — LAB (OUTPATIENT)
Dept: LAB | Facility: HOSPITAL | Age: 75
End: 2020-06-16

## 2020-06-16 DIAGNOSIS — Z11.59 NEED FOR HEPATITIS C SCREENING TEST: ICD-10-CM

## 2020-06-16 DIAGNOSIS — I48.0 PAF (PAROXYSMAL ATRIAL FIBRILLATION) (HCC): ICD-10-CM

## 2020-06-16 DIAGNOSIS — G89.29 OTHER CHRONIC PAIN: ICD-10-CM

## 2020-06-16 DIAGNOSIS — F41.1 GAD (GENERALIZED ANXIETY DISORDER): ICD-10-CM

## 2020-06-16 LAB
ALBUMIN SERPL-MCNC: 4.6 G/DL (ref 3.5–5.2)
ALBUMIN/GLOB SERPL: 1.4 G/DL
ALP SERPL-CCNC: 34 U/L (ref 39–117)
ALT SERPL W P-5'-P-CCNC: 34 U/L (ref 1–41)
ANION GAP SERPL CALCULATED.3IONS-SCNC: 8.8 MMOL/L (ref 5–15)
AST SERPL-CCNC: 27 U/L (ref 1–40)
BASOPHILS # BLD AUTO: 0.05 10*3/MM3 (ref 0–0.2)
BASOPHILS NFR BLD AUTO: 0.8 % (ref 0–1.5)
BILIRUB SERPL-MCNC: 0.6 MG/DL (ref 0.2–1.2)
BUN BLD-MCNC: 19 MG/DL (ref 8–23)
BUN/CREAT SERPL: 16.7 (ref 7–25)
CALCIUM SPEC-SCNC: 9.7 MG/DL (ref 8.6–10.5)
CHLORIDE SERPL-SCNC: 98 MMOL/L (ref 98–107)
CHOLEST SERPL-MCNC: 167 MG/DL (ref 0–200)
CO2 SERPL-SCNC: 27.2 MMOL/L (ref 22–29)
CREAT BLD-MCNC: 1.14 MG/DL (ref 0.76–1.27)
DEPRECATED RDW RBC AUTO: 41.8 FL (ref 37–54)
EOSINOPHIL # BLD AUTO: 0.15 10*3/MM3 (ref 0–0.4)
EOSINOPHIL NFR BLD AUTO: 2.3 % (ref 0.3–6.2)
ERYTHROCYTE [DISTWIDTH] IN BLOOD BY AUTOMATED COUNT: 13.7 % (ref 12.3–15.4)
GFR SERPL CREATININE-BSD FRML MDRD: 63 ML/MIN/1.73
GLOBULIN UR ELPH-MCNC: 3.2 GM/DL
GLUCOSE BLD-MCNC: 236 MG/DL (ref 65–99)
HAV IGM SERPL QL IA: NORMAL
HBA1C MFR BLD: 8.3 % (ref 4.8–5.6)
HBV CORE IGM SERPL QL IA: NORMAL
HBV SURFACE AG SERPL QL IA: NORMAL
HCT VFR BLD AUTO: 40.4 % (ref 37.5–51)
HCV AB SER DONR QL: NORMAL
HDLC SERPL-MCNC: 34 MG/DL (ref 40–60)
HGB BLD-MCNC: 13.7 G/DL (ref 13–17.7)
HOLD SPECIMEN: NORMAL
IMM GRANULOCYTES # BLD AUTO: 0.03 10*3/MM3 (ref 0–0.05)
IMM GRANULOCYTES NFR BLD AUTO: 0.5 % (ref 0–0.5)
LDLC SERPL CALC-MCNC: 94 MG/DL (ref 0–100)
LDLC/HDLC SERPL: 2.78 {RATIO}
LYMPHOCYTES # BLD AUTO: 1.45 10*3/MM3 (ref 0.7–3.1)
LYMPHOCYTES NFR BLD AUTO: 22.5 % (ref 19.6–45.3)
MCH RBC QN AUTO: 28.6 PG (ref 26.6–33)
MCHC RBC AUTO-ENTMCNC: 33.9 G/DL (ref 31.5–35.7)
MCV RBC AUTO: 84.3 FL (ref 79–97)
MONOCYTES # BLD AUTO: 0.37 10*3/MM3 (ref 0.1–0.9)
MONOCYTES NFR BLD AUTO: 5.7 % (ref 5–12)
NEUTROPHILS # BLD AUTO: 4.4 10*3/MM3 (ref 1.7–7)
NEUTROPHILS NFR BLD AUTO: 68.2 % (ref 42.7–76)
NRBC BLD AUTO-RTO: 0 /100 WBC (ref 0–0.2)
PLATELET # BLD AUTO: 232 10*3/MM3 (ref 140–450)
PMV BLD AUTO: 10.4 FL (ref 6–12)
POTASSIUM BLD-SCNC: 4.6 MMOL/L (ref 3.5–5.2)
PROT SERPL-MCNC: 7.8 G/DL (ref 6–8.5)
RBC # BLD AUTO: 4.79 10*6/MM3 (ref 4.14–5.8)
SODIUM BLD-SCNC: 134 MMOL/L (ref 136–145)
T3FREE SERPL-MCNC: 3.27 PG/ML (ref 2–4.4)
T4 FREE SERPL-MCNC: 1.12 NG/DL (ref 0.93–1.7)
TRIGL SERPL-MCNC: 193 MG/DL (ref 0–150)
TSH SERPL DL<=0.05 MIU/L-ACNC: 2.43 UIU/ML (ref 0.27–4.2)
VIT B12 BLD-MCNC: 792 PG/ML (ref 211–946)
VLDLC SERPL-MCNC: 38.6 MG/DL (ref 5–40)
WBC NRBC COR # BLD: 6.45 10*3/MM3 (ref 3.4–10.8)

## 2020-06-16 PROCEDURE — 82043 UR ALBUMIN QUANTITATIVE: CPT | Performed by: INTERNAL MEDICINE

## 2020-06-16 PROCEDURE — 85025 COMPLETE CBC W/AUTO DIFF WBC: CPT | Performed by: INTERNAL MEDICINE

## 2020-06-16 PROCEDURE — 84439 ASSAY OF FREE THYROXINE: CPT

## 2020-06-16 PROCEDURE — 82570 ASSAY OF URINE CREATININE: CPT | Performed by: INTERNAL MEDICINE

## 2020-06-16 PROCEDURE — 84443 ASSAY THYROID STIM HORMONE: CPT | Performed by: INTERNAL MEDICINE

## 2020-06-16 PROCEDURE — 83036 HEMOGLOBIN GLYCOSYLATED A1C: CPT | Performed by: INTERNAL MEDICINE

## 2020-06-16 PROCEDURE — 84481 FREE ASSAY (FT-3): CPT

## 2020-06-16 PROCEDURE — 80053 COMPREHEN METABOLIC PANEL: CPT | Performed by: INTERNAL MEDICINE

## 2020-06-16 PROCEDURE — 80061 LIPID PANEL: CPT | Performed by: INTERNAL MEDICINE

## 2020-06-16 PROCEDURE — 82607 VITAMIN B-12: CPT | Performed by: INTERNAL MEDICINE

## 2020-06-16 PROCEDURE — 80074 ACUTE HEPATITIS PANEL: CPT

## 2020-06-16 PROCEDURE — 80306 DRUG TEST PRSMV INSTRMNT: CPT

## 2020-06-17 LAB
ALBUMIN UR-MCNC: <1.2 MG/DL
AMPHET+METHAMPHET UR QL: NEGATIVE
AMPHETAMINES UR QL: NEGATIVE
BARBITURATES UR QL SCN: NEGATIVE
BENZODIAZ UR QL SCN: POSITIVE
BUPRENORPHINE SERPL-MCNC: NEGATIVE NG/ML
CANNABINOIDS SERPL QL: NEGATIVE
COCAINE UR QL: NEGATIVE
CREAT UR-MCNC: 135.1 MG/DL
METHADONE UR QL SCN: NEGATIVE
MICROALBUMIN/CREAT UR: NORMAL MG/G{CREAT}
OPIATES UR QL: NEGATIVE
OXYCODONE UR QL SCN: NEGATIVE
PCP UR QL SCN: NEGATIVE
PROPOXYPH UR QL: NEGATIVE
TRICYCLICS UR QL SCN: NEGATIVE

## 2020-06-18 ENCOUNTER — TELEPHONE (OUTPATIENT)
Dept: FAMILY MEDICINE CLINIC | Facility: CLINIC | Age: 75
End: 2020-06-18

## 2020-06-18 NOTE — TELEPHONE ENCOUNTER
----- Message from Andreas Martinez MD sent at 6/17/2020  1:38 PM CDT -----  Please call pt    All test stable    UDS consistent with pt taking Benzodiazpene. Thanks

## 2020-06-30 ENCOUNTER — HOSPITAL ENCOUNTER (OUTPATIENT)
Dept: NUCLEAR MEDICINE | Facility: HOSPITAL | Age: 75
Discharge: HOME | End: 2020-06-30

## 2020-06-30 ENCOUNTER — HOSPITAL ENCOUNTER (OUTPATIENT)
Dept: CARDIOLOGY | Facility: HOSPITAL | Age: 75
Discharge: HOME | End: 2020-06-30

## 2020-06-30 DIAGNOSIS — R07.9 CHEST PAIN, UNSPECIFIED TYPE: ICD-10-CM

## 2020-06-30 DIAGNOSIS — Z98.61 HISTORY OF PTCA: ICD-10-CM

## 2020-06-30 DIAGNOSIS — I25.10 ATHEROSCLEROSIS OF NATIVE CORONARY ARTERY OF NATIVE HEART, ANGINA PRESENCE UNSPECIFIED: ICD-10-CM

## 2020-06-30 LAB
BH CV STRESS BP STAGE 1: NORMAL
BH CV STRESS COMMENTS STAGE 1: NORMAL
BH CV STRESS DOSE REGADENOSON STAGE 1: 0.4
BH CV STRESS DURATION MIN STAGE 1: 0
BH CV STRESS DURATION SEC STAGE 1: 10
BH CV STRESS HR STAGE 1: 82
BH CV STRESS PROTOCOL 1: NORMAL
BH CV STRESS RECOVERY BP: NORMAL MMHG
BH CV STRESS RECOVERY HR: 80 BPM
BH CV STRESS STAGE 1: 1
LV EF NUC BP: 65 %
MAXIMAL PREDICTED HEART RATE: 145 BPM
PERCENT MAX PREDICTED HR: 59.31 %
STRESS BASELINE BP: NORMAL MMHG
STRESS BASELINE HR: 63 BPM
STRESS PERCENT HR: 70 %
STRESS POST ESTIMATED WORKLOAD: 1 METS
STRESS POST PEAK BP: NORMAL MMHG
STRESS POST PEAK HR: 86 BPM
STRESS TARGET HR: 123 BPM

## 2020-06-30 PROCEDURE — 0 TECHNETIUM SESTAMIBI: Performed by: INTERNAL MEDICINE

## 2020-06-30 PROCEDURE — A9500 TC99M SESTAMIBI: HCPCS | Performed by: INTERNAL MEDICINE

## 2020-06-30 PROCEDURE — 25010000002 REGADENOSON 0.4 MG/5ML SOLUTION: Performed by: INTERNAL MEDICINE

## 2020-06-30 PROCEDURE — 78452 HT MUSCLE IMAGE SPECT MULT: CPT

## 2020-06-30 PROCEDURE — 93017 CV STRESS TEST TRACING ONLY: CPT

## 2020-06-30 RX ORDER — SODIUM CHLORIDE 0.9 % (FLUSH) 0.9 %
10 SYRINGE (ML) INJECTION ONCE
Status: COMPLETED | OUTPATIENT
Start: 2020-06-30 | End: 2020-06-30

## 2020-06-30 RX ADMIN — SODIUM CHLORIDE, PRESERVATIVE FREE 10 ML: 5 INJECTION INTRAVENOUS at 09:59

## 2020-06-30 RX ADMIN — TECHNETIUM TC 99M SESTAMIBI 1 DOSE: 1 INJECTION INTRAVENOUS at 09:59

## 2020-06-30 RX ADMIN — REGADENOSON 0.4 MG: 0.08 INJECTION, SOLUTION INTRAVENOUS at 09:59

## 2020-06-30 RX ADMIN — TECHNETIUM TC 99M SESTAMIBI 1 DOSE: 1 INJECTION INTRAVENOUS at 08:20

## 2020-07-28 ENCOUNTER — OFFICE VISIT (OUTPATIENT)
Dept: GASTROENTEROLOGY | Facility: CLINIC | Age: 75
End: 2020-07-28

## 2020-07-28 DIAGNOSIS — R10.13 EPIGASTRIC PAIN: ICD-10-CM

## 2020-07-28 DIAGNOSIS — K57.30 DIVERTICULOSIS OF LARGE INTESTINE WITHOUT HEMORRHAGE: ICD-10-CM

## 2020-07-28 DIAGNOSIS — K21.00 GASTROESOPHAGEAL REFLUX DISEASE WITH ESOPHAGITIS: Primary | ICD-10-CM

## 2020-07-28 PROCEDURE — 99213 OFFICE O/P EST LOW 20 MIN: CPT | Performed by: PHYSICIAN ASSISTANT

## 2020-07-28 RX ORDER — PROPAFENONE HYDROCHLORIDE 150 MG/1
150 TABLET, COATED ORAL EVERY 8 HOURS
COMMUNITY
End: 2022-10-28

## 2020-07-28 RX ORDER — ISOSORBIDE MONONITRATE 30 MG/1
30 TABLET, EXTENDED RELEASE ORAL DAILY
COMMUNITY
End: 2021-01-21 | Stop reason: SDUPTHER

## 2020-08-05 ENCOUNTER — OFFICE VISIT (OUTPATIENT)
Dept: FAMILY MEDICINE CLINIC | Facility: CLINIC | Age: 75
End: 2020-08-05

## 2020-08-05 VITALS
HEART RATE: 70 BPM | SYSTOLIC BLOOD PRESSURE: 146 MMHG | OXYGEN SATURATION: 99 % | BODY MASS INDEX: 28.88 KG/M2 | TEMPERATURE: 96.6 F | DIASTOLIC BLOOD PRESSURE: 70 MMHG | WEIGHT: 195 LBS | HEIGHT: 69 IN

## 2020-08-05 DIAGNOSIS — E78.2 MIXED HYPERLIPIDEMIA: ICD-10-CM

## 2020-08-05 DIAGNOSIS — I10 ESSENTIAL HYPERTENSION: Primary | ICD-10-CM

## 2020-08-05 DIAGNOSIS — F41.1 GENERALIZED ANXIETY DISORDER: ICD-10-CM

## 2020-08-05 DIAGNOSIS — I51.7 LVH (LEFT VENTRICULAR HYPERTROPHY): ICD-10-CM

## 2020-08-05 DIAGNOSIS — E55.9 VITAMIN D DEFICIENCY: ICD-10-CM

## 2020-08-05 DIAGNOSIS — E66.3 OVERWEIGHT: ICD-10-CM

## 2020-08-05 DIAGNOSIS — I25.119 CORONARY ARTERY DISEASE WITH ANGINA PECTORIS, UNSPECIFIED VESSEL OR LESION TYPE, UNSPECIFIED WHETHER NATIVE OR TRANSPLANTED HEART (HCC): ICD-10-CM

## 2020-08-05 DIAGNOSIS — E11.65 UNCONTROLLED TYPE 2 DIABETES MELLITUS WITH HYPERGLYCEMIA (HCC): ICD-10-CM

## 2020-08-05 DIAGNOSIS — I48.0 PAF (PAROXYSMAL ATRIAL FIBRILLATION) (HCC): ICD-10-CM

## 2020-08-05 PROCEDURE — 99214 OFFICE O/P EST MOD 30 MIN: CPT | Performed by: FAMILY MEDICINE

## 2020-08-05 RX ORDER — ALPRAZOLAM 0.5 MG/1
0.5 TABLET ORAL NIGHTLY PRN
Qty: 30 TABLET | Refills: 0 | Status: SHIPPED | OUTPATIENT
Start: 2020-08-05 | End: 2020-11-16 | Stop reason: SDUPTHER

## 2020-08-05 RX ORDER — AMLODIPINE BESYLATE 10 MG/1
10 TABLET ORAL DAILY
Qty: 30 TABLET | Refills: 3
Start: 2020-08-05 | End: 2020-11-16 | Stop reason: SDUPTHER

## 2020-08-05 RX ORDER — LISINOPRIL 10 MG/1
10 TABLET ORAL DAILY
Qty: 30 TABLET | Refills: 3
Start: 2020-08-05 | End: 2020-11-16 | Stop reason: SDUPTHER

## 2020-08-05 RX ORDER — METOPROLOL TARTRATE 50 MG/1
50 TABLET, FILM COATED ORAL 2 TIMES DAILY
Qty: 60 TABLET | Refills: 3 | Status: SHIPPED | OUTPATIENT
Start: 2020-08-05 | End: 2020-11-16 | Stop reason: SDUPTHER

## 2020-08-05 NOTE — PATIENT INSTRUCTIONS
Go back on lopressor or metoprolol 50 mg  Twice a day    Continue lisinopril and cardizem      Bring blood pressure with HR and medications next visit    Call Dr. Jeffers for an appt regarding heart test    Recheck in 2 weeks

## 2020-08-06 ENCOUNTER — OFFICE VISIT (OUTPATIENT)
Dept: ENDOCRINOLOGY | Facility: CLINIC | Age: 75
End: 2020-08-06

## 2020-08-06 VITALS
HEIGHT: 69 IN | DIASTOLIC BLOOD PRESSURE: 80 MMHG | BODY MASS INDEX: 28.99 KG/M2 | SYSTOLIC BLOOD PRESSURE: 125 MMHG | HEART RATE: 65 BPM | WEIGHT: 195.7 LBS | OXYGEN SATURATION: 99 %

## 2020-08-06 DIAGNOSIS — E11.65 TYPE 2 DIABETES MELLITUS WITH HYPERGLYCEMIA, WITHOUT LONG-TERM CURRENT USE OF INSULIN (HCC): Primary | ICD-10-CM

## 2020-08-06 DIAGNOSIS — K76.0 NAFLD (NONALCOHOLIC FATTY LIVER DISEASE): ICD-10-CM

## 2020-08-06 DIAGNOSIS — I25.119 CORONARY ARTERY DISEASE WITH ANGINA PECTORIS, UNSPECIFIED VESSEL OR LESION TYPE, UNSPECIFIED WHETHER NATIVE OR TRANSPLANTED HEART (HCC): ICD-10-CM

## 2020-08-06 DIAGNOSIS — E78.2 MIXED HYPERLIPIDEMIA: ICD-10-CM

## 2020-08-06 DIAGNOSIS — I10 ESSENTIAL HYPERTENSION: ICD-10-CM

## 2020-08-06 PROCEDURE — 99214 OFFICE O/P EST MOD 30 MIN: CPT | Performed by: INTERNAL MEDICINE

## 2020-08-06 RX ORDER — ROSUVASTATIN CALCIUM 5 MG/1
5 TABLET, COATED ORAL NIGHTLY
Qty: 30 TABLET | Refills: 11 | Status: SHIPPED | OUTPATIENT
Start: 2020-08-06 | End: 2020-11-09

## 2020-08-06 RX ORDER — METFORMIN HYDROCHLORIDE 500 MG/1
TABLET, EXTENDED RELEASE ORAL
Qty: 120 TABLET | Refills: 11 | Status: SHIPPED | OUTPATIENT
Start: 2020-08-06 | End: 2020-11-09

## 2020-08-06 NOTE — PROGRESS NOTES
"Aj Card Jr. is a 74 y.o. male who presents for  evaluation of   Chief Complaint   Patient presents with   • Follow-up     3 mo jaziel type 2      IN OFFICE VISIT    Referring provider   Primary Care Provider    Andreas Martinez MD    Duration since age 71 years old     Timing - Diabetes is Constant    Quality -  Financial aid     Severity -  moderate    Complications - CAD     Current symptoms/problems  none     Alleviating Factors: Compliance       Side Effects  none    Current diet  in general, a \"healthy\" diet      Current exercise walking    Current monitoring regimen: home blood tests - checking 2x daily   Home blood sugar records: elevated fasting     Hypoglycemia none    Past Medical History:   Diagnosis Date   • Acute posthemorrhagic anemia    • Allergic rhinitis    • Anxiety state    • Chest pain    • Coronary arteriosclerosis    • Diabetes mellitus (CMS/HCC)     type 2   • Diverticular disease of colon    • Dysphagia    • Epigastric pain    • GERD (gastroesophageal reflux disease)     esophagitis on Dexilant. Pt feels Nexium working better      • History of echocardiogram     Small left atrial enlargement with mild CLVH.Ef of 55-60%.Mitral valve mildly thickened.Av thickened.Left ventricle mildly dilated.Tricuspid intact.Mild aortic insufficiency. 12/07/2015       • History of echocardiogram     Mild diastolic dysfunction and mild left atrial enlargement, otherwise normal echo. EF> 55% 01/03/2012       • Hypercholesterolemia    • Hypertension    • Insomnia    • Irritable bowel syndrome    • Osteoarthritis of multiple joints      Family History   Problem Relation Age of Onset   • Clotting disorder Other    • Lung disease Other    • Schizophrenia Sister    • Obesity Sister    • Tuberculosis Sister    • Arthritis Mother    • Diabetes Mother    • Heart disease Mother    • Hypertension Mother    • Obesity Mother    • Stroke Mother    • Thyroid disease Mother    • Tuberculosis Mother    • Arthritis " "Father    • Tuberculosis Father      Social History     Tobacco Use   • Smoking status: Never Smoker   • Smokeless tobacco: Never Used   Substance Use Topics   • Alcohol use: No   • Drug use: No         Current Outpatient Medications:   •  ALPRAZolam (XANAX) 0.5 MG tablet, Take 1 tablet by mouth At Night As Needed for Anxiety., Disp: 30 tablet, Rfl: 0  •  amLODIPine (NORVASC) 10 MG tablet, Take 1 tablet by mouth Daily., Disp: 30 tablet, Rfl: 3  •  aspirin 81 MG EC tablet, Take 2 tablets by mouth Daily., Disp: 90 tablet, Rfl: 3  •  azelastine (ASTELIN) 0.1 % nasal spray, 2 sprays into the nostril(s) as directed by provider 2 (Two) Times a Day. Use in each nostril as directed, Disp: 90 mL, Rfl: 3  •  Blood Glucose Monitoring Suppl w/Device kit, USE AS INDICATED, ANY MONITOR , ICD10 code is E11.9, Disp: 1 each, Rfl: 1  •  cholecalciferol (VITAMIN D3) 1000 units tablet, Take 1 tablet by mouth Daily., Disp: 30 tablet, Rfl: 3  •  coenzyme Q10 100 MG capsule, Take 100 mg by mouth daily., Disp: , Rfl:   •  esomeprazole (nexIUM) 40 MG capsule, Take 1 capsule by mouth Every Morning Before Breakfast., Disp: 90 capsule, Rfl: 3  •  Glucose Blood (BLOOD GLUCOSE TEST) strip, Use 4 x daily use any brand covered by insurance or same brand as before ICD10 code is E11.9, Disp: 120 each, Rfl: 11  •  Insulin Degludec (Tresiba FlexTouch) 200 UNIT/ML solution pen-injector pen injection, Inject 10 Units under the skin into the appropriate area as directed. Inject up to 10 units in the morning pt doing 6 tight now, Disp: , Rfl:   •  insulin regular (HUMULIN R) 100 UNIT/ML injection, 4 units with meals, Disp: 1 each, Rfl: 12  •  Insulin Syringe 31G X 5/16\" 0.3 ML misc, 4 x daily, Disp: 120 each, Rfl: 11  •  isosorbide mononitrate (IMDUR) 30 MG 24 hr tablet, Take 30 mg by mouth Daily., Disp: , Rfl:   •  KRILL OIL OMEGA-3 PO, Take  by mouth daily., Disp: , Rfl:   •  Lancet Devices (LANCING DEVICE) misc, USE AS INDICATED TO CORRELATE WITH " STRIPS AND METER, Disp: 1 each, Rfl: 1  •  Lancets 30G misc, USE 4 X DAILY, Disp: 120 each, Rfl: 11  •  lisinopril (PRINIVIL,ZESTRIL) 10 MG tablet, Take 1 tablet by mouth Daily., Disp: 30 tablet, Rfl: 3  •  metFORMIN ER (GLUCOPHAGE-XR) 500 MG 24 hr tablet, Take 1 tablet by mouth Daily With Breakfast., Disp: 90 tablet, Rfl: 3  •  metoprolol tartrate (LOPRESSOR) 50 MG tablet, Take 1 tablet by mouth 2 (Two) Times a Day., Disp: 60 tablet, Rfl: 3  •  propafenone (RYTHMOL) 150 MG tablet, Take 150 mg by mouth Every 8 (Eight) Hours., Disp: , Rfl:   •  Red Yeast Rice Extract (RED YEAST RICE PO), Take  by mouth Daily., Disp: , Rfl:   •  rivaroxaban (XARELTO) 20 MG tablet, Take 20 mg by mouth 2 (Two) Times a Day With Meals., Disp: , Rfl:   •  Saw Palmetto, Serenoa repens, (SAW PALMETTO PO), Take  by mouth daily., Disp: , Rfl:   •  sucralfate (CARAFATE) 1 g tablet, TAKE 1 TABLET BY MOUTH TWICE DAILY AS NEEDED FOR NAUSEA AND FOR ABDOMINAL PAIN, Disp: 180 tablet, Rfl: 0  •  vitamin B-12 (CYANOCOBALAMIN) 2500 MCG sublingual tablet tablet, Place  under the tongue Every Other Day., Disp: , Rfl:   •  LOTEMAX 0.5 % ophthalmic suspension, , Disp: , Rfl:     Review of Systems    Review of Systems   Constitutional: Positive for fatigue. Negative for activity change, appetite change, chills, diaphoresis, fever and unexpected weight change.   HENT: Negative for congestion, dental problem, drooling, ear discharge, ear pain, facial swelling, mouth sores, postnasal drip, rhinorrhea, sinus pressure, sore throat, tinnitus, trouble swallowing and voice change.    Eyes: Negative for photophobia, pain, discharge, redness, itching and visual disturbance.   Respiratory: Negative for apnea, cough, choking, chest tightness, shortness of breath, wheezing and stridor.    Cardiovascular: Negative for chest pain, palpitations and leg swelling.   Gastrointestinal: Negative for abdominal distention, abdominal pain, constipation, diarrhea, nausea and  "vomiting.   Endocrine: Negative for cold intolerance, heat intolerance, polydipsia, polyphagia and polyuria.   Genitourinary: Negative for decreased urine volume, difficulty urinating, dysuria, flank pain, frequency, hematuria and urgency.   Musculoskeletal: Negative for arthralgias, back pain, gait problem, joint swelling, myalgias, neck pain and neck stiffness.   Skin: Negative for color change, pallor, rash and wound.   Allergic/Immunologic: Negative for immunocompromised state.   Neurological: Negative for dizziness, tremors, seizures, syncope, facial asymmetry, speech difficulty, weakness, light-headedness, numbness and headaches.   Hematological: Negative for adenopathy.   Psychiatric/Behavioral: Negative for agitation, behavioral problems, confusion, decreased concentration, dysphoric mood, hallucinations, self-injury, sleep disturbance and suicidal ideas. The patient is not nervous/anxious and is not hyperactive.         Objective:   /80 (BP Location: Right arm, Patient Position: Sitting, Cuff Size: Large Adult)   Pulse 65   Ht 175.3 cm (69\")   Wt 88.8 kg (195 lb 11.2 oz)   SpO2 99%   BMI 28.90 kg/m²     Physical Exam   Constitutional: He is oriented to person, place, and time. He appears well-developed and well-nourished. He is cooperative.   HENT:   Head: Normocephalic and atraumatic.   Right Ear: External ear normal.   Left Ear: External ear normal.   Nose: Nose normal.   Mouth/Throat: Oropharynx is clear and moist. No oropharyngeal exudate.   Eyes: Pupils are equal, round, and reactive to light. Conjunctivae and EOM are normal. No scleral icterus. Right eye exhibits normal extraocular motion. Left eye exhibits normal extraocular motion.   Neck: Neck supple. No JVD present. No muscular tenderness present. No tracheal deviation, no edema and no erythema present. No thyromegaly present.   Cardiovascular: Normal rate, regular rhythm, normal heart sounds and intact distal pulses. Exam reveals no " gallop and no friction rub.   No murmur heard.  Pulmonary/Chest: Effort normal and breath sounds normal. No stridor. No respiratory distress. He has no decreased breath sounds. He has no wheezes. He has no rhonchi. He has no rales. He exhibits no tenderness.   Abdominal: Soft. Bowel sounds are normal. He exhibits no distension and no mass. There is no hepatomegaly. There is no tenderness. There is no rebound and no guarding. No hernia.   Musculoskeletal: Normal range of motion. He exhibits no edema, tenderness or deformity.   Lymphadenopathy:     He has no cervical adenopathy.   Neurological: He is alert and oriented to person, place, and time. He has normal reflexes. No cranial nerve deficit. He exhibits normal muscle tone. Coordination normal.   Skin: Skin is warm. No rash noted. No erythema. No pallor.   Psychiatric: He has a normal mood and affect. His behavior is normal. Judgment and thought content normal.   Nursing note and vitals reviewed.      Lab Review          Assessment/Plan       ICD-10-CM ICD-9-CM   1. Type 2 diabetes mellitus with hyperglycemia, without long-term current use of insulin (CMS/[facility]) E11.65 250.00     790.29   2. Mixed hyperlipidemia E78.2 272.2   3. Essential hypertension I10 401.9   4. NAFLD (nonalcoholic fatty liver disease) K76.0 571.8   5. Coronary artery disease with angina pectoris, unspecified vessel or lesion type, unspecified whether native or transplanted heart (CMS/[facility]) I25.119 414.00     413.9         I reviewed and summarized records from Andreas Martinez MD from 2019 and I reviewed / ordered labs.   From review of records :    Pt has type 2 Diabetes with CAD     Glycemic Management:   Lab Results   Component Value Date    HGBA1C 8.30 (H) 06/16/2020    HGBA1C 8.34 (H) 02/26/2020    HGBA1C 7.97 (H) 10/11/2019     Lab Results   Component Value Date    GLUCOSE 236 (H) 06/16/2020    BUN 19 06/16/2020    CREATININE 1.14 06/16/2020    EGFRIFNONA 63 06/16/2020    BCR 16.7  06/16/2020    K 4.6 06/16/2020    CO2 27.2 06/16/2020    CALCIUM 9.7 06/16/2020    ALBUMIN 4.60 06/16/2020    AST 27 06/16/2020    ALT 34 06/16/2020    ANIONGAP 8.8 06/16/2020     Lab Results   Component Value Date    WBC 6.45 06/16/2020    HGB 13.7 06/16/2020    HCT 40.4 06/16/2020    MCV 84.3 06/16/2020     06/16/2020          Synjardy XR 10/1000 mg w bkfast - couldn't tolerate -- go back to metformin alone - afraid of side effect  Taking only one tab w bkfast, may increase up to 2 tabs po bid     Trulicity 0.75 mg weekly - increase to 1.5 mg weekly - not paid for     Change to     toujeo 12 units daily CHANGE  To tresiba due to side effects, only taking 10 --- 14      and novolin R 4 units with meals ( he will increase supper to 6 units )    No alvarez monitoring        Lipid Management  Lab Results   Component Value Date    CHOL 167 06/16/2020    CHOL 159 02/26/2020    CHOL 136 10/11/2019     Lab Results   Component Value Date    TRIG 193 (H) 06/16/2020    TRIG 280 (H) 02/26/2020    TRIG 193 (H) 10/11/2019     Lab Results   Component Value Date    HDL 34 (L) 06/16/2020    HDL 30 (L) 02/26/2020    HDL 28 (L) 10/11/2019     No components found for: LDLCALC  Lab Results   Component Value Date    LDL 94 06/16/2020    LDL 73 02/26/2020    LDL 69 10/11/2019       On atorvastatin - preferred not to take    Taking red yeast rice     Try at least crestor 5 mg weekly       Blood Pressure Management:    Vitals:    08/06/20 1305   BP: 125/80   Pulse: 65   SpO2: 99%     Lab Results   Component Value Date    GLUCOSE 236 (H) 06/16/2020    CALCIUM 9.7 06/16/2020     (L) 06/16/2020    K 4.6 06/16/2020    CO2 27.2 06/16/2020    CL 98 06/16/2020    BUN 19 06/16/2020    CREATININE 1.14 06/16/2020    EGFRIFNONA 63 06/16/2020    BCR 16.7 06/16/2020    ANIONGAP 8.8 06/16/2020     Controlled on ACE I regimen           Microvascular Complication Monitoring:      Eye Exam Evaluation  Within 1 year     -----------    Last  Microalbumin-Proteinuria Assessment    Lab Results   Component Value Date    MALBCRERATIO  06/16/2020      Comment:      Unable to calculate    MALBCRERATIO  10/11/2019      Comment:      Unable to calculate    MALBCRERATIO 4.1 10/04/2018       No results found for: UTPCR    -----------      Neuropathy        Weight Related:   Wt Readings from Last 3 Encounters:   08/06/20 88.8 kg (195 lb 11.2 oz)   08/05/20 88.5 kg (195 lb)   06/15/20 88.9 kg (196 lb)     Body mass index is 28.9 kg/m².        Diet interventions: moderate (500 kCal/d) deficit diet.      Bone Health    Lab Results   Component Value Date    CALCIUM 9.7 06/16/2020    [id number] 41.2 06/21/2019       Thyroid Health    Lab Results   Component Value Date    TSH 2.430 06/16/2020    TSH 2.490 05/27/2020    TSH 2.430 10/11/2019         Other Diabetes Related Aspects       Lab Results   Component Value Date    GOUHTCKN06 792 06/16/2020     FATTY LIVER    Weight loss   Vitamin E 200 units daily - could have been considered       No orders of the defined types were placed in this encounter.        A copy of my note was sent to Andreas Martinez MD    Please see my above opinion and suggestions.

## 2020-08-08 NOTE — PROGRESS NOTES
Subjective:  Aj Card Jr. is a 74 y.o. male who presents for       Patient Active Problem List   Diagnosis   • Type 2 diabetes mellitus without complication, without long-term current use of insulin (CMS/[facility])   • Essential hypertension   • Mixed hyperlipidemia   • Generalized anxiety disorder   • History of colon polyps   • Diverticulosis of large intestine without hemorrhage   • Coronary artery disease with angina pectoris (CMS/[facility])   • Gastroesophageal reflux disease with esophagitis   • Vitamin D deficiency   • Overweight   • Encounter for screening for endocrine disorder   • Atopic dermatitis   • High serum vitamin B12   • Acute pain of right knee   • Tear of medial meniscus of right knee, current   • GERD (gastroesophageal reflux disease)   • PAF (paroxysmal atrial fibrillation) (CMS/[facility])   • Uncontrolled type 2 diabetes mellitus with hyperglycemia (CMS/[facility])           Current Outpatient Medications:   •  ALPRAZolam (XANAX) 0.5 MG tablet, Take 1 tablet by mouth At Night As Needed for Anxiety., Disp: 30 tablet, Rfl: 0  •  amLODIPine (NORVASC) 10 MG tablet, Take 1 tablet by mouth Daily., Disp: 30 tablet, Rfl: 3  •  aspirin 81 MG EC tablet, Take 2 tablets by mouth Daily., Disp: 90 tablet, Rfl: 3  •  azelastine (ASTELIN) 0.1 % nasal spray, 2 sprays into the nostril(s) as directed by provider 2 (Two) Times a Day. Use in each nostril as directed, Disp: 90 mL, Rfl: 3  •  Blood Glucose Monitoring Suppl w/Device kit, USE AS INDICATED, ANY MONITOR , ICD10 code is E11.9, Disp: 1 each, Rfl: 1  •  cholecalciferol (VITAMIN D3) 1000 units tablet, Take 1 tablet by mouth Daily., Disp: 30 tablet, Rfl: 3  •  coenzyme Q10 100 MG capsule, Take 100 mg by mouth daily., Disp: , Rfl:   •  Dulaglutide 0.75 MG/0.5ML solution pen-injector, Inject 0.75 mg under the skin into the appropriate area as directed 1 (One) Time Per Week., Disp: 4 pen, Rfl: 11  •  esomeprazole (nexIUM) 40 MG capsule, Take 1 capsule by mouth Every  "Morning Before Breakfast., Disp: 90 capsule, Rfl: 3  •  Glucose Blood (BLOOD GLUCOSE TEST) strip, Use 4 x daily use any brand covered by insurance or same brand as before ICD10 code is E11.9, Disp: 120 each, Rfl: 11  •  Insulin Degludec (Tresiba FlexTouch) 200 UNIT/ML solution pen-injector pen injection, Inject 10 Units under the skin into the appropriate area as directed. Inject up to 10 units in the morning pt doing 6 tight now, Disp: , Rfl:   •  insulin regular (HUMULIN R) 100 UNIT/ML injection, 4 units with meals, Disp: 1 each, Rfl: 12  •  Insulin Syringe 31G X 5/16\" 0.3 ML misc, 4 x daily, Disp: 120 each, Rfl: 11  •  isosorbide mononitrate (IMDUR) 30 MG 24 hr tablet, Take 30 mg by mouth Daily., Disp: , Rfl:   •  KRILL OIL OMEGA-3 PO, Take  by mouth daily., Disp: , Rfl:   •  Lancet Devices (LANCING DEVICE) misc, USE AS INDICATED TO CORRELATE WITH STRIPS AND METER, Disp: 1 each, Rfl: 1  •  Lancets 30G misc, USE 4 X DAILY, Disp: 120 each, Rfl: 11  •  lisinopril (PRINIVIL,ZESTRIL) 10 MG tablet, Take 1 tablet by mouth Daily., Disp: 30 tablet, Rfl: 3  •  LOTEMAX 0.5 % ophthalmic suspension, , Disp: , Rfl:   •  metFORMIN ER (Glucophage XR) 500 MG 24 hr tablet, 2 tabs twice daily with meals , generic ok, Disp: 120 tablet, Rfl: 11  •  metoprolol tartrate (LOPRESSOR) 50 MG tablet, Take 1 tablet by mouth 2 (Two) Times a Day., Disp: 60 tablet, Rfl: 3  •  propafenone (RYTHMOL) 150 MG tablet, Take 150 mg by mouth Every 8 (Eight) Hours., Disp: , Rfl:   •  Red Yeast Rice Extract (RED YEAST RICE PO), Take  by mouth Daily., Disp: , Rfl:   •  rivaroxaban (XARELTO) 20 MG tablet, Take 20 mg by mouth 2 (Two) Times a Day With Meals., Disp: , Rfl:   •  rosuvastatin (Crestor) 5 MG tablet, Take 1 tablet by mouth Every Night., Disp: 30 tablet, Rfl: 11  •  Saw Palmetto, Serenoa repens, (SAW PALMETTO PO), Take  by mouth daily., Disp: , Rfl:   •  sucralfate (CARAFATE) 1 g tablet, TAKE 1 TABLET BY MOUTH TWICE DAILY AS NEEDED FOR NAUSEA AND " FOR ABDOMINAL PAIN, Disp: 180 tablet, Rfl: 0  •  vitamin B-12 (CYANOCOBALAMIN) 2500 MCG sublingual tablet tablet, Place  under the tongue Every Other Day., Disp: , Rfl:     HPI        Pt is 74 yo male with history of GERD DM type 2, Vitamin B12 deficiency, HLP, HTN, ADRIENNE, CAD sp stent x 3 ,  Esophagitis, Gastritis, sp appendectomy sp cholecystectomy sp hernia repair surgery, sp hand surgery/ cataract surgery bilateral, abnormal EKG ( right ventricular dilation, LVH,  Grade 1 diastolic dysfunction, mild calcification of aortic valve)           8/5/20 in office visit for the above medical issues.  p farzaneh labwork on 61/6/20 that showed normal CBC , hga1c at 8.30 from 8.34.  CMP showedsugars at 236 with sodium at 134  GFR at 63.  Lipid panel showed triglcyerides at  280 from 193 and HDL at 34.  TSH is normal Vitamin B12 levels normal  UDS positive for benzodiazpine use.  T3 and T4 normal hepatitis panel normal. Since last viist pt had a stress test study on 6/30/20 that showed impressions consistent with low risk study. Echocardiogram on 6/5/20 showed right ventricular dilation,LVH, , grade 1 diastolic dysfunction, mild calcification of aortic valve) Also saw GI on 7/28/20 regarding his GERD/epigastric pain and diverticulosis.   His BP is elevated today. He is not sure if he is supposed to take lopressor 50 mg PO BID  Per patient he was taken off medication in April of lopressor by his Cardiologist  He was at [facility] for admission for atrial  fibrillation in June 2020.  And on discharge summary he was supposed to continue on lopressor 50 mg twice a day.   He was also discharged with dilitizem but he cannot tolerate this.   .He supposedly is taking norvasc 10 mg daily and lisinopril 10 mg daily. But not lopressor  But did not bring medications. It is also no on his medication list on chart.  He continues to take rhythmol and imdur 30 mg PO q daily  He also [facility] for[facility] foot [facility] and had liquid adhesive on  lateral portion of left foot. Do not have records here.. Also pt was given abx clindamcyin but did not take     8/17/20 in office visit for pt's above medical issues and recheck on his BP. His BP is better cotnroll he is now back to taking lopressor 50 mg PO BID and lisinopril 10 mg daily and norvasc. He is doing fine. No chest pain no dizzines. Eduar Saw Cardiologist last week  HR stable            Atrial Fibrillation   Presents for follow-up visit. Symptoms include dizziness, palpitations, shortness of breath and weakness. Symptoms are negative for an AICD problem, bradycardia, chest pain, hemodynamic instability, hypertension, hypotension, pacemaker problem and tachycardia. The symptoms have been stable. Past medical history includes atrial fibrillation. There are no medication compliance problems.   Hypertension   This is a chronic problem. The current episode started more than 1 year ago. The problem is controlled. Pertinent negatives include no anxiety, blurred vision, chest pain, headaches, malaise/fatigue, neck pain, palpitations, peripheral edema, PND, shortness of breath or sweats. Risk factors for coronary artery disease include diabetes mellitus, dyslipidemia, sedentary lifestyle and male gender. There are no compliance problems.  Hypertensive end-organ damage includes CAD/MI. There is no history of angina, kidney disease, CVA, heart failure, left ventricular hypertrophy, PVD or retinopathy. There is no history of chronic renal disease, coarctation of the aorta, hyperaldosteronism, hypercortisolism, hyperparathyroidism, a hypertension causing med, pheochromocytoma, renovascular disease, sleep apnea or a thyroid problem.   Diabetes   He presents for his follow-up diabetic visit. He has type 2 diabetes mellitus. His disease course has been stable. Hypoglycemia symptoms include nervousness/anxiousness. Pertinent negatives for hypoglycemia include no confusion, dizziness, headaches or sweats. Pertinent  negatives for diabetes include no blurred vision, no chest pain, no fatigue, no foot paresthesias, no foot ulcerations, no polydipsia, no polyphagia, no polyuria, no visual change, no weakness and no weight loss. Pertinent negatives for hypoglycemia complications include no blackouts and no hospitalization. Symptoms are stable. Pertinent negatives for diabetic complications include no CVA, impotence, PVD or retinopathy. Risk factors for coronary artery disease include diabetes mellitus, dyslipidemia, hypertension and male sex. Current diabetic treatment includes oral agent (dual therapy). He is compliant with treatment most of the time. His weight is stable. An ACE inhibitor/angiotensin II receptor blocker is being taken. He does not see a podiatrist.Eye exam is not current.   Anxiety   Presents for follow-up visit. Symptoms include excessive worry and nervous/anxious behavior. Patient reports no chest pain, compulsions, confusion, decreased concentration, depressed mood, dizziness, dry mouth, feeling of choking, hyperventilation, impotence, insomnia, irritability, malaise, muscle tension, nausea, obsessions, palpitations, panic, restlessness, shortness of breath or suicidal ideas.     Review of Systems  Review of Systems   Constitutional: Positive for activity change and fatigue. Negative for appetite change, chills, diaphoresis and fever.   HENT: Negative for congestion, postnasal drip, rhinorrhea, sinus pressure, sinus pain, sneezing, sore throat, trouble swallowing and voice change.    Respiratory: Negative for cough, choking, chest tightness, shortness of breath, wheezing and stridor.    Cardiovascular: Negative for chest pain.   Gastrointestinal: Negative for diarrhea, nausea and vomiting.   Musculoskeletal: Positive for arthralgias.   Neurological: Positive for weakness and numbness. Negative for headaches.   Psychiatric/Behavioral: The patient is nervous/anxious.        Patient Active Problem List    Diagnosis   • Type 2 diabetes mellitus without complication, without long-term current use of insulin (CMS/[facility])   • Essential hypertension   • Mixed hyperlipidemia   • Generalized anxiety disorder   • History of colon polyps   • Diverticulosis of large intestine without hemorrhage   • Coronary artery disease with angina pectoris (CMS/[facility])   • Gastroesophageal reflux disease with esophagitis   • Vitamin D deficiency   • Overweight   • Encounter for screening for endocrine disorder   • Atopic dermatitis   • High serum vitamin B12   • Acute pain of right knee   • Tear of medial meniscus of right knee, current   • GERD (gastroesophageal reflux disease)   • PAF (paroxysmal atrial fibrillation) (CMS/[facility])   • Uncontrolled type 2 diabetes mellitus with hyperglycemia (CMS/[facility])     Past Surgical History:   Procedure Laterality Date   • APPENDECTOMY       [facility] 1995       • CARDIAC CATHETERIZATION      Successful PTCA of the OMB#2.Unsuccessful PTCA of the 1st OMB, secondary to difficulty in advancing the balloon. 12/08/2015       • CHOLECYSTECTOMY      [facility] 1993       • COLONOSCOPY  10/01/2012   • COLONOSCOPY N/A 12/11/2017    Procedure: COLONOSCOPY;  Surgeon: Bladimir Michael MD;  Location: [facility] ENDOSCOPY;  Service:    • CORONARY ANGIOPLASTY      Successful PTCA & stenting LAD was done w/ deployment of a 2.5mm x23 Xience stent w/ reduction of stenosis from 90% to less than 0% stenosis with good LILLIAM 3 flow 02/17/2012       • ENDOSCOPY      with biopsy.  Mildly severe esophagitis. Gastritis. Normal examined duodenum.Several biopsies obtained in the lower third of the esophagus.Several biopsies obtained in the gastric antrum. 05/06/2016       • ENDOSCOPY      w tube. Normal esophagus. Gastritis in stomach. Biopsy taken. Gastric polyp found. Biopsy taken. Normal duodenum. 10/01/2012       • ENDOSCOPY AND COLONOSCOPY      Diverticulum in sigmoid colon & descending colon. Internal &  external hemorrhoids found. 10/01/2012       • HAND SURGERY       Corrective osteotomy of ring finger metacarpal. Malunited fracture of left ring finger 05/21/1985       • HERNIA REPAIR      Laparoscopic hernia repair. prepertitoneal bilateral inguinal hernia repair with mesh. 10/15/2009      • OTHER SURGICAL HISTORY      CORONARY ARTERY STENT    • SKIN TAG REMOVAL      Excision, viral skin tag,right upper eyelid 05/08/1995      Social History     Socioeconomic History   • Marital status:      Spouse name: Not on file   • Number of children: Not on file   • Years of education: Not on file   • Highest education level: Not on file   Tobacco Use   • Smoking status: Never Smoker   • Smokeless tobacco: Never Used   Substance and Sexual Activity   • Alcohol use: No   • Drug use: No   • Sexual activity: Defer     Family History   Problem Relation Age of Onset   • Clotting disorder Other    • Lung disease Other    • Schizophrenia Sister    • Obesity Sister    • Tuberculosis Sister    • Arthritis Mother    • Diabetes Mother    • Heart disease Mother    • Hypertension Mother    • Obesity Mother    • Stroke Mother    • Thyroid disease Mother    • Tuberculosis Mother    • Arthritis Father    • Tuberculosis Father      Hospital Outpatient Visit on 06/30/2020   Component Date Value Ref Range Status   •  CV STRESS PROTOCOL 1 06/30/2020 Pharmacologic   Final   • Stage 1 06/30/2020 1   Final   • HR Stage 1 06/30/2020 82   Final   • BP Stage 1 06/30/2020 149/77   Final   • Duration Min Stage 1 06/30/2020 0   Final   • Duration Sec Stage 1 06/30/2020 10   Final   • Stress Dose Regadenoson Stage 1 06/30/2020 0.4   Final   • Stress Comments Stage 1 06/30/2020 10 sec bolus injection   Final   • Baseline HR 06/30/2020 63  bpm Final   • Baseline BP 06/30/2020 141/81  mmHg Final   • Peak HR 06/30/2020 86  bpm Final   • Percent Max Pred HR 06/30/2020 59.31  % Final   • Percent Target HR 06/30/2020 70  % Final   • Peak BP 06/30/2020  152/78  mmHg Final   • Recovery HR 06/30/2020 80  bpm Final   • Recovery BP 06/30/2020 151/78  mmHg Final   • Target HR (85%) 06/30/2020 123  bpm Final   • Max. Pred. HR (100%) 06/30/2020 145  bpm Final   • Estimated workload 06/30/2020 1.0  METS Final   • Nuc Stress EF 06/30/2020 65  % Final   Lab on 06/16/2020   Component Date Value Ref Range Status   • THC, Screen, Urine 06/16/2020 Negative  Negative Final   • Phencyclidine (PCP), Urine 06/16/2020 Negative  Negative Final   • Cocaine Screen, Urine 06/16/2020 Negative  Negative Final   • Methamphetamine, Ur 06/16/2020 Negative  Negative Final   • Opiate Screen 06/16/2020 Negative  Negative Final   • Amphetamine Screen, Urine 06/16/2020 Negative  Negative Final   • Benzodiazepine Screen, Urine 06/16/2020 Positive* Negative Final   • Tricyclic Antidepressants Screen 06/16/2020 Negative  Negative Final   • Methadone Screen, Urine 06/16/2020 Negative  Negative Final   • Barbiturates Screen, Urine 06/16/2020 Negative  Negative Final   • Oxycodone Screen, Urine 06/16/2020 Negative  Negative Final   • Propoxyphene Screen 06/16/2020 Negative  Negative Final   • Buprenorphine, Screen, Urine 06/16/2020 Negative  Negative Final   • Free T4 06/16/2020 1.12  0.93 - 1.70 ng/dL Final   • T3, Free 06/16/2020 3.27  2.00 - 4.40 pg/mL Final   • Hepatitis B Surface Ag 06/16/2020 Non-Reactive  Non-Reactive Final   • Hep A IgM 06/16/2020 Non-Reactive  Non-Reactive Final   • Hep B C IgM 06/16/2020 Non-Reactive  Non-Reactive Final   • Hepatitis C Ab 06/16/2020 Non-Reactive  Non-Reactive Final   • Extra Tube 06/16/2020 Hold for add-ons.   Final    Auto resulted.   Admission on 06/05/2020, Discharged on 06/06/2020   Component Date Value Ref Range Status   • Troponin T 06/05/2020 <0.010  0.000 - 0.030 ng/mL Final   • Troponin T 06/05/2020 <0.010  0.000 - 0.030 ng/mL Final   • Glucose 06/05/2020 287* 65 - 99 mg/dL Final   • BUN 06/05/2020 15  8 - 23 mg/dL Final   • Creatinine 06/05/2020 0.94   0.76 - 1.27 mg/dL Final   • Sodium 06/05/2020 137  136 - 145 mmol/L Final   • Potassium 06/05/2020 4.1  3.5 - 5.2 mmol/L Final   • Chloride 06/05/2020 97* 98 - 107 mmol/L Final   • CO2 06/05/2020 25.0  22.0 - 29.0 mmol/L Final   • Calcium 06/05/2020 9.6  8.6 - 10.5 mg/dL Final   • Total Protein 06/05/2020 7.5  6.0 - 8.5 g/dL Final   • Albumin 06/05/2020 4.30  3.50 - 5.20 g/dL Final   • ALT (SGPT) 06/05/2020 29  1 - 41 U/L Final   • AST (SGOT) 06/05/2020 27  1 - 40 U/L Final   • Alkaline Phosphatase 06/05/2020 38* 39 - 117 U/L Final   • Total Bilirubin 06/05/2020 0.3  0.2 - 1.2 mg/dL Final   • eGFR Non African Amer 06/05/2020 78  >60 mL/min/1.73 Final   • Globulin 06/05/2020 3.2  gm/dL Final   • A/G Ratio 06/05/2020 1.3  g/dL Final   • BUN/Creatinine Ratio 06/05/2020 16.0  7.0 - 25.0 Final   • Anion Gap 06/05/2020 15.0  5.0 - 15.0 mmol/L Final   • proBNP 06/05/2020 171.3  5.0-1,800.0 pg/mL Final   • Color, UA 06/05/2020 Yellow  Yellow, Straw, Dark Yellow, Renay Final   • Appearance, UA 06/05/2020 Clear  Clear Final   • pH, UA 06/05/2020 6.0  5.0 - 9.0 Final   • Specific Gravity, UA 06/05/2020 1.019  1.003 - 1.030 Final   • Glucose, UA 06/05/2020 500 mg/dL (2+)* Negative Final   • Ketones, UA 06/05/2020 Negative  Negative Final   • Bilirubin, UA 06/05/2020 Negative  Negative Final   • Blood, UA 06/05/2020 Negative  Negative Final   • Protein, UA 06/05/2020 Negative  Negative Final   • Leuk Esterase, UA 06/05/2020 Negative  Negative Final   • Nitrite, UA 06/05/2020 Negative  Negative Final   • Urobilinogen, UA 06/05/2020 0.2 E.U./dL  0.2 - 1.0 E.U./dL Final   • Extra Tube 06/05/2020 hold for add-on   Final    Auto resulted   • Extra Tube 06/05/2020 Hold for add-ons.   Final    Auto resulted.   • Extra Tube 06/05/2020 hold for add-on   Final    Auto resulted   • Extra Tube 06/05/2020 Hold for add-ons.   Final    Auto resulted.   • WBC 06/05/2020 5.81  3.40 - 10.80 10*3/mm3 Final   • RBC 06/05/2020 4.81  4.14 - 5.80  10*6/mm3 Final   • Hemoglobin 06/05/2020 13.8  13.0 - 17.7 g/dL Final   • Hematocrit 06/05/2020 40.4  37.5 - 51.0 % Final   • MCV 06/05/2020 84.0  79.0 - 97.0 fL Final   • MCH 06/05/2020 28.7  26.6 - 33.0 pg Final   • MCHC 06/05/2020 34.2  31.5 - 35.7 g/dL Final   • RDW 06/05/2020 13.3  12.3 - 15.4 % Final   • RDW-SD 06/05/2020 41.3  37.0 - 54.0 fl Final   • MPV 06/05/2020 9.1  6.0 - 12.0 fL Final   • Platelets 06/05/2020 213  140 - 450 10*3/mm3 Final   • Neutrophil % 06/05/2020 61.3  42.7 - 76.0 % Final   • Lymphocyte % 06/05/2020 28.2  19.6 - 45.3 % Final   • Monocyte % 06/05/2020 6.7  5.0 - 12.0 % Final   • Eosinophil % 06/05/2020 2.4  0.3 - 6.2 % Final   • Basophil % 06/05/2020 0.7  0.0 - 1.5 % Final   • Immature Grans % 06/05/2020 0.7* 0.0 - 0.5 % Final   • Neutrophils, Absolute 06/05/2020 3.56  1.70 - 7.00 10*3/mm3 Final   • Lymphocytes, Absolute 06/05/2020 1.64  0.70 - 3.10 10*3/mm3 Final   • Monocytes, Absolute 06/05/2020 0.39  0.10 - 0.90 10*3/mm3 Final   • Eosinophils, Absolute 06/05/2020 0.14  0.00 - 0.40 10*3/mm3 Final   • Basophils, Absolute 06/05/2020 0.04  0.00 - 0.20 10*3/mm3 Final   • Immature Grans, Absolute 06/05/2020 0.04  0.00 - 0.05 10*3/mm3 Final   • nRBC 06/05/2020 0.0  0.0 - 0.2 /100 WBC Final   • Extra Tube 06/05/2020 hold for add-on   Final    Auto resulted   • BSA 06/05/2020 2.0  m^2 Final   • RVIDd 06/05/2020 2.5  cm Final   • IVSd 06/05/2020 1.2  cm Final   • LVIDd 06/05/2020 4.8  cm Final   • LVIDs 06/05/2020 3.2  cm Final   • LVPWd 06/05/2020 0.96  cm Final   • IVS/LVPW 06/05/2020 1.2   Final   • FS 06/05/2020 32.4  % Final   • EDV(Teich) 06/05/2020 106.5  ml Final   • ESV(Teich) 06/05/2020 41.9  ml Final   • EF(Teich) 06/05/2020 60.7  % Final   • EDV(cubed) 06/05/2020 109.2  ml Final   • ESV(cubed) 06/05/2020 33.7  ml Final   • EF(cubed) 06/05/2020 69.1  % Final   • LV mass(C)d 06/05/2020 183.8  grams Final   • LV mass(C)dI 06/05/2020 92.6  grams/m^2 Final   • SV(Teich) 06/05/2020  64.6  ml Final   • SI(Teich) 06/05/2020 32.5  ml/m^2 Final   • SV(cubed) 06/05/2020 75.5  ml Final   • SI(cubed) 06/05/2020 38.1  ml/m^2 Final   • Ao root diam 06/05/2020 3.9  cm Final   • Ao root area 06/05/2020 11.9  cm^2 Final   • ACS 06/05/2020 1.7  cm Final   • LA dimension 06/05/2020 3.6  cm Final   • asc Aorta Diam 06/05/2020 2.9  cm Final   • Ao Isth Diam 06/05/2020 2.7  cm Final   • LA/Ao 06/05/2020 0.92   Final   • LVOT diam 06/05/2020 2.0  cm Final   • LVOT area 06/05/2020 3.1  cm^2 Final   • LVOT area(traced) 06/05/2020 3.1  cm^2 Final   • LVLd ap4 06/05/2020 7.5  cm Final   • EDV(MOD-sp4) 06/05/2020 82.5  ml Final   • LVLs ap4 06/05/2020 6.8  cm Final   • ESV(MOD-sp4) 06/05/2020 44.4  ml Final   • EF(MOD-sp4) 06/05/2020 46.2  % Final   • LVLd ap2 06/05/2020 7.4  cm Final   • EDV(MOD-sp2) 06/05/2020 68.4  ml Final   • LVLs ap2 06/05/2020 6.3  cm Final   • ESV(MOD-sp2) 06/05/2020 32.1  ml Final   • EF(MOD-sp2) 06/05/2020 53.1  % Final   • SV(MOD-sp4) 06/05/2020 38.1  ml Final   • SI(MOD-sp4) 06/05/2020 19.2  ml/m^2 Final   • SV(MOD-sp2) 06/05/2020 36.3  ml Final   • SI(MOD-sp2) 06/05/2020 18.3  ml/m^2 Final   • Ao root area (BSA corrected) 06/05/2020 2.0   Final   • LV Velásquez Vol (BSA corrected) 06/05/2020 41.6  ml/m^2 Final   • LV Sys Vol (BSA corrected) 06/05/2020 22.4  ml/m^2 Final   • MV E max gita 06/05/2020 62.1  cm/sec Final   • MV A max gita 06/05/2020 70.3  cm/sec Final   • MV E/A 06/05/2020 0.88   Final   • MV V2 max 06/05/2020 81.8  cm/sec Final   • MV max PG 06/05/2020 2.7  mmHg Final   • MV V2 mean 06/05/2020 43.5  cm/sec Final   • MV mean PG 06/05/2020 1.0  mmHg Final   • MV V2 VTI 06/05/2020 26.5  cm Final   • MVA(VTI) 06/05/2020 2.1  cm^2 Final   • MV P1/2t max gita 06/05/2020 65.2  cm/sec Final   • MV P1/2t 06/05/2020 46.0  msec Final   • MVA(P1/2t) 06/05/2020 4.8  cm^2 Final   • MV dec slope 06/05/2020 415.0  cm/sec^2 Final   • Ao pk gita 06/05/2020 161.0  cm/sec Final   • Ao max PG 06/05/2020  10.4  mmHg Final   • Ao max PG (full) 06/05/2020 8.5  mmHg Final   • Ao V2 mean 06/05/2020 120.0  cm/sec Final   • Ao mean PG 06/05/2020 6.0  mmHg Final   • Ao mean PG (full) 06/05/2020 5.0  mmHg Final   • Ao V2 VTI 06/05/2020 42.7  cm Final   • MICHAEL(I,A) 06/05/2020 1.3  cm^2 Final   • MICHAEL(I,D) 06/05/2020 1.3  cm^2 Final   • MICHAEL(V,A) 06/05/2020 1.3  cm^2 Final   • MICHAEL(V,D) 06/05/2020 1.3  cm^2 Final   • LV V1 max PG 06/05/2020 1.8  mmHg Final   • LV V1 mean PG 06/05/2020 1.0  mmHg Final   • LV V1 max 06/05/2020 67.8  cm/sec Final   • LV V1 mean 06/05/2020 50.0  cm/sec Final   • LV V1 VTI 06/05/2020 17.8  cm Final   • MR max gita 06/05/2020 389.0  cm/sec Final   • MR max PG 06/05/2020 60.5  mmHg Final   • SV(Ao) 06/05/2020 510.1  ml Final   • SI(Ao) 06/05/2020 257.0  ml/m^2 Final   • SV(LVOT) 06/05/2020 55.9  ml Final   • SI(LVOT) 06/05/2020 28.2  ml/m^2 Final   • PA V2 max 06/05/2020 101.0  cm/sec Final   • PA max PG 06/05/2020 4.1  mmHg Final   • TR max gita 06/05/2020 191.0  cm/sec Final   • RVSP(TR) 06/05/2020 19.6  mmHg Final   • RAP systole 06/05/2020 5.0  mmHg Final   • MVA P1/2T LCG 06/05/2020 3.4  cm^2 Final   • BH CV ECHO PHOEBE - BZI_BMI 06/05/2020 26.9  kilograms/m^2 Final   • BH CV ECHO PHOEBE - BSA([place]) 06/05/2020 2.0  m^2 Final   • BH CV ECHO PHOEBE - BZI_METRIC_WEIGHT 06/05/2020 82.6  kg Final   • BH CV ECHO PHOEBE - BZI_METRIC_HEIGHT 06/05/2020 175.3  cm Final   • Target HR (85%) 06/05/2020 123  bpm Final   • Max. Pred. HR (100%) 06/05/2020 145  bpm Final   • Glucose 06/05/2020 178* 70 - 130 mg/dL Final    Sliding Scale AdminOperator: [id number] English Helperer ID: [id number]   • Glucose 06/05/2020 211* 70 - 130 mg/dL Final    Sliding Scale AdminOperator: [id number] English Helperer ID: [id number]   • Glucose 06/05/2020 149* 70 - 130 mg/dL Final    : [id number] English Helperer ID: [id number]   • Glucose 06/06/2020 247* 65 - 99 mg/dL Final   • BUN 06/06/2020 17  8 - 23 mg/dL Final   •  Creatinine 06/06/2020 1.00  0.76 - 1.27 mg/dL Final   • Sodium 06/06/2020 135* 136 - 145 mmol/L Final   • Potassium 06/06/2020 4.2  3.5 - 5.2 mmol/L Final   • Chloride 06/06/2020 98  98 - 107 mmol/L Final   • CO2 06/06/2020 26.0  22.0 - 29.0 mmol/L Final   • Calcium 06/06/2020 8.9  8.6 - 10.5 mg/dL Final   • eGFR Non African Amer 06/06/2020 73  >60 mL/min/1.73 Final   • BUN/Creatinine Ratio 06/06/2020 17.0  7.0 - 25.0 Final   • Anion Gap 06/06/2020 11.0  5.0 - 15.0 mmol/L Final   • WBC 06/06/2020 6.00  3.40 - 10.80 10*3/mm3 Final   • RBC 06/06/2020 4.30  4.14 - 5.80 10*6/mm3 Final   • Hemoglobin 06/06/2020 12.4* 13.0 - 17.7 g/dL Final   • Hematocrit 06/06/2020 36.1* 37.5 - 51.0 % Final   • MCV 06/06/2020 84.0  79.0 - 97.0 fL Final   • MCH 06/06/2020 28.8  26.6 - 33.0 pg Final   • MCHC 06/06/2020 34.3  31.5 - 35.7 g/dL Final   • RDW 06/06/2020 13.5  12.3 - 15.4 % Final   • RDW-SD 06/06/2020 41.4  37.0 - 54.0 fl Final   • MPV 06/06/2020 9.2  6.0 - 12.0 fL Final   • Platelets 06/06/2020 192  140 - 450 10*3/mm3 Final   • Neutrophil % 06/06/2020 55.6  42.7 - 76.0 % Final   • Lymphocyte % 06/06/2020 31.2  19.6 - 45.3 % Final   • Monocyte % 06/06/2020 8.2  5.0 - 12.0 % Final   • Eosinophil % 06/06/2020 3.3  0.3 - 6.2 % Final   • Basophil % 06/06/2020 0.7  0.0 - 1.5 % Final   • Immature Grans % 06/06/2020 1.0* 0.0 - 0.5 % Final   • Neutrophils, Absolute 06/06/2020 3.34  1.70 - 7.00 10*3/mm3 Final   • Lymphocytes, Absolute 06/06/2020 1.87  0.70 - 3.10 10*3/mm3 Final   • Monocytes, Absolute 06/06/2020 0.49  0.10 - 0.90 10*3/mm3 Final   • Eosinophils, Absolute 06/06/2020 0.20  0.00 - 0.40 10*3/mm3 Final   • Basophils, Absolute 06/06/2020 0.04  0.00 - 0.20 10*3/mm3 Final   • Immature Grans, Absolute 06/06/2020 0.06* 0.00 - 0.05 10*3/mm3 Final   • nRBC 06/06/2020 0.0  0.0 - 0.2 /100 WBC Final   • Glucose 06/06/2020 186* 70 - 130 mg/dL Final    Result Not ConfirmedOperator: [id number] YELE[facility] ID: [id number]   •  Glucose 06/06/2020 196* 70 - 130 mg/dL Final    Sliding Scale AdminOperator: [id number] LILLIE Ventura ID: [id number]   Lab on 05/27/2020   Component Date Value Ref Range Status   • Glucose 05/27/2020 254* 65 - 99 mg/dL Final   • BUN 05/27/2020 20  8 - 23 mg/dL Final   • Creatinine 05/27/2020 0.95  0.76 - 1.27 mg/dL Final   • Sodium 05/27/2020 135* 136 - 145 mmol/L Final   • Potassium 05/27/2020 4.5  3.5 - 5.2 mmol/L Final   • Chloride 05/27/2020 99  98 - 107 mmol/L Final   • CO2 05/27/2020 26.0  22.0 - 29.0 mmol/L Final   • Calcium 05/27/2020 9.4  8.6 - 10.5 mg/dL Final   • Total Protein 05/27/2020 7.7  6.0 - 8.5 g/dL Final   • Albumin 05/27/2020 4.60  3.50 - 5.20 g/dL Final   • ALT (SGPT) 05/27/2020 33  1 - 41 U/L Final   • AST (SGOT) 05/27/2020 27  1 - 40 U/L Final   • Alkaline Phosphatase 05/27/2020 34* 39 - 117 U/L Final   • Total Bilirubin 05/27/2020 0.4  0.2 - 1.2 mg/dL Final   • eGFR Non African Amer 05/27/2020 77  >60 mL/min/1.73 Final   • Globulin 05/27/2020 3.1  gm/dL Final   • A/G Ratio 05/27/2020 1.5  g/dL Final   • BUN/Creatinine Ratio 05/27/2020 21.1  7.0 - 25.0 Final   • Anion Gap 05/27/2020 10.0  5.0 - 15.0 mmol/L Final   • D-Dimer, Quantitative 05/27/2020 320  0 - 470 ng/mL (FEU) Final   • Troponin T 05/27/2020 <0.010  0.000 - 0.030 ng/mL Final   • TSH 05/27/2020 2.490  0.270 - 4.200 uIU/mL Final    TSH results may be falsely decreased if patient taking Biotin.   • WBC 05/27/2020 7.14  3.40 - 10.80 10*3/mm3 Final   • RBC 05/27/2020 4.78  4.14 - 5.80 10*6/mm3 Final   • Hemoglobin 05/27/2020 13.8  13.0 - 17.7 g/dL Final   • Hematocrit 05/27/2020 40.6  37.5 - 51.0 % Final   • MCV 05/27/2020 84.9  79.0 - 97.0 fL Final   • MCH 05/27/2020 28.9  26.6 - 33.0 pg Final   • MCHC 05/27/2020 34.0  31.5 - 35.7 g/dL Final   • RDW 05/27/2020 13.6  12.3 - 15.4 % Final   • RDW-SD 05/27/2020 42.2  37.0 - 54.0 fl Final   • MPV 05/27/2020 8.7  6.0 - 12.0 fL Final   • Platelets 05/27/2020 198  140 - 450 10*3/mm3  Final   • Neutrophil % 05/27/2020 68.2  42.7 - 76.0 % Final   • Lymphocyte % 05/27/2020 21.4  19.6 - 45.3 % Final   • Monocyte % 05/27/2020 6.7  5.0 - 12.0 % Final   • Eosinophil % 05/27/2020 2.5  0.3 - 6.2 % Final   • Basophil % 05/27/2020 0.6  0.0 - 1.5 % Final   • Immature Grans % 05/27/2020 0.6* 0.0 - 0.5 % Final   • Neutrophils, Absolute 05/27/2020 4.87  1.70 - 7.00 10*3/mm3 Final   • Lymphocytes, Absolute 05/27/2020 1.53  0.70 - 3.10 10*3/mm3 Final   • Monocytes, Absolute 05/27/2020 0.48  0.10 - 0.90 10*3/mm3 Final   • Eosinophils, Absolute 05/27/2020 0.18  0.00 - 0.40 10*3/mm3 Final   • Basophils, Absolute 05/27/2020 0.04  0.00 - 0.20 10*3/mm3 Final   • Immature Grans, Absolute 05/27/2020 0.04  0.00 - 0.05 10*3/mm3 Final   • nRBC 05/27/2020 0.0  0.0 - 0.2 /100 WBC Final   Office Visit on 05/08/2020   Component Date Value Ref Range Status   • WBC 06/16/2020 6.45  3.40 - 10.80 10*3/mm3 Final   • RBC 06/16/2020 4.79  4.14 - 5.80 10*6/mm3 Final   • Hemoglobin 06/16/2020 13.7  13.0 - 17.7 g/dL Final   • Hematocrit 06/16/2020 40.4  37.5 - 51.0 % Final   • MCV 06/16/2020 84.3  79.0 - 97.0 fL Final   • MCH 06/16/2020 28.6  26.6 - 33.0 pg Final   • MCHC 06/16/2020 33.9  31.5 - 35.7 g/dL Final   • RDW 06/16/2020 13.7  12.3 - 15.4 % Final   • RDW-SD 06/16/2020 41.8  37.0 - 54.0 fl Final   • MPV 06/16/2020 10.4  6.0 - 12.0 fL Final   • Platelets 06/16/2020 232  140 - 450 10*3/mm3 Final   • Neutrophil % 06/16/2020 68.2  42.7 - 76.0 % Final   • Lymphocyte % 06/16/2020 22.5  19.6 - 45.3 % Final   • Monocyte % 06/16/2020 5.7  5.0 - 12.0 % Final   • Eosinophil % 06/16/2020 2.3  0.3 - 6.2 % Final   • Basophil % 06/16/2020 0.8  0.0 - 1.5 % Final   • Immature Grans % 06/16/2020 0.5  0.0 - 0.5 % Final   • Neutrophils, Absolute 06/16/2020 4.40  1.70 - 7.00 10*3/mm3 Final   • Lymphocytes, Absolute 06/16/2020 1.45  0.70 - 3.10 10*3/mm3 Final   • Monocytes, Absolute 06/16/2020 0.37  0.10 - 0.90 10*3/mm3 Final   • Eosinophils,  Absolute 06/16/2020 0.15  0.00 - 0.40 10*3/mm3 Final   • Basophils, Absolute 06/16/2020 0.05  0.00 - 0.20 10*3/mm3 Final   • Immature Grans, Absolute 06/16/2020 0.03  0.00 - 0.05 10*3/mm3 Final   • nRBC 06/16/2020 0.0  0.0 - 0.2 /100 WBC Final   • Glucose 06/16/2020 236* 65 - 99 mg/dL Final   • BUN 06/16/2020 19  8 - 23 mg/dL Final   • Creatinine 06/16/2020 1.14  0.76 - 1.27 mg/dL Final   • Sodium 06/16/2020 134* 136 - 145 mmol/L Final   • Potassium 06/16/2020 4.6  3.5 - 5.2 mmol/L Final   • Chloride 06/16/2020 98  98 - 107 mmol/L Final   • CO2 06/16/2020 27.2  22.0 - 29.0 mmol/L Final   • Calcium 06/16/2020 9.7  8.6 - 10.5 mg/dL Final   • Total Protein 06/16/2020 7.8  6.0 - 8.5 g/dL Final   • Albumin 06/16/2020 4.60  3.50 - 5.20 g/dL Final   • ALT (SGPT) 06/16/2020 34  1 - 41 U/L Final   • AST (SGOT) 06/16/2020 27  1 - 40 U/L Final   • Alkaline Phosphatase 06/16/2020 34* 39 - 117 U/L Final   • Total Bilirubin 06/16/2020 0.6  0.2 - 1.2 mg/dL Final   • eGFR Non African Amer 06/16/2020 63  >60 mL/min/1.73 Final   • Globulin 06/16/2020 3.2  gm/dL Final   • A/G Ratio 06/16/2020 1.4  g/dL Final   • BUN/Creatinine Ratio 06/16/2020 16.7  7.0 - 25.0 Final   • Anion Gap 06/16/2020 8.8  5.0 - 15.0 mmol/L Final   • Hemoglobin A1C 06/16/2020 8.30* 4.80 - 5.60 % Final   • Total Cholesterol 06/16/2020 167  0 - 200 mg/dL Final   • Triglycerides 06/16/2020 193* 0 - 150 mg/dL Final   • HDL Cholesterol 06/16/2020 34* 40 - 60 mg/dL Final   • LDL Cholesterol  06/16/2020 94  0 - 100 mg/dL Final   • VLDL Cholesterol 06/16/2020 38.6  5 - 40 mg/dL Final   • LDL/HDL Ratio 06/16/2020 2.78   Final   • Microalbumin/Creatinine Ratio 06/16/2020    Final    Unable to calculate   • Creatinine, Urine 06/16/2020 135.1  mg/dL Final   • Microalbumin, Urine 06/16/2020 <1.2  mg/dL Final   • TSH 06/16/2020 2.430  0.270 - 4.200 uIU/mL Final   • Vitamin B-12 06/16/2020 792  211 - 946 pg/mL Final   Lab on 02/26/2020   Component Date Value Ref Range Status    • WBC 02/26/2020 5.23  3.40 - 10.80 10*3/mm3 Final   • RBC 02/26/2020 4.98  4.14 - 5.80 10*6/mm3 Final   • Hemoglobin 02/26/2020 14.5  13.0 - 17.7 g/dL Final   • Hematocrit 02/26/2020 41.0  37.5 - 51.0 % Final   • MCV 02/26/2020 82.3  79.0 - 97.0 fL Final   • MCH 02/26/2020 29.1  26.6 - 33.0 pg Final   • MCHC 02/26/2020 35.4  31.5 - 35.7 g/dL Final   • RDW 02/26/2020 14.1  12.3 - 15.4 % Final   • RDW-SD 02/26/2020 41.1  37.0 - 54.0 fl Final   • MPV 02/26/2020 10.1  6.0 - 12.0 fL Final   • Platelets 02/26/2020 216  140 - 450 10*3/mm3 Final   • Neutrophil % 02/26/2020 61.7  42.7 - 76.0 % Final   • Lymphocyte % 02/26/2020 26.8  19.6 - 45.3 % Final   • Monocyte % 02/26/2020 6.7  5.0 - 12.0 % Final   • Eosinophil % 02/26/2020 3.4  0.3 - 6.2 % Final   • Basophil % 02/26/2020 0.8  0.0 - 1.5 % Final   • Immature Grans % 02/26/2020 0.6* 0.0 - 0.5 % Final   • Neutrophils, Absolute 02/26/2020 3.23  1.70 - 7.00 10*3/mm3 Final   • Lymphocytes, Absolute 02/26/2020 1.40  0.70 - 3.10 10*3/mm3 Final   • Monocytes, Absolute 02/26/2020 0.35  0.10 - 0.90 10*3/mm3 Final   • Eosinophils, Absolute 02/26/2020 0.18  0.00 - 0.40 10*3/mm3 Final   • Basophils, Absolute 02/26/2020 0.04  0.00 - 0.20 10*3/mm3 Final   • Immature Grans, Absolute 02/26/2020 0.03  0.00 - 0.05 10*3/mm3 Final   • nRBC 02/26/2020 0.0  0.0 - 0.2 /100 WBC Final   • Glucose 02/26/2020 230* 65 - 99 mg/dL Final   • BUN 02/26/2020 16  8 - 23 mg/dL Final   • Creatinine 02/26/2020 1.06  0.76 - 1.27 mg/dL Final   • Sodium 02/26/2020 138  136 - 145 mmol/L Final   • Potassium 02/26/2020 4.8  3.5 - 5.2 mmol/L Final   • Chloride 02/26/2020 98  98 - 107 mmol/L Final   • CO2 02/26/2020 25.8  22.0 - 29.0 mmol/L Final   • Calcium 02/26/2020 9.9  8.6 - 10.5 mg/dL Final   • Total Protein 02/26/2020 7.6  6.0 - 8.5 g/dL Final   • Albumin 02/26/2020 4.60  3.50 - 5.20 g/dL Final   • ALT (SGPT) 02/26/2020 57* 1 - 41 U/L Final   • AST (SGOT) 02/26/2020 42* 1 - 40 U/L Final   • Alkaline  Phosphatase 02/26/2020 35* 39 - 117 U/L Final   • Total Bilirubin 02/26/2020 0.5  0.2 - 1.2 mg/dL Final   • eGFR Non African Amer 02/26/2020 68  >60 mL/min/1.73 Final   • Globulin 02/26/2020 3.0  gm/dL Final   • A/G Ratio 02/26/2020 1.5  g/dL Final   • BUN/Creatinine Ratio 02/26/2020 15.1  7.0 - 25.0 Final   • Anion Gap 02/26/2020 14.2  5.0 - 15.0 mmol/L Final   • Hemoglobin A1C 02/26/2020 8.34* 4.80 - 5.60 % Final   • Total Cholesterol 02/26/2020 159  0 - 200 mg/dL Final   • Triglycerides 02/26/2020 280* 0 - 150 mg/dL Final   • HDL Cholesterol 02/26/2020 30* 40 - 60 mg/dL Final   • LDL Cholesterol  02/26/2020 73  0 - 100 mg/dL Final   • VLDL Cholesterol 02/26/2020 56* 5 - 40 mg/dL Final   • LDL/HDL Ratio 02/26/2020 2.43   Final   • Vitamin B-12 02/26/2020 1,168* 211 - 946 pg/mL Final   • THC, Screen, Urine 02/26/2020 Negative  Negative Final   • Phencyclidine (PCP), Urine 02/26/2020 Negative  Negative Final   • Cocaine Screen, Urine 02/26/2020 Negative  Negative Final   • Methamphetamine, Ur 02/26/2020 Negative  Negative Final   • Opiate Screen 02/26/2020 Negative  Negative Final   • Amphetamine Screen, Urine 02/26/2020 Negative  Negative Final   • Benzodiazepine Screen, Urine 02/26/2020 Positive* Negative Final   • Tricyclic Antidepressants Screen 02/26/2020 Negative  Negative Final   • Methadone Screen, Urine 02/26/2020 Negative  Negative Final   • Barbiturates Screen, Urine 02/26/2020 Negative  Negative Final   • Oxycodone Screen, Urine 02/26/2020 Negative  Negative Final   • Propoxyphene Screen 02/26/2020 Negative  Negative Final   • Buprenorphine, Screen, Urine 02/26/2020 Negative  Negative Final      Stress Test With Myocardial Perfusion One Day  · Left ventricular ejection fraction is normal (Calculated EF = 65%).  · Myocardial perfusion imaging indicates a normal myocardial perfusion   study with no evidence of ischemia.  · Impressions are consistent with a low risk study.  · 74 year old male with chest  "pain.  · Findings consistent with a normal ECG stress test.       [unfilled]  Immunization History   Administered Date(s) Administered   • FLUARIX/FLUZONE/AFLURIA/FLULAVAL QUAD 10/31/2018, 10/31/2018   • Fluzone High Dose =>65 Years ([employer] ONLY) 11/02/2016, 10/23/2019   • Pneumococcal Conjugate 13-Valent (PCV13) 03/15/2018   • Pneumococcal Polysaccharide (PPSV23) 08/15/2005, 03/14/2017   • Pneumococcal, Unspecified 04/14/2015   • Tetanus 09/15/2015   • influenza Split 12/08/2015       The following portions of the patient's history were reviewed and updated as appropriate: allergies, current medications, past family history, past medical history, past social history, past surgical history and problem list.        Physical Exam  /60 (BP Location: Right arm, Patient Position: Sitting, Cuff Size: Large Adult)   Pulse 74   Temp 96.9 °F (36.1 °C) Comment: per screener  Ht 175.3 cm (69\")   Wt 88.5 kg (195 lb)   SpO2 98%   BMI 28.80 kg/m²     Physical Exam   Constitutional: He is oriented to person, place, and time. He appears well-developed and well-nourished.   HENT:   Head: Normocephalic and atraumatic.   Right Ear: External ear normal.   Eyes: Pupils are equal, round, and reactive to light. Conjunctivae and EOM are normal.   Neck: Normal range of motion. Neck supple.   Cardiovascular: Normal rate, regular rhythm and normal heart sounds.   No murmur heard.  Pulmonary/Chest: Effort normal and breath sounds normal. No respiratory distress.   Abdominal: Soft. Bowel sounds are normal. He exhibits no distension. There is no tenderness.   Musculoskeletal: Normal range of motion. He exhibits tenderness. He exhibits no edema or deformity.   Neurological: He is alert and oriented to person, place, and time. No cranial nerve deficit.   Skin: Skin is warm. No rash noted. He is not diaphoretic. No erythema. No pallor.   Psychiatric: He has a normal mood and affect. His behavior is normal.   Nursing note and vitals " reviewed.      Assessment/Plan    Diagnosis Plan   1. Mixed hyperlipidemia     2. Gastroesophageal reflux disease with esophagitis  esomeprazole (nexIUM) 40 MG capsule   3. Epigastric pain  esomeprazole (nexIUM) 40 MG capsule   4. Heartburn  esomeprazole (nexIUM) 40 MG capsule   5. Essential hypertension     6. Type 2 diabetes mellitus without complication, without long-term current use of insulin (CMS/[facility])     7. PAF (paroxysmal atrial fibrillation) (CMS/[facility])        -advised pt o get labowrk  -recommend diabetic eye exam   -high vitamin b12 . Supplement every other day.    -advised pt to be safe and call with any questions or concerns. All questions addressed today  -DM type 2-  Endocrinology following  Pt could   not tolerate synjardy 100/1000 mg daily. off  trulicity 0.75 mg to 1.5 mg daily. On humlulin 4 units with meals.  Gave samples of tresiba. Today.    -refilled medicaitons. Will  try jardiance 10 mg daily.  Samples given today   -HLP  -  Red yeast rice.  Will try Vascepa 1 gram PO BID  -GERD -continue nexium. Gastroenterology following   -overweight - weght loss information provided. Counseled >5 minutes BMI at 26.9   -ADRIENNE - pt is on xanax. PT prefers to stay on xanax. FRANK printed and on file refilled xanax  REF number [id number]  -hypertension -on norvasc 10 mg pO q daily on  lisinopril 10 mg PO q daily, there is conflicting reports on pt's medication. He is not taking lopressor 50 mg twice a day or cardizem  On pt's discharge summary in June 2020 at [facility] he was supposed to take lopressor 50 mg twice a day and cardizem. He cannot tolerate cardizem. Will restart on lopressor 50 mg twice a day and pt is to call his Cardiologist for an appt.   -pAF - cardiology following -currently rate controlled on cardizem 30 mg PO every 8 hours, eliquis 5 mg PO BId,  loptressor 50 mg every 12 hours,  rythmol 150 mg every 8 hours and imdur 30 mg PO q daily.   -CAD- sp stent  Cardiology following. Aspirin 81 mg  daily.  Off plavix 75 mg QOD,  Lopressor 50 mg PO BID, lisinopril 10 mg daily.  On imdur 30 mg PO q daily.  Off lipitor.  Advised pt to call regarding Cardiology   -for vitamin D deficiency check Vitamin D pills   -for pruritic rash on left leg - eucrisa ointment samples given today. If helpfdng pt will call back and let me know if helping  -recommend sleep study. Pt declined   -advised pt to be safe and call with questions and concerns  -advised to go to ER or call 911 if symptoms worrisome or severe  -advised to folllowup with referrals and Specialist   -advised pt to be safe during COVID-19 pandemic   -total time with pt >25 minutes         This document has been electronically signed by Andreas Martinez MD on August 17, 2020 14:56

## 2020-08-17 ENCOUNTER — OFFICE VISIT (OUTPATIENT)
Dept: FAMILY MEDICINE CLINIC | Facility: CLINIC | Age: 75
End: 2020-08-17

## 2020-08-17 VITALS
HEART RATE: 74 BPM | HEIGHT: 69 IN | OXYGEN SATURATION: 98 % | DIASTOLIC BLOOD PRESSURE: 60 MMHG | WEIGHT: 195 LBS | TEMPERATURE: 96.9 F | SYSTOLIC BLOOD PRESSURE: 112 MMHG | BODY MASS INDEX: 28.88 KG/M2

## 2020-08-17 DIAGNOSIS — R12 HEARTBURN: ICD-10-CM

## 2020-08-17 DIAGNOSIS — E78.2 MIXED HYPERLIPIDEMIA: Primary | ICD-10-CM

## 2020-08-17 DIAGNOSIS — I48.0 PAF (PAROXYSMAL ATRIAL FIBRILLATION) (HCC): ICD-10-CM

## 2020-08-17 DIAGNOSIS — E11.9 TYPE 2 DIABETES MELLITUS WITHOUT COMPLICATION, WITHOUT LONG-TERM CURRENT USE OF INSULIN (HCC): ICD-10-CM

## 2020-08-17 DIAGNOSIS — I10 ESSENTIAL HYPERTENSION: ICD-10-CM

## 2020-08-17 DIAGNOSIS — K21.00 GASTROESOPHAGEAL REFLUX DISEASE WITH ESOPHAGITIS: ICD-10-CM

## 2020-08-17 DIAGNOSIS — R10.13 EPIGASTRIC PAIN: ICD-10-CM

## 2020-08-17 PROCEDURE — 99214 OFFICE O/P EST MOD 30 MIN: CPT | Performed by: FAMILY MEDICINE

## 2020-08-17 RX ORDER — ESOMEPRAZOLE MAGNESIUM 40 MG/1
40 CAPSULE, DELAYED RELEASE ORAL
Qty: 90 CAPSULE | Refills: 3 | Status: SHIPPED | OUTPATIENT
Start: 2020-08-17 | End: 2021-01-26

## 2020-10-05 ENCOUNTER — LAB (OUTPATIENT)
Dept: LAB | Facility: HOSPITAL | Age: 75
End: 2020-10-05

## 2020-10-05 LAB
ALBUMIN SERPL-MCNC: 4.6 G/DL (ref 3.5–5.2)
ALBUMIN UR-MCNC: <1.2 MG/DL
ALBUMIN/GLOB SERPL: 1.4 G/DL
ALP SERPL-CCNC: 39 U/L (ref 39–117)
ALT SERPL W P-5'-P-CCNC: 35 U/L (ref 1–41)
ANION GAP SERPL CALCULATED.3IONS-SCNC: 12.3 MMOL/L (ref 5–15)
AST SERPL-CCNC: 21 U/L (ref 1–40)
BASOPHILS # BLD AUTO: 0.05 10*3/MM3 (ref 0–0.2)
BASOPHILS NFR BLD AUTO: 0.8 % (ref 0–1.5)
BILIRUB SERPL-MCNC: 0.4 MG/DL (ref 0–1.2)
BUN SERPL-MCNC: 17 MG/DL (ref 8–23)
BUN/CREAT SERPL: 17.9 (ref 7–25)
CALCIUM SPEC-SCNC: 9.7 MG/DL (ref 8.6–10.5)
CHLORIDE SERPL-SCNC: 98 MMOL/L (ref 98–107)
CHOLEST SERPL-MCNC: 167 MG/DL (ref 0–200)
CO2 SERPL-SCNC: 25.7 MMOL/L (ref 22–29)
CREAT SERPL-MCNC: 0.95 MG/DL (ref 0.76–1.27)
CREAT UR-MCNC: 72.7 MG/DL
DEPRECATED RDW RBC AUTO: 41.4 FL (ref 37–54)
EOSINOPHIL # BLD AUTO: 0.16 10*3/MM3 (ref 0–0.4)
EOSINOPHIL NFR BLD AUTO: 2.7 % (ref 0.3–6.2)
ERYTHROCYTE [DISTWIDTH] IN BLOOD BY AUTOMATED COUNT: 13.9 % (ref 12.3–15.4)
GFR SERPL CREATININE-BSD FRML MDRD: 77 ML/MIN/1.73
GLOBULIN UR ELPH-MCNC: 3.3 GM/DL
GLUCOSE SERPL-MCNC: 225 MG/DL (ref 65–99)
HBA1C MFR BLD: 6.9 % (ref 4.8–5.6)
HCT VFR BLD AUTO: 43.1 % (ref 37.5–51)
HDLC SERPL-MCNC: 35 MG/DL (ref 40–60)
HGB BLD-MCNC: 14.7 G/DL (ref 13–17.7)
IMM GRANULOCYTES # BLD AUTO: 0.05 10*3/MM3 (ref 0–0.05)
IMM GRANULOCYTES NFR BLD AUTO: 0.8 % (ref 0–0.5)
LDLC SERPL CALC-MCNC: 62 MG/DL (ref 0–100)
LDLC/HDLC SERPL: 1.77 {RATIO}
LYMPHOCYTES # BLD AUTO: 1.39 10*3/MM3 (ref 0.7–3.1)
LYMPHOCYTES NFR BLD AUTO: 23.3 % (ref 19.6–45.3)
MCH RBC QN AUTO: 28.2 PG (ref 26.6–33)
MCHC RBC AUTO-ENTMCNC: 34.1 G/DL (ref 31.5–35.7)
MCV RBC AUTO: 82.7 FL (ref 79–97)
MICROALBUMIN/CREAT UR: NORMAL MG/G{CREAT}
MONOCYTES # BLD AUTO: 0.38 10*3/MM3 (ref 0.1–0.9)
MONOCYTES NFR BLD AUTO: 6.4 % (ref 5–12)
NEUTROPHILS NFR BLD AUTO: 3.94 10*3/MM3 (ref 1.7–7)
NEUTROPHILS NFR BLD AUTO: 66 % (ref 42.7–76)
NRBC BLD AUTO-RTO: 0 /100 WBC (ref 0–0.2)
PLATELET # BLD AUTO: 223 10*3/MM3 (ref 140–450)
PMV BLD AUTO: 10.2 FL (ref 6–12)
POTASSIUM SERPL-SCNC: 4.4 MMOL/L (ref 3.5–5.2)
PROT SERPL-MCNC: 7.9 G/DL (ref 6–8.5)
RBC # BLD AUTO: 5.21 10*6/MM3 (ref 4.14–5.8)
SODIUM SERPL-SCNC: 136 MMOL/L (ref 136–145)
TRIGL SERPL-MCNC: 350 MG/DL (ref 0–150)
TSH SERPL DL<=0.05 MIU/L-ACNC: 1.92 UIU/ML (ref 0.27–4.2)
VIT B12 BLD-MCNC: 719 PG/ML (ref 211–946)
VLDLC SERPL-MCNC: 70 MG/DL (ref 5–40)
WBC # BLD AUTO: 5.97 10*3/MM3 (ref 3.4–10.8)

## 2020-10-05 PROCEDURE — 80053 COMPREHEN METABOLIC PANEL: CPT | Performed by: INTERNAL MEDICINE

## 2020-10-05 PROCEDURE — 82570 ASSAY OF URINE CREATININE: CPT | Performed by: INTERNAL MEDICINE

## 2020-10-05 PROCEDURE — 80061 LIPID PANEL: CPT | Performed by: INTERNAL MEDICINE

## 2020-10-05 PROCEDURE — 82043 UR ALBUMIN QUANTITATIVE: CPT | Performed by: INTERNAL MEDICINE

## 2020-10-05 PROCEDURE — 85025 COMPLETE CBC W/AUTO DIFF WBC: CPT | Performed by: INTERNAL MEDICINE

## 2020-10-05 PROCEDURE — 82607 VITAMIN B-12: CPT | Performed by: INTERNAL MEDICINE

## 2020-10-05 PROCEDURE — 83036 HEMOGLOBIN GLYCOSYLATED A1C: CPT | Performed by: INTERNAL MEDICINE

## 2020-10-05 PROCEDURE — 84443 ASSAY THYROID STIM HORMONE: CPT | Performed by: INTERNAL MEDICINE

## 2020-10-12 ENCOUNTER — OFFICE VISIT (OUTPATIENT)
Dept: FAMILY MEDICINE CLINIC | Facility: CLINIC | Age: 75
End: 2020-10-12

## 2020-10-12 VITALS
SYSTOLIC BLOOD PRESSURE: 122 MMHG | HEART RATE: 68 BPM | HEIGHT: 69 IN | DIASTOLIC BLOOD PRESSURE: 62 MMHG | BODY MASS INDEX: 29.62 KG/M2 | WEIGHT: 200 LBS | OXYGEN SATURATION: 99 % | TEMPERATURE: 96.9 F

## 2020-10-12 DIAGNOSIS — Z00.00 MEDICARE ANNUAL WELLNESS VISIT, SUBSEQUENT: Primary | ICD-10-CM

## 2020-10-12 PROCEDURE — G0439 PPPS, SUBSEQ VISIT: HCPCS | Performed by: FAMILY MEDICINE

## 2020-10-12 NOTE — PATIENT INSTRUCTIONS
Medicare Wellness  Personal Prevention Plan of Service     Date of Office Visit:  10/12/2020  Encounter Provider:  Andreas Martinez MD  Place of Service:  [facility]  Patient Name: Aj Card Jr.  :  1945    As part of the Medicare Wellness portion of your visit today, we are providing you with this personalized preventive plan of services (PPPS). This plan is based upon recommendations of the [place] Preventive Services Task Force (USPSTF) and the Advisory Committee on Immunization Practices (ACIP).    This lists the preventive care services that should be considered, and provides dates of when you are due. Items listed as completed are up-to-date and do not require any further intervention.    Health Maintenance   Topic Date Due   • ZOSTER VACCINE (1 of 2) 1994   • ANNUAL WELLNESS VISIT  2020   • INFLUENZA VACCINE  2020   • HEMOGLOBIN A1C  2021   • DIABETIC EYE EXAM  2021   • LIPID PANEL  10/05/2021   • URINE MICROALBUMIN  10/05/2021   • COLONOSCOPY  2022   • TDAP/TD VACCINES (2 - Td) 09/15/2025   • HEPATITIS C SCREENING  Completed   • Pneumococcal Vaccine 65+  Completed       No orders of the defined types were placed in this encounter.      Return in about 1 year (around 10/12/2021) for Medicare Wellness.

## 2020-11-02 NOTE — PROGRESS NOTES
Subjective:  Aj Card Jr. is a 75 y.o. male who presents for DM type 2        Patient Active Problem List   Diagnosis   • Type 2 diabetes mellitus without complication, without long-term current use of insulin (CMS/[facility])   • Essential hypertension   • Mixed hyperlipidemia   • Generalized anxiety disorder   • History of colon polyps   • Diverticulosis of large intestine without hemorrhage   • Coronary artery disease with angina pectoris (CMS/[facility])   • Gastroesophageal reflux disease with esophagitis   • Vitamin D deficiency   • Overweight   • Encounter for screening for endocrine disorder   • Atopic dermatitis   • High serum vitamin B12   • Acute pain of right knee   • Tear of medial meniscus of right knee, current   • GERD (gastroesophageal reflux disease)   • PAF (paroxysmal atrial fibrillation) (CMS/[facility])   • Uncontrolled type 2 diabetes mellitus with hyperglycemia (CMS/[facility])           Current Outpatient Medications:   •  aspirin 81 MG EC tablet, Take 2 tablets by mouth Daily., Disp: 90 tablet, Rfl: 3  •  Blood Glucose Monitoring Suppl w/Device kit, USE AS INDICATED, ANY MONITOR , ICD10 code is E11.9, Disp: 1 each, Rfl: 1  •  cholecalciferol (VITAMIN D3) 1000 units tablet, Take 1 tablet by mouth Daily., Disp: 30 tablet, Rfl: 3  •  coenzyme Q10 100 MG capsule, Take 100 mg by mouth daily., Disp: , Rfl:   •  Dulaglutide 1.5 MG/0.5ML solution pen-injector, Inject 1.5 mg under the skin into the appropriate area as directed 1 (One) Time Per Week., Disp: 4 pen, Rfl: 11  •  esomeprazole (nexIUM) 40 MG capsule, Take 1 capsule by mouth Every Morning Before Breakfast., Disp: 90 capsule, Rfl: 3  •  Glucose Blood (BLOOD GLUCOSE TEST) strip, Use 4 x daily use any brand covered by insurance or same brand as before ICD10 code is E11.9, Disp: 120 each, Rfl: 11  •  Insulin Degludec (Tresiba FlexTouch) 200 UNIT/ML solution pen-injector pen injection, Inject 10 Units under the skin into the appropriate area as directed.  "Inject up to 10 units in the morning pt doing 6 tight now, Disp: , Rfl:   •  insulin regular (HUMULIN R) 100 UNIT/ML injection, 4 units with meals, Disp: 1 each, Rfl: 12  •  Insulin Syringe 31G X 5/16\" 0.3 ML misc, 4 x daily, Disp: 120 each, Rfl: 11  •  isosorbide mononitrate (IMDUR) 30 MG 24 hr tablet, Take 30 mg by mouth Daily., Disp: , Rfl:   •  KRILL OIL OMEGA-3 PO, Take  by mouth daily., Disp: , Rfl:   •  Lancet Devices (LANCING DEVICE) misc, USE AS INDICATED TO CORRELATE WITH STRIPS AND METER, Disp: 1 each, Rfl: 1  •  Lancets 30G misc, USE 4 X DAILY, Disp: 120 each, Rfl: 11  •  lisinopril (PRINIVIL,ZESTRIL) 10 MG tablet, Take 1 tablet by mouth Daily for 30 days., Disp: 30 tablet, Rfl: 0  •  LOTEMAX 0.5 % ophthalmic suspension, , Disp: , Rfl:   •  propafenone (RYTHMOL) 150 MG tablet, Take 150 mg by mouth Every 8 (Eight) Hours., Disp: , Rfl:   •  Red Yeast Rice Extract (RED YEAST RICE PO), Take  by mouth Daily., Disp: , Rfl:   •  rivaroxaban (XARELTO) 20 MG tablet, Take 20 mg by mouth 2 (Two) Times a Day With Meals., Disp: , Rfl:   •  Saw Palmetto, Serenoa repens, (SAW PALMETTO PO), Take  by mouth daily., Disp: , Rfl:   •  sucralfate (CARAFATE) 1 g tablet, TAKE 1 TABLET BY MOUTH TWICE DAILY AS NEEDED FOR NAUSEA AND FOR ABDOMINAL PAIN, Disp: 180 tablet, Rfl: 0  •  vitamin B-12 (CYANOCOBALAMIN) 2500 MCG sublingual tablet tablet, Place  under the tongue Every Other Day., Disp: , Rfl:   •  ALPRAZolam (XANAX) 0.5 MG tablet, Take 1 tablet by mouth At Night As Needed for Anxiety., Disp: 30 tablet, Rfl: 0  •  amLODIPine (NORVASC) 10 MG tablet, Take 1 tablet by mouth Daily., Disp: 30 tablet, Rfl: 3  •  azelastine (ASTELIN) 0.1 % nasal spray, 2 sprays into the nostril(s) as directed by provider 2 (Two) Times a Day. Use in each nostril as directed, Disp: 90 mL, Rfl: 3  •  Icosapent Ethyl 0.5 g capsule, Take 2 capsules by mouth 2 (two) times a day., Disp: 180 capsule, Rfl: 3  •  Insulin Pen Needle 30G X 8 MM misc, Use 3-4 " times daily., Disp: 100 each, Rfl: 3  •  lisinopril (PRINIVIL,ZESTRIL) 10 MG tablet, Take 1 tablet by mouth Daily., Disp: 30 tablet, Rfl: 3  •  metoprolol tartrate (LOPRESSOR) 50 MG tablet, Take 1 tablet by mouth 2 (Two) Times a Day., Disp: 60 tablet, Rfl: 3    HPI        Pt is 74 yo male with history of GERD DM type 2, Vitamin B12 deficiency, HLP, HTN, ADRIENNE, CAD sp stent x 3 ,  Esophagitis, Gastritis, sp appendectomy sp cholecystectomy sp hernia repair surgery, sp hand surgery/ cataract surgery bilateral, abnormal EKG ( right ventricular dilation, LVH,  Grade 1 diastolic dysfunction, mild calcification of aortic valve)  CKD stage 2          8/17/20 in office visit for pt's above medical issues and recheck on his BP. His BP is better cotnroll he is now back to taking lopressor 50 mg PO BID and lisinopril 10 mg daily and norvasc. He is doing fine. No chest pain no dizzines. Eduar Saw Cardiologist last week  HR stable     11/16/20 in office visit for recheck on pt's above medical issues. Pt recently saw Endocrinology on 11/9/20 for his DM type 2. He is no novolon R 20-25 with meals along with tresiba 25 units and trulicity 0.75 mg weekly. His hga1c was at 6.90. lipid panel shows triglycerides at 250 with HDL at 35 and LDL at 62. BP and HR stable. No chest pain no dizziness.             Atrial Fibrillation   Presents for follow-up visit. Symptoms include dizziness, palpitations, shortness of breath and weakness. Symptoms are negative for an AICD problem, bradycardia, chest pain, hemodynamic instability, hypertension, hypotension, pacemaker problem and tachycardia. The symptoms have been stable. Past medical history includes atrial fibrillation. There are no medication compliance problems.   Hypertension   This is a chronic problem. The current episode started more than 1 year ago. The problem is controlled. Pertinent negatives include no anxiety, blurred vision, chest pain, headaches, malaise/fatigue, neck  pain, palpitations, peripheral edema, PND, shortness of breath or sweats. Risk factors for coronary artery disease include diabetes mellitus, dyslipidemia, sedentary lifestyle and male gender. There are no compliance problems.  Hypertensive end-organ damage includes CAD/MI. There is no history of angina, kidney disease, CVA, heart failure, left ventricular hypertrophy, PVD or retinopathy. There is no history of chronic renal disease, coarctation of the aorta, hyperaldosteronism, hypercortisolism, hyperparathyroidism, a hypertension causing med, pheochromocytoma, renovascular disease, sleep apnea or a thyroid problem.   Diabetes   He presents for his follow-up diabetic visit. He has type 2 diabetes mellitus. His disease course has been stable. Hypoglycemia symptoms include nervousness/anxiousness. Pertinent negatives for hypoglycemia include no confusion, dizziness, headaches or sweats. Pertinent negatives for diabetes include no blurred vision, no chest pain, no fatigue, no foot paresthesias, no foot ulcerations, no polydipsia, no polyphagia, no polyuria, no visual change, no weakness and no weight loss. Pertinent negatives for hypoglycemia complications include no blackouts and no hospitalization. Symptoms are stable. Pertinent negatives for diabetic complications include no CVA, impotence, PVD or retinopathy. Risk factors for coronary artery disease include diabetes mellitus, dyslipidemia, hypertension and male sex. Current diabetic treatment includes oral agent (dual therapy). He is compliant with treatment most of the time. His weight is stable. An ACE inhibitor/angiotensin II receptor blocker is being taken. He does not see a podiatrist.Eye exam is not current.   Anxiety   Presents for follow-up visit. Symptoms include excessive worry and nervous/anxious behavior. Patient reports no chest pain, compulsions, confusion, decreased concentration, depressed mood, dizziness, dry mouth, feeling of  choking, hyperventilation, impotence, insomnia, irritability, malaise, muscle tension, nausea, obsessions, palpitations, panic, restlessness, shortness of breath or suicidal ideas.        Review of Systems  Review of Systems   Constitutional: Positive for activity change and fatigue. Negative for appetite change, chills, diaphoresis and fever.   HENT: Negative for congestion, postnasal drip, rhinorrhea, sinus pressure, sinus pain, sneezing, sore throat, trouble swallowing and voice change.    Respiratory: Negative for cough, choking, chest tightness, shortness of breath, wheezing and stridor.    Cardiovascular: Negative for chest pain.   Gastrointestinal: Negative for diarrhea, nausea and vomiting.   Musculoskeletal: Positive for arthralgias.   Neurological: Positive for weakness and numbness. Negative for headaches.   Psychiatric/Behavioral: The patient is nervous/anxious.        Patient Active Problem List   Diagnosis   • Type 2 diabetes mellitus without complication, without long-term current use of insulin (CMS/[facility])   • Essential hypertension   • Mixed hyperlipidemia   • Generalized anxiety disorder   • History of colon polyps   • Diverticulosis of large intestine without hemorrhage   • Coronary artery disease with angina pectoris (CMS/[facility])   • Gastroesophageal reflux disease with esophagitis   • Vitamin D deficiency   • Overweight   • Encounter for screening for endocrine disorder   • Atopic dermatitis   • High serum vitamin B12   • Acute pain of right knee   • Tear of medial meniscus of right knee, current   • GERD (gastroesophageal reflux disease)   • PAF (paroxysmal atrial fibrillation) (CMS/[facility])   • Uncontrolled type 2 diabetes mellitus with hyperglycemia (CMS/[facility])     Past Surgical History:   Procedure Laterality Date   • APPENDECTOMY       [facility] 1995       • CARDIAC CATHETERIZATION      Successful PTCA of the OMB#2.Unsuccessful PTCA of the 1st OMB, secondary to difficulty in advancing  the balloon. 12/08/2015       • CHOLECYSTECTOMY      [facility] 1993       • COLONOSCOPY  10/01/2012   • COLONOSCOPY N/A 12/11/2017    Procedure: COLONOSCOPY;  Surgeon: Bladimir Michael MD;  Location: [facility] ENDOSCOPY;  Service:    • CORONARY ANGIOPLASTY      Successful PTCA & stenting LAD was done w/ deployment of a 2.5mm x23 Xience stent w/ reduction of stenosis from 90% to less than 0% stenosis with good LILLIAM 3 flow 02/17/2012       • ENDOSCOPY      with biopsy.  Mildly severe esophagitis. Gastritis. Normal examined duodenum.Several biopsies obtained in the lower third of the esophagus.Several biopsies obtained in the gastric antrum. 05/06/2016       • ENDOSCOPY      w tube. Normal esophagus. Gastritis in stomach. Biopsy taken. Gastric polyp found. Biopsy taken. Normal duodenum. 10/01/2012       • ENDOSCOPY AND COLONOSCOPY      Diverticulum in sigmoid colon & descending colon. Internal & external hemorrhoids found. 10/01/2012       • HAND SURGERY       Corrective osteotomy of ring finger metacarpal. Malunited fracture of left ring finger 05/21/1985       • HERNIA REPAIR      Laparoscopic hernia repair. prepertitoneal bilateral inguinal hernia repair with mesh. 10/15/2009      • OTHER SURGICAL HISTORY      CORONARY ARTERY STENT    • SKIN TAG REMOVAL      Excision, viral skin tag,right upper eyelid 05/08/1995      Social History     Socioeconomic History   • Marital status:      Spouse name: Not on file   • Number of children: Not on file   • Years of education: Not on file   • Highest education level: Not on file   Tobacco Use   • Smoking status: Never Smoker   • Smokeless tobacco: Never Used   Substance and Sexual Activity   • Alcohol use: No   • Drug use: No   • Sexual activity: Defer     Family History   Problem Relation Age of Onset   • Clotting disorder Other    • Lung disease Other    • Schizophrenia Sister    • Obesity Sister    • Tuberculosis Sister    • Arthritis Mother    • Diabetes  Mother    • Heart disease Mother    • Hypertension Mother    • Obesity Mother    • Stroke Mother    • Thyroid disease Mother    • Tuberculosis Mother    • Arthritis Father    • Tuberculosis Father      Office Visit on 08/06/2020   Component Date Value Ref Range Status   • WBC 10/05/2020 5.97  3.40 - 10.80 10*3/mm3 Final   • RBC 10/05/2020 5.21  4.14 - 5.80 10*6/mm3 Final   • Hemoglobin 10/05/2020 14.7  13.0 - 17.7 g/dL Final   • Hematocrit 10/05/2020 43.1  37.5 - 51.0 % Final   • MCV 10/05/2020 82.7  79.0 - 97.0 fL Final   • MCH 10/05/2020 28.2  26.6 - 33.0 pg Final   • MCHC 10/05/2020 34.1  31.5 - 35.7 g/dL Final   • RDW 10/05/2020 13.9  12.3 - 15.4 % Final   • RDW-SD 10/05/2020 41.4  37.0 - 54.0 fl Final   • MPV 10/05/2020 10.2  6.0 - 12.0 fL Final   • Platelets 10/05/2020 223  140 - 450 10*3/mm3 Final   • Neutrophil % 10/05/2020 66.0  42.7 - 76.0 % Final   • Lymphocyte % 10/05/2020 23.3  19.6 - 45.3 % Final   • Monocyte % 10/05/2020 6.4  5.0 - 12.0 % Final   • Eosinophil % 10/05/2020 2.7  0.3 - 6.2 % Final   • Basophil % 10/05/2020 0.8  0.0 - 1.5 % Final   • Immature Grans % 10/05/2020 0.8* 0.0 - 0.5 % Final   • Neutrophils, Absolute 10/05/2020 3.94  1.70 - 7.00 10*3/mm3 Final   • Lymphocytes, Absolute 10/05/2020 1.39  0.70 - 3.10 10*3/mm3 Final   • Monocytes, Absolute 10/05/2020 0.38  0.10 - 0.90 10*3/mm3 Final   • Eosinophils, Absolute 10/05/2020 0.16  0.00 - 0.40 10*3/mm3 Final   • Basophils, Absolute 10/05/2020 0.05  0.00 - 0.20 10*3/mm3 Final   • Immature Grans, Absolute 10/05/2020 0.05  0.00 - 0.05 10*3/mm3 Final   • nRBC 10/05/2020 0.0  0.0 - 0.2 /100 WBC Final   • Glucose 10/05/2020 225* 65 - 99 mg/dL Final   • BUN 10/05/2020 17  8 - 23 mg/dL Final   • Creatinine 10/05/2020 0.95  0.76 - 1.27 mg/dL Final   • Sodium 10/05/2020 136  136 - 145 mmol/L Final   • Potassium 10/05/2020 4.4  3.5 - 5.2 mmol/L Final   • Chloride 10/05/2020 98  98 - 107 mmol/L Final   • CO2 10/05/2020 25.7  22.0 - 29.0 mmol/L Final    • Calcium 10/05/2020 9.7  8.6 - 10.5 mg/dL Final   • Total Protein 10/05/2020 7.9  6.0 - 8.5 g/dL Final   • Albumin 10/05/2020 4.60  3.50 - 5.20 g/dL Final   • ALT (SGPT) 10/05/2020 35  1 - 41 U/L Final   • AST (SGOT) 10/05/2020 21  1 - 40 U/L Final   • Alkaline Phosphatase 10/05/2020 39  39 - 117 U/L Final   • Total Bilirubin 10/05/2020 0.4  0.0 - 1.2 mg/dL Final   • eGFR Non African Amer 10/05/2020 77  >60 mL/min/1.73 Final   • Globulin 10/05/2020 3.3  gm/dL Final   • A/G Ratio 10/05/2020 1.4  g/dL Final   • BUN/Creatinine Ratio 10/05/2020 17.9  7.0 - 25.0 Final   • Anion Gap 10/05/2020 12.3  5.0 - 15.0 mmol/L Final   • Hemoglobin A1C 10/05/2020 6.90* 4.80 - 5.60 % Final   • Total Cholesterol 10/05/2020 167  0 - 200 mg/dL Final   • Triglycerides 10/05/2020 350* 0 - 150 mg/dL Final   • HDL Cholesterol 10/05/2020 35* 40 - 60 mg/dL Final   • LDL Cholesterol  10/05/2020 62  0 - 100 mg/dL Final   • VLDL Cholesterol 10/05/2020 70* 5 - 40 mg/dL Final   • LDL/HDL Ratio 10/05/2020 1.77   Final   • Microalbumin/Creatinine Ratio 10/05/2020    Final    Unable to calculate   • Creatinine, Urine 10/05/2020 72.7  mg/dL Final   • Microalbumin, Urine 10/05/2020 <1.2  mg/dL Final   • TSH 10/05/2020 1.920  0.270 - 4.200 uIU/mL Final   • Vitamin B-12 10/05/2020 719  211 - 946 pg/mL Final   Hospital Outpatient Visit on 06/30/2020   Component Date Value Ref Range Status   •  CV STRESS PROTOCOL 1 06/30/2020 Pharmacologic   Final   • Stage 1 06/30/2020 1   Final   • HR Stage 1 06/30/2020 82   Final   • BP Stage 1 06/30/2020 149/77   Final   • Duration Min Stage 1 06/30/2020 0   Final   • Duration Sec Stage 1 06/30/2020 10   Final   • Stress Dose Regadenoson Stage 1 06/30/2020 0.4   Final   • Stress Comments Stage 1 06/30/2020 10 sec bolus injection   Final   • Baseline HR 06/30/2020 63  bpm Final   • Baseline BP 06/30/2020 141/81  mmHg Final   • Peak HR 06/30/2020 86  bpm Final   • Percent Max Pred HR 06/30/2020 59.31  % Final   •  Percent Target HR 06/30/2020 70  % Final   • Peak BP 06/30/2020 152/78  mmHg Final   • Recovery HR 06/30/2020 80  bpm Final   • Recovery BP 06/30/2020 151/78  mmHg Final   • Target HR (85%) 06/30/2020 123  bpm Final   • Max. Pred. HR (100%) 06/30/2020 145  bpm Final   • Estimated workload 06/30/2020 1.0  METS Final   • Nuc Stress EF 06/30/2020 65  % Final   Lab on 06/16/2020   Component Date Value Ref Range Status   • THC, Screen, Urine 06/16/2020 Negative  Negative Final   • Phencyclidine (PCP), Urine 06/16/2020 Negative  Negative Final   • Cocaine Screen, Urine 06/16/2020 Negative  Negative Final   • Methamphetamine, Ur 06/16/2020 Negative  Negative Final   • Opiate Screen 06/16/2020 Negative  Negative Final   • Amphetamine Screen, Urine 06/16/2020 Negative  Negative Final   • Benzodiazepine Screen, Urine 06/16/2020 Positive* Negative Final   • Tricyclic Antidepressants Screen 06/16/2020 Negative  Negative Final   • Methadone Screen, Urine 06/16/2020 Negative  Negative Final   • Barbiturates Screen, Urine 06/16/2020 Negative  Negative Final   • Oxycodone Screen, Urine 06/16/2020 Negative  Negative Final   • Propoxyphene Screen 06/16/2020 Negative  Negative Final   • Buprenorphine, Screen, Urine 06/16/2020 Negative  Negative Final   • Free T4 06/16/2020 1.12  0.93 - 1.70 ng/dL Final   • T3, Free 06/16/2020 3.27  2.00 - 4.40 pg/mL Final   • Hepatitis B Surface Ag 06/16/2020 Non-Reactive  Non-Reactive Final   • Hep A IgM 06/16/2020 Non-Reactive  Non-Reactive Final   • Hep B C IgM 06/16/2020 Non-Reactive  Non-Reactive Final   • Hepatitis C Ab 06/16/2020 Non-Reactive  Non-Reactive Final   • Extra Tube 06/16/2020 Hold for add-ons.   Final    Auto resulted.   Admission on 06/05/2020, Discharged on 06/06/2020   Component Date Value Ref Range Status   • Troponin T 06/05/2020 <0.010  0.000 - 0.030 ng/mL Final   • Troponin T 06/05/2020 <0.010  0.000 - 0.030 ng/mL Final   • Glucose 06/05/2020 287* 65 - 99 mg/dL Final   • BUN  06/05/2020 15  8 - 23 mg/dL Final   • Creatinine 06/05/2020 0.94  0.76 - 1.27 mg/dL Final   • Sodium 06/05/2020 137  136 - 145 mmol/L Final   • Potassium 06/05/2020 4.1  3.5 - 5.2 mmol/L Final   • Chloride 06/05/2020 97* 98 - 107 mmol/L Final   • CO2 06/05/2020 25.0  22.0 - 29.0 mmol/L Final   • Calcium 06/05/2020 9.6  8.6 - 10.5 mg/dL Final   • Total Protein 06/05/2020 7.5  6.0 - 8.5 g/dL Final   • Albumin 06/05/2020 4.30  3.50 - 5.20 g/dL Final   • ALT (SGPT) 06/05/2020 29  1 - 41 U/L Final   • AST (SGOT) 06/05/2020 27  1 - 40 U/L Final   • Alkaline Phosphatase 06/05/2020 38* 39 - 117 U/L Final   • Total Bilirubin 06/05/2020 0.3  0.2 - 1.2 mg/dL Final   • eGFR Non African Amer 06/05/2020 78  >60 mL/min/1.73 Final   • Globulin 06/05/2020 3.2  gm/dL Final   • A/G Ratio 06/05/2020 1.3  g/dL Final   • BUN/Creatinine Ratio 06/05/2020 16.0  7.0 - 25.0 Final   • Anion Gap 06/05/2020 15.0  5.0 - 15.0 mmol/L Final   • proBNP 06/05/2020 171.3  5.0-1,800.0 pg/mL Final   • Color, UA 06/05/2020 Yellow  Yellow, Straw, Dark Yellow, Renay Final   • Appearance, UA 06/05/2020 Clear  Clear Final   • pH, UA 06/05/2020 6.0  5.0 - 9.0 Final   • Specific Gravity, UA 06/05/2020 1.019  1.003 - 1.030 Final   • Glucose, UA 06/05/2020 500 mg/dL (2+)* Negative Final   • Ketones, UA 06/05/2020 Negative  Negative Final   • Bilirubin, UA 06/05/2020 Negative  Negative Final   • Blood, UA 06/05/2020 Negative  Negative Final   • Protein, UA 06/05/2020 Negative  Negative Final   • Leuk Esterase, UA 06/05/2020 Negative  Negative Final   • Nitrite, UA 06/05/2020 Negative  Negative Final   • Urobilinogen, UA 06/05/2020 0.2 E.U./dL  0.2 - 1.0 E.U./dL Final   • Extra Tube 06/05/2020 hold for add-on   Final    Auto resulted   • Extra Tube 06/05/2020 Hold for add-ons.   Final    Auto resulted.   • Extra Tube 06/05/2020 hold for add-on   Final    Auto resulted   • Extra Tube 06/05/2020 Hold for add-ons.   Final    Auto resulted.   • WBC 06/05/2020 5.81   3.40 - 10.80 10*3/mm3 Final   • RBC 06/05/2020 4.81  4.14 - 5.80 10*6/mm3 Final   • Hemoglobin 06/05/2020 13.8  13.0 - 17.7 g/dL Final   • Hematocrit 06/05/2020 40.4  37.5 - 51.0 % Final   • MCV 06/05/2020 84.0  79.0 - 97.0 fL Final   • MCH 06/05/2020 28.7  26.6 - 33.0 pg Final   • MCHC 06/05/2020 34.2  31.5 - 35.7 g/dL Final   • RDW 06/05/2020 13.3  12.3 - 15.4 % Final   • RDW-SD 06/05/2020 41.3  37.0 - 54.0 fl Final   • MPV 06/05/2020 9.1  6.0 - 12.0 fL Final   • Platelets 06/05/2020 213  140 - 450 10*3/mm3 Final   • Neutrophil % 06/05/2020 61.3  42.7 - 76.0 % Final   • Lymphocyte % 06/05/2020 28.2  19.6 - 45.3 % Final   • Monocyte % 06/05/2020 6.7  5.0 - 12.0 % Final   • Eosinophil % 06/05/2020 2.4  0.3 - 6.2 % Final   • Basophil % 06/05/2020 0.7  0.0 - 1.5 % Final   • Immature Grans % 06/05/2020 0.7* 0.0 - 0.5 % Final   • Neutrophils, Absolute 06/05/2020 3.56  1.70 - 7.00 10*3/mm3 Final   • Lymphocytes, Absolute 06/05/2020 1.64  0.70 - 3.10 10*3/mm3 Final   • Monocytes, Absolute 06/05/2020 0.39  0.10 - 0.90 10*3/mm3 Final   • Eosinophils, Absolute 06/05/2020 0.14  0.00 - 0.40 10*3/mm3 Final   • Basophils, Absolute 06/05/2020 0.04  0.00 - 0.20 10*3/mm3 Final   • Immature Grans, Absolute 06/05/2020 0.04  0.00 - 0.05 10*3/mm3 Final   • nRBC 06/05/2020 0.0  0.0 - 0.2 /100 WBC Final   • Extra Tube 06/05/2020 hold for add-on   Final    Auto resulted   • BSA 06/05/2020 2.0  m^2 Final   • RVIDd 06/05/2020 2.5  cm Final   • IVSd 06/05/2020 1.2  cm Final   • LVIDd 06/05/2020 4.8  cm Final   • LVIDs 06/05/2020 3.2  cm Final   • LVPWd 06/05/2020 0.96  cm Final   • IVS/LVPW 06/05/2020 1.2   Final   • FS 06/05/2020 32.4  % Final   • EDV(Teich) 06/05/2020 106.5  ml Final   • ESV(Teich) 06/05/2020 41.9  ml Final   • EF(Teich) 06/05/2020 60.7  % Final   • EDV(cubed) 06/05/2020 109.2  ml Final   • ESV(cubed) 06/05/2020 33.7  ml Final   • EF(cubed) 06/05/2020 69.1  % Final   • LV mass(C)d 06/05/2020 183.8  grams Final   • LV  mass(C)dI 06/05/2020 92.6  grams/m^2 Final   • SV(Teich) 06/05/2020 64.6  ml Final   • SI(Teich) 06/05/2020 32.5  ml/m^2 Final   • SV(cubed) 06/05/2020 75.5  ml Final   • SI(cubed) 06/05/2020 38.1  ml/m^2 Final   • Ao root diam 06/05/2020 3.9  cm Final   • Ao root area 06/05/2020 11.9  cm^2 Final   • ACS 06/05/2020 1.7  cm Final   • LA dimension 06/05/2020 3.6  cm Final   • asc Aorta Diam 06/05/2020 2.9  cm Final   • Ao Isth Diam 06/05/2020 2.7  cm Final   • LA/Ao 06/05/2020 0.92   Final   • LVOT diam 06/05/2020 2.0  cm Final   • LVOT area 06/05/2020 3.1  cm^2 Final   • LVOT area(traced) 06/05/2020 3.1  cm^2 Final   • LVLd ap4 06/05/2020 7.5  cm Final   • EDV(MOD-sp4) 06/05/2020 82.5  ml Final   • LVLs ap4 06/05/2020 6.8  cm Final   • ESV(MOD-sp4) 06/05/2020 44.4  ml Final   • EF(MOD-sp4) 06/05/2020 46.2  % Final   • LVLd ap2 06/05/2020 7.4  cm Final   • EDV(MOD-sp2) 06/05/2020 68.4  ml Final   • LVLs ap2 06/05/2020 6.3  cm Final   • ESV(MOD-sp2) 06/05/2020 32.1  ml Final   • EF(MOD-sp2) 06/05/2020 53.1  % Final   • SV(MOD-sp4) 06/05/2020 38.1  ml Final   • SI(MOD-sp4) 06/05/2020 19.2  ml/m^2 Final   • SV(MOD-sp2) 06/05/2020 36.3  ml Final   • SI(MOD-sp2) 06/05/2020 18.3  ml/m^2 Final   • Ao root area (BSA corrected) 06/05/2020 2.0   Final   • LV Velásquez Vol (BSA corrected) 06/05/2020 41.6  ml/m^2 Final   • LV Sys Vol (BSA corrected) 06/05/2020 22.4  ml/m^2 Final   • MV E max gita 06/05/2020 62.1  cm/sec Final   • MV A max gita 06/05/2020 70.3  cm/sec Final   • MV E/A 06/05/2020 0.88   Final   • MV V2 max 06/05/2020 81.8  cm/sec Final   • MV max PG 06/05/2020 2.7  mmHg Final   • MV V2 mean 06/05/2020 43.5  cm/sec Final   • MV mean PG 06/05/2020 1.0  mmHg Final   • MV V2 VTI 06/05/2020 26.5  cm Final   • MVA(VTI) 06/05/2020 2.1  cm^2 Final   • MV P1/2t max gita 06/05/2020 65.2  cm/sec Final   • MV P1/2t 06/05/2020 46.0  msec Final   • MVA(P1/2t) 06/05/2020 4.8  cm^2 Final   • MV dec slope 06/05/2020 415.0  cm/sec^2 Final    • Ao pk gita 06/05/2020 161.0  cm/sec Final   • Ao max PG 06/05/2020 10.4  mmHg Final   • Ao max PG (full) 06/05/2020 8.5  mmHg Final   • Ao V2 mean 06/05/2020 120.0  cm/sec Final   • Ao mean PG 06/05/2020 6.0  mmHg Final   • Ao mean PG (full) 06/05/2020 5.0  mmHg Final   • Ao V2 VTI 06/05/2020 42.7  cm Final   • MICHAEL(I,A) 06/05/2020 1.3  cm^2 Final   • MICHAEL(I,D) 06/05/2020 1.3  cm^2 Final   • MICHAEL(V,A) 06/05/2020 1.3  cm^2 Final   • MICHAEL(V,D) 06/05/2020 1.3  cm^2 Final   • LV V1 max PG 06/05/2020 1.8  mmHg Final   • LV V1 mean PG 06/05/2020 1.0  mmHg Final   • LV V1 max 06/05/2020 67.8  cm/sec Final   • LV V1 mean 06/05/2020 50.0  cm/sec Final   • LV V1 VTI 06/05/2020 17.8  cm Final   • MR max gita 06/05/2020 389.0  cm/sec Final   • MR max PG 06/05/2020 60.5  mmHg Final   • SV(Ao) 06/05/2020 510.1  ml Final   • SI(Ao) 06/05/2020 257.0  ml/m^2 Final   • SV(LVOT) 06/05/2020 55.9  ml Final   • SI(LVOT) 06/05/2020 28.2  ml/m^2 Final   • PA V2 max 06/05/2020 101.0  cm/sec Final   • PA max PG 06/05/2020 4.1  mmHg Final   • TR max gita 06/05/2020 191.0  cm/sec Final   • RVSP(TR) 06/05/2020 19.6  mmHg Final   • RAP systole 06/05/2020 5.0  mmHg Final   • MVA P1/2T LCG 06/05/2020 3.4  cm^2 Final   • BH CV ECHO PHOEBE - BZI_BMI 06/05/2020 26.9  kilograms/m^2 Final   • BH CV ECHO PHOEBE - BSA([place]) 06/05/2020 2.0  m^2 Final   • BH CV ECHO PHOEBE - BZI_METRIC_WEIGHT 06/05/2020 82.6  kg Final   • BH CV ECHO PHOEBE - BZI_METRIC_HEIGHT 06/05/2020 175.3  cm Final   • Target HR (85%) 06/05/2020 123  bpm Final   • Max. Pred. HR (100%) 06/05/2020 145  bpm Final   • Glucose 06/05/2020 178* 70 - 130 mg/dL Final    Sliding Scale AdminOperator: [id number] LILLIE Ventura ID: [id number]   • Glucose 06/05/2020 211* 70 - 130 mg/dL Final    Sliding Scale AdminOperator: [id number] LILLIE Ventura ID: [id number]   • Glucose 06/05/2020 149* 70 - 130 mg/dL Final    : [id number] LILLIE Ventura ID: [id number]   • Glucose 06/06/2020 247*  65 - 99 mg/dL Final   • BUN 06/06/2020 17  8 - 23 mg/dL Final   • Creatinine 06/06/2020 1.00  0.76 - 1.27 mg/dL Final   • Sodium 06/06/2020 135* 136 - 145 mmol/L Final   • Potassium 06/06/2020 4.2  3.5 - 5.2 mmol/L Final   • Chloride 06/06/2020 98  98 - 107 mmol/L Final   • CO2 06/06/2020 26.0  22.0 - 29.0 mmol/L Final   • Calcium 06/06/2020 8.9  8.6 - 10.5 mg/dL Final   • eGFR Non African Amer 06/06/2020 73  >60 mL/min/1.73 Final   • BUN/Creatinine Ratio 06/06/2020 17.0  7.0 - 25.0 Final   • Anion Gap 06/06/2020 11.0  5.0 - 15.0 mmol/L Final   • WBC 06/06/2020 6.00  3.40 - 10.80 10*3/mm3 Final   • RBC 06/06/2020 4.30  4.14 - 5.80 10*6/mm3 Final   • Hemoglobin 06/06/2020 12.4* 13.0 - 17.7 g/dL Final   • Hematocrit 06/06/2020 36.1* 37.5 - 51.0 % Final   • MCV 06/06/2020 84.0  79.0 - 97.0 fL Final   • MCH 06/06/2020 28.8  26.6 - 33.0 pg Final   • MCHC 06/06/2020 34.3  31.5 - 35.7 g/dL Final   • RDW 06/06/2020 13.5  12.3 - 15.4 % Final   • RDW-SD 06/06/2020 41.4  37.0 - 54.0 fl Final   • MPV 06/06/2020 9.2  6.0 - 12.0 fL Final   • Platelets 06/06/2020 192  140 - 450 10*3/mm3 Final   • Neutrophil % 06/06/2020 55.6  42.7 - 76.0 % Final   • Lymphocyte % 06/06/2020 31.2  19.6 - 45.3 % Final   • Monocyte % 06/06/2020 8.2  5.0 - 12.0 % Final   • Eosinophil % 06/06/2020 3.3  0.3 - 6.2 % Final   • Basophil % 06/06/2020 0.7  0.0 - 1.5 % Final   • Immature Grans % 06/06/2020 1.0* 0.0 - 0.5 % Final   • Neutrophils, Absolute 06/06/2020 3.34  1.70 - 7.00 10*3/mm3 Final   • Lymphocytes, Absolute 06/06/2020 1.87  0.70 - 3.10 10*3/mm3 Final   • Monocytes, Absolute 06/06/2020 0.49  0.10 - 0.90 10*3/mm3 Final   • Eosinophils, Absolute 06/06/2020 0.20  0.00 - 0.40 10*3/mm3 Final   • Basophils, Absolute 06/06/2020 0.04  0.00 - 0.20 10*3/mm3 Final   • Immature Grans, Absolute 06/06/2020 0.06* 0.00 - 0.05 10*3/mm3 Final   • nRBC 06/06/2020 0.0  0.0 - 0.2 /100 WBC Final   • Glucose 06/06/2020 186* 70 - 130 mg/dL Final    Result Not  ConfirmedOperator: [id number] YELENA BANDAMeter ID: [id number]   • Glucose 06/06/2020 196* 70 - 130 mg/dL Final    Sliding Scale AdminOperator: [id number] LILLIE Ventura ID: [id number]   Lab on 05/27/2020   Component Date Value Ref Range Status   • Glucose 05/27/2020 254* 65 - 99 mg/dL Final   • BUN 05/27/2020 20  8 - 23 mg/dL Final   • Creatinine 05/27/2020 0.95  0.76 - 1.27 mg/dL Final   • Sodium 05/27/2020 135* 136 - 145 mmol/L Final   • Potassium 05/27/2020 4.5  3.5 - 5.2 mmol/L Final   • Chloride 05/27/2020 99  98 - 107 mmol/L Final   • CO2 05/27/2020 26.0  22.0 - 29.0 mmol/L Final   • Calcium 05/27/2020 9.4  8.6 - 10.5 mg/dL Final   • Total Protein 05/27/2020 7.7  6.0 - 8.5 g/dL Final   • Albumin 05/27/2020 4.60  3.50 - 5.20 g/dL Final   • ALT (SGPT) 05/27/2020 33  1 - 41 U/L Final   • AST (SGOT) 05/27/2020 27  1 - 40 U/L Final   • Alkaline Phosphatase 05/27/2020 34* 39 - 117 U/L Final   • Total Bilirubin 05/27/2020 0.4  0.2 - 1.2 mg/dL Final   • eGFR Non African Amer 05/27/2020 77  >60 mL/min/1.73 Final   • Globulin 05/27/2020 3.1  gm/dL Final   • A/G Ratio 05/27/2020 1.5  g/dL Final   • BUN/Creatinine Ratio 05/27/2020 21.1  7.0 - 25.0 Final   • Anion Gap 05/27/2020 10.0  5.0 - 15.0 mmol/L Final   • D-Dimer, Quantitative 05/27/2020 320  0 - 470 ng/mL (FEU) Final   • Troponin T 05/27/2020 <0.010  0.000 - 0.030 ng/mL Final   • TSH 05/27/2020 2.490  0.270 - 4.200 uIU/mL Final    TSH results may be falsely decreased if patient taking Biotin.   • WBC 05/27/2020 7.14  3.40 - 10.80 10*3/mm3 Final   • RBC 05/27/2020 4.78  4.14 - 5.80 10*6/mm3 Final   • Hemoglobin 05/27/2020 13.8  13.0 - 17.7 g/dL Final   • Hematocrit 05/27/2020 40.6  37.5 - 51.0 % Final   • MCV 05/27/2020 84.9  79.0 - 97.0 fL Final   • MCH 05/27/2020 28.9  26.6 - 33.0 pg Final   • MCHC 05/27/2020 34.0  31.5 - 35.7 g/dL Final   • RDW 05/27/2020 13.6  12.3 - 15.4 % Final   • RDW-SD 05/27/2020 42.2  37.0 - 54.0 fl Final   • MPV 05/27/2020 8.7   6.0 - 12.0 fL Final   • Platelets 05/27/2020 198  140 - 450 10*3/mm3 Final   • Neutrophil % 05/27/2020 68.2  42.7 - 76.0 % Final   • Lymphocyte % 05/27/2020 21.4  19.6 - 45.3 % Final   • Monocyte % 05/27/2020 6.7  5.0 - 12.0 % Final   • Eosinophil % 05/27/2020 2.5  0.3 - 6.2 % Final   • Basophil % 05/27/2020 0.6  0.0 - 1.5 % Final   • Immature Grans % 05/27/2020 0.6* 0.0 - 0.5 % Final   • Neutrophils, Absolute 05/27/2020 4.87  1.70 - 7.00 10*3/mm3 Final   • Lymphocytes, Absolute 05/27/2020 1.53  0.70 - 3.10 10*3/mm3 Final   • Monocytes, Absolute 05/27/2020 0.48  0.10 - 0.90 10*3/mm3 Final   • Eosinophils, Absolute 05/27/2020 0.18  0.00 - 0.40 10*3/mm3 Final   • Basophils, Absolute 05/27/2020 0.04  0.00 - 0.20 10*3/mm3 Final   • Immature Grans, Absolute 05/27/2020 0.04  0.00 - 0.05 10*3/mm3 Final   • nRBC 05/27/2020 0.0  0.0 - 0.2 /100 WBC Final      Stress Test With Myocardial Perfusion One Day  · Left ventricular ejection fraction is normal (Calculated EF = 65%).  · Myocardial perfusion imaging indicates a normal myocardial perfusion   study with no evidence of ischemia.  · Impressions are consistent with a low risk study.  · 74 year old male with chest pain.  · Findings consistent with a normal ECG stress test.       [unfilled]  Immunization History   Administered Date(s) Administered   • Flulaval/Fluarix/Fluzone Quad 10/31/2018, 10/31/2018   • Fluzone High Dose =>65 Years ([employer] ONLY) 11/02/2016, 10/23/2019   • Pneumococcal Conjugate 13-Valent (PCV13) 03/15/2018   • Pneumococcal Polysaccharide (PPSV23) 08/15/2005, 03/14/2017   • Pneumococcal, Unspecified 04/14/2015   • Tetanus 09/15/2015   • influenza Split 12/08/2015       The following portions of the patient's history were reviewed and updated as appropriate: allergies, current medications, past family history, past medical history, past social history, past surgical history and problem list.        Physical Exam  /58 (BP Location: Left arm,  "Patient Position: Sitting, Cuff Size: Large Adult)   Pulse 64   Temp 97.6 °F (36.4 °C)   Ht 175.3 cm (69\")   Wt 91.4 kg (201 lb 6.4 oz)   SpO2 99%   BMI 29.74 kg/m²     Physical Exam  Vitals signs and nursing note reviewed.   Constitutional:       Appearance: He is well-developed. He is not diaphoretic.   HENT:      Head: Normocephalic and atraumatic.      Right Ear: External ear normal.   Eyes:      Conjunctiva/sclera: Conjunctivae normal.      Pupils: Pupils are equal, round, and reactive to light.   Neck:      Musculoskeletal: Normal range of motion and neck supple.   Cardiovascular:      Rate and Rhythm: Normal rate and regular rhythm.      Heart sounds: Normal heart sounds. No murmur.   Pulmonary:      Effort: Pulmonary effort is normal. No respiratory distress.      Breath sounds: Normal breath sounds.   Abdominal:      General: Bowel sounds are normal. There is no distension.      Palpations: Abdomen is soft.      Tenderness: There is no abdominal tenderness.   Musculoskeletal: Normal range of motion.         General: Tenderness present. No deformity.   Skin:     General: Skin is warm.      Coloration: Skin is not pale.      Findings: No erythema or rash.   Neurological:      Mental Status: He is alert and oriented to person, place, and time.      Cranial Nerves: No cranial nerve deficit.   Psychiatric:         Behavior: Behavior normal.         Assessment/Plan    Diagnosis Plan   1. Uncontrolled type 2 diabetes mellitus with hyperglycemia (CMS/[facility])     2. PAF (paroxysmal atrial fibrillation) (CMS/[facility])     3. Generalized anxiety disorder  ALPRAZolam (XANAX) 0.5 MG tablet   4. Mixed hyperlipidemia  ALPRAZolam (XANAX) 0.5 MG tablet   5. Essential hypertension  ALPRAZolam (XANAX) 0.5 MG tablet   6. Vitamin D deficiency  ALPRAZolam (XANAX) 0.5 MG tablet   7. Overweight  ALPRAZolam (XANAX) 0.5 MG tablet   8. Coronary artery disease with angina pectoris, unspecified vessel or lesion type, unspecified whether " native or transplanted heart (CMS/[facility])  ALPRAZolam (XANAX) 0.5 MG tablet   9. Stage 2 chronic kidney disease          -recommend labowrk   -recommend diabetic eye exam   -recommend influenza vaccination   -high vitamin b12 . Supplement every other day.    -CKD stage 2 - gave information. Continue to monitor   -advised pt to be safe and call with any questions or concerns. All questions addressed today  -DM type 2-  Endocrinology following  Pt could   not tolerate synjardy 100/1000 mg daily. on trulicity 0.75 m. On novoloin R 20-25  units with meals.  Tresiba 25 untis at bedtime   -refilled medicaitons. Will  try jardiance 10 mg daily.  Samples given today   -HLP  -  Red yeast rice.  Will try Vascepa 1 gram PO BID  -GERD -continue nexium. Gastroenterology following   -overweight - weght loss information provided. Counseled >5 minutes BMI at 29.74  -ADRIENNE - pt is on xanax. PT prefers to stay on xanax. FRANK printed and on file refilled xanax  REF number [id number]  -hypertension -on norvasc 10 mg pO q daily on  lisinopril 10 mg PO q daily, there is conflicting reports on pt's medication. He is not taking lopressor 50 mg twice a day or cardizem  On pt's discharge summary in June 2020 at [facility] he was supposed to take lopressor 50 mg twice a day and cardizem. He cannot tolerate cardizem. Will restart on lopressor 50 mg twice a day and pt is to call his Cardiologist for an appt.   -pAF - cardiology following -currently rate controlled on cardizem 30 mg PO every 8 hours, eliquis 5 mg PO BId,  loptressor 50 mg every 12 hours,  rythmol 150 mg every 8 hours and imdur 30 mg PO q daily.   -CAD- sp stent  Cardiology following. Aspirin 81 mg daily.  Off plavix 75 mg QOD,  Lopressor 50 mg PO BID, lisinopril 10 mg daily.  On imdur 30 mg PO q daily.  Off lipitor.  Advised pt to call regarding Cardiology   -for vitamin D deficiency check Vitamin D pills   -for pruritic rash on left leg - eucrisa ointment samples given today. If helpfdng  pt will call back and let me know if helping  -recommend sleep study. Pt declined   -advised pt to be safe and call with questions and concerns  -advised to go to ER or call 911 if symptoms worrisome or severe  -advised to folllowup with referrals and Specialist   -advised pt to be safe during COVID-19 pandemic   -total time with pt >25 minutes   -recheck in 3 months         This document has been electronically signed by Andreas Martinez MD on November 16, 2020 14:16 CST

## 2020-11-09 ENCOUNTER — OFFICE VISIT (OUTPATIENT)
Dept: ENDOCRINOLOGY | Facility: CLINIC | Age: 75
End: 2020-11-09

## 2020-11-09 VITALS
OXYGEN SATURATION: 98 % | HEART RATE: 63 BPM | DIASTOLIC BLOOD PRESSURE: 70 MMHG | WEIGHT: 201.2 LBS | SYSTOLIC BLOOD PRESSURE: 122 MMHG | HEIGHT: 69 IN | BODY MASS INDEX: 29.8 KG/M2

## 2020-11-09 DIAGNOSIS — E11.65 TYPE 2 DIABETES MELLITUS WITH HYPERGLYCEMIA, WITHOUT LONG-TERM CURRENT USE OF INSULIN (HCC): Primary | ICD-10-CM

## 2020-11-09 DIAGNOSIS — E55.9 VITAMIN D DEFICIENCY: ICD-10-CM

## 2020-11-09 DIAGNOSIS — I10 ESSENTIAL HYPERTENSION: ICD-10-CM

## 2020-11-09 DIAGNOSIS — E78.2 MIXED HYPERLIPIDEMIA: ICD-10-CM

## 2020-11-09 PROCEDURE — 99214 OFFICE O/P EST MOD 30 MIN: CPT | Performed by: INTERNAL MEDICINE

## 2020-11-09 NOTE — PROGRESS NOTES
"Aj Card Jr. is a 75 y.o. male who presents for  evaluation of   Chief Complaint   Patient presents with   • Follow-up     3 mo jaziel type 2      IN OFFICE VISIT    Referring provider   Primary Care Provider    Andreas Martinez MD    Duration since age 71 years old     Timing - Diabetes is Constant    Quality -  Financial aid     Severity -  moderate    Complications - CAD     Current symptoms/problems  none     Alleviating Factors: Compliance       Side Effects  none    Current diet  in general, a \"healthy\" diet      Current exercise walking    Current monitoring regimen: home blood tests - checking 2x daily   Home blood sugar records: elevated fasting     Hypoglycemia none    Past Medical History:   Diagnosis Date   • Acute posthemorrhagic anemia    • Allergic rhinitis    • Anxiety state    • Chest pain    • Coronary arteriosclerosis    • Diabetes mellitus (CMS/HCC)     type 2   • Diverticular disease of colon    • Dysphagia    • Epigastric pain    • GERD (gastroesophageal reflux disease)     esophagitis on Dexilant. Pt feels Nexium working better      • History of echocardiogram     Small left atrial enlargement with mild CLVH.Ef of 55-60%.Mitral valve mildly thickened.Av thickened.Left ventricle mildly dilated.Tricuspid intact.Mild aortic insufficiency. 12/07/2015       • History of echocardiogram     Mild diastolic dysfunction and mild left atrial enlargement, otherwise normal echo. EF> 55% 01/03/2012       • Hypercholesterolemia    • Hypertension    • Insomnia    • Irritable bowel syndrome    • Osteoarthritis of multiple joints      Family History   Problem Relation Age of Onset   • Clotting disorder Other    • Lung disease Other    • Schizophrenia Sister    • Obesity Sister    • Tuberculosis Sister    • Arthritis Mother    • Diabetes Mother    • Heart disease Mother    • Hypertension Mother    • Obesity Mother    • Stroke Mother    • Thyroid disease Mother    • Tuberculosis Mother    • Arthritis " "Father    • Tuberculosis Father      Social History     Tobacco Use   • Smoking status: Never Smoker   • Smokeless tobacco: Never Used   Substance Use Topics   • Alcohol use: No   • Drug use: No         Current Outpatient Medications:   •  ALPRAZolam (XANAX) 0.5 MG tablet, Take 1 tablet by mouth At Night As Needed for Anxiety., Disp: 30 tablet, Rfl: 0  •  amLODIPine (NORVASC) 10 MG tablet, Take 1 tablet by mouth Daily., Disp: 30 tablet, Rfl: 3  •  aspirin 81 MG EC tablet, Take 2 tablets by mouth Daily., Disp: 90 tablet, Rfl: 3  •  azelastine (ASTELIN) 0.1 % nasal spray, 2 sprays into the nostril(s) as directed by provider 2 (Two) Times a Day. Use in each nostril as directed, Disp: 90 mL, Rfl: 3  •  Blood Glucose Monitoring Suppl w/Device kit, USE AS INDICATED, ANY MONITOR , ICD10 code is E11.9, Disp: 1 each, Rfl: 1  •  cholecalciferol (VITAMIN D3) 1000 units tablet, Take 1 tablet by mouth Daily., Disp: 30 tablet, Rfl: 3  •  coenzyme Q10 100 MG capsule, Take 100 mg by mouth daily., Disp: , Rfl:   •  Dulaglutide 0.75 MG/0.5ML solution pen-injector, Inject 0.75 mg under the skin into the appropriate area as directed 1 (One) Time Per Week., Disp: 4 pen, Rfl: 11  •  esomeprazole (nexIUM) 40 MG capsule, Take 1 capsule by mouth Every Morning Before Breakfast., Disp: 90 capsule, Rfl: 3  •  Glucose Blood (BLOOD GLUCOSE TEST) strip, Use 4 x daily use any brand covered by insurance or same brand as before ICD10 code is E11.9, Disp: 120 each, Rfl: 11  •  Insulin Degludec (Tresiba FlexTouch) 200 UNIT/ML solution pen-injector pen injection, Inject 10 Units under the skin into the appropriate area as directed. Inject up to 10 units in the morning pt doing 6 tight now, Disp: , Rfl:   •  insulin regular (HUMULIN R) 100 UNIT/ML injection, 4 units with meals, Disp: 1 each, Rfl: 12  •  Insulin Syringe 31G X 5/16\" 0.3 ML misc, 4 x daily, Disp: 120 each, Rfl: 11  •  isosorbide mononitrate (IMDUR) 30 MG 24 hr tablet, Take 30 mg by mouth " Daily., Disp: , Rfl:   •  KRILL OIL OMEGA-3 PO, Take  by mouth daily., Disp: , Rfl:   •  Lancet Devices (LANCING DEVICE) misc, USE AS INDICATED TO CORRELATE WITH STRIPS AND METER, Disp: 1 each, Rfl: 1  •  Lancets 30G misc, USE 4 X DAILY, Disp: 120 each, Rfl: 11  •  lisinopril (PRINIVIL,ZESTRIL) 10 MG tablet, Take 1 tablet by mouth Daily., Disp: 30 tablet, Rfl: 3  •  LOTEMAX 0.5 % ophthalmic suspension, , Disp: , Rfl:   •  metFORMIN ER (Glucophage XR) 500 MG 24 hr tablet, 2 tabs twice daily with meals , generic ok, Disp: 120 tablet, Rfl: 11  •  metoprolol tartrate (LOPRESSOR) 50 MG tablet, Take 1 tablet by mouth 2 (Two) Times a Day., Disp: 60 tablet, Rfl: 3  •  propafenone (RYTHMOL) 150 MG tablet, Take 150 mg by mouth Every 8 (Eight) Hours., Disp: , Rfl:   •  Red Yeast Rice Extract (RED YEAST RICE PO), Take  by mouth Daily., Disp: , Rfl:   •  rivaroxaban (XARELTO) 20 MG tablet, Take 20 mg by mouth 2 (Two) Times a Day With Meals., Disp: , Rfl:   •  rosuvastatin (Crestor) 5 MG tablet, Take 1 tablet by mouth Every Night., Disp: 30 tablet, Rfl: 11  •  Saw Palmetto, Serenoa repens, (SAW PALMETTO PO), Take  by mouth daily., Disp: , Rfl:   •  sucralfate (CARAFATE) 1 g tablet, TAKE 1 TABLET BY MOUTH TWICE DAILY AS NEEDED FOR NAUSEA AND FOR ABDOMINAL PAIN, Disp: 180 tablet, Rfl: 0  •  vitamin B-12 (CYANOCOBALAMIN) 2500 MCG sublingual tablet tablet, Place  under the tongue Every Other Day., Disp: , Rfl:     Review of Systems    Review of Systems   Constitutional: Positive for fatigue. Negative for activity change, appetite change, chills, diaphoresis, fever and unexpected weight change.   HENT: Negative for congestion, dental problem, drooling, ear discharge, ear pain, facial swelling, mouth sores, postnasal drip, rhinorrhea, sinus pressure, sore throat, tinnitus, trouble swallowing and voice change.    Eyes: Negative for photophobia, pain, discharge, redness, itching and visual disturbance.   Respiratory: Negative for apnea,  "cough, choking, chest tightness, shortness of breath, wheezing and stridor.    Cardiovascular: Negative for chest pain, palpitations and leg swelling.   Gastrointestinal: Negative for abdominal distention, abdominal pain, constipation, diarrhea, nausea and vomiting.   Endocrine: Negative for cold intolerance, heat intolerance, polydipsia, polyphagia and polyuria.   Genitourinary: Negative for decreased urine volume, difficulty urinating, dysuria, flank pain, frequency, hematuria and urgency.   Musculoskeletal: Negative for arthralgias, back pain, gait problem, joint swelling, myalgias, neck pain and neck stiffness.   Skin: Negative for color change, pallor, rash and wound.   Allergic/Immunologic: Negative for immunocompromised state.   Neurological: Negative for dizziness, tremors, seizures, syncope, facial asymmetry, speech difficulty, weakness, light-headedness, numbness and headaches.   Hematological: Negative for adenopathy.   Psychiatric/Behavioral: Negative for agitation, behavioral problems, confusion, decreased concentration, dysphoric mood, hallucinations, self-injury, sleep disturbance and suicidal ideas. The patient is not nervous/anxious and is not hyperactive.         Objective:   /70 (BP Location: Left arm, Patient Position: Sitting, Cuff Size: Large Adult)   Pulse 63   Ht 175.3 cm (69\")   Wt 91.3 kg (201 lb 3.2 oz)   SpO2 98%   BMI 29.71 kg/m²     Physical Exam   Constitutional: He is oriented to person, place, and time. He appears well-developed. He is cooperative.   HENT:   Head: Normocephalic and atraumatic.   Right Ear: External ear normal.   Left Ear: External ear normal.   Nose: Nose normal.   Mouth/Throat: No oropharyngeal exudate.   Eyes: Pupils are equal, round, and reactive to light. Conjunctivae are normal. No scleral icterus. Right eye exhibits normal extraocular motion. Left eye exhibits normal extraocular motion.   Neck: Neck supple. No JVD present. No muscular tenderness " present. No tracheal deviation, no edema and no erythema present. No thyromegaly present.   Cardiovascular: Normal rate, regular rhythm and normal heart sounds. Exam reveals no gallop and no friction rub.   No murmur heard.  Pulmonary/Chest: Effort normal and breath sounds normal. No stridor. No respiratory distress. He has no decreased breath sounds. He has no wheezes. He has no rhonchi. He has no rales. He exhibits no tenderness.   Abdominal: Soft. Bowel sounds are normal. He exhibits no distension and no mass. There is no hepatomegaly. There is no abdominal tenderness. There is no rebound and no guarding. No hernia.   Musculoskeletal: Normal range of motion. No tenderness or deformity.   Lymphadenopathy:     He has no cervical adenopathy.   Neurological: He is alert and oriented to person, place, and time. He has normal reflexes. No cranial nerve deficit. He exhibits normal muscle tone. Coordination normal.   Skin: Skin is warm. No rash noted. No erythema. No pallor.   Psychiatric: His behavior is normal. Judgment and thought content normal.   Nursing note and vitals reviewed.      Lab Review          Assessment/Plan       ICD-10-CM ICD-9-CM   1. Type 2 diabetes mellitus with hyperglycemia, without long-term current use of insulin (CMS/[facility])  E11.65 250.00     790.29   2. Mixed hyperlipidemia  E78.2 272.2   3. Essential hypertension  I10 401.9         I reviewed and summarized records from Andreas Martinez MD from 2019 and I reviewed / ordered labs.   From review of records :    Pt has type 2 Diabetes with CAD     Glycemic Management:   Lab Results   Component Value Date    HGBA1C 6.90 (H) 10/05/2020    HGBA1C 8.30 (H) 06/16/2020    HGBA1C 8.34 (H) 02/26/2020     Lab Results   Component Value Date    GLUCOSE 225 (H) 10/05/2020    BUN 17 10/05/2020    CREATININE 0.95 10/05/2020    EGFRIFNONA 77 10/05/2020    BCR 17.9 10/05/2020    K 4.4 10/05/2020    CO2 25.7 10/05/2020    CALCIUM 9.7 10/05/2020    ALBUMIN 4.60  10/05/2020    AST 21 10/05/2020    ALT 35 10/05/2020    ANIONGAP 12.3 10/05/2020     Lab Results   Component Value Date    WBC 5.97 10/05/2020    HGB 14.7 10/05/2020    HCT 43.1 10/05/2020    MCV 82.7 10/05/2020     10/05/2020      side effects to metformin     jardiance too expensive     Trulicity 0.75 mg weekly --- restart     Tresiba 25 units  --- decrease in half     Novolin R 20 to 25 w every meal --- decrease in half       Lipid Management  Lab Results   Component Value Date    CHOL 167 10/05/2020    CHOL 167 06/16/2020    CHOL 159 02/26/2020     Lab Results   Component Value Date    TRIG 350 (H) 10/05/2020    TRIG 193 (H) 06/16/2020    TRIG 280 (H) 02/26/2020     Lab Results   Component Value Date    HDL 35 (L) 10/05/2020    HDL 34 (L) 06/16/2020    HDL 30 (L) 02/26/2020     No components found for: LDLCALC  Lab Results   Component Value Date    LDL 62 10/05/2020    LDL 94 06/16/2020    LDL 73 02/26/2020       On atorvastatin - preferred not to take    Taking red yeast rice     Try at least crestor 5 mg weekly -- prefers not to       Blood Pressure Management:    Vitals:    11/09/20 1439   BP: 122/70   Pulse: 63   SpO2: 98%     Lab Results   Component Value Date    GLUCOSE 225 (H) 10/05/2020    CALCIUM 9.7 10/05/2020     10/05/2020    K 4.4 10/05/2020    CO2 25.7 10/05/2020    CL 98 10/05/2020    BUN 17 10/05/2020    CREATININE 0.95 10/05/2020    EGFRIFNONA 77 10/05/2020    BCR 17.9 10/05/2020    ANIONGAP 12.3 10/05/2020     Controlled on ACE I regimen           Microvascular Complication Monitoring:      Eye Exam Evaluation  Within 1 year     -----------    Last Microalbumin-Proteinuria Assessment    Lab Results   Component Value Date    MALBCRERATIO  10/05/2020      Comment:      Unable to calculate    MALBCRERATIO  06/16/2020      Comment:      Unable to calculate    MALBCRERATIO  10/11/2019      Comment:      Unable to calculate       No results found for:  UTPCR    -----------      Neuropathy        Weight Related:   Wt Readings from Last 3 Encounters:   11/09/20 91.3 kg (201 lb 3.2 oz)   10/12/20 90.7 kg (200 lb)   08/17/20 88.5 kg (195 lb)     Body mass index is 29.71 kg/m².        Diet interventions: moderate (500 kCal/d) deficit diet.      Bone Health    Lab Results   Component Value Date    CALCIUM 9.7 10/05/2020    UGBM61RG 41.2 06/21/2019       Thyroid Health    Lab Results   Component Value Date    TSH 1.920 10/05/2020    TSH 2.430 06/16/2020    TSH 2.490 05/27/2020         Other Diabetes Related Aspects       Lab Results   Component Value Date    FDPFPNWX12 719 10/05/2020     FATTY LIVER    Weight loss   Vitamin E 200 units daily - could have been considered       No orders of the defined types were placed in this encounter.        A copy of my note was sent to Andreas Martinez MD    Please see my above opinion and suggestions.

## 2020-11-16 ENCOUNTER — OFFICE VISIT (OUTPATIENT)
Dept: FAMILY MEDICINE CLINIC | Facility: CLINIC | Age: 75
End: 2020-11-16

## 2020-11-16 VITALS
BODY MASS INDEX: 29.83 KG/M2 | DIASTOLIC BLOOD PRESSURE: 58 MMHG | SYSTOLIC BLOOD PRESSURE: 106 MMHG | HEART RATE: 64 BPM | OXYGEN SATURATION: 99 % | HEIGHT: 69 IN | TEMPERATURE: 97.6 F | WEIGHT: 201.4 LBS

## 2020-11-16 DIAGNOSIS — E11.65 UNCONTROLLED TYPE 2 DIABETES MELLITUS WITH HYPERGLYCEMIA (HCC): Primary | ICD-10-CM

## 2020-11-16 DIAGNOSIS — F41.1 GENERALIZED ANXIETY DISORDER: ICD-10-CM

## 2020-11-16 DIAGNOSIS — I48.0 PAF (PAROXYSMAL ATRIAL FIBRILLATION) (HCC): ICD-10-CM

## 2020-11-16 DIAGNOSIS — I25.119 CORONARY ARTERY DISEASE WITH ANGINA PECTORIS, UNSPECIFIED VESSEL OR LESION TYPE, UNSPECIFIED WHETHER NATIVE OR TRANSPLANTED HEART (HCC): ICD-10-CM

## 2020-11-16 DIAGNOSIS — N18.2 STAGE 2 CHRONIC KIDNEY DISEASE: ICD-10-CM

## 2020-11-16 DIAGNOSIS — E66.3 OVERWEIGHT: ICD-10-CM

## 2020-11-16 DIAGNOSIS — E55.9 VITAMIN D DEFICIENCY: ICD-10-CM

## 2020-11-16 DIAGNOSIS — Z23 NEED FOR IMMUNIZATION AGAINST INFLUENZA: ICD-10-CM

## 2020-11-16 DIAGNOSIS — I10 ESSENTIAL HYPERTENSION: ICD-10-CM

## 2020-11-16 DIAGNOSIS — E78.2 MIXED HYPERLIPIDEMIA: ICD-10-CM

## 2020-11-16 PROCEDURE — G0008 ADMIN INFLUENZA VIRUS VAC: HCPCS | Performed by: FAMILY MEDICINE

## 2020-11-16 PROCEDURE — 99214 OFFICE O/P EST MOD 30 MIN: CPT | Performed by: FAMILY MEDICINE

## 2020-11-16 PROCEDURE — 90694 VACC AIIV4 NO PRSRV 0.5ML IM: CPT | Performed by: FAMILY MEDICINE

## 2020-11-16 RX ORDER — LISINOPRIL 10 MG/1
10 TABLET ORAL DAILY
Qty: 30 TABLET | Refills: 3
Start: 2020-11-16 | End: 2021-03-03 | Stop reason: DRUGHIGH

## 2020-11-16 RX ORDER — ICOSAPENT ETHYL 500 MG/1
2 CAPSULE, LIQUID FILLED ORAL 2 TIMES DAILY
Qty: 180 CAPSULE | Refills: 3 | Status: SHIPPED | OUTPATIENT
Start: 2020-11-16 | End: 2021-08-18 | Stop reason: SDUPTHER

## 2020-11-16 RX ORDER — AZELASTINE 1 MG/ML
2 SPRAY, METERED NASAL 2 TIMES DAILY
Qty: 90 ML | Refills: 3 | Status: SHIPPED | OUTPATIENT
Start: 2020-11-16 | End: 2021-12-21

## 2020-11-16 RX ORDER — ALPRAZOLAM 0.5 MG/1
0.5 TABLET ORAL NIGHTLY PRN
Qty: 30 TABLET | Refills: 0 | Status: SHIPPED | OUTPATIENT
Start: 2020-11-16 | End: 2021-02-23

## 2020-11-16 RX ORDER — METOPROLOL TARTRATE 50 MG/1
50 TABLET, FILM COATED ORAL 2 TIMES DAILY
Qty: 60 TABLET | Refills: 3 | Status: SHIPPED | OUTPATIENT
Start: 2020-11-16 | End: 2021-08-18 | Stop reason: SDUPTHER

## 2020-11-16 RX ORDER — AMLODIPINE BESYLATE 10 MG/1
10 TABLET ORAL DAILY
Qty: 30 TABLET | Refills: 3
Start: 2020-11-16 | End: 2021-05-17

## 2020-11-16 NOTE — PATIENT INSTRUCTIONS
Chronic Kidney Disease, Adult  Chronic kidney disease (CKD) occurs when the kidneys become damaged slowly over a long period of time. The kidneys are a pair of organs that do many important jobs in the body, including:  · Removing waste and extra fluid from the blood to make urine.  · Making hormones that maintain the amount of fluid in tissues and blood vessels.  · Maintaining the right amount of fluids and chemicals in the body.  A small amount of kidney damage may not cause problems, but a large amount of damage may make it hard or impossible for the kidneys to work the way they should. If steps are not taken to slow down kidney damage or to stop it from getting worse, the kidneys may stop working permanently (end-stage renal disease or ESRD). Most of the time, CKD does not go away, but it can often be controlled. People who have CKD are usually able to live normal lives.  What are the causes?  The most common causes of this condition are diabetes and high blood pressure (hypertension). Other causes include:  · Heart and blood vessel (cardiovascular) disease.  · Kidney diseases, such as:  ? Glomerulonephritis.  ? Interstitial nephritis.  ? Polycystic kidney disease.  ? Renal vascular disease.  · Diseases that affect the immune system.  · Genetic diseases.  · Medicines that damage the kidneys, such as anti-inflammatory medicines.  · Being around or being in contact with poisonous (toxic) substances.  · A kidney or urinary infection that occurs again and again (recurs).  · Vasculitis. This is swelling or inflammation of the blood vessels.  · A problem with urine flow that may be caused by:  ? Cancer.  ? Having kidney stones more than one time.  ? An enlarged prostate, in males.  What increases the risk?  You are more likely to develop this condition if you:  · Are older than age 60.  · Are female.  · Are -American, , , , or .  · Are a current or former  smoker.  · Are obese.  · Have a family history of kidney disease or failure.  · Often take medicines that are damaging to the kidneys.  What are the signs or symptoms?  Symptoms of this condition include:  · Swelling (edema) of the face, legs, ankles, or feet.  · Tiredness (lethargy) and having less energy.  · Nausea or vomiting.  · Confusion or trouble concentrating.  · Problems with urination, such as:  ? Painful or burning feeling during urination.  ? Decreased urine production.  ? Frequent urination, especially at night.  ? Bloody urine.  · Muscle twitches and cramps, especially in the legs.  · Shortness of breath.  · Weakness.  · Loss of appetite.  · Metallic taste in the mouth.  · Trouble sleeping.  · Dry, itchy skin.  · A low blood count (anemia).  · Pale lining of the eyelids and surface of the eye (conjunctiva).  Symptoms develop slowly and may not be obvious until the kidney damage becomes severe. It is possible to have kidney disease for years without having any symptoms.  How is this diagnosed?  This condition may be diagnosed based on:  · Blood tests.  · Urine tests.  · Imaging tests, such as an ultrasound or CT scan.  · A test in which a sample of tissue is removed from the kidneys to be examined under a microscope (kidney biopsy).  These test results will help your health care provider determine how serious the CKD is.  How is this treated?  There is no cure for most cases of this condition, but treatment usually relieves symptoms and prevents or slows the progression of the disease. Treatment may include:  · Making diet changes, which may require you to avoid alcohol, salty foods (sodium), and foods that are high in potassium, calcium, and protein.  · Medicines:  ? To lower blood pressure.  ? To control blood glucose.  ? To relieve anemia.  ? To relieve swelling.  ? To protect your bones.  ? To improve the balance of electrolytes in your blood.  · Removing toxic waste from the body through types of  dialysis, if the kidneys can no longer do their job (kidney failure).  · Managing any other conditions that are causing your CKD or making it worse.  Follow these instructions at home:  Medicines  · Take over-the-counter and prescription medicines only as told by your health care provider. The dose of some medicines that you take may need to be adjusted.  · Do not take any new medicines unless approved by your health care provider. Many medicines can worsen your kidney damage.  · Do not take any vitamin and mineral supplements unless approved by your health care provider. Many nutritional supplements can worsen your kidney damage.  General instructions  · Follow your prescribed diet as told by your health care provider.  · Do not use any products that contain nicotine or tobacco, such as cigarettes and e-cigarettes. If you need help quitting, ask your health care provider.  · Monitor and track your blood pressure at home. Report changes in your blood pressure as told by your health care provider.  · If you are being treated for diabetes, monitor and track your blood sugar (blood glucose) levels as told by your health care provider.  · Maintain a healthy weight. If you need help with this, ask your health care provider.  · Start or continue an exercise plan. Exercise at least 30 minutes a day, 5 days a week.  · Keep your immunizations up to date as told by your health care provider.  · Keep all follow-up visits as told by your health care provider. This is important.  Where to find more information  · American Association of Kidney Patients: www.aakp.org  · National Kidney Foundation: www.kidney.org  · American Kidney Fund: www.akfinc.org  · [employer] [facility]: www.lifeoptions.org and www.kidneyschool.org  Contact a health care provider if:  · Your symptoms get worse.  · You develop new symptoms.  Get help right away if:  · You develop symptoms of ESRD, which include:  ? Headaches.  ? Numbness in the  hands or feet.  ? Easy bruising.  ? Frequent hiccups.  ? Chest pain.  ? Shortness of breath.  ? Lack of menstruation, in women.  · You have a fever.  · You have decreased urine production.  · You have pain or bleeding when you urinate.  Summary  · Chronic kidney disease (CKD) occurs when the kidneys become damaged slowly over a long period of time.  · The most common causes of this condition are diabetes and high blood pressure (hypertension).  · There is no cure for most cases of this condition, but treatment usually relieves symptoms and prevents or slows the progression of the disease. Treatment may include a combination of medicines and lifestyle changes.  This information is not intended to replace advice given to you by your health care provider. Make sure you discuss any questions you have with your health care provider.  Document Released: 09/26/2009 Document Revised: 11/30/2018 Document Reviewed: 01/25/2018  Elsevier Patient Education © 2020 [employer]

## 2021-01-04 DIAGNOSIS — K21.00 GASTROESOPHAGEAL REFLUX DISEASE WITH ESOPHAGITIS: ICD-10-CM

## 2021-01-04 DIAGNOSIS — R10.13 EPIGASTRIC PAIN: ICD-10-CM

## 2021-01-04 DIAGNOSIS — R12 HEARTBURN: ICD-10-CM

## 2021-01-04 RX ORDER — SUCRALFATE 1 G/1
1 TABLET ORAL 2 TIMES DAILY
Qty: 180 TABLET | Refills: 0 | Status: SHIPPED | OUTPATIENT
Start: 2021-01-04 | End: 2021-08-18 | Stop reason: SDUPTHER

## 2021-01-04 NOTE — TELEPHONE ENCOUNTER
Caller: Dalia Card    Relationship: Emergency Contact    Best call back number: [phone]    Medication needed:   Requested Prescriptions     Pending Prescriptions Disp Refills   • sucralfate (CARAFATE) 1 g tablet 180 tablet 0       When do you need the refill by: 01/06/21    Does the patient have less than a 3 day supply:  [x] Yes  [] No    What is the patient's preferred pharmacy: [facility] [address] [place] [address] [phone] [facility] [phone]

## 2021-01-21 RX ORDER — ISOSORBIDE MONONITRATE 30 MG/1
30 TABLET, EXTENDED RELEASE ORAL DAILY
Qty: 30 TABLET | Refills: 3 | Status: SHIPPED | OUTPATIENT
Start: 2021-01-21 | End: 2021-08-18 | Stop reason: SDUPTHER

## 2021-01-21 NOTE — TELEPHONE ENCOUNTER
Caller: Dalia Card    Relationship: Emergency Contact   (  Best call back number: [phone]     Medication needed:   Requested Prescriptions     Pending Prescriptions Disp Refills   • isosorbide mononitrate (IMDUR) 30 MG 24 hr tablet        Sig: Take 1 tablet by mouth Daily.       When do you need the refill by: 01/21/21    What details did the patient provide when requesting the medication:     Does the patient have less than a 3 day supply:  [] Yes  [x] No    What is the patient's preferred pharmacy: [facility] [address] [place] [address] [phone] [facility] [phone]

## 2021-01-26 ENCOUNTER — OFFICE VISIT (OUTPATIENT)
Dept: GASTROENTEROLOGY | Facility: CLINIC | Age: 76
End: 2021-01-26

## 2021-01-26 VITALS — BODY MASS INDEX: 28.17 KG/M2 | WEIGHT: 190.2 LBS | HEIGHT: 69 IN

## 2021-01-26 DIAGNOSIS — K57.30 DIVERTICULOSIS OF LARGE INTESTINE WITHOUT HEMORRHAGE: ICD-10-CM

## 2021-01-26 DIAGNOSIS — R10.13 EPIGASTRIC PAIN: ICD-10-CM

## 2021-01-26 DIAGNOSIS — K21.00 GASTROESOPHAGEAL REFLUX DISEASE WITH ESOPHAGITIS WITHOUT HEMORRHAGE: Primary | ICD-10-CM

## 2021-01-26 DIAGNOSIS — R12 HEARTBURN: ICD-10-CM

## 2021-01-26 PROCEDURE — 99213 OFFICE O/P EST LOW 20 MIN: CPT | Performed by: PHYSICIAN ASSISTANT

## 2021-01-26 RX ORDER — PANTOPRAZOLE SODIUM 40 MG/1
40 TABLET, DELAYED RELEASE ORAL DAILY
Qty: 30 TABLET | Refills: 5 | Status: SHIPPED | OUTPATIENT
Start: 2021-01-26 | End: 2021-03-03 | Stop reason: SDUPTHER

## 2021-01-29 ENCOUNTER — TELEPHONE (OUTPATIENT)
Dept: ENDOCRINOLOGY | Facility: CLINIC | Age: 76
End: 2021-01-29

## 2021-02-10 ENCOUNTER — TELEMEDICINE (OUTPATIENT)
Dept: ENDOCRINOLOGY | Facility: CLINIC | Age: 76
End: 2021-02-10

## 2021-02-10 DIAGNOSIS — I10 ESSENTIAL HYPERTENSION: ICD-10-CM

## 2021-02-10 DIAGNOSIS — E78.2 MIXED HYPERLIPIDEMIA: ICD-10-CM

## 2021-02-10 DIAGNOSIS — E11.65 TYPE 2 DIABETES MELLITUS WITH HYPERGLYCEMIA, WITHOUT LONG-TERM CURRENT USE OF INSULIN (HCC): Primary | ICD-10-CM

## 2021-02-10 DIAGNOSIS — E55.9 VITAMIN D DEFICIENCY: ICD-10-CM

## 2021-02-10 PROCEDURE — 99214 OFFICE O/P EST MOD 30 MIN: CPT | Performed by: INTERNAL MEDICINE

## 2021-02-10 RX ORDER — INSULIN DEGLUDEC INJECTION 100 U/ML
INJECTION, SOLUTION SUBCUTANEOUS
Qty: 3 PEN | Refills: 11 | Status: SHIPPED | OUTPATIENT
Start: 2021-02-10 | End: 2021-11-22

## 2021-02-10 RX ORDER — INSULIN ASPART 100 [IU]/ML
INJECTION, SOLUTION INTRAVENOUS; SUBCUTANEOUS
Qty: 6 PEN | Refills: 11 | Status: SHIPPED | OUTPATIENT
Start: 2021-02-10 | End: 2021-07-27

## 2021-02-10 NOTE — PROGRESS NOTES
" Aj Card Jr. is a 75 y.o. male who presents for  evaluation of type 2 diabetes                                    This was a Telehealth Encounter. Benefits and Disadvantages of a Telehealth Visit were discussed and accepted by patient. .  Patient agreed to receive service through Telehealth visit as patient is being compliant with social distancing recommendations imparted by CDC.     You have chosen to receive care through a telehealth visit.  Do you consent to use a video/audio connection for your medical care today? Yes        Diabetes     Duration since age 71 years old     Complications - CAD     Current symptoms/problems  none     Alleviating Factors: Compliance       Current diet  in general, a \"healthy\" diet      Current exercise walking    Current monitoring regimen: home blood tests - checking 2x daily   Home blood sugar records: at goal     Hypoglycemia none      Review of Systems    Review of Systems     Objective:   There were no vitals taken for this visit.    Physical Exam   HENT:   Head: Normocephalic.   Neck: Normal range of motion. Neck supple.   Cardiovascular: Normal rate and regular rhythm.   Pulmonary/Chest: Effort normal and breath sounds normal.   Abdominal: Normal appearance.   Musculoskeletal:      Right lower leg: No edema.      Left lower leg: No edema.   Neurological: He is alert.       Lab Review          Assessment/Plan       ICD-10-CM ICD-9-CM   1. Type 2 diabetes mellitus with hyperglycemia, without long-term current use of insulin (CMS/[facility])  E11.65 250.00     790.29   2. Essential hypertension  I10 401.9   3. Mixed hyperlipidemia  E78.2 272.2   4. Vitamin D deficiency  E55.9 268.9       Pt has type 2 Diabetes with CAD     Glycemic Management:   Lab Results   Component Value Date    HGBA1C 6.90 (H) 10/05/2020    HGBA1C 8.30 (H) 06/16/2020    HGBA1C 8.34 (H) 02/26/2020     Lab Results   Component Value Date    GLUCOSE 225 (H) 10/05/2020    BUN 17 10/05/2020    CREATININE " 0.95 10/05/2020    EGFRIFNONA 77 10/05/2020    BCR 17.9 10/05/2020    K 4.4 10/05/2020    CO2 25.7 10/05/2020    CALCIUM 9.7 10/05/2020    ALBUMIN 4.60 10/05/2020    AST 21 10/05/2020    ALT 35 10/05/2020    ANIONGAP 12.3 10/05/2020     Lab Results   Component Value Date    WBC 5.97 10/05/2020    HGB 14.7 10/05/2020    HCT 43.1 10/05/2020    MCV 82.7 10/05/2020     10/05/2020      side effects to metformin     jardiance too expensive     Trulicity 0.75 mg weekly --- stop due to cost     Restart 15 units once daily -- may increase up to 30     Novolin R 20 to 25 w every meal --- decrease in half          Lipid Management  Lab Results   Component Value Date    CHOL 167 10/05/2020    CHOL 167 06/16/2020    CHOL 159 02/26/2020     Lab Results   Component Value Date    TRIG 350 (H) 10/05/2020    TRIG 193 (H) 06/16/2020    TRIG 280 (H) 02/26/2020     Lab Results   Component Value Date    HDL 35 (L) 10/05/2020    HDL 34 (L) 06/16/2020    HDL 30 (L) 02/26/2020     No components found for: LDLCALC  Lab Results   Component Value Date    LDL 62 10/05/2020    LDL 94 06/16/2020    LDL 73 02/26/2020       On atorvastatin - preferred not to take    Taking red yeast rice     Try at least crestor 5 mg weekly -- prefers not to       Blood Pressure Management:    There were no vitals filed for this visit.  Lab Results   Component Value Date    GLUCOSE 225 (H) 10/05/2020    CALCIUM 9.7 10/05/2020     10/05/2020    K 4.4 10/05/2020    CO2 25.7 10/05/2020    CL 98 10/05/2020    BUN 17 10/05/2020    CREATININE 0.95 10/05/2020    EGFRIFNONA 77 10/05/2020    BCR 17.9 10/05/2020    ANIONGAP 12.3 10/05/2020     Controlled on ACE I regimen           Microvascular Complication Monitoring:      Eye Exam Evaluation  Within 1 year     -----------    Last Microalbumin-Proteinuria Assessment    Lab Results   Component Value Date    MALBCRERATIO  10/05/2020      Comment:      Unable to calculate    MALBCRERATIO  06/16/2020      Comment:       Unable to calculate    FRANDY  10/11/2019      Comment:      Unable to calculate       No results found for: UTPCR    -----------      Neuropathy        Weight Related:   Wt Readings from Last 3 Encounters:   01/26/21 86.3 kg (190 lb 3.2 oz)   11/16/20 91.4 kg (201 lb 6.4 oz)   11/09/20 91.3 kg (201 lb 3.2 oz)     There is no height or weight on file to calculate BMI.        Diet interventions: moderate (500 kCal/d) deficit diet.      Bone Health    Lab Results   Component Value Date    CALCIUM 9.7 10/05/2020    EEMC31BK 41.2 06/21/2019       Thyroid Health    Lab Results   Component Value Date    TSH 1.920 10/05/2020    TSH 2.430 06/16/2020    TSH 2.490 05/27/2020         Other Diabetes Related Aspects       Lab Results   Component Value Date    RLMAUCLM14 719 10/05/2020     FATTY LIVER    Weight loss   Vitamin E 200 units daily - could have been considered       No orders of the defined types were placed in this encounter.        A copy of my note was sent to Andreas Martinez MD    Please see my above opinion and suggestions.     I spent 15 minutes reviewing patient electronic chart , reviewing medications , past history , active problems.   I provided advice regarding management of medical conditions, refilled prescriptions , ordered labs and arranged for future appointment.   Patient was advised to contact us if there were any unanswered questions or ongoing concerns.

## 2021-02-12 ENCOUNTER — TELEPHONE (OUTPATIENT)
Dept: FAMILY MEDICINE CLINIC | Facility: CLINIC | Age: 76
End: 2021-02-12

## 2021-02-23 DIAGNOSIS — E78.2 MIXED HYPERLIPIDEMIA: ICD-10-CM

## 2021-02-23 DIAGNOSIS — F41.1 GENERALIZED ANXIETY DISORDER: ICD-10-CM

## 2021-02-23 DIAGNOSIS — I25.119 CORONARY ARTERY DISEASE WITH ANGINA PECTORIS, UNSPECIFIED VESSEL OR LESION TYPE, UNSPECIFIED WHETHER NATIVE OR TRANSPLANTED HEART (HCC): ICD-10-CM

## 2021-02-23 DIAGNOSIS — I10 ESSENTIAL HYPERTENSION: ICD-10-CM

## 2021-02-23 DIAGNOSIS — E55.9 VITAMIN D DEFICIENCY: ICD-10-CM

## 2021-02-23 DIAGNOSIS — E66.3 OVERWEIGHT: ICD-10-CM

## 2021-02-23 RX ORDER — ALPRAZOLAM 0.5 MG/1
0.5 TABLET ORAL NIGHTLY PRN
Qty: 30 TABLET | Refills: 0 | Status: SHIPPED | OUTPATIENT
Start: 2021-02-23 | End: 2021-08-18 | Stop reason: SDUPTHER

## 2021-03-03 ENCOUNTER — OFFICE VISIT (OUTPATIENT)
Dept: FAMILY MEDICINE CLINIC | Facility: CLINIC | Age: 76
End: 2021-03-03

## 2021-03-03 VITALS
SYSTOLIC BLOOD PRESSURE: 100 MMHG | WEIGHT: 197 LBS | TEMPERATURE: 98 F | BODY MASS INDEX: 29.18 KG/M2 | DIASTOLIC BLOOD PRESSURE: 50 MMHG | OXYGEN SATURATION: 95 % | HEART RATE: 74 BPM | HEIGHT: 69 IN

## 2021-03-03 DIAGNOSIS — I48.0 PAF (PAROXYSMAL ATRIAL FIBRILLATION) (HCC): ICD-10-CM

## 2021-03-03 DIAGNOSIS — K21.9 GASTROESOPHAGEAL REFLUX DISEASE, UNSPECIFIED WHETHER ESOPHAGITIS PRESENT: ICD-10-CM

## 2021-03-03 DIAGNOSIS — E66.3 OVERWEIGHT: ICD-10-CM

## 2021-03-03 DIAGNOSIS — E55.9 VITAMIN D DEFICIENCY: ICD-10-CM

## 2021-03-03 DIAGNOSIS — E11.65 UNCONTROLLED TYPE 2 DIABETES MELLITUS WITH HYPERGLYCEMIA (HCC): Primary | ICD-10-CM

## 2021-03-03 DIAGNOSIS — I25.119 CORONARY ARTERY DISEASE WITH ANGINA PECTORIS, UNSPECIFIED VESSEL OR LESION TYPE, UNSPECIFIED WHETHER NATIVE OR TRANSPLANTED HEART (HCC): ICD-10-CM

## 2021-03-03 DIAGNOSIS — I10 ESSENTIAL HYPERTENSION: ICD-10-CM

## 2021-03-03 PROCEDURE — 99214 OFFICE O/P EST MOD 30 MIN: CPT | Performed by: FAMILY MEDICINE

## 2021-03-03 RX ORDER — PANTOPRAZOLE SODIUM 40 MG/1
40 TABLET, DELAYED RELEASE ORAL DAILY
Qty: 30 TABLET | Refills: 5 | Status: SHIPPED | OUTPATIENT
Start: 2021-03-03 | End: 2021-08-18 | Stop reason: SDUPTHER

## 2021-03-03 RX ORDER — LISINOPRIL 40 MG/1
40 TABLET ORAL DAILY
COMMUNITY
Start: 2020-11-27 | End: 2021-05-17

## 2021-03-30 ENCOUNTER — DOCUMENTATION (OUTPATIENT)
Dept: ENDOCRINOLOGY | Facility: CLINIC | Age: 76
End: 2021-03-30

## 2021-04-07 ENCOUNTER — LAB (OUTPATIENT)
Dept: LAB | Facility: HOSPITAL | Age: 76
End: 2021-04-07

## 2021-04-07 LAB
25(OH)D3 SERPL-MCNC: 48.8 NG/ML
ALBUMIN SERPL-MCNC: 4.4 G/DL (ref 3.5–5.2)
ALBUMIN UR-MCNC: <1.2 MG/DL
ALBUMIN/GLOB SERPL: 1.5 G/DL
ALP SERPL-CCNC: 43 U/L (ref 39–117)
ALT SERPL W P-5'-P-CCNC: 19 U/L (ref 1–41)
ANION GAP SERPL CALCULATED.3IONS-SCNC: 8.4 MMOL/L (ref 5–15)
AST SERPL-CCNC: 21 U/L (ref 1–40)
BASOPHILS # BLD AUTO: 0.04 10*3/MM3 (ref 0–0.2)
BASOPHILS NFR BLD AUTO: 0.6 % (ref 0–1.5)
BILIRUB SERPL-MCNC: 0.4 MG/DL (ref 0–1.2)
BUN SERPL-MCNC: 18 MG/DL (ref 8–23)
BUN/CREAT SERPL: 18.4 (ref 7–25)
CALCIUM SPEC-SCNC: 9.2 MG/DL (ref 8.6–10.5)
CHLORIDE SERPL-SCNC: 101 MMOL/L (ref 98–107)
CHOLEST SERPL-MCNC: 125 MG/DL (ref 0–200)
CO2 SERPL-SCNC: 27.6 MMOL/L (ref 22–29)
CREAT SERPL-MCNC: 0.98 MG/DL (ref 0.76–1.27)
CREAT UR-MCNC: 93.9 MG/DL
DEPRECATED RDW RBC AUTO: 42.3 FL (ref 37–54)
EOSINOPHIL # BLD AUTO: 0.21 10*3/MM3 (ref 0–0.4)
EOSINOPHIL NFR BLD AUTO: 3.2 % (ref 0.3–6.2)
ERYTHROCYTE [DISTWIDTH] IN BLOOD BY AUTOMATED COUNT: 13.8 % (ref 12.3–15.4)
GFR SERPL CREATININE-BSD FRML MDRD: 74 ML/MIN/1.73
GLOBULIN UR ELPH-MCNC: 3 GM/DL
GLUCOSE SERPL-MCNC: 159 MG/DL (ref 65–99)
HBA1C MFR BLD: 7.68 % (ref 4.8–5.6)
HCT VFR BLD AUTO: 40.9 % (ref 37.5–51)
HDLC SERPL-MCNC: 28 MG/DL (ref 40–60)
HGB BLD-MCNC: 13.7 G/DL (ref 13–17.7)
IMM GRANULOCYTES # BLD AUTO: 0.04 10*3/MM3 (ref 0–0.05)
IMM GRANULOCYTES NFR BLD AUTO: 0.6 % (ref 0–0.5)
LDLC SERPL CALC-MCNC: 58 MG/DL (ref 0–100)
LDLC/HDLC SERPL: 1.75 {RATIO}
LYMPHOCYTES # BLD AUTO: 1.55 10*3/MM3 (ref 0.7–3.1)
LYMPHOCYTES NFR BLD AUTO: 23.6 % (ref 19.6–45.3)
MCH RBC QN AUTO: 28.4 PG (ref 26.6–33)
MCHC RBC AUTO-ENTMCNC: 33.5 G/DL (ref 31.5–35.7)
MCV RBC AUTO: 84.9 FL (ref 79–97)
MICROALBUMIN/CREAT UR: NORMAL MG/G{CREAT}
MONOCYTES # BLD AUTO: 0.47 10*3/MM3 (ref 0.1–0.9)
MONOCYTES NFR BLD AUTO: 7.2 % (ref 5–12)
NEUTROPHILS NFR BLD AUTO: 4.26 10*3/MM3 (ref 1.7–7)
NEUTROPHILS NFR BLD AUTO: 64.8 % (ref 42.7–76)
NRBC BLD AUTO-RTO: 0 /100 WBC (ref 0–0.2)
PLATELET # BLD AUTO: 245 10*3/MM3 (ref 140–450)
PMV BLD AUTO: 10 FL (ref 6–12)
POTASSIUM SERPL-SCNC: 4.9 MMOL/L (ref 3.5–5.2)
PROT SERPL-MCNC: 7.4 G/DL (ref 6–8.5)
RBC # BLD AUTO: 4.82 10*6/MM3 (ref 4.14–5.8)
SODIUM SERPL-SCNC: 137 MMOL/L (ref 136–145)
TRIGL SERPL-MCNC: 240 MG/DL (ref 0–150)
TSH SERPL DL<=0.05 MIU/L-ACNC: 2.51 UIU/ML (ref 0.27–4.2)
VIT B12 BLD-MCNC: 604 PG/ML (ref 211–946)
VLDLC SERPL-MCNC: 39 MG/DL (ref 5–40)
WBC # BLD AUTO: 6.57 10*3/MM3 (ref 3.4–10.8)

## 2021-04-07 PROCEDURE — 82607 VITAMIN B-12: CPT | Performed by: INTERNAL MEDICINE

## 2021-04-07 PROCEDURE — 80061 LIPID PANEL: CPT | Performed by: INTERNAL MEDICINE

## 2021-04-07 PROCEDURE — 83036 HEMOGLOBIN GLYCOSYLATED A1C: CPT | Performed by: INTERNAL MEDICINE

## 2021-04-07 PROCEDURE — 82570 ASSAY OF URINE CREATININE: CPT | Performed by: INTERNAL MEDICINE

## 2021-04-07 PROCEDURE — 80053 COMPREHEN METABOLIC PANEL: CPT | Performed by: INTERNAL MEDICINE

## 2021-04-07 PROCEDURE — 82043 UR ALBUMIN QUANTITATIVE: CPT | Performed by: INTERNAL MEDICINE

## 2021-04-07 PROCEDURE — 82306 VITAMIN D 25 HYDROXY: CPT | Performed by: INTERNAL MEDICINE

## 2021-04-07 PROCEDURE — 84443 ASSAY THYROID STIM HORMONE: CPT | Performed by: INTERNAL MEDICINE

## 2021-04-07 PROCEDURE — 85025 COMPLETE CBC W/AUTO DIFF WBC: CPT | Performed by: INTERNAL MEDICINE

## 2021-04-14 ENCOUNTER — OFFICE VISIT (OUTPATIENT)
Dept: FAMILY MEDICINE CLINIC | Facility: CLINIC | Age: 76
End: 2021-04-14

## 2021-04-14 VITALS
WEIGHT: 195 LBS | DIASTOLIC BLOOD PRESSURE: 60 MMHG | SYSTOLIC BLOOD PRESSURE: 108 MMHG | BODY MASS INDEX: 28.88 KG/M2 | OXYGEN SATURATION: 98 % | HEART RATE: 78 BPM | TEMPERATURE: 97.1 F | HEIGHT: 69 IN

## 2021-04-14 DIAGNOSIS — I48.0 PAF (PAROXYSMAL ATRIAL FIBRILLATION) (HCC): ICD-10-CM

## 2021-04-14 DIAGNOSIS — E66.3 OVERWEIGHT: ICD-10-CM

## 2021-04-14 DIAGNOSIS — I10 ESSENTIAL HYPERTENSION: Primary | ICD-10-CM

## 2021-04-14 DIAGNOSIS — N18.2 STAGE 2 CHRONIC KIDNEY DISEASE: ICD-10-CM

## 2021-04-14 DIAGNOSIS — E11.65 UNCONTROLLED TYPE 2 DIABETES MELLITUS WITH HYPERGLYCEMIA (HCC): ICD-10-CM

## 2021-04-14 DIAGNOSIS — I25.119 CORONARY ARTERY DISEASE WITH ANGINA PECTORIS, UNSPECIFIED VESSEL OR LESION TYPE, UNSPECIFIED WHETHER NATIVE OR TRANSPLANTED HEART (HCC): ICD-10-CM

## 2021-04-14 DIAGNOSIS — E78.2 MIXED HYPERLIPIDEMIA: ICD-10-CM

## 2021-04-14 DIAGNOSIS — E55.9 VITAMIN D DEFICIENCY: ICD-10-CM

## 2021-04-14 PROCEDURE — 99214 OFFICE O/P EST MOD 30 MIN: CPT | Performed by: FAMILY MEDICINE

## 2021-04-14 RX ORDER — DULAGLUTIDE 3 MG/.5ML
3 INJECTION, SOLUTION SUBCUTANEOUS WEEKLY
Qty: 12 PEN | Refills: 3 | Status: SHIPPED | OUTPATIENT
Start: 2021-04-14 | End: 2021-08-10 | Stop reason: SDUPTHER

## 2021-05-10 ENCOUNTER — TELEPHONE (OUTPATIENT)
Dept: ENDOCRINOLOGY | Facility: CLINIC | Age: 76
End: 2021-05-10

## 2021-05-10 ENCOUNTER — DOCUMENTATION (OUTPATIENT)
Dept: ENDOCRINOLOGY | Facility: CLINIC | Age: 76
End: 2021-05-10

## 2021-05-10 NOTE — TELEPHONE ENCOUNTER
[facility] has samples ready for pt and his wife please call and let them know when pt assistance is ready for  so they can get both of these.

## 2021-05-17 RX ORDER — AMLODIPINE BESYLATE 10 MG/1
TABLET ORAL
Qty: 90 TABLET | Refills: 0 | Status: SHIPPED | OUTPATIENT
Start: 2021-05-17 | End: 2021-08-18 | Stop reason: SDUPTHER

## 2021-05-17 RX ORDER — LISINOPRIL 40 MG/1
TABLET ORAL
Qty: 90 TABLET | Refills: 0 | Status: SHIPPED | OUTPATIENT
Start: 2021-05-17 | End: 2021-08-18 | Stop reason: SDUPTHER

## 2021-05-25 ENCOUNTER — TELEPHONE (OUTPATIENT)
Dept: FAMILY MEDICINE CLINIC | Facility: CLINIC | Age: 76
End: 2021-05-25

## 2021-07-12 ENCOUNTER — LAB (OUTPATIENT)
Dept: LAB | Facility: HOSPITAL | Age: 76
End: 2021-07-12

## 2021-07-12 DIAGNOSIS — I48.0 PAF (PAROXYSMAL ATRIAL FIBRILLATION) (HCC): ICD-10-CM

## 2021-07-12 DIAGNOSIS — E55.9 VITAMIN D DEFICIENCY: ICD-10-CM

## 2021-07-12 DIAGNOSIS — I10 ESSENTIAL HYPERTENSION: ICD-10-CM

## 2021-07-12 DIAGNOSIS — E78.2 MIXED HYPERLIPIDEMIA: ICD-10-CM

## 2021-07-12 DIAGNOSIS — E66.3 OVERWEIGHT: ICD-10-CM

## 2021-07-12 DIAGNOSIS — N18.2 STAGE 2 CHRONIC KIDNEY DISEASE: ICD-10-CM

## 2021-07-12 DIAGNOSIS — E11.65 UNCONTROLLED TYPE 2 DIABETES MELLITUS WITH HYPERGLYCEMIA (HCC): ICD-10-CM

## 2021-07-12 DIAGNOSIS — I25.119 CORONARY ARTERY DISEASE WITH ANGINA PECTORIS, UNSPECIFIED VESSEL OR LESION TYPE, UNSPECIFIED WHETHER NATIVE OR TRANSPLANTED HEART (HCC): ICD-10-CM

## 2021-07-12 LAB
25(OH)D3 SERPL-MCNC: 48.8 NG/ML
ALBUMIN SERPL-MCNC: 4.5 G/DL (ref 3.5–5.2)
ALBUMIN/GLOB SERPL: 1.7 G/DL
ALP SERPL-CCNC: 42 U/L (ref 39–117)
ALT SERPL W P-5'-P-CCNC: 18 U/L (ref 1–41)
ANION GAP SERPL CALCULATED.3IONS-SCNC: 8.8 MMOL/L (ref 5–15)
AST SERPL-CCNC: 16 U/L (ref 1–40)
BASOPHILS # BLD AUTO: 0.04 10*3/MM3 (ref 0–0.2)
BASOPHILS NFR BLD AUTO: 0.6 % (ref 0–1.5)
BILIRUB SERPL-MCNC: 0.4 MG/DL (ref 0–1.2)
BUN SERPL-MCNC: 19 MG/DL (ref 8–23)
BUN/CREAT SERPL: 17.1 (ref 7–25)
CALCIUM SPEC-SCNC: 9 MG/DL (ref 8.6–10.5)
CHLORIDE SERPL-SCNC: 101 MMOL/L (ref 98–107)
CHOLEST SERPL-MCNC: 141 MG/DL (ref 0–200)
CO2 SERPL-SCNC: 25.2 MMOL/L (ref 22–29)
CREAT SERPL-MCNC: 1.11 MG/DL (ref 0.76–1.27)
DEPRECATED RDW RBC AUTO: 40.5 FL (ref 37–54)
EOSINOPHIL # BLD AUTO: 0.2 10*3/MM3 (ref 0–0.4)
EOSINOPHIL NFR BLD AUTO: 3.2 % (ref 0.3–6.2)
ERYTHROCYTE [DISTWIDTH] IN BLOOD BY AUTOMATED COUNT: 13.7 % (ref 12.3–15.4)
GFR SERPL CREATININE-BSD FRML MDRD: 64 ML/MIN/1.73
GLOBULIN UR ELPH-MCNC: 2.6 GM/DL
GLUCOSE SERPL-MCNC: 180 MG/DL (ref 65–99)
HBA1C MFR BLD: 7.59 % (ref 4.8–5.6)
HCT VFR BLD AUTO: 40.4 % (ref 37.5–51)
HDLC SERPL-MCNC: 27 MG/DL (ref 40–60)
HGB BLD-MCNC: 13.7 G/DL (ref 13–17.7)
IMM GRANULOCYTES # BLD AUTO: 0.04 10*3/MM3 (ref 0–0.05)
IMM GRANULOCYTES NFR BLD AUTO: 0.6 % (ref 0–0.5)
LDLC SERPL CALC-MCNC: 70 MG/DL (ref 0–100)
LDLC/HDLC SERPL: 2.21 {RATIO}
LYMPHOCYTES # BLD AUTO: 1.37 10*3/MM3 (ref 0.7–3.1)
LYMPHOCYTES NFR BLD AUTO: 22.2 % (ref 19.6–45.3)
MCH RBC QN AUTO: 28.1 PG (ref 26.6–33)
MCHC RBC AUTO-ENTMCNC: 33.9 G/DL (ref 31.5–35.7)
MCV RBC AUTO: 82.8 FL (ref 79–97)
MONOCYTES # BLD AUTO: 0.44 10*3/MM3 (ref 0.1–0.9)
MONOCYTES NFR BLD AUTO: 7.1 % (ref 5–12)
NEUTROPHILS NFR BLD AUTO: 4.08 10*3/MM3 (ref 1.7–7)
NEUTROPHILS NFR BLD AUTO: 66.3 % (ref 42.7–76)
NRBC BLD AUTO-RTO: 0 /100 WBC (ref 0–0.2)
PLATELET # BLD AUTO: 230 10*3/MM3 (ref 140–450)
PMV BLD AUTO: 10.2 FL (ref 6–12)
POTASSIUM SERPL-SCNC: 5.1 MMOL/L (ref 3.5–5.2)
PROT SERPL-MCNC: 7.1 G/DL (ref 6–8.5)
RBC # BLD AUTO: 4.88 10*6/MM3 (ref 4.14–5.8)
SODIUM SERPL-SCNC: 135 MMOL/L (ref 136–145)
TRIGL SERPL-MCNC: 271 MG/DL (ref 0–150)
VLDLC SERPL-MCNC: 44 MG/DL (ref 5–40)
WBC # BLD AUTO: 6.17 10*3/MM3 (ref 3.4–10.8)

## 2021-07-12 PROCEDURE — 82306 VITAMIN D 25 HYDROXY: CPT

## 2021-07-12 PROCEDURE — 80053 COMPREHEN METABOLIC PANEL: CPT

## 2021-07-12 PROCEDURE — 83036 HEMOGLOBIN GLYCOSYLATED A1C: CPT

## 2021-07-12 PROCEDURE — 85025 COMPLETE CBC W/AUTO DIFF WBC: CPT

## 2021-07-12 PROCEDURE — 80061 LIPID PANEL: CPT

## 2021-07-16 ENCOUNTER — DOCUMENTATION (OUTPATIENT)
Dept: ENDOCRINOLOGY | Facility: CLINIC | Age: 76
End: 2021-07-16

## 2021-07-16 NOTE — PROGRESS NOTES
Patients medicine from [employer] patient assistance program has arrived and patient will  today.

## 2021-07-19 ENCOUNTER — TELEPHONE (OUTPATIENT)
Dept: FAMILY MEDICINE CLINIC | Facility: CLINIC | Age: 76
End: 2021-07-19

## 2021-07-19 NOTE — TELEPHONE ENCOUNTER
----- Message from Andreas Martinez MD sent at 7/14/2021 11:07 AM CDT -----  Please let pt know that labwork stable    Continues to have high triglycerides    CMP shows kidney function at 64 from 74. Needs to drink more water.  Pt has CKD stage 2     Hga1c <8.0    Recheck on next visit thanks

## 2021-07-27 ENCOUNTER — OFFICE VISIT (OUTPATIENT)
Dept: GASTROENTEROLOGY | Facility: CLINIC | Age: 76
End: 2021-07-27

## 2021-07-27 VITALS
DIASTOLIC BLOOD PRESSURE: 57 MMHG | HEIGHT: 69 IN | BODY MASS INDEX: 29.24 KG/M2 | SYSTOLIC BLOOD PRESSURE: 111 MMHG | HEART RATE: 74 BPM | WEIGHT: 197.4 LBS

## 2021-07-27 DIAGNOSIS — K59.00 CONSTIPATION, UNSPECIFIED CONSTIPATION TYPE: Primary | ICD-10-CM

## 2021-07-27 DIAGNOSIS — K21.00 GASTROESOPHAGEAL REFLUX DISEASE WITH ESOPHAGITIS WITHOUT HEMORRHAGE: ICD-10-CM

## 2021-07-27 DIAGNOSIS — K57.30 DIVERTICULOSIS OF LARGE INTESTINE WITHOUT HEMORRHAGE: ICD-10-CM

## 2021-07-27 DIAGNOSIS — R10.13 EPIGASTRIC PAIN: ICD-10-CM

## 2021-07-27 PROCEDURE — 99213 OFFICE O/P EST LOW 20 MIN: CPT | Performed by: PHYSICIAN ASSISTANT

## 2021-08-03 ENCOUNTER — TELEPHONE (OUTPATIENT)
Dept: GASTROENTEROLOGY | Facility: CLINIC | Age: 76
End: 2021-08-03

## 2021-08-03 RX ORDER — LACTULOSE 10 G/15ML
20 SOLUTION ORAL 2 TIMES DAILY PRN
Qty: 473 ML | Refills: 1 | Status: SHIPPED | OUTPATIENT
Start: 2021-08-03 | End: 2022-07-26

## 2021-08-03 NOTE — TELEPHONE ENCOUNTER
Patient has been contacted and made aware that an rx for Lactulose has been sent to [facility]. Understanding has been voiced.

## 2021-08-03 NOTE — TELEPHONE ENCOUNTER
----- Message from Andreas Aldana PA-C sent at 8/3/2021 12:15 PM CDT -----  Contact: [phone]  sent  ----- Message -----  From: Cleo Stover MA  Sent: 8/3/2021  10:42 AM CDT  To: Andreas Aldana PA-C    Patient called his insurance and states that Lactulose is covered with a co pay of $4. Please send in to [facility].   ----- Message -----  From: Andreas Aldana PA-C  Sent: 8/1/2021   8:28 PM CDT  To: Cleo Stover MA    So he can call his insurance?  ----- Message -----  From: Cleo Stover MA  Sent: 7/30/2021  12:57 PM CDT  To: Andreas Aldana PA-C    Not showing the pharmacist if its a deductible issue or just a co pay.   ----- Message -----  From: Andreas Aldana PA-C  Sent: 7/30/2021  12:53 PM CDT  To: Cleo Stoevr MA    Is it coverage or deductible ?  ----- Message -----  From: Cleo Stover MA  Sent: 7/30/2021  11:24 AM CDT  To: Andreas Aldana PA-C      ----- Message -----  From: Mayito Solorzano  Sent: 7/30/2021  11:15 AM CDT  To: Cleo Stover MA, Antonina Moreno MA    Called to say that medication that was prescribed for constipation costs almost $400.00 and they cannot afford that, wants to know if there is anything else that can be called in for him. He uses [facility] on [address] in [place]. Pleases call when done.

## 2021-08-05 NOTE — PROGRESS NOTES
Chief Complaint  Chronic Kidney Disease, Diabetes, Hyperlipidemia, Heartburn, Anxiety, Hypertension, Coronary Artery Disease, and Vitamin D Deficiency    Subjective          Aj Card Jr. presents to [facility]  History of Present Illness     Pt is 74 yo male with history of GERD DM type 2, Vitamin B12 deficiency, HLP, HTN, ADRIENNE, CAD sp stent x 3 ,  Esophagitis, Gastritis, sp appendectomy sp cholecystectomy sp hernia repair surgery, sp hand surgery/ cataract surgery bilateral, abnormal EKG ( right ventricular dilation, LVH,  Grade 1 diastolic dysfunction, mild calcification of aortic valve)  CKD stage 2       4/17/21 in office visit for recheck on pt's above medical issues.. Pt continues to take his medications for DM type 2, HTN, HLP, CAD, atrial fibrillation,  ADRIENNE, chronic anticoagulation . Pt had labwork done on 4/7/21 that showed VItamin D stable vitamin B12 stable TSH is normal lipid panel shoed total choleterol at 125 with triglycerides at 240 HDL at 28 and LDL at 58. hga1c at 7.68. CMP showed glucose at 159 with GFR At 74 normal liver enzymes. CBC showed normal hemoglobin and WBC. Pt is doing well overall no chest pain.        8/18/21 in office visit for recheck on pt's above medical issues. Pt saw GI on 7/27/21 for his constipation. Pt had labwork done on 7/12/21 that showed normal hemoglobin and WBC. hga1c was at 7.59. CMP showed sodium at 135 with GFR at 64. Lipid panel showed triglcyeride at 271 from 240 with HDL at 27 LDL at 70. Vitamin D was stable. Saw Endcorinlogy on 8/10/21 for his DM type 2, He contineus to take his medcations for HTN, DM type 2, CAD, HLP, GERD, ADRIENNE.  BP on lower side today         Heartburn  He reports no abdominal pain, no belching, no chest pain, no choking, no coughing, no dysphagia, no early satiety, no globus sensation, no heartburn, no hoarse voice, no nausea, no sore throat, no stridor, no tooth decay, no water brash or no wheezing.  This is a recurrent problem. The current episode started more than 1 month ago. The problem occurs constantly. The problem has been unchanged. Nothing aggravates the symptoms. Associated symptoms include fatigue. Pertinent negatives include no muscle weakness. He has tried a PPI for the symptoms. The treatment provided moderate relief. Past procedures do not include an abdominal ultrasound, an EGD, esophageal manometry, esophageal pH monitoring, H. pylori antibody titer or a UGI.   Coronary Artery Disease  Presents for follow-up visit. Pertinent negatives include no chest pain, chest pressure, chest tightness, dizziness, leg swelling, muscle weakness, palpitations, shortness of breath or weight gain. The symptoms have been stable. Compliance with diet is good. Compliance with exercise is good. Compliance with medications is good.   Chronic Kidney Disease  This is a chronic problem. The current episode started more than 1 year ago. The problem occurs constantly. Associated symptoms include arthralgias, fatigue, numbness and weakness. Pertinent negatives include no abdominal pain, chest pain, coughing, nausea or sore throat. He has tried nothing for the symptoms. The treatment provided no relief.   Atrial Fibrillation   Presents for follow-up visit. Symptoms include dizziness, palpitations, shortness of breath and weakness. Symptoms are negative for an AICD problem, bradycardia, chest pain, hemodynamic instability, hypertension, hypotension, pacemaker problem and tachycardia. The symptoms have been stable. Past medical history includes atrial fibrillation. There are no medication compliance problems.   Hypertension   This is a chronic problem. The current episode started more than 1 year ago. The problem is controlled. Pertinent negatives include no anxiety, blurred vision, chest pain, headaches, malaise/fatigue, neck pain, palpitations, peripheral edema, PND, shortness of breath or sweats. Risk factors for coronary  artery disease include diabetes mellitus, dyslipidemia, sedentary lifestyle and male gender. There are no compliance problems.  Hypertensive end-organ damage includes CAD/MI. There is no history of angina, kidney disease, CVA, heart failure, left ventricular hypertrophy, PVD or retinopathy. There is no history of chronic renal disease, coarctation of the aorta, hyperaldosteronism, hypercortisolism, hyperparathyroidism, a hypertension causing med, pheochromocytoma, renovascular disease, sleep apnea or a thyroid problem.   Diabetes   He presents for his follow-up diabetic visit. He has type 2 diabetes mellitus. His disease course has been stable. Hypoglycemia symptoms include nervousness/anxiousness. Pertinent negatives for hypoglycemia include no confusion, dizziness, headaches or sweats. Pertinent negatives for diabetes include no blurred vision, no chest pain, no fatigue, no foot paresthesias, no foot ulcerations, no polydipsia, no polyphagia, no polyuria, no visual change, no weakness and no weight loss. Pertinent negatives for hypoglycemia complications include no blackouts and no hospitalization. Symptoms are stable. Pertinent negatives for diabetic complications include no CVA, impotence, PVD or retinopathy. Risk factors for coronary artery disease include diabetes mellitus, dyslipidemia, hypertension and male sex. Current diabetic treatment includes oral agent (dual therapy). He is compliant with treatment most of the time. His weight is stable. An ACE inhibitor/angiotensin II receptor blocker is being taken. He does not see a podiatrist.Eye exam is not current.   Anxiety   Presents for follow-up visit. Symptoms include excessive worry and nervous/anxious behavior. Patient reports no chest pain, compulsions, confusion, decreased concentration, depressed mood, dizziness, dry mouth, feeling of choking, hyperventilation, impotence, insomnia, irritability, malaise, muscle  "tension, nausea, obsessions, palpitations, panic, restlessness, shortness of breath or suicidal ideas.      Objective   Vital Signs:   BP 98/52 (BP Location: Left arm, Patient Position: Sitting, Cuff Size: Large Adult)   Pulse 78   Temp 97.5 °F (36.4 °C)   Ht 175.3 cm (69\")   Wt 88.3 kg (194 lb 9.6 oz)   SpO2 98%   BMI 28.74 kg/m²     Physical Exam   Result Review :                 Assessment and Plan    Diagnoses and all orders for this visit:    1. Uncontrolled type 2 diabetes mellitus with hyperglycemia (CMS/[facility]) (Primary)    2. Vitamin D deficiency  -     ALPRAZolam (XANAX) 0.5 MG tablet; Take 1 tablet by mouth At Night As Needed for Anxiety.  Dispense: 30 tablet; Refill: 0    3. Mixed hyperlipidemia  -     ALPRAZolam (XANAX) 0.5 MG tablet; Take 1 tablet by mouth At Night As Needed for Anxiety.  Dispense: 30 tablet; Refill: 0    4. Overweight  -     ALPRAZolam (XANAX) 0.5 MG tablet; Take 1 tablet by mouth At Night As Needed for Anxiety.  Dispense: 30 tablet; Refill: 0    5. PAF (paroxysmal atrial fibrillation) (CMS/[facility])    6. Essential hypertension  -     ALPRAZolam (XANAX) 0.5 MG tablet; Take 1 tablet by mouth At Night As Needed for Anxiety.  Dispense: 30 tablet; Refill: 0    7. Generalized anxiety disorder  -     ALPRAZolam (XANAX) 0.5 MG tablet; Take 1 tablet by mouth At Night As Needed for Anxiety.  Dispense: 30 tablet; Refill: 0    8. Coronary artery disease with angina pectoris, unspecified vessel or lesion type, unspecified whether native or transplanted heart (CMS/[facility])  -     ALPRAZolam (XANAX) 0.5 MG tablet; Take 1 tablet by mouth At Night As Needed for Anxiety.  Dispense: 30 tablet; Refill: 0    9. Gastroesophageal reflux disease with esophagitis  -     sucralfate (CARAFATE) 1 g tablet; Take 1 tablet by mouth 2 (two) times a day.  Dispense: 180 tablet; Refill: 0    10. Epigastric pain  -     sucralfate (CARAFATE) 1 g tablet; Take 1 tablet by mouth 2 (two) times a day.  Dispense: 180 tablet; " Refill: 0    11. Heartburn  -     sucralfate (CARAFATE) 1 g tablet; Take 1 tablet by mouth 2 (two) times a day.  Dispense: 180 tablet; Refill: 0    Other orders  -     lisinopril (PRINIVIL,ZESTRIL) 40 MG tablet; Take 1 tablet by mouth Daily.  Dispense: 90 tablet; Refill: 1  -     pantoprazole (Protonix) 40 MG EC tablet; Take 1 tablet by mouth Daily.  Dispense: 30 tablet; Refill: 5  -     Discontinue: amLODIPine (NORVASC) 10 MG tablet; Take 1 tablet by mouth Daily.  Dispense: 90 tablet; Refill: 1  -     metoprolol tartrate (LOPRESSOR) 50 MG tablet; Take 1 tablet by mouth 2 (Two) Times a Day.  Dispense: 60 tablet; Refill: 3  -     isosorbide mononitrate (IMDUR) 30 MG 24 hr tablet; Take 1 tablet by mouth Daily.  Dispense: 30 tablet; Refill: 3  -     Icosapent Ethyl 0.5 g capsule; Take 2 capsules by mouth 2 (two) times a day.  Dispense: 180 capsule; Refill: 3        -recommend labowrk   -recommend COVID-19 vaccination  --recommend shingles vaccination   -CKD stage 2 - gave information. Continue to monitor   -DM type 2-  Endocrinology following  Pt could   not tolerate synjardy 100/1000 mg daily.  go up on trulcity 3 mg subqweekly  weekly On novoloin R 20-25  units with meals.  Tresiba 25 untis at bedtime. Gave samples today   -HLP  - on  Red yeast rice.on Vascepa 1 gram PO BID  -GERD -continue nexium. Gastroenterology following   -overweight - weght loss information provided. Counseled >5 minutes BMI at 28.74   -ADRIENNE - pt is on xanax. PT prefers to stay on xanax. FRANK printed and on file refilled xanax  REF number [id number]  -hypertension -on norvasc 10 mg pO q daily stop norvasc since BP is lower side  on  lisinopril 40 mg daily. On lopressor 50 mg PO BID. adivsed pt to bring BP readings next visit   -pAF - cardiology following -currently rate controlled eliquis 5 mg PO BId,  loptressor 50 mg every 12 hours, imdur 30 mg PO q daily.  On propafenoe 150 mg every 8 hours   -CAD-sp stent  Cardiology following. Aspirin 81 mg  daily.  Off plavix 75 mg QOD,  Lopressor 50 mg PO BID, lisinopril 10 mg daily.  On imdur 30 mg PO q daily.  Off lipitor.  Advised pt to call regarding Cardiology   -vitamin D - vitamin D supplement daily.   -advised pt to be safe and call with questions and concerns  -advised to go to ER or call 911 if symptoms worrisome or severe  -advised to folllowup with referrals and Specialist   -advised pt to be safe during COVID-19 pandemic   I spent 25 minutes caring for Aj on this date of service. This time includes time spent by me in the following activities: preparing for the visit, reviewing tests, obtaining and/or reviewing a separately obtained history, performing a medically appropriate examination and/or evaluation, counseling and educating the patient/family/caregiver, ordering medications, tests, or procedures, referring and communicating with other health care professionals, documenting information in the medical record, independently interpreting results and communicating that information with the patient/family/caregiver and care coordination        This document has been electronically signed by Andreas Martinez MD on August 18, 2021 11:46 CDT          Follow Up   Return in about 3 months (around 11/18/2021).  Patient was given instructions and counseling regarding his condition or for health maintenance advice. Please see specific information pulled into the AVS if appropriate.

## 2021-08-10 ENCOUNTER — OFFICE VISIT (OUTPATIENT)
Dept: ENDOCRINOLOGY | Facility: CLINIC | Age: 76
End: 2021-08-10

## 2021-08-10 VITALS
OXYGEN SATURATION: 98 % | SYSTOLIC BLOOD PRESSURE: 110 MMHG | BODY MASS INDEX: 29.18 KG/M2 | DIASTOLIC BLOOD PRESSURE: 60 MMHG | WEIGHT: 197 LBS | HEART RATE: 72 BPM | HEIGHT: 69 IN

## 2021-08-10 DIAGNOSIS — E55.9 VITAMIN D DEFICIENCY: ICD-10-CM

## 2021-08-10 DIAGNOSIS — E11.65 TYPE 2 DIABETES MELLITUS WITH HYPERGLYCEMIA, WITHOUT LONG-TERM CURRENT USE OF INSULIN (HCC): Primary | ICD-10-CM

## 2021-08-10 DIAGNOSIS — E78.2 MIXED HYPERLIPIDEMIA: ICD-10-CM

## 2021-08-10 DIAGNOSIS — K76.0 NAFLD (NONALCOHOLIC FATTY LIVER DISEASE): ICD-10-CM

## 2021-08-10 DIAGNOSIS — I10 ESSENTIAL HYPERTENSION: ICD-10-CM

## 2021-08-10 PROCEDURE — 99214 OFFICE O/P EST MOD 30 MIN: CPT | Performed by: INTERNAL MEDICINE

## 2021-08-10 RX ORDER — DULAGLUTIDE 3 MG/.5ML
3 INJECTION, SOLUTION SUBCUTANEOUS WEEKLY
Qty: 12 PEN | Refills: 3 | Status: ON HOLD | OUTPATIENT
Start: 2021-08-10

## 2021-08-10 NOTE — PROGRESS NOTES
"Aj Card Jr. is a 75 y.o. male who presents for  evaluation of type 2 diabetes        Diabetes     Duration since age 71 years old     Complications - CAD     Current symptoms/problems  none     Alleviating Factors: Compliance       Current diet  in general, a \"healthy\" diet      Current exercise walking    Current monitoring regimen: home blood tests - checking 2x daily   Home blood sugar records: at goal     Hypoglycemia none      Review of Systems    Review of Systems     Objective:   /60   Pulse 72   Ht 175.3 cm (69\")   Wt 89.4 kg (197 lb)   SpO2 98%   BMI 29.09 kg/m²     Physical Exam   HENT:   Head: Normocephalic.   Cardiovascular: Normal rate and regular rhythm.   Pulmonary/Chest: Effort normal and breath sounds normal.   Abdominal: Normal appearance.   Musculoskeletal:      Right lower leg: No edema.      Left lower leg: No edema.   Neurological: He is alert.       Lab Review          Assessment/Plan       ICD-10-CM ICD-9-CM   1. Type 2 diabetes mellitus with hyperglycemia, without long-term current use of insulin (CMS/[facility])  E11.65 250.00     790.29   2. Essential hypertension  I10 401.9   3. Mixed hyperlipidemia  E78.2 272.2   4. Vitamin D deficiency  E55.9 268.9   5. NAFLD (nonalcoholic fatty liver disease)  K76.0 571.8       Pt has type 2 Diabetes with CAD     Glycemic Management:   Lab Results   Component Value Date    HGBA1C 7.59 (H) 07/12/2021    HGBA1C 7.68 (H) 04/07/2021    HGBA1C 6.90 (H) 10/05/2020     Lab Results   Component Value Date    GLUCOSE 180 (H) 07/12/2021    BUN 19 07/12/2021    CREATININE 1.11 07/12/2021    EGFRIFNONA 64 07/12/2021    BCR 17.1 07/12/2021    K 5.1 07/12/2021    CO2 25.2 07/12/2021    CALCIUM 9.0 07/12/2021    ALBUMIN 4.50 07/12/2021    AST 16 07/12/2021    ALT 18 07/12/2021    ANIONGAP 8.8 07/12/2021     Lab Results   Component Value Date    WBC 6.17 07/12/2021    HGB 13.7 07/12/2021    HCT 40.4 07/12/2021    MCV 82.8 07/12/2021     " 07/12/2021      side effects to metformin     jardiance too expensive     Trulicity 0.75 mg weekly --- stop due to cost     Restart 15 units once daily -- may increase up to 30     Novolin R 20 to 25 w every meal --- decrease in half          Lipid Management  Lab Results   Component Value Date    CHOL 141 07/12/2021    CHOL 125 04/07/2021    CHOL 167 10/05/2020     Lab Results   Component Value Date    TRIG 271 (H) 07/12/2021    TRIG 240 (H) 04/07/2021    TRIG 350 (H) 10/05/2020     Lab Results   Component Value Date    HDL 27 (L) 07/12/2021    HDL 28 (L) 04/07/2021    HDL 35 (L) 10/05/2020     No components found for: LDLCALC  Lab Results   Component Value Date    LDL 70 07/12/2021    LDL 58 04/07/2021    LDL 62 10/05/2020       On atorvastatin - preferred not to take    Taking red yeast rice     Try at least crestor 5 mg weekly -- prefers not to       Blood Pressure Management:    Vitals:    08/10/21 1029   BP: 110/60   Pulse: 72   SpO2: 98%     Lab Results   Component Value Date    GLUCOSE 180 (H) 07/12/2021    CALCIUM 9.0 07/12/2021     (L) 07/12/2021    K 5.1 07/12/2021    CO2 25.2 07/12/2021     07/12/2021    BUN 19 07/12/2021    CREATININE 1.11 07/12/2021    EGFRIFNONA 64 07/12/2021    BCR 17.1 07/12/2021    ANIONGAP 8.8 07/12/2021     Controlled on ACE I regimen           Microvascular Complication Monitoring:      Eye Exam Evaluation  Within 1 year     -----------    Last Microalbumin-Proteinuria Assessment    Lab Results   Component Value Date    MALBCRERATIO  04/07/2021      Comment:      Unable to calculate    MALBCRERATIO  10/05/2020      Comment:      Unable to calculate    MALBCRERATIO  06/16/2020      Comment:      Unable to calculate       No results found for: UTPCR    -----------      Neuropathy        Weight Related:   Wt Readings from Last 3 Encounters:   08/10/21 89.4 kg (197 lb)   07/27/21 89.5 kg (197 lb 6.4 oz)   04/14/21 88.5 kg (195 lb)     Body mass index is 29.09  kg/m².        Diet interventions: moderate (500 kCal/d) deficit diet.      Bone Health    Lab Results   Component Value Date    CALCIUM 9.0 07/12/2021    QPLI96WC 48.8 07/12/2021       Thyroid Health    Lab Results   Component Value Date    TSH 2.510 04/07/2021    TSH 1.920 10/05/2020    TSH 2.430 06/16/2020         Other Diabetes Related Aspects       Lab Results   Component Value Date    DTVDYVWH01 604 04/07/2021     FATTY LIVER    Weight loss   Vitamin E 200 units daily - could have been considered       No orders of the defined types were placed in this encounter.        A copy of my note was sent to Andreas Martinez MD    Please see my above opinion and suggestions.     I spent 15 minutes reviewing patient electronic chart , reviewing medications , past history , active problems.   I provided advice regarding management of medical conditions, refilled prescriptions , ordered labs and arranged for future appointment.   Patient was advised to contact us if there were any unanswered questions or ongoing concerns.

## 2021-08-18 ENCOUNTER — OFFICE VISIT (OUTPATIENT)
Dept: FAMILY MEDICINE CLINIC | Facility: CLINIC | Age: 76
End: 2021-08-18

## 2021-08-18 VITALS
DIASTOLIC BLOOD PRESSURE: 52 MMHG | OXYGEN SATURATION: 98 % | WEIGHT: 194.6 LBS | HEART RATE: 78 BPM | HEIGHT: 69 IN | BODY MASS INDEX: 28.82 KG/M2 | TEMPERATURE: 97.5 F | SYSTOLIC BLOOD PRESSURE: 98 MMHG

## 2021-08-18 DIAGNOSIS — E78.2 MIXED HYPERLIPIDEMIA: ICD-10-CM

## 2021-08-18 DIAGNOSIS — R10.13 EPIGASTRIC PAIN: ICD-10-CM

## 2021-08-18 DIAGNOSIS — F41.1 GENERALIZED ANXIETY DISORDER: ICD-10-CM

## 2021-08-18 DIAGNOSIS — I48.0 PAF (PAROXYSMAL ATRIAL FIBRILLATION) (HCC): ICD-10-CM

## 2021-08-18 DIAGNOSIS — I25.119 CORONARY ARTERY DISEASE WITH ANGINA PECTORIS, UNSPECIFIED VESSEL OR LESION TYPE, UNSPECIFIED WHETHER NATIVE OR TRANSPLANTED HEART (HCC): ICD-10-CM

## 2021-08-18 DIAGNOSIS — R12 HEARTBURN: ICD-10-CM

## 2021-08-18 DIAGNOSIS — K21.00 GASTROESOPHAGEAL REFLUX DISEASE WITH ESOPHAGITIS: ICD-10-CM

## 2021-08-18 DIAGNOSIS — E66.3 OVERWEIGHT: ICD-10-CM

## 2021-08-18 DIAGNOSIS — I10 ESSENTIAL HYPERTENSION: ICD-10-CM

## 2021-08-18 DIAGNOSIS — E11.65 UNCONTROLLED TYPE 2 DIABETES MELLITUS WITH HYPERGLYCEMIA (HCC): Primary | ICD-10-CM

## 2021-08-18 DIAGNOSIS — E55.9 VITAMIN D DEFICIENCY: ICD-10-CM

## 2021-08-18 PROCEDURE — 99213 OFFICE O/P EST LOW 20 MIN: CPT | Performed by: FAMILY MEDICINE

## 2021-08-18 RX ORDER — AMLODIPINE BESYLATE 10 MG/1
10 TABLET ORAL DAILY
Qty: 90 TABLET | Refills: 1 | Status: SHIPPED | OUTPATIENT
Start: 2021-08-18 | End: 2021-08-18

## 2021-08-18 RX ORDER — PANTOPRAZOLE SODIUM 40 MG/1
40 TABLET, DELAYED RELEASE ORAL DAILY
Qty: 30 TABLET | Refills: 5 | Status: SHIPPED | OUTPATIENT
Start: 2021-08-18 | End: 2021-11-16 | Stop reason: SDUPTHER

## 2021-08-18 RX ORDER — METOPROLOL TARTRATE 50 MG/1
50 TABLET, FILM COATED ORAL 2 TIMES DAILY
Qty: 60 TABLET | Refills: 3 | Status: SHIPPED | OUTPATIENT
Start: 2021-08-18 | End: 2022-03-28

## 2021-08-18 RX ORDER — SUCRALFATE 1 G/1
1 TABLET ORAL 2 TIMES DAILY
Qty: 180 TABLET | Refills: 0 | Status: SHIPPED | OUTPATIENT
Start: 2021-08-18 | End: 2023-02-17 | Stop reason: SDUPTHER

## 2021-08-18 RX ORDER — ICOSAPENT ETHYL 500 MG/1
2 CAPSULE, LIQUID FILLED ORAL 2 TIMES DAILY
Qty: 180 CAPSULE | Refills: 3 | Status: SHIPPED | OUTPATIENT
Start: 2021-08-18 | End: 2021-08-19 | Stop reason: DRUGHIGH

## 2021-08-18 RX ORDER — LISINOPRIL 40 MG/1
40 TABLET ORAL DAILY
Qty: 90 TABLET | Refills: 1 | Status: SHIPPED | OUTPATIENT
Start: 2021-08-18 | End: 2021-11-16 | Stop reason: SDUPTHER

## 2021-08-18 RX ORDER — ALPRAZOLAM 0.5 MG/1
0.5 TABLET ORAL NIGHTLY PRN
Qty: 30 TABLET | Refills: 0 | Status: SHIPPED | OUTPATIENT
Start: 2021-08-18 | End: 2021-11-16 | Stop reason: SDUPTHER

## 2021-08-18 RX ORDER — ISOSORBIDE MONONITRATE 30 MG/1
30 TABLET, EXTENDED RELEASE ORAL DAILY
Qty: 30 TABLET | Refills: 3 | Status: SHIPPED | OUTPATIENT
Start: 2021-08-18 | End: 2021-11-16 | Stop reason: SDUPTHER

## 2021-08-18 NOTE — PATIENT INSTRUCTIONS
Stop norvasc/amlodipine     Check BP often    Goal <130/90     Continue vascepa 2 grams twice a day for triglcyerdies

## 2021-08-19 RX ORDER — ICOSAPENT ETHYL 1000 MG/1
2 CAPSULE ORAL 2 TIMES DAILY WITH MEALS
Qty: 120 CAPSULE | Refills: 3 | Status: SHIPPED | OUTPATIENT
Start: 2021-08-19 | End: 2022-10-28

## 2021-09-13 ENCOUNTER — DOCUMENTATION (OUTPATIENT)
Dept: ENDOCRINOLOGY | Facility: CLINIC | Age: 76
End: 2021-09-13

## 2021-11-04 ENCOUNTER — DOCUMENTATION (OUTPATIENT)
Dept: ENDOCRINOLOGY | Facility: CLINIC | Age: 76
End: 2021-11-04

## 2021-11-16 ENCOUNTER — OFFICE VISIT (OUTPATIENT)
Dept: FAMILY MEDICINE CLINIC | Facility: CLINIC | Age: 76
End: 2021-11-16

## 2021-11-16 VITALS
OXYGEN SATURATION: 99 % | HEART RATE: 80 BPM | SYSTOLIC BLOOD PRESSURE: 132 MMHG | BODY MASS INDEX: 29.53 KG/M2 | TEMPERATURE: 97.7 F | WEIGHT: 199.4 LBS | HEIGHT: 69 IN | DIASTOLIC BLOOD PRESSURE: 64 MMHG

## 2021-11-16 DIAGNOSIS — E66.3 OVERWEIGHT: ICD-10-CM

## 2021-11-16 DIAGNOSIS — R42 VERTIGO: ICD-10-CM

## 2021-11-16 DIAGNOSIS — H93.13 TINNITUS OF BOTH EARS: ICD-10-CM

## 2021-11-16 DIAGNOSIS — R42 DIZZINESS: Primary | ICD-10-CM

## 2021-11-16 DIAGNOSIS — I25.119 CORONARY ARTERY DISEASE WITH ANGINA PECTORIS, UNSPECIFIED VESSEL OR LESION TYPE, UNSPECIFIED WHETHER NATIVE OR TRANSPLANTED HEART (HCC): ICD-10-CM

## 2021-11-16 DIAGNOSIS — E11.65 UNCONTROLLED TYPE 2 DIABETES MELLITUS WITH HYPERGLYCEMIA (HCC): ICD-10-CM

## 2021-11-16 DIAGNOSIS — I48.0 PAF (PAROXYSMAL ATRIAL FIBRILLATION) (HCC): ICD-10-CM

## 2021-11-16 DIAGNOSIS — F41.1 GENERALIZED ANXIETY DISORDER: ICD-10-CM

## 2021-11-16 DIAGNOSIS — I10 ESSENTIAL HYPERTENSION: ICD-10-CM

## 2021-11-16 DIAGNOSIS — E78.2 MIXED HYPERLIPIDEMIA: ICD-10-CM

## 2021-11-16 DIAGNOSIS — E55.9 VITAMIN D DEFICIENCY: ICD-10-CM

## 2021-11-16 PROCEDURE — 99214 OFFICE O/P EST MOD 30 MIN: CPT | Performed by: FAMILY MEDICINE

## 2021-11-16 RX ORDER — ISOSORBIDE MONONITRATE 30 MG/1
30 TABLET, EXTENDED RELEASE ORAL DAILY
Qty: 30 TABLET | Refills: 3 | Status: SHIPPED | OUTPATIENT
Start: 2021-11-16 | End: 2022-03-28

## 2021-11-16 RX ORDER — LISINOPRIL 40 MG/1
40 TABLET ORAL DAILY
Qty: 90 TABLET | Refills: 1 | Status: SHIPPED | OUTPATIENT
Start: 2021-11-16 | End: 2022-04-26

## 2021-11-16 RX ORDER — PANTOPRAZOLE SODIUM 40 MG/1
40 TABLET, DELAYED RELEASE ORAL DAILY
Qty: 30 TABLET | Refills: 5 | Status: SHIPPED | OUTPATIENT
Start: 2021-11-16 | End: 2022-03-28

## 2021-11-16 RX ORDER — MECLIZINE HCL 12.5 MG/1
12.5 TABLET ORAL 3 TIMES DAILY PRN
Qty: 90 TABLET | Refills: 2 | Status: SHIPPED | OUTPATIENT
Start: 2021-11-16 | End: 2022-02-23

## 2021-11-16 RX ORDER — ALPRAZOLAM 0.5 MG/1
0.5 TABLET ORAL NIGHTLY PRN
Qty: 30 TABLET | Refills: 0 | Status: SHIPPED | OUTPATIENT
Start: 2021-11-16 | End: 2022-05-26 | Stop reason: SDUPTHER

## 2021-11-16 NOTE — PATIENT INSTRUCTIONS
Meclizine tablets or capsules  What is this medicine?  MECLIZINE (JULIA valladares) is an antihistamine. It is used to prevent nausea, vomiting, or dizziness caused by motion sickness. It is also used to prevent and treat vertigo (extreme dizziness or a feeling that you or your surroundings are tilting or spinning around).  This medicine may be used for other purposes; ask your health care provider or pharmacist if you have questions.  COMMON BRAND NAME(S): Antivert, Dramamine Less Drowsy, Dramamine-N, Medivert, Meni-D  What should I tell my health care provider before I take this medicine?  They need to know if you have any of these conditions:  · glaucoma  · lung or breathing disease, like asthma  · problems urinating  · prostate disease  · stomach or intestine problems  · an unusual or allergic reaction to meclizine, other medicines, foods, dyes, or preservatives  · pregnant or trying to get pregnant  · breast-feeding  How should I use this medicine?  Take this medicine by mouth with a glass of water. Follow the directions on the prescription label. If you are using this medicine to prevent motion sickness, take the dose at least 1 hour before travel. If it upsets your stomach, take it with food or milk. Take your doses at regular intervals. Do not take your medicine more often than directed.  Talk to your pediatrician regarding the use of this medicine in children. Special care may be needed.  Overdosage: If you think you have taken too much of this medicine contact a poison control center or emergency room at once.  NOTE: This medicine is only for you. Do not share this medicine with others.  What if I miss a dose?  If you miss a dose, take it as soon as you can. If it is almost time for your next dose, take only that dose. Do not take double or extra doses.  What may interact with this medicine?  Do not take this medicine with any of the following medications:  · MAOIs like Carbex, Eldepryl, Marplan, Nardil, and  Parnate  This medicine may also interact with the following medications:  · alcohol  · antihistamines for allergy, cough and cold  · certain medicines for anxiety or sleep  · certain medicines for depression, like amitriptyline, fluoxetine, sertraline  · certain medicines for seizures like phenobarbital, primidone  · general anesthetics like halothane, isoflurane, methoxyflurane, propofol  · local anesthetics like lidocaine, pramoxine, tetracaine  · medicines that relax muscles for surgery  · narcotic medicines for pain  · phenothiazines like chlorpromazine, mesoridazine, prochlorperazine, thioridazine  This list may not describe all possible interactions. Give your health care provider a list of all the medicines, herbs, non-prescription drugs, or dietary supplements you use. Also tell them if you smoke, drink alcohol, or use illegal drugs. Some items may interact with your medicine.  What should I watch for while using this medicine?  Tell your doctor or healthcare professional if your symptoms do not start to get better or if they get worse.  You may get drowsy or dizzy. Do not drive, use machinery, or do anything that needs mental alertness until you know how this medicine affects you. Do not stand or sit up quickly, especially if you are an older patient. This reduces the risk of dizzy or fainting spells. Alcohol may interfere with the effect of this medicine. Avoid alcoholic drinks.  Your mouth may get dry. Chewing sugarless gum or sucking hard candy, and drinking plenty of water may help. Contact your doctor if the problem does not go away or is severe.  This medicine may cause dry eyes and blurred vision. If you wear contact lenses you may feel some discomfort. Lubricating drops may help. See your eye doctor if the problem does not go away or is severe.  What side effects may I notice from receiving this medicine?  Side effects that you should report to your doctor or health care professional as soon as  possible:  · feeling faint or lightheaded, falls  · fast, irregular heartbeat  Side effects that usually do not require medical attention (report to your doctor or health care professional if they continue or are bothersome):  · constipation  · headache  · trouble passing urine or change in the amount of urine  · trouble sleeping  · upset stomach  This list may not describe all possible side effects. Call your doctor for medical advice about side effects. You may report side effects to FDA at 6-125-WGJ-1832.  Where should I keep my medicine?  Keep out of the reach of children.  Store at room temperature between 15 and 30 degrees C (59 and 86 degrees F). Keep container tightly closed. Throw away any unused medicine after the expiration date.  NOTE: This sheet is a summary. It may not cover all possible information. If you have questions about this medicine, talk to your doctor, pharmacist, or health care provider.  © 2021 [employer] Standard (2017-01-18 19:41:02)  Meclizine tablets or capsules  What is this medicine?  MECLIZINE (JULIA valladares) is an antihistamine. It is used to prevent nausea, vomiting, or dizziness caused by motion sickness. It is also used to prevent and treat vertigo (extreme dizziness or a feeling that you or your surroundings are tilting or spinning around).  This medicine may be used for other purposes; ask your health care provider or pharmacist if you have questions.  COMMON BRAND NAME(S): Antivert, Dramamine Less Drowsy, Dramamine-N, Medivert, Meni-D  What should I tell my health care provider before I take this medicine?  They need to know if you have any of these conditions:  · glaucoma  · lung or breathing disease, like asthma  · problems urinating  · prostate disease  · stomach or intestine problems  · an unusual or allergic reaction to meclizine, other medicines, foods, dyes, or preservatives  · pregnant or trying to get pregnant  · breast-feeding  How should I use this medicine?  Take  this medicine by mouth with a glass of water. Follow the directions on the prescription label. If you are using this medicine to prevent motion sickness, take the dose at least 1 hour before travel. If it upsets your stomach, take it with food or milk. Take your doses at regular intervals. Do not take your medicine more often than directed.  Talk to your pediatrician regarding the use of this medicine in children. Special care may be needed.  Overdosage: If you think you have taken too much of this medicine contact a poison control center or emergency room at once.  NOTE: This medicine is only for you. Do not share this medicine with others.  What if I miss a dose?  If you miss a dose, take it as soon as you can. If it is almost time for your next dose, take only that dose. Do not take double or extra doses.  What may interact with this medicine?  Do not take this medicine with any of the following medications:  · MAOIs like Carbex, Eldepryl, Marplan, Nardil, and Parnate  This medicine may also interact with the following medications:  · alcohol  · antihistamines for allergy, cough and cold  · certain medicines for anxiety or sleep  · certain medicines for depression, like amitriptyline, fluoxetine, sertraline  · certain medicines for seizures like phenobarbital, primidone  · general anesthetics like halothane, isoflurane, methoxyflurane, propofol  · local anesthetics like lidocaine, pramoxine, tetracaine  · medicines that relax muscles for surgery  · narcotic medicines for pain  · phenothiazines like chlorpromazine, mesoridazine, prochlorperazine, thioridazine  This list may not describe all possible interactions. Give your health care provider a list of all the medicines, herbs, non-prescription drugs, or dietary supplements you use. Also tell them if you smoke, drink alcohol, or use illegal drugs. Some items may interact with your medicine.  What should I watch for while using this medicine?  Tell your doctor or  healthcare professional if your symptoms do not start to get better or if they get worse.  You may get drowsy or dizzy. Do not drive, use machinery, or do anything that needs mental alertness until you know how this medicine affects you. Do not stand or sit up quickly, especially if you are an older patient. This reduces the risk of dizzy or fainting spells. Alcohol may interfere with the effect of this medicine. Avoid alcoholic drinks.  Your mouth may get dry. Chewing sugarless gum or sucking hard candy, and drinking plenty of water may help. Contact your doctor if the problem does not go away or is severe.  This medicine may cause dry eyes and blurred vision. If you wear contact lenses you may feel some discomfort. Lubricating drops may help. See your eye doctor if the problem does not go away or is severe.  What side effects may I notice from receiving this medicine?  Side effects that you should report to your doctor or health care professional as soon as possible:  · feeling faint or lightheaded, falls  · fast, irregular heartbeat  Side effects that usually do not require medical attention (report to your doctor or health care professional if they continue or are bothersome):  · constipation  · headache  · trouble passing urine or change in the amount of urine  · trouble sleeping  · upset stomach  This list may not describe all possible side effects. Call your doctor for medical advice about side effects. You may report side effects to FDA at 8-773-FDA-0830.  Where should I keep my medicine?  Keep out of the reach of children.  Store at room temperature between 15 and 30 degrees C (59 and 86 degrees F). Keep container tightly closed. Throw away any unused medicine after the expiration date.  NOTE: This sheet is a summary. It may not cover all possible information. If you have questions about this medicine, talk to your doctor, pharmacist, or health care provider.  © 2021 [employer] Standard (2017-01-18  19:41:02)    How to Perform the Epley Maneuver  The Epley maneuver is an exercise that relieves symptoms of vertigo. Vertigo is the feeling that you or your surroundings are moving when they are not. When you feel vertigo, you may feel like the room is spinning and may have trouble walking. The Epley maneuver is used for a type of vertigo caused by a calcium deposit in a part of the inner ear. The maneuver involves changing head positions to help the deposit move out of the area.  You can do this maneuver at home whenever you have symptoms of vertigo. You can repeat it in 24 hours if your vertigo has not gone away.  Even though the Epley maneuver may relieve your vertigo for a few weeks, it is possible that your symptoms will return. This maneuver relieves vertigo, but it does not relieve dizziness.  What are the risks?  If it is done correctly, the Epley maneuver is considered safe. Sometimes it can lead to dizziness or nausea that goes away after a short time. If you develop other symptoms--such as changes in vision, weakness, or numbness--stop doing the maneuver and call your health care provider.  Supplies needed:  · A bed or table.  · A pillow.  How to do the Epley maneuver         1. Sit on the edge of a bed or table with your back straight and your legs extended or hanging over the edge of the bed or table.  2. Turn your head snf toward the affected ear or side as told by your health care provider.  3. Lie backward quickly with your head turned until you are lying flat on your back. You may want to position a pillow under your shoulders.  4. Hold this position for at least 30 seconds. If you feel dizzy or have symptoms of vertigo, continue to hold the position until the symptoms stop.  5. Turn your head to the opposite direction until your unaffected ear is facing the floor.  6. Hold this position for at least 30 seconds. If you feel dizzy or have symptoms of vertigo, continue to hold the position until  the symptoms stop.  7. Turn your whole body to the same side as your head so that you are positioned on your side. Your head will now be nearly facedown. Hold for at least 30 seconds. If you feel dizzy or have symptoms of vertigo, continue to hold the position until the symptoms stop.  8. Sit back up.  You can repeat the maneuver in 24 hours if your vertigo does not go away.  Follow these instructions at home:  For 24 hours after doing the Epley maneuver:  · Keep your head in an upright position.  · When lying down to sleep or rest, keep your head raised (elevated) with two or more pillows.  · Avoid excessive neck movements.  Activity  · Do not drive or use machinery if you feel dizzy.  · After doing the Epley maneuver, return to your normal activities as told by your health care provider. Ask your health care provider what activities are safe for you.  General instructions  · Drink enough fluid to keep your urine pale yellow.  · Do not drink alcohol.  · Take over-the-counter and prescription medicines only as told by your health care provider.  · Keep all follow-up visits as told by your health care provider. This is important.  Preventing vertigo symptoms  Ask your health care provider if there is anything you should do at home to prevent vertigo. He or she may recommend that you:  · Keep your head elevated with two or more pillows while you sleep.  · Do not sleep on the side of your affected ear.  · Get up slowly from bed.  · Avoid sudden movements during the day.  · Avoid extreme head positions or movement, such as looking up or bending over.  Contact a health care provider if:  · Your vertigo gets worse.  · You have other symptoms, including:  ? Nausea.  ? Vomiting.  ? Headache.  Get help right away if you:  · Have vision changes.  · Have a headache or neck pain that is severe or getting worse.  · Cannot stop vomiting.  · Have new numbness or weakness in any part of your body.  Summary  · Vertigo is the feeling  that you or your surroundings are moving when they are not.  · The Epley maneuver is an exercise that relieves symptoms of vertigo.  · If the Epley maneuver is done correctly, it is considered safe and relieves vertigo quickly.  This information is not intended to replace advice given to you by your health care provider. Make sure you discuss any questions you have with your health care provider.  Document Revised: 10/14/2020 Document Reviewed: 10/14/2020  Elsevier Patient Education © 2021 [employer]

## 2021-11-17 ENCOUNTER — LAB (OUTPATIENT)
Dept: LAB | Facility: HOSPITAL | Age: 76
End: 2021-11-17

## 2021-11-17 LAB
25(OH)D3 SERPL-MCNC: 52.4 NG/ML (ref 30–100)
ALBUMIN SERPL-MCNC: 4.2 G/DL (ref 3.5–5.2)
ALBUMIN UR-MCNC: <1.2 MG/DL
ALBUMIN/GLOB SERPL: 1.4 G/DL
ALP SERPL-CCNC: 40 U/L (ref 39–117)
ALT SERPL W P-5'-P-CCNC: 31 U/L (ref 1–41)
ANION GAP SERPL CALCULATED.3IONS-SCNC: 8.4 MMOL/L (ref 5–15)
AST SERPL-CCNC: 28 U/L (ref 1–40)
BASOPHILS # BLD AUTO: 0.04 10*3/MM3 (ref 0–0.2)
BASOPHILS NFR BLD AUTO: 0.7 % (ref 0–1.5)
BILIRUB SERPL-MCNC: 0.4 MG/DL (ref 0–1.2)
BUN SERPL-MCNC: 11 MG/DL (ref 8–23)
BUN/CREAT SERPL: 11.2 (ref 7–25)
CALCIUM SPEC-SCNC: 9.3 MG/DL (ref 8.6–10.5)
CHLORIDE SERPL-SCNC: 101 MMOL/L (ref 98–107)
CHOLEST SERPL-MCNC: 147 MG/DL (ref 0–200)
CO2 SERPL-SCNC: 27.6 MMOL/L (ref 22–29)
CREAT SERPL-MCNC: 0.98 MG/DL (ref 0.76–1.27)
CREAT UR-MCNC: 98.9 MG/DL
DEPRECATED RDW RBC AUTO: 42.8 FL (ref 37–54)
EOSINOPHIL # BLD AUTO: 0.2 10*3/MM3 (ref 0–0.4)
EOSINOPHIL NFR BLD AUTO: 3.4 % (ref 0.3–6.2)
ERYTHROCYTE [DISTWIDTH] IN BLOOD BY AUTOMATED COUNT: 13.9 % (ref 12.3–15.4)
GFR SERPL CREATININE-BSD FRML MDRD: 74 ML/MIN/1.73
GLOBULIN UR ELPH-MCNC: 3 GM/DL
GLUCOSE SERPL-MCNC: 145 MG/DL (ref 65–99)
HBA1C MFR BLD: 7.7 % (ref 4.8–5.6)
HCT VFR BLD AUTO: 42.6 % (ref 37.5–51)
HDLC SERPL-MCNC: 31 MG/DL (ref 40–60)
HGB BLD-MCNC: 13.9 G/DL (ref 13–17.7)
IMM GRANULOCYTES # BLD AUTO: 0.03 10*3/MM3 (ref 0–0.05)
IMM GRANULOCYTES NFR BLD AUTO: 0.5 % (ref 0–0.5)
LDLC SERPL CALC-MCNC: 72 MG/DL (ref 0–100)
LDLC/HDLC SERPL: 2.01 {RATIO}
LYMPHOCYTES # BLD AUTO: 1.32 10*3/MM3 (ref 0.7–3.1)
LYMPHOCYTES NFR BLD AUTO: 22.4 % (ref 19.6–45.3)
MCH RBC QN AUTO: 27.7 PG (ref 26.6–33)
MCHC RBC AUTO-ENTMCNC: 32.6 G/DL (ref 31.5–35.7)
MCV RBC AUTO: 85 FL (ref 79–97)
MICROALBUMIN/CREAT UR: NORMAL MG/G{CREAT}
MONOCYTES # BLD AUTO: 0.43 10*3/MM3 (ref 0.1–0.9)
MONOCYTES NFR BLD AUTO: 7.3 % (ref 5–12)
NEUTROPHILS NFR BLD AUTO: 3.88 10*3/MM3 (ref 1.7–7)
NEUTROPHILS NFR BLD AUTO: 65.7 % (ref 42.7–76)
NRBC BLD AUTO-RTO: 0 /100 WBC (ref 0–0.2)
PLATELET # BLD AUTO: 200 10*3/MM3 (ref 140–450)
PMV BLD AUTO: 10.1 FL (ref 6–12)
POTASSIUM SERPL-SCNC: 4.7 MMOL/L (ref 3.5–5.2)
PROT SERPL-MCNC: 7.2 G/DL (ref 6–8.5)
RBC # BLD AUTO: 5.01 10*6/MM3 (ref 4.14–5.8)
SODIUM SERPL-SCNC: 137 MMOL/L (ref 136–145)
TRIGL SERPL-MCNC: 269 MG/DL (ref 0–150)
TSH SERPL DL<=0.05 MIU/L-ACNC: 2.44 UIU/ML (ref 0.27–4.2)
VIT B12 BLD-MCNC: 799 PG/ML (ref 211–946)
VLDLC SERPL-MCNC: 44 MG/DL (ref 5–40)
WBC NRBC COR # BLD: 5.9 10*3/MM3 (ref 3.4–10.8)

## 2021-11-17 PROCEDURE — 85025 COMPLETE CBC W/AUTO DIFF WBC: CPT | Performed by: INTERNAL MEDICINE

## 2021-11-17 PROCEDURE — 82306 VITAMIN D 25 HYDROXY: CPT | Performed by: INTERNAL MEDICINE

## 2021-11-17 PROCEDURE — 80061 LIPID PANEL: CPT | Performed by: INTERNAL MEDICINE

## 2021-11-17 PROCEDURE — 82570 ASSAY OF URINE CREATININE: CPT | Performed by: INTERNAL MEDICINE

## 2021-11-17 PROCEDURE — 82043 UR ALBUMIN QUANTITATIVE: CPT | Performed by: INTERNAL MEDICINE

## 2021-11-17 PROCEDURE — 83036 HEMOGLOBIN GLYCOSYLATED A1C: CPT | Performed by: INTERNAL MEDICINE

## 2021-11-17 PROCEDURE — 80053 COMPREHEN METABOLIC PANEL: CPT | Performed by: INTERNAL MEDICINE

## 2021-11-17 PROCEDURE — 84443 ASSAY THYROID STIM HORMONE: CPT | Performed by: INTERNAL MEDICINE

## 2021-11-17 PROCEDURE — 82607 VITAMIN B-12: CPT | Performed by: INTERNAL MEDICINE

## 2021-11-22 ENCOUNTER — DOCUMENTATION (OUTPATIENT)
Dept: ENDOCRINOLOGY | Facility: CLINIC | Age: 76
End: 2021-11-22

## 2021-11-22 ENCOUNTER — OFFICE VISIT (OUTPATIENT)
Dept: ENDOCRINOLOGY | Facility: CLINIC | Age: 76
End: 2021-11-22

## 2021-11-22 VITALS
OXYGEN SATURATION: 97 % | SYSTOLIC BLOOD PRESSURE: 130 MMHG | WEIGHT: 197 LBS | DIASTOLIC BLOOD PRESSURE: 70 MMHG | HEIGHT: 69 IN | BODY MASS INDEX: 29.18 KG/M2 | HEART RATE: 77 BPM

## 2021-11-22 DIAGNOSIS — I10 ESSENTIAL HYPERTENSION: ICD-10-CM

## 2021-11-22 DIAGNOSIS — E78.2 MIXED HYPERLIPIDEMIA: ICD-10-CM

## 2021-11-22 DIAGNOSIS — K76.0 NAFLD (NONALCOHOLIC FATTY LIVER DISEASE): ICD-10-CM

## 2021-11-22 DIAGNOSIS — E55.9 VITAMIN D DEFICIENCY: ICD-10-CM

## 2021-11-22 DIAGNOSIS — I25.119 CORONARY ARTERY DISEASE WITH ANGINA PECTORIS, UNSPECIFIED VESSEL OR LESION TYPE, UNSPECIFIED WHETHER NATIVE OR TRANSPLANTED HEART (HCC): ICD-10-CM

## 2021-11-22 DIAGNOSIS — E11.65 TYPE 2 DIABETES MELLITUS WITH HYPERGLYCEMIA, WITHOUT LONG-TERM CURRENT USE OF INSULIN (HCC): Primary | ICD-10-CM

## 2021-11-22 PROCEDURE — 99214 OFFICE O/P EST MOD 30 MIN: CPT | Performed by: INTERNAL MEDICINE

## 2021-11-22 RX ORDER — LOVASTATIN 10 MG/1
10 TABLET ORAL NIGHTLY
Qty: 30 TABLET | Refills: 11 | Status: SHIPPED | OUTPATIENT
Start: 2021-11-22 | End: 2021-12-27 | Stop reason: SDUPTHER

## 2021-11-22 NOTE — PROGRESS NOTES
"Aj Card Jr. is a 76 y.o. male who presents for  evaluation of type 2 diabetes        Diabetes     Duration since age 71 years old     Complications - CAD     Current symptoms/problems  none     Alleviating Factors: Compliance       Current diet  in general, a \"healthy\" diet      Current exercise walking    Current monitoring regimen: home blood tests - checking 4 x daily   Home blood sugar records: ppg elevation    Hypoglycemia none    PE    /70   Pulse 77   Ht 175.3 cm (69\")   Wt 89.4 kg (197 lb)   SpO2 97%   BMI 29.09 kg/m²   AOx3  No visible goiter  Normal Respiratory Effort , Lung CTA  RRR  No Edema    Labs    Lab Results   Component Value Date    WBC 5.90 11/17/2021    HGB 13.9 11/17/2021    HCT 42.6 11/17/2021    MCV 85.0 11/17/2021     11/17/2021     Lab Results   Component Value Date    GLUCOSE 145 (H) 11/17/2021    BUN 11 11/17/2021    CREATININE 0.98 11/17/2021    EGFRIFNONA 74 11/17/2021    BCR 11.2 11/17/2021    K 4.7 11/17/2021    CO2 27.6 11/17/2021    CALCIUM 9.3 11/17/2021    ALBUMIN 4.20 11/17/2021    AST 28 11/17/2021    ALT 31 11/17/2021             Assessment/Plan       ICD-10-CM ICD-9-CM   1. Type 2 diabetes mellitus with hyperglycemia, without long-term current use of insulin ([facility])  E11.65 250.00     790.29   2. Essential hypertension  I10 401.9   3. Mixed hyperlipidemia  E78.2 272.2   4. Vitamin D deficiency  E55.9 268.9   5. NAFLD (nonalcoholic fatty liver disease)  K76.0 571.8   6. Coronary artery disease with angina pectoris, unspecified vessel or lesion type, unspecified whether native or transplanted heart ([facility])  I25.119 414.00     413.9       Pt has type 2 Diabetes with CAD     Glycemic Management:   Lab Results   Component Value Date    HGBA1C 7.70 (H) 11/17/2021    HGBA1C 7.59 (H) 07/12/2021    HGBA1C 7.68 (H) 04/07/2021     Lab Results   Component Value Date    GLUCOSE 145 (H) 11/17/2021    BUN 11 11/17/2021    CREATININE 0.98 11/17/2021    EGFRIFNONA " 74 11/17/2021    BCR 11.2 11/17/2021    K 4.7 11/17/2021    CO2 27.6 11/17/2021    CALCIUM 9.3 11/17/2021    ALBUMIN 4.20 11/17/2021    AST 28 11/17/2021    ALT 31 11/17/2021    ANIONGAP 8.4 11/17/2021     Lab Results   Component Value Date    WBC 5.90 11/17/2021    HGB 13.9 11/17/2021    HCT 42.6 11/17/2021    MCV 85.0 11/17/2021     11/17/2021      side effects to metformin     jardiance too expensive     Trulicity 3 mg weekly     Basaglar 15 units    Add farxiga 5 mg daily , he will do assistance     Novolin R 20 to 25 w every meal ---       Criteria for Approval of Dexcom     The patient has diabetes mellitus, insulin-dependent.    Our Diabetes Department has evaluated the patient in the last six months and will continue counseling on insulin adjustment.     The patient performs blood glucose testing at least four times daily with proven glucose variability from 50 to 300 mg per dl.    The patient is administering basal insulin and prandial insulin four times per day for more than six months.    The patient uses a home blood glucose monitor to assess blood glucose at least four times daily for more than six months.    The patient requires frequent adjustment of insulin treatment regimen based on blood glucose readings.    The patient has frequent variability in blood glucose readings due to activity and variability in meal content and time.     The patient has completed a diabetes education program with us.    The patient has demonstrated the ability to self-monitor her glucose.     The patient is motivated in improving diabetes control             Lipid Management  Lab Results   Component Value Date    CHOL 147 11/17/2021    CHOL 141 07/12/2021    CHOL 125 04/07/2021     Lab Results   Component Value Date    TRIG 269 (H) 11/17/2021    TRIG 271 (H) 07/12/2021    TRIG 240 (H) 04/07/2021     Lab Results   Component Value Date    HDL 31 (L) 11/17/2021    HDL 27 (L) 07/12/2021    HDL 28 (L) 04/07/2021     No  components found for: LDLCALC  Lab Results   Component Value Date    LDL 72 11/17/2021    LDL 70 07/12/2021    LDL 58 04/07/2021       Before refused but this time agrees    Start lovastatin 10 mg qhs         Blood Pressure Management:    Vitals:    11/22/21 1003   BP: 130/70   Pulse: 77   SpO2: 97%     Lab Results   Component Value Date    GLUCOSE 145 (H) 11/17/2021    CALCIUM 9.3 11/17/2021     11/17/2021    K 4.7 11/17/2021    CO2 27.6 11/17/2021     11/17/2021    BUN 11 11/17/2021    CREATININE 0.98 11/17/2021    EGFRIFNONA 74 11/17/2021    BCR 11.2 11/17/2021    ANIONGAP 8.4 11/17/2021     Controlled on ACE I regimen           Microvascular Complication Monitoring:      Eye Exam Evaluation  Within 1 year     -----------    Last Microalbumin-Proteinuria Assessment    Lab Results   Component Value Date    MALBCRERATIO  11/17/2021      Comment:      Unable to calculate    MALBCRERATIO  04/07/2021      Comment:      Unable to calculate    MALBCRERATIO  10/05/2020      Comment:      Unable to calculate       No results found for: UTPCR    -----------      Neuropathy        Bone Health    Lab Results   Component Value Date    CALCIUM 9.3 11/17/2021    [id number] 52.4 11/17/2021       Thyroid Health    Lab Results   Component Value Date    TSH 2.440 11/17/2021    TSH 2.510 04/07/2021    TSH 1.920 10/05/2020         Other Diabetes Related Aspects       Lab Results   Component Value Date    [id number] 799 11/17/2021     FATTY LIVER    Weight loss   Vitamin E 200 units daily - could have been considered       No orders of the defined types were placed in this encounter.

## 2021-11-24 ENCOUNTER — DOCUMENTATION (OUTPATIENT)
Dept: ENDOCRINOLOGY | Facility: CLINIC | Age: 76
End: 2021-11-24

## 2021-12-15 ENCOUNTER — TELEPHONE (OUTPATIENT)
Dept: ENDOCRINOLOGY | Facility: CLINIC | Age: 76
End: 2021-12-15

## 2021-12-15 NOTE — TELEPHONE ENCOUNTER
Patient came in to the office to check on the status of a Dexcom that Dr. Richards was going to order after his last appointment.     Please Advise    Thanks

## 2021-12-21 ENCOUNTER — DOCUMENTATION (OUTPATIENT)
Dept: ENDOCRINOLOGY | Facility: CLINIC | Age: 76
End: 2021-12-21

## 2021-12-21 RX ORDER — AZELASTINE HYDROCHLORIDE 137 UG/1
SPRAY, METERED NASAL
Qty: 30 ML | Refills: 0 | Status: SHIPPED | OUTPATIENT
Start: 2021-12-21 | End: 2022-06-27

## 2021-12-21 NOTE — PROGRESS NOTES
Juan did not receive the Dexcom order on 11- so I initiated the order by phone for the Dexcom G6 system.

## 2021-12-27 DIAGNOSIS — I10 ESSENTIAL HYPERTENSION: Primary | ICD-10-CM

## 2021-12-27 RX ORDER — LOVASTATIN 10 MG/1
10 TABLET ORAL NIGHTLY
Qty: 90 TABLET | Refills: 3 | Status: SHIPPED | OUTPATIENT
Start: 2021-12-27 | End: 2023-02-17 | Stop reason: SDUPTHER

## 2022-01-05 ENCOUNTER — DOCUMENTATION (OUTPATIENT)
Dept: ENDOCRINOLOGY | Facility: CLINIC | Age: 77
End: 2022-01-05

## 2022-01-18 DIAGNOSIS — M25.561 ACUTE PAIN OF RIGHT KNEE: Primary | ICD-10-CM

## 2022-01-19 ENCOUNTER — OFFICE VISIT (OUTPATIENT)
Dept: ORTHOPEDIC SURGERY | Facility: CLINIC | Age: 77
End: 2022-01-19

## 2022-01-19 VITALS — HEIGHT: 69 IN | WEIGHT: 203 LBS | BODY MASS INDEX: 30.07 KG/M2

## 2022-01-19 DIAGNOSIS — M23.91 INTERNAL DERANGEMENT OF RIGHT KNEE: ICD-10-CM

## 2022-01-19 DIAGNOSIS — M17.11 PRIMARY OSTEOARTHRITIS OF RIGHT KNEE: Primary | ICD-10-CM

## 2022-01-19 PROCEDURE — 99214 OFFICE O/P EST MOD 30 MIN: CPT | Performed by: NURSE PRACTITIONER

## 2022-01-19 PROCEDURE — 20610 DRAIN/INJ JOINT/BURSA W/O US: CPT | Performed by: NURSE PRACTITIONER

## 2022-01-19 RX ADMIN — LIDOCAINE HYDROCHLORIDE 4 ML: 10 INJECTION, SOLUTION INFILTRATION; PERINEURAL at 14:06

## 2022-01-19 RX ADMIN — TRIAMCINOLONE ACETONIDE 80 MG: 40 INJECTION, SUSPENSION INTRA-ARTICULAR; INTRAMUSCULAR at 14:06

## 2022-01-19 NOTE — PROGRESS NOTES
"Aj Card Jr. is a 76 y.o. male      Chief Complaint   Patient presents with   • Right Knee - Follow-up, Pain       HISTORY OF PRESENT ILLNESS:     Patient is a 76-year-old male who presents today to follow-up on right knee pain.  Patient was seen by Dr. Shah after he started having knee pain in February 2020.  At patient's last appointment he was having some mechanical symptoms and knee pain suggestive of possible meniscal pathology.  Patient was treated intra-articular injection which she reports did well to control with pain.  He reports pain only recently returned.  He reports pain is constant but sometimes intermittent.  He reports some catching and locking.  He has some increased pain going up and down stairs.  He denies new injuries or falls.  No fever, nausea, vomiting.       CONCURRENT MEDICAL HISTORY:    The following portions of the patient's history were reviewed and updated as appropriate: allergies, current medications, past family history, past medical history, past social history, past surgical history and problem list.     ROS  No fevers or chills.  No chest pain or shortness of air.  No GI or  disturbances.  Right knee pain.    PHYSICAL EXAMINATION:       Ht 175.3 cm (69\")   Wt 92.1 kg (203 lb)   BMI 29.98 kg/m²     Physical Exam  Vitals and nursing note reviewed.   Constitutional:       General: He is not in acute distress.     Appearance: He is well-developed. He is not toxic-appearing.   HENT:      Head: Normocephalic.   Pulmonary:      Effort: Pulmonary effort is normal. No respiratory distress.   Musculoskeletal:      Right knee: No effusion.      Instability Tests: Medial Sandi test positive. Lateral Sandi test negative.   Skin:     General: Skin is warm and dry.   Neurological:      Mental Status: He is alert and oriented to person, place, and time.   Psychiatric:         Behavior: Behavior normal.         Thought Content: Thought content normal.         Judgment: " Judgment normal.         GAIT:     []  Normal  []  Antalgic    Assistive device: [x]  None  []  Walker     []  Crutches  []  Cane     []  Wheelchair  []  Stretcher    Right Knee Exam     Tenderness   Right knee tenderness location: Diffuse.    Range of Motion   Extension: -5   Flexion: 130     Tests   Sandi:  Medial - positive Lateral - negative  Varus: negative Valgus: negative    Other   Erythema: absent  Pulse: present  Swelling: mild  Effusion: no effusion present    Comments:  Mild pain and limitations with arc of motion.  No evidence of infection      Left Knee Exam     Tenderness   The patient is experiencing no tenderness.     Range of Motion   Extension: -5   Flexion: 130     Comments:  No pain but mild limitations with arc of motion.  No evidence of infection              XR Knee 1 or 2 View Right    Result Date: 1/19/2022  Narrative: Standing AP knees Standing lateral right knee HISTORY: Right knee pain AP standing view of each knee and lateral standing views of the right knee obtained. COMPARISON: March 2, 2020 FINDINGS: No fracture or dislocation. Small spur arising from the lateral aspect of each proximal tibia. Very small spur each lateral tibial spine. Minimal narrowing of each medial joint space. Small right patellar spurs. Very small right suprapatellar effusion. No other osseous or articular abnormality. Atherosclerotic calcifications at and below the level of the right knee.     Impression: CONCLUSION: Small spur arising from the lateral aspect of each proximal tibia. Very small spur each lateral tibial spine. Minimal narrowing of each medial joint space. Small right patellar spurs. Very small right suprapatellar effusion. [id number] Electronically signed by:  Estuardo Maciel MD  1/19/2022 3:24 PM CST Workstation: [id number]    XR Knee Bilateral AP Standing    Result Date: 1/19/2022  Narrative: Standing AP knees Standing lateral right knee HISTORY: Right knee pain AP standing view of each knee and  lateral standing views of the right knee obtained. COMPARISON: March 2, 2020 FINDINGS: No fracture or dislocation. Small spur arising from the lateral aspect of each proximal tibia. Very small spur each lateral tibial spine. Minimal narrowing of each medial joint space. Small right patellar spurs. Very small right suprapatellar effusion. No other osseous or articular abnormality. Atherosclerotic calcifications at and below the level of the right knee.     Impression: CONCLUSION: Small spur arising from the lateral aspect of each proximal tibia. Very small spur each lateral tibial spine. Minimal narrowing of each medial joint space. Small right patellar spurs. Very small right suprapatellar effusion. [id number] Electronically signed by:  Estuardo Maciel MD  1/19/2022 3:24 PM CST Workstation: [id number]            ASSESSMENT:    Diagnoses and all orders for this visit:    Primary osteoarthritis of right knee  -     Cane    Internal derangement of right knee    Other orders  -     Large Joint Arthrocentesis: R knee          PLAN    Large Joint Arthrocentesis: R knee  Date/Time: 1/19/2022 2:06 PM  Consent given by: patient  Site marked: site marked  Timeout: Immediately prior to procedure a time out was called to verify the correct patient, procedure, equipment, support staff and site/side marked as required   Supporting Documentation  Indications: pain   Procedure Details  Location: knee - R knee  Preparation: Patient was prepped and draped in the usual sterile fashion  Needle size: 22 G  Approach: anteromedial  Medications administered: 4 mL lidocaine 1 %; 80 mg triamcinolone acetonide 40 MG/ML  Patient tolerance: patient tolerated the procedure well with no immediate complications          X-rays reviewed, no acute bony abnormality identified.  Patient does have osteoarthritic findings in both knees and some complaints and findings suggestive of meniscal pathology today.  After discussing risk, benefits, alternatives patient  desires to proceed with repeat injection today.  Patient tolerated injection well.      Recommend the following:    -Rest and activity modification as tolerated and based on pain.  -Modified weightbearing of the affected extremity with use of a cane or walker as needed.  -Gradual progression of weightbearing and activity as pain and swelling allow.  -Conditioning and strengthening exercises of the bilateral knees/legs.  -Elevation and ice therapy to the affected knee to minimize pain/swelling/inflammation.   -OTC medications to help with pain.    Patient is to return in 2 to 4 weeks if not feeling better for further investigation, otherwise patient may return every 3 months + as desired for repeat injection.        EMR Dragon/Transciption Disclaimer: Some of this note may be an electronic transcription/translation of spoken language to printed text.  The electronic translation of spoken language may permit erroneous, or at times, nonsensical words or phrases to be inadvertently transcribed. Although I have reviewed the note for such errors, some may still exist.     Time spent of a minimum of 30 minutes including the face to face evaluation, reviewing of medical history and prior medial records, reviewing of diagnostic studies, ordering additional tests, documentation, patient education and coordination of care.       Return in about 3 months (around 4/19/2022).    Velma Haynes, APRN

## 2022-01-20 RX ORDER — LIDOCAINE HYDROCHLORIDE 10 MG/ML
4 INJECTION, SOLUTION INFILTRATION; PERINEURAL
Status: COMPLETED | OUTPATIENT
Start: 2022-01-19 | End: 2022-01-19

## 2022-01-20 RX ORDER — TRIAMCINOLONE ACETONIDE 40 MG/ML
80 INJECTION, SUSPENSION INTRA-ARTICULAR; INTRAMUSCULAR
Status: COMPLETED | OUTPATIENT
Start: 2022-01-19 | End: 2022-01-19

## 2022-02-16 ENCOUNTER — TELEPHONE (OUTPATIENT)
Dept: ENDOCRINOLOGY | Facility: CLINIC | Age: 77
End: 2022-02-16

## 2022-02-23 RX ORDER — MECLIZINE HCL 12.5 MG/1
TABLET ORAL
Qty: 90 TABLET | Refills: 0 | Status: SHIPPED | OUTPATIENT
Start: 2022-02-23 | End: 2022-03-28

## 2022-03-28 RX ORDER — METOPROLOL TARTRATE 50 MG/1
TABLET, FILM COATED ORAL
Qty: 60 TABLET | Refills: 1 | Status: SHIPPED | OUTPATIENT
Start: 2022-03-28 | End: 2022-06-01

## 2022-03-28 RX ORDER — MECLIZINE HCL 12.5 MG/1
TABLET ORAL
Qty: 90 TABLET | Refills: 1 | Status: SHIPPED | OUTPATIENT
Start: 2022-03-28 | End: 2022-10-28

## 2022-03-28 RX ORDER — ISOSORBIDE MONONITRATE 30 MG/1
TABLET, EXTENDED RELEASE ORAL
Qty: 30 TABLET | Refills: 1 | Status: SHIPPED | OUTPATIENT
Start: 2022-03-28 | End: 2022-10-28

## 2022-03-28 RX ORDER — PANTOPRAZOLE SODIUM 40 MG/1
TABLET, DELAYED RELEASE ORAL
Qty: 30 TABLET | Refills: 1 | Status: SHIPPED | OUTPATIENT
Start: 2022-03-28 | End: 2022-10-28

## 2022-04-19 ENCOUNTER — OFFICE VISIT (OUTPATIENT)
Dept: FAMILY MEDICINE CLINIC | Facility: CLINIC | Age: 77
End: 2022-04-19

## 2022-04-19 VITALS
WEIGHT: 200.2 LBS | SYSTOLIC BLOOD PRESSURE: 136 MMHG | BODY MASS INDEX: 29.65 KG/M2 | HEIGHT: 69 IN | HEART RATE: 72 BPM | OXYGEN SATURATION: 98 % | TEMPERATURE: 98.3 F | DIASTOLIC BLOOD PRESSURE: 74 MMHG

## 2022-04-19 DIAGNOSIS — Z00.00 MEDICARE ANNUAL WELLNESS VISIT, SUBSEQUENT: Primary | ICD-10-CM

## 2022-04-19 PROCEDURE — 1126F AMNT PAIN NOTED NONE PRSNT: CPT | Performed by: STUDENT IN AN ORGANIZED HEALTH CARE EDUCATION/TRAINING PROGRAM

## 2022-04-19 PROCEDURE — 1159F MED LIST DOCD IN RCRD: CPT | Performed by: STUDENT IN AN ORGANIZED HEALTH CARE EDUCATION/TRAINING PROGRAM

## 2022-04-19 PROCEDURE — 1170F FXNL STATUS ASSESSED: CPT | Performed by: STUDENT IN AN ORGANIZED HEALTH CARE EDUCATION/TRAINING PROGRAM

## 2022-04-19 PROCEDURE — G0439 PPPS, SUBSEQ VISIT: HCPCS | Performed by: STUDENT IN AN ORGANIZED HEALTH CARE EDUCATION/TRAINING PROGRAM

## 2022-04-19 NOTE — PROGRESS NOTES
The ABCs of the Annual Wellness Visit  Subsequent Medicare Wellness Visit    Chief Complaint   Patient presents with   • Annual Exam     Subsequent Medicare Wellness      Subjective    History of Present Illness:  Aj Card Jr. is a 76 y.o. male who presents for a Subsequent Medicare Wellness Visit.    The following portions of the patient's history were reviewed and   updated as appropriate: allergies, current medications, past family history, past medical history, past social history, past surgical history and problem list.    Compared to one year ago, the patient feels his physical   health is the same.    Compared to one year ago, the patient feels his mental   health is better.    Recent Hospitalizations:  He was not admitted to the hospital during the last year.       Current Medical Providers:  Patient Care Team:  Andreas Martinez MD as PCP - General (Family Medicine)  Andreas Aldana PA-C as Physician Assistant (Gastroenterology)  Glen Jain MD as Consulting Physician (Endocrinology)  David Jeffers MD as Consulting Physician (Cardiology)    Outpatient Medications Prior to Visit   Medication Sig Dispense Refill   • ALPRAZolam (XANAX) 0.5 MG tablet Take 1 tablet by mouth At Night As Needed for Anxiety. 30 tablet 0   • aspirin 81 MG EC tablet Take 2 tablets by mouth Daily. 90 tablet 3   • Azelastine HCl 137 MCG/SPRAY solution USE 2 SPRAY(S) IN EACH NOSTRIL TWICE DAILY AS DIRECTED 30 mL 0   • cholecalciferol (VITAMIN D3) 1000 units tablet Take 1 tablet by mouth Daily. 30 tablet 3   • coenzyme Q10 100 MG capsule Take 100 mg by mouth daily.     • dapagliflozin (FARXIGA) 5 MG tablet tablet One tablet daily before breakfast 30 tablet 11   • Dulaglutide (Trulicity) 3 MG/0.5ML solution pen-injector Inject 3 mg under the skin into the appropriate area as directed 1 (One) Time Per Week. 12 pen 3   • Glucose Blood (BLOOD GLUCOSE TEST) strip Use 4 x daily use any brand covered by insurance  "or same brand as before ICD10 code is E11.9 120 each 11   • icosapent ethyl (VASCEPA) 1 g capsule capsule Take 2 g by mouth 2 (Two) Times a Day With Meals. 120 capsule 3   • Insulin Glargine (BASAGLAR KWIKPEN SC) Inject 15 Units under the skin into the appropriate area as directed Daily.     • Insulin Pen Needle 30G X 8 MM misc Use 3-4 times daily. 100 each 3   • Insulin Syringe 31G X 5/16\" 0.3 ML misc 4 x daily 120 each 11   • isosorbide mononitrate (IMDUR) 30 MG 24 hr tablet Take 1 tablet by mouth once daily 30 tablet 1   • KRILL OIL OMEGA-3 PO Take  by mouth daily.     • lactulose (CHRONULAC) 10 GM/15ML solution Take 30 mL by mouth 2 (Two) Times a Day As Needed (constipation). 473 mL 1   • Lancet Devices (LANCING DEVICE) misc USE AS INDICATED TO CORRELATE WITH STRIPS AND METER 1 each 1   • Lancets 30G misc USE 4 X DAILY 120 each 11   • linaclotide (LINZESS) 145 MCG capsule capsule Take 1 capsule by mouth Every Morning Before Breakfast. 30 capsule 0   • lisinopril (PRINIVIL,ZESTRIL) 40 MG tablet Take 1 tablet by mouth Daily. 90 tablet 1   • LOTEMAX 0.5 % ophthalmic suspension      • lovastatin (MEVACOR) 10 MG tablet Take 1 tablet by mouth Every Night for 360 days. 90 tablet 3   • meclizine (ANTIVERT) 12.5 MG tablet TAKE 1 TABLET BY MOUTH THREE TIMES DAILY AS NEEDED FOR DIZZINESS 90 tablet 1   • metoprolol tartrate (LOPRESSOR) 50 MG tablet Take 1 tablet by mouth twice daily 60 tablet 1   • pantoprazole (PROTONIX) 40 MG EC tablet Take 1 tablet by mouth once daily 30 tablet 1   • propafenone (RYTHMOL) 150 MG tablet Take 150 mg by mouth Every 8 (Eight) Hours.     • rivaroxaban (XARELTO) 20 MG tablet Take 20 mg by mouth 2 (Two) Times a Day With Meals.     • Saw Palmetto, Serenoa repens, (SAW PALMETTO PO) Take  by mouth daily.     • sucralfate (CARAFATE) 1 g tablet Take 1 tablet by mouth 2 (two) times a day. 180 tablet 0   • vitamin B-12 (CYANOCOBALAMIN) 2500 MCG sublingual tablet tablet Place  under the tongue Every " "Other Day.       No facility-administered medications prior to visit.       No opioid medication identified on active medication list. I have reviewed chart for other potential  high risk medication/s and harmful drug interactions in the elderly.          Aspirin is on active medication list. Aspirin use is indicated based on review of current medical condition/s. Pros and cons of this therapy have been discussed today. Benefits of this medication outweigh potential harm.  Patient has been encouraged to continue taking this medication.  .      Patient Active Problem List   Diagnosis   • Type 2 diabetes mellitus without complication, without long-term current use of insulin ([facility])   • Essential hypertension   • Mixed hyperlipidemia   • Generalized anxiety disorder   • History of colon polyps   • Diverticulosis of large intestine without hemorrhage   • Coronary artery disease with angina pectoris ([facility])   • Gastroesophageal reflux disease with esophagitis   • Vitamin D deficiency   • Overweight   • Encounter for screening for endocrine disorder   • Atopic dermatitis   • High serum vitamin B12   • Acute pain of right knee   • Tear of medial meniscus of right knee, current   • GERD (gastroesophageal reflux disease)   • PAF (paroxysmal atrial fibrillation) ([facility])   • Uncontrolled type 2 diabetes mellitus with hyperglycemia ([facility])   • Gastroesophageal reflux disease     Advance Care Planning  Advance Directive is not on file.  ACP discussion was declined by the patient. Patient does not have an advance directive, information provided.          Objective    Vitals:    04/19/22 1332   BP: 136/74   BP Location: Left arm   Patient Position: Sitting   Cuff Size: Large Adult   Pulse: 72   Temp: 98.3 °F (36.8 °C)   SpO2: 98%   Weight: 90.8 kg (200 lb 3.2 oz)   Height: 175.3 cm (69\")   PainSc: 0-No pain     BMI Readings from Last 1 Encounters:   04/19/22 29.56 kg/m²   BMI is above normal parameters. Recommendations include: exercise " counseling    Does the patient have evidence of cognitive impairment? No    Physical Exam            HEALTH RISK ASSESSMENT    Smoking Status:  Social History     Tobacco Use   Smoking Status Never Smoker   Smokeless Tobacco Never Used     Alcohol Consumption:  Social History     Substance and Sexual Activity   Alcohol Use No     Fall Risk Screen:    ISAURA Fall Risk Assessment was completed, and patient is at LOW risk for falls.Assessment completed on:4/19/2022    Depression Screening:  PHQ-2/PHQ-9 Depression Screening 4/19/2022   Retired PHQ-9 Total Score -   Retired Total Score -   Little Interest or Pleasure in Doing Things 0-->not at all   Feeling Down, Depressed or Hopeless 0-->not at all   PHQ-9: Brief Depression Severity Measure Score 0       Health Habits and Functional and Cognitive Screening:  Functional & Cognitive Status 4/19/2022   Do you have difficulty preparing food and eating? No   Do you have difficulty bathing yourself, getting dressed or grooming yourself? No   Do you have difficulty using the toilet? Yes   Do you have difficulty moving around from place to place? No   Do you have trouble with steps or getting out of a bed or a chair? No   Current Diet Well Balanced Diet   Dental Exam Up to date   Eye Exam Up to date   Exercise (times per week) 7 times per week   Current Exercises Include Yard Work;House Cleaning   Current Exercise Activities Include -   Do you need help using the phone?  No   Are you deaf or do you have serious difficulty hearing?  No   Do you need help with transportation? No   Do you need help shopping? No   Do you need help preparing meals?  No   Do you need help with housework?  No   Do you need help with laundry? No   Do you need help taking your medications? No   Do you need help managing money? No   Do you ever drive or ride in a car without wearing a seat belt? No   Have you felt unusual stress, anger or loneliness in the last month? No   Who do you live with? Spouse    If you need help, do you have trouble finding someone available to you? No   Have you been bothered in the last four weeks by sexual problems? No   Do you have difficulty concentrating, remembering or making decisions? No       Age-appropriate Screening Schedule:  Refer to the list below for future screening recommendations based on patient's age, sex and/or medical conditions. Orders for these recommended tests are listed in the plan section. The patient has been provided with a written plan.    Health Maintenance   Topic Date Due   • ZOSTER VACCINE (1 of 2) 04/25/2022 (Originally 9/30/1995)   • HEMOGLOBIN A1C  05/17/2022   • DIABETIC EYE EXAM  05/17/2022   • INFLUENZA VACCINE  08/01/2022   • LIPID PANEL  11/17/2022   • URINE MICROALBUMIN  11/17/2022   • TDAP/TD VACCINES (3 - Td or Tdap) 07/11/2030              Assessment/Plan   CMS Preventative Services Quick Reference  Risk Factors Identified During Encounter  Inactivity/Sedentary  Obesity/Overweight   The above risks/problems have been discussed with the patient.  Follow up actions/plans if indicated are seen below in the Assessment/Plan Section.  Pertinent information has been shared with the patient in the After Visit Summary.    Diagnoses and all orders for this visit:    1. Medicare annual wellness visit, subsequent (Primary)        Follow Up:   Return in about 1 year (around 4/19/2023) for Medicare Wellness.     An After Visit Summary and PPPS were made available to the patient.

## 2022-04-19 NOTE — PATIENT INSTRUCTIONS
Medicare Wellness  Personal Prevention Plan of Service     Date of Office Visit:    Encounter Provider:  Alfredo Cruz MD  Place of Service:  [facility]  Patient Name: Aj Card Jr.  :  1945    As part of the Medicare Wellness portion of your visit today, we are providing you with this personalized preventive plan of services (PPPS). This plan is based upon recommendations of the [place] Preventive Services Task Force (USPSTF) and the Advisory Committee on Immunization Practices (ACIP).    This lists the preventive care services that should be considered, and provides dates of when you are due. Items listed as completed are up-to-date and do not require any further intervention.    Health Maintenance   Topic Date Due    ZOSTER VACCINE (1 of 2) Never done    COVID-19 Vaccine (3 - Booster for Moderna series) 2021    HEMOGLOBIN A1C  2022    DIABETIC EYE EXAM  2022    INFLUENZA VACCINE  2022    LIPID PANEL  2022    URINE MICROALBUMIN  2022    COLORECTAL CANCER SCREENING  2022    ANNUAL WELLNESS VISIT  2023    TDAP/TD VACCINES (3 - Td or Tdap) 2030    HEPATITIS C SCREENING  Completed    Pneumococcal Vaccine 65+  Completed       No orders of the defined types were placed in this encounter.      Return in about 1 year (around 2023) for Medicare Wellness.

## 2022-04-26 RX ORDER — LISINOPRIL 40 MG/1
TABLET ORAL
Qty: 90 TABLET | Refills: 0 | Status: SHIPPED | OUTPATIENT
Start: 2022-04-26 | End: 2022-07-25

## 2022-05-18 NOTE — PROGRESS NOTES
Subjective:  Aj Card Jr. is a 76 y.o. male who presents for       Patient Active Problem List   Diagnosis   • Type 2 diabetes mellitus without complication, without long-term current use of insulin ([facility])   • Essential hypertension   • Mixed hyperlipidemia   • Generalized anxiety disorder   • History of colon polyps   • Diverticulosis of large intestine without hemorrhage   • Coronary artery disease with angina pectoris ([facility])   • Gastroesophageal reflux disease with esophagitis   • Vitamin D deficiency   • Overweight   • Encounter for screening for endocrine disorder   • Atopic dermatitis   • High serum vitamin B12   • Acute pain of right knee   • Tear of medial meniscus of right knee, current   • GERD (gastroesophageal reflux disease)   • PAF (paroxysmal atrial fibrillation) ([facility])   • Uncontrolled type 2 diabetes mellitus with hyperglycemia ([facility])   • Gastroesophageal reflux disease   • Chronic constipation           Current Outpatient Medications:   •  ALPRAZolam (XANAX) 0.5 MG tablet, Take 1 tablet by mouth At Night As Needed for Anxiety., Disp: 30 tablet, Rfl: 0  •  aspirin 81 MG EC tablet, Take 2 tablets by mouth Daily., Disp: 90 tablet, Rfl: 3  •  Azelastine HCl 137 MCG/SPRAY solution, USE 2 SPRAY(S) IN EACH NOSTRIL TWICE DAILY AS DIRECTED, Disp: 30 mL, Rfl: 0  •  cholecalciferol (VITAMIN D3) 1000 units tablet, Take 1 tablet by mouth Daily., Disp: 30 tablet, Rfl: 3  •  coenzyme Q10 100 MG capsule, Take 100 mg by mouth daily., Disp: , Rfl:   •  dapagliflozin (FARXIGA) 5 MG tablet tablet, One tablet daily before breakfast, Disp: 30 tablet, Rfl: 11  •  Dulaglutide (Trulicity) 3 MG/0.5ML solution pen-injector, Inject 3 mg under the skin into the appropriate area as directed 1 (One) Time Per Week., Disp: 12 pen, Rfl: 3  •  Glucose Blood (BLOOD GLUCOSE TEST) strip, Use 4 x daily use any brand covered by insurance or same brand as before ICD10 code is E11.9, Disp: 120 each, Rfl: 11  •  icosapent ethyl  "(VASCEPA) 1 g capsule capsule, Take 2 g by mouth 2 (Two) Times a Day With Meals., Disp: 120 capsule, Rfl: 3  •  Insulin Glargine (BASAGLAR KWIKPEN SC), Inject 15 Units under the skin into the appropriate area as directed Daily., Disp: , Rfl:   •  Insulin Pen Needle 30G X 8 MM misc, Use 3-4 times daily., Disp: 100 each, Rfl: 3  •  Insulin Syringe 31G X 5/16\" 0.3 ML misc, 4 x daily, Disp: 120 each, Rfl: 11  •  isosorbide mononitrate (IMDUR) 30 MG 24 hr tablet, Take 1 tablet by mouth once daily, Disp: 30 tablet, Rfl: 1  •  KRILL OIL OMEGA-3 PO, Take  by mouth daily., Disp: , Rfl:   •  lactulose (CHRONULAC) 10 GM/15ML solution, Take 30 mL by mouth 2 (Two) Times a Day As Needed (constipation)., Disp: 473 mL, Rfl: 1  •  Lancet Devices (LANCING DEVICE) misc, USE AS INDICATED TO CORRELATE WITH STRIPS AND METER, Disp: 1 each, Rfl: 1  •  Lancets 30G misc, USE 4 X DAILY, Disp: 120 each, Rfl: 11  •  linaclotide (LINZESS) 145 MCG capsule capsule, Take 1 capsule by mouth Every Morning Before Breakfast., Disp: 30 capsule, Rfl: 3  •  lisinopril (PRINIVIL,ZESTRIL) 40 MG tablet, Take 1 tablet by mouth once daily, Disp: 90 tablet, Rfl: 0  •  LOTEMAX 0.5 % ophthalmic suspension, , Disp: , Rfl:   •  lovastatin (MEVACOR) 10 MG tablet, Take 1 tablet by mouth Every Night for 360 days., Disp: 90 tablet, Rfl: 3  •  meclizine (ANTIVERT) 12.5 MG tablet, TAKE 1 TABLET BY MOUTH THREE TIMES DAILY AS NEEDED FOR DIZZINESS, Disp: 90 tablet, Rfl: 1  •  metoprolol tartrate (LOPRESSOR) 50 MG tablet, Take 1 tablet by mouth twice daily, Disp: 60 tablet, Rfl: 1  •  pantoprazole (PROTONIX) 40 MG EC tablet, Take 1 tablet by mouth once daily, Disp: 30 tablet, Rfl: 1  •  propafenone (RYTHMOL) 150 MG tablet, Take 150 mg by mouth Every 8 (Eight) Hours., Disp: , Rfl:   •  rivaroxaban (XARELTO) 20 MG tablet, Take 20 mg by mouth 2 (Two) Times a Day With Meals., Disp: , Rfl:   •  Saw Palmetto, Serenoa repens, (SAW PALMETTO PO), Take  by mouth daily., Disp: , Rfl: "   •  sucralfate (CARAFATE) 1 g tablet, Take 1 tablet by mouth 2 (two) times a day., Disp: 180 tablet, Rfl: 0  •  vitamin B-12 (CYANOCOBALAMIN) 2500 MCG sublingual tablet tablet, Place  under the tongue Every Other Day., Disp: , Rfl:      Pt is 75 yo male with history of GERD DM type 2, Vitamin B12 deficiency, HLP, HTN, ADRIENNE, CAD sp stent x 3 ,  Esophagitis, Gastritis, sp appendectomy sp cholecystectomy sp hernia repair surgery, sp hand surgery/ cataract surgery bilateral, abnormal EKG ( right ventricular dilation, LVH,  Grade 1 diastolic dysfunction, mild calcification of aortic valve)  CKD stage       11/16/21 in office visit for recheck on pt's above medical issues. Pt continues to take his medicatiosn for CAD, DM tyep 2, HLP, ADRIENNE, vitamin D deficiency, gERD, atrial fibrillation, vitamin B12.   Pt states since Friday night last week he has developed vertigo and dizziness. suddenlty  He has tinnitus as well in both ears he has hearing loss in right ear. No chest pain no syncope.      5/26/22 in office visit for recheck. Pt is due for new labwork . Pt has been under stress. His anxiety is worse. His daugther is at [place] and will be going to hospice care.  He is also having constipation issues. He is not taking linzess. His last colonoscopy was in 2017 that showed hemorrhoids and diverticulosis. He is due for new colonoscopy. BP has been stable. HR stable. Sugars have been running in 150s or lower     Constipation  This is a chronic problem. The current episode started more than 1 year ago. The problem is unchanged. The patient is not on a high fiber diet. He does not exercise regularly. There has not been adequate water intake. Pertinent negatives include no abdominal pain, anorexia, back pain, bloating, diarrhea, difficulty urinating, fecal incontinence, fever, flatus, hematochezia, hemorrhoids, melena, nausea, rectal pain, vomiting or weight loss. He has tried nothing for the symptoms. There is no history of  abdominal surgery, endocrine disease, inflammatory bowel disease, irritable bowel syndrome, metabolic disease, neurologic disease, neuromuscular disease, psychiatric history or radiation treatment.   Heartburn  He reports no abdominal pain, no belching, no chest pain, no choking, no coughing, no dysphagia, no early satiety, no globus sensation, no heartburn, no hoarse voice, no nausea, no sore throat, no stridor, no tooth decay, no water brash or no wheezing. This is a recurrent problem. The current episode started more than 1 month ago. The problem occurs constantly. The problem has been unchanged. Nothing aggravates the symptoms. Associated symptoms include fatigue. Pertinent negatives include no muscle weakness. He has tried a PPI for the symptoms. The treatment provided moderate relief. Past procedures do not include an abdominal ultrasound, an EGD, esophageal manometry, esophageal pH monitoring, H. pylori antibody titer or a UGI.   Coronary Artery Disease  Presents for follow-up visit. Pertinent negatives include no chest pain, chest pressure, chest tightness, dizziness, leg swelling, muscle weakness, palpitations, shortness of breath or weight gain. The symptoms have been stable. Compliance with diet is good. Compliance with exercise is good. Compliance with medications is good.   Chronic Kidney Disease  This is a chronic problem. The current episode started more than 1 year ago. The problem occurs constantly. Associated symptoms include arthralgias, fatigue, numbness and weakness. Pertinent negatives include no abdominal pain, chest pain, coughing, nausea or sore throat. He has tried nothing for the symptoms. The treatment provided no relief.   Atrial Fibrillation   Presents for follow-up visit. Symptoms include dizziness, palpitations, shortness of breath and weakness. Symptoms are negative for an AICD problem, bradycardia, chest pain, hemodynamic instability, hypertension, hypotension, pacemaker problem and  tachycardia. The symptoms have been stable. Past medical history includes atrial fibrillation. There are no medication compliance problems.   Hypertension   This is a chronic problem. The current episode started more than 1 year ago. The problem is controlled. Pertinent negatives include no anxiety, blurred vision, chest pain, headaches, malaise/fatigue, neck pain, palpitations, peripheral edema, PND, shortness of breath or sweats. Risk factors for coronary artery disease include diabetes mellitus, dyslipidemia, sedentary lifestyle and male gender. There are no compliance problems.  Hypertensive end-organ damage includes CAD/MI. There is no history of angina, kidney disease, CVA, heart failure, left ventricular hypertrophy, PVD or retinopathy. There is no history of chronic renal disease, coarctation of the aorta, hyperaldosteronism, hypercortisolism, hyperparathyroidism, a hypertension causing med, pheochromocytoma, renovascular disease, sleep apnea or a thyroid problem.   Diabetes   He presents for his follow-up diabetic visit. He has type 2 diabetes mellitus. His disease course has been stable. Hypoglycemia symptoms include nervousness/anxiousness. Pertinent negatives for hypoglycemia include no confusion, dizziness, headaches or sweats. Pertinent negatives for diabetes include no blurred vision, no chest pain, no fatigue, no foot paresthesias, no foot ulcerations, no polydipsia, no polyphagia, no polyuria, no visual change, no weakness and no weight loss. Pertinent negatives for hypoglycemia complications include no blackouts and no hospitalization. Symptoms are stable. Pertinent negatives for diabetic complications include no CVA, impotence, PVD or retinopathy. Risk factors for coronary artery disease include diabetes mellitus, dyslipidemia, hypertension and male sex. Current diabetic treatment includes oral agent (dual therapy). He is compliant with treatment most of the time. His weight is stable. An ACE  inhibitor/angiotensin II receptor blocker is being taken. He does not see a podiatrist.Eye exam is not current.   Anxiety   Presents for follow-up visit. Symptoms include excessive worry and nervous/anxious behavior. Patient reports no chest pain, compulsions, confusion, decreased concentration, depressed mood, dizziness, dry mouth, feeling of choking, hyperventilation, impotence, insomnia, irritability, malaise, muscle tension, nausea, obsessions, palpitations, panic, restlessness, shortness of breath or suicidal ideas.        Review of Systems  Review of Systems   Constitutional: Positive for activity change and fatigue. Negative for appetite change, chills, diaphoresis, fever and weight loss.   HENT: Negative for congestion, postnasal drip, rhinorrhea, sinus pressure, sinus pain, sneezing, sore throat, trouble swallowing and voice change.    Respiratory: Negative for cough, choking, chest tightness, shortness of breath, wheezing and stridor.    Cardiovascular: Negative for chest pain.   Gastrointestinal: Positive for constipation. Negative for abdominal pain, anorexia, bloating, diarrhea, flatus, hematochezia, hemorrhoids, melena, nausea, rectal pain and vomiting.   Genitourinary: Negative for difficulty urinating.   Musculoskeletal: Positive for arthralgias. Negative for back pain.   Neurological: Positive for weakness and numbness. Negative for headaches.   Psychiatric/Behavioral: The patient is nervous/anxious.        Patient Active Problem List   Diagnosis   • Type 2 diabetes mellitus without complication, without long-term current use of insulin ([facility])   • Essential hypertension   • Mixed hyperlipidemia   • Generalized anxiety disorder   • History of colon polyps   • Diverticulosis of large intestine without hemorrhage   • Coronary artery disease with angina pectoris ([facility])   • Gastroesophageal reflux disease with esophagitis   • Vitamin D deficiency   • Overweight   • Encounter for screening for endocrine  disorder   • Atopic dermatitis   • High serum vitamin B12   • Acute pain of right knee   • Tear of medial meniscus of right knee, current   • GERD (gastroesophageal reflux disease)   • PAF (paroxysmal atrial fibrillation) ([facility])   • Uncontrolled type 2 diabetes mellitus with hyperglycemia ([facility])   • Gastroesophageal reflux disease   • Chronic constipation     Past Surgical History:   Procedure Laterality Date   • APPENDECTOMY       [facility] 1995       • CARDIAC CATHETERIZATION      Successful PTCA of the OMB#2.Unsuccessful PTCA of the 1st OMB, secondary to difficulty in advancing the balloon. 12/08/2015       • CHOLECYSTECTOMY      [facility] 1993       • COLONOSCOPY  10/01/2012   • COLONOSCOPY N/A 12/11/2017    Procedure: COLONOSCOPY;  Surgeon: Bladimir Michael MD;  Location: [facility] ENDOSCOPY;  Service:    • CORONARY ANGIOPLASTY      Successful PTCA & stenting LAD was done w/ deployment of a 2.5mm x23 Xience stent w/ reduction of stenosis from 90% to less than 0% stenosis with good LILLIAM 3 flow 02/17/2012       • ENDOSCOPY      with biopsy.  Mildly severe esophagitis. Gastritis. Normal examined duodenum.Several biopsies obtained in the lower third of the esophagus.Several biopsies obtained in the gastric antrum. 05/06/2016       • ENDOSCOPY      w tube. Normal esophagus. Gastritis in stomach. Biopsy taken. Gastric polyp found. Biopsy taken. Normal duodenum. 10/01/2012       • ENDOSCOPY AND COLONOSCOPY      Diverticulum in sigmoid colon & descending colon. Internal & external hemorrhoids found. 10/01/2012       • HAND SURGERY       Corrective osteotomy of ring finger metacarpal. Malunited fracture of left ring finger 05/21/1985       • HERNIA REPAIR      Laparoscopic hernia repair. prepertitoneal bilateral inguinal hernia repair with mesh. 10/15/2009      • OTHER SURGICAL HISTORY      CORONARY ARTERY STENT    • SKIN TAG REMOVAL      Excision, viral skin tag,right upper eyelid 05/08/1995       Social History     Socioeconomic History   • Marital status:    Tobacco Use   • Smoking status: Never Smoker   • Smokeless tobacco: Never Used   Vaping Use   • Vaping Use: Never used   Substance and Sexual Activity   • Alcohol use: No   • Drug use: No   • Sexual activity: Defer     Family History   Problem Relation Age of Onset   • Clotting disorder Other    • Lung disease Other    • Schizophrenia Sister    • Obesity Sister    • Tuberculosis Sister    • Arthritis Mother    • Diabetes Mother    • Heart disease Mother    • Hypertension Mother    • Obesity Mother    • Stroke Mother    • Thyroid disease Mother    • Tuberculosis Mother    • Arthritis Father    • Tuberculosis Father      No visits with results within 6 Month(s) from this visit.   Latest known visit with results is:   Office Visit on 08/10/2021   Component Date Value Ref Range Status   • WBC 11/17/2021 5.90  3.40 - 10.80 10*3/mm3 Final   • RBC 11/17/2021 5.01  4.14 - 5.80 10*6/mm3 Final   • Hemoglobin 11/17/2021 13.9  13.0 - 17.7 g/dL Final   • Hematocrit 11/17/2021 42.6  37.5 - 51.0 % Final   • MCV 11/17/2021 85.0  79.0 - 97.0 fL Final   • MCH 11/17/2021 27.7  26.6 - 33.0 pg Final   • MCHC 11/17/2021 32.6  31.5 - 35.7 g/dL Final   • RDW 11/17/2021 13.9  12.3 - 15.4 % Final   • RDW-SD 11/17/2021 42.8  37.0 - 54.0 fl Final   • MPV 11/17/2021 10.1  6.0 - 12.0 fL Final   • Platelets 11/17/2021 200  140 - 450 10*3/mm3 Final   • Neutrophil % 11/17/2021 65.7  42.7 - 76.0 % Final   • Lymphocyte % 11/17/2021 22.4  19.6 - 45.3 % Final   • Monocyte % 11/17/2021 7.3  5.0 - 12.0 % Final   • Eosinophil % 11/17/2021 3.4  0.3 - 6.2 % Final   • Basophil % 11/17/2021 0.7  0.0 - 1.5 % Final   • Immature Grans % 11/17/2021 0.5  0.0 - 0.5 % Final   • Neutrophils, Absolute 11/17/2021 3.88  1.70 - 7.00 10*3/mm3 Final   • Lymphocytes, Absolute 11/17/2021 1.32  0.70 - 3.10 10*3/mm3 Final   • Monocytes, Absolute 11/17/2021 0.43  0.10 - 0.90 10*3/mm3 Final   •  Eosinophils, Absolute 11/17/2021 0.20  0.00 - 0.40 10*3/mm3 Final   • Basophils, Absolute 11/17/2021 0.04  0.00 - 0.20 10*3/mm3 Final   • Immature Grans, Absolute 11/17/2021 0.03  0.00 - 0.05 10*3/mm3 Final   • nRBC 11/17/2021 0.0  0.0 - 0.2 /100 WBC Final   • Glucose 11/17/2021 145 (A) 65 - 99 mg/dL Final   • BUN 11/17/2021 11  8 - 23 mg/dL Final   • Creatinine 11/17/2021 0.98  0.76 - 1.27 mg/dL Final   • Sodium 11/17/2021 137  136 - 145 mmol/L Final   • Potassium 11/17/2021 4.7  3.5 - 5.2 mmol/L Final   • Chloride 11/17/2021 101  98 - 107 mmol/L Final   • CO2 11/17/2021 27.6  22.0 - 29.0 mmol/L Final   • Calcium 11/17/2021 9.3  8.6 - 10.5 mg/dL Final   • Total Protein 11/17/2021 7.2  6.0 - 8.5 g/dL Final   • Albumin 11/17/2021 4.20  3.50 - 5.20 g/dL Final   • ALT (SGPT) 11/17/2021 31  1 - 41 U/L Final   • AST (SGOT) 11/17/2021 28  1 - 40 U/L Final   • Alkaline Phosphatase 11/17/2021 40  39 - 117 U/L Final   • Total Bilirubin 11/17/2021 0.4  0.0 - 1.2 mg/dL Final   • eGFR Non  Amer 11/17/2021 74  >60 mL/min/1.73 Final   • Globulin 11/17/2021 3.0  gm/dL Final   • A/G Ratio 11/17/2021 1.4  g/dL Final   • BUN/Creatinine Ratio 11/17/2021 11.2  7.0 - 25.0 Final   • Anion Gap 11/17/2021 8.4  5.0 - 15.0 mmol/L Final   • Hemoglobin A1C 11/17/2021 7.70 (A) 4.80 - 5.60 % Final   • Total Cholesterol 11/17/2021 147  0 - 200 mg/dL Final   • Triglycerides 11/17/2021 269 (A) 0 - 150 mg/dL Final   • HDL Cholesterol 11/17/2021 31 (A) 40 - 60 mg/dL Final   • LDL Cholesterol  11/17/2021 72  0 - 100 mg/dL Final   • VLDL Cholesterol 11/17/2021 44 (A) 5 - 40 mg/dL Final   • LDL/HDL Ratio 11/17/2021 2.01   Final   • Microalbumin/Creatinine Ratio 11/17/2021    Final    Unable to calculate   • Creatinine, Urine 11/17/2021 98.9  mg/dL Final   • Microalbumin, Urine 11/17/2021 <1.2  mg/dL Final   • TSH 11/17/2021 2.440  0.270 - 4.200 uIU/mL Final   • Vitamin B-12 11/17/2021 799  211 - 946 pg/mL Final   • 25 Hydroxy, Vitamin D  11/17/2021 52.4  30.0 - 100.0 ng/ml Final      XR Knee 1 or 2 View Right  Narrative: Standing AP knees  Standing lateral right knee    HISTORY: Right knee pain    AP standing view of each knee and lateral standing views of the  right knee obtained.    COMPARISON: March 2, 2020    FINDINGS:   No fracture or dislocation.  Small spur arising from the lateral aspect of each proximal  tibia.  Very small spur each lateral tibial spine.  Minimal narrowing of each medial joint space.  Small right patellar spurs.  Very small right suprapatellar effusion.  No other osseous or articular abnormality.    Atherosclerotic calcifications at and below the level of the  right knee.  Impression: CONCLUSION:  Small spur arising from the lateral aspect of each proximal  tibia.  Very small spur each lateral tibial spine.  Minimal narrowing of each medial joint space.  Small right patellar spurs.  Very small right suprapatellar effusion.    [id number]    Electronically signed by:  Estuardo Maciel MD  1/19/2022 3:24 PM CST  Workstation: [id number]  XR Knee Bilateral AP Standing  Narrative: Standing AP knees  Standing lateral right knee    HISTORY: Right knee pain    AP standing view of each knee and lateral standing views of the  right knee obtained.    COMPARISON: March 2, 2020    FINDINGS:   No fracture or dislocation.  Small spur arising from the lateral aspect of each proximal  tibia.  Very small spur each lateral tibial spine.  Minimal narrowing of each medial joint space.  Small right patellar spurs.  Very small right suprapatellar effusion.  No other osseous or articular abnormality.    Atherosclerotic calcifications at and below the level of the  right knee.  Impression: CONCLUSION:  Small spur arising from the lateral aspect of each proximal  tibia.  Very small spur each lateral tibial spine.  Minimal narrowing of each medial joint space.  Small right patellar spurs.  Very small right suprapatellar effusion.    [id number]    Electronically  "signed by:  Estuardo Maciel MD  1/19/2022 3:24 PM [facility]  Workstation: [id number]    [email or web address]  Immunization History   Administered Date(s) Administered   • COVID-19 (MODERNA) 1st, 2nd, 3rd Dose Only 01/26/2021, 04/13/2021, 01/25/2022   • FluLaval/Fluarix/Fluzone >6 10/31/2018, 10/31/2018   • Fluad Quad 65+ 11/16/2020   • Fluzone High Dose =>65 Years ([employer] ONLY) 11/02/2016, 10/23/2019   • Pneumococcal Conjugate 13-Valent (PCV13) 03/15/2018   • Pneumococcal Polysaccharide (PPSV23) 08/15/2005, 03/14/2017   • Pneumococcal, Unspecified 04/14/2015   • Tdap 07/11/2020   • Tetanus 09/15/2015   • influenza Split 12/08/2015       The following portions of the patient's history were reviewed and updated as appropriate: allergies, current medications, past family history, past medical history, past social history, past surgical history and problem list.        Physical Exam  /60 (BP Location: Left arm, Patient Position: Sitting, Cuff Size: Large Adult)   Pulse 76   Temp 97.8 °F (36.6 °C)   Ht 175.3 cm (69\")   Wt 89.7 kg (197 lb 12.8 oz)   SpO2 98%   BMI 29.21 kg/m²     Physical Exam  Vitals and nursing note reviewed.   Constitutional:       Appearance: He is well-developed. He is not diaphoretic.   HENT:      Head: Normocephalic and atraumatic.      Right Ear: External ear normal.   Eyes:      Conjunctiva/sclera: Conjunctivae normal.      Pupils: Pupils are equal, round, and reactive to light.   Cardiovascular:      Rate and Rhythm: Normal rate. Rhythm irregular.      Heart sounds: Normal heart sounds. No murmur heard.  Pulmonary:      Effort: Pulmonary effort is normal. No respiratory distress.      Breath sounds: Normal breath sounds.   Abdominal:      General: Bowel sounds are normal. There is no distension.      Palpations: Abdomen is soft.      Tenderness: There is no abdominal tenderness.   Musculoskeletal:         General: Tenderness present. No deformity. Normal range of motion.      Cervical back: Normal " range of motion and neck supple.   Skin:     General: Skin is warm.      Coloration: Skin is not pale.      Findings: No erythema or rash.   Neurological:      Mental Status: He is alert and oriented to person, place, and time.      Cranial Nerves: No cranial nerve deficit.   Psychiatric:         Behavior: Behavior normal.         [unfilled]   Diagnosis Plan   1. PAF (paroxysmal atrial fibrillation) ([facility])  CBC Auto Differential    Comprehensive Metabolic Panel    Hemoglobin A1c    Lipid Panel    TSH    T4, Free    Vitamin D 25 Hydroxy    Vitamin B12    Microalbumin / Creatinine Urine Ratio - Urine, Clean Catch   2. Coronary artery disease with angina pectoris, unspecified vessel or lesion type, unspecified whether native or transplanted heart ([facility])  CBC Auto Differential    Comprehensive Metabolic Panel    Hemoglobin A1c    Lipid Panel    TSH    T4, Free    Vitamin D 25 Hydroxy    Vitamin B12    Microalbumin / Creatinine Urine Ratio - Urine, Clean Catch    ALPRAZolam (XANAX) 0.5 MG tablet   3. Vitamin D deficiency  CBC Auto Differential    Comprehensive Metabolic Panel    Hemoglobin A1c    Lipid Panel    TSH    T4, Free    Vitamin D 25 Hydroxy    Vitamin B12    Microalbumin / Creatinine Urine Ratio - Urine, Clean Catch    ALPRAZolam (XANAX) 0.5 MG tablet   4. Overweight  CBC Auto Differential    Comprehensive Metabolic Panel    Hemoglobin A1c    Lipid Panel    TSH    T4, Free    Vitamin D 25 Hydroxy    Vitamin B12    Microalbumin / Creatinine Urine Ratio - Urine, Clean Catch    ALPRAZolam (XANAX) 0.5 MG tablet   5. Uncontrolled type 2 diabetes mellitus with hyperglycemia ([facility])  CBC Auto Differential    Comprehensive Metabolic Panel    Hemoglobin A1c    Lipid Panel    TSH    T4, Free    Vitamin D 25 Hydroxy    Vitamin B12    Microalbumin / Creatinine Urine Ratio - Urine, Clean Catch   6. Mixed hyperlipidemia  CBC Auto Differential    Comprehensive Metabolic Panel    Hemoglobin A1c    Lipid Panel    TSH    T4,  Free    Vitamin D 25 Hydroxy    Vitamin B12    Microalbumin / Creatinine Urine Ratio - Urine, Clean Catch    ALPRAZolam (XANAX) 0.5 MG tablet   7. Essential hypertension  CBC Auto Differential    Comprehensive Metabolic Panel    Hemoglobin A1c    Lipid Panel    TSH    T4, Free    Vitamin D 25 Hydroxy    Vitamin B12    Microalbumin / Creatinine Urine Ratio - Urine, Clean Catch    ALPRAZolam (XANAX) 0.5 MG tablet   8. Generalized anxiety disorder  ALPRAZolam (XANAX) 0.5 MG tablet   9. Encounter for screening colonoscopy  Ambulatory Referral to Gastroenterology   10. Chronic constipation             -recommend labwork   -recommend COVID-19 vaccination  -recommmend colonoscopy -refer to GI  -dizzines/veritigo/tinnitus - referred to ENT n meclizine 12.5 mg every 8 hours PRN.   Performed epley maneuever. Gave exercises   -constipation - start back on linzess 145 mcg PO q daily. Gave samples   -CKD stage 2 - gave information. Continue to monitor   -DM type 2-  Endocrinology following  Pt could   not tolerate synjardy 100/1000 mg daily.  on trulcity 3 mg subqweekly  weekly On novoloin R 20-25  units with meals.  Ton basaglar 15 untis at bedtime. Gave samples today   -HLP  - on  Red yeast rice.on Vascepa 1 gram PO BID. Pt cannot tolerate statin   -GERD -continue nexium. Gastroenterology following   -overweight - weght loss information provided. Counseled >5 minutes BMI at 29.21   -ADRIENNE - pt is on xanax. PT prefers to stay on xanax. FRANK printed and on file refilled xanax  REF number [id number]  -hypertension -on norvasc 10 mg pO q daily stop norvasc since BP is lower side  on  lisinopril 40 mg daily. On lopressor 50 mg PO BID. adivsed pt to bring BP readings next visit   -pAF - cardiology following -currently rate controlled eliquis 5 mg PO BId,  loptressor 50 mg every 12 hours, imdur 30 mg PO q daily.  On propafenoe 150 mg every 8 hours   -CAD-sp stent  Cardiology following. Aspirin 81 mg daily.   on xarelto 20 mg PO BID    Lopressor 50 mg PO BID, lisinopril 10 mg daily.  On imdur 30 mg PO q daily.  Off lipitor.  Advised pt to call regarding Cardiology   -vitamin D - vitamin D supplement daily.   -advised pt to be safe and call with questions and concerns  -advised to go to ER or call 911 if symptoms worrisome or severe  -advised to folllowup with referrals and Specialist   -advised pt to be safe during COVID-19 pandemic   I spent 25 minutes caring for Aj on this date of service. This time includes time spent by me in the following activities: preparing for the visit, reviewing tests, obtaining and/or reviewing a separately obtained history, performing a medically appropriate examination and/or evaluation, counseling and educating the patient/family/caregiver, ordering medications, tests, or procedures, referring and communicating with other health care professionals, documenting information in the medical record, independently interpreting results and communicating that information with the patient/family/caregiver and care coordination  -recheck in 6 weeks         This document has been electronically signed by Andreas Martinez MD on May 26, 2022 13:23 CDT

## 2022-05-26 ENCOUNTER — OFFICE VISIT (OUTPATIENT)
Dept: FAMILY MEDICINE CLINIC | Facility: CLINIC | Age: 77
End: 2022-05-26

## 2022-05-26 VITALS
OXYGEN SATURATION: 98 % | TEMPERATURE: 97.8 F | BODY MASS INDEX: 29.3 KG/M2 | HEIGHT: 69 IN | DIASTOLIC BLOOD PRESSURE: 60 MMHG | HEART RATE: 76 BPM | SYSTOLIC BLOOD PRESSURE: 110 MMHG | WEIGHT: 197.8 LBS

## 2022-05-26 DIAGNOSIS — E11.65 UNCONTROLLED TYPE 2 DIABETES MELLITUS WITH HYPERGLYCEMIA: ICD-10-CM

## 2022-05-26 DIAGNOSIS — E55.9 VITAMIN D DEFICIENCY: ICD-10-CM

## 2022-05-26 DIAGNOSIS — E78.2 MIXED HYPERLIPIDEMIA: ICD-10-CM

## 2022-05-26 DIAGNOSIS — E66.3 OVERWEIGHT: ICD-10-CM

## 2022-05-26 DIAGNOSIS — K59.09 CHRONIC CONSTIPATION: ICD-10-CM

## 2022-05-26 DIAGNOSIS — I10 ESSENTIAL HYPERTENSION: ICD-10-CM

## 2022-05-26 DIAGNOSIS — F41.1 GENERALIZED ANXIETY DISORDER: ICD-10-CM

## 2022-05-26 DIAGNOSIS — Z12.11 ENCOUNTER FOR SCREENING COLONOSCOPY: ICD-10-CM

## 2022-05-26 DIAGNOSIS — I48.0 PAF (PAROXYSMAL ATRIAL FIBRILLATION): Primary | ICD-10-CM

## 2022-05-26 DIAGNOSIS — I25.119 CORONARY ARTERY DISEASE WITH ANGINA PECTORIS, UNSPECIFIED VESSEL OR LESION TYPE, UNSPECIFIED WHETHER NATIVE OR TRANSPLANTED HEART: ICD-10-CM

## 2022-05-26 PROCEDURE — 99214 OFFICE O/P EST MOD 30 MIN: CPT | Performed by: FAMILY MEDICINE

## 2022-05-26 RX ORDER — ALPRAZOLAM 0.5 MG/1
0.5 TABLET ORAL NIGHTLY PRN
Qty: 30 TABLET | Refills: 0 | Status: SHIPPED | OUTPATIENT
Start: 2022-05-26 | End: 2022-08-03 | Stop reason: SDUPTHER

## 2022-06-01 ENCOUNTER — LAB (OUTPATIENT)
Dept: LAB | Facility: HOSPITAL | Age: 77
End: 2022-06-01

## 2022-06-01 DIAGNOSIS — E11.65 UNCONTROLLED TYPE 2 DIABETES MELLITUS WITH HYPERGLYCEMIA: ICD-10-CM

## 2022-06-01 DIAGNOSIS — I25.119 CORONARY ARTERY DISEASE WITH ANGINA PECTORIS, UNSPECIFIED VESSEL OR LESION TYPE, UNSPECIFIED WHETHER NATIVE OR TRANSPLANTED HEART: ICD-10-CM

## 2022-06-01 DIAGNOSIS — E66.3 OVERWEIGHT: ICD-10-CM

## 2022-06-01 DIAGNOSIS — E78.2 MIXED HYPERLIPIDEMIA: ICD-10-CM

## 2022-06-01 DIAGNOSIS — I10 ESSENTIAL HYPERTENSION: ICD-10-CM

## 2022-06-01 DIAGNOSIS — E55.9 VITAMIN D DEFICIENCY: ICD-10-CM

## 2022-06-01 DIAGNOSIS — I48.0 PAF (PAROXYSMAL ATRIAL FIBRILLATION): ICD-10-CM

## 2022-06-01 LAB
25(OH)D3 SERPL-MCNC: 62 NG/ML (ref 30–100)
ALBUMIN SERPL-MCNC: 4 G/DL (ref 3.5–5.2)
ALBUMIN UR-MCNC: <1.2 MG/DL
ALBUMIN/GLOB SERPL: 1.1 G/DL
ALP SERPL-CCNC: 39 U/L (ref 39–117)
ALT SERPL W P-5'-P-CCNC: 20 U/L (ref 1–41)
ANION GAP SERPL CALCULATED.3IONS-SCNC: 13.4 MMOL/L (ref 5–15)
AST SERPL-CCNC: 22 U/L (ref 1–40)
BASOPHILS # BLD AUTO: 0.04 10*3/MM3 (ref 0–0.2)
BASOPHILS NFR BLD AUTO: 0.6 % (ref 0–1.5)
BILIRUB SERPL-MCNC: 0.4 MG/DL (ref 0–1.2)
BUN SERPL-MCNC: 17 MG/DL (ref 8–23)
BUN/CREAT SERPL: 16.3 (ref 7–25)
CALCIUM SPEC-SCNC: 9.4 MG/DL (ref 8.6–10.5)
CHLORIDE SERPL-SCNC: 104 MMOL/L (ref 98–107)
CHOLEST SERPL-MCNC: 128 MG/DL (ref 0–200)
CO2 SERPL-SCNC: 22.6 MMOL/L (ref 22–29)
CREAT SERPL-MCNC: 1.04 MG/DL (ref 0.76–1.27)
CREAT UR-MCNC: 194.6 MG/DL
DEPRECATED RDW RBC AUTO: 42.1 FL (ref 37–54)
EGFRCR SERPLBLD CKD-EPI 2021: 74 ML/MIN/1.73
EOSINOPHIL # BLD AUTO: 0.17 10*3/MM3 (ref 0–0.4)
EOSINOPHIL NFR BLD AUTO: 2.7 % (ref 0.3–6.2)
ERYTHROCYTE [DISTWIDTH] IN BLOOD BY AUTOMATED COUNT: 13.8 % (ref 12.3–15.4)
GLOBULIN UR ELPH-MCNC: 3.5 GM/DL
GLUCOSE SERPL-MCNC: 166 MG/DL (ref 65–99)
HBA1C MFR BLD: 7.4 % (ref 4.8–5.6)
HCT VFR BLD AUTO: 41.7 % (ref 37.5–51)
HDLC SERPL-MCNC: 28 MG/DL (ref 40–60)
HGB BLD-MCNC: 14 G/DL (ref 13–17.7)
IMM GRANULOCYTES # BLD AUTO: 0.02 10*3/MM3 (ref 0–0.05)
IMM GRANULOCYTES NFR BLD AUTO: 0.3 % (ref 0–0.5)
LDLC SERPL CALC-MCNC: 64 MG/DL (ref 0–100)
LDLC/HDLC SERPL: 2.04 {RATIO}
LYMPHOCYTES # BLD AUTO: 1.49 10*3/MM3 (ref 0.7–3.1)
LYMPHOCYTES NFR BLD AUTO: 23.4 % (ref 19.6–45.3)
MCH RBC QN AUTO: 28.3 PG (ref 26.6–33)
MCHC RBC AUTO-ENTMCNC: 33.6 G/DL (ref 31.5–35.7)
MCV RBC AUTO: 84.4 FL (ref 79–97)
MICROALBUMIN/CREAT UR: NORMAL MG/G{CREAT}
MONOCYTES # BLD AUTO: 0.44 10*3/MM3 (ref 0.1–0.9)
MONOCYTES NFR BLD AUTO: 6.9 % (ref 5–12)
NEUTROPHILS NFR BLD AUTO: 4.22 10*3/MM3 (ref 1.7–7)
NEUTROPHILS NFR BLD AUTO: 66.1 % (ref 42.7–76)
NRBC BLD AUTO-RTO: 0 /100 WBC (ref 0–0.2)
PLATELET # BLD AUTO: 231 10*3/MM3 (ref 140–450)
PMV BLD AUTO: 10.5 FL (ref 6–12)
POTASSIUM SERPL-SCNC: 4.5 MMOL/L (ref 3.5–5.2)
PROT SERPL-MCNC: 7.5 G/DL (ref 6–8.5)
RBC # BLD AUTO: 4.94 10*6/MM3 (ref 4.14–5.8)
SODIUM SERPL-SCNC: 140 MMOL/L (ref 136–145)
T4 FREE SERPL-MCNC: 0.96 NG/DL (ref 0.93–1.7)
TRIGL SERPL-MCNC: 215 MG/DL (ref 0–150)
TSH SERPL DL<=0.05 MIU/L-ACNC: 1.62 UIU/ML (ref 0.27–4.2)
VIT B12 BLD-MCNC: 577 PG/ML (ref 211–946)
VLDLC SERPL-MCNC: 36 MG/DL (ref 5–40)
WBC NRBC COR # BLD: 6.38 10*3/MM3 (ref 3.4–10.8)

## 2022-06-01 PROCEDURE — 85025 COMPLETE CBC W/AUTO DIFF WBC: CPT

## 2022-06-01 PROCEDURE — 84439 ASSAY OF FREE THYROXINE: CPT

## 2022-06-01 PROCEDURE — 83036 HEMOGLOBIN GLYCOSYLATED A1C: CPT

## 2022-06-01 PROCEDURE — 82043 UR ALBUMIN QUANTITATIVE: CPT

## 2022-06-01 PROCEDURE — 82570 ASSAY OF URINE CREATININE: CPT

## 2022-06-01 PROCEDURE — 80053 COMPREHEN METABOLIC PANEL: CPT

## 2022-06-01 PROCEDURE — 80061 LIPID PANEL: CPT

## 2022-06-01 PROCEDURE — 82607 VITAMIN B-12: CPT

## 2022-06-01 PROCEDURE — 84443 ASSAY THYROID STIM HORMONE: CPT

## 2022-06-01 PROCEDURE — 82306 VITAMIN D 25 HYDROXY: CPT

## 2022-06-01 RX ORDER — METOPROLOL TARTRATE 50 MG/1
TABLET, FILM COATED ORAL
Qty: 60 TABLET | Refills: 0 | Status: SHIPPED | OUTPATIENT
Start: 2022-06-01 | End: 2022-07-07

## 2022-06-22 ENCOUNTER — OFFICE VISIT (OUTPATIENT)
Dept: ENDOCRINOLOGY | Facility: CLINIC | Age: 77
End: 2022-06-22

## 2022-06-22 VITALS
SYSTOLIC BLOOD PRESSURE: 100 MMHG | BODY MASS INDEX: 28.44 KG/M2 | OXYGEN SATURATION: 98 % | HEIGHT: 69 IN | WEIGHT: 192 LBS | DIASTOLIC BLOOD PRESSURE: 60 MMHG | HEART RATE: 65 BPM

## 2022-06-22 DIAGNOSIS — K76.0 NAFLD (NONALCOHOLIC FATTY LIVER DISEASE): ICD-10-CM

## 2022-06-22 DIAGNOSIS — I25.119 CORONARY ARTERY DISEASE WITH ANGINA PECTORIS, UNSPECIFIED VESSEL OR LESION TYPE, UNSPECIFIED WHETHER NATIVE OR TRANSPLANTED HEART: ICD-10-CM

## 2022-06-22 DIAGNOSIS — I10 ESSENTIAL HYPERTENSION: ICD-10-CM

## 2022-06-22 DIAGNOSIS — E78.2 MIXED HYPERLIPIDEMIA: ICD-10-CM

## 2022-06-22 DIAGNOSIS — E11.65 TYPE 2 DIABETES MELLITUS WITH HYPERGLYCEMIA, WITHOUT LONG-TERM CURRENT USE OF INSULIN: Primary | ICD-10-CM

## 2022-06-22 DIAGNOSIS — E55.9 VITAMIN D DEFICIENCY: ICD-10-CM

## 2022-06-22 PROCEDURE — 99214 OFFICE O/P EST MOD 30 MIN: CPT | Performed by: INTERNAL MEDICINE

## 2022-06-22 NOTE — PROGRESS NOTES
"Aj Card Jr. is a 76 y.o. male who presents for  evaluation of type 2 diabetes        Diabetes     Duration since age 71 years old     Complications - CAD     Current symptoms/problems  none     Alleviating Factors: Compliance       Current diet  in general, a \"healthy\" diet      Current exercise walking    Current monitoring regimen: home blood tests - checking 4 x daily   Home blood sugar records: ppg elevation    Hypoglycemia none    PE    /60   Pulse 65   Ht 175.3 cm (69\")   Wt 87.1 kg (192 lb)   SpO2 98%   BMI 28.35 kg/m²   AOx3  No visible goiter  Normal Respiratory Effort , Lung CTA  RRR  No Edema    Labs    Lab Results   Component Value Date    WBC 6.38 06/01/2022    HGB 14.0 06/01/2022    HCT 41.7 06/01/2022    MCV 84.4 06/01/2022     06/01/2022     Lab Results   Component Value Date    GLUCOSE 166 (H) 06/01/2022    BUN 17 06/01/2022    CREATININE 1.04 06/01/2022    EGFRIFNONA 74 11/17/2021    BCR 16.3 06/01/2022    K 4.5 06/01/2022    CO2 22.6 06/01/2022    CALCIUM 9.4 06/01/2022    ALBUMIN 4.00 06/01/2022    AST 22 06/01/2022    ALT 20 06/01/2022             Assessment & Plan       ICD-10-CM ICD-9-CM   1. Type 2 diabetes mellitus with hyperglycemia, without long-term current use of insulin ([facility])  E11.65 250.00     790.29   2. Mixed hyperlipidemia  E78.2 272.2   3. Essential hypertension  I10 401.9   4. Vitamin D deficiency  E55.9 268.9   5. NAFLD (nonalcoholic fatty liver disease)  K76.0 571.8   6. Coronary artery disease with angina pectoris, unspecified vessel or lesion type, unspecified whether native or transplanted heart (HCC)  I25.119 414.00     413.9       Pt has type 2 Diabetes with CAD     Glycemic Management:   Lab Results   Component Value Date    HGBA1C 7.40 (H) 06/01/2022    HGBA1C 7.70 (H) 11/17/2021    HGBA1C 7.59 (H) 07/12/2021     Lab Results   Component Value Date    GLUCOSE 166 (H) 06/01/2022    BUN 17 06/01/2022    CREATININE 1.04 06/01/2022    " EGFRIFNONA 74 11/17/2021    BCR 16.3 06/01/2022    K 4.5 06/01/2022    CO2 22.6 06/01/2022    CALCIUM 9.4 06/01/2022    ALBUMIN 4.00 06/01/2022    AST 22 06/01/2022    ALT 20 06/01/2022    ANIONGAP 13.4 06/01/2022     Lab Results   Component Value Date    WBC 6.38 06/01/2022    HGB 14.0 06/01/2022    HCT 41.7 06/01/2022    MCV 84.4 06/01/2022     06/01/2022      side effects to metformin          Trulicity 3 mg weekly     Basaglar 15 units    Add Farxiga 5 mg daily     Novolin R 20 to 25 w every meal ---       Criteria for Approval of Dexcom     The patient has diabetes mellitus, insulin-dependent.    Our Diabetes Department has evaluated the patient in the last six months and will continue counseling on insulin adjustment.     The patient performs blood glucose testing at least four times daily with proven glucose variability from 50 to 300 mg per dl.    The patient is administering basal insulin and prandial insulin four times per day for more than six months.    The patient uses a home blood glucose monitor to assess blood glucose at least four times daily for more than six months.    The patient requires frequent adjustment of insulin treatment regimen based on blood glucose readings.    The patient has frequent variability in blood glucose readings due to activity and variability in meal content and time.     The patient has completed a diabetes education program with us.    The patient has demonstrated the ability to self-monitor her glucose.     The patient is motivated in improving diabetes control             Lipid Management  Lab Results   Component Value Date    CHOL 128 06/01/2022    CHOL 147 11/17/2021    CHOL 141 07/12/2021     Lab Results   Component Value Date    TRIG 215 (H) 06/01/2022    TRIG 269 (H) 11/17/2021    TRIG 271 (H) 07/12/2021     Lab Results   Component Value Date    HDL 28 (L) 06/01/2022    HDL 31 (L) 11/17/2021    HDL 27 (L) 07/12/2021     No components found for: LDLCALC  Lab  Results   Component Value Date    LDL 64 06/01/2022    LDL 72 11/17/2021    LDL 70 07/12/2021        lovastatin 10 mg qhs         Blood Pressure Management:    Vitals:    06/22/22 1501   BP: 100/60   Pulse: 65   SpO2: 98%     Lab Results   Component Value Date    GLUCOSE 166 (H) 06/01/2022    CALCIUM 9.4 06/01/2022     06/01/2022    K 4.5 06/01/2022    CO2 22.6 06/01/2022     06/01/2022    BUN 17 06/01/2022    CREATININE 1.04 06/01/2022    EGFRIFNONA 74 11/17/2021    BCR 16.3 06/01/2022    ANIONGAP 13.4 06/01/2022     Controlled on ACE I regimen           Microvascular Complication Monitoring:      Eye Exam Evaluation  Within 1 year     -----------    Last Microalbumin-Proteinuria Assessment    Lab Results   Component Value Date    MALBCRERATIO  06/01/2022      Comment:      Unable to calculate    MALBCRERATIO  11/17/2021      Comment:      Unable to calculate    MALBCRERATIO  04/07/2021      Comment:      Unable to calculate       No results found for: UTPCR    -----------      Neuropathy        Bone Health    Lab Results   Component Value Date    CALCIUM 9.4 06/01/2022    RUFP48MC 62.0 06/01/2022       Thyroid Health    Lab Results   Component Value Date    TSH 1.620 06/01/2022    TSH 2.440 11/17/2021    TSH 2.510 04/07/2021         Other Diabetes Related Aspects       Lab Results   Component Value Date    SFWSYMRJ17 577 06/01/2022     FATTY LIVER    Weight loss   Vitamin E 200 units daily - could have been considered       No orders of the defined types were placed in this encounter.

## 2022-06-24 ENCOUNTER — DOCUMENTATION (OUTPATIENT)
Dept: ENDOCRINOLOGY | Facility: CLINIC | Age: 77
End: 2022-06-24

## 2022-06-27 RX ORDER — AZELASTINE HYDROCHLORIDE 137 UG/1
SPRAY, METERED NASAL
Qty: 30 ML | Refills: 0 | Status: SHIPPED | OUTPATIENT
Start: 2022-06-27 | End: 2022-08-03 | Stop reason: SDUPTHER

## 2022-07-07 RX ORDER — METOPROLOL TARTRATE 50 MG/1
TABLET, FILM COATED ORAL
Qty: 60 TABLET | Refills: 0 | Status: SHIPPED | OUTPATIENT
Start: 2022-07-07 | End: 2022-08-25

## 2022-07-20 ENCOUNTER — APPOINTMENT (OUTPATIENT)
Dept: GENERAL RADIOLOGY | Facility: HOSPITAL | Age: 77
End: 2022-07-20

## 2022-07-20 ENCOUNTER — HOSPITAL ENCOUNTER (EMERGENCY)
Facility: HOSPITAL | Age: 77
Discharge: HOME | End: 2022-07-20
Attending: EMERGENCY MEDICINE | Admitting: EMERGENCY MEDICINE

## 2022-07-20 VITALS
DIASTOLIC BLOOD PRESSURE: 85 MMHG | RESPIRATION RATE: 18 BRPM | OXYGEN SATURATION: 97 % | SYSTOLIC BLOOD PRESSURE: 129 MMHG | HEIGHT: 69 IN | BODY MASS INDEX: 27.98 KG/M2 | TEMPERATURE: 97.6 F | HEART RATE: 89 BPM | WEIGHT: 188.9 LBS

## 2022-07-20 DIAGNOSIS — R11.10 VOMITING AND DIARRHEA: Primary | ICD-10-CM

## 2022-07-20 DIAGNOSIS — R19.7 VOMITING AND DIARRHEA: Primary | ICD-10-CM

## 2022-07-20 LAB
ALBUMIN SERPL-MCNC: 4.9 G/DL (ref 3.5–5.2)
ALBUMIN/GLOB SERPL: 1.5 G/DL
ALP SERPL-CCNC: 56 U/L (ref 39–117)
ALT SERPL W P-5'-P-CCNC: 18 U/L (ref 1–41)
AMYLASE SERPL-CCNC: 35 U/L (ref 28–100)
ANION GAP SERPL CALCULATED.3IONS-SCNC: 11 MMOL/L (ref 5–15)
AST SERPL-CCNC: 22 U/L (ref 1–40)
BASOPHILS # BLD AUTO: 0.05 10*3/MM3 (ref 0–0.2)
BASOPHILS NFR BLD AUTO: 0.5 % (ref 0–1.5)
BILIRUB SERPL-MCNC: 0.5 MG/DL (ref 0–1.2)
BILIRUB UR QL STRIP: NEGATIVE
BUN SERPL-MCNC: 14 MG/DL (ref 8–23)
BUN/CREAT SERPL: 12.3 (ref 7–25)
CALCIUM SPEC-SCNC: 9.9 MG/DL (ref 8.6–10.5)
CHLORIDE SERPL-SCNC: 100 MMOL/L (ref 98–107)
CLARITY UR: CLEAR
CO2 SERPL-SCNC: 26 MMOL/L (ref 22–29)
COLOR UR: YELLOW
CREAT SERPL-MCNC: 1.14 MG/DL (ref 0.76–1.27)
DEPRECATED RDW RBC AUTO: 41.5 FL (ref 37–54)
EGFRCR SERPLBLD CKD-EPI 2021: 66.2 ML/MIN/1.73
EOSINOPHIL # BLD AUTO: 0.37 10*3/MM3 (ref 0–0.4)
EOSINOPHIL NFR BLD AUTO: 3.6 % (ref 0.3–6.2)
ERYTHROCYTE [DISTWIDTH] IN BLOOD BY AUTOMATED COUNT: 14.2 % (ref 12.3–15.4)
FLUAV SUBTYP SPEC NAA+PROBE: NOT DETECTED
FLUBV RNA ISLT QL NAA+PROBE: NOT DETECTED
GLOBULIN UR ELPH-MCNC: 3.2 GM/DL
GLUCOSE SERPL-MCNC: 189 MG/DL (ref 65–99)
GLUCOSE UR STRIP-MCNC: NEGATIVE MG/DL
HCT VFR BLD AUTO: 45.6 % (ref 37.5–51)
HGB BLD-MCNC: 15.5 G/DL (ref 13–17.7)
HGB UR QL STRIP.AUTO: NEGATIVE
HOLD SPECIMEN: NORMAL
HOLD SPECIMEN: NORMAL
IMM GRANULOCYTES # BLD AUTO: 0.08 10*3/MM3 (ref 0–0.05)
IMM GRANULOCYTES NFR BLD AUTO: 0.8 % (ref 0–0.5)
KETONES UR QL STRIP: NEGATIVE
LEUKOCYTE ESTERASE UR QL STRIP.AUTO: NEGATIVE
LIPASE SERPL-CCNC: 104 U/L (ref 13–60)
LYMPHOCYTES # BLD AUTO: 0.89 10*3/MM3 (ref 0.7–3.1)
LYMPHOCYTES NFR BLD AUTO: 8.7 % (ref 19.6–45.3)
MCH RBC QN AUTO: 28.1 PG (ref 26.6–33)
MCHC RBC AUTO-ENTMCNC: 34 G/DL (ref 31.5–35.7)
MCV RBC AUTO: 82.6 FL (ref 79–97)
MONOCYTES # BLD AUTO: 0.45 10*3/MM3 (ref 0.1–0.9)
MONOCYTES NFR BLD AUTO: 4.4 % (ref 5–12)
NEUTROPHILS NFR BLD AUTO: 8.37 10*3/MM3 (ref 1.7–7)
NEUTROPHILS NFR BLD AUTO: 82 % (ref 42.7–76)
NITRITE UR QL STRIP: NEGATIVE
NRBC BLD AUTO-RTO: 0 /100 WBC (ref 0–0.2)
PH UR STRIP.AUTO: <=5 [PH] (ref 5–9)
PLATELET # BLD AUTO: 237 10*3/MM3 (ref 140–450)
PMV BLD AUTO: 9 FL (ref 6–12)
POTASSIUM SERPL-SCNC: 5 MMOL/L (ref 3.5–5.2)
PROT SERPL-MCNC: 8.1 G/DL (ref 6–8.5)
PROT UR QL STRIP: NEGATIVE
RBC # BLD AUTO: 5.52 10*6/MM3 (ref 4.14–5.8)
SARS-COV-2 RNA PNL SPEC NAA+PROBE: NOT DETECTED
SODIUM SERPL-SCNC: 137 MMOL/L (ref 136–145)
SP GR UR STRIP: 1.02 (ref 1–1.03)
UROBILINOGEN UR QL STRIP: NORMAL
WBC NRBC COR # BLD: 10.21 10*3/MM3 (ref 3.4–10.8)
WHOLE BLOOD HOLD COAG: NORMAL
WHOLE BLOOD HOLD SPECIMEN: NORMAL

## 2022-07-20 PROCEDURE — 87636 SARSCOV2 & INF A&B AMP PRB: CPT | Performed by: EMERGENCY MEDICINE

## 2022-07-20 PROCEDURE — 96374 THER/PROPH/DIAG INJ IV PUSH: CPT

## 2022-07-20 PROCEDURE — 82150 ASSAY OF AMYLASE: CPT | Performed by: EMERGENCY MEDICINE

## 2022-07-20 PROCEDURE — 74022 RADEX COMPL AQT ABD SERIES: CPT

## 2022-07-20 PROCEDURE — 99283 EMERGENCY DEPT VISIT LOW MDM: CPT

## 2022-07-20 PROCEDURE — 83690 ASSAY OF LIPASE: CPT

## 2022-07-20 PROCEDURE — 25010000002 ONDANSETRON PER 1 MG: Performed by: EMERGENCY MEDICINE

## 2022-07-20 PROCEDURE — 85025 COMPLETE CBC W/AUTO DIFF WBC: CPT

## 2022-07-20 PROCEDURE — 80053 COMPREHEN METABOLIC PANEL: CPT

## 2022-07-20 PROCEDURE — 81003 URINALYSIS AUTO W/O SCOPE: CPT

## 2022-07-20 RX ORDER — SODIUM CHLORIDE 0.9 % (FLUSH) 0.9 %
10 SYRINGE (ML) INJECTION AS NEEDED
Status: DISCONTINUED | OUTPATIENT
Start: 2022-07-20 | End: 2022-07-20 | Stop reason: HOSPADM

## 2022-07-20 RX ORDER — ONDANSETRON 4 MG/1
4 TABLET, ORALLY DISINTEGRATING ORAL EVERY 8 HOURS PRN
Qty: 10 TABLET | Refills: 0 | Status: SHIPPED | OUTPATIENT
Start: 2022-07-20 | End: 2022-10-28

## 2022-07-20 RX ORDER — ONDANSETRON 2 MG/ML
4 INJECTION INTRAMUSCULAR; INTRAVENOUS ONCE
Status: COMPLETED | OUTPATIENT
Start: 2022-07-20 | End: 2022-07-20

## 2022-07-20 RX ORDER — LOPERAMIDE HYDROCHLORIDE 2 MG/1
2 CAPSULE ORAL 4 TIMES DAILY PRN
Qty: 6 CAPSULE | Refills: 0 | Status: SHIPPED | OUTPATIENT
Start: 2022-07-20 | End: 2022-10-28

## 2022-07-20 RX ADMIN — ONDANSETRON 4 MG: 2 INJECTION INTRAMUSCULAR; INTRAVENOUS at 15:45

## 2022-07-20 NOTE — DISCHARGE INSTRUCTIONS
Increase PO fluid intake  Return ED fever, abdominal pain, vomiting, dehydration, bleeding, worse condition, any other concerns

## 2022-07-20 NOTE — ED PROVIDER NOTES
Subjective   75yo male pmh significant htn/hyperlipidemia/dm2/cad/appendectomy/cholecystectomy presents ED c/o 9d hx intermittent nausea/vomiting/diarrhea.  ROS neg fever/chills/cough/chest pain/abd pain/dysuria/melena/hematochoezia/hematemesis.      History provided by:  Patient  Illness  Severity:  Mild  Timing:  Intermittent  Progression:  Waxing and waning  Chronicity:  New  Associated symptoms: diarrhea, nausea and vomiting    Associated symptoms: no abdominal pain        Review of Systems   Constitutional: Negative.    HENT: Negative.    Eyes: Negative for redness.   Respiratory: Negative.    Cardiovascular: Negative.    Gastrointestinal: Positive for diarrhea, nausea and vomiting. Negative for abdominal pain.   Genitourinary: Negative.    Musculoskeletal: Negative.    Allergic/Immunologic: Negative for immunocompromised state.   All other systems reviewed and are negative.      Past Medical History:   Diagnosis Date   • Acute posthemorrhagic anemia    • Allergic rhinitis    • Anxiety state    • Chest pain    • Coronary arteriosclerosis    • Diabetes mellitus (HCC)     type 2   • Diverticular disease of colon    • Dysphagia    • Epigastric pain    • GERD (gastroesophageal reflux disease)     esophagitis on Dexilant. Pt feels Nexium working better      • History of echocardiogram     Small left atrial enlargement with mild CLVH.Ef of 55-60%.Mitral valve mildly thickened.Av thickened.Left ventricle mildly dilated.Tricuspid intact.Mild aortic insufficiency. 12/07/2015       • History of echocardiogram     Mild diastolic dysfunction and mild left atrial enlargement, otherwise normal echo. EF> 55% 01/03/2012       • Hypercholesterolemia    • Hypertension    • Insomnia    • Irritable bowel syndrome    • Osteoarthritis of multiple joints        Allergies   Allergen Reactions   • Brilinta [Ticagrelor] Shortness Of Breath   • Effient [Prasugrel] Shortness Of Breath   • Warfarin And Related Shortness Of Breath   •  Ciprofloxacin Hives   • Lipitor [Atorvastatin] Swelling   • Latex Itching   • Adhesive Tape Rash   • Celexa [Citalopram Hydrobromide] Rash   • Crestor [Rosuvastatin Calcium] Rash   • Floxin [Ofloxacin] Rash   • Januvia [Sitagliptin] Rash   • Penicillins Rash   • Sulfa Antibiotics Rash       Past Surgical History:   Procedure Laterality Date   • APPENDECTOMY       [facility] 1995       • CARDIAC CATHETERIZATION      Successful PTCA of the OMB#2.Unsuccessful PTCA of the 1st OMB, secondary to difficulty in advancing the balloon. 12/08/2015       • CHOLECYSTECTOMY      [place] 1993       • COLONOSCOPY  10/01/2012   • COLONOSCOPY N/A 12/11/2017    Procedure: COLONOSCOPY;  Surgeon: Bladimir Michael MD;  Location: [facility] ENDOSCOPY;  Service:    • CORONARY ANGIOPLASTY      Successful PTCA & stenting LAD was done w/ deployment of a 2.5mm x23 Xience stent w/ reduction of stenosis from 90% to less than 0% stenosis with good LILLIAM 3 flow 02/17/2012       • ENDOSCOPY      with biopsy.  Mildly severe esophagitis. Gastritis. Normal examined duodenum.Several biopsies obtained in the lower third of the esophagus.Several biopsies obtained in the gastric antrum. 05/06/2016       • ENDOSCOPY      w tube. Normal esophagus. Gastritis in stomach. Biopsy taken. Gastric polyp found. Biopsy taken. Normal duodenum. 10/01/2012       • ENDOSCOPY AND COLONOSCOPY      Diverticulum in sigmoid colon & descending colon. Internal & external hemorrhoids found. 10/01/2012       • HAND SURGERY       Corrective osteotomy of ring finger metacarpal. Malunited fracture of left ring finger 05/21/1985       • HERNIA REPAIR      Laparoscopic hernia repair. prepertitoneal bilateral inguinal hernia repair with mesh. 10/15/2009      • OTHER SURGICAL HISTORY      CORONARY ARTERY STENT    • SKIN TAG REMOVAL      Excision, viral skin tag,right upper eyelid 05/08/1995        Family History   Problem Relation Age of Onset   • Clotting  disorder Other    • Lung disease Other    • Schizophrenia Sister    • Obesity Sister    • Tuberculosis Sister    • Arthritis Mother    • Diabetes Mother    • Heart disease Mother    • Hypertension Mother    • Obesity Mother    • Stroke Mother    • Thyroid disease Mother    • Tuberculosis Mother    • Arthritis Father    • Tuberculosis Father        Social History     Socioeconomic History   • Marital status:    Tobacco Use   • Smoking status: Never Smoker   • Smokeless tobacco: Never Used   Vaping Use   • Vaping Use: Never used   Substance and Sexual Activity   • Alcohol use: No   • Drug use: No   • Sexual activity: Defer           Objective   Physical Exam  Vitals and nursing note reviewed.   Constitutional:       Appearance: Normal appearance.   HENT:      Head: Normocephalic and atraumatic.      Mouth/Throat:      Mouth: Mucous membranes are moist.   Eyes:      Pupils: Pupils are equal, round, and reactive to light.   Cardiovascular:      Rate and Rhythm: Normal rate and regular rhythm.      Pulses: Normal pulses.      Heart sounds: Normal heart sounds. No murmur heard.    No friction rub. No gallop.   Pulmonary:      Effort: Pulmonary effort is normal. No respiratory distress.      Breath sounds: Normal breath sounds. No wheezing, rhonchi or rales.   Abdominal:      General: Abdomen is flat. Bowel sounds are normal. There is no distension.      Palpations: Abdomen is soft.      Tenderness: There is no abdominal tenderness. There is no guarding or rebound.   Musculoskeletal:         General: No swelling or deformity.      Cervical back: Normal range of motion and neck supple. No rigidity.   Lymphadenopathy:      Cervical: No cervical adenopathy.   Skin:     General: Skin is warm and dry.   Neurological:      General: No focal deficit present.      Mental Status: He is alert and oriented to person, place, and time.      GCS: GCS eye subscore is 4. GCS verbal subscore is 5. GCS motor subscore is 6.          Procedures           ED Course      Labs Reviewed   COMPREHENSIVE METABOLIC PANEL - Abnormal; Notable for the following components:       Result Value    Glucose 189 (*)     All other components within normal limits    Narrative:     GFR Normal >60  Chronic Kidney Disease <60  Kidney Failure <15     LIPASE - Abnormal; Notable for the following components:    Lipase 104 (*)     All other components within normal limits   CBC WITH AUTO DIFFERENTIAL - Abnormal; Notable for the following components:    Neutrophil % 82.0 (*)     Lymphocyte % 8.7 (*)     Monocyte % 4.4 (*)     Immature Grans % 0.8 (*)     Neutrophils, Absolute 8.37 (*)     Immature Grans, Absolute 0.08 (*)     All other components within normal limits   COVID-19 AND FLU A/B, NP SWAB IN TRANSPORT MEDIA 8-12 HR TAT - Normal    Narrative:     Fact sheet for providers: https://www.fda.gov/media/422028/download    Fact sheet for patients: https://www.fda.gov/media/242693/download    Test performed by PCR.   URINALYSIS W/ MICROSCOPIC IF INDICATED (NO CULTURE) - Normal    Narrative:     Urine microscopic not indicated.   AMYLASE - Normal   RAINBOW DRAW    Narrative:     The following orders were created for panel order [place] Draw.  Procedure                               Abnormality         Status                     ---------                               -----------         ------                     Green Top (Gel)[[id number]]                                  Final result               Lavender Top[[id number]]                                     Final result               Gold Top - SST[[id number]]                                   Final result               Light Blue Top[[id number]]                                   Final result                 Please view results for these tests on the individual orders.   CBC AND DIFFERENTIAL    Narrative:     The following orders were created for panel order CBC & Differential.  Procedure                                Abnormality         Status                     ---------                               -----------         ------                     CBC Auto Differential[[id number]]        Abnormal            Final result                 Please view results for these tests on the individual orders.   GREEN TOP   LAVENDER TOP   GOLD TOP - SST   LIGHT BLUE TOP     XR Abdomen 2+ VW with Chest 1 VW    Result Date: 7/20/2022  Narrative: EXAMINATION:  XR ABDOMEN 2+ VIEWS W CHEST 1 VW CLINICAL HISTORY:  76 years Male,vomiting COMPARISON:  Chest x-ray dated 6/5/2020 FINDINGS:  No focal airspace consolidation. No pleural effusion or pneumothorax. Normal heart size and pulmonary vascularity. Nonobstructed bowel gas pattern. No free intraperitoneal air on the upright view. Prior cholecystectomy. Overall small amount of stool throughout the colon.     Impression: 1. No obstruction or free intraperitoneal air. 2. Overall small amount of stool scattered throughout the colon. 3. No evidence for acute cardiopulmonary process. Electronically signed by:  Aristeo Recinos MD  7/20/2022 2:56 PM CDT Workstation: [id number]                                         [facility]  Number of Diagnoses or Management Options  Vomiting and diarrhea  Diagnosis management comments: Labs/radiographic studies reviewed.  cxr no active disease.  abd xr nonobstructive bowel gas pattern.  CMP/CBC normal.  Minimal elevation lipase <2x ULN.  Amylase normal.  lft normal. nontender abdominal exam. Stable discharge with supportive care/pmd et gi followup.       Amount and/or Complexity of Data Reviewed  Clinical lab tests: reviewed  Tests in the radiology section of CPT®: reviewed        Final diagnoses:   Vomiting and diarrhea       ED Disposition  ED Disposition     ED Disposition   Discharge    Condition   Good    Comment   --             Andreas Martinez MD  [address] [place] [address] [address]  [place] [id number] [phone]    In 1 day      Kobe Roman DO  [address]  [place] [id number]  [phone]    Schedule an appointment as soon as possible for a visit in 1 week           Medication List      New Prescriptions    loperamide 2 MG capsule  Commonly known as: IMODIUM  Take 1 capsule by mouth 4 (Four) Times a Day As Needed for Diarrhea.     ondansetron ODT 4 MG disintegrating tablet  Commonly known as: ZOFRAN-ODT  Place 1 tablet on the tongue Every 8 (Eight) Hours As Needed for Nausea or Vomiting.           Where to Get Your Medications      These medications were sent to [facility] [address] [place] [address] - [facility] - [phone]  - [phone] [address] [address]    Phone: [phone]   · loperamide 2 MG capsule  · ondansetron ODT 4 MG disintegrating tablet          Jerod Valiente MD  07/20/22 [id number]

## 2022-07-25 RX ORDER — LISINOPRIL 40 MG/1
TABLET ORAL
Qty: 90 TABLET | Refills: 0 | Status: SHIPPED | OUTPATIENT
Start: 2022-07-25 | End: 2022-08-03

## 2022-07-26 ENCOUNTER — OFFICE VISIT (OUTPATIENT)
Dept: GASTROENTEROLOGY | Facility: CLINIC | Age: 77
End: 2022-07-26

## 2022-07-26 VITALS
HEIGHT: 69 IN | SYSTOLIC BLOOD PRESSURE: 111 MMHG | HEART RATE: 84 BPM | BODY MASS INDEX: 27.55 KG/M2 | WEIGHT: 186 LBS | DIASTOLIC BLOOD PRESSURE: 69 MMHG

## 2022-07-26 DIAGNOSIS — R11.2 NAUSEA AND VOMITING, UNSPECIFIED VOMITING TYPE: ICD-10-CM

## 2022-07-26 DIAGNOSIS — Z86.010 HISTORY OF COLON POLYPS: ICD-10-CM

## 2022-07-26 DIAGNOSIS — R19.7 DIARRHEA, UNSPECIFIED TYPE: Primary | ICD-10-CM

## 2022-07-26 DIAGNOSIS — Z12.11 ENCOUNTER FOR SCREENING FOR MALIGNANT NEOPLASM OF COLON: ICD-10-CM

## 2022-07-26 DIAGNOSIS — K57.30 DIVERTICULOSIS OF LARGE INTESTINE WITHOUT HEMORRHAGE: ICD-10-CM

## 2022-07-26 DIAGNOSIS — K21.00 GASTROESOPHAGEAL REFLUX DISEASE WITH ESOPHAGITIS WITHOUT HEMORRHAGE: ICD-10-CM

## 2022-07-26 PROCEDURE — 99213 OFFICE O/P EST LOW 20 MIN: CPT | Performed by: PHYSICIAN ASSISTANT

## 2022-07-26 RX ORDER — DEXTROSE AND SODIUM CHLORIDE 5; .45 G/100ML; G/100ML
30 INJECTION, SOLUTION INTRAVENOUS CONTINUOUS PRN
Status: CANCELLED | OUTPATIENT
Start: 2022-09-02

## 2022-08-03 ENCOUNTER — OFFICE VISIT (OUTPATIENT)
Dept: FAMILY MEDICINE CLINIC | Facility: CLINIC | Age: 77
End: 2022-08-03

## 2022-08-03 VITALS
TEMPERATURE: 97.3 F | BODY MASS INDEX: 28.2 KG/M2 | OXYGEN SATURATION: 98 % | SYSTOLIC BLOOD PRESSURE: 96 MMHG | HEART RATE: 87 BPM | HEIGHT: 69 IN | WEIGHT: 190.4 LBS | DIASTOLIC BLOOD PRESSURE: 52 MMHG

## 2022-08-03 DIAGNOSIS — E55.9 VITAMIN D DEFICIENCY: ICD-10-CM

## 2022-08-03 DIAGNOSIS — I48.0 PAF (PAROXYSMAL ATRIAL FIBRILLATION): ICD-10-CM

## 2022-08-03 DIAGNOSIS — E78.2 MIXED HYPERLIPIDEMIA: ICD-10-CM

## 2022-08-03 DIAGNOSIS — F41.1 GENERALIZED ANXIETY DISORDER: ICD-10-CM

## 2022-08-03 DIAGNOSIS — F43.21 GRIEVING: ICD-10-CM

## 2022-08-03 DIAGNOSIS — I10 ESSENTIAL HYPERTENSION: ICD-10-CM

## 2022-08-03 DIAGNOSIS — E66.3 OVERWEIGHT: ICD-10-CM

## 2022-08-03 DIAGNOSIS — E11.65 UNCONTROLLED TYPE 2 DIABETES MELLITUS WITH HYPERGLYCEMIA: ICD-10-CM

## 2022-08-03 DIAGNOSIS — R19.7 DIARRHEA, UNSPECIFIED TYPE: Primary | ICD-10-CM

## 2022-08-03 DIAGNOSIS — N18.2 STAGE 2 CHRONIC KIDNEY DISEASE: ICD-10-CM

## 2022-08-03 DIAGNOSIS — I25.119 CORONARY ARTERY DISEASE WITH ANGINA PECTORIS, UNSPECIFIED VESSEL OR LESION TYPE, UNSPECIFIED WHETHER NATIVE OR TRANSPLANTED HEART: ICD-10-CM

## 2022-08-03 PROCEDURE — 99214 OFFICE O/P EST MOD 30 MIN: CPT | Performed by: FAMILY MEDICINE

## 2022-08-03 RX ORDER — LISINOPRIL 20 MG/1
20 TABLET ORAL DAILY
Qty: 30 TABLET | Refills: 3 | Status: SHIPPED | OUTPATIENT
Start: 2022-08-03 | End: 2022-11-17 | Stop reason: SDUPTHER

## 2022-08-03 RX ORDER — ALPRAZOLAM 0.5 MG/1
0.5 TABLET ORAL NIGHTLY PRN
Qty: 30 TABLET | Refills: 0 | Status: SHIPPED | OUTPATIENT
Start: 2022-08-03 | End: 2022-09-01 | Stop reason: SDUPTHER

## 2022-08-03 RX ORDER — LACTULOSE 10 G/15ML
SOLUTION ORAL
COMMUNITY
Start: 2022-07-27 | End: 2022-10-28

## 2022-08-03 RX ORDER — AZELASTINE HYDROCHLORIDE 137 UG/1
1 SPRAY, METERED NASAL DAILY
Qty: 30 ML | Refills: 3 | Status: SHIPPED | OUTPATIENT
Start: 2022-08-03 | End: 2023-02-17 | Stop reason: SDUPTHER

## 2022-08-03 NOTE — PATIENT INSTRUCTIONS
Cut back on lisinpril from 40 to 20 mg daily.    Monitor your blood pressure at home     [facility] [place] [facility]     Recheck in 4 weeks

## 2022-08-04 ENCOUNTER — LAB (OUTPATIENT)
Dept: LAB | Facility: HOSPITAL | Age: 77
End: 2022-08-04

## 2022-08-04 DIAGNOSIS — R19.7 DIARRHEA, UNSPECIFIED TYPE: ICD-10-CM

## 2022-08-04 LAB
ALBUMIN SERPL-MCNC: 4.3 G/DL (ref 3.5–5.2)
ALBUMIN/GLOB SERPL: 1.7 G/DL
ALP SERPL-CCNC: 45 U/L (ref 39–117)
ALT SERPL W P-5'-P-CCNC: 14 U/L (ref 1–41)
ANION GAP SERPL CALCULATED.3IONS-SCNC: 11.3 MMOL/L (ref 5–15)
AST SERPL-CCNC: 19 U/L (ref 1–40)
BILIRUB SERPL-MCNC: 0.4 MG/DL (ref 0–1.2)
BUN SERPL-MCNC: 16 MG/DL (ref 8–23)
BUN/CREAT SERPL: 13.6 (ref 7–25)
CALCIUM SPEC-SCNC: 9 MG/DL (ref 8.6–10.5)
CHLORIDE SERPL-SCNC: 103 MMOL/L (ref 98–107)
CO2 SERPL-SCNC: 25.7 MMOL/L (ref 22–29)
CREAT SERPL-MCNC: 1.18 MG/DL (ref 0.76–1.27)
EGFRCR SERPLBLD CKD-EPI 2021: 63.6 ML/MIN/1.73
GLOBULIN UR ELPH-MCNC: 2.6 GM/DL
GLUCOSE SERPL-MCNC: 172 MG/DL (ref 65–99)
POTASSIUM SERPL-SCNC: 5 MMOL/L (ref 3.5–5.2)
PROT SERPL-MCNC: 6.9 G/DL (ref 6–8.5)
SODIUM SERPL-SCNC: 140 MMOL/L (ref 136–145)

## 2022-08-04 PROCEDURE — 80053 COMPREHEN METABOLIC PANEL: CPT

## 2022-08-25 RX ORDER — METOPROLOL TARTRATE 50 MG/1
TABLET, FILM COATED ORAL
Qty: 60 TABLET | Refills: 0 | Status: SHIPPED | OUTPATIENT
Start: 2022-08-25 | End: 2022-09-13

## 2022-09-01 ENCOUNTER — OFFICE VISIT (OUTPATIENT)
Dept: FAMILY MEDICINE CLINIC | Facility: CLINIC | Age: 77
End: 2022-09-01

## 2022-09-01 VITALS
SYSTOLIC BLOOD PRESSURE: 114 MMHG | WEIGHT: 192 LBS | HEIGHT: 69 IN | DIASTOLIC BLOOD PRESSURE: 72 MMHG | HEART RATE: 75 BPM | OXYGEN SATURATION: 98 % | BODY MASS INDEX: 28.44 KG/M2 | TEMPERATURE: 97.1 F

## 2022-09-01 DIAGNOSIS — N18.2 STAGE 2 CHRONIC KIDNEY DISEASE: ICD-10-CM

## 2022-09-01 DIAGNOSIS — I25.119 CORONARY ARTERY DISEASE WITH ANGINA PECTORIS, UNSPECIFIED VESSEL OR LESION TYPE, UNSPECIFIED WHETHER NATIVE OR TRANSPLANTED HEART: ICD-10-CM

## 2022-09-01 DIAGNOSIS — K21.00 GASTROESOPHAGEAL REFLUX DISEASE WITH ESOPHAGITIS WITHOUT HEMORRHAGE: ICD-10-CM

## 2022-09-01 DIAGNOSIS — E78.2 MIXED HYPERLIPIDEMIA: ICD-10-CM

## 2022-09-01 DIAGNOSIS — E66.3 OVERWEIGHT: ICD-10-CM

## 2022-09-01 DIAGNOSIS — I10 ESSENTIAL HYPERTENSION: ICD-10-CM

## 2022-09-01 DIAGNOSIS — F41.1 GENERALIZED ANXIETY DISORDER: ICD-10-CM

## 2022-09-01 DIAGNOSIS — E11.65 UNCONTROLLED TYPE 2 DIABETES MELLITUS WITH HYPERGLYCEMIA: ICD-10-CM

## 2022-09-01 DIAGNOSIS — E55.9 VITAMIN D DEFICIENCY: Primary | ICD-10-CM

## 2022-09-01 DIAGNOSIS — I48.0 PAF (PAROXYSMAL ATRIAL FIBRILLATION): ICD-10-CM

## 2022-09-01 PROCEDURE — 99214 OFFICE O/P EST MOD 30 MIN: CPT | Performed by: FAMILY MEDICINE

## 2022-09-01 RX ORDER — ALPRAZOLAM 0.5 MG/1
0.5 TABLET ORAL NIGHTLY PRN
Qty: 30 TABLET | Refills: 0 | Status: SHIPPED | OUTPATIENT
Start: 2022-09-01 | End: 2022-12-21 | Stop reason: SDUPTHER

## 2022-09-13 RX ORDER — METOPROLOL TARTRATE 50 MG/1
TABLET, FILM COATED ORAL
Qty: 60 TABLET | Refills: 0 | Status: SHIPPED | OUTPATIENT
Start: 2022-09-13 | End: 2022-10-28

## 2022-10-13 ENCOUNTER — FLU SHOT (OUTPATIENT)
Dept: FAMILY MEDICINE CLINIC | Facility: CLINIC | Age: 77
End: 2022-10-13

## 2022-10-13 DIAGNOSIS — Z23 FLU VACCINE NEED: Primary | ICD-10-CM

## 2022-10-13 PROCEDURE — 90662 IIV NO PRSV INCREASED AG IM: CPT | Performed by: FAMILY MEDICINE

## 2022-10-13 PROCEDURE — G0008 ADMIN INFLUENZA VIRUS VAC: HCPCS | Performed by: FAMILY MEDICINE

## 2022-10-28 RX ORDER — ISOSORBIDE MONONITRATE 30 MG/1
15 TABLET, EXTENDED RELEASE ORAL NIGHTLY
COMMUNITY
End: 2023-02-17 | Stop reason: SDUPTHER

## 2022-10-28 RX ORDER — PANTOPRAZOLE SODIUM 40 MG/1
40 TABLET, DELAYED RELEASE ORAL DAILY
COMMUNITY
End: 2022-11-17 | Stop reason: SDUPTHER

## 2022-10-28 RX ORDER — DAPAGLIFLOZIN 5 MG/1
5 TABLET, FILM COATED ORAL DAILY
Status: ON HOLD | COMMUNITY

## 2022-10-28 RX ORDER — METOPROLOL TARTRATE 50 MG/1
50 TABLET, FILM COATED ORAL 2 TIMES DAILY
COMMUNITY
End: 2022-11-14

## 2022-11-01 ENCOUNTER — ANESTHESIA (OUTPATIENT)
Dept: GASTROENTEROLOGY | Facility: HOSPITAL | Age: 77
End: 2022-11-01

## 2022-11-01 ENCOUNTER — HOSPITAL ENCOUNTER (OUTPATIENT)
Facility: HOSPITAL | Age: 77
Setting detail: HOSPITAL OUTPATIENT SURGERY
Discharge: HOME | End: 2022-11-01
Attending: INTERNAL MEDICINE | Admitting: INTERNAL MEDICINE

## 2022-11-01 ENCOUNTER — ANESTHESIA EVENT (OUTPATIENT)
Dept: GASTROENTEROLOGY | Facility: HOSPITAL | Age: 77
End: 2022-11-01

## 2022-11-01 VITALS
DIASTOLIC BLOOD PRESSURE: 50 MMHG | BODY MASS INDEX: 28.02 KG/M2 | RESPIRATION RATE: 20 BRPM | TEMPERATURE: 97.3 F | HEIGHT: 69 IN | SYSTOLIC BLOOD PRESSURE: 101 MMHG | OXYGEN SATURATION: 97 % | WEIGHT: 189.15 LBS | HEART RATE: 80 BPM

## 2022-11-01 DIAGNOSIS — Z86.010 HISTORY OF COLON POLYPS: ICD-10-CM

## 2022-11-01 DIAGNOSIS — Z12.11 ENCOUNTER FOR SCREENING FOR MALIGNANT NEOPLASM OF COLON: ICD-10-CM

## 2022-11-01 PROCEDURE — G0105 COLORECTAL SCRN; HI RISK IND: HCPCS | Performed by: INTERNAL MEDICINE

## 2022-11-01 PROCEDURE — 25010000002 PHENYLEPHRINE 10 MG/ML SOLUTION: Performed by: NURSE ANESTHETIST, CERTIFIED REGISTERED

## 2022-11-01 PROCEDURE — 25010000002 PROPOFOL 10 MG/ML EMULSION: Performed by: NURSE ANESTHETIST, CERTIFIED REGISTERED

## 2022-11-01 RX ORDER — PHENYLEPHRINE HYDROCHLORIDE 10 MG/ML
INJECTION INTRAVENOUS AS NEEDED
Status: DISCONTINUED | OUTPATIENT
Start: 2022-11-01 | End: 2022-11-01 | Stop reason: SURG

## 2022-11-01 RX ORDER — SODIUM CHLORIDE 9 MG/ML
30 INJECTION, SOLUTION INTRAVENOUS CONTINUOUS
Status: DISCONTINUED | OUTPATIENT
Start: 2022-11-01 | End: 2022-11-01 | Stop reason: HOSPADM

## 2022-11-01 RX ORDER — DEXTROSE AND SODIUM CHLORIDE 5; .45 G/100ML; G/100ML
50 INJECTION, SOLUTION INTRAVENOUS CONTINUOUS
Status: DISCONTINUED | OUTPATIENT
Start: 2022-11-01 | End: 2022-11-01

## 2022-11-01 RX ORDER — PROPOFOL 10 MG/ML
VIAL (ML) INTRAVENOUS AS NEEDED
Status: DISCONTINUED | OUTPATIENT
Start: 2022-11-01 | End: 2022-11-01 | Stop reason: SURG

## 2022-11-01 RX ORDER — DEXTROSE AND SODIUM CHLORIDE 5; .45 G/100ML; G/100ML
30 INJECTION, SOLUTION INTRAVENOUS CONTINUOUS PRN
Status: DISCONTINUED | OUTPATIENT
Start: 2022-11-01 | End: 2022-11-01 | Stop reason: HOSPADM

## 2022-11-01 RX ADMIN — PROPOFOL 20 MG: 10 INJECTION, EMULSION INTRAVENOUS at 15:10

## 2022-11-01 RX ADMIN — SODIUM CHLORIDE 225 ML: 900 INJECTION, SOLUTION INTRAVENOUS at 15:13

## 2022-11-01 RX ADMIN — PHENYLEPHRINE HYDROCHLORIDE 50 MCG: 10 INJECTION, SOLUTION INTRAVENOUS at 15:09

## 2022-11-01 RX ADMIN — SODIUM CHLORIDE 25 ML: 900 INJECTION, SOLUTION INTRAVENOUS at 15:01

## 2022-11-01 RX ADMIN — SODIUM CHLORIDE 30 ML/HR: 900 INJECTION, SOLUTION INTRAVENOUS at 14:00

## 2022-11-01 RX ADMIN — PROPOFOL 80 MG: 10 INJECTION, EMULSION INTRAVENOUS at 15:03

## 2022-11-01 NOTE — ANESTHESIA POSTPROCEDURE EVALUATION
Patient: Aj Card Jr.    Procedure Summary     Date: 11/01/22 Room / Location: [facility] ENDOSCOPY 1 / [facility] ENDOSCOPY    Anesthesia Start: 1501 Anesthesia Stop: 1513    Procedure: COLONOSCOPY Diagnosis:       History of colon polyps      Encounter for screening for malignant neoplasm of colon      (History of colon polyps [Z86.010])      (Encounter for screening for malignant neoplasm of colon [Z12.11])    Surgeons: Bladimir Michael MD Provider: Oliver Hobbs CRNA    Anesthesia Type: general ASA Status: 3          Anesthesia Type: general    Vitals  No vitals data found for the desired time range.          Post Anesthesia Care and Evaluation    Patient location during evaluation: bedside  Patient participation: complete - patient participated  Level of consciousness: awake and awake and alert  Pain score: 0  Pain management: adequate    Airway patency: patent  Anesthetic complications: No anesthetic complications  PONV Status: none  Cardiovascular status: acceptable and stable  Respiratory status: acceptable, room air and spontaneous ventilation  Hydration status: acceptable    Comments: BP:  106/42  HR:  71  SAT:  99  RR:  12  TEMP: 97.5

## 2022-11-01 NOTE — ANESTHESIA PREPROCEDURE EVALUATION
Anesthesia Evaluation     Patient summary reviewed and Nursing notes reviewed   NPO Solid Status: > 8 hours  NPO Liquid Status: > 6 hours           Airway   Mallampati: II  TM distance: >3 FB  Neck ROM: full  no difficulty expected  Dental    (+) upper dentures    Pulmonary - negative pulmonary ROS and normal exam   Cardiovascular - normal exam    (+) hypertension well controlled, CAD, dysrhythmias Paroxysmal Atrial Fib, angina, hyperlipidemia,       Neuro/Psych  (+) psychiatric history Anxiety,    GI/Hepatic/Renal/Endo    (+)  GERD,  renal disease (Stage 2 chronic kidney disease), diabetes mellitus type 2,     Musculoskeletal     Abdominal  - normal exam   Substance History - negative use     OB/GYN          Other   arthritis,                        Anesthesia Plan    ASA 3     general   total IV anesthesia  intravenous induction     Anesthetic plan, risks, benefits, and alternatives have been provided, discussed and informed consent has been obtained with: patient.    Plan discussed with CRNA.

## 2022-11-01 NOTE — H&P
No chief complaint on file.      ENDO PROCEDURE ORDERED: COLON screen, hx polyp    Subjective    Aj Cr Card Jr. is a 77 y.o. male. he is here today for follow-up.    History of Present Illness   I agree with the current note with no changes in the history.      This 76-year-old male presents for evaluation for colonoscopy. Recently, he saw Dr. Martinez for hypertension, diabetes, and heart disease on 05/26/2022. I had last seen the patient with constipation and he was given Linzess on 07/27/2021. He did not return for a follow-up until today. He has been having some more constipation since last visit. He does take Carafate as needed for GERD symptoms. Denied nausea, vomiting, and dysphagia. No blood in his stool. Weight is down 11.5 pounds since last visit. Last colonoscopy by Dr. Salcedo 12/11/2017 showed diverticulosis and hemorrhoids with a recommended repeat at 5 years. He had tubular adenomas removed in 2006 and 2008 on colonoscopy.     The patient went to the ER on 07/20/2022 with nausea, vomiting, and diarrhea. Acute abdominal series showed nothing acute. Negative for COVID influenza, negative urinalysis, amylase, and lipase, slightly elevated at 104. CMP showed a glucose 189, otherwise normal. CBC showed some increased neutrophils, otherwise normal. He was given Imodium from the ER.     ASSESSMENT/PLAN: Patient with personal history of colon polyps due for screening colonoscopy. His bowels are doing a little bit better. He declined any intervention at present. GERD appears to be doing well currently. We will plan follow-up after the above, further pending clinical course and the results of the above.       The following portions of the patient's history were reviewed and updated as appropriate:   Past Medical History:   Diagnosis Date   • Acute posthemorrhagic anemia    • Allergic rhinitis    • Anxiety state    • Chest pain    • CKD (chronic kidney disease), stage II    • Coronary arteriosclerosis      3 stents, followed by Dr. Jeffers   • Diabetes mellitus (HCC)     type 2   • Diverticular disease of colon    • Dysphagia    • Epigastric pain    • GERD (gastroesophageal reflux disease)     esophagitis on Dexilant. Pt feels Nexium working better      • History of echocardiogram     Small left atrial enlargement with mild CLVH.Ef of 55-60%.Mitral valve mildly thickened.Av thickened.Left ventricle mildly dilated.Tricuspid intact.Mild aortic insufficiency. 12/07/2015       • History of echocardiogram     Mild diastolic dysfunction and mild left atrial enlargement, otherwise normal echo. EF> 55% 01/03/2012       • Hypercholesterolemia    • Hypertension    • Insomnia    • Irritable bowel syndrome    • Osteoarthritis of multiple joints      Past Surgical History:   Procedure Laterality Date   • APPENDECTOMY       [facility] 1995       • CARDIAC CATHETERIZATION      Successful PTCA of the OMB#2.Unsuccessful PTCA of the 1st OMB, secondary to difficulty in advancing the balloon. 12/08/2015       • CHOLECYSTECTOMY      [facility] 1993       • COLONOSCOPY  10/01/2012   • COLONOSCOPY N/A 12/11/2017    Procedure: COLONOSCOPY;  Surgeon: Bladimir Michael MD;  Location: [facility] ENDOSCOPY;  Service:    • CORONARY ANGIOPLASTY      Successful PTCA & stenting LAD was done w/ deployment of a 2.5mm x23 Xience stent w/ reduction of stenosis from 90% to less than 0% stenosis with good LILLIAM 3 flow 02/17/2012       • ENDOSCOPY      with biopsy.  Mildly severe esophagitis. Gastritis. Normal examined duodenum.Several biopsies obtained in the lower third of the esophagus.Several biopsies obtained in the gastric antrum. 05/06/2016       • ENDOSCOPY      w tube. Normal esophagus. Gastritis in stomach. Biopsy taken. Gastric polyp found. Biopsy taken. Normal duodenum. 10/01/2012       • ENDOSCOPY AND COLONOSCOPY      Diverticulum in sigmoid colon & descending colon. Internal & external hemorrhoids found. 10/01/2012        • HAND SURGERY Left      Corrective osteotomy of ring finger metacarpal. Malunited fracture of left ring finger 05/21/1985       • HERNIA REPAIR      Laparoscopic hernia repair. prepertitoneal bilateral inguinal hernia repair with mesh. 10/15/2009      • OTHER SURGICAL HISTORY      CORONARY ARTERY STENT    • SKIN TAG REMOVAL      Excision, viral skin tag,right upper eyelid 05/08/1995      Family History   Problem Relation Age of Onset   • Clotting disorder Other    • Lung disease Other    • Schizophrenia Sister    • Obesity Sister    • Tuberculosis Sister    • Arthritis Mother    • Diabetes Mother    • Heart disease Mother    • Hypertension Mother    • Obesity Mother    • Stroke Mother    • Thyroid disease Mother    • Tuberculosis Mother    • Arthritis Father    • Tuberculosis Father        Allergies   Allergen Reactions   • Brilinta [Ticagrelor] Shortness Of Breath   • Effient [Prasugrel] Shortness Of Breath   • Warfarin And Related Shortness Of Breath   • Ciprofloxacin Hives   • Lipitor [Atorvastatin] Swelling   • Latex Itching   • Adhesive Tape Rash   • Celexa [Citalopram Hydrobromide] Rash   • Crestor [Rosuvastatin Calcium] Rash   • Floxin [Ofloxacin] Rash   • Januvia [Sitagliptin] Rash   • Penicillins Rash   • Sulfa Antibiotics Rash     Social History     Socioeconomic History   • Marital status:    Tobacco Use   • Smoking status: Never   • Smokeless tobacco: Never   Vaping Use   • Vaping Use: Never used   Substance and Sexual Activity   • Alcohol use: No   • Drug use: No   • Sexual activity: Defer     Current Medications:  Prior to Admission medications    Medication Sig Start Date End Date Taking? Authorizing Provider   ALPRAZolam (XANAX) 0.5 MG tablet Take 1 tablet by mouth At Night As Needed for Anxiety. 5/26/22  Yes Andreas Martinez MD   aspirin 81 MG EC tablet Take 2 tablets by mouth Daily. 4/1/19  Yes Andreas Martinez MD   Azelastine HCl 137 MCG/SPRAY solution USE 2 SPRAY(S) IN EACH NOSTRIL  "TWICE DAILY AS DIRECTED 6/27/22  Yes Andreas Martinez MD   cholecalciferol (VITAMIN D3) 1000 units tablet Take 1 tablet by mouth Daily. 4/1/19  Yes Andreas Martinez MD   coenzyme Q10 100 MG capsule Take 100 mg by mouth daily.   Yes Yazan Yancey MD   Dulaglutide (Trulicity) 3 MG/0.5ML solution pen-injector Inject 3 mg under the skin into the appropriate area as directed 1 (One) Time Per Week. 8/10/21  Yes Glen Jain MD   Glucose Blood (BLOOD GLUCOSE TEST) strip Use 4 x daily use any brand covered by insurance or same brand as before ICD10 code is E11.9 2/22/19  Yes Glen Jain MD   Insulin Glargine (BASAGLAR KWIKPEN SC) Inject 15 Units under the skin into the appropriate area as directed Daily.   Yes Yazan Yancey MD   Insulin Pen Needle 30G X 8 MM misc Use 3-4 times daily. 11/16/20  Yes Andreas Martinez MD   Insulin Syringe 31G X 5/16\" 0.3 ML misc 4 x daily 2/10/20  Yes Glen Jain MD   isosorbide mononitrate (IMDUR) 30 MG 24 hr tablet Take 1 tablet by mouth once daily 3/28/22  Yes Andreas Martinez MD   KRILL OIL OMEGA-3 PO Take  by mouth daily.   Yes Yazan Yancey MD   Lancet Devices (LANCING DEVICE) misc USE AS INDICATED TO CORRELATE WITH STRIPS AND METER 2/22/19  Yes Glen Jain MD   Lancets 30G misc USE 4 X DAILY 2/22/19  Yes Glen Jain MD   lisinopril (PRINIVIL,ZESTRIL) 40 MG tablet Take 1 tablet by mouth once daily 7/25/22  Yes Andreas Martinez MD   LOTEMAX 0.5 % ophthalmic suspension  9/3/19  Yes Yazan Yancey MD   lovastatin (MEVACOR) 10 MG tablet Take 1 tablet by mouth Every Night for 360 days. 12/27/21 12/22/22 Yes Glen Jain MD   metoprolol tartrate (LOPRESSOR) 50 MG tablet Take 1 tablet by mouth twice daily 7/7/22  Yes Andreas Martinez MD   rivaroxaban (XARELTO) 20 MG tablet Take 20 mg by mouth 2 (Two) Times a Day With Meals.   Yes Provider, MD Mac Hand Serenoa " "repens, (SAW PALMETTO PO) Take  by mouth daily.   Yes Yazan Yancey MD   sucralfate (CARAFATE) 1 g tablet Take 1 tablet by mouth 2 (two) times a day. 8/18/21  Yes Andreas Martinez MD   vitamin B-12 (CYANOCOBALAMIN) 2500 MCG sublingual tablet tablet Place  under the tongue Every Other Day.   Yes Yazan Yancey MD   dapagliflozin (FARXIGA) 5 MG tablet tablet One tablet daily before breakfast 6/22/22   Glen Jain MD   icosapent ethyl (VASCEPA) 1 g capsule capsule Take 2 g by mouth 2 (Two) Times a Day With Meals. 8/19/21   Andreas Martinez MD   lactulose (CHRONULAC) 10 GM/15ML solution Take 30 mL by mouth 2 (Two) Times a Day As Needed (constipation). 8/3/21   Adnreas Aldana PA-C   linaclotide (LINZESS) 145 MCG capsule capsule Take 1 capsule by mouth Every Morning Before Breakfast. 5/26/22   Andreas Martinez MD   loperamide (IMODIUM) 2 MG capsule Take 1 capsule by mouth 4 (Four) Times a Day As Needed for Diarrhea. 7/20/22   Jerod Valiente MD   meclizine (ANTIVERT) 12.5 MG tablet TAKE 1 TABLET BY MOUTH THREE TIMES DAILY AS NEEDED FOR DIZZINESS 3/28/22   Andreas Martinez MD   ondansetron ODT (ZOFRAN-ODT) 4 MG disintegrating tablet Place 1 tablet on the tongue Every 8 (Eight) Hours As Needed for Nausea or Vomiting. 7/20/22   Jerod Valiente MD   pantoprazole (PROTONIX) 40 MG EC tablet Take 1 tablet by mouth once daily 3/28/22   Andreas Martinez MD   propafenone (RYTHMOL) 150 MG tablet Take 150 mg by mouth Every 8 (Eight) Hours.    ProviderYazan MD     Review of Systems  Review of Systems   Constitutional: Negative for unexpected weight change.   HENT: Negative for trouble swallowing.    Gastrointestinal: Positive for abdominal pain and nausea. Negative for abdominal distention, anal bleeding, blood in stool, constipation, diarrhea, rectal pain and vomiting.          Objective    /63   Pulse 78   Temp 97.3 °F (36.3 °C) (Tympanic)   Resp 16   Ht 175.3 cm (69\")   Wt 85.8 kg " (189 lb 2.5 oz)   SpO2 96%   BMI 27.93 kg/m²   Physical Exam  Vitals and nursing note reviewed.   Constitutional:       General: He is not in acute distress.     Appearance: He is well-developed. He is obese.   HENT:      Head: Normocephalic and atraumatic.   Eyes:      Pupils: Pupils are equal, round, and reactive to light.   Cardiovascular:      Rate and Rhythm: Normal rate and regular rhythm.      Heart sounds: Normal heart sounds.   Pulmonary:      Effort: Pulmonary effort is normal.      Breath sounds: Normal breath sounds.   Abdominal:      General: Bowel sounds are normal. There is no distension or abdominal bruit.      Palpations: Abdomen is soft. Abdomen is not rigid. There is no shifting dullness or mass.      Tenderness: There is abdominal tenderness. There is no guarding or rebound.      Hernia: No hernia is present. There is no hernia in the ventral area.   Musculoskeletal:         General: Normal range of motion.      Cervical back: Normal range of motion.   Skin:     General: Skin is warm and dry.   Neurological:      Mental Status: He is alert and oriented to person, place, and time.   Psychiatric:         Behavior: Behavior normal.         Thought Content: Thought content normal.         Judgment: Judgment normal.       Assessment & Plan      1. Encounter for screening for malignant neoplasm of colon    2. History of colon polyps    .   Diagnoses and all orders for this visit:    1. Encounter for screening for malignant neoplasm of colon  -     dextrose 5 % and sodium chloride 0.45 % infusion    2. History of colon polyps  -     dextrose 5 % and sodium chloride 0.45 % infusion    Other orders  -     Obtain Informed Consent; Standing  -     Insert Peripheral IV; Standing  -     dextrose 5 % and sodium chloride 0.45 % infusion  -     Obtain Informed Consent  -     Insert Peripheral IV  -     Obtain Informed Consent; Standing  -     POC Glucose Once; Standing  -     Insert Peripheral IV; Standing  -      Obtain Informed Consent  -     POC Glucose Once  -     Insert Peripheral IV        Orders placed during this encounter include:  Orders Placed This Encounter   Procedures   • Obtain Informed Consent     Standing Status:   Standing     Number of Occurrences:   1     Order Specific Question:   Informed Consent Given For     Answer:   colonoscopy   • Obtain Informed Consent     Standing Status:   Standing     Number of Occurrences:   1     Order Specific Question:   Informed Consent Given For     Answer:   COLONOSCOPY   • POC Glucose Once     Prior to Procedure on ALL Diabetic Patients     Standing Status:   Standing     Number of Occurrences:   1     Order Specific Question:   Release to patient     Answer:   Immediate   • Insert Peripheral IV     Standing Status:   Standing     Number of Occurrences:   1   • Insert Peripheral IV     Standing Status:   Standing     Number of Occurrences:   1       Medications prescribed:  New Medications Ordered This Visit   Medications   • dextrose 5 % and sodium chloride 0.45 % infusion   • dextrose 5 % and sodium chloride 0.45 % infusion       Requested Prescriptions      No prescriptions requested or ordered in this encounter       Review and/or summary of lab tests, radiology, procedures, medications. Review and summary of old records and obtaining of history. The risks and benefits of my recommendations, as well as other treatment options were discussed with the patient today. Questions were answered.    Follow-up: No follow-ups on file.     COLONOSCOPY (N/A)      This document has been electronically signed by Bladimir Michael MD on November 1, 2022 13:57 CDT      Results for orders placed or performed in visit on 08/04/22   Comprehensive metabolic panel    Specimen: Blood   Result Value Ref Range    Glucose 172 (H) 65 - 99 mg/dL    BUN 16 8 - 23 mg/dL    Creatinine 1.18 0.76 - 1.27 mg/dL    Sodium 140 136 - 145 mmol/L    Potassium 5.0 3.5 - 5.2 mmol/L    Chloride 103 98 -  107 mmol/L    CO2 25.7 22.0 - 29.0 mmol/L    Calcium 9.0 8.6 - 10.5 mg/dL    Total Protein 6.9 6.0 - 8.5 g/dL    Albumin 4.30 3.50 - 5.20 g/dL    ALT (SGPT) 14 1 - 41 U/L    AST (SGOT) 19 1 - 40 U/L    Alkaline Phosphatase 45 39 - 117 U/L    Total Bilirubin 0.4 0.0 - 1.2 mg/dL    Globulin 2.6 gm/dL    A/G Ratio 1.7 g/dL    BUN/Creatinine Ratio 13.6 7.0 - 25.0    Anion Gap 11.3 5.0 - 15.0 mmol/L    eGFR 63.6 >60.0 mL/min/1.73   Results for orders placed or performed during the hospital encounter of 07/20/22   Gold Top - SST   Result Value Ref Range    Extra Tube Hold for add-ons.    Green Top (Gel)   Result Value Ref Range    Extra Tube Hold for add-ons.    COVID-19 and FLU A/B PCR - Swab, Nasopharynx    Specimen: Nasopharynx; Swab   Result Value Ref Range    COVID19 Not Detected Not Detected - Ref. Range    Influenza A PCR Not Detected Not Detected    Influenza B PCR Not Detected Not Detected   Urinalysis With Microscopic If Indicated (No Culture) - Urine, Clean Catch    Specimen: Urine, Clean Catch   Result Value Ref Range    Color, UA Yellow Yellow, Straw, Dark Yellow, Renay    Appearance, UA Clear Clear    pH, UA <=5.0 5.0 - 9.0    Specific Gravity, UA 1.018 1.003 - 1.030    Glucose, UA Negative Negative    Ketones, UA Negative Negative    Bilirubin, UA Negative Negative    Blood, UA Negative Negative    Protein, UA Negative Negative    Leuk Esterase, UA Negative Negative    Nitrite, UA Negative Negative    Urobilinogen, UA 0.2 E.U./dL 0.2 - 1.0 E.U./dL   CBC Auto Differential    Specimen: Blood   Result Value Ref Range    WBC 10.21 3.40 - 10.80 10*3/mm3    RBC 5.52 4.14 - 5.80 10*6/mm3    Hemoglobin 15.5 13.0 - 17.7 g/dL    Hematocrit 45.6 37.5 - 51.0 %    MCV 82.6 79.0 - 97.0 fL    MCH 28.1 26.6 - 33.0 pg    MCHC 34.0 31.5 - 35.7 g/dL    RDW 14.2 12.3 - 15.4 %    RDW-SD 41.5 37.0 - 54.0 fl    MPV 9.0 6.0 - 12.0 fL    Platelets 237 140 - 450 10*3/mm3    Neutrophil % 82.0 (H) 42.7 - 76.0 %    Lymphocyte % 8.7 (L)  19.6 - 45.3 %    Monocyte % 4.4 (L) 5.0 - 12.0 %    Eosinophil % 3.6 0.3 - 6.2 %    Basophil % 0.5 0.0 - 1.5 %    Immature Grans % 0.8 (H) 0.0 - 0.5 %    Neutrophils, Absolute 8.37 (H) 1.70 - 7.00 10*3/mm3    Lymphocytes, Absolute 0.89 0.70 - 3.10 10*3/mm3    Monocytes, Absolute 0.45 0.10 - 0.90 10*3/mm3    Eosinophils, Absolute 0.37 0.00 - 0.40 10*3/mm3    Basophils, Absolute 0.05 0.00 - 0.20 10*3/mm3    Immature Grans, Absolute 0.08 (H) 0.00 - 0.05 10*3/mm3    nRBC 0.0 0.0 - 0.2 /100 WBC   Lavender Top   Result Value Ref Range    Extra Tube hold for add-on    Light Blue Top   Result Value Ref Range    Extra Tube Hold for add-ons.    Lipase    Specimen: Blood   Result Value Ref Range    Lipase 104 (H) 13 - 60 U/L   Amylase    Specimen: Blood   Result Value Ref Range    Amylase 35 28 - 100 U/L   Comprehensive Metabolic Panel    Specimen: Blood   Result Value Ref Range    Glucose 189 (H) 65 - 99 mg/dL    BUN 14 8 - 23 mg/dL    Creatinine 1.14 0.76 - 1.27 mg/dL    Sodium 137 136 - 145 mmol/L    Potassium 5.0 3.5 - 5.2 mmol/L    Chloride 100 98 - 107 mmol/L    CO2 26.0 22.0 - 29.0 mmol/L    Calcium 9.9 8.6 - 10.5 mg/dL    Total Protein 8.1 6.0 - 8.5 g/dL    Albumin 4.90 3.50 - 5.20 g/dL    ALT (SGPT) 18 1 - 41 U/L    AST (SGOT) 22 1 - 40 U/L    Alkaline Phosphatase 56 39 - 117 U/L    Total Bilirubin 0.5 0.0 - 1.2 mg/dL    Globulin 3.2 gm/dL    A/G Ratio 1.5 g/dL    BUN/Creatinine Ratio 12.3 7.0 - 25.0    Anion Gap 11.0 5.0 - 15.0 mmol/L    eGFR 66.2 >60.0 mL/min/1.73   Results for orders placed or performed in visit on 06/01/22   CBC Auto Differential    Specimen: Blood   Result Value Ref Range    WBC 6.38 3.40 - 10.80 10*3/mm3    RBC 4.94 4.14 - 5.80 10*6/mm3    Hemoglobin 14.0 13.0 - 17.7 g/dL    Hematocrit 41.7 37.5 - 51.0 %    MCV 84.4 79.0 - 97.0 fL    MCH 28.3 26.6 - 33.0 pg    MCHC 33.6 31.5 - 35.7 g/dL    RDW 13.8 12.3 - 15.4 %    RDW-SD 42.1 37.0 - 54.0 fl    MPV 10.5 6.0 - 12.0 fL    Platelets 231 140 - 450  10*3/mm3    Neutrophil % 66.1 42.7 - 76.0 %    Lymphocyte % 23.4 19.6 - 45.3 %    Monocyte % 6.9 5.0 - 12.0 %    Eosinophil % 2.7 0.3 - 6.2 %    Basophil % 0.6 0.0 - 1.5 %    Immature Grans % 0.3 0.0 - 0.5 %    Neutrophils, Absolute 4.22 1.70 - 7.00 10*3/mm3    Lymphocytes, Absolute 1.49 0.70 - 3.10 10*3/mm3    Monocytes, Absolute 0.44 0.10 - 0.90 10*3/mm3    Eosinophils, Absolute 0.17 0.00 - 0.40 10*3/mm3    Basophils, Absolute 0.04 0.00 - 0.20 10*3/mm3    Immature Grans, Absolute 0.02 0.00 - 0.05 10*3/mm3    nRBC 0.0 0.0 - 0.2 /100 WBC     *Note: Due to a large number of results and/or encounters for the requested time period, some results have not been displayed. A complete set of results can be found in Results Review.

## 2022-11-14 RX ORDER — METOPROLOL TARTRATE 50 MG/1
TABLET, FILM COATED ORAL
Qty: 60 TABLET | Refills: 0 | Status: SHIPPED | OUTPATIENT
Start: 2022-11-14 | End: 2022-11-17 | Stop reason: SDUPTHER

## 2022-11-17 ENCOUNTER — OFFICE VISIT (OUTPATIENT)
Dept: FAMILY MEDICINE CLINIC | Facility: CLINIC | Age: 77
End: 2022-11-17

## 2022-11-17 VITALS
HEART RATE: 84 BPM | HEIGHT: 69 IN | TEMPERATURE: 97.3 F | WEIGHT: 193.8 LBS | OXYGEN SATURATION: 99 % | SYSTOLIC BLOOD PRESSURE: 100 MMHG | BODY MASS INDEX: 28.71 KG/M2 | DIASTOLIC BLOOD PRESSURE: 48 MMHG

## 2022-11-17 DIAGNOSIS — I95.9 HYPOTENSION, UNSPECIFIED HYPOTENSION TYPE: ICD-10-CM

## 2022-11-17 DIAGNOSIS — E66.3 OVERWEIGHT: ICD-10-CM

## 2022-11-17 DIAGNOSIS — K21.9 GASTROESOPHAGEAL REFLUX DISEASE, UNSPECIFIED WHETHER ESOPHAGITIS PRESENT: ICD-10-CM

## 2022-11-17 DIAGNOSIS — I25.119 CORONARY ARTERY DISEASE WITH ANGINA PECTORIS, UNSPECIFIED VESSEL OR LESION TYPE, UNSPECIFIED WHETHER NATIVE OR TRANSPLANTED HEART: ICD-10-CM

## 2022-11-17 DIAGNOSIS — E55.9 VITAMIN D DEFICIENCY: ICD-10-CM

## 2022-11-17 DIAGNOSIS — K57.90 DIVERTICULOSIS: ICD-10-CM

## 2022-11-17 DIAGNOSIS — E11.65 UNCONTROLLED TYPE 2 DIABETES MELLITUS WITH HYPERGLYCEMIA: ICD-10-CM

## 2022-11-17 DIAGNOSIS — K57.30 DIVERTICULOSIS OF LARGE INTESTINE WITHOUT HEMORRHAGE: ICD-10-CM

## 2022-11-17 DIAGNOSIS — E78.2 MIXED HYPERLIPIDEMIA: ICD-10-CM

## 2022-11-17 DIAGNOSIS — I48.0 PAF (PAROXYSMAL ATRIAL FIBRILLATION): Primary | ICD-10-CM

## 2022-11-17 DIAGNOSIS — F41.1 GENERALIZED ANXIETY DISORDER: ICD-10-CM

## 2022-11-17 PROCEDURE — 99214 OFFICE O/P EST MOD 30 MIN: CPT | Performed by: FAMILY MEDICINE

## 2022-11-17 RX ORDER — PANTOPRAZOLE SODIUM 40 MG/1
40 TABLET, DELAYED RELEASE ORAL DAILY
Qty: 90 TABLET | Refills: 1 | Status: SHIPPED | OUTPATIENT
Start: 2022-11-17 | End: 2023-02-17 | Stop reason: SDUPTHER

## 2022-11-17 RX ORDER — LISINOPRIL 20 MG/1
20 TABLET ORAL DAILY
Qty: 90 TABLET | Refills: 1 | Status: SHIPPED | OUTPATIENT
Start: 2022-11-17 | End: 2022-11-17

## 2022-11-17 RX ORDER — LISINOPRIL 10 MG/1
10 TABLET ORAL DAILY
Qty: 30 TABLET | Refills: 3 | Status: SHIPPED | OUTPATIENT
Start: 2022-11-17 | End: 2023-03-03

## 2022-11-17 RX ORDER — METOPROLOL TARTRATE 50 MG/1
50 TABLET, FILM COATED ORAL 2 TIMES DAILY
Qty: 180 TABLET | Refills: 1 | Status: SHIPPED | OUTPATIENT
Start: 2022-11-17 | End: 2023-02-17 | Stop reason: SDUPTHER

## 2022-11-17 NOTE — PATIENT INSTRUCTIONS
Cut back on lisinopril from 20 to 10 mg daily    Monitor blood pressure at home     Get labwork before you see Dr. Vega after Thanksgiving

## 2022-11-23 ENCOUNTER — LAB (OUTPATIENT)
Dept: LAB | Facility: HOSPITAL | Age: 77
End: 2022-11-23

## 2022-11-23 LAB
ALBUMIN SERPL-MCNC: 4.3 G/DL (ref 3.5–5.2)
ALBUMIN/GLOB SERPL: 1.5 G/DL
ALP SERPL-CCNC: 44 U/L (ref 39–117)
ALT SERPL W P-5'-P-CCNC: 16 U/L (ref 1–41)
ANION GAP SERPL CALCULATED.3IONS-SCNC: 9 MMOL/L (ref 5–15)
AST SERPL-CCNC: 19 U/L (ref 1–40)
BASOPHILS # BLD AUTO: 0.03 10*3/MM3 (ref 0–0.2)
BASOPHILS NFR BLD AUTO: 0.5 % (ref 0–1.5)
BILIRUB SERPL-MCNC: 0.3 MG/DL (ref 0–1.2)
BUN SERPL-MCNC: 12 MG/DL (ref 8–23)
BUN/CREAT SERPL: 13.3 (ref 7–25)
CALCIUM SPEC-SCNC: 9.6 MG/DL (ref 8.6–10.5)
CHLORIDE SERPL-SCNC: 101 MMOL/L (ref 98–107)
CHOLEST SERPL-MCNC: 162 MG/DL (ref 0–200)
CO2 SERPL-SCNC: 29 MMOL/L (ref 22–29)
CREAT SERPL-MCNC: 0.9 MG/DL (ref 0.76–1.27)
DEPRECATED RDW RBC AUTO: 41.9 FL (ref 37–54)
EGFRCR SERPLBLD CKD-EPI 2021: 87.4 ML/MIN/1.73
EOSINOPHIL # BLD AUTO: 0.31 10*3/MM3 (ref 0–0.4)
EOSINOPHIL NFR BLD AUTO: 4.9 % (ref 0.3–6.2)
ERYTHROCYTE [DISTWIDTH] IN BLOOD BY AUTOMATED COUNT: 13.6 % (ref 12.3–15.4)
GLOBULIN UR ELPH-MCNC: 2.8 GM/DL
GLUCOSE SERPL-MCNC: 155 MG/DL (ref 65–99)
HBA1C MFR BLD: 7.2 % (ref 4.8–5.6)
HCT VFR BLD AUTO: 43.9 % (ref 37.5–51)
HDLC SERPL-MCNC: 26 MG/DL (ref 40–60)
HGB BLD-MCNC: 14.2 G/DL (ref 13–17.7)
IMM GRANULOCYTES # BLD AUTO: 0.03 10*3/MM3 (ref 0–0.05)
IMM GRANULOCYTES NFR BLD AUTO: 0.5 % (ref 0–0.5)
LDLC SERPL CALC-MCNC: 74 MG/DL (ref 0–100)
LDLC/HDLC SERPL: 2.26 {RATIO}
LYMPHOCYTES # BLD AUTO: 1.41 10*3/MM3 (ref 0.7–3.1)
LYMPHOCYTES NFR BLD AUTO: 22.5 % (ref 19.6–45.3)
MCH RBC QN AUTO: 28.1 PG (ref 26.6–33)
MCHC RBC AUTO-ENTMCNC: 32.3 G/DL (ref 31.5–35.7)
MCV RBC AUTO: 86.9 FL (ref 79–97)
MONOCYTES # BLD AUTO: 0.45 10*3/MM3 (ref 0.1–0.9)
MONOCYTES NFR BLD AUTO: 7.2 % (ref 5–12)
NEUTROPHILS NFR BLD AUTO: 4.05 10*3/MM3 (ref 1.7–7)
NEUTROPHILS NFR BLD AUTO: 64.4 % (ref 42.7–76)
NRBC BLD AUTO-RTO: 0 /100 WBC (ref 0–0.2)
PLATELET # BLD AUTO: 207 10*3/MM3 (ref 140–450)
PMV BLD AUTO: 9.7 FL (ref 6–12)
POTASSIUM SERPL-SCNC: 4.5 MMOL/L (ref 3.5–5.2)
PROT SERPL-MCNC: 7.1 G/DL (ref 6–8.5)
RBC # BLD AUTO: 5.05 10*6/MM3 (ref 4.14–5.8)
SODIUM SERPL-SCNC: 139 MMOL/L (ref 136–145)
TRIGL SERPL-MCNC: 386 MG/DL (ref 0–150)
TSH SERPL DL<=0.05 MIU/L-ACNC: 2.68 UIU/ML (ref 0.27–4.2)
VLDLC SERPL-MCNC: 62 MG/DL (ref 5–40)
WBC NRBC COR # BLD: 6.28 10*3/MM3 (ref 3.4–10.8)

## 2022-11-23 PROCEDURE — 80061 LIPID PANEL: CPT | Performed by: INTERNAL MEDICINE

## 2022-11-23 PROCEDURE — 83036 HEMOGLOBIN GLYCOSYLATED A1C: CPT | Performed by: INTERNAL MEDICINE

## 2022-11-23 PROCEDURE — 84443 ASSAY THYROID STIM HORMONE: CPT | Performed by: INTERNAL MEDICINE

## 2022-11-23 PROCEDURE — 82607 VITAMIN B-12: CPT | Performed by: INTERNAL MEDICINE

## 2022-11-23 PROCEDURE — 82043 UR ALBUMIN QUANTITATIVE: CPT | Performed by: INTERNAL MEDICINE

## 2022-11-23 PROCEDURE — 80053 COMPREHEN METABOLIC PANEL: CPT | Performed by: INTERNAL MEDICINE

## 2022-11-23 PROCEDURE — 82570 ASSAY OF URINE CREATININE: CPT | Performed by: INTERNAL MEDICINE

## 2022-11-23 PROCEDURE — 82306 VITAMIN D 25 HYDROXY: CPT | Performed by: INTERNAL MEDICINE

## 2022-11-23 PROCEDURE — 85025 COMPLETE CBC W/AUTO DIFF WBC: CPT | Performed by: INTERNAL MEDICINE

## 2022-11-24 LAB
25(OH)D3 SERPL-MCNC: 46.3 NG/ML (ref 30–100)
ALBUMIN UR-MCNC: <1.2 MG/DL
CREAT UR-MCNC: 94.1 MG/DL
MICROALBUMIN/CREAT UR: NORMAL MG/G{CREAT}
VIT B12 BLD-MCNC: 580 PG/ML (ref 211–946)

## 2022-11-29 ENCOUNTER — DOCUMENTATION (OUTPATIENT)
Dept: ENDOCRINOLOGY | Facility: CLINIC | Age: 77
End: 2022-11-29

## 2022-11-29 NOTE — PROGRESS NOTES
TURNER HAS NOT ANSWERED HER PHONE CALL FROM [employer] THEREFORE THEY CANNOT SEND OUT HER NEXT SHIPMENT.    SHE NEEDS TO CALL [phone] AND [employer]

## 2022-12-02 DIAGNOSIS — I10 ESSENTIAL HYPERTENSION: ICD-10-CM

## 2022-12-02 DIAGNOSIS — E11.65 TYPE 2 DIABETES MELLITUS WITH HYPERGLYCEMIA, WITHOUT LONG-TERM CURRENT USE OF INSULIN: Primary | ICD-10-CM

## 2022-12-02 DIAGNOSIS — E55.9 VITAMIN D DEFICIENCY: ICD-10-CM

## 2022-12-08 DIAGNOSIS — E11.65 TYPE 2 DIABETES MELLITUS WITH HYPERGLYCEMIA, WITHOUT LONG-TERM CURRENT USE OF INSULIN: Primary | ICD-10-CM

## 2022-12-08 RX ORDER — INSULIN GLARGINE 100 [IU]/ML
15 INJECTION, SOLUTION SUBCUTANEOUS DAILY
Qty: 30 ML | Refills: 2 | Status: SHIPPED | OUTPATIENT
Start: 2022-12-08 | End: 2023-03-03

## 2022-12-08 NOTE — TELEPHONE ENCOUNTER
BASAGLAR [facility]

## 2022-12-14 ENCOUNTER — DOCUMENTATION (OUTPATIENT)
Dept: ENDOCRINOLOGY | Facility: CLINIC | Age: 77
End: 2022-12-14

## 2022-12-14 NOTE — PROGRESS NOTES
Paperwork for Humalog and Basaglar was faxed to [employer]. Patient is accepted in to the program until the end of 2023

## 2022-12-16 ENCOUNTER — DOCUMENTATION (OUTPATIENT)
Dept: ENDOCRINOLOGY | Facility: CLINIC | Age: 77
End: 2022-12-16

## 2022-12-21 DIAGNOSIS — I25.119 CORONARY ARTERY DISEASE WITH ANGINA PECTORIS, UNSPECIFIED VESSEL OR LESION TYPE, UNSPECIFIED WHETHER NATIVE OR TRANSPLANTED HEART: ICD-10-CM

## 2022-12-21 DIAGNOSIS — I10 ESSENTIAL HYPERTENSION: ICD-10-CM

## 2022-12-21 DIAGNOSIS — F41.1 GENERALIZED ANXIETY DISORDER: ICD-10-CM

## 2022-12-21 DIAGNOSIS — E66.3 OVERWEIGHT: ICD-10-CM

## 2022-12-21 DIAGNOSIS — E55.9 VITAMIN D DEFICIENCY: ICD-10-CM

## 2022-12-21 DIAGNOSIS — E78.2 MIXED HYPERLIPIDEMIA: ICD-10-CM

## 2022-12-21 RX ORDER — ALPRAZOLAM 0.5 MG/1
0.5 TABLET ORAL NIGHTLY PRN
Qty: 60 TABLET | Refills: 0 | Status: SHIPPED | OUTPATIENT
Start: 2022-12-21 | End: 2023-02-17 | Stop reason: SDUPTHER

## 2022-12-27 ENCOUNTER — OFFICE VISIT (OUTPATIENT)
Dept: GASTROENTEROLOGY | Facility: CLINIC | Age: 77
End: 2022-12-27
Payer: MEDICARE

## 2022-12-27 VITALS
SYSTOLIC BLOOD PRESSURE: 115 MMHG | DIASTOLIC BLOOD PRESSURE: 65 MMHG | WEIGHT: 194 LBS | BODY MASS INDEX: 28.73 KG/M2 | HEART RATE: 76 BPM | HEIGHT: 69 IN

## 2022-12-27 DIAGNOSIS — K57.30 DIVERTICULOSIS OF LARGE INTESTINE WITHOUT HEMORRHAGE: Primary | ICD-10-CM

## 2022-12-27 DIAGNOSIS — Z86.010 HISTORY OF COLON POLYPS: ICD-10-CM

## 2022-12-27 PROCEDURE — 99213 OFFICE O/P EST LOW 20 MIN: CPT | Performed by: PHYSICIAN ASSISTANT

## 2022-12-27 NOTE — PROGRESS NOTES
Chief Complaint   Patient presents with   • Diarrhea   • endo follow-up       ENDO PROCEDURE ORDERED:    Subjective    Aj Card Jr. is a 77 y.o. male. he is here today for follow-up.    History of Present Illness    Patient seen on a recheck of his GERD, abdominal pain. Last seen 07/26/2022. Patient had a prior colonoscopy with diverticular disease and history of polyps 12/11/2017. Underwent colonoscopy on 11/01/2022, showed internal/external hemorrhoids and multiple diverticula in the sigmoid and descending colon with adequate prep. Recommended repeat colonoscopy in 5 years given his previous history of polyps. Weight is up 8 pounds since last visit. He has not really needed the Carafate. GERD has been doing well on the Protonix. He denied nausea, vomiting, dysphagia. He is on Xarelto.     Laboratory 11/23/2022 showed normal CBC. CMP showed a glucose 155, otherwise normal. A1c is 7.2%. Cholesterol panel showed triglycerides 386, otherwise normal, normal TSH, B12 was 580, vitamin D 46.3.     A/P: Patient with diverticular disease. Encouraged high fiber/diverticular diet. History of polyps, recommend repeat colonoscopy in 5 years. GERD appears to be doing well on the Protonix. Last LFT's were normal. Will plan follow-up as needed, otherwise at 5 years.         The following portions of the patient's history were reviewed and updated as appropriate:   Past Medical History:   Diagnosis Date   • Acute posthemorrhagic anemia    • Allergic rhinitis    • Anxiety state    • Chest pain    • CKD (chronic kidney disease), stage II    • Coronary arteriosclerosis     3 stents, followed by Dr. Jeffers   • Diabetes mellitus (HCC)     type 2   • Diverticular disease of colon    • Dysphagia    • Epigastric pain    • GERD (gastroesophageal reflux disease)     esophagitis on Dexilant. Pt feels Nexium working better      • History of echocardiogram     Small left atrial enlargement with mild CLVH.Ef of 55-60%.Mitral valve  mildly thickened.Av thickened.Left ventricle mildly dilated.Tricuspid intact.Mild aortic insufficiency. 12/07/2015       • History of echocardiogram     Mild diastolic dysfunction and mild left atrial enlargement, otherwise normal echo. EF> 55% 01/03/2012       • Hypercholesterolemia    • Hypertension    • Insomnia    • Irritable bowel syndrome    • Osteoarthritis of multiple joints      Past Surgical History:   Procedure Laterality Date   • APPENDECTOMY       [facility] 1995       • CARDIAC CATHETERIZATION      Successful PTCA of the OMB#2.Unsuccessful PTCA of the 1st OMB, secondary to difficulty in advancing the balloon. 12/08/2015       • CHOLECYSTECTOMY      [facility] 1993       • COLONOSCOPY  10/01/2012   • COLONOSCOPY N/A 12/11/2017    Procedure: COLONOSCOPY;  Surgeon: Bladimir Michael MD;  Location: [facility] ENDOSCOPY;  Service:    • COLONOSCOPY N/A 11/1/2022    Procedure: COLONOSCOPY;  Surgeon: Bladimir Michael MD;  Location: [facility] ENDOSCOPY;  Service: Gastroenterology;  Laterality: N/A;   • CORONARY ANGIOPLASTY      Successful PTCA & stenting LAD was done w/ deployment of a 2.5mm x23 Xience stent w/ reduction of stenosis from 90% to less than 0% stenosis with good LILLIAM 3 flow 02/17/2012       • ENDOSCOPY      with biopsy.  Mildly severe esophagitis. Gastritis. Normal examined duodenum.Several biopsies obtained in the lower third of the esophagus.Several biopsies obtained in the gastric antrum. 05/06/2016       • ENDOSCOPY      w tube. Normal esophagus. Gastritis in stomach. Biopsy taken. Gastric polyp found. Biopsy taken. Normal duodenum. 10/01/2012       • ENDOSCOPY AND COLONOSCOPY      Diverticulum in sigmoid colon & descending colon. Internal & external hemorrhoids found. 10/01/2012       • HAND SURGERY Left      Corrective osteotomy of ring finger metacarpal. Malunited fracture of left ring finger 05/21/1985       • HERNIA REPAIR      Laparoscopic hernia repair. prepertitoneal  bilateral inguinal hernia repair with mesh. 10/15/2009      • OTHER SURGICAL HISTORY      CORONARY ARTERY STENT    • SKIN TAG REMOVAL      Excision, viral skin tag,right upper eyelid 05/08/1995      Family History   Problem Relation Age of Onset   • Clotting disorder Other    • Lung disease Other    • Schizophrenia Sister    • Obesity Sister    • Tuberculosis Sister    • Arthritis Mother    • Diabetes Mother    • Heart disease Mother    • Hypertension Mother    • Obesity Mother    • Stroke Mother    • Thyroid disease Mother    • Tuberculosis Mother    • Arthritis Father    • Tuberculosis Father        Allergies   Allergen Reactions   • Brilinta [Ticagrelor] Shortness Of Breath   • Effient [Prasugrel] Shortness Of Breath   • Warfarin And Related Shortness Of Breath   • Ciprofloxacin Hives   • Lipitor [Atorvastatin] Swelling   • Latex Itching   • Adhesive Tape Rash   • Celexa [Citalopram Hydrobromide] Rash   • Crestor [Rosuvastatin Calcium] Rash   • Floxin [Ofloxacin] Rash   • Januvia [Sitagliptin] Rash   • Penicillins Rash   • Sulfa Antibiotics Rash     Social History     Socioeconomic History   • Marital status:    Tobacco Use   • Smoking status: Never   • Smokeless tobacco: Never   Vaping Use   • Vaping Use: Never used   Substance and Sexual Activity   • Alcohol use: No   • Drug use: No   • Sexual activity: Defer     Current Medications:  Prior to Admission medications    Medication Sig Start Date End Date Taking? Authorizing Provider   ALPRAZolam (XANAX) 0.5 MG tablet Take 1 tablet by mouth At Night As Needed for Anxiety. 12/21/22  Yes Andreas Martinez MD   aspirin 81 MG EC tablet Take 2 tablets by mouth Daily. 4/1/19  Yes Andreas Martinez MD   Azelastine HCl 137 MCG/SPRAY solution Inhale 1 spray Daily. 8/3/22  Yes Andreas Martinez MD   cholecalciferol (VITAMIN D3) 1000 units tablet Take 1 tablet by mouth Daily. 4/1/19  Yes Andreas Martinez MD   coenzyme Q10 100 MG capsule Take 100 mg by mouth daily.   Yes  Yazan Yancey MD   dapagliflozin (Farxiga) 5 MG tablet tablet Take 1 tablet by mouth Daily.   Yes Yazan Yancey MD   Dulaglutide (Trulicity) 3 MG/0.5ML solution pen-injector Inject 3 mg under the skin into the appropriate area as directed 1 (One) Time Per Week. 8/10/21  Yes Glen Jain MD   Glucose Blood (BLOOD GLUCOSE TEST) strip Use 4 x daily use any brand covered by insurance or same brand as before ICD10 code is E11.9 2/22/19  Yes Glen Jain MD   Insulin Glargine (BASAGLAR KWIKPEN SC) Inject 15 Units under the skin into the appropriate area as directed Daily.   Yes Yazan Yancey MD   Insulin Glargine (BASAGLAR KWIKPEN) 100 UNIT/ML injection pen Inject 15 Units under the skin into the appropriate area as directed Daily. 12/8/22  Yes Glen Jain MD   Insulin Pen Needle 30G X 8 MM misc Use 3-4 times daily. 11/16/20  Yes Andreas Martinez MD   Insulin Syringe 31G X 5/16\" 0.3 ML misc 4 x daily 2/10/20  Yes Glen Jain MD   isosorbide mononitrate (IMDUR) 30 MG 24 hr tablet Take 15 mg by mouth Every Night.   Yes Yazan Yancey MD   KRILL OIL OMEGA-3 PO Take 1 capsule by mouth Daily.   Yes Yazan Yancey MD   Lancet Devices (LANCING DEVICE) misc USE AS INDICATED TO CORRELATE WITH STRIPS AND METER 2/22/19  Yes Glen Jain MD   Lancets 30G misc USE 4 X DAILY 2/22/19  Yes Glen Jain MD   lisinopril (PRINIVIL,ZESTRIL) 10 MG tablet Take 1 tablet by mouth Daily. 11/17/22  Yes Andreas Martinez MD   LOTEMAX 0.5 % ophthalmic suspension Administer 1 drop to both eyes Daily. 9/3/19  Yes Yazan Yancey MD   metoprolol tartrate (LOPRESSOR) 50 MG tablet Take 1 tablet by mouth 2 (Two) Times a Day. 11/17/22  Yes Andreas Martinez MD   pantoprazole (PROTONIX) 40 MG EC tablet Take 1 tablet by mouth Daily. 11/17/22  Yes Andreas Martinez MD   rivaroxaban (XARELTO) 20 MG tablet Take 20 mg by mouth 2 (Two) Times a  Day With Meals.   Yes Provider, MD Mac Hand Serenoa repens, (SAW PALMETTO PO) Take 1 tablet by mouth Daily.   Yes Yazan Yancey MD   sucralfate (CARAFATE) 1 g tablet Take 1 tablet by mouth 2 (two) times a day. 8/18/21  Yes Andreas Martinez MD   vitamin B-12 (CYANOCOBALAMIN) 2500 MCG sublingual tablet tablet Place 2,500 mcg under the tongue Every Other Day.   Yes ProviderYazan MD   lovastatin (MEVACOR) 10 MG tablet Take 1 tablet by mouth Every Night for 360 days. 12/27/21 12/22/22  Glen Jain MD     Review of Systems  Review of Systems       Objective    /65 (BP Location: Left arm)   Pulse 76   Ht 175.3 cm (69\")   Wt 88 kg (194 lb)   BMI 28.65 kg/m²   Physical Exam  Vitals and nursing note reviewed.   Constitutional:       General: He is not in acute distress.     Appearance: He is well-developed.   HENT:      Head: Normocephalic and atraumatic.   Eyes:      Pupils: Pupils are equal, round, and reactive to light.   Cardiovascular:      Rate and Rhythm: Normal rate and regular rhythm.      Heart sounds: Normal heart sounds.   Pulmonary:      Effort: Pulmonary effort is normal.      Breath sounds: Normal breath sounds.   Abdominal:      General: Bowel sounds are normal. There is no distension or abdominal bruit.      Palpations: Abdomen is soft. Abdomen is not rigid. There is no shifting dullness or mass.      Tenderness: There is abdominal tenderness. There is no guarding or rebound.      Hernia: No hernia is present. There is no hernia in the ventral area.   Musculoskeletal:         General: Normal range of motion.      Cervical back: Normal range of motion.   Skin:     General: Skin is warm and dry.   Neurological:      Mental Status: He is alert and oriented to person, place, and time.   Psychiatric:         Behavior: Behavior normal.         Thought Content: Thought content normal.         Judgment: Judgment normal.       Assessment & Plan      1.  Diverticulosis of large intestine without hemorrhage    2. History of colon polyps    .   Diagnoses and all orders for this visit:    1. Diverticulosis of large intestine without hemorrhage (Primary)    2. History of colon polyps        Orders placed during this encounter include:  No orders of the defined types were placed in this encounter.      Medications prescribed:  No orders of the defined types were placed in this encounter.      Requested Prescriptions      No prescriptions requested or ordered in this encounter       Review and/or summary of lab tests, radiology, procedures, medications. Review and summary of old records and obtaining of history. The risks and benefits of my recommendations, as well as other treatment options were discussed with the patient today. Questions were answered.    Follow-up: Return if symptoms worsen or fail to improve, for Next scheduled follow up.     * Surgery not found *      This document has been electronically signed by Andreas Aldana PA-C on January 9, 2023 18:23 CST      Results for orders placed or performed in visit on 08/04/22   Comprehensive metabolic panel    Specimen: Blood   Result Value Ref Range    Glucose 172 (H) 65 - 99 mg/dL    BUN 16 8 - 23 mg/dL    Creatinine 1.18 0.76 - 1.27 mg/dL    Sodium 140 136 - 145 mmol/L    Potassium 5.0 3.5 - 5.2 mmol/L    Chloride 103 98 - 107 mmol/L    CO2 25.7 22.0 - 29.0 mmol/L    Calcium 9.0 8.6 - 10.5 mg/dL    Total Protein 6.9 6.0 - 8.5 g/dL    Albumin 4.30 3.50 - 5.20 g/dL    ALT (SGPT) 14 1 - 41 U/L    AST (SGOT) 19 1 - 40 U/L    Alkaline Phosphatase 45 39 - 117 U/L    Total Bilirubin 0.4 0.0 - 1.2 mg/dL    Globulin 2.6 gm/dL    A/G Ratio 1.7 g/dL    BUN/Creatinine Ratio 13.6 7.0 - 25.0    Anion Gap 11.3 5.0 - 15.0 mmol/L    eGFR 63.6 >60.0 mL/min/1.73   Results for orders placed or performed during the hospital encounter of 07/20/22   Gold Top - SST   Result Value Ref Range    Extra Tube Hold for add-ons.    Green Top  (Gel)   Result Value Ref Range    Extra Tube Hold for add-ons.    COVID-19 and FLU A/B PCR - Swab, Nasopharynx    Specimen: Nasopharynx; Swab   Result Value Ref Range    COVID19 Not Detected Not Detected - Ref. Range    Influenza A PCR Not Detected Not Detected    Influenza B PCR Not Detected Not Detected   Urinalysis With Microscopic If Indicated (No Culture) - Urine, Clean Catch    Specimen: Urine, Clean Catch   Result Value Ref Range    Color, UA Yellow Yellow, Straw, Dark Yellow, Renay    Appearance, UA Clear Clear    pH, UA <=5.0 5.0 - 9.0    Specific Gravity, UA 1.018 1.003 - 1.030    Glucose, UA Negative Negative    Ketones, UA Negative Negative    Bilirubin, UA Negative Negative    Blood, UA Negative Negative    Protein, UA Negative Negative    Leuk Esterase, UA Negative Negative    Nitrite, UA Negative Negative    Urobilinogen, UA 0.2 E.U./dL 0.2 - 1.0 E.U./dL   CBC Auto Differential    Specimen: Blood   Result Value Ref Range    WBC 10.21 3.40 - 10.80 10*3/mm3    RBC 5.52 4.14 - 5.80 10*6/mm3    Hemoglobin 15.5 13.0 - 17.7 g/dL    Hematocrit 45.6 37.5 - 51.0 %    MCV 82.6 79.0 - 97.0 fL    MCH 28.1 26.6 - 33.0 pg    MCHC 34.0 31.5 - 35.7 g/dL    RDW 14.2 12.3 - 15.4 %    RDW-SD 41.5 37.0 - 54.0 fl    MPV 9.0 6.0 - 12.0 fL    Platelets 237 140 - 450 10*3/mm3    Neutrophil % 82.0 (H) 42.7 - 76.0 %    Lymphocyte % 8.7 (L) 19.6 - 45.3 %    Monocyte % 4.4 (L) 5.0 - 12.0 %    Eosinophil % 3.6 0.3 - 6.2 %    Basophil % 0.5 0.0 - 1.5 %    Immature Grans % 0.8 (H) 0.0 - 0.5 %    Neutrophils, Absolute 8.37 (H) 1.70 - 7.00 10*3/mm3    Lymphocytes, Absolute 0.89 0.70 - 3.10 10*3/mm3    Monocytes, Absolute 0.45 0.10 - 0.90 10*3/mm3    Eosinophils, Absolute 0.37 0.00 - 0.40 10*3/mm3    Basophils, Absolute 0.05 0.00 - 0.20 10*3/mm3    Immature Grans, Absolute 0.08 (H) 0.00 - 0.05 10*3/mm3    nRBC 0.0 0.0 - 0.2 /100 WBC   Lavender Top   Result Value Ref Range    Extra Tube hold for add-on    Light Blue Top   Result Value  Ref Range    Extra Tube Hold for add-ons.    Lipase    Specimen: Blood   Result Value Ref Range    Lipase 104 (H) 13 - 60 U/L   Amylase    Specimen: Blood   Result Value Ref Range    Amylase 35 28 - 100 U/L   Comprehensive Metabolic Panel    Specimen: Blood   Result Value Ref Range    Glucose 189 (H) 65 - 99 mg/dL    BUN 14 8 - 23 mg/dL    Creatinine 1.14 0.76 - 1.27 mg/dL    Sodium 137 136 - 145 mmol/L    Potassium 5.0 3.5 - 5.2 mmol/L    Chloride 100 98 - 107 mmol/L    CO2 26.0 22.0 - 29.0 mmol/L    Calcium 9.9 8.6 - 10.5 mg/dL    Total Protein 8.1 6.0 - 8.5 g/dL    Albumin 4.90 3.50 - 5.20 g/dL    ALT (SGPT) 18 1 - 41 U/L    AST (SGOT) 22 1 - 40 U/L    Alkaline Phosphatase 56 39 - 117 U/L    Total Bilirubin 0.5 0.0 - 1.2 mg/dL    Globulin 3.2 gm/dL    A/G Ratio 1.5 g/dL    BUN/Creatinine Ratio 12.3 7.0 - 25.0    Anion Gap 11.0 5.0 - 15.0 mmol/L    eGFR 66.2 >60.0 mL/min/1.73   Results for orders placed or performed in visit on 06/22/22   CBC Auto Differential    Specimen: Blood   Result Value Ref Range    WBC 6.28 3.40 - 10.80 10*3/mm3    RBC 5.05 4.14 - 5.80 10*6/mm3    Hemoglobin 14.2 13.0 - 17.7 g/dL    Hematocrit 43.9 37.5 - 51.0 %    MCV 86.9 79.0 - 97.0 fL    MCH 28.1 26.6 - 33.0 pg    MCHC 32.3 31.5 - 35.7 g/dL    RDW 13.6 12.3 - 15.4 %    RDW-SD 41.9 37.0 - 54.0 fl    MPV 9.7 6.0 - 12.0 fL    Platelets 207 140 - 450 10*3/mm3    Neutrophil % 64.4 42.7 - 76.0 %    Lymphocyte % 22.5 19.6 - 45.3 %    Monocyte % 7.2 5.0 - 12.0 %    Eosinophil % 4.9 0.3 - 6.2 %    Basophil % 0.5 0.0 - 1.5 %    Immature Grans % 0.5 0.0 - 0.5 %    Neutrophils, Absolute 4.05 1.70 - 7.00 10*3/mm3    Lymphocytes, Absolute 1.41 0.70 - 3.10 10*3/mm3    Monocytes, Absolute 0.45 0.10 - 0.90 10*3/mm3    Eosinophils, Absolute 0.31 0.00 - 0.40 10*3/mm3    Basophils, Absolute 0.03 0.00 - 0.20 10*3/mm3    Immature Grans, Absolute 0.03 0.00 - 0.05 10*3/mm3    nRBC 0.0 0.0 - 0.2 /100 WBC     *Note: Due to a large number of results and/or  encounters for the requested time period, some results have not been displayed. A complete set of results can be found in Results Review.

## 2022-12-30 ENCOUNTER — OFFICE VISIT (OUTPATIENT)
Dept: ENDOCRINOLOGY | Facility: CLINIC | Age: 77
End: 2022-12-30

## 2022-12-30 VITALS
HEART RATE: 74 BPM | HEIGHT: 69 IN | BODY MASS INDEX: 29.18 KG/M2 | WEIGHT: 197 LBS | SYSTOLIC BLOOD PRESSURE: 120 MMHG | OXYGEN SATURATION: 97 % | DIASTOLIC BLOOD PRESSURE: 60 MMHG

## 2022-12-30 DIAGNOSIS — E78.2 MIXED HYPERLIPIDEMIA: ICD-10-CM

## 2022-12-30 DIAGNOSIS — K76.0 NAFLD (NONALCOHOLIC FATTY LIVER DISEASE): ICD-10-CM

## 2022-12-30 DIAGNOSIS — E11.65 TYPE 2 DIABETES MELLITUS WITH HYPERGLYCEMIA, WITHOUT LONG-TERM CURRENT USE OF INSULIN: Primary | ICD-10-CM

## 2022-12-30 DIAGNOSIS — I10 ESSENTIAL HYPERTENSION: ICD-10-CM

## 2022-12-30 PROCEDURE — 99214 OFFICE O/P EST MOD 30 MIN: CPT | Performed by: INTERNAL MEDICINE

## 2022-12-30 NOTE — PROGRESS NOTES
"Aj Card Jr. is a 77 y.o. male who presents for  evaluation of type 2 diabetes        Diabetes     Duration since age 71 years old     Complications - CAD     Current symptoms/problems  none     Alleviating Factors: Compliance       Current diet  in general, a \"healthy\" diet      Current exercise walking    Current monitoring regimen: home blood tests - checking 4 x daily   Home blood sugar records: ppg elevation    Hypoglycemia none    PE    /60   Pulse 74   Ht 175.3 cm (69\")   Wt 89.4 kg (197 lb)   SpO2 97%   BMI 29.09 kg/m²   AOx3  No visible goiter  Normal Respiratory Effort , Lung CTA  RRR  No Edema    Labs    Lab Results   Component Value Date    WBC 6.28 11/23/2022    HGB 14.2 11/23/2022    HCT 43.9 11/23/2022    MCV 86.9 11/23/2022     11/23/2022     Lab Results   Component Value Date    GLUCOSE 155 (H) 11/23/2022    BUN 12 11/23/2022    CREATININE 0.90 11/23/2022    EGFRIFNONA 74 11/17/2021    BCR 13.3 11/23/2022    K 4.5 11/23/2022    CO2 29.0 11/23/2022    CALCIUM 9.6 11/23/2022    ALBUMIN 4.30 11/23/2022    AST 19 11/23/2022    ALT 16 11/23/2022             Assessment & Plan       ICD-10-CM ICD-9-CM   1. Type 2 diabetes mellitus with hyperglycemia, without long-term current use of insulin (HCC)  E11.65 250.00     790.29   2. Essential hypertension  I10 401.9   3. Mixed hyperlipidemia  E78.2 272.2   4. NAFLD (nonalcoholic fatty liver disease)  K76.0 571.8       Pt has type 2 Diabetes with CAD     Glycemic Management:   Lab Results   Component Value Date    HGBA1C 7.20 (H) 11/23/2022    HGBA1C 7.40 (H) 06/01/2022    HGBA1C 7.70 (H) 11/17/2021     Lab Results   Component Value Date    GLUCOSE 155 (H) 11/23/2022    BUN 12 11/23/2022    CREATININE 0.90 11/23/2022    EGFRIFNONA 74 11/17/2021    BCR 13.3 11/23/2022    K 4.5 11/23/2022    CO2 29.0 11/23/2022    CALCIUM 9.6 11/23/2022    ALBUMIN 4.30 11/23/2022    AST 19 11/23/2022    ALT 16 11/23/2022    ANIONGAP 9.0 11/23/2022 "     Lab Results   Component Value Date    WBC 6.28 11/23/2022    HGB 14.2 11/23/2022    HCT 43.9 11/23/2022    MCV 86.9 11/23/2022     11/23/2022      side effects to metformin          Trulicity 3 mg weekly     Basaglar 15 units    Add Farxiga 5 mg daily - didn't take     Novolin R 20 to 25 w every meal ---       Criteria for Approval of Dexcom     The patient has diabetes mellitus, insulin-dependent.    Our Diabetes Department has evaluated the patient in the last six months and will continue counseling on insulin adjustment.     The patient performs blood glucose testing at least four times daily with proven glucose variability from 50 to 300 mg per dl.    The patient is administering basal insulin and prandial insulin four times per day for more than six months.    The patient uses a home blood glucose monitor to assess blood glucose at least four times daily for more than six months.    The patient requires frequent adjustment of insulin treatment regimen based on blood glucose readings.    The patient has frequent variability in blood glucose readings due to activity and variability in meal content and time.     The patient has completed a diabetes education program with us.    The patient has demonstrated the ability to self-monitor her glucose.     The patient is motivated in improving diabetes control             Lipid Management  Lab Results   Component Value Date    CHOL 162 11/23/2022    CHOL 128 06/01/2022    CHOL 147 11/17/2021     Lab Results   Component Value Date    TRIG 386 (H) 11/23/2022    TRIG 215 (H) 06/01/2022    TRIG 269 (H) 11/17/2021     Lab Results   Component Value Date    HDL 26 (L) 11/23/2022    HDL 28 (L) 06/01/2022    HDL 31 (L) 11/17/2021     No components found for: LDLCALC  Lab Results   Component Value Date    LDL 74 11/23/2022    LDL 64 06/01/2022    LDL 72 11/17/2021        lovastatin 10 mg qhs         Blood Pressure Management:    Vitals:    12/30/22 1137   BP: 120/60    Pulse: 74   SpO2: 97%     Lab Results   Component Value Date    GLUCOSE 155 (H) 11/23/2022    CALCIUM 9.6 11/23/2022     11/23/2022    K 4.5 11/23/2022    CO2 29.0 11/23/2022     11/23/2022    BUN 12 11/23/2022    CREATININE 0.90 11/23/2022    EGFRIFNONA 74 11/17/2021    BCR 13.3 11/23/2022    ANIONGAP 9.0 11/23/2022     Controlled on ACE I regimen           Microvascular Complication Monitoring:      Eye Exam Evaluation  Within 1 year     -----------    Last Microalbumin-Proteinuria Assessment    Lab Results   Component Value Date    MALBCRERATIO  11/23/2022      Comment:      Unable to calculate    MALBCRERATIO  06/01/2022      Comment:      Unable to calculate    MALBCRERATIO  11/17/2021      Comment:      Unable to calculate       No results found for: UTPCR    -----------      Neuropathy        Bone Health    Lab Results   Component Value Date    CALCIUM 9.6 11/23/2022    [id number] 46.3 11/23/2022       Thyroid Health    Lab Results   Component Value Date    TSH 2.680 11/23/2022    TSH 1.620 06/01/2022    TSH 2.440 11/17/2021         Other Diabetes Related Aspects       Lab Results   Component Value Date    [id number] 580 11/23/2022     FATTY LIVER    Weight loss   Vitamin E 200 units daily - could have been considered       No orders of the defined types were placed in this encounter.           This document has been electronically signed by Glen Richards MD on December 30, 2022 12:08 CST

## 2023-02-14 ENCOUNTER — OFFICE VISIT (OUTPATIENT)
Dept: PODIATRY | Facility: CLINIC | Age: 78
End: 2023-02-14
Payer: MEDICARE

## 2023-02-14 VITALS
DIASTOLIC BLOOD PRESSURE: 74 MMHG | OXYGEN SATURATION: 97 % | HEART RATE: 71 BPM | HEIGHT: 69 IN | SYSTOLIC BLOOD PRESSURE: 110 MMHG | WEIGHT: 197 LBS | BODY MASS INDEX: 29.18 KG/M2

## 2023-02-14 DIAGNOSIS — M79.671 RIGHT FOOT PAIN: ICD-10-CM

## 2023-02-14 DIAGNOSIS — M20.11 HALLUX VALGUS, RIGHT: ICD-10-CM

## 2023-02-14 DIAGNOSIS — M19.071 OSTEOARTHRITIS OF ANKLE AND FOOT, RIGHT: ICD-10-CM

## 2023-02-14 DIAGNOSIS — R09.89 DECREASED PEDAL PULSES: Primary | ICD-10-CM

## 2023-02-14 PROCEDURE — 99204 OFFICE O/P NEW MOD 45 MIN: CPT | Performed by: PODIATRIST

## 2023-02-14 RX ORDER — BUPIVACAINE HCL/0.9 % NACL/PF 0.1 %
2 PLASTIC BAG, INJECTION (ML) EPIDURAL ONCE
Status: CANCELLED | OUTPATIENT
Start: 2023-03-13 | End: 2023-02-14

## 2023-02-14 NOTE — PROGRESS NOTES
Aj Card Jr.  1945  77 y.o. male  PCP: Andreas Martinez 1/23  BS: 158    02/14/2023    Chief Complaint   Patient presents with   • Right Foot - Pain       History of Present Illness    Aj Card Jr. is a 77 y.o.male presents to clinic today with right foot pain. Xrays obtained today.       Past Medical History:   Diagnosis Date   • Acute posthemorrhagic anemia    • Allergic rhinitis    • Anxiety state    • Chest pain    • CKD (chronic kidney disease), stage II    • Coronary arteriosclerosis     3 stents, followed by Dr. Jeffers   • Diabetes mellitus (HCC)     type 2   • Diverticular disease of colon    • Dysphagia    • Epigastric pain    • GERD (gastroesophageal reflux disease)     esophagitis on Dexilant. Pt feels Nexium working better      • History of echocardiogram     Small left atrial enlargement with mild CLVH.Ef of 55-60%.Mitral valve mildly thickened.Av thickened.Left ventricle mildly dilated.Tricuspid intact.Mild aortic insufficiency. 12/07/2015       • History of echocardiogram     Mild diastolic dysfunction and mild left atrial enlargement, otherwise normal echo. EF> 55% 01/03/2012       • Hypercholesterolemia    • Hypertension    • Insomnia    • Irritable bowel syndrome    • Osteoarthritis of multiple joints          Past Surgical History:   Procedure Laterality Date   • APPENDECTOMY       [facility] 1995       • CARDIAC CATHETERIZATION      Successful PTCA of the OMB#2.Unsuccessful PTCA of the 1st OMB, secondary to difficulty in advancing the balloon. 12/08/2015       • CHOLECYSTECTOMY      [facility] 1993       • COLONOSCOPY  10/01/2012   • COLONOSCOPY N/A 12/11/2017    Procedure: COLONOSCOPY;  Surgeon: Bladimir Michael MD;  Location: [facility] ENDOSCOPY;  Service:    • COLONOSCOPY N/A 11/1/2022    Procedure: COLONOSCOPY;  Surgeon: Bladimir Michael MD;  Location: [facility] ENDOSCOPY;  Service: Gastroenterology;  Laterality: N/A;   • CORONARY ANGIOPLASTY       Successful PTCA & stenting LAD was done w/ deployment of a 2.5mm x23 Xience stent w/ reduction of stenosis from 90% to less than 0% stenosis with good LILLIAM 3 flow 02/17/2012       • ENDOSCOPY      with biopsy.  Mildly severe esophagitis. Gastritis. Normal examined duodenum.Several biopsies obtained in the lower third of the esophagus.Several biopsies obtained in the gastric antrum. 05/06/2016       • ENDOSCOPY      w tube. Normal esophagus. Gastritis in stomach. Biopsy taken. Gastric polyp found. Biopsy taken. Normal duodenum. 10/01/2012       • ENDOSCOPY AND COLONOSCOPY      Diverticulum in sigmoid colon & descending colon. Internal & external hemorrhoids found. 10/01/2012       • HAND SURGERY Left      Corrective osteotomy of ring finger metacarpal. Malunited fracture of left ring finger 05/21/1985       • HERNIA REPAIR      Laparoscopic hernia repair. prepertitoneal bilateral inguinal hernia repair with mesh. 10/15/2009      • OTHER SURGICAL HISTORY      CORONARY ARTERY STENT    • SKIN TAG REMOVAL      Excision, viral skin tag,right upper eyelid 05/08/1995          Family History   Problem Relation Age of Onset   • Clotting disorder Other    • Lung disease Other    • Schizophrenia Sister    • Obesity Sister    • Tuberculosis Sister    • Arthritis Mother    • Diabetes Mother    • Heart disease Mother    • Hypertension Mother    • Obesity Mother    • Stroke Mother    • Thyroid disease Mother    • Tuberculosis Mother    • Arthritis Father    • Tuberculosis Father        Allergies   Allergen Reactions   • Brilinta [Ticagrelor] Shortness Of Breath   • Effient [Prasugrel] Shortness Of Breath   • Warfarin And Related Shortness Of Breath   • Ciprofloxacin Hives   • Lipitor [Atorvastatin] Swelling   • Latex Itching   • Adhesive Tape Rash   • Celexa [Citalopram Hydrobromide] Rash   • Crestor [Rosuvastatin Calcium] Rash   • Floxin [Ofloxacin] Rash   • Januvia [Sitagliptin] Rash   • Penicillins Rash   • Sulfa Antibiotics  "Rash       Social History     Socioeconomic History   • Marital status:    Tobacco Use   • Smoking status: Never   • Smokeless tobacco: Never   Vaping Use   • Vaping Use: Never used   Substance and Sexual Activity   • Alcohol use: No   • Drug use: No   • Sexual activity: Defer         Current Outpatient Medications   Medication Sig Dispense Refill   • ALPRAZolam (XANAX) 0.5 MG tablet Take 1 tablet by mouth At Night As Needed for Anxiety. 60 tablet 0   • aspirin 81 MG EC tablet Take 2 tablets by mouth Daily. 90 tablet 3   • Azelastine HCl 137 MCG/SPRAY solution Inhale 1 spray Daily. 30 mL 3   • cholecalciferol (VITAMIN D3) 1000 units tablet Take 1 tablet by mouth Daily. 30 tablet 3   • coenzyme Q10 100 MG capsule Take 100 mg by mouth daily.     • dapagliflozin (Farxiga) 5 MG tablet tablet Take 1 tablet by mouth Daily.     • Dulaglutide (Trulicity) 3 MG/0.5ML solution pen-injector Inject 3 mg under the skin into the appropriate area as directed 1 (One) Time Per Week. 12 pen 3   • Glucose Blood (BLOOD GLUCOSE TEST) strip Use 4 x daily use any brand covered by insurance or same brand as before ICD10 code is E11.9 120 each 11   • Insulin Glargine (BASAGLAR KWIKPEN SC) Inject 15 Units under the skin into the appropriate area as directed Daily.     • Insulin Glargine (BASAGLAR KWIKPEN) 100 UNIT/ML injection pen Inject 15 Units under the skin into the appropriate area as directed Daily. 30 mL 2   • Insulin Pen Needle 30G X 8 MM misc Use 3-4 times daily. 100 each 3   • Insulin Syringe 31G X 5/16\" 0.3 ML misc 4 x daily 120 each 11   • isosorbide mononitrate (IMDUR) 30 MG 24 hr tablet Take 15 mg by mouth Every Night.     • KRILL OIL OMEGA-3 PO Take 1 capsule by mouth Daily.     • Lancet Devices (LANCING DEVICE) misc USE AS INDICATED TO CORRELATE WITH STRIPS AND METER 1 each 1   • Lancets 30G misc USE 4 X DAILY 120 each 11   • lisinopril (PRINIVIL,ZESTRIL) 10 MG tablet Take 1 tablet by mouth Daily. 30 tablet 3   • " "LOTEMAX 0.5 % ophthalmic suspension Administer 1 drop to both eyes Daily.     • metoprolol tartrate (LOPRESSOR) 50 MG tablet Take 1 tablet by mouth 2 (Two) Times a Day. 180 tablet 1   • pantoprazole (PROTONIX) 40 MG EC tablet Take 1 tablet by mouth Daily. 90 tablet 1   • rivaroxaban (XARELTO) 20 MG tablet Take 20 mg by mouth 2 (Two) Times a Day With Meals.     • Saw Palmetto, Serenoa repens, (SAW PALMETTO PO) Take 1 tablet by mouth Daily.     • sucralfate (CARAFATE) 1 g tablet Take 1 tablet by mouth 2 (two) times a day. 180 tablet 0   • vitamin B-12 (CYANOCOBALAMIN) 2500 MCG sublingual tablet tablet Place 2,500 mcg under the tongue Every Other Day.     • lovastatin (MEVACOR) 10 MG tablet Take 1 tablet by mouth Every Night for 360 days. 90 tablet 3     No current facility-administered medications for this visit.       Review of Systems   Constitutional: Negative.    HENT: Negative.    Eyes: Negative.    Respiratory: Negative.    Cardiovascular: Negative.    Gastrointestinal: Negative.    Endocrine: Negative.    Genitourinary: Negative.    Musculoskeletal:        Foot pain   Skin: Negative.    Allergic/Immunologic: Negative.    Neurological: Negative.    Hematological: Negative.    Psychiatric/Behavioral: Negative.          OBJECTIVE    /74   Pulse 71   Ht 175.3 cm (69\")   Wt 89.4 kg (197 lb)   SpO2 97%   BMI 29.09 kg/m²     Physical Exam  Vitals reviewed.   Constitutional:       General: He is not in acute distress.     Appearance: He is well-developed.   HENT:      Head: Normocephalic and atraumatic.      Nose: Nose normal.   Eyes:      Conjunctiva/sclera: Conjunctivae normal.      Pupils: Pupils are equal, round, and reactive to light.   Cardiovascular:      Pulses:           Dorsalis pedis pulses are 1+ on the right side.        Posterior tibial pulses are 1+ on the right side.   Pulmonary:      Effort: Pulmonary effort is normal. No respiratory distress.      Breath sounds: No wheezing. "   Musculoskeletal:      Right foot: Decreased range of motion. Deformity, bunion and prominent metatarsal heads present.   Feet:      Right foot:      Skin integrity: Skin integrity normal.   Skin:     General: Skin is warm and dry.      Capillary Refill: Capillary refill takes less than 2 seconds.   Neurological:      Mental Status: He is alert and oriented to person, place, and time.   Psychiatric:         Behavior: Behavior normal.         Thought Content: Thought content normal.                Procedures        ASSESSMENT AND PLAN    Diagnoses and all orders for this visit:    1. Decreased pedal pulses (Primary)  -     Doppler Ankle Brachial Index Single Level CAR; Future  -     Doppler Arterial Multi Level Lower Extremity - Bilateral CAR; Future    2. Right foot pain  -     XR Foot 3+ View Right  -     Doppler Arterial Multi Level Lower Extremity - Bilateral CAR; Future    3. Osteoarthritis of ankle and foot, right  -     Case Request; Standing  -     ceFAZolin (ANCEF) 2 g in sodium chloride 0.9 % 100 mL IVPB  -     Case Request    4. Hallux valgus, right    Other orders  -     Follow Anesthesia Guidelines / Protocol; Future  -     Follow Anesthesia Guidelines / Protocol; Standing  -     Verify NPO Status; Standing  -     Obtain informed consent (if not collected inpatient or PAT); Standing  -     Notify Physician - Standard; Standing        -Patient examined and evaluated  -Radiographs taken and reviewed.  Moderate hallux valgus deformity with degeneration noted to the first TMT and MTP.  -Discussed treatment options both conservative and surgical.  At this time patient has failed conservative care including anti-inflammatories, over-the-counter arch supports and wider shoe gear.  He wishes to pursue surgery at this time.  -Surgical plan is right first tarsometatarsal joint arthrodesis, first metatarsophalangeal joint arthrodesis, calcaneal bone graft and all other indicated procedures.  -Risks and benefits of  the procedure including but not limited to postoperative infection, incisional dehiscence, numbness, swelling, residual pain and postoperative blood clot discussed in detail.  No guarantees were given or implied.  -Tentative date for the surgery March 13, 2023  -All questions were answered  -Recheck following surgery           This document has been electronically signed by Mason Salazar DPM on February 14, 2023 14:29 CST     2/14/2023  14:29 CST

## 2023-02-15 PROBLEM — M19.071 OSTEOARTHRITIS OF ANKLE AND FOOT, RIGHT: Status: ACTIVE | Noted: 2023-02-15

## 2023-02-17 ENCOUNTER — TELEPHONE (OUTPATIENT)
Dept: PODIATRY | Facility: CLINIC | Age: 78
End: 2023-02-17
Payer: MEDICARE

## 2023-02-17 ENCOUNTER — OFFICE VISIT (OUTPATIENT)
Dept: FAMILY MEDICINE CLINIC | Facility: CLINIC | Age: 78
End: 2023-02-17
Payer: MEDICARE

## 2023-02-17 ENCOUNTER — TELEPHONE (OUTPATIENT)
Dept: FAMILY MEDICINE CLINIC | Facility: CLINIC | Age: 78
End: 2023-02-17

## 2023-02-17 VITALS
OXYGEN SATURATION: 99 % | TEMPERATURE: 98.1 F | BODY MASS INDEX: 28.05 KG/M2 | WEIGHT: 189.4 LBS | HEIGHT: 69 IN | HEART RATE: 81 BPM | SYSTOLIC BLOOD PRESSURE: 102 MMHG | DIASTOLIC BLOOD PRESSURE: 60 MMHG

## 2023-02-17 DIAGNOSIS — K57.90 DIVERTICULOSIS: ICD-10-CM

## 2023-02-17 DIAGNOSIS — K21.9 GASTROESOPHAGEAL REFLUX DISEASE, UNSPECIFIED WHETHER ESOPHAGITIS PRESENT: ICD-10-CM

## 2023-02-17 DIAGNOSIS — I95.9 HYPOTENSION, UNSPECIFIED HYPOTENSION TYPE: ICD-10-CM

## 2023-02-17 DIAGNOSIS — E11.65 UNCONTROLLED TYPE 2 DIABETES MELLITUS WITH HYPERGLYCEMIA: ICD-10-CM

## 2023-02-17 DIAGNOSIS — E78.2 MIXED HYPERLIPIDEMIA: ICD-10-CM

## 2023-02-17 DIAGNOSIS — I25.119 CORONARY ARTERY DISEASE WITH ANGINA PECTORIS, UNSPECIFIED VESSEL OR LESION TYPE, UNSPECIFIED WHETHER NATIVE OR TRANSPLANTED HEART: ICD-10-CM

## 2023-02-17 DIAGNOSIS — I48.0 PAF (PAROXYSMAL ATRIAL FIBRILLATION): ICD-10-CM

## 2023-02-17 DIAGNOSIS — Z02.83 ENCOUNTER FOR DRUG SCREENING: Primary | ICD-10-CM

## 2023-02-17 DIAGNOSIS — E55.9 VITAMIN D DEFICIENCY: ICD-10-CM

## 2023-02-17 DIAGNOSIS — R10.13 EPIGASTRIC PAIN: ICD-10-CM

## 2023-02-17 DIAGNOSIS — E66.3 OVERWEIGHT: ICD-10-CM

## 2023-02-17 DIAGNOSIS — R12 HEARTBURN: ICD-10-CM

## 2023-02-17 DIAGNOSIS — N18.2 STAGE 2 CHRONIC KIDNEY DISEASE: ICD-10-CM

## 2023-02-17 DIAGNOSIS — K21.00 GASTROESOPHAGEAL REFLUX DISEASE WITH ESOPHAGITIS: ICD-10-CM

## 2023-02-17 DIAGNOSIS — F41.1 GENERALIZED ANXIETY DISORDER: ICD-10-CM

## 2023-02-17 DIAGNOSIS — Z51.81 ENCOUNTER FOR THERAPEUTIC DRUG LEVEL MONITORING: ICD-10-CM

## 2023-02-17 DIAGNOSIS — I10 ESSENTIAL HYPERTENSION: ICD-10-CM

## 2023-02-17 PROCEDURE — 99214 OFFICE O/P EST MOD 30 MIN: CPT | Performed by: FAMILY MEDICINE

## 2023-02-17 RX ORDER — LISINOPRIL 10 MG/1
10 TABLET ORAL DAILY
Qty: 30 TABLET | Refills: 3 | Status: CANCELLED | OUTPATIENT
Start: 2023-02-17

## 2023-02-17 RX ORDER — ISOSORBIDE MONONITRATE 30 MG/1
15 TABLET, EXTENDED RELEASE ORAL NIGHTLY
Qty: 90 TABLET | Refills: 1 | Status: SHIPPED | OUTPATIENT
Start: 2023-02-17 | End: 2023-03-03

## 2023-02-17 RX ORDER — PANTOPRAZOLE SODIUM 40 MG/1
40 TABLET, DELAYED RELEASE ORAL DAILY
Qty: 90 TABLET | Refills: 1 | Status: SHIPPED | OUTPATIENT
Start: 2023-02-17 | End: 2023-02-17

## 2023-02-17 RX ORDER — AZELASTINE HYDROCHLORIDE 137 UG/1
1 SPRAY, METERED NASAL DAILY
Qty: 30 ML | Refills: 3 | Status: SHIPPED | OUTPATIENT
Start: 2023-02-17

## 2023-02-17 RX ORDER — ALPRAZOLAM 0.5 MG/1
0.5 TABLET ORAL NIGHTLY PRN
Qty: 60 TABLET | Refills: 0 | Status: SHIPPED | OUTPATIENT
Start: 2023-02-17

## 2023-02-17 RX ORDER — LISINOPRIL 5 MG/1
5 TABLET ORAL DAILY
Qty: 90 TABLET | Refills: 1 | Status: SHIPPED | OUTPATIENT
Start: 2023-02-17 | End: 2023-04-07 | Stop reason: HOSPADM

## 2023-02-17 RX ORDER — LOVASTATIN 10 MG/1
10 TABLET ORAL NIGHTLY
Qty: 90 TABLET | Refills: 1 | Status: SHIPPED | OUTPATIENT
Start: 2023-02-17 | End: 2023-03-03

## 2023-02-17 RX ORDER — SUCRALFATE 1 G/1
1 TABLET ORAL 4 TIMES DAILY
Qty: 180 TABLET | Refills: 1 | Status: SHIPPED | OUTPATIENT
Start: 2023-02-17 | End: 2023-03-03

## 2023-02-17 RX ORDER — METOPROLOL TARTRATE 50 MG/1
50 TABLET, FILM COATED ORAL 2 TIMES DAILY
Qty: 180 TABLET | Refills: 1 | Status: SHIPPED | OUTPATIENT
Start: 2023-02-17

## 2023-02-17 RX ORDER — ESOMEPRAZOLE MAGNESIUM 40 MG/1
40 CAPSULE, DELAYED RELEASE ORAL
Qty: 30 CAPSULE | Refills: 3 | Status: SHIPPED | OUTPATIENT
Start: 2023-02-17

## 2023-02-17 NOTE — TELEPHONE ENCOUNTER
JOLIE, HEART AND VASCULAR, REQUESTS A CALL BACK RE NEEDS TO KNOW IF THE ORDER FOR PT IS MULTILEVEL OR SINGLE LEVEL.  CALL BACK # [phone].  THANK YOU.

## 2023-02-17 NOTE — TELEPHONE ENCOUNTER
[facility] called and is needing more specific directions on  The Azelastine HCl 137 MCG/SPRAY solution.  Please call the pharmacy at [phone]

## 2023-02-17 NOTE — PATIENT INSTRUCTIONS
Cut back on lisinopril from 10 to 5 mg daily monitor blood presure at home      Call PAC Andreas Aldana for EGD   \    Stop protonix start on nexium    Recheck in 2 months

## 2023-02-18 RX ORDER — AZELASTINE 1 MG/ML
2 SPRAY, METERED NASAL 2 TIMES DAILY
Qty: 30 ML | Refills: 3 | Status: SHIPPED | OUTPATIENT
Start: 2023-02-18 | End: 2023-02-18

## 2023-02-22 ENCOUNTER — DOCUMENTATION (OUTPATIENT)
Dept: ENDOCRINOLOGY | Facility: CLINIC | Age: 78
End: 2023-02-22
Payer: MEDICARE

## 2023-03-03 ENCOUNTER — PRE-ADMISSION TESTING (OUTPATIENT)
Dept: PREADMISSION TESTING | Facility: HOSPITAL | Age: 78
End: 2023-03-03
Payer: MEDICARE

## 2023-03-03 ENCOUNTER — ANESTHESIA EVENT (OUTPATIENT)
Dept: PERIOP | Facility: HOSPITAL | Age: 78
End: 2023-03-03
Payer: MEDICARE

## 2023-03-03 VITALS
DIASTOLIC BLOOD PRESSURE: 78 MMHG | WEIGHT: 190 LBS | HEART RATE: 77 BPM | RESPIRATION RATE: 14 BRPM | BODY MASS INDEX: 28.14 KG/M2 | HEIGHT: 69 IN | OXYGEN SATURATION: 96 % | SYSTOLIC BLOOD PRESSURE: 126 MMHG

## 2023-03-03 LAB
ANION GAP SERPL CALCULATED.3IONS-SCNC: 11 MMOL/L (ref 5–15)
BUN SERPL-MCNC: 14 MG/DL (ref 8–23)
BUN/CREAT SERPL: 13 (ref 7–25)
CALCIUM SPEC-SCNC: 9.3 MG/DL (ref 8.6–10.5)
CHLORIDE SERPL-SCNC: 102 MMOL/L (ref 98–107)
CO2 SERPL-SCNC: 26 MMOL/L (ref 22–29)
CREAT SERPL-MCNC: 1.08 MG/DL (ref 0.76–1.27)
EGFRCR SERPLBLD CKD-EPI 2021: 70.2 ML/MIN/1.73
GLUCOSE SERPL-MCNC: 153 MG/DL (ref 65–99)
POTASSIUM SERPL-SCNC: 4.4 MMOL/L (ref 3.5–5.2)
SODIUM SERPL-SCNC: 139 MMOL/L (ref 136–145)

## 2023-03-03 PROCEDURE — 93010 ELECTROCARDIOGRAM REPORT: CPT | Performed by: INTERNAL MEDICINE

## 2023-03-03 PROCEDURE — 93005 ELECTROCARDIOGRAM TRACING: CPT

## 2023-03-03 PROCEDURE — 80048 BASIC METABOLIC PNL TOTAL CA: CPT

## 2023-03-03 PROCEDURE — 36415 COLL VENOUS BLD VENIPUNCTURE: CPT

## 2023-03-03 RX ORDER — SUCRALFATE 1 G/1
1 TABLET ORAL 2 TIMES DAILY
COMMUNITY

## 2023-03-03 RX ORDER — SODIUM CHLORIDE 9 MG/ML
1000 INJECTION, SOLUTION INTRAVENOUS CONTINUOUS
Status: CANCELLED | OUTPATIENT
Start: 2023-03-13

## 2023-03-03 RX ORDER — INSULIN GLARGINE 100 [IU]/ML
15 INJECTION, SOLUTION SUBCUTANEOUS NIGHTLY PRN
COMMUNITY

## 2023-03-03 RX ORDER — PROPAFENONE HYDROCHLORIDE 150 MG/1
150 TABLET, COATED ORAL EVERY 8 HOURS
COMMUNITY

## 2023-03-03 NOTE — PAT
Called DR. Salazar office in regards to instructions on Xarelto and baby asa, message taken per .  Patient aware that if he does not get a call back today with instructions on xarelto/baby asa that he  Is to call Dr. Salazar office on Monday for instructions.   Dr. Holbrook here to review EKG and pt history, no further orders or instructions may proceed with surgery.  Chlorhexidine given with written and verbal instructions, patient relates that he understands information given and has no questions.  Opioid pain medication pamphlet given.

## 2023-03-03 NOTE — ANESTHESIA PREPROCEDURE EVALUATION
Anesthesia Evaluation     Patient summary reviewed and Nursing notes reviewed   no history of anesthetic complications:  NPO Solid Status: > 8 hours  NPO Liquid Status: > 2 hours           Airway   Mallampati: II  TM distance: >3 FB  Neck ROM: full  possible difficult intubation  Dental    (+) poor dentation    Pulmonary - negative pulmonary ROS    breath sounds clear to auscultation  (-) shortness of breath, not a smoker  Cardiovascular     ECG reviewed  PT is on anticoagulation therapy  Patient on routine beta blocker and Beta blocker given within 24 hours of surgery  Rhythm: regular  Rate: normal    (+) hypertension, past MI  >12 months, CAD, cardiac stents (One stent 2005. Two stents 2010.) dysrhythmias Paroxysmal Atrial Fib, hyperlipidemia,   (-) angina, GALVAN, murmur, carotid bruits    ROS comment: Normal sinus rhythm  Cannot rule out Anterior infarct , age undetermined  Abnormal ECG  When compared with ECG of 05-JUN-2020 10:46,  Non-specific change in ST segment in Lateral leads  Nonspecific T wave abnormality now evident in Lateral leadsEstimated EF appears to be in the range of 51 - 55%. Normal left ventricular cavity size noted. All left ventricular wall segments contract normally. Left ventricular wall thickness is consistent with mild concentric hypertrophy. Left ventricular diastolic dysfunction is noted (grade I) consistent with impaired relaxation. There is no evidence of a left ventricular mass or thrombus present.    Neuro/Psych  (+) psychiatric history Anxiety,    GI/Hepatic/Renal/Endo    (+) obesity,  GERD well controlled,  diabetes mellitus type 2,     Musculoskeletal     Abdominal   (+) obese,    Substance History - negative use     OB/GYN negative ob/gyn ROS         Other   arthritis,                    Anesthesia Plan    ASA 3     general with block     (Discussed peripheral nerve block(popliteal) & adductor canal for post op pain relief and patient understands possible complications,risks and  agrees.)  intravenous induction     Anesthetic plan, risks, benefits, and alternatives have been provided, discussed and informed consent has been obtained with: patient and spouse/significant other.  Pre-procedure education provided  Plan discussed with CRNA.        CODE STATUS:

## 2023-03-03 NOTE — DISCHARGE INSTRUCTIONS
[facility]  Pre-op Information and Guidelines    You will be called after 2 p.m. the day before your surgery (Friday for Monday surgery) and notified of your time for arrival and approximate surgery time.  If you have not received a call by 4P.M., please contact Same Day Surgery at [phone] of if outside [facility] call [phone].    Please Follow these Important Safety Guidelines:    The morning of your procedure, take only the medications listed below with   A sip of water:_____________________________________________       ______________________________________________    DO NOT eat or drink anything after 12:00 midnight the night before surgery  Specific instructions concerning drinking clear liquids will be discussed during  the pre-surgery instruction call the day before your surgery.    If you take a blood thinner (ex. Plavix, Coumadin, aspirin), ask your doctor when to stop it before surgery  STOP DATE: _________________    Only 1 visitor is allowed in patient rooms at a time  Your visitors will be asked to wait in the lobby until the admission process is complete with the exception of a parent with a child and patients in need of special assistance.    YOU CANNOT DRIVE YOURSELF HOME  You must be accompanied by someone who will be responsible for driving you home after surgery and for your care at home.    DO NOT chew gum, use breath mints, hard candy, or smoke the day of surgery  DO NOT drink alcohol for at least 24 hours before your surgery  DO NOT wear any jewelry and remove all body piercing before coming to the hospital  DO NOT wear make-up to the hospital  If you are having surgery on an extremity (arm/leg/foot) remove nail polish/artificial nails on the surgical side  Clothing, glasses, contacts, dentures, and hairpieces must be removed before surgery  Bathe the night before or the morning of your surgery and do not use powders/lotions on skin.

## 2023-03-04 LAB
QT INTERVAL: 356 MS
QTC INTERVAL: 402 MS

## 2023-03-06 ENCOUNTER — TELEPHONE (OUTPATIENT)
Dept: PODIATRY | Facility: CLINIC | Age: 78
End: 2023-03-06
Payer: MEDICARE

## 2023-03-06 NOTE — TELEPHONE ENCOUNTER
PT WIFE REQUESTS A CALL BACK RE PT IS SCHEDULED FOR SURGERY ON 3-13-23.  AND THEY WANT TO KNOW WHEN HE NEEDS TO DISCONTINUE BLOOD THINNERS.  CALL BACK # [phone].  THANK YOU.

## 2023-03-13 ENCOUNTER — APPOINTMENT (OUTPATIENT)
Dept: GENERAL RADIOLOGY | Facility: HOSPITAL | Age: 78
End: 2023-03-13
Payer: MEDICARE

## 2023-03-13 ENCOUNTER — ANESTHESIA (OUTPATIENT)
Dept: PERIOP | Facility: HOSPITAL | Age: 78
End: 2023-03-13
Payer: MEDICARE

## 2023-03-13 ENCOUNTER — HOSPITAL ENCOUNTER (OUTPATIENT)
Facility: HOSPITAL | Age: 78
Setting detail: HOSPITAL OUTPATIENT SURGERY
Discharge: HOME | End: 2023-03-13
Attending: PODIATRIST | Admitting: PODIATRIST
Payer: MEDICARE

## 2023-03-13 VITALS
DIASTOLIC BLOOD PRESSURE: 65 MMHG | TEMPERATURE: 97.4 F | RESPIRATION RATE: 18 BRPM | BODY MASS INDEX: 27.4 KG/M2 | OXYGEN SATURATION: 98 % | WEIGHT: 184.97 LBS | SYSTOLIC BLOOD PRESSURE: 145 MMHG | HEIGHT: 69 IN | HEART RATE: 68 BPM

## 2023-03-13 DIAGNOSIS — M19.071 OSTEOARTHRITIS OF ANKLE AND FOOT, RIGHT: ICD-10-CM

## 2023-03-13 LAB
GLUCOSE BLDC GLUCOMTR-MCNC: 101 MG/DL (ref 70–130)
GLUCOSE BLDC GLUCOMTR-MCNC: 131 MG/DL (ref 70–130)

## 2023-03-13 PROCEDURE — C1713 ANCHOR/SCREW BN/BN,TIS/BN: HCPCS | Performed by: PODIATRIST

## 2023-03-13 PROCEDURE — 25010000002 ROPIVACAINE PER 1 MG: Performed by: ANESTHESIOLOGY

## 2023-03-13 PROCEDURE — 25010000002 PROPOFOL 200 MG/20ML EMULSION: Performed by: NURSE ANESTHETIST, CERTIFIED REGISTERED

## 2023-03-13 PROCEDURE — 25010000002 CEFAZOLIN PER 500 MG: Performed by: PODIATRIST

## 2023-03-13 PROCEDURE — 25010000002 ONDANSETRON PER 1 MG: Performed by: NURSE ANESTHETIST, CERTIFIED REGISTERED

## 2023-03-13 PROCEDURE — 25010000002 MIDAZOLAM PER 1 MG: Performed by: NURSE ANESTHETIST, CERTIFIED REGISTERED

## 2023-03-13 PROCEDURE — 20900 REMOVAL OF BONE FOR GRAFT: CPT | Performed by: PODIATRIST

## 2023-03-13 PROCEDURE — 28750 FUSION OF BIG TOE JOINT: CPT | Performed by: PODIATRIST

## 2023-03-13 PROCEDURE — 28297 COR HLX VLGS JT ARTHRD: CPT | Performed by: PODIATRIST

## 2023-03-13 PROCEDURE — 25010000002 FENTANYL CITRATE (PF) 100 MCG/2ML SOLUTION: Performed by: NURSE ANESTHETIST, CERTIFIED REGISTERED

## 2023-03-13 PROCEDURE — 76000 FLUOROSCOPY <1 HR PHYS/QHP: CPT

## 2023-03-13 PROCEDURE — 82962 GLUCOSE BLOOD TEST: CPT

## 2023-03-13 DEVICE — 2.7MM HIGH PITCH LOCKING SCREW - 12MM/14MM
Type: IMPLANTABLE DEVICE | Site: FOOT | Status: FUNCTIONAL
Brand: FASTPITCH

## 2023-03-13 DEVICE — ANATOMIC BIPLANAR FIXATION
Type: IMPLANTABLE DEVICE | Site: FOOT | Status: FUNCTIONAL
Brand: LAPIPLASTY SYSTEM 1

## 2023-03-13 DEVICE — DEV CONTRL TISS STRATAFIXSPIRALMNCRYL PLSPS2 REV4/0 30CM: Type: IMPLANTABLE DEVICE | Site: FOOT | Status: FUNCTIONAL

## 2023-03-13 DEVICE — WAX,BONE,NATURAL
Type: IMPLANTABLE DEVICE | Site: FOOT | Status: FUNCTIONAL
Brand: MEDLINE INDUSTRIES

## 2023-03-13 DEVICE — DEV CONTRL TISS STRATAFIXSPIRAL PDO BIDIR MH 2/0 24X24CM: Type: IMPLANTABLE DEVICE | Site: FOOT | Status: FUNCTIONAL

## 2023-03-13 DEVICE — ANATOMIC BIPLANAR IMPLANTS
Type: IMPLANTABLE DEVICE | Site: FOOT | Status: FUNCTIONAL
Brand: LAPIPLASTY MINI INCISION SYSTEM

## 2023-03-13 DEVICE — LOCKING SCREWS
Type: IMPLANTABLE DEVICE | Site: FOOT | Status: FUNCTIONAL
Brand: FASTPITCH 2.7MM HIGH PITCH LOCKING SCREW

## 2023-03-13 RX ORDER — SODIUM CHLORIDE 9 MG/ML
1000 INJECTION, SOLUTION INTRAVENOUS CONTINUOUS
Status: DISCONTINUED | OUTPATIENT
Start: 2023-03-13 | End: 2023-03-13 | Stop reason: HOSPADM

## 2023-03-13 RX ORDER — FENTANYL CITRATE 50 UG/ML
INJECTION, SOLUTION INTRAMUSCULAR; INTRAVENOUS AS NEEDED
Status: DISCONTINUED | OUTPATIENT
Start: 2023-03-13 | End: 2023-03-13 | Stop reason: SURG

## 2023-03-13 RX ORDER — BACITRACIN ZINC 500 [USP'U]/G
OINTMENT TOPICAL AS NEEDED
Status: DISCONTINUED | OUTPATIENT
Start: 2023-03-13 | End: 2023-03-13 | Stop reason: HOSPADM

## 2023-03-13 RX ORDER — PROPOFOL 10 MG/ML
INJECTION, EMULSION INTRAVENOUS AS NEEDED
Status: DISCONTINUED | OUTPATIENT
Start: 2023-03-13 | End: 2023-03-13 | Stop reason: SURG

## 2023-03-13 RX ORDER — LIDOCAINE HYDROCHLORIDE 20 MG/ML
INJECTION, SOLUTION EPIDURAL; INFILTRATION; INTRACAUDAL; PERINEURAL AS NEEDED
Status: DISCONTINUED | OUTPATIENT
Start: 2023-03-13 | End: 2023-03-13 | Stop reason: SURG

## 2023-03-13 RX ORDER — ONDANSETRON 2 MG/ML
INJECTION INTRAMUSCULAR; INTRAVENOUS AS NEEDED
Status: DISCONTINUED | OUTPATIENT
Start: 2023-03-13 | End: 2023-03-13 | Stop reason: SURG

## 2023-03-13 RX ORDER — BUPIVACAINE HCL/0.9 % NACL/PF 0.1 %
2 PLASTIC BAG, INJECTION (ML) EPIDURAL ONCE
Status: COMPLETED | OUTPATIENT
Start: 2023-03-13 | End: 2023-03-13

## 2023-03-13 RX ORDER — MIDAZOLAM HYDROCHLORIDE 1 MG/ML
INJECTION INTRAMUSCULAR; INTRAVENOUS AS NEEDED
Status: DISCONTINUED | OUTPATIENT
Start: 2023-03-13 | End: 2023-03-13 | Stop reason: SURG

## 2023-03-13 RX ORDER — ROPIVACAINE HYDROCHLORIDE 5 MG/ML
INJECTION, SOLUTION EPIDURAL; INFILTRATION; PERINEURAL
Status: COMPLETED | OUTPATIENT
Start: 2023-03-13 | End: 2023-03-13

## 2023-03-13 RX ORDER — LIDOCAINE HYDROCHLORIDE 10 MG/ML
INJECTION, SOLUTION EPIDURAL; INFILTRATION; INTRACAUDAL; PERINEURAL
Status: COMPLETED | OUTPATIENT
Start: 2023-03-13 | End: 2023-03-13

## 2023-03-13 RX ORDER — HYDROCODONE BITARTRATE AND ACETAMINOPHEN 10; 325 MG/1; MG/1
1 TABLET ORAL EVERY 6 HOURS PRN
Qty: 30 TABLET | Refills: 0 | Status: SHIPPED | OUTPATIENT
Start: 2023-03-13

## 2023-03-13 RX ORDER — ONDANSETRON 2 MG/ML
4 INJECTION INTRAMUSCULAR; INTRAVENOUS ONCE AS NEEDED
Status: DISCONTINUED | OUTPATIENT
Start: 2023-03-13 | End: 2023-03-13 | Stop reason: HOSPADM

## 2023-03-13 RX ADMIN — FENTANYL CITRATE 25 MCG: 50 INJECTION, SOLUTION INTRAMUSCULAR; INTRAVENOUS at 11:01

## 2023-03-13 RX ADMIN — LIDOCAINE HYDROCHLORIDE 100 MG: 20 INJECTION, SOLUTION EPIDURAL; INFILTRATION; INTRACAUDAL at 11:01

## 2023-03-13 RX ADMIN — Medication 2 G: at 11:05

## 2023-03-13 RX ADMIN — PROPOFOL 120 MG: 10 INJECTION, EMULSION INTRAVENOUS at 11:01

## 2023-03-13 RX ADMIN — ROPIVACAINE HYDROCHLORIDE 30 ML: 5 INJECTION, SOLUTION EPIDURAL; INFILTRATION; PERINEURAL at 10:20

## 2023-03-13 RX ADMIN — LIDOCAINE HYDROCHLORIDE 30 MG: 10 INJECTION, SOLUTION EPIDURAL; INFILTRATION; INTRACAUDAL; PERINEURAL at 10:20

## 2023-03-13 RX ADMIN — SODIUM CHLORIDE 1000 ML: 9 INJECTION, SOLUTION INTRAVENOUS at 08:40

## 2023-03-13 RX ADMIN — ONDANSETRON 4 MG: 2 INJECTION INTRAMUSCULAR; INTRAVENOUS at 12:08

## 2023-03-13 RX ADMIN — MIDAZOLAM HYDROCHLORIDE 1 MG: 1 INJECTION, SOLUTION INTRAMUSCULAR; INTRAVENOUS at 10:55

## 2023-03-13 NOTE — INTERVAL H&P NOTE
H&P reviewed. The patient was examined and there are no changes to the H&P.            This document has been electronically signed by Mason Salazar DPM on March 13, 2023 10:34 CDT

## 2023-03-13 NOTE — DISCHARGE INSTRUCTIONS
What to expect after a Nerve Block  Nerve blocks administered to block pain affect many types of nerves, including those nerves that control movement, pain, and normal sensation.  Following a nerve block, you may notice some bruising at the site where the block was given.  You may experience sensations such as:  numbness of the affected area or limb, tingling, heaviness (that is the limb feels heavy to you), weakness or inability to move the affected arm or leg, or a feeling as if your arm or leg has “fallen asleep”.    A nerve block can last from 9-18 hours depending on the medications used.  Usually the weakness wears off first followed by the tingling and heaviness.  As the block wears off, you may begin to notice pain; however, this sequence of events may occur in any order.  Typically, you will be able to move your limb before you will feel it.  Once a nerve block begins to wear off, the effects are usually completely gone within 60 minutes.    If you experience continued side effects that you believe are block related for longer than 48 hours, please call your healthcare provider.  Please see block-specific instructions below.    Instructions for any Block involving the leg/foot:  If you have had a leg/foot block, you should not bear weight on the affected leg until the block has worn off.  After the block has worn off, weight bearing should be as directed by your surgeon.  You may be sent home with crutches.  You are at high risk for falling because of the anesthetic effects on your leg.  Please use caution when standing or trying to move or walk.  Have someone assist you until your leg and foot function have returned to normal.    Protection of a “blocked” limb  After a nerve block, you cannot feel pain, pressure, or extremes of temperature in the affected limb.  And because of this, your blocked limb is at more risk for injury.  For example, it is possible to burn your limb on an extremely hot surface  without feeling it.  When resting, it is important to reposition your limb periodically to avoid prolonged pressure on it.  This may require the use of pillows and padding.  While sleeping, you should avoid rolling onto the affected limb or putting too much pressure on it.  If you have a cast or tight dressing, check the color of your toes of the affected limb.  Call your surgeon if they look discolored (that is, dusky, dark colored)  Use caution in cold weather.  Cover your limb appropriately to protect it from the cold.  Pain Management  Your surgeon will give you a prescription for pain medication.  Begin taking this before the nerve block wears off.  Bear in mind that sometimes the block can wear off in the middle of the night. Discharge home  Resume normal diet  Keep dressing clean,dry, and intact  Ice and elevate  Nonweight bearing to Right lower extremity. Ok to take boot off while sitting or sleeping.   Norco 10mg as needed for pain. Alternate with 600mg ibu  Follow up in clinic within 1 week          This document has been electronically signed by Mason Salazar DPM on March 13, 2023 13:09 CDT

## 2023-03-13 NOTE — BRIEF OP NOTE
TOE INTERPHALANGEAL JOINT/METATARSOPHALANGEAL JOINT FUSION  Progress Note    Aj Card Jr.  3/13/2023    Pre-op Diagnosis:   Osteoarthritis of ankle and foot, right [M19.071]       Post-Op Diagnosis Codes:     * Osteoarthritis of ankle and foot, right [M19.071]    Procedure/CPT® Codes:        Procedure(s):  RIGHT FIRST TARSOMETATARSAL JOINT ARTHRODESIS, FIRST METATARSOPHALANGEAL JOINT ARTHRODESIS, CALCANEAL BONE GRAFT AND ALL OTHER INDICATED PROCEDURES                (LATEX ALLERGY)        Surgeon(s):  Mason Salazar DPM    Anesthesia: General    Staff:   Circulator: Jonathan Price RN; Wade Monzon RN; Skylar Johnson RN  Scrub Person: Brooklynn Barney RN; Ruma Valle  Assistant: Erlinda Lang MA  Assistant: Erlinda Lang MA      Estimated Blood Loss: minimal    Urine Voided: * No values recorded between 3/13/2023 10:55 AM and 3/13/2023 12:48 PM *    Specimens:                None          Drains: * No LDAs found *    Findings: consistent with pre-op dx        Complications: none    Assistant: Erlinda Lang MA  was responsible for performing the following activities: Retraction, Suction, Irrigation and Placing Dressing and their skilled assistance was necessary for the success of this case.    Mason Salazar DPM     Date: 3/13/2023  Time: 13:06 CDT

## 2023-03-13 NOTE — ANESTHESIA PROCEDURE NOTES
Peripheral Block      Patient reassessed immediately prior to procedure    Patient location during procedure: pre-op  Start time: 3/13/2023 10:20 AM  Stop time: 3/13/2023 10:30 AM  Reason for block: procedure for pain, at surgeon's request, post-op pain management and secondary anesthetic  Performed by  Anesthesiologist: Quita Douglas DO  Preanesthetic Checklist  Completed: patient identified, IV checked, site marked, risks and benefits discussed, surgical consent, monitors and equipment checked, pre-op evaluation and timeout performed  Prep:  Pt Position: supine  Sterile barriers:cap, gloves and sterile barriers  Prep: ChloraPrep  Patient monitoring: blood pressure monitoring, continuous pulse oximetry and EKG  Procedure    Sedation: no  Performed under: PNB  Guidance:ultrasound guided    ULTRASOUND INTERPRETATION.  Using ultrasound guidance a 21 G gauge needle was placed in close proximity to the femoral and sciatic nerve, at which point, under ultrasound guidance anesthetic was injected in the area of the nerve and spread of the anesthesia was seen on ultrasound in close proximity thereto.  There were no abnormalities seen on ultrasound; a digital image was taken; and the patient tolerated the procedure with no complications. Images:still images obtained, printed/placed on chart    Laterality:right  Block Type:adductor canal block and popliteal  Injection Technique:single-shot  Needle Type:echogenic  Needle Gauge:21 G  Resistance on Injection: none    Medications Used: lidocaine PF 1% (XYLOCAINE) injection - Injection   30 mg - 3/13/2023 10:20:00 AM  ropivacaine (NAROPIN) 0.5 % injection - Injection   30 mL - 3/13/2023 10:20:00 AM      Medications  Comment:5mL 0.5% ropivacaine to adductor canal  25mL 0.5% ropivacaine to popliteal    Post Assessment  Injection Assessment: negative aspiration for heme, no paresthesia on injection and incremental injection  Patient Tolerance:comfortable throughout  block  Complications:no  Additional Notes  Pt & side identified  U/S used throughout and needle seen throughout  No complications  Pt tolerated procedure well

## 2023-03-13 NOTE — ANESTHESIA PROCEDURE NOTES
Airway  Urgency: elective    Date/Time: 3/13/2023 11:03 AM  Airway not difficult    General Information and Staff    Patient location during procedure: OR  CRNA/CAA: Radha Mathur CRNA  SRNA: Gretchen Delgado SRNA  Indications and Patient Condition  Indications for airway management: airway protection    Preoxygenated: yes  Mask difficulty assessment: 0 - not attempted    Final Airway Details  Final airway type: supraglottic airway      Successful airway: I-gel  Size 4     Number of attempts at approach: 1  Assessment: lips, teeth, and gum same as pre-op

## 2023-03-18 NOTE — OP NOTE
Date of Procedure: 3/13/23     SURGEON: Mason Salazar DPM      Assistant: Erlinda Lang MA was responsible for performing the following activities: Retraction, Suction, Irrigation and Placing Dressing and their skilled assistance was necessary for the success of this case.      PREOPERATIVE DIAGNOSIS:  1.  Hallux valgus Right foot  2.   Osteoarthritis right foot     POSTOPERATIVE DIAGNOSIS: Same as preop     PROCEDURES PERFORMED:   1.  Right  first tarsometatarsal joint arthrodesis  2.  Right first metatarsophalangeal joint arthrodesis  3.  Harvesting calcaneal bone graft     ANESTHESIA: General     HEMOSTASIS: Pneumatic thigh tourniquet set at 220 mmHg     ESTIMATED BLOOD LOSS: 10 mL.      SPECIMEN: none     MATERIALS: Treace medical locking plates x 4 with 16 locking screws     COMPLICATIONS: None.      CONDITION: Stable.         INDICATIONS FOR PROCEDURE:  Pleasant 77-year-old male with painful arthritis and bunion on the right foot. The patient agrees to undergo the surgical intervention at this time. Consent is signed and documented in the chart. History and physical exam were reviewed prior to patient being taken to the operating room and n.p.o. status was confirmed. No guarantees were given or implied regarding the outcome of this treatment.      DESCRIPTION OF PROCEDURE: After mild sedation was administered by the anesthesia team in the preoperative holding area the patient was transported to the operating room and placed in a supine position.  General anesthesia was then administered and the operative extremity was prepped and draped in usual sterile technique and a timeout was performed to confirm the correct patient, correct site, and correct procedure.      Attention was directed to the lower extremity which was exsanguinated using an Esmarch bandage and the tourniquet was rapidly inflated to 220 mmHg.  Attention was then directed to the medial aspect of the foot where a linear incision was made  over the first tarsometatarsal joint.  Sharp and blunt dissection used taking care to identify and retract all vital neurovascular structures down to the first tarsometatarsal joint.  First tarsometatarsal joint was then broached with a #15 blade.  Lapiplasty  instrumentation set was used to denude the articular cartilage of the first tarsometatarsal joint and reduce the hallux valgus deformity.  At this time attention was directed to the posterior lateral heel where a small linear incision was made over the lateral calcaneal wall.  Sharp and blunt dissection carried down to the lateral calcaneal wall taking care to identify and retract all vital neurovascular structures.  Utilizing the [employer] bone graft harvester a small dowel of cancellous bone was harvested from the calcaneus and inserted into the first tarsometatarsal joint arthrodesis site.  The harvest site was flushed and closed with a single nylon stitch.  The Lapiplasty instrumentation was then used to fixate the  first tarsometatarsal joint with 2 locking plates with 8 locking screws.  Intraoperative fluoroscopy used during the entire process to ensure that deformity was well corrected and the hardware is well-placed.        Attention then directed distally where the incision was extended over the first metatarsophalangeal joint.  Sharp and blunt dissection through subcutaneous tissue taking care to identify and retract all vital neurovascular structures.  Capsulotomy through the first metatarsophalangeal joint exposing the head of the first metatarsal and the base of the proximal phalanx.  Significant cartilage degradation noted to the first metatarsal head.  Articular cartilage was denuded with Samuel cup and cone reamers.  The joint was fenestrated with a 2.0 drill bit.  The joint was packed with residual calcaneal bone graft and reduced and fixated with a temporary Valentin wire.  Fluoroscopy used to confirm adequate reduction of the joint.  Next  the arthrodesis site was fixated with 2 locking plates and a total of 8 locking screws.  Operative sites were then flushed and closed with Monocryl and nylon.  A sterile dressing was applied.  Tourniquet deflated with a rapid hyperemic response to the distal digits. Patient tolerated the procedure and anesthesia well and was transported from the operating room to the PACU with vital signs stable and neurovascular status intact to the operative extremity.     Plan to discharge patient home once stable per anesthesia.  Patient can resume normal diet.  Keep dressing clean, dry and intact.  Nonweightbearing to operative extremity.  Follow-up in 1 week.          This document has been electronically signed by Mason Salazar DPM on March 18, 2023 10:14 CDT

## 2023-03-21 ENCOUNTER — OFFICE VISIT (OUTPATIENT)
Dept: PODIATRY | Facility: CLINIC | Age: 78
End: 2023-03-21
Payer: MEDICARE

## 2023-03-21 VITALS
BODY MASS INDEX: 27.25 KG/M2 | DIASTOLIC BLOOD PRESSURE: 69 MMHG | HEIGHT: 69 IN | OXYGEN SATURATION: 98 % | WEIGHT: 184 LBS | HEART RATE: 78 BPM | SYSTOLIC BLOOD PRESSURE: 108 MMHG

## 2023-03-21 DIAGNOSIS — M19.071 OSTEOARTHRITIS OF ANKLE AND FOOT, RIGHT: ICD-10-CM

## 2023-03-21 DIAGNOSIS — Z98.890 STATUS POST BUNIONECTOMY: ICD-10-CM

## 2023-03-21 DIAGNOSIS — S82.851A CLOSED TRIMALLEOLAR FRACTURE OF RIGHT ANKLE, INITIAL ENCOUNTER: Primary | ICD-10-CM

## 2023-03-21 PROCEDURE — 3078F DIAST BP <80 MM HG: CPT | Performed by: NURSE PRACTITIONER

## 2023-03-21 PROCEDURE — 99214 OFFICE O/P EST MOD 30 MIN: CPT | Performed by: NURSE PRACTITIONER

## 2023-03-21 PROCEDURE — 3074F SYST BP LT 130 MM HG: CPT | Performed by: NURSE PRACTITIONER

## 2023-03-21 RX ORDER — BUPIVACAINE HCL/0.9 % NACL/PF 0.1 %
2 PLASTIC BAG, INJECTION (ML) EPIDURAL ONCE
Status: CANCELLED | OUTPATIENT
Start: 2023-03-23 | End: 2023-03-21

## 2023-03-21 NOTE — H&P (VIEW-ONLY)
Aj Card Jr.  1945  77 y.o. male  Bs: 130 per patient       03/21/2023    Chief Complaint   Patient presents with   • Right Foot - Post-op       History of Present Illness    Aj Card Jr. is a 77 y.o.male came to clinic for post operative appointment on right foot joint fusion. Xray obtained today because patient states he has fallen down since surgery.       Patient in the office today for follow-up evaluation of first metatarsal phalangeal joint fusion and bunionectomy.  His wife and he reports multiple falls since his operation and today is complaining of pain in area around the foot and ankle.    Past Medical History:   Diagnosis Date   • Acute posthemorrhagic anemia    • Afib (HCC)    • Allergic rhinitis    • Anxiety state    • Chest pain    • CKD (chronic kidney disease), stage II    • Coronary arteriosclerosis     3 stents, followed by Dr. Jeffers   • Diabetes mellitus (HCC)     type 2   • Diverticular disease of colon    • Dysphagia    • Elevated cholesterol    • Epigastric pain    • GERD (gastroesophageal reflux disease)     esophagitis on Dexilant. Pt feels Nexium working better      • History of echocardiogram     Small left atrial enlargement with mild CLVH.Ef of 55-60%.Mitral valve mildly thickened.Av thickened.Left ventricle mildly dilated.Tricuspid intact.Mild aortic insufficiency. 12/07/2015       • History of echocardiogram     Mild diastolic dysfunction and mild left atrial enlargement, otherwise normal echo. EF> 55% 01/03/2012       • Hypercholesterolemia    • Hypertension    • Insomnia    • Irritable bowel syndrome    • Osteoarthritis of multiple joints    • Pain of right foot    • PONV (postoperative nausea and vomiting)          Past Surgical History:   Procedure Laterality Date   • APPENDECTOMY       [facility] 1995       • CARDIAC CATHETERIZATION      Successful PTCA of the OMB#2.Unsuccessful PTCA of the 1st OMB, secondary to difficulty in  advancing the balloon. 12/08/2015       • CHOLECYSTECTOMY      [facility] 1993       • CHOLECYSTECTOMY     • COLONOSCOPY  10/01/2012   • COLONOSCOPY N/A 12/11/2017    Procedure: COLONOSCOPY;  Surgeon: Bladimir Michael MD;  Location: [facility] ENDOSCOPY;  Service:    • COLONOSCOPY N/A 11/01/2022    Procedure: COLONOSCOPY;  Surgeon: Bladimir Michael MD;  Location: [facility] ENDOSCOPY;  Service: Gastroenterology;  Laterality: N/A;   • CORONARY ANGIOPLASTY      Successful PTCA & stenting LAD was done w/ deployment of a 2.5mm x23 Xience stent w/ reduction of stenosis from 90% to less than 0% stenosis with good LILLIAM 3 flow 02/17/2012       • ENDOSCOPY      with biopsy.  Mildly severe esophagitis. Gastritis. Normal examined duodenum.Several biopsies obtained in the lower third of the esophagus.Several biopsies obtained in the gastric antrum. 05/06/2016       • ENDOSCOPY      w tube. Normal esophagus. Gastritis in stomach. Biopsy taken. Gastric polyp found. Biopsy taken. Normal duodenum. 10/01/2012       • ENDOSCOPY AND COLONOSCOPY      Diverticulum in sigmoid colon & descending colon. Internal & external hemorrhoids found. 10/01/2012       • EYE SURGERY Bilateral     catarract   • HAND SURGERY Left      Corrective osteotomy of ring finger metacarpal. Malunited fracture of left ring finger 05/21/1985       • HERNIA REPAIR      Laparoscopic hernia repair. prepertitoneal bilateral inguinal hernia repair with mesh. 10/15/2009      • OTHER SURGICAL HISTORY      CORONARY ARTERY STENT    • SKIN TAG REMOVAL      Excision, viral skin tag,right upper eyelid 05/08/1995          Family History   Problem Relation Age of Onset   • Clotting disorder Other    • Lung disease Other    • Schizophrenia Sister    • Obesity Sister    • Tuberculosis Sister    • Arthritis Mother    • Diabetes Mother    • Heart disease Mother    • Hypertension Mother    • Obesity Mother    • Stroke Mother    • Thyroid disease Mother    • Tuberculosis Mother     • Arthritis Father    • Tuberculosis Father        Allergies   Allergen Reactions   • Brilinta [Ticagrelor] Shortness Of Breath   • Effient [Prasugrel] Shortness Of Breath   • Warfarin And Related Shortness Of Breath   • Ciprofloxacin Hives   • Lipitor [Atorvastatin] Swelling   • Latex Itching   • Adhesive Tape Rash   • Celexa [Citalopram Hydrobromide] Rash   • Crestor [Rosuvastatin Calcium] Rash   • Floxin [Ofloxacin] Rash   • Januvia [Sitagliptin] Rash   • Penicillins Rash   • Sulfa Antibiotics Rash       Social History     Socioeconomic History   • Marital status:    Tobacco Use   • Smoking status: Never   • Smokeless tobacco: Never   Vaping Use   • Vaping Use: Never used   Substance and Sexual Activity   • Alcohol use: No   • Drug use: No   • Sexual activity: Defer         Current Outpatient Medications   Medication Sig Dispense Refill   • ALPRAZolam (XANAX) 0.5 MG tablet Take 1 tablet by mouth At Night As Needed for Anxiety. 60 tablet 0   • ASPIRIN 81 PO Take 1 tablet by mouth Daily.     • Azelastine HCl 137 MCG/SPRAY solution Inhale 1 spray Daily. 30 mL 3   • cholecalciferol (VITAMIN D3) 1000 units tablet Take 1 tablet by mouth Daily. 30 tablet 3   • coenzyme Q10 100 MG capsule Take 1 capsule by mouth Daily.     • dapagliflozin (Farxiga) 5 MG tablet tablet Take 1 tablet by mouth Daily.     • Dulaglutide (Trulicity) 3 MG/0.5ML solution pen-injector Inject 3 mg under the skin into the appropriate area as directed 1 (One) Time Per Week. 12 pen 3   • esomeprazole (nexIUM) 40 MG capsule Take 1 capsule by mouth Every Morning Before Breakfast. 30 capsule 3   • Glucose Blood (BLOOD GLUCOSE TEST) strip Use 4 x daily use any brand covered by insurance or same brand as before ICD10 code is E11.9 120 each 11   • HYDROcodone-acetaminophen (Norco)  MG per tablet Take 1 tablet by mouth Every 6 (Six) Hours As Needed for Moderate Pain. 30 tablet 0   • Insulin Glargine (BASAGLAR KWIKPEN) 100 UNIT/ML injection pen  "Inject 15 Units under the skin into the appropriate area as directed At Night As Needed.     • Insulin Pen Needle 30G X 8 MM misc Use 3-4 times daily. 100 each 3   • Insulin Syringe 31G X 5/16\" 0.3 ML misc 4 x daily 120 each 11   • KRILL OIL OMEGA-3 PO Take 1 capsule by mouth Daily.     • Lancet Devices (LANCING DEVICE) misc USE AS INDICATED TO CORRELATE WITH STRIPS AND METER 1 each 1   • Lancets 30G misc USE 4 X DAILY 120 each 11   • lisinopril (PRINIVIL,ZESTRIL) 5 MG tablet Take 1 tablet by mouth Daily. 90 tablet 1   • metoprolol tartrate (LOPRESSOR) 50 MG tablet Take 1 tablet by mouth 2 (Two) Times a Day. 180 tablet 1   • propafenone (RYTHMOL) 150 MG tablet Take 1 tablet by mouth Every 8 (Eight) Hours.     • Red Yeast Rice Extract (RED YEAST RICE PO) Take 4 capsules by mouth Daily.     • rivaroxaban (XARELTO) 20 MG tablet Take 1 tablet by mouth Daily.     • Saw Palmetto, Serenoa repens, (SAW PALMETTO PO) Take 1 tablet by mouth Daily.     • sucralfate (CARAFATE) 1 g tablet Take 1 tablet by mouth 2 (Two) Times a Day.     • vitamin B-12 (CYANOCOBALAMIN) 2500 MCG sublingual tablet tablet Place 2,500 mcg under the tongue 1 (One) Time Per Week.       No current facility-administered medications for this visit.       Review of Systems   Musculoskeletal:        Foot pain    All other systems reviewed and are negative.        OBJECTIVE    /69   Pulse 78   Ht 175.3 cm (69\")   Wt 83.5 kg (184 lb)   SpO2 98%   BMI 27.17 kg/m²     Body mass index is 27.17 kg/m².        Physical Exam  Vitals reviewed.   Constitutional:       Appearance: Normal appearance. He is well-developed.   HENT:      Head: Normocephalic and atraumatic.   Neck:      Trachea: Trachea and phonation normal.   Cardiovascular:      Pulses:           Dorsalis pedis pulses are 1+ on the right side.        Posterior tibial pulses are 1+ on the right side.   Pulmonary:      Effort: Pulmonary effort is normal. No respiratory distress.   Abdominal:      " General: There is no distension.      Palpations: Abdomen is soft.   Musculoskeletal:        Feet:    Skin:     General: Skin is warm and dry.   Neurological:      Mental Status: He is alert and oriented to person, place, and time.      GCS: GCS eye subscore is 4. GCS verbal subscore is 5. GCS motor subscore is 6.   Psychiatric:         Speech: Speech normal.         Behavior: Behavior normal. Behavior is cooperative.         Thought Content: Thought content normal.         Judgment: Judgment normal.                Procedures        ASSESSMENT AND PLAN    Diagnoses and all orders for this visit:    1. Closed trimalleolar fracture of right ankle, initial encounter (Primary)  -     Case Request; Standing  -     ceFAZolin (ANCEF) 2 g in sodium chloride 0.9 % 100 mL IVPB  -     Case Request    2. Osteoarthritis of ankle and foot, right  -     XR Foot 3+ View Right  -     XR Ankle 3+ View Right  -     XR Tibia Fibula 2 View Right  -     Case Request; Standing  -     ceFAZolin (ANCEF) 2 g in sodium chloride 0.9 % 100 mL IVPB  -     Case Request    3. Status post bunionectomy  -     Case Request; Standing  -     ceFAZolin (ANCEF) 2 g in sodium chloride 0.9 % 100 mL IVPB  -     Case Request    Other orders  -     Follow Anesthesia Guidelines / Protocol; Standing  -     Verify NPO Status; Standing  -     Obtain informed consent (if not collected inpatient or PAT); Standing  -     Notify Physician - Standard; Standing  -     Follow Anesthesia Guidelines / Protocol; Future      Body mass index is 27.17 kg/m².    Recommend follow-up with primary care to discuss BMI greater than 30    We reviewed the x-rays with the patient and his family today and Dr. Salazar recommended an open reduction internal fixation of the right trimalleolar fracture.  We will get that set up for Thursday of this week, patient was instructed to hold all anticoagulants and to remain n.p.o. after midnight until follow-up with anesthesia for further  instructions.  They verbalized understand plan of care.  He will continue to take pain medication/regimen in half to help control his drowsiness and contact the office for any worsening symptoms.  He needs to remain in the Ortho boot at this time and continue use the scooter/walker for mobility    The risks, benefits, and alternatives of this procedure have been discussed with the patient or the responsible party- they understand and agrees to proceed. No guarantees given or implied.           This document has been electronically signed by Qasim LEONARDO FNP-C, ONP-C on March 21, 2023 14:43 CDT

## 2023-03-21 NOTE — PROGRESS NOTES
Aj Card Jr.  1945  77 y.o. male  Bs: 130 per patient       03/21/2023    Chief Complaint   Patient presents with   • Right Foot - Post-op       History of Present Illness    Aj Card Jr. is a 77 y.o.male came to clinic for post operative appointment on right foot joint fusion. Xray obtained today because patient states he has fallen down since surgery.       Patient in the office today for follow-up evaluation of first metatarsal phalangeal joint fusion and bunionectomy.  His wife and he reports multiple falls since his operation and today is complaining of pain in area around the foot and ankle.    Past Medical History:   Diagnosis Date   • Acute posthemorrhagic anemia    • Afib (HCC)    • Allergic rhinitis    • Anxiety state    • Chest pain    • CKD (chronic kidney disease), stage II    • Coronary arteriosclerosis     3 stents, followed by Dr. Jeffers   • Diabetes mellitus (HCC)     type 2   • Diverticular disease of colon    • Dysphagia    • Elevated cholesterol    • Epigastric pain    • GERD (gastroesophageal reflux disease)     esophagitis on Dexilant. Pt feels Nexium working better      • History of echocardiogram     Small left atrial enlargement with mild CLVH.Ef of 55-60%.Mitral valve mildly thickened.Av thickened.Left ventricle mildly dilated.Tricuspid intact.Mild aortic insufficiency. 12/07/2015       • History of echocardiogram     Mild diastolic dysfunction and mild left atrial enlargement, otherwise normal echo. EF> 55% 01/03/2012       • Hypercholesterolemia    • Hypertension    • Insomnia    • Irritable bowel syndrome    • Osteoarthritis of multiple joints    • Pain of right foot    • PONV (postoperative nausea and vomiting)          Past Surgical History:   Procedure Laterality Date   • APPENDECTOMY       [facility] 1995       • CARDIAC CATHETERIZATION      Successful PTCA of the OMB#2.Unsuccessful PTCA of the 1st OMB, secondary to difficulty in  advancing the balloon. 12/08/2015       • CHOLECYSTECTOMY      [facility] 1993       • CHOLECYSTECTOMY     • COLONOSCOPY  10/01/2012   • COLONOSCOPY N/A 12/11/2017    Procedure: COLONOSCOPY;  Surgeon: Bladimir Michael MD;  Location: [facility] ENDOSCOPY;  Service:    • COLONOSCOPY N/A 11/01/2022    Procedure: COLONOSCOPY;  Surgeon: Bladimir Michael MD;  Location: [facility] ENDOSCOPY;  Service: Gastroenterology;  Laterality: N/A;   • CORONARY ANGIOPLASTY      Successful PTCA & stenting LAD was done w/ deployment of a 2.5mm x23 Xience stent w/ reduction of stenosis from 90% to less than 0% stenosis with good LILLIAM 3 flow 02/17/2012       • ENDOSCOPY      with biopsy.  Mildly severe esophagitis. Gastritis. Normal examined duodenum.Several biopsies obtained in the lower third of the esophagus.Several biopsies obtained in the gastric antrum. 05/06/2016       • ENDOSCOPY      w tube. Normal esophagus. Gastritis in stomach. Biopsy taken. Gastric polyp found. Biopsy taken. Normal duodenum. 10/01/2012       • ENDOSCOPY AND COLONOSCOPY      Diverticulum in sigmoid colon & descending colon. Internal & external hemorrhoids found. 10/01/2012       • EYE SURGERY Bilateral     catarract   • HAND SURGERY Left      Corrective osteotomy of ring finger metacarpal. Malunited fracture of left ring finger 05/21/1985       • HERNIA REPAIR      Laparoscopic hernia repair. prepertitoneal bilateral inguinal hernia repair with mesh. 10/15/2009      • OTHER SURGICAL HISTORY      CORONARY ARTERY STENT    • SKIN TAG REMOVAL      Excision, viral skin tag,right upper eyelid 05/08/1995          Family History   Problem Relation Age of Onset   • Clotting disorder Other    • Lung disease Other    • Schizophrenia Sister    • Obesity Sister    • Tuberculosis Sister    • Arthritis Mother    • Diabetes Mother    • Heart disease Mother    • Hypertension Mother    • Obesity Mother    • Stroke Mother    • Thyroid disease Mother    • Tuberculosis Mother     • Arthritis Father    • Tuberculosis Father        Allergies   Allergen Reactions   • Brilinta [Ticagrelor] Shortness Of Breath   • Effient [Prasugrel] Shortness Of Breath   • Warfarin And Related Shortness Of Breath   • Ciprofloxacin Hives   • Lipitor [Atorvastatin] Swelling   • Latex Itching   • Adhesive Tape Rash   • Celexa [Citalopram Hydrobromide] Rash   • Crestor [Rosuvastatin Calcium] Rash   • Floxin [Ofloxacin] Rash   • Januvia [Sitagliptin] Rash   • Penicillins Rash   • Sulfa Antibiotics Rash       Social History     Socioeconomic History   • Marital status:    Tobacco Use   • Smoking status: Never   • Smokeless tobacco: Never   Vaping Use   • Vaping Use: Never used   Substance and Sexual Activity   • Alcohol use: No   • Drug use: No   • Sexual activity: Defer         Current Outpatient Medications   Medication Sig Dispense Refill   • ALPRAZolam (XANAX) 0.5 MG tablet Take 1 tablet by mouth At Night As Needed for Anxiety. 60 tablet 0   • ASPIRIN 81 PO Take 1 tablet by mouth Daily.     • Azelastine HCl 137 MCG/SPRAY solution Inhale 1 spray Daily. 30 mL 3   • cholecalciferol (VITAMIN D3) 1000 units tablet Take 1 tablet by mouth Daily. 30 tablet 3   • coenzyme Q10 100 MG capsule Take 1 capsule by mouth Daily.     • dapagliflozin (Farxiga) 5 MG tablet tablet Take 1 tablet by mouth Daily.     • Dulaglutide (Trulicity) 3 MG/0.5ML solution pen-injector Inject 3 mg under the skin into the appropriate area as directed 1 (One) Time Per Week. 12 pen 3   • esomeprazole (nexIUM) 40 MG capsule Take 1 capsule by mouth Every Morning Before Breakfast. 30 capsule 3   • Glucose Blood (BLOOD GLUCOSE TEST) strip Use 4 x daily use any brand covered by insurance or same brand as before ICD10 code is E11.9 120 each 11   • HYDROcodone-acetaminophen (Norco)  MG per tablet Take 1 tablet by mouth Every 6 (Six) Hours As Needed for Moderate Pain. 30 tablet 0   • Insulin Glargine (BASAGLAR KWIKPEN) 100 UNIT/ML injection pen  "Inject 15 Units under the skin into the appropriate area as directed At Night As Needed.     • Insulin Pen Needle 30G X 8 MM misc Use 3-4 times daily. 100 each 3   • Insulin Syringe 31G X 5/16\" 0.3 ML misc 4 x daily 120 each 11   • KRILL OIL OMEGA-3 PO Take 1 capsule by mouth Daily.     • Lancet Devices (LANCING DEVICE) misc USE AS INDICATED TO CORRELATE WITH STRIPS AND METER 1 each 1   • Lancets 30G misc USE 4 X DAILY 120 each 11   • lisinopril (PRINIVIL,ZESTRIL) 5 MG tablet Take 1 tablet by mouth Daily. 90 tablet 1   • metoprolol tartrate (LOPRESSOR) 50 MG tablet Take 1 tablet by mouth 2 (Two) Times a Day. 180 tablet 1   • propafenone (RYTHMOL) 150 MG tablet Take 1 tablet by mouth Every 8 (Eight) Hours.     • Red Yeast Rice Extract (RED YEAST RICE PO) Take 4 capsules by mouth Daily.     • rivaroxaban (XARELTO) 20 MG tablet Take 1 tablet by mouth Daily.     • Saw Palmetto, Serenoa repens, (SAW PALMETTO PO) Take 1 tablet by mouth Daily.     • sucralfate (CARAFATE) 1 g tablet Take 1 tablet by mouth 2 (Two) Times a Day.     • vitamin B-12 (CYANOCOBALAMIN) 2500 MCG sublingual tablet tablet Place 2,500 mcg under the tongue 1 (One) Time Per Week.       No current facility-administered medications for this visit.       Review of Systems   Musculoskeletal:        Foot pain    All other systems reviewed and are negative.        OBJECTIVE    /69   Pulse 78   Ht 175.3 cm (69\")   Wt 83.5 kg (184 lb)   SpO2 98%   BMI 27.17 kg/m²     Body mass index is 27.17 kg/m².        Physical Exam  Vitals reviewed.   Constitutional:       Appearance: Normal appearance. He is well-developed.   HENT:      Head: Normocephalic and atraumatic.   Neck:      Trachea: Trachea and phonation normal.   Cardiovascular:      Pulses:           Dorsalis pedis pulses are 1+ on the right side.        Posterior tibial pulses are 1+ on the right side.   Pulmonary:      Effort: Pulmonary effort is normal. No respiratory distress.   Abdominal:      " General: There is no distension.      Palpations: Abdomen is soft.   Musculoskeletal:        Feet:    Skin:     General: Skin is warm and dry.   Neurological:      Mental Status: He is alert and oriented to person, place, and time.      GCS: GCS eye subscore is 4. GCS verbal subscore is 5. GCS motor subscore is 6.   Psychiatric:         Speech: Speech normal.         Behavior: Behavior normal. Behavior is cooperative.         Thought Content: Thought content normal.         Judgment: Judgment normal.                Procedures        ASSESSMENT AND PLAN    Diagnoses and all orders for this visit:    1. Closed trimalleolar fracture of right ankle, initial encounter (Primary)  -     Case Request; Standing  -     ceFAZolin (ANCEF) 2 g in sodium chloride 0.9 % 100 mL IVPB  -     Case Request    2. Osteoarthritis of ankle and foot, right  -     XR Foot 3+ View Right  -     XR Ankle 3+ View Right  -     XR Tibia Fibula 2 View Right  -     Case Request; Standing  -     ceFAZolin (ANCEF) 2 g in sodium chloride 0.9 % 100 mL IVPB  -     Case Request    3. Status post bunionectomy  -     Case Request; Standing  -     ceFAZolin (ANCEF) 2 g in sodium chloride 0.9 % 100 mL IVPB  -     Case Request    Other orders  -     Follow Anesthesia Guidelines / Protocol; Standing  -     Verify NPO Status; Standing  -     Obtain informed consent (if not collected inpatient or PAT); Standing  -     Notify Physician - Standard; Standing  -     Follow Anesthesia Guidelines / Protocol; Future      Body mass index is 27.17 kg/m².    Recommend follow-up with primary care to discuss BMI greater than 30    We reviewed the x-rays with the patient and his family today and Dr. Salazar recommended an open reduction internal fixation of the right trimalleolar fracture.  We will get that set up for Thursday of this week, patient was instructed to hold all anticoagulants and to remain n.p.o. after midnight until follow-up with anesthesia for further  instructions.  They verbalized understand plan of care.  He will continue to take pain medication/regimen in half to help control his drowsiness and contact the office for any worsening symptoms.  He needs to remain in the Ortho boot at this time and continue use the scooter/walker for mobility    The risks, benefits, and alternatives of this procedure have been discussed with the patient or the responsible party- they understand and agrees to proceed. No guarantees given or implied.           This document has been electronically signed by Qasim LEONARDO FNP-C, ONP-C on March 21, 2023 14:43 CDT

## 2023-03-22 ENCOUNTER — PRE-ADMISSION TESTING (OUTPATIENT)
Dept: PREADMISSION TESTING | Facility: HOSPITAL | Age: 78
End: 2023-03-22
Payer: MEDICARE

## 2023-03-22 ENCOUNTER — TELEPHONE (OUTPATIENT)
Dept: ENDOCRINOLOGY | Facility: CLINIC | Age: 78
End: 2023-03-22
Payer: MEDICARE

## 2023-03-22 LAB — MRSA DNA SPEC QL NAA+PROBE: NEGATIVE

## 2023-03-22 PROCEDURE — 87641 MR-STAPH DNA AMP PROBE: CPT

## 2023-03-22 RX ORDER — SODIUM CHLORIDE 9 MG/ML
1000 INJECTION, SOLUTION INTRAVENOUS CONTINUOUS
Status: CANCELLED | OUTPATIENT
Start: 2023-03-23

## 2023-03-22 NOTE — TELEPHONE ENCOUNTER
Called to let Aj know that his Basaglar has been delivered     No answer left voicemail requesting a call back

## 2023-03-22 NOTE — PAT
PRE-OP INFORMATION AND SAFETY GUIDELINES GIVEN TO PATIENT AND WIFE VERBAL AND WRITTEN.  BOTH VOICED BOTH UNDERSTANDING.

## 2023-03-23 ENCOUNTER — ANESTHESIA (OUTPATIENT)
Dept: PERIOP | Facility: HOSPITAL | Age: 78
End: 2023-03-23
Payer: MEDICARE

## 2023-03-23 ENCOUNTER — APPOINTMENT (OUTPATIENT)
Dept: GENERAL RADIOLOGY | Facility: HOSPITAL | Age: 78
End: 2023-03-23
Payer: MEDICARE

## 2023-03-23 ENCOUNTER — ANESTHESIA EVENT (OUTPATIENT)
Dept: PERIOP | Facility: HOSPITAL | Age: 78
End: 2023-03-23
Payer: MEDICARE

## 2023-03-23 ENCOUNTER — HOSPITAL ENCOUNTER (OUTPATIENT)
Facility: HOSPITAL | Age: 78
Discharge: NURSING, REHAB OR LONG-TERM CARE | End: 2023-03-25
Attending: PODIATRIST | Admitting: PODIATRIST
Payer: MEDICARE

## 2023-03-23 DIAGNOSIS — Z78.9 IMPAIRED MOBILITY AND ADLS: ICD-10-CM

## 2023-03-23 DIAGNOSIS — Z98.890 STATUS POST BUNIONECTOMY: ICD-10-CM

## 2023-03-23 DIAGNOSIS — S82.851A CLOSED TRIMALLEOLAR FRACTURE OF RIGHT ANKLE, INITIAL ENCOUNTER: ICD-10-CM

## 2023-03-23 DIAGNOSIS — Z74.09 IMPAIRED FUNCTIONAL MOBILITY, BALANCE, GAIT, AND ENDURANCE: ICD-10-CM

## 2023-03-23 DIAGNOSIS — M19.071 OSTEOARTHRITIS OF ANKLE AND FOOT, RIGHT: ICD-10-CM

## 2023-03-23 DIAGNOSIS — Z74.09 IMPAIRED MOBILITY AND ADLS: ICD-10-CM

## 2023-03-23 LAB
ANION GAP SERPL CALCULATED.3IONS-SCNC: 10 MMOL/L (ref 5–15)
BASOPHILS # BLD AUTO: 0.01 10*3/MM3 (ref 0–0.2)
BASOPHILS NFR BLD AUTO: 0.2 % (ref 0–1.5)
BUN SERPL-MCNC: 27 MG/DL (ref 8–23)
BUN/CREAT SERPL: 29 (ref 7–25)
CALCIUM SPEC-SCNC: 8.7 MG/DL (ref 8.6–10.5)
CHLORIDE SERPL-SCNC: 99 MMOL/L (ref 98–107)
CO2 SERPL-SCNC: 23 MMOL/L (ref 22–29)
CREAT SERPL-MCNC: 0.93 MG/DL (ref 0.76–1.27)
DEPRECATED RDW RBC AUTO: 42.5 FL (ref 37–54)
EGFRCR SERPLBLD CKD-EPI 2021: 84 ML/MIN/1.73
EOSINOPHIL # BLD AUTO: 0.01 10*3/MM3 (ref 0–0.4)
EOSINOPHIL NFR BLD AUTO: 0.2 % (ref 0.3–6.2)
ERYTHROCYTE [DISTWIDTH] IN BLOOD BY AUTOMATED COUNT: 13.8 % (ref 12.3–15.4)
GEN 5 2HR TROPONIN T REFLEX: 15 NG/L
GLUCOSE BLDC GLUCOMTR-MCNC: 126 MG/DL (ref 70–130)
GLUCOSE BLDC GLUCOMTR-MCNC: 150 MG/DL (ref 70–130)
GLUCOSE BLDC GLUCOMTR-MCNC: 250 MG/DL (ref 70–130)
GLUCOSE SERPL-MCNC: 159 MG/DL (ref 65–99)
HBA1C MFR BLD: 7.1 % (ref 4.8–5.6)
HCT VFR BLD AUTO: 32.8 % (ref 37.5–51)
HGB BLD-MCNC: 10.9 G/DL (ref 13–17.7)
IMM GRANULOCYTES # BLD AUTO: 0.04 10*3/MM3 (ref 0–0.05)
IMM GRANULOCYTES NFR BLD AUTO: 0.8 % (ref 0–0.5)
LYMPHOCYTES # BLD AUTO: 0.5 10*3/MM3 (ref 0.7–3.1)
LYMPHOCYTES NFR BLD AUTO: 9.5 % (ref 19.6–45.3)
MCH RBC QN AUTO: 28.2 PG (ref 26.6–33)
MCHC RBC AUTO-ENTMCNC: 33.2 G/DL (ref 31.5–35.7)
MCV RBC AUTO: 84.8 FL (ref 79–97)
MONOCYTES # BLD AUTO: 0.13 10*3/MM3 (ref 0.1–0.9)
MONOCYTES NFR BLD AUTO: 2.5 % (ref 5–12)
NEUTROPHILS NFR BLD AUTO: 4.59 10*3/MM3 (ref 1.7–7)
NEUTROPHILS NFR BLD AUTO: 86.8 % (ref 42.7–76)
NRBC BLD AUTO-RTO: 0 /100 WBC (ref 0–0.2)
PLATELET # BLD AUTO: 252 10*3/MM3 (ref 140–450)
PMV BLD AUTO: 8.3 FL (ref 6–12)
POTASSIUM SERPL-SCNC: 4.6 MMOL/L (ref 3.5–5.2)
QT INTERVAL: 372 MS
QT INTERVAL: 382 MS
QTC INTERVAL: 429 MS
QTC INTERVAL: 446 MS
RBC # BLD AUTO: 3.87 10*6/MM3 (ref 4.14–5.8)
SODIUM SERPL-SCNC: 132 MMOL/L (ref 136–145)
TROPONIN T DELTA: 0 NG/L
TROPONIN T SERPL HS-MCNC: 15 NG/L
TSH SERPL DL<=0.05 MIU/L-ACNC: 1.2 UIU/ML (ref 0.27–4.2)
WBC NRBC COR # BLD: 5.28 10*3/MM3 (ref 3.4–10.8)

## 2023-03-23 PROCEDURE — 63710000001 ALPRAZOLAM 0.25 MG TABLET: Performed by: HOSPITALIST

## 2023-03-23 PROCEDURE — 25010000002 DEXAMETHASONE PER 1 MG: Performed by: NURSE ANESTHETIST, CERTIFIED REGISTERED

## 2023-03-23 PROCEDURE — 63710000001 PROPAFENONE 150 MG TABLET: Performed by: HOSPITALIST

## 2023-03-23 PROCEDURE — 76000 FLUOROSCOPY <1 HR PHYS/QHP: CPT

## 2023-03-23 PROCEDURE — C1713 ANCHOR/SCREW BN/BN,TIS/BN: HCPCS | Performed by: PODIATRIST

## 2023-03-23 PROCEDURE — 25010000002 MIDAZOLAM PER 1 MG: Performed by: NURSE ANESTHETIST, CERTIFIED REGISTERED

## 2023-03-23 PROCEDURE — 63710000001 AMLODIPINE 10 MG TABLET: Performed by: HOSPITALIST

## 2023-03-23 PROCEDURE — 94799 UNLISTED PULMONARY SVC/PX: CPT

## 2023-03-23 PROCEDURE — 25010000002 FENTANYL CITRATE (PF) 50 MCG/ML SOLUTION: Performed by: NURSE ANESTHETIST, CERTIFIED REGISTERED

## 2023-03-23 PROCEDURE — 63710000001 METOPROLOL TARTRATE 50 MG TABLET: Performed by: HOSPITALIST

## 2023-03-23 PROCEDURE — 93005 ELECTROCARDIOGRAM TRACING: CPT | Performed by: ANESTHESIOLOGY

## 2023-03-23 PROCEDURE — 84484 ASSAY OF TROPONIN QUANT: CPT | Performed by: HOSPITALIST

## 2023-03-23 PROCEDURE — 25010000002 KETOROLAC TROMETHAMINE PER 15 MG: Performed by: NURSE ANESTHETIST, CERTIFIED REGISTERED

## 2023-03-23 PROCEDURE — 25010000002 MORPHINE PER 10 MG: Performed by: ANESTHESIOLOGY

## 2023-03-23 PROCEDURE — 93010 ELECTROCARDIOGRAM REPORT: CPT | Performed by: INTERNAL MEDICINE

## 2023-03-23 PROCEDURE — 20680 REMOVAL OF IMPLANT DEEP: CPT | Performed by: PODIATRIST

## 2023-03-23 PROCEDURE — 63710000001 ISOSORBIDE MONONITRATE 30 MG TABLET SUSTAINED-RELEASE 24 HOUR: Performed by: HOSPITALIST

## 2023-03-23 PROCEDURE — A9270 NON-COVERED ITEM OR SERVICE: HCPCS | Performed by: PODIATRIST

## 2023-03-23 PROCEDURE — 85025 COMPLETE CBC W/AUTO DIFF WBC: CPT | Performed by: PODIATRIST

## 2023-03-23 PROCEDURE — A9270 NON-COVERED ITEM OR SERVICE: HCPCS | Performed by: HOSPITALIST

## 2023-03-23 PROCEDURE — 94760 N-INVAS EAR/PLS OXIMETRY 1: CPT

## 2023-03-23 PROCEDURE — 63710000001 OXYCODONE-ACETAMINOPHEN 7.5-325 MG TABLET: Performed by: PODIATRIST

## 2023-03-23 PROCEDURE — 84443 ASSAY THYROID STIM HORMONE: CPT | Performed by: HOSPITALIST

## 2023-03-23 PROCEDURE — 25010000002 HEPARIN (PORCINE) PER 1000 UNITS: Performed by: PODIATRIST

## 2023-03-23 PROCEDURE — 83036 HEMOGLOBIN GLYCOSYLATED A1C: CPT | Performed by: HOSPITALIST

## 2023-03-23 PROCEDURE — G0378 HOSPITAL OBSERVATION PER HR: HCPCS

## 2023-03-23 PROCEDURE — 99232 SBSQ HOSP IP/OBS MODERATE 35: CPT | Performed by: INTERNAL MEDICINE

## 2023-03-23 PROCEDURE — C1769 GUIDE WIRE: HCPCS | Performed by: PODIATRIST

## 2023-03-23 PROCEDURE — 25010000002 ONDANSETRON PER 1 MG: Performed by: NURSE ANESTHETIST, CERTIFIED REGISTERED

## 2023-03-23 PROCEDURE — 80048 BASIC METABOLIC PNL TOTAL CA: CPT | Performed by: PODIATRIST

## 2023-03-23 PROCEDURE — 63710000001 NITROGLYCERIN 2 % OINTMENT: Performed by: HOSPITALIST

## 2023-03-23 PROCEDURE — 63710000001 LISINOPRIL 5 MG TABLET: Performed by: HOSPITALIST

## 2023-03-23 PROCEDURE — 63710000001 SUCRALFATE 1 G TABLET: Performed by: HOSPITALIST

## 2023-03-23 PROCEDURE — 93005 ELECTROCARDIOGRAM TRACING: CPT | Performed by: HOSPITALIST

## 2023-03-23 PROCEDURE — 27792 TREATMENT OF ANKLE FRACTURE: CPT | Performed by: PODIATRIST

## 2023-03-23 PROCEDURE — 25010000002 PROPOFOL 10 MG/ML EMULSION: Performed by: NURSE ANESTHETIST, CERTIFIED REGISTERED

## 2023-03-23 PROCEDURE — 82962 GLUCOSE BLOOD TEST: CPT

## 2023-03-23 PROCEDURE — 25010000002 CEFAZOLIN PER 500 MG: Performed by: NURSE PRACTITIONER

## 2023-03-23 DEVICE — IMPLANTABLE DEVICE: Type: IMPLANTABLE DEVICE | Site: ANKLE | Status: FUNCTIONAL

## 2023-03-23 DEVICE — SCRW LK LP SS 3.5X14MM: Type: IMPLANTABLE DEVICE | Site: ANKLE | Status: FUNCTIONAL

## 2023-03-23 DEVICE — SCRW CORT LP SS 3.5X16MM: Type: IMPLANTABLE DEVICE | Site: ANKLE | Status: FUNCTIONAL

## 2023-03-23 DEVICE — IMPLANTABLE DEVICE: Type: IMPLANTABLE DEVICE | Site: TOE FIRST | Status: FUNCTIONAL

## 2023-03-23 RX ORDER — FLUMAZENIL 0.1 MG/ML
0.2 INJECTION INTRAVENOUS AS NEEDED
Status: DISCONTINUED | OUTPATIENT
Start: 2023-03-23 | End: 2023-03-23 | Stop reason: HOSPADM

## 2023-03-23 RX ORDER — MORPHINE SULFATE 2 MG/ML
2 INJECTION, SOLUTION INTRAMUSCULAR; INTRAVENOUS ONCE
Status: COMPLETED | OUTPATIENT
Start: 2023-03-23 | End: 2023-03-23

## 2023-03-23 RX ORDER — DIPHENHYDRAMINE HYDROCHLORIDE 50 MG/ML
12.5 INJECTION INTRAMUSCULAR; INTRAVENOUS
Status: DISCONTINUED | OUTPATIENT
Start: 2023-03-23 | End: 2023-03-23 | Stop reason: HOSPADM

## 2023-03-23 RX ORDER — BISACODYL 5 MG/1
5 TABLET, DELAYED RELEASE ORAL DAILY PRN
Status: DISCONTINUED | OUTPATIENT
Start: 2023-03-23 | End: 2023-03-25 | Stop reason: HOSPADM

## 2023-03-23 RX ORDER — OXYCODONE AND ACETAMINOPHEN 7.5; 325 MG/1; MG/1
1 TABLET ORAL EVERY 6 HOURS PRN
Status: DISCONTINUED | OUTPATIENT
Start: 2023-03-23 | End: 2023-03-23

## 2023-03-23 RX ORDER — ONDANSETRON 2 MG/ML
4 INJECTION INTRAMUSCULAR; INTRAVENOUS EVERY 6 HOURS PRN
Status: DISCONTINUED | OUTPATIENT
Start: 2023-03-23 | End: 2023-03-25 | Stop reason: HOSPADM

## 2023-03-23 RX ORDER — HYDROMORPHONE HCL IN 0.9% NACL 0.2 MG/ML
PLASTIC BAG, INJECTION (ML) INTRAVENOUS CONTINUOUS
Status: DISCONTINUED | OUTPATIENT
Start: 2023-03-23 | End: 2023-03-23

## 2023-03-23 RX ORDER — MIDAZOLAM HYDROCHLORIDE 1 MG/ML
INJECTION INTRAMUSCULAR; INTRAVENOUS AS NEEDED
Status: DISCONTINUED | OUTPATIENT
Start: 2023-03-23 | End: 2023-03-23 | Stop reason: SURG

## 2023-03-23 RX ORDER — PROPOFOL 10 MG/ML
VIAL (ML) INTRAVENOUS AS NEEDED
Status: DISCONTINUED | OUTPATIENT
Start: 2023-03-23 | End: 2023-03-23 | Stop reason: SURG

## 2023-03-23 RX ORDER — SODIUM CHLORIDE 0.9 % (FLUSH) 0.9 %
10 SYRINGE (ML) INJECTION AS NEEDED
Status: DISCONTINUED | OUTPATIENT
Start: 2023-03-23 | End: 2023-03-25 | Stop reason: HOSPADM

## 2023-03-23 RX ORDER — NALOXONE HCL 0.4 MG/ML
0.4 VIAL (ML) INJECTION AS NEEDED
Status: DISCONTINUED | OUTPATIENT
Start: 2023-03-23 | End: 2023-03-23 | Stop reason: HOSPADM

## 2023-03-23 RX ORDER — ACETAMINOPHEN 650 MG/1
650 SUPPOSITORY RECTAL ONCE AS NEEDED
Status: DISCONTINUED | OUTPATIENT
Start: 2023-03-23 | End: 2023-03-23 | Stop reason: HOSPADM

## 2023-03-23 RX ORDER — ISOSORBIDE MONONITRATE 30 MG/1
30 TABLET, EXTENDED RELEASE ORAL
Status: DISCONTINUED | OUTPATIENT
Start: 2023-03-23 | End: 2023-03-25 | Stop reason: HOSPADM

## 2023-03-23 RX ORDER — POLYETHYLENE GLYCOL 3350 17 G/17G
17 POWDER, FOR SOLUTION ORAL DAILY PRN
Status: DISCONTINUED | OUTPATIENT
Start: 2023-03-23 | End: 2023-03-25 | Stop reason: HOSPADM

## 2023-03-23 RX ORDER — AMLODIPINE BESYLATE 10 MG/1
10 TABLET ORAL
Status: DISCONTINUED | OUTPATIENT
Start: 2023-03-23 | End: 2023-03-25 | Stop reason: HOSPADM

## 2023-03-23 RX ORDER — PROMETHAZINE HYDROCHLORIDE 25 MG/1
25 TABLET ORAL ONCE AS NEEDED
Status: DISCONTINUED | OUTPATIENT
Start: 2023-03-23 | End: 2023-03-23 | Stop reason: HOSPADM

## 2023-03-23 RX ORDER — PANTOPRAZOLE SODIUM 40 MG/1
40 TABLET, DELAYED RELEASE ORAL DAILY
COMMUNITY
End: 2023-04-07 | Stop reason: HOSPADM

## 2023-03-23 RX ORDER — AMLODIPINE BESYLATE 10 MG/1
10 TABLET ORAL DAILY
COMMUNITY

## 2023-03-23 RX ORDER — INSULIN ASPART 100 [IU]/ML
0-9 INJECTION, SOLUTION INTRAVENOUS; SUBCUTANEOUS
Status: DISCONTINUED | OUTPATIENT
Start: 2023-03-23 | End: 2023-03-25 | Stop reason: HOSPADM

## 2023-03-23 RX ORDER — OXYCODONE AND ACETAMINOPHEN 7.5; 325 MG/1; MG/1
1 TABLET ORAL EVERY 4 HOURS PRN
Status: DISCONTINUED | OUTPATIENT
Start: 2023-03-23 | End: 2023-03-25 | Stop reason: HOSPADM

## 2023-03-23 RX ORDER — DEXTROSE MONOHYDRATE 25 G/50ML
25 INJECTION, SOLUTION INTRAVENOUS
Status: DISCONTINUED | OUTPATIENT
Start: 2023-03-23 | End: 2023-03-25 | Stop reason: HOSPADM

## 2023-03-23 RX ORDER — NICOTINE POLACRILEX 4 MG
15 LOZENGE BUCCAL
Status: DISCONTINUED | OUTPATIENT
Start: 2023-03-23 | End: 2023-03-25 | Stop reason: HOSPADM

## 2023-03-23 RX ORDER — EPHEDRINE SULFATE 50 MG/ML
5 INJECTION, SOLUTION INTRAVENOUS ONCE AS NEEDED
Status: DISCONTINUED | OUTPATIENT
Start: 2023-03-23 | End: 2023-03-23 | Stop reason: HOSPADM

## 2023-03-23 RX ORDER — ISOSORBIDE MONONITRATE 30 MG/1
30 TABLET, EXTENDED RELEASE ORAL DAILY
COMMUNITY

## 2023-03-23 RX ORDER — DEXAMETHASONE SODIUM PHOSPHATE 4 MG/ML
INJECTION, SOLUTION INTRA-ARTICULAR; INTRALESIONAL; INTRAMUSCULAR; INTRAVENOUS; SOFT TISSUE AS NEEDED
Status: DISCONTINUED | OUTPATIENT
Start: 2023-03-23 | End: 2023-03-23 | Stop reason: SURG

## 2023-03-23 RX ORDER — HEPARIN SODIUM 5000 [USP'U]/ML
5000 INJECTION, SOLUTION INTRAVENOUS; SUBCUTANEOUS EVERY 8 HOURS SCHEDULED
Status: DISCONTINUED | OUTPATIENT
Start: 2023-03-23 | End: 2023-03-25 | Stop reason: HOSPADM

## 2023-03-23 RX ORDER — SODIUM CHLORIDE 9 MG/ML
40 INJECTION, SOLUTION INTRAVENOUS AS NEEDED
Status: DISCONTINUED | OUTPATIENT
Start: 2023-03-23 | End: 2023-03-25 | Stop reason: HOSPADM

## 2023-03-23 RX ORDER — LIDOCAINE HYDROCHLORIDE 20 MG/ML
INJECTION, SOLUTION EPIDURAL; INFILTRATION; INTRACAUDAL; PERINEURAL AS NEEDED
Status: DISCONTINUED | OUTPATIENT
Start: 2023-03-23 | End: 2023-03-23 | Stop reason: SURG

## 2023-03-23 RX ORDER — ALPRAZOLAM 0.25 MG/1
0.25 TABLET ORAL NIGHTLY PRN
Status: DISCONTINUED | OUTPATIENT
Start: 2023-03-23 | End: 2023-03-25 | Stop reason: HOSPADM

## 2023-03-23 RX ORDER — ONDANSETRON 2 MG/ML
4 INJECTION INTRAMUSCULAR; INTRAVENOUS ONCE AS NEEDED
Status: COMPLETED | OUTPATIENT
Start: 2023-03-23 | End: 2023-03-23

## 2023-03-23 RX ORDER — PROPAFENONE HYDROCHLORIDE 150 MG/1
150 TABLET, COATED ORAL EVERY 8 HOURS SCHEDULED
Status: DISCONTINUED | OUTPATIENT
Start: 2023-03-23 | End: 2023-03-25 | Stop reason: HOSPADM

## 2023-03-23 RX ORDER — PROMETHAZINE HYDROCHLORIDE 25 MG/1
25 SUPPOSITORY RECTAL ONCE AS NEEDED
Status: DISCONTINUED | OUTPATIENT
Start: 2023-03-23 | End: 2023-03-23 | Stop reason: HOSPADM

## 2023-03-23 RX ORDER — BISACODYL 10 MG
10 SUPPOSITORY, RECTAL RECTAL DAILY PRN
Status: DISCONTINUED | OUTPATIENT
Start: 2023-03-23 | End: 2023-03-25 | Stop reason: HOSPADM

## 2023-03-23 RX ORDER — FENTANYL CITRATE 50 UG/ML
INJECTION, SOLUTION INTRAMUSCULAR; INTRAVENOUS AS NEEDED
Status: DISCONTINUED | OUTPATIENT
Start: 2023-03-23 | End: 2023-03-23 | Stop reason: SURG

## 2023-03-23 RX ORDER — BUPIVACAINE HCL/0.9 % NACL/PF 0.1 %
2 PLASTIC BAG, INJECTION (ML) EPIDURAL ONCE
Status: COMPLETED | OUTPATIENT
Start: 2023-03-23 | End: 2023-03-23

## 2023-03-23 RX ORDER — LISINOPRIL 5 MG/1
5 TABLET ORAL
Status: DISCONTINUED | OUTPATIENT
Start: 2023-03-23 | End: 2023-03-25 | Stop reason: HOSPADM

## 2023-03-23 RX ORDER — BUPIVACAINE HYDROCHLORIDE 5 MG/ML
INJECTION, SOLUTION EPIDURAL; INTRACAUDAL AS NEEDED
Status: DISCONTINUED | OUTPATIENT
Start: 2023-03-23 | End: 2023-03-23 | Stop reason: HOSPADM

## 2023-03-23 RX ORDER — KETOROLAC TROMETHAMINE 30 MG/ML
INJECTION, SOLUTION INTRAMUSCULAR; INTRAVENOUS AS NEEDED
Status: DISCONTINUED | OUTPATIENT
Start: 2023-03-23 | End: 2023-03-23 | Stop reason: SURG

## 2023-03-23 RX ORDER — SODIUM CHLORIDE 0.9 % (FLUSH) 0.9 %
10 SYRINGE (ML) INJECTION EVERY 12 HOURS SCHEDULED
Status: DISCONTINUED | OUTPATIENT
Start: 2023-03-23 | End: 2023-03-25 | Stop reason: HOSPADM

## 2023-03-23 RX ORDER — OXYCODONE AND ACETAMINOPHEN 7.5; 325 MG/1; MG/1
1 TABLET ORAL EVERY 4 HOURS PRN
Status: DISCONTINUED | OUTPATIENT
Start: 2023-03-23 | End: 2023-03-23

## 2023-03-23 RX ORDER — PANTOPRAZOLE SODIUM 40 MG/10ML
40 INJECTION, POWDER, LYOPHILIZED, FOR SOLUTION INTRAVENOUS
Status: DISCONTINUED | OUTPATIENT
Start: 2023-03-24 | End: 2023-03-25 | Stop reason: HOSPADM

## 2023-03-23 RX ORDER — AMOXICILLIN 250 MG
2 CAPSULE ORAL 2 TIMES DAILY
Status: DISCONTINUED | OUTPATIENT
Start: 2023-03-23 | End: 2023-03-25 | Stop reason: HOSPADM

## 2023-03-23 RX ORDER — SODIUM CHLORIDE 9 MG/ML
1000 INJECTION, SOLUTION INTRAVENOUS CONTINUOUS
Status: DISCONTINUED | OUTPATIENT
Start: 2023-03-23 | End: 2023-03-23

## 2023-03-23 RX ORDER — ACETAMINOPHEN 325 MG/1
650 TABLET ORAL ONCE AS NEEDED
Status: DISCONTINUED | OUTPATIENT
Start: 2023-03-23 | End: 2023-03-23 | Stop reason: HOSPADM

## 2023-03-23 RX ORDER — METOPROLOL TARTRATE 50 MG/1
50 TABLET, FILM COATED ORAL EVERY 12 HOURS SCHEDULED
Status: DISCONTINUED | OUTPATIENT
Start: 2023-03-23 | End: 2023-03-25 | Stop reason: HOSPADM

## 2023-03-23 RX ORDER — NALOXONE HCL 0.4 MG/ML
0.1 VIAL (ML) INJECTION
Status: DISCONTINUED | OUTPATIENT
Start: 2023-03-23 | End: 2023-03-25 | Stop reason: HOSPADM

## 2023-03-23 RX ORDER — SUCRALFATE 1 G/1
1 TABLET ORAL
Status: DISCONTINUED | OUTPATIENT
Start: 2023-03-23 | End: 2023-03-25 | Stop reason: HOSPADM

## 2023-03-23 RX ADMIN — FENTANYL CITRATE 50 MCG: 50 INJECTION, SOLUTION INTRAMUSCULAR; INTRAVENOUS at 09:14

## 2023-03-23 RX ADMIN — DEXAMETHASONE SODIUM PHOSPHATE 4 MG: 4 INJECTION, SOLUTION INTRAMUSCULAR; INTRAVENOUS at 09:17

## 2023-03-23 RX ADMIN — ISOSORBIDE MONONITRATE 30 MG: 30 TABLET, EXTENDED RELEASE ORAL at 16:48

## 2023-03-23 RX ADMIN — NITROGLYCERIN 0.5 INCH: 20 OINTMENT TOPICAL at 13:57

## 2023-03-23 RX ADMIN — PROPAFENONE HYDROCHLORIDE 150 MG: 150 TABLET, FILM COATED ORAL at 22:36

## 2023-03-23 RX ADMIN — LIDOCAINE HYDROCHLORIDE 100 MG: 20 INJECTION, SOLUTION EPIDURAL; INFILTRATION; INTRACAUDAL; PERINEURAL at 09:10

## 2023-03-23 RX ADMIN — ALPRAZOLAM 0.25 MG: 0.25 TABLET ORAL at 22:33

## 2023-03-23 RX ADMIN — SODIUM CHLORIDE 1000 ML: 9 INJECTION, SOLUTION INTRAVENOUS at 07:34

## 2023-03-23 RX ADMIN — AMLODIPINE BESYLATE 10 MG: 10 TABLET ORAL at 16:48

## 2023-03-23 RX ADMIN — PROPOFOL 120 MG: 10 INJECTION, EMULSION INTRAVENOUS at 09:10

## 2023-03-23 RX ADMIN — OXYCODONE HYDROCHLORIDE AND ACETAMINOPHEN 1 TABLET: 7.5; 325 TABLET ORAL at 22:33

## 2023-03-23 RX ADMIN — SUCRALFATE 1 G: 1 TABLET ORAL at 16:48

## 2023-03-23 RX ADMIN — HEPARIN SODIUM 5000 UNITS: 5000 INJECTION INTRAVENOUS; SUBCUTANEOUS at 22:36

## 2023-03-23 RX ADMIN — SUCRALFATE 1 G: 1 TABLET ORAL at 22:34

## 2023-03-23 RX ADMIN — Medication 2 G: at 09:15

## 2023-03-23 RX ADMIN — METOPROLOL TARTRATE 50 MG: 50 TABLET, FILM COATED ORAL at 22:35

## 2023-03-23 RX ADMIN — Medication 10 ML: at 22:36

## 2023-03-23 RX ADMIN — KETOROLAC TROMETHAMINE 15 MG: 30 INJECTION, SOLUTION INTRAMUSCULAR at 10:30

## 2023-03-23 RX ADMIN — HEPARIN SODIUM 5000 UNITS: 5000 INJECTION INTRAVENOUS; SUBCUTANEOUS at 13:57

## 2023-03-23 RX ADMIN — FENTANYL CITRATE 50 MCG: 50 INJECTION, SOLUTION INTRAMUSCULAR; INTRAVENOUS at 09:34

## 2023-03-23 RX ADMIN — MIDAZOLAM HYDROCHLORIDE 2 MG: 1 INJECTION, SOLUTION INTRAMUSCULAR; INTRAVENOUS at 09:06

## 2023-03-23 RX ADMIN — ONDANSETRON 4 MG: 2 INJECTION INTRAMUSCULAR; INTRAVENOUS at 10:30

## 2023-03-23 RX ADMIN — LISINOPRIL 5 MG: 5 TABLET ORAL at 16:48

## 2023-03-23 RX ADMIN — MORPHINE SULFATE 2 MG: 2 INJECTION, SOLUTION INTRAMUSCULAR; INTRAVENOUS at 12:15

## 2023-03-23 RX ADMIN — MORPHINE SULFATE 2 MG: 2 INJECTION, SOLUTION INTRAMUSCULAR; INTRAVENOUS at 11:51

## 2023-03-23 NOTE — BRIEF OP NOTE
ANKLE OPEN REDUCTION INTERNAL FIXATION  Progress Note    Aj Card .  3/23/2023    Pre-op Diagnosis:   Osteoarthritis of ankle and foot, right [M19.071]  Closed trimalleolar fracture of right ankle, initial encounter [S82.851A]  Status post bunionectomy [Z98.890]       Post-Op Diagnosis Codes:     * Osteoarthritis of ankle and foot, right [M19.071]     * Closed trimalleolar fracture of right ankle, initial encounter [S82.851A]     * Status post bunionectomy [Z98.890]    Procedure/CPT® Codes:        Procedure(s):  ANKLE OPEN REDUCTION INTERNAL with revision of mpj fusion FIXATION               (LATEX ALLERGY)        Surgeon(s):  Mason Salazar DPM    Anesthesia: Choice    Staff:   Circulator: Gretchen Moreno RN; Shikha Mukherjee RN  Scrub Person: Gerber Strickland Niki  Vendor Representative: Cindy Aiken  Assistant: Erlinda Lang MA  Assistant: Erlinda Lang MA      Estimated Blood Loss: minimal    Urine Voided: * No values recorded between 3/23/2023  9:05 AM and 3/23/2023 10:46 AM *    Specimens:                none        Drains: * No LDAs found *    Findings: none        Complications: none     Assistant: Erlinda Lang MA  was responsible for performing the following activities: Retraction, Suction, Irrigation and Placing Dressing and their skilled assistance was necessary for the success of this case.    Mason Salazar DPM     Date: 3/23/2023  Time: 11:00 CDT

## 2023-03-23 NOTE — INTERVAL H&P NOTE
H&P reviewed. The patient was examined and there are no changes to the H&P.      Plan for open reduction internal fixation of right ankle fracture.  Risks and benefits discussed in detail.  No guarantees given or implied.          This document has been electronically signed by Mason Salazar DPM on March 23, 2023 08:35 CDT

## 2023-03-23 NOTE — PLAN OF CARE
Goal Outcome Evaluation:  Plan of Care Reviewed With: patient        Progress: improving  Outcome Evaluation: VSS; patient s/p ORIF of right ankle fracture post fall at home;  C/O chest pain and heart burn;  cardiology to see and home medications restarted;  after belching felt no chest pain; will continue to monitor   5

## 2023-03-23 NOTE — CONSULTS
[facility] Medicine Consult  Consults    Date of Admission: 3/23/2023  Date of Consult: 03/23/23    Primary Care Physician: Andreas Martinez MD  Referring Physician: Dr. Salazar  Chief Complaint/Reason for Consultation: Medical management    Subjective   History of Present Illness  The patient he is a 77-year-old male admitted to the hospital status post open reduction internal fixation right ankle per Dr. Salazar.  The patient does have a longstanding history of coronary artery disease status post stenting.  He has hypertension paroxysmal atrial fibrillation hyperlipidemia hypertension per tensive heart disease GERD and insulin-dependent diabetes mellitus.  Patient was seen for surgery and EKG was completed showing normal sinus rhythm.  Patient was noted with elevated troponin and was complaining of some chest pain.  The patient developed chest burning in the PACU and he was not having any other symptoms.  He does have some shortness of breath on exertion however otherwise stable.  Patient is otherwise stable.  Has been seen by cardiology.  Dr. Salazar to manage postop concerns.    Review of Systems   Constitutional: Positive for fatigue.   HENT: Negative.    Eyes: Negative.    Respiratory: Negative.    Cardiovascular: Positive for chest pain.   Gastrointestinal: Positive for abdominal pain.   Endocrine: Negative.    Genitourinary: Negative.    Musculoskeletal: Positive for joint swelling.        Right lower extremity.  Expected postop   Skin: Negative.    Neurological: Positive for weakness.   Hematological: Negative.    Psychiatric/Behavioral: Negative.  Negative for agitation.      Otherwise complete ROS is negative except as mentioned above.    Past Medical History:   Past Medical History:   Diagnosis Date   • Acute posthemorrhagic anemia    • Afib (HCC)    • Allergic rhinitis    • Anxiety state    • Chest pain    • CKD (chronic kidney disease), stage II    •  Coronary arteriosclerosis     3 stents, followed by Dr. Jeffers   • Diabetes mellitus (HCC)     type 2   • Diverticular disease of colon    • Dysphagia    • Elevated cholesterol    • Epigastric pain    • GERD (gastroesophageal reflux disease)     esophagitis on Dexilant. Pt feels Nexium working better      • History of echocardiogram     Small left atrial enlargement with mild CLVH.Ef of 55-60%.Mitral valve mildly thickened.Av thickened.Left ventricle mildly dilated.Tricuspid intact.Mild aortic insufficiency. 12/07/2015       • History of echocardiogram     Mild diastolic dysfunction and mild left atrial enlargement, otherwise normal echo. EF> 55% 01/03/2012       • Hypercholesterolemia    • Hypertension    • Insomnia    • Irritable bowel syndrome    • Osteoarthritis of multiple joints    • Pain of right foot    • PONV (postoperative nausea and vomiting)      Past Surgical History:  Past Surgical History:   Procedure Laterality Date   • APPENDECTOMY       [facility] 1995       • CARDIAC CATHETERIZATION      Successful PTCA of the OMB#2.Unsuccessful PTCA of the 1st OMB, secondary to difficulty in advancing the balloon. 12/08/2015       • CHOLECYSTECTOMY      [facility] 1993       • CHOLECYSTECTOMY     • COLONOSCOPY  10/01/2012   • COLONOSCOPY N/A 12/11/2017    Procedure: COLONOSCOPY;  Surgeon: Bladimir Michael MD;  Location: [facility] ENDOSCOPY;  Service:    • COLONOSCOPY N/A 11/01/2022    Procedure: COLONOSCOPY;  Surgeon: Bladimir Michael MD;  Location: [facility] ENDOSCOPY;  Service: Gastroenterology;  Laterality: N/A;   • CORONARY ANGIOPLASTY      Successful PTCA & stenting LAD was done w/ deployment of a 2.5mm x23 Xience stent w/ reduction of stenosis from 90% to less than 0% stenosis with good LILLIAM 3 flow 02/17/2012       • ENDOSCOPY      with biopsy.  Mildly severe esophagitis. Gastritis. Normal examined duodenum.Several biopsies obtained in the lower third of the esophagus.Several biopsies  obtained in the gastric antrum. 05/06/2016       • ENDOSCOPY      w tube. Normal esophagus. Gastritis in stomach. Biopsy taken. Gastric polyp found. Biopsy taken. Normal duodenum. 10/01/2012       • ENDOSCOPY AND COLONOSCOPY      Diverticulum in sigmoid colon & descending colon. Internal & external hemorrhoids found. 10/01/2012       • EYE SURGERY Bilateral     catarract   • HAND SURGERY Left      Corrective osteotomy of ring finger metacarpal. Malunited fracture of left ring finger 05/21/1985       • HERNIA REPAIR      Laparoscopic hernia repair. prepertitoneal bilateral inguinal hernia repair with mesh. 10/15/2009      • OTHER SURGICAL HISTORY      CORONARY ARTERY STENT    • SKIN TAG REMOVAL      Excision, viral skin tag,right upper eyelid 05/08/1995      Social History:  reports that he has never smoked. He has never used smokeless tobacco. He reports that he does not drink alcohol and does not use drugs.    Family History: family history includes Arthritis in his father and mother; Clotting disorder in an other family member; Diabetes in his mother; Heart disease in his mother; Hypertension in his mother; Lung disease in an other family member; Obesity in his mother and sister; Schizophrenia in his sister; Stroke in his mother; Thyroid disease in his mother; Tuberculosis in his father, mother, and sister.     Allergies:   Allergies   Allergen Reactions   • Brilinta [Ticagrelor] Shortness Of Breath   • Effient [Prasugrel] Shortness Of Breath   • Warfarin And Related Shortness Of Breath   • Ciprofloxacin Hives   • Lipitor [Atorvastatin] Swelling   • Latex Itching   • Adhesive Tape Rash   • Celexa [Citalopram Hydrobromide] Rash   • Crestor [Rosuvastatin Calcium] Rash   • Floxin [Ofloxacin] Rash   • Januvia [Sitagliptin] Rash   • Penicillins Rash   • Sulfa Antibiotics Rash     Medications: Scheduled Meds:amLODIPine, 10 mg, Oral, Q24H  heparin (porcine), 5,000 Units, Subcutaneous, Q8H  Insulin Aspart, 0-9 Units,  Subcutaneous, TID AC  isosorbide mononitrate, 30 mg, Oral, Q24H  lisinopril, 5 mg, Oral, Q24H  metoprolol tartrate, 50 mg, Oral, Q12H  nitroglycerin, 0.5 inch, Topical, Q6H  [START ON 3/24/2023] pantoprazole, 40 mg, Intravenous, Q AM  propafenone, 150 mg, Oral, Q8H  senna-docusate sodium, 2 tablet, Oral, BID  sodium chloride, 10 mL, Intravenous, Q12H  sucralfate, 1 g, Oral, 4x Daily AC & at Bedtime      Continuous Infusions:Pharmacy Consult,       PRN Meds:.•  ALPRAZolam  •  senna-docusate sodium **AND** polyethylene glycol **AND** bisacodyl **AND** bisacodyl  •  dextrose  •  dextrose  •  glucagon (human recombinant)  •  naloxone  •  ondansetron  •  oxyCODONE-acetaminophen  •  Pharmacy Consult  •  sodium chloride  •  sodium chloride    Objective   Objective    Physical Exam:   Temp:  [97.5 °F (36.4 °C)-98.8 °F (37.1 °C)] 97.5 °F (36.4 °C)  Heart Rate:  [74-88] 81  Resp:  [16-20] 18  BP: ()/(51-74) 124/63  Physical Exam  Vitals and nursing note reviewed.   HENT:      Head: Normocephalic and atraumatic.      Right Ear: External ear normal.      Left Ear: External ear normal.      Nose: Nose normal.      Mouth/Throat:      Mouth: Mucous membranes are moist.      Pharynx: Oropharynx is clear.   Eyes:      General: No scleral icterus.     Extraocular Movements: Extraocular movements intact.      Conjunctiva/sclera: Conjunctivae normal.      Pupils: Pupils are equal, round, and reactive to light.   Cardiovascular:      Rate and Rhythm: Normal rate and regular rhythm.      Pulses: Normal pulses.      Heart sounds: Normal heart sounds.   Pulmonary:      Effort: Pulmonary effort is normal.      Breath sounds: Normal breath sounds.   Abdominal:      General: Bowel sounds are normal. There is no distension.      Palpations: Abdomen is soft.      Tenderness: There is no abdominal tenderness.   Musculoskeletal:         General: Normal range of motion.      Cervical back: Normal range of motion and neck supple.       Comments: Right ankle bandage clean dry intact.  Some edema is noted expected postop   Skin:     General: Skin is warm and dry.      Coloration: Skin is not jaundiced.   Neurological:      General: No focal deficit present.      Mental Status: He is alert and oriented to person, place, and time.      Motor: No weakness.   Psychiatric:         Mood and Affect: Mood normal.         Behavior: Behavior normal.           Results Reviewed:  I have personally reviewed current lab, radiology, and data and agree with results.  Lab Results (last 24 hours)     Procedure Component Value Units Date/Time    TSH [[id number]]  (Normal) Collected: 03/23/23 1318    Specimen: Blood Updated: 03/23/23 1643     TSH 1.200 uIU/mL     Hemoglobin A1c [[id number]]  (Abnormal) Collected: 03/23/23 1318    Specimen: Blood Updated: 03/23/23 1636     Hemoglobin A1C 7.10 %     Narrative:      Hemoglobin A1C Ranges:    Increased Risk for Diabetes  5.7% to 6.4%  Diabetes                     >= 6.5%  Diabetic Goal                < 7.0%    POC Glucose Once [[id number]]  (Abnormal) Collected: 03/23/23 1620    Specimen: Blood Updated: 03/23/23 1633     Glucose 250 mg/dL      Comment: RN NotifiedOperator: [id number] ANATOLY SANTOSMeter ID: [id number]       High Sensitivity Troponin T 2Hr [[id number]]  (Abnormal) Collected: 03/23/23 1521    Specimen: Blood Updated: 03/23/23 1612     HS Troponin T 15 ng/L      Troponin T Delta 0 ng/L     Narrative:      High Sensitive Troponin T Reference Range:  <10.0 ng/L- Negative Female for AMI  <15.0 ng/L- Negative Male for AMI  >=10 - Abnormal Female indicating possible myocardial injury.  >=15 - Abnormal Male indicating possible myocardial injury.   Clinicians would have to utilize clinical acumen, EKG, Troponin, and serial changes to determine if it is an Acute Myocardial Infarction or myocardial injury due to an underlying chronic condition.         Basic Metabolic Panel [[id number]]  (Abnormal) Collected: 03/23/23  1318    Specimen: Blood Updated: 03/23/23 1400     Glucose 159 mg/dL      BUN 27 mg/dL      Creatinine 0.93 mg/dL      Sodium 132 mmol/L      Potassium 4.6 mmol/L      Chloride 99 mmol/L      CO2 23.0 mmol/L      Calcium 8.7 mg/dL      BUN/Creatinine Ratio 29.0     Anion Gap 10.0 mmol/L      eGFR 84.0 mL/min/1.73     Narrative:      GFR Normal >60  Chronic Kidney Disease <60  Kidney Failure <15    The GFR formula is only valid for adults with stable renal function between ages 18 and 70.    High Sensitivity Troponin T [[id number]]  (Abnormal) Collected: 03/23/23 1318    Specimen: Blood Updated: 03/23/23 1400     HS Troponin T 15 ng/L     Narrative:      High Sensitive Troponin T Reference Range:  <10.0 ng/L- Negative Female for AMI  <15.0 ng/L- Negative Male for AMI  >=10 - Abnormal Female indicating possible myocardial injury.  >=15 - Abnormal Male indicating possible myocardial injury.   Clinicians would have to utilize clinical acumen, EKG, Troponin, and serial changes to determine if it is an Acute Myocardial Infarction or myocardial injury due to an underlying chronic condition.         CBC & Differential [[id number]]  (Abnormal) Collected: 03/23/23 1318    Specimen: Blood Updated: 03/23/23 1326    Narrative:      The following orders were created for panel order CBC & Differential.  Procedure                               Abnormality         Status                     ---------                               -----------         ------                     CBC Auto Differential[[id number]]        Abnormal            Final result                 Please view results for these tests on the individual orders.    CBC Auto Differential [[id number]]  (Abnormal) Collected: 03/23/23 1318    Specimen: Blood Updated: 03/23/23 1326     WBC 5.28 10*3/mm3      RBC 3.87 10*6/mm3      Hemoglobin 10.9 g/dL      Hematocrit 32.8 %      MCV 84.8 fL      MCH 28.2 pg      MCHC 33.2 g/dL      RDW 13.8 %      RDW-SD 42.5 fl      MPV 8.3 fL       Platelets 252 10*3/mm3      Neutrophil % 86.8 %      Lymphocyte % 9.5 %      Monocyte % 2.5 %      Eosinophil % 0.2 %      Basophil % 0.2 %      Immature Grans % 0.8 %      Neutrophils, Absolute 4.59 10*3/mm3      Lymphocytes, Absolute 0.50 10*3/mm3      Monocytes, Absolute 0.13 10*3/mm3      Eosinophils, Absolute 0.01 10*3/mm3      Basophils, Absolute 0.01 10*3/mm3      Immature Grans, Absolute 0.04 10*3/mm3      nRBC 0.0 /100 WBC     POC Glucose Once [[id number]]  (Normal) Collected: 03/23/23 1054    Specimen: Blood Updated: 03/23/23 1134     Glucose 126 mg/dL      Comment: : [id number] GLAHILARIABROOK  LYNNEMeter ID: [id number]       POC Glucose Once [[id number]]  (Abnormal) Collected: 03/23/23 0720    Specimen: Blood Updated: 03/23/23 0926     Glucose 150 mg/dL      Comment: : [id number] PATTI MICHELLEMeter ID: [id number]           Imaging Results (Last 24 Hours)     Procedure Component Value Units Date/Time    FL C Arm During Surgery [[id number]] Resulted: 03/23/23 0940     Updated: 03/23/23 1032          Assessment / Plan   Assessment:     Active Hospital Problems    Diagnosis    • **Status post bunionectomy    • Closed trimalleolar fracture of right ankle    • Osteoarthritis of ankle and foot, right       Impression/treatment plan  Status post open reduction internal fixation right ankle  PT OT  Management per Dr. Salazar.    Chest pain  Has been seen by cardiology  Troponin is currently 15.  Only slightly elevated.  Cardiology feels it is more related to GERD.  Recommending continuing Protonix and Carafate which have been started.    Hypertension  Current blood pressure 124/63  Continue Rythmol  O2 saturation 99% room air  Continue Lopressor and Norvasc.  Continue aspirin    Diabetes mellitus type 2  Hemoglobin A1c is 7.1.  TSH is 1.2.  We will continue patient on ADA/cardiac diet  Sliding scale    GERD  Continue Carafate  Continue Protonix    Deconditioning  Continue PT OT    CODE STATUS full  code  DVT prophylaxis heparin        Medical Decision Making  Number and Complexity of problems: 3 complex problems  Differential Diagnosis:     Conditions and Status:        Condition is unchanged.     [facility] Data  External documents reviewed: Hospital charts  My EKG interpretation:   My plain film interpretation:   Tests considered but not ordered: None     Decision rules/scores evaluated (example [id number], Wells, etc): None     Discussed with: Patient and Dr. Salazar.     Treatment Plan  Above    Care Planning  Shared decision making: Patient updated on current status and informed of proposed care plan; is in agreement with plan  Code status and discussions: Full code    Disposition  Social Determinants of Health that impact treatment or disposition: None  I expect the patient to be discharged to home when stabilized    I confirmed that the patient's Advance Care Plan is present, code status is documented, or surrogate decision maker is listed in the patient's medical record.      I discussed the patient's findings and my recommendations with patient and he agrees to care plan    The patient's surrogate decision maker is unknown    Patient seen and examined by me on 3/23/2023.    Electronically signed by Nigel Hammond DO, 03/23/23, 18:16 CDT.

## 2023-03-23 NOTE — PROGRESS NOTES
Subjective:  Aj Card Jr. is a 77 y.o. male who presents for       Patient Active Problem List   Diagnosis   • Type 2 diabetes mellitus without complication, without long-term current use of insulin ([facility])   • Essential hypertension   • Mixed hyperlipidemia   • Generalized anxiety disorder   • History of colon polyps   • Diverticulosis of large intestine without hemorrhage   • Coronary artery disease with angina pectoris   • Gastroesophageal reflux disease with esophagitis   • Vitamin D deficiency   • Overweight   • Encounter for screening for endocrine disorder   • Atopic dermatitis   • High serum vitamin B12   • Acute pain of right knee   • Tear of medial meniscus of right knee, current   • Atrial fibrillation with RVR   • GERD (gastroesophageal reflux disease)   • PAF (paroxysmal atrial fibrillation) ([facility])   • Uncontrolled type 2 diabetes mellitus with hyperglycemia   • Gastroesophageal reflux disease   • Chronic constipation   • Stage 2 chronic kidney disease   • Osteoarthritis of ankle and foot, right   • Closed trimalleolar fracture of right ankle   • Status post bunionectomy   • Urinary retention   • History of foot surgery   • Chronic anticoagulation           Current Outpatient Medications:   •  ALPRAZolam (XANAX) 0.5 MG tablet, Take 1 tablet by mouth At Night As Needed for Anxiety., Disp: 60 tablet, Rfl: 0  •  amLODIPine (NORVASC) 10 MG tablet, Take 1 tablet by mouth Daily., Disp: , Rfl:   •  ASPIRIN 81 PO, Take 1 tablet by mouth Daily., Disp: , Rfl:   •  Azelastine HCl 137 MCG/SPRAY solution, Inhale 1 spray Daily., Disp: 30 mL, Rfl: 3  •  coenzyme Q10 100 MG capsule, Take 1 capsule by mouth Daily., Disp: , Rfl:   •  dapagliflozin (Farxiga) 5 MG tablet tablet, Take 1 tablet by mouth Daily., Disp: , Rfl:   •  docusate sodium 100 MG capsule, Take 1 capsule by mouth 2 (Two) Times a Day., Disp: 14 capsule, Rfl: 0  •  Dulaglutide (Trulicity) 3 MG/0.5ML solution pen-injector, Inject 3 mg under the  "skin into the appropriate area as directed 1 (One) Time Per Week. (Patient taking differently: Inject 1.5 mg under the skin into the appropriate area as directed 1 (One) Time Per Week.), Disp: 12 pen, Rfl: 3  •  esomeprazole (nexIUM) 40 MG capsule, Take 1 capsule by mouth Every Morning Before Breakfast., Disp: 30 capsule, Rfl: 3  •  Glucose Blood (BLOOD GLUCOSE TEST) strip, Use 4 x daily use any brand covered by insurance or same brand as before ICD10 code is E11.9, Disp: 120 each, Rfl: 11  •  Insulin Glargine (BASAGLAR KWIKPEN) 100 UNIT/ML injection pen, Inject 15 Units under the skin into the appropriate area as directed At Night As Needed., Disp: , Rfl:   •  Insulin Pen Needle 30G X 8 MM misc, Use 3-4 times daily., Disp: 100 each, Rfl: 3  •  Insulin Syringe 31G X 5/16\" 0.3 ML misc, 4 x daily, Disp: 120 each, Rfl: 11  •  isosorbide mononitrate (IMDUR) 30 MG 24 hr tablet, Take 1 tablet by mouth Daily., Disp: , Rfl:   •  KRILL OIL OMEGA-3 PO, Take 1 capsule by mouth Daily., Disp: , Rfl:   •  Lancet Devices (LANCING DEVICE) misc, USE AS INDICATED TO CORRELATE WITH STRIPS AND METER, Disp: 1 each, Rfl: 1  •  Lancets 30G misc, USE 4 X DAILY, Disp: 120 each, Rfl: 11  •  metoprolol tartrate (LOPRESSOR) 50 MG tablet, Take 1 tablet by mouth 2 (Two) Times a Day., Disp: 180 tablet, Rfl: 1  •  oxyCODONE-acetaminophen (PERCOCET) 7.5-325 MG per tablet, Take 1 tablet by mouth Every 4 (Four) Hours As Needed for Moderate Pain., Disp: 30 tablet, Rfl: 0  •  propafenone (RYTHMOL) 150 MG tablet, Take 1 tablet by mouth Every 8 (Eight) Hours., Disp: , Rfl:   •  Red Yeast Rice Extract (RED YEAST RICE PO), Take 4 capsules by mouth Daily., Disp: , Rfl:   •  rivaroxaban (XARELTO) 15 MG tablet, Take 1 tablet by mouth Daily. Indications: Atrial Fibrillation, Disp: 30 tablet, Rfl: 0  •  Saw Palmetto, Serenoa repens, (SAW PALMETTO PO), Take 1 tablet by mouth Daily., Disp: , Rfl:   •  sucralfate (CARAFATE) 1 g tablet, Take 1 tablet by mouth 2 " (Two) Times a Day., Disp: , Rfl:   •  terazosin (HYTRIN) 2 MG capsule, Take 1 capsule by mouth Every Night., Disp: 30 capsule, Rfl: 0  •  TURMERIC CURCUMIN PO, Take 1,500 mg by mouth Daily., Disp: , Rfl:   •  vitamin B-12 (CYANOCOBALAMIN) 2500 MCG sublingual tablet tablet, Place 5,000 mcg under the tongue 1 (One) Time Per Week., Disp: , Rfl:   •  vitamin E 100 UNIT capsule, Take 1 capsule by mouth Daily., Disp: , Rfl:        Pt is 78 yo male with history of GERD DM type 2, Vitamin B12 deficiency, HLP, HTN, ADRIENNE, CAD sp stent x 3, atrial fibrillation with RVR   Esophagitis, Gastritis, sp appendectomy sp cholecystectomy sp hernia repair surgery, sp hand surgery/ cataract surgery bilateral, abnormal EKG ( right ventricular dilation, LVH,  Grade 1 diastolic dysfunction, mild calcification of aortic valve)  CKD stage, diverticulosis, internal/external hemorrhoids, history of urinary retention. Sp right fusion surgery x 2.        11/17/22 in office visit for recheck. Since last visit pt has seen Cardiology on 9/7/22. Pt also had colonoscopy on 11/1/22 that showed diverticulosis and internal/external hemorrhoids . Pt continues to  Take his medications for HTN, CAD, atrial fibrillation. BP and HR are stable today. Anxiety is stable..     2/17/23 in office visit for recheck. Pt saw GI on 12/27/22 for his diverticular disease and encourage high fiber diet. He will have repeat colonoscoy in 5 years.  He continues to take protonix for GERD. Pt saw Endocrionlogy on 12/30/2 for his uncontrolled DM type 2 and is on trulicity 3 mg weekly. basaglar 15 units novolin R 20 to 25 with every meal and farxiga 5 mg daily. Pt was ordered labwork on 12/2/22 and 12/30/22 but has yet to complete it. His last hemoglobin  On 11/23/22 was 7.20 lipid panel showed triglycerides at 386.  HDL at 26. CMP showed glucose at 87.4. pt saw Podiatry on 2/14/23 for his right foot pain and hallux valgu. He will have right foot surgery on 3/13/22. He was also  ordered NIRAJ studies. He states his heartburn is getting worse. He has not had EGD. He does snack at bedtime mainly eating processed foods. protonix is not working    4/18/23 in office visit for recheck. Since last visit pt had 2 surgeries on right foot fusion on 3/13/23 and 3/23/23.  Pt was at a nursing home for his rehab.  He was [facility] to [facility] From 3/30/[facility] to 4/7/23  For urinary retention and nausea/vomiting  Several days prior.  He was found to have 45401 in bladder scan.  He was also found to be in a fib with RVR. He was sent to ICU and cardizem was started and then transitioned to PO. For his urinary retention he had ratliff catheter placed and Urology was consulted. Pt had cystoscopy. His Ratliff catheter was discontinued and he was able to urinate.  Pt was discharged home instable condition and opted to not go back to Nursing home. Pt was given keflex 500 mg PO BID along with colace and terazosin 2 mg PO qhs . His last labwork on 4/6/23 showed CBC hemoglobin at 12.1 from 11.0 HCT at 36.0 from 33.0 WBC at 6.50 and RBC at 4.30. BMP showed glucose at 160 sodium at 134. His eliquis was changed to xarelto 15 mg daily. HH is now seeing pt and is getting PT/OT.  He has his right foot bandaged.  He was prescribed Norco for pain he has also tried Percocet.  He states Norco made him feel crazy. He states his urination is better. His pain is stable. No fever no shortness of breath no abdominal pain         Male  Problem  The patient's pertinent negatives include no genital injury, genital itching, genital lesions, pelvic pain, penile discharge, penile pain, priapism, scrotal swelling or testicular pain. This is a recurrent problem. The current episode started more than 1 month ago. The problem occurs constantly. The problem has been gradually improving. The patient is experiencing no pain. Pertinent negatives include no chest pain, chills, coughing, diarrhea, fever, headaches, nausea, shortness of breath, sore throat or  vomiting. Nothing aggravates the symptoms. He has tried nothing for the symptoms. The treatment provided no relief. He is sexually active. He never uses condoms. There is no history of BPH, chlamydia, cryptorchidism, erectile aid use, erectile dysfunction, a femoral hernia, gonorrhea, herpes simplex, HIV, an inguinal hernia, kidney stones, prostatitis, sickle cell disease, syphilis or varicocele.   Heartburn  He reports no abdominal pain, no belching, no chest pain, no choking, no coughing, no dysphagia, no early satiety, no globus sensation, no heartburn, no hoarse voice, no nausea, no sore throat, no stridor, no tooth decay, no water brash or no wheezing. This is a recurrent problem. The current episode started more than 1 month ago. The problem occurs constantly. The problem has been unchanged. Nothing aggravates the symptoms. Associated symptoms include fatigue. Pertinent negatives include no muscle weakness. He has tried a PPI for the symptoms. The treatment provided moderate relief. Past procedures do not include an abdominal ultrasound, an EGD, esophageal manometry, esophageal pH monitoring, H. pylori antibody titer or a UGI.   Coronary Artery Disease  Presents for follow-up visit. Pertinent negatives include no chest pain, chest pressure, chest tightness, dizziness, leg swelling, muscle weakness, palpitations, shortness of breath or weight gain. The symptoms have been stable. Compliance with diet is good. Compliance with exercise is good. Compliance with medications is good.   Chronic Kidney Disease  This is a chronic problem. The current episode started more than 1 year ago. The problem occurs constantly. Associated symptoms include arthralgias, fatigue, numbness and weakness. Pertinent negatives include no abdominal pain, chest pain, coughing, nausea or sore throat. He has tried nothing for the symptoms. The treatment provided no relief.   Atrial Fibrillation   Presents for follow-up visit. Symptoms  include dizziness, palpitations, shortness of breath and weakness. Symptoms are negative for an AICD problem, bradycardia, chest pain, hemodynamic instability, hypertension, hypotension, pacemaker problem and tachycardia. The symptoms have been stable. Past medical history includes atrial fibrillation. There are no medication compliance problems.   Hypertension   This is a chronic problem. The current episode started more than 1 year ago. The problem is controlled. Pertinent negatives include no anxiety, blurred vision, chest pain, headaches, malaise/fatigue, neck pain, palpitations, peripheral edema, PND, shortness of breath or sweats. Risk factors for coronary artery disease include diabetes mellitus, dyslipidemia, sedentary lifestyle and male gender. There are no compliance problems.  Hypertensive end-organ damage includes CAD/MI. There is no history of angina, kidney disease, CVA, heart failure, left ventricular hypertrophy, PVD or retinopathy. There is no history of chronic renal disease, coarctation of the aorta, hyperaldosteronism, hypercortisolism, hyperparathyroidism, a hypertension causing med, pheochromocytoma, renovascular disease, sleep apnea or a thyroid problem.   Diabetes   He presents for his follow-up diabetic visit. He has type 2 diabetes mellitus. His disease course has been stable. Hypoglycemia symptoms include nervousness/anxiousness. Pertinent negatives for hypoglycemia include no confusion, dizziness, headaches or sweats. Pertinent negatives for diabetes include no blurred vision, no chest pain, no fatigue, no foot paresthesias, no foot ulcerations, no polydipsia, no polyphagia, no polyuria, no visual change, no weakness and no weight loss. Pertinent negatives for hypoglycemia complications include no blackouts and no hospitalization. Symptoms are stable. Pertinent negatives for diabetic complications include no CVA, impotence, PVD or retinopathy. Risk factors for coronary artery disease  include diabetes mellitus, dyslipidemia, hypertension and male sex. Current diabetic treatment includes oral agent (dual therapy). He is compliant with treatment most of the time. His weight is stable. An ACE inhibitor/angiotensin II receptor blocker is being taken. He does not see a podiatrist.Eye exam is not current.   Anxiety   Presents for follow-up visit. Symptoms include excessive worry and nervous/anxious behavior. Patient reports no chest pain, compulsions, confusion, decreased concentration, depressed mood, dizziness, dry mouth, feeling of choking, hyperventilation, impotence, insomnia, irritability, malaise, muscle tension, nausea, obsessions, palpitations, panic, restlessness, shortness of breath or suicidal ideas.        Review of Systems  Review of Systems   Constitutional: Positive for activity change and fatigue. Negative for appetite change, chills, diaphoresis and fever.   HENT: Negative for congestion, postnasal drip, rhinorrhea, sinus pressure, sinus pain, sneezing, sore throat, trouble swallowing and voice change.    Respiratory: Negative for cough, choking, chest tightness, shortness of breath, wheezing and stridor.    Cardiovascular: Negative for chest pain.   Gastrointestinal: Negative for diarrhea, nausea and vomiting.   Genitourinary: Negative for pelvic pain, penile discharge, penile pain, scrotal swelling and testicular pain.   Musculoskeletal: Positive for arthralgias.   Neurological: Positive for weakness and numbness. Negative for headaches.   Psychiatric/Behavioral: The patient is nervous/anxious.        Patient Active Problem List   Diagnosis   • Type 2 diabetes mellitus without complication, without long-term current use of insulin ([facility])   • Essential hypertension   • Mixed hyperlipidemia   • Generalized anxiety disorder   • History of colon polyps   • Diverticulosis of large intestine without hemorrhage   • Coronary artery disease with angina pectoris   • Gastroesophageal reflux  disease with esophagitis   • Vitamin D deficiency   • Overweight   • Encounter for screening for endocrine disorder   • Atopic dermatitis   • High serum vitamin B12   • Acute pain of right knee   • Tear of medial meniscus of right knee, current   • Atrial fibrillation with RVR   • GERD (gastroesophageal reflux disease)   • PAF (paroxysmal atrial fibrillation) ([facility])   • Uncontrolled type 2 diabetes mellitus with hyperglycemia   • Gastroesophageal reflux disease   • Chronic constipation   • Stage 2 chronic kidney disease   • Osteoarthritis of ankle and foot, right   • Closed trimalleolar fracture of right ankle   • Status post bunionectomy   • Urinary retention   • History of foot surgery   • Chronic anticoagulation     Past Surgical History:   Procedure Laterality Date   • ANKLE OPEN REDUCTION INTERNAL FIXATION Right 3/23/2023    Procedure: ANKLE OPEN REDUCTION INTERNAL FIXATION               (LATEX ALLERGY);  Surgeon: Mason Salazar DPM;  Location: [facility] OR;  Service: Podiatry;  Laterality: Right;   • APPENDECTOMY       [facility] 1995       • CARDIAC CATHETERIZATION      Successful PTCA of the OMB#2.Unsuccessful PTCA of the 1st OMB, secondary to difficulty in advancing the balloon. 12/08/2015       • CHOLECYSTECTOMY      [facility] 1993       • CHOLECYSTECTOMY     • COLONOSCOPY  10/01/2012   • COLONOSCOPY N/A 12/11/2017    Procedure: COLONOSCOPY;  Surgeon: Bladimir Michael MD;  Location: [facility] ENDOSCOPY;  Service:    • COLONOSCOPY N/A 11/01/2022    Procedure: COLONOSCOPY;  Surgeon: Bladimir Michael MD;  Location: [facility] ENDOSCOPY;  Service: Gastroenterology;  Laterality: N/A;   • CORONARY ANGIOPLASTY      Successful PTCA & stenting LAD was done w/ deployment of a 2.5mm x23 Xience stent w/ reduction of stenosis from 90% to less than 0% stenosis with good LILLIAM 3 flow 02/17/2012       • CYSTOSCOPY N/A 4/5/2023    Procedure: CYSTOSCOPY         (LATEX ALLERGY);  Surgeon: Vinicio Dan  MD;  Location: [facility];  Service: Urology;  Laterality: N/A;   • ENDOSCOPY      with biopsy.  Mildly severe esophagitis. Gastritis. Normal examined duodenum.Several biopsies obtained in the lower third of the esophagus.Several biopsies obtained in the gastric antrum. 05/06/2016       • ENDOSCOPY      w tube. Normal esophagus. Gastritis in stomach. Biopsy taken. Gastric polyp found. Biopsy taken. Normal duodenum. 10/01/2012       • ENDOSCOPY AND COLONOSCOPY      Diverticulum in sigmoid colon & descending colon. Internal & external hemorrhoids found. 10/01/2012       • EYE SURGERY Bilateral     catarract   • HAND SURGERY Left      Corrective osteotomy of ring finger metacarpal. Malunited fracture of left ring finger 05/21/1985       • HERNIA REPAIR      Laparoscopic hernia repair. prepertitoneal bilateral inguinal hernia repair with mesh. 10/15/2009      • OTHER SURGICAL HISTORY      CORONARY ARTERY STENT    • SKIN TAG REMOVAL      Excision, viral skin tag,right upper eyelid 05/08/1995    • TOE FUSION Right 3/13/2023    Procedure: RIGHT FIRST TARSOMETATARSAL JOINT ARTHRODESIS, FIRST METATARSOPHALANGEAL JOINT ARTHRODESIS, CALCANEAL BONE GRAFT AND ALL OTHER INDICATED PROCEDURES                (LATEX ALLERGY);  Surgeon: Mason Salazar DPM;  Location: [facility];  Service: Podiatry;  Laterality: Right;     Social History     Socioeconomic History   • Marital status:    Tobacco Use   • Smoking status: Never     Passive exposure: Never   • Smokeless tobacco: Never   Vaping Use   • Vaping Use: Never used   Substance and Sexual Activity   • Alcohol use: No   • Drug use: Never   • Sexual activity: Defer     Family History   Problem Relation Age of Onset   • Clotting disorder Other    • Lung disease Other    • Schizophrenia Sister    • Obesity Sister    • Tuberculosis Sister    • Arthritis Mother    • Diabetes Mother    • Heart disease Mother    • Hypertension Mother    • Obesity Mother    • Stroke Mother    • Thyroid  disease Mother    • Tuberculosis Mother    • Arthritis Father    • Tuberculosis Father      No results displayed because visit has over 200 results.      Admission on 03/23/2023, Discharged on 03/25/2023   Component Date Value Ref Range Status   • Glucose 03/23/2023 150 (H)  70 - 130 mg/dL Final    : [id number] PATTI Emmanuel ID: [id number]   • Glucose 03/23/2023 126  70 - 130 mg/dL Final    : [id number] GIRMA Zhong ID: [id number]   • QT Interval 03/23/2023 382  ms Final   • QTC Interval 03/23/2023 446  ms Final   • Glucose 03/23/2023 159 (H)  65 - 99 mg/dL Final   • BUN 03/23/2023 27 (H)  8 - 23 mg/dL Final   • Creatinine 03/23/2023 0.93  0.76 - 1.27 mg/dL Final   • Sodium 03/23/2023 132 (L)  136 - 145 mmol/L Final   • Potassium 03/23/2023 4.6  3.5 - 5.2 mmol/L Final   • Chloride 03/23/2023 99  98 - 107 mmol/L Final   • CO2 03/23/2023 23.0  22.0 - 29.0 mmol/L Final   • Calcium 03/23/2023 8.7  8.6 - 10.5 mg/dL Final   • BUN/Creatinine Ratio 03/23/2023 29.0 (H)  7.0 - 25.0 Final   • Anion Gap 03/23/2023 10.0  5.0 - 15.0 mmol/L Final   • eGFR 03/23/2023 84.0  >60.0 mL/min/1.73 Final   • WBC 03/23/2023 5.28  3.40 - 10.80 10*3/mm3 Final   • RBC 03/23/2023 3.87 (L)  4.14 - 5.80 10*6/mm3 Final   • Hemoglobin 03/23/2023 10.9 (L)  13.0 - 17.7 g/dL Final   • Hematocrit 03/23/2023 32.8 (L)  37.5 - 51.0 % Final   • MCV 03/23/2023 84.8  79.0 - 97.0 fL Final   • MCH 03/23/2023 28.2  26.6 - 33.0 pg Final   • MCHC 03/23/2023 33.2  31.5 - 35.7 g/dL Final   • RDW 03/23/2023 13.8  12.3 - 15.4 % Final   • RDW-SD 03/23/2023 42.5  37.0 - 54.0 fl Final   • MPV 03/23/2023 8.3  6.0 - 12.0 fL Final   • Platelets 03/23/2023 252  140 - 450 10*3/mm3 Final   • Neutrophil % 03/23/2023 86.8 (H)  42.7 - 76.0 % Final   • Lymphocyte % 03/23/2023 9.5 (L)  19.6 - 45.3 % Final   • Monocyte % 03/23/2023 2.5 (L)  5.0 - 12.0 % Final   • Eosinophil % 03/23/2023 0.2 (L)  0.3 - 6.2 % Final   • Basophil % 03/23/2023 0.2   0.0 - 1.5 % Final   • Immature Grans % 03/23/2023 0.8 (H)  0.0 - 0.5 % Final   • Neutrophils, Absolute 03/23/2023 4.59  1.70 - 7.00 10*3/mm3 Final   • Lymphocytes, Absolute 03/23/2023 0.50 (L)  0.70 - 3.10 10*3/mm3 Final   • Monocytes, Absolute 03/23/2023 0.13  0.10 - 0.90 10*3/mm3 Final   • Eosinophils, Absolute 03/23/2023 0.01  0.00 - 0.40 10*3/mm3 Final   • Basophils, Absolute 03/23/2023 0.01  0.00 - 0.20 10*3/mm3 Final   • Immature Grans, Absolute 03/23/2023 0.04  0.00 - 0.05 10*3/mm3 Final   • nRBC 03/23/2023 0.0  0.0 - 0.2 /100 WBC Final   • HS Troponin T 03/23/2023 15 (H)  <15 ng/L Final   • QT Interval 03/23/2023 372  ms Final   • QTC Interval 03/23/2023 429  ms Final   • HS Troponin T 03/23/2023 15 (H)  <15 ng/L Final   • Troponin T Delta 03/23/2023 0  >=-4 - <+4 ng/L Final   • Target HR (85%) 03/24/2023 121  bpm Final   • Max. Pred. HR (100%) 03/24/2023 142  bpm Final   • EF(MOD-bp) 03/24/2023 61.4  % Final   • LV GLOBAL STRAIN  03/24/2023 -13.1  % Final   • LVIDd 03/24/2023 4.1  cm Final   • LVIDs 03/24/2023 2.6  cm Final   • IVSd 03/24/2023 1.30  cm Final   • LVPWd 03/24/2023 1.10  cm Final   • FS 03/24/2023 37.5  % Final   • IVS/LVPW 03/24/2023 1.19  cm Final   • ESV(cubed) 03/24/2023 17.1  ml Final   • LV Sys Vol (BSA corrected) 03/24/2023 23.2  cm2 Final   • EDV(cubed) 03/24/2023 70.4  ml Final   • LV Velásquez Vol (BSA corrected) 03/24/2023 57.0  cm2 Final   • LVOT area 03/24/2023 3.2  cm2 Final   • LV mass(C)d 03/24/2023 173.1  grams Final   • LVOT diam 03/24/2023 2.01  cm Final   • EDV(MOD-sp2) 03/24/2023 62.4  ml Final   • EDV(MOD-sp4) 03/24/2023 81.9  ml Final   • ESV(MOD-sp2) 03/24/2023 22.6  ml Final   • ESV(MOD-sp4) 03/24/2023 33.3  ml Final   • SV(MOD-sp2) 03/24/2023 39.8  ml Final   • SV(MOD-sp4) 03/24/2023 48.6  ml Final   • SI(MOD-sp2) 03/24/2023 27.7  ml/m2 Final   • SI(MOD-sp4) 03/24/2023 33.8  ml/m2 Final   • EF(MOD-sp2) 03/24/2023 63.8  % Final   • EF(MOD-sp4) 03/24/2023 59.3  % Final   •  MV E max bay 03/24/2023 70.6  cm/sec Final   • MV A max bay 03/24/2023 76.4  cm/sec Final   • MV E/A 03/24/2023 0.92   Final   • Med [place] 03/24/2023 8.2  cm/sec Final   • Lat [place] 03/24/2023 11.4  cm/sec Final   • Avg E/e' ratio 03/24/2023 7.20   Final   • SV(LVOT) 03/24/2023 57.2  ml Final   • RVIDd 03/24/2023 2.7  cm Final   • TAPSE (>1.6) 03/24/2023 2.09  cm Final   • LA dimension (2D)  03/24/2023 3.7  cm Final   • LV V1 max 03/24/2023 88.8  cm/sec Final   • LV V1 max PG 03/24/2023 3.2  mmHg Final   • LV V1 mean PG 03/24/2023 1.76  mmHg Final   • LV V1 VTI 03/24/2023 18.1  cm Final   • Ao pk bay 03/24/2023 179.8  cm/sec Final   • Ao max PG 03/24/2023 12.9  mmHg Final   • Ao mean PG 03/24/2023 8.2  mmHg Final   • Ao V2 VTI 03/24/2023 35.4  cm Final   • MICHAEL(I,D) 03/24/2023 1.61  cm2 Final   • MV max PG 03/24/2023 2.5  mmHg Final   • MV mean PG 03/24/2023 1.40  mmHg Final   • MV V2 VTI 03/24/2023 23.2  cm Final   • MV P1/2t 03/24/2023 47.3  msec Final   • MVA(P1/2t) 03/24/2023 4.6  cm2 Final   • MVA(VTI) 03/24/2023 2.47  cm2 Final   • MV dec slope 03/24/2023 418.2  cm/sec2 Final   • MR max bay 03/24/2023 443.9  cm/sec Final   • MR max PG 03/24/2023 78.8  mmHg Final   • TR max bay 03/24/2023 256.5  cm/sec Final   • TR max PG 03/24/2023 26.3  mmHg Final   • RVSP(TR) 03/24/2023 29.3  mmHg Final   • RAP systole 03/24/2023 3.0  mmHg Final   • PA V2 max 03/24/2023 133.0  cm/sec Final   • PI end-d bay 03/24/2023 80.1  cm/sec Final   • Ao root diam 03/24/2023 2.9  cm Final   • ACS 03/24/2023 1.17  cm Final   • STJ 03/24/2023 2.5  cm Final   • Hemoglobin A1C 03/23/2023 7.10 (H)  4.80 - 5.60 % Final   • TSH 03/23/2023 1.200  0.270 - 4.200 uIU/mL Final   • Glucose 03/23/2023 250 (H)  70 - 130 mg/dL Final    RN NotifiedOperator: [id number] ANATOLY SANTOSMeter ID: [id number]   • Glucose 03/24/2023 142 (H)  65 - 99 mg/dL Final   • BUN 03/24/2023 22  8 - 23 mg/dL Final   • Creatinine 03/24/2023 0.88  0.76 - 1.27  mg/dL Final   • Sodium 03/24/2023 135 (L)  136 - 145 mmol/L Final   • Potassium 03/24/2023 4.4  3.5 - 5.2 mmol/L Final   • Chloride 03/24/2023 100  98 - 107 mmol/L Final   • CO2 03/24/2023 25.0  22.0 - 29.0 mmol/L Final   • Calcium 03/24/2023 9.1  8.6 - 10.5 mg/dL Final   • BUN/Creatinine Ratio 03/24/2023 25.0  7.0 - 25.0 Final   • Anion Gap 03/24/2023 10.0  5.0 - 15.0 mmol/L Final   • eGFR 03/24/2023 88.0  >60.0 mL/min/1.73 Final   • HS Troponin T 03/24/2023 34 (H)  <15 ng/L Final   • WBC 03/24/2023 7.91  3.40 - 10.80 10*3/mm3 Final   • RBC 03/24/2023 3.78 (L)  4.14 - 5.80 10*6/mm3 Final   • Hemoglobin 03/24/2023 10.7 (L)  13.0 - 17.7 g/dL Final   • Hematocrit 03/24/2023 31.9 (L)  37.5 - 51.0 % Final   • MCV 03/24/2023 84.4  79.0 - 97.0 fL Final   • MCH 03/24/2023 28.3  26.6 - 33.0 pg Final   • MCHC 03/24/2023 33.5  31.5 - 35.7 g/dL Final   • RDW 03/24/2023 13.7  12.3 - 15.4 % Final   • RDW-SD 03/24/2023 42.5  37.0 - 54.0 fl Final   • MPV 03/24/2023 8.8  6.0 - 12.0 fL Final   • Platelets 03/24/2023 296  140 - 450 10*3/mm3 Final   • Neutrophil % 03/24/2023 69.0  42.7 - 76.0 % Final   • Lymphocyte % 03/24/2023 20.4  19.6 - 45.3 % Final   • Monocyte % 03/24/2023 9.1  5.0 - 12.0 % Final   • Eosinophil % 03/24/2023 0.8  0.3 - 6.2 % Final   • Basophil % 03/24/2023 0.3  0.0 - 1.5 % Final   • Immature Grans % 03/24/2023 0.4  0.0 - 0.5 % Final   • Neutrophils, Absolute 03/24/2023 5.47  1.70 - 7.00 10*3/mm3 Final   • Lymphocytes, Absolute 03/24/2023 1.61  0.70 - 3.10 10*3/mm3 Final   • Monocytes, Absolute 03/24/2023 0.72  0.10 - 0.90 10*3/mm3 Final   • Eosinophils, Absolute 03/24/2023 0.06  0.00 - 0.40 10*3/mm3 Final   • Basophils, Absolute 03/24/2023 0.02  0.00 - 0.20 10*3/mm3 Final   • Immature Grans, Absolute 03/24/2023 0.03  0.00 - 0.05 10*3/mm3 Final   • nRBC 03/24/2023 0.0  0.0 - 0.2 /100 WBC Final   • Glucose 03/23/2023 193 (H)  70 - 130 mg/dL Final    RN NotifiedOperator: [id number] JIMMIE Brittter ID:  [id number]   • Glucose 03/24/2023 134 (H)  70 - 130 mg/dL Final    RN NotifiedOperator: [id number] MEHNAZ MELISSAMeter ID: [id number]   • Glucose 03/24/2023 193 (H)  70 - 130 mg/dL Final    Result Not ConfirmedOperator: [id number] ANJANA CHANQUITAMeter ID: [id number]   • COVID19 03/24/2023 Not Detected  Not Detected - Ref. Range Final   • Glucose 03/24/2023 163 (H)  70 - 130 mg/dL Final    RN NotifiedOperator: [id number] LARISSA CABAWNNEEMeter ID: [id number]   • Glucose 03/24/2023 181 (H)  70 - 130 mg/dL Final    RN NotifiedOperator: [id number] GILL GARCIAAHMeter ID: [id number]   • Glucose 03/25/2023 140 (H)  65 - 99 mg/dL Final   • BUN 03/25/2023 17  8 - 23 mg/dL Final   • Creatinine 03/25/2023 1.04  0.76 - 1.27 mg/dL Final   • Sodium 03/25/2023 134 (L)  136 - 145 mmol/L Final   • Potassium 03/25/2023 4.3  3.5 - 5.2 mmol/L Final   • Chloride 03/25/2023 100  98 - 107 mmol/L Final   • CO2 03/25/2023 24.0  22.0 - 29.0 mmol/L Final   • Calcium 03/25/2023 9.2  8.6 - 10.5 mg/dL Final   • BUN/Creatinine Ratio 03/25/2023 16.3  7.0 - 25.0 Final   • Anion Gap 03/25/2023 10.0  5.0 - 15.0 mmol/L Final   • eGFR 03/25/2023 73.5  >60.0 mL/min/1.73 Final   • WBC 03/25/2023 6.39  3.40 - 10.80 10*3/mm3 Final   • RBC 03/25/2023 3.91 (L)  4.14 - 5.80 10*6/mm3 Final   • Hemoglobin 03/25/2023 11.1 (L)  13.0 - 17.7 g/dL Final   • Hematocrit 03/25/2023 33.2 (L)  37.5 - 51.0 % Final   • MCV 03/25/2023 84.9  79.0 - 97.0 fL Final   • MCH 03/25/2023 28.4  26.6 - 33.0 pg Final   • MCHC 03/25/2023 33.4  31.5 - 35.7 g/dL Final   • RDW 03/25/2023 13.7  12.3 - 15.4 % Final   • RDW-SD 03/25/2023 42.3  37.0 - 54.0 fl Final   • MPV 03/25/2023 8.6  6.0 - 12.0 fL Final   • Platelets 03/25/2023 264  140 - 450 10*3/mm3 Final   • Neutrophil % 03/25/2023 67.7  42.7 - 76.0 % Final   • Lymphocyte % 03/25/2023 21.4  19.6 - 45.3 % Final   • Monocyte % 03/25/2023 8.0  5.0 - 12.0 % Final   • Eosinophil % 03/25/2023 1.7  0.3 - 6.2 % Final   • Basophil %  03/25/2023 0.6  0.0 - 1.5 % Final   • Immature Grans % 03/25/2023 0.6 (H)  0.0 - 0.5 % Final   • Neutrophils, Absolute 03/25/2023 4.32  1.70 - 7.00 10*3/mm3 Final   • Lymphocytes, Absolute 03/25/2023 1.37  0.70 - 3.10 10*3/mm3 Final   • Monocytes, Absolute 03/25/2023 0.51  0.10 - 0.90 10*3/mm3 Final   • Eosinophils, Absolute 03/25/2023 0.11  0.00 - 0.40 10*3/mm3 Final   • Basophils, Absolute 03/25/2023 0.04  0.00 - 0.20 10*3/mm3 Final   • Immature Grans, Absolute 03/25/2023 0.04  0.00 - 0.05 10*3/mm3 Final   • nRBC 03/25/2023 0.0  0.0 - 0.2 /100 WBC Final   • Glucose 03/25/2023 137 (H)  70 - 130 mg/dL Final    RN NotifiedOperator: [id number] MILE MATIAHMeter ID: [id number]   • Glucose 03/25/2023 197 (H)  70 - 130 mg/dL Final    Result Not ConfirmedOperator: [id number] MIKE HEATHERMeter ID: [id number]   Pre-Admission Testing on 03/22/2023   Component Date Value Ref Range Status   • MRSA, PCR 03/22/2023 Negative  Negative Final   Admission on 03/13/2023, Discharged on 03/13/2023   Component Date Value Ref Range Status   • Glucose 03/13/2023 131 (H)  70 - 130 mg/dL Final    : [id number] PARKS KERIMeter ID: [id number]   • Glucose 03/13/2023 101  70 - 130 mg/dL Final    : [id number] QUANG AMYMeter ID: [id number]   Pre-Admission Testing on 03/03/2023   Component Date Value Ref Range Status   • Glucose 03/03/2023 153 (H)  65 - 99 mg/dL Final   • BUN 03/03/2023 14  8 - 23 mg/dL Final   • Creatinine 03/03/2023 1.08  0.76 - 1.27 mg/dL Final   • Sodium 03/03/2023 139  136 - 145 mmol/L Final   • Potassium 03/03/2023 4.4  3.5 - 5.2 mmol/L Final   • Chloride 03/03/2023 102  98 - 107 mmol/L Final   • CO2 03/03/2023 26.0  22.0 - 29.0 mmol/L Final   • Calcium 03/03/2023 9.3  8.6 - 10.5 mg/dL Final   • BUN/Creatinine Ratio 03/03/2023 13.0  7.0 - 25.0 Final   • Anion Gap 03/03/2023 11.0  5.0 - 15.0 mmol/L Final   • eGFR 03/03/2023 70.2  >60.0 mL/min/1.73 Final   • QT Interval 03/03/2023 356  ms Final   •  QTC Interval 03/03/2023 402  ms Final   Hospital Outpatient Visit on 02/24/2023   Component Date Value Ref Range Status   • Target HR (85%) 02/24/2023 121  bpm Final   • Max. Pred. HR (100%) 02/24/2023 142  bpm Final   • Upper arterial right arm brachial * 02/24/2023 132  mmHg Final   • Upper arterial left arm brachial s* 02/24/2023 125  mmHg Final   • RIGHT POST TIBIAL SYS MAX 02/24/2023 164   Final   • LEFT POST TIBIAL SYS MAX 02/24/2023 125   Final   • RIGHT DORSALIS PEDIS SYS MAX 02/24/2023 150   Final   • LEFT DORSALIS PEDIS SYS MAX 02/24/2023 124   Final   • RIGHT NIRAJ RATIO 02/24/2023 1.24   Final   • LEFT NIRAJ RATIO 02/24/2023 0.95   Final      XR Abdomen 2+ VW with Chest 1 VW  Narrative: Acute Abdominal Series Withe Upright Chest.    History: Abdominal pain. Vomiting. Acute kidney failure.  Retention of urine. Nausea.    Upright frontal film of the chest and supine and upright films of  the abdomen were obtained.    Comparison: July 20, 2022    FINDINGS:    EKG leads.  The lungs are clear of an acute process.  Coronary artery stents.  The heart is not enlarged.  The pulmonary vasculature is not increased.  No pleural effusion.  No pneumothorax.  No acute osseous abnormality.  Degenerative changes are present in the thoracic spine.    No free air.  Some gaseous distention of the colon and some feces scattered  throughout the colon.  No mechanical bowel obstruction.  No organomegaly.  Atherosclerotic calcification splenic artery.  No acute osseous abnormality.  Minimal degenerative changes lumbar spine.  Cholecystectomy.  Surgical clips right lower quadrant.  Prior inguinal hernia repair.  Impression: Conclusion:  The lungs are clear of an acute process.  Coronary artery stents.  No free air.  Some gaseous distention of the colon and some feces scattered  throughout the colon.  No mechanical bowel obstruction.  Cholecystectomy.  Surgical clips right lower quadrant.  Prior inguinal hernia  "repair.    [id number]    Electronically signed by:  Estuardo Maciel MD  3/30/2023 5:02 PM CDT  Workstation: [id number]    @[email or web address]@  Immunization History   Administered Date(s) Administered   • COVID-19 (MODERNA) 1st, 2nd, 3rd Dose Only 01/26/2021, 04/13/2021, 01/25/2022   • FluLaval/Fluzone >6mos 10/31/2018, 10/31/2018   • Fluad Quad 65+ 11/16/2020   • Fluzone High Dose =>65 Years ([employer] ONLY) 11/02/2016, 10/23/2019   • Fluzone High-Dose 65+yrs 10/13/2022   • Pneumococcal Conjugate 13-Valent (PCV13) 03/15/2018   • Pneumococcal Polysaccharide (PPSV23) 08/15/2005, 03/14/2017   • Pneumococcal, Unspecified 04/14/2015   • Tdap 07/11/2020   • Tetanus 09/15/2015   • influenza Split 12/08/2015       The following portions of the patient's history were reviewed and updated as appropriate: allergies, current medications, past family history, past medical history, past social history, past surgical history and problem list.        Physical Exam  /50 (BP Location: Left arm, Patient Position: Sitting, Cuff Size: Large Adult)   Pulse 86   Temp 97.3 °F (36.3 °C)   Ht 175.3 cm (69.02\")   SpO2 98%   BMI 25.47 kg/m²       Physical Exam  Vitals and nursing note reviewed.   Constitutional:       Appearance: He is well-developed. He is not diaphoretic.   HENT:      Head: Normocephalic and atraumatic.      Right Ear: External ear normal.   Eyes:      Conjunctiva/sclera: Conjunctivae normal.      Pupils: Pupils are equal, round, and reactive to light.   Cardiovascular:      Rate and Rhythm: Normal rate and regular rhythm.      Heart sounds: Normal heart sounds. No murmur heard.  Pulmonary:      Effort: Pulmonary effort is normal. No respiratory distress.      Breath sounds: Normal breath sounds.   Abdominal:      General: Bowel sounds are normal. There is no distension.      Palpations: Abdomen is soft.      Tenderness: There is no abdominal tenderness.   Musculoskeletal:         General: Tenderness present. No deformity. Normal " range of motion.      Cervical back: Normal range of motion and neck supple.   Feet:      Comments: Right foot bandaged   Skin:     General: Skin is warm.      Coloration: Skin is not pale.      Findings: No erythema or rash.   Neurological:      Mental Status: He is alert and oriented to person, place, and time.      Cranial Nerves: No cranial nerve deficit.   Psychiatric:         Behavior: Behavior normal.         [unfilled]   Diagnosis Plan   1. Urinary retention        2. Essential hypertension        3. Mixed hyperlipidemia        4. PAF (paroxysmal atrial fibrillation) (HCC)        5. Coronary artery disease with angina pectoris, unspecified vessel or lesion type, unspecified whether native or transplanted heart        6. Uncontrolled type 2 diabetes mellitus with hyperglycemia        7. Gastroesophageal reflux disease with esophagitis without hemorrhage        8. Stage 2 chronic kidney disease        9. Generalized anxiety disorder        10. High serum vitamin B12        11. Chronic anticoagulation        12. History of foot surgery                -recommend labwork   -recommend COVID-19 vaccination  -urinary retention - reviewed last discharge summary. Urology following. Now on terazosin.  Had recent  Cystoscopy.  HH seeing pt and is with PT/OT. Urinary symptoms is stable   -sp right foot surgery - Podiatry following. On Percocet PRN.    -dizzines/veritigo/tinnitus - referred to ENT n meclizine 12.5 mg every 8 hours PRN.     -constipation - on linzess 145 mcg PO q daily.   -CKD stage 2 - gave information. Continue to monitor   -DM type 2-  Endocrinology following  Pt could   not tolerate synjardy 100/1000 mg daily.  on trulcity 3 mg subqweekly  weekly On novoloin R 20-25  units with meals.  on basaglar 15 untis at bedtime.. On farxiga 5 mg daily.   -HLP  - on  Red yeast rice.on Vascepa 1 gram PO BID. Pt cannot tolerate statin   -GERD/internal/external hemorrhoids/diverticulosis   on nexium  40 mg  daily. Gastroenterology following recommend high fiber diet. He will need to call for an appt GERD.  Advised to not eat at bedtime. On carafate 1 gram PO QID  -overweight - weght loss information provided. Counseled >5 minutes BMI at 25.47   -ADRIENNE/grieving  - pt is on xanax PO PRN. Advised pt to try and avoid taking Percocet and Xanax togeter   -HTN - . On lopressor 50 mg PO BID. on lisinopril 10 mg daily.  Cut back to 5 mg daily  Since BP hypotensive today  Pt advised to monitor blood pressure at home.   -pAF - cardiology following -currently rate controlled off eliquis 5 mg PO BID, now on xarelto 15 mg PO q daily.  lopressor 50 mg every 12 hours, imdur 30 mg PO q daily.  on rhythmol 150 mg every 8 hours   -CAD-sp stent  Cardiology following. Aspirin 81 mg daily.   on xarelto 20 mg PO BID   Lopressor 50 mg PO BID, lisinopril 10 mg daily.  On imdur 30 mg PO q daily.  Off lipitor.  Advised pt to call regarding Cardiology   -vitamin D - vitamin D supplement daily.   -advised pt to be safe and call with questions and concerns  -advised to go to ER or call 911 if symptoms worrisome or severe  -advised to folllowup with referrals and Specialist   -advised pt to be safe during COVID-19 pandemic   I spent 33  minutes caring for Aj on this date of service. This time includes time spent by me in the following activities: preparing for the visit, reviewing tests, obtaining and/or reviewing a separately obtained history, performing a medically appropriate examination and/or evaluation, counseling and educating the patient/family/caregiver, ordering medications, tests, or procedures, referring and communicating with other health care professionals, documenting information in the medical record, independently interpreting results and communicating that information with the patient/family/caregiver and care coordination.         This document has been electronically signed by Andreas Martinez MD on April 18, 2023 14:55 CDT

## 2023-03-23 NOTE — ANESTHESIA POSTPROCEDURE EVALUATION
"Patient: Aj Card Jr.    Procedure Summary     Date: 03/23/23 Room / Location: [facility] OR 11 / [facility] OR    Anesthesia Start: 0906 Anesthesia Stop: 1051    Procedure: ANKLE OPEN REDUCTION INTERNAL FIXATION               (LATEX ALLERGY) (Right: Foot) Diagnosis:       Osteoarthritis of ankle and foot, right      Closed trimalleolar fracture of right ankle, initial encounter      Status post bunionectomy      (Osteoarthritis of ankle and foot, right [M19.071])      (Closed trimalleolar fracture of right ankle, initial encounter [S82.851A])      (Status post bunionectomy [Z98.890])    Surgeons: Mason Salazar DPM Provider: Miguel Haywood CRNA    Anesthesia Type: general ASA Status: 3          Anesthesia Type: general    Vitals  No vitals data found for the desired time range.          Post Anesthesia Care and Evaluation    Patient location during evaluation: PACU  Patient participation: complete - patient participated  Level of consciousness: agitated  Pain score: 0  Pain management: adequate    Airway patency: patent  Anesthetic complications: No anesthetic complications  PONV Status: none  Cardiovascular status: acceptable  Respiratory status: acceptable and room air  Hydration status: acceptable    Comments: BP 98/51 (BP Location: Right arm, Patient Position: Lying)   Pulse 86   Temp 97.7 °F (36.5 °C) (Temporal)   Resp 18   Ht 175.3 cm (69\")   Wt 83.6 kg (184 lb 4.9 oz)   SpO2 96%   BMI 27.22 kg/m²         "

## 2023-03-23 NOTE — ANESTHESIA PREPROCEDURE EVALUATION
Anesthesia Evaluation     Patient summary reviewed and Nursing notes reviewed   history of anesthetic complications: PONV  NPO Solid Status: > 8 hours  NPO Liquid Status: > 2 hours           Airway   Mallampati: II  TM distance: >3 FB  Neck ROM: full  possible difficult intubation  Comment: Final Airway Details  Final airway type: supraglottic airway        Successful airway: I-gel  Size 4     Number of attempts at approach: 1  Assessment: lips, teeth, and gum same as pre-op  Dental    (+) poor dentation    Pulmonary - negative pulmonary ROS    breath sounds clear to auscultation  (-) shortness of breath, not a smoker  Cardiovascular     ECG reviewed  PT is on anticoagulation therapy  Patient on routine beta blocker and Beta blocker given within 24 hours of surgery  Rhythm: regular  Rate: normal    (+) hypertension, past MI  >12 months, CAD, cardiac stents (One stent 2005. Two stents 2010.) dysrhythmias Paroxysmal Atrial Fib, hyperlipidemia,   (-) angina, GALVAN, murmur, carotid bruits    ROS comment: Normal sinus rhythm  Cannot rule out Anterior infarct , age undetermined  Abnormal ECG  When compared with ECG of 05-JUN-2020 10:46,  Non-specific change in ST segment in Lateral leads  Nonspecific T wave abnormality now evident in Lateral leadsEstimated EF appears to be in the range of 51 - 55%. Normal left ventricular cavity size noted. All left ventricular wall segments contract normally. Left ventricular wall thickness is consistent with mild concentric hypertrophy. Left ventricular diastolic dysfunction is noted (grade I) consistent with impaired relaxation. There is no evidence of a left ventricular mass or thrombus present.    Neuro/Psych  (+) psychiatric history Anxiety,    GI/Hepatic/Renal/Endo    (+) obesity,  GERD well controlled,  diabetes mellitus type 2 using insulin,   Renal disease: Creatinine 1.08.    Musculoskeletal     Abdominal   (+) obese,    Substance History - negative use     OB/GYN negative  Discussed the importance of blood sugar control in the prevention of ocular complications. ob/gyn ROS         Other   arthritis,                        Anesthesia Plan    ASA 3     general     intravenous induction     Anesthetic plan, risks, benefits, and alternatives have been provided, discussed and informed consent has been obtained with: patient and spouse/significant other.  Pre-procedure education provided  Plan discussed with CRNA.        CODE STATUS:

## 2023-03-23 NOTE — CONSULTS
[facility] Cardiology Consult    Referring Provider: Mason Salazar DPM      Reason for Consultation: Chest pain    Patient Care Team:  Andreas Martinez MD as PCP - General (Family Medicine)  Andreas Aldana PA-C as Physician Assistant (Gastroenterology)  Glen Jain MD as Consulting Physician (Endocrinology)  David Jeffers MD as Consulting Physician (Cardiology)    Chief complaint No chief complaint on file.      Subjective .     History of present illness:     Mr. Aj Card is an 77-year-old male with medical history of coronary artery disease and s/p coronary stenting, hypertension, paroxysmal atrial fibrillation, hyperlipidemia, hypertensive heart disease, GERD, insulin-dependent diabetic, and mild mitral valve regurgitation.    He presented today for foot surgery and is now status post bunionectomy.  In recovery he developed midsternal burning pain.  EKG obtained showing normal sinus rhythm without ST elevation.  High sensitivity troponin slightly elevated at 15.  CBC with hemoglobin 10.9, hematocrit 32.8.  Normal WBC.  Last lipid panel showing LDL 74, HDL 26    He tells me that he developed chest burning while in the PACU.  It has been constant and nagging.  Denies associated symptoms such as nausea, vomiting, diaphoresis, or shortness of breath.  Currently sitting in bed in no distress.  Has Nitropaste on.  Tells me that nitro did not help chest burning.  When asked, he tells me that this is not his cardiac chest pain and feels like if he could burp chest pressure would go away.  Has not had his daily medications which include amlodipine and metoprolol.    Past records indicate that he follows Dr. Jeffers, cardiologist  Most recent cardiac cath in 2015 he underwent POBA of , at this time LAD stent patent, RCA stent patent.  He did have a first diagonal with 40% blockage, ramus intermedius branch also noted to have a 30 to 40% lesion     Last Lexiscan stress  test in 2020 was low risk for CAD.    The CVD Risk score (D'Agostino, et al., 2008) failed to calculate for the following reasons:    The 2008 CVD risk score is only valid for ages 30 to 74      Review of Systems  Review of Systems   Constitutional: Negative for activity change, diaphoresis and fatigue.   Respiratory: Negative for cough, shortness of breath and wheezing.    Cardiovascular: Positive for chest pain (burning).   Gastrointestinal: Negative for abdominal distention, abdominal pain, nausea and vomiting.        Heartburn   Musculoskeletal:        Recent foot surgery   Neurological: Negative for dizziness and light-headedness.     History  Past Medical History:   Diagnosis Date   • Acute posthemorrhagic anemia    • Afib (HCC)    • Allergic rhinitis    • Anxiety state    • Chest pain    • CKD (chronic kidney disease), stage II    • Coronary arteriosclerosis     3 stents, followed by Dr. Jeffers   • Diabetes mellitus (HCC)     type 2   • Diverticular disease of colon    • Dysphagia    • Elevated cholesterol    • Epigastric pain    • GERD (gastroesophageal reflux disease)     esophagitis on Dexilant. Pt feels Nexium working better      • History of echocardiogram     Small left atrial enlargement with mild CLVH.Ef of 55-60%.Mitral valve mildly thickened.Av thickened.Left ventricle mildly dilated.Tricuspid intact.Mild aortic insufficiency. 12/07/2015       • History of echocardiogram     Mild diastolic dysfunction and mild left atrial enlargement, otherwise normal echo. EF> 55% 01/03/2012       • Hypercholesterolemia    • Hypertension    • Insomnia    • Irritable bowel syndrome    • Osteoarthritis of multiple joints    • Pain of right foot    • PONV (postoperative nausea and vomiting)    ,   Past Surgical History:   Procedure Laterality Date   • APPENDECTOMY       [facility] 1995       • CARDIAC CATHETERIZATION      Successful PTCA of the OMB#2.Unsuccessful PTCA of the 1st OMB, secondary to  difficulty in advancing the balloon. 12/08/2015       • CHOLECYSTECTOMY      [facility] 1993       • CHOLECYSTECTOMY     • COLONOSCOPY  10/01/2012   • COLONOSCOPY N/A 12/11/2017    Procedure: COLONOSCOPY;  Surgeon: Bladimir Michael MD;  Location: [facility] ENDOSCOPY;  Service:    • COLONOSCOPY N/A 11/01/2022    Procedure: COLONOSCOPY;  Surgeon: Bladimir Michael MD;  Location: [facility] ENDOSCOPY;  Service: Gastroenterology;  Laterality: N/A;   • CORONARY ANGIOPLASTY      Successful PTCA & stenting LAD was done w/ deployment of a 2.5mm x23 Xience stent w/ reduction of stenosis from 90% to less than 0% stenosis with good LILLIAM 3 flow 02/17/2012       • ENDOSCOPY      with biopsy.  Mildly severe esophagitis. Gastritis. Normal examined duodenum.Several biopsies obtained in the lower third of the esophagus.Several biopsies obtained in the gastric antrum. 05/06/2016       • ENDOSCOPY      w tube. Normal esophagus. Gastritis in stomach. Biopsy taken. Gastric polyp found. Biopsy taken. Normal duodenum. 10/01/2012       • ENDOSCOPY AND COLONOSCOPY      Diverticulum in sigmoid colon & descending colon. Internal & external hemorrhoids found. 10/01/2012       • EYE SURGERY Bilateral     catarract   • HAND SURGERY Left      Corrective osteotomy of ring finger metacarpal. Malunited fracture of left ring finger 05/21/1985       • HERNIA REPAIR      Laparoscopic hernia repair. prepertitoneal bilateral inguinal hernia repair with mesh. 10/15/2009      • OTHER SURGICAL HISTORY      CORONARY ARTERY STENT    • SKIN TAG REMOVAL      Excision, viral skin tag,right upper eyelid 05/08/1995    ,   Family History   Problem Relation Age of Onset   • Clotting disorder Other    • Lung disease Other    • Schizophrenia Sister    • Obesity Sister    • Tuberculosis Sister    • Arthritis Mother    • Diabetes Mother    • Heart disease Mother    • Hypertension Mother    • Obesity Mother    • Stroke Mother    • Thyroid disease Mother    •  Tuberculosis Mother    • Arthritis Father    • Tuberculosis Father    ,   Social History     Tobacco Use   • Smoking status: Never   • Smokeless tobacco: Never   Vaping Use   • Vaping Use: Never used   Substance Use Topics   • Alcohol use: No   • Drug use: Never   ,   Medications Prior to Admission   Medication Sig Dispense Refill Last Dose   • coenzyme Q10 100 MG capsule Take 1 capsule by mouth Daily.   3/21/2023 at 0800   • dapagliflozin (Farxiga) 5 MG tablet tablet Take 1 tablet by mouth Daily.   3/22/2023 at 0800   • esomeprazole (nexIUM) 40 MG capsule Take 1 capsule by mouth Every Morning Before Breakfast. 30 capsule 3 3/22/2023 at 0800   • KRILL OIL OMEGA-3 PO Take 1 capsule by mouth Daily.   3/22/2023 at 0800   • lisinopril (PRINIVIL,ZESTRIL) 5 MG tablet Take 1 tablet by mouth Daily. 90 tablet 1 3/22/2023 at 0800   • metoprolol tartrate (LOPRESSOR) 50 MG tablet Take 1 tablet by mouth 2 (Two) Times a Day. 180 tablet 1 3/22/2023 at 0800   • propafenone (RYTHMOL) 150 MG tablet Take 1 tablet by mouth Every 8 (Eight) Hours.   3/22/2023 at 0800   • Red Yeast Rice Extract (RED YEAST RICE PO) Take 4 capsules by mouth Daily.   3/22/2023 at 0800   • Saw Palmetto, Serenoa repens, (SAW PALMETTO PO) Take 1 tablet by mouth Daily.   3/22/2023 at 0800   • sucralfate (CARAFATE) 1 g tablet Take 1 tablet by mouth 2 (Two) Times a Day.   3/22/2023 at 0800   • vitamin B-12 (CYANOCOBALAMIN) 2500 MCG sublingual tablet tablet Place 2,500 mcg under the tongue 1 (One) Time Per Week.   3/22/2023 at 0800   • ALPRAZolam (XANAX) 0.5 MG tablet Take 1 tablet by mouth At Night As Needed for Anxiety. 60 tablet 0 3/16/2023   • ASPIRIN 81 PO Take 1 tablet by mouth Daily.   3/16/2023 at 0800   • Azelastine HCl 137 MCG/SPRAY solution Inhale 1 spray Daily. 30 mL 3 More than a month   • cholecalciferol (VITAMIN D3) 1000 units tablet Take 1 tablet by mouth Daily. 30 tablet 3 3/16/2023 at 0800   • Dulaglutide (Trulicity) 3 MG/0.5ML solution  "pen-injector Inject 3 mg under the skin into the appropriate area as directed 1 (One) Time Per Week. 12 pen 3 3/20/2023 at 0800   • Glucose Blood (BLOOD GLUCOSE TEST) strip Use 4 x daily use any brand covered by insurance or same brand as before ICD10 code is E11.9 120 each 11    • HYDROcodone-acetaminophen (Norco)  MG per tablet Take 1 tablet by mouth Every 6 (Six) Hours As Needed for Moderate Pain. 30 tablet 0 3/19/2023   • Insulin Glargine (BASAGLAR KWIKPEN) 100 UNIT/ML injection pen Inject 15 Units under the skin into the appropriate area as directed At Night As Needed.   3/19/2023   • Insulin Pen Needle 30G X 8 MM misc Use 3-4 times daily. 100 each 3    • Insulin Syringe 31G X 5/16\" 0.3 ML misc 4 x daily 120 each 11    • Lancet Devices (LANCING DEVICE) misc USE AS INDICATED TO CORRELATE WITH STRIPS AND METER 1 each 1    • Lancets 30G misc USE 4 X DAILY 120 each 11    • rivaroxaban (XARELTO) 20 MG tablet Take 1 tablet by mouth Daily.   3/16/2023 at 0800      ALLERGIES  Brilinta [ticagrelor], Effient [prasugrel], Warfarin and related, Ciprofloxacin, Lipitor [atorvastatin], Latex, Adhesive tape, Celexa [citalopram hydrobromide], Crestor [rosuvastatin calcium], Floxin [ofloxacin], Januvia [sitagliptin], Penicillins, and Sulfa antibiotics    The following portions of the patient's history were reviewed and updated as appropriate: allergies, current medications, past family history, past medical history, past social history, past surgical history and problem list.     Old and outside records reviewed (if available) and pertinent information is included in the objective data.     Objective     Vital Sign Min/Max for last 24 hours  Temp  Min: 97.5 °F (36.4 °C)  Max: 98.8 °F (37.1 °C)   BP  Min: 98/51  Max: 167/74   Pulse  Min: 74  Max: 88   Resp  Min: 16  Max: 20   SpO2  Min: 94 %  Max: 100 %   Flow (L/min)  Min: 2  Max: 2   Weight  Min: 83.6 kg (184 lb 4.9 oz)  Max: 83.7 kg (184 lb 8 oz)     Flowsheet Rows  " "  Flowsheet Row First Filed Value   Admission Height 175.3 cm (69\") Documented at 03/23/2023 0725   Admission Weight 83.6 kg (184 lb 4.9 oz) Documented at 03/23/2023 0725             Physical Exam:  Physical Exam  Vitals and nursing note reviewed.   Constitutional:       Appearance: Normal appearance. He is well-developed and well-groomed.   HENT:      Head: Normocephalic and atraumatic.      Nose: Nose normal. No congestion.      Mouth/Throat:      Mouth: Mucous membranes are moist.      Pharynx: Oropharynx is clear. No oropharyngeal exudate.   Eyes:      General:         Right eye: No discharge.         Left eye: No discharge.      Conjunctiva/sclera: Conjunctivae normal.   Cardiovascular:      Rate and Rhythm: Normal rate and regular rhythm.      Pulses: Normal pulses.      Heart sounds: Normal heart sounds. No murmur heard.    No gallop.   Pulmonary:      Effort: Pulmonary effort is normal.      Breath sounds: Normal breath sounds. No wheezing or rhonchi.   Abdominal:      General: Bowel sounds are normal. There is no distension.      Palpations: Abdomen is soft.      Tenderness: There is no abdominal tenderness.   Musculoskeletal:      Right lower leg: No edema.      Left lower leg: No edema.   Skin:     General: Skin is warm and dry.      Capillary Refill: Capillary refill takes less than 2 seconds.   Neurological:      Mental Status: He is alert and oriented to person, place, and time.   Psychiatric:         Mood and Affect: Mood normal.         Behavior: Behavior is cooperative.         Thought Content: Thought content normal.         Judgment: Judgment normal.            Results Reviewed by myself:     Results from last 7 days   Lab Units 03/23/23  1318   SODIUM mmol/L 132*   POTASSIUM mmol/L 4.6   CHLORIDE mmol/L 99   CO2 mmol/L 23.0   BUN mg/dL 27*   CREATININE mg/dL 0.93   CALCIUM mg/dL 8.7   GLUCOSE mg/dL 159*       Estimated Creatinine Clearance: 77.5 mL/min (by C-G formula based on SCr of 0.93 " mg/dL).          Results from last 7 days   Lab Units 03/23/23  1318   WBC 10*3/mm3 5.28   HEMOGLOBIN g/dL 10.9*   PLATELETS 10*3/mm3 252             Lab Results   Component Value Date    CKTOTAL 41 (L) 12/07/2015    CKMB 0.4 12/07/2015    TROPONINI 0.021 12/07/2015    TROPONINT 15 (H) 03/23/2023     Lab Results   Component Value Date    PROBNP 171.3 06/05/2020       No intake/output data recorded.    Cardiographics  ECG/EMG Results (last 24 hours)     Procedure Component Value Units Date/Time    ECG 12 Lead Chest Pain [[id number]] Collected: 03/23/23 1204     Updated: 03/23/23 1442     QT Interval 382 ms      QTC Interval 446 ms     Narrative:      Test Reason : Chest Pain  Blood Pressure :   */*   mmHG  Vent. Rate :  82 BPM     Atrial Rate :  82 BPM     P-R Int : 166 ms          QRS Dur :  78 ms      QT Int : 382 ms       P-R-T Axes :  47  35  13 degrees     QTc Int : 446 ms    Normal sinus rhythm with sinus arrhythmia  Nonspecific ST abnormality  Abnormal ECG  When compared with ECG of 03-MAR-2023 10:09,  Nonspecific T wave abnormality no longer evident in Lateral leads    Referred By:            Confirmed By: CITLALI VASQUEZ MD    ECG 12 Lead Chest Pain [[id number]] Collected: 03/23/23 1347     Updated: 03/23/23 1444     QT Interval 372 ms      QTC Interval 429 ms     Narrative:      Test Reason : Chest Pain  Blood Pressure :   */*   mmHG  Vent. Rate :  80 BPM     Atrial Rate :  80 BPM     P-R Int : 172 ms          QRS Dur :  76 ms      QT Int : 372 ms       P-R-T Axes :  50  34  49 degrees     QTc Int : 429 ms    Normal sinus rhythm  Normal ECG  When compared with ECG of 23-MAR-2023 12:04,  No significant change was found    Referred By:            Confirmed By: CITLALI VASQUEZ MD          Results for orders placed during the hospital encounter of 06/05/20    Adult Transthoracic Echo Complete W/ Cont if Necessary Per Protocol    Interpretation Summary  · Right ventricular cavity is borderline dilated.  · Left  ventricular wall thickness is consistent with mild concentric hypertrophy.  · Left ventricular diastolic dysfunction (grade I) consistent with impaired relaxation.  · There is mild calcification of the aortic valve mainly affecting the non, left and right coronary cusp(s).      XR Tibia Fibula 2 View Right    Result Date: 3/23/2023  Lateral malleolus mildly comminuted fracture. May be a tiny fracture fragment posterior malleolus at the tibiotalar joint line as well. Minimal widening of the medial tibiotalar joint space. Note:  if pain or symptoms persist beyond reasonable expectations and follow-up imaging is anticipated,  cross sectional imaging  (CT and/or MRI) is suggested, as is deemed clinically appropriate. Electronically signed by:  Salena Anderson MD  3/23/2023 3:40 PM CDT Workstation: [id number]    XR Foot 3+ View Right    Result Date: 3/23/2023  Postoperative change of the first tarsometatarsal joint of the first metatarsophalangeal joints as above. Improved alignment. No evidence of hardware failure. Note:  if pain or symptoms persist beyond reasonable expectations and follow-up imaging is anticipated,  cross sectional imaging  (CT and/or MRI) is suggested, as is deemed clinically appropriate. Electronically signed by:  Salena Anderson MD  3/23/2023 1:59 PM CDT Workstation: [id number]      Intake/Output       03/23/23 0700 - 03/24/23 0659    Intake (ml) 630    Output (ml) --    Net (ml) 630    Last Weight 83.7 kg (184 lb 8 oz)            Assessment & Plan       Status post bunionectomy    Osteoarthritis of ankle and foot, right    Closed trimalleolar fracture of right ankle    1. Chest pain/known CAD.  S/p PCI to obtuse marginal in 2015.  At that time he had patent LAD stent, patent RCA stent, first diagonal branch had 40% lesion, ramus intermedius branch had 30 to 40% lesion.    Presented today for outpatient elective bunionectomy surgery .  Experienced chest pain that is described as burning in chest while  he was in PACU.  EKG showing no ST elevation and normal sinus rhythm.  High-sensitivity troponin at 15, slightly elevated.  He has not had his heart medications such as metoprolol, amlodipine, or aspirin .  He is currently in no distress talking with family in room.  Vital signs are stable.  Telemetry showing normal sinus rhythm without ectopy    Recommend restarting metoprolol tartrate 50 mg twice daily  Recommend restarting amlodipine 10 mg daily  Recommend restarting aspirin 81 mg  Currently has Nitropaste on chest, reports that this is not helping.    Describing GERD symptoms, recommend restarting Protonix and sucralfate.      However, HS troponin is elevated.  Chest burning still present,  will trend troponins.  EKG in a.m.      Disposition: Continue telemetry bed.  We will continue to follow.  We appreciate the consult.    I discussed the patient's findings and my recommendations with patient, family and nursing staff and Dr. Gan        This document has been electronically signed by JORDEN Chaudhry on March 23, 2023 15:53 CDT   Electronically signed by JORDEN Chaudhry, 03/23/23, 3:54 PM CDT.      I personally saw and examined Aj Smithedy Cotto. after the APRN.  I personally performed a history and physical examination of the patient.  I personally reviewed independent findings and plan of care.  I discussed management with the APRN.  I agree with the APRN's documentation.    The patient is a 77-year-old gentleman with a known history of coronary artery disease.  He follows with Dr. Jeffers for management.  Based on Dr. Jeffers's prior notes, he has a known history of PCI to the LAD, LCx and RCA systems.  He also has a history of atrial fibrillation.  He has multiple coronary risk factors including hypertension, dyslipidemia and diabetes mellitus.  The patient underwent ankle surgery earlier today.  He reported development of central chest discomfort in the perioperative  period.  Cardiology service was consulted for further evaluation.    At the time of my evaluation today afternoon, the patient reported complete resolution of chest discomfort after burping.    Vitals:    03/23/23 1328 03/23/23 1403 03/23/23 1451 03/23/23 1615   BP: 132/63 124/63     BP Location: Left arm Left arm     Patient Position: Lying Lying     Pulse: 81 78 87 81   Resp: 18 18 18    Temp: 97.5 °F (36.4 °C) 97.5 °F (36.4 °C)     TempSrc: Temporal Temporal     SpO2: 97% 100% 99%    Weight:       Height:         Chest discomfort/known history of coronary artery disease/elevated serum troponin level: The patient has a known history of coronary artery disease s/p multiple PCI's.  He developed some chest discomfort in the perioperative period around ankle surgery today which resolved after burping.  Borderline elevation in serum high-sensitivity troponin T levels is noted.  ECGs do not show any ST-T wave changes specific for ischemia.  Recommend continuing to monitor troponin levels overnight.  Will consider further work-up if significant uptrend in troponin levels is noted.  Continue medical therapy for coronary artery disease for now.  He is on amlodipine, Imdur, Lopressor therapy.  Recommend restarting aspirin therapy.    Atrial fibrillation: He appears to be on propafenone therapy.  Consider resuming Xarelto therapy once decision on management of chest discomfort is made tomorrow.    Thanks for the consultation.  Please feel free to call us with any questions.    Electronically signed by Anastacio Gan MD, 03/23/23, 6:15 PM CDT.

## 2023-03-23 NOTE — ANESTHESIA PROCEDURE NOTES
Airway  Urgency: elective    Date/Time: 3/23/2023 9:12 AM  Airway not difficult    General Information and Staff    Patient location during procedure: OR    Indications and Patient Condition  Indications for airway management: airway protection    Preoxygenated: yes  MILS maintained throughout      Final Airway Details  Final airway type: supraglottic airway      Successful airway: I-gel  Size 5     Number of attempts at approach: 1  Assessment: lips, teeth, and gum same as pre-op and atraumatic intubation

## 2023-03-24 ENCOUNTER — APPOINTMENT (OUTPATIENT)
Dept: CARDIOLOGY | Facility: HOSPITAL | Age: 78
End: 2023-03-24
Payer: MEDICARE

## 2023-03-24 LAB
ANION GAP SERPL CALCULATED.3IONS-SCNC: 10 MMOL/L (ref 5–15)
BASOPHILS # BLD AUTO: 0.02 10*3/MM3 (ref 0–0.2)
BASOPHILS NFR BLD AUTO: 0.3 % (ref 0–1.5)
BH CV ECHO LEFT VENTRICLE GLOBAL LONGITUDINAL STRAIN: -13.1 %
BH CV ECHO MEAS - ACS: 1.17 CM
BH CV ECHO MEAS - AO MAX PG: 12.9 MMHG
BH CV ECHO MEAS - AO MEAN PG: 8.2 MMHG
BH CV ECHO MEAS - AO ROOT DIAM: 2.9 CM
BH CV ECHO MEAS - AO V2 MAX: 179.8 CM/SEC
BH CV ECHO MEAS - AO V2 VTI: 35.4 CM
BH CV ECHO MEAS - AVA(I,D): 1.61 CM2
BH CV ECHO MEAS - EDV(CUBED): 70.4 ML
BH CV ECHO MEAS - EDV(MOD-SP2): 62.4 ML
BH CV ECHO MEAS - EDV(MOD-SP4): 81.9 ML
BH CV ECHO MEAS - EF(MOD-BP): 61.4 %
BH CV ECHO MEAS - EF(MOD-SP2): 63.8 %
BH CV ECHO MEAS - EF(MOD-SP4): 59.3 %
BH CV ECHO MEAS - ESV(CUBED): 17.1 ML
BH CV ECHO MEAS - ESV(MOD-SP2): 22.6 ML
BH CV ECHO MEAS - ESV(MOD-SP4): 33.3 ML
BH CV ECHO MEAS - FS: 37.5 %
BH CV ECHO MEAS - IVS/LVPW: 1.19 CM
BH CV ECHO MEAS - IVSD: 1.3 CM
BH CV ECHO MEAS - LA DIMENSION: 3.7 CM
BH CV ECHO MEAS - LAT PEAK E' VEL: 11.4 CM/SEC
BH CV ECHO MEAS - LV DIASTOLIC VOL/BSA (35-75): 57 CM2
BH CV ECHO MEAS - LV MASS(C)D: 173.1 GRAMS
BH CV ECHO MEAS - LV MAX PG: 3.2 MMHG
BH CV ECHO MEAS - LV MEAN PG: 1.76 MMHG
BH CV ECHO MEAS - LV SYSTOLIC VOL/BSA (12-30): 23.2 CM2
BH CV ECHO MEAS - LV V1 MAX: 88.8 CM/SEC
BH CV ECHO MEAS - LV V1 VTI: 18.1 CM
BH CV ECHO MEAS - LVIDD: 4.1 CM
BH CV ECHO MEAS - LVIDS: 2.6 CM
BH CV ECHO MEAS - LVOT AREA: 3.2 CM2
BH CV ECHO MEAS - LVOT DIAM: 2.01 CM
BH CV ECHO MEAS - LVPWD: 1.1 CM
BH CV ECHO MEAS - MED PEAK E' VEL: 8.2 CM/SEC
BH CV ECHO MEAS - MR MAX PG: 78.8 MMHG
BH CV ECHO MEAS - MR MAX VEL: 443.9 CM/SEC
BH CV ECHO MEAS - MV A MAX VEL: 76.4 CM/SEC
BH CV ECHO MEAS - MV DEC SLOPE: 418.2 CM/SEC2
BH CV ECHO MEAS - MV E MAX VEL: 70.6 CM/SEC
BH CV ECHO MEAS - MV E/A: 0.92
BH CV ECHO MEAS - MV MAX PG: 2.5 MMHG
BH CV ECHO MEAS - MV MEAN PG: 1.4 MMHG
BH CV ECHO MEAS - MV P1/2T: 47.3 MSEC
BH CV ECHO MEAS - MV V2 VTI: 23.2 CM
BH CV ECHO MEAS - MVA(P1/2T): 4.6 CM2
BH CV ECHO MEAS - MVA(VTI): 2.47 CM2
BH CV ECHO MEAS - PA V2 MAX: 133 CM/SEC
BH CV ECHO MEAS - PI END-D VEL: 80.1 CM/SEC
BH CV ECHO MEAS - RAP SYSTOLE: 3 MMHG
BH CV ECHO MEAS - RVDD: 2.7 CM
BH CV ECHO MEAS - RVSP: 29.3 MMHG
BH CV ECHO MEAS - SI(MOD-SP2): 27.7 ML/M2
BH CV ECHO MEAS - SI(MOD-SP4): 33.8 ML/M2
BH CV ECHO MEAS - SV(LVOT): 57.2 ML
BH CV ECHO MEAS - SV(MOD-SP2): 39.8 ML
BH CV ECHO MEAS - SV(MOD-SP4): 48.6 ML
BH CV ECHO MEAS - TAPSE (>1.6): 2.09 CM
BH CV ECHO MEAS - TR MAX PG: 26.3 MMHG
BH CV ECHO MEAS - TR MAX VEL: 256.5 CM/SEC
BH CV ECHO MEASUREMENTS AVERAGE E/E' RATIO: 7.2
BUN SERPL-MCNC: 22 MG/DL (ref 8–23)
BUN/CREAT SERPL: 25 (ref 7–25)
CALCIUM SPEC-SCNC: 9.1 MG/DL (ref 8.6–10.5)
CHLORIDE SERPL-SCNC: 100 MMOL/L (ref 98–107)
CO2 SERPL-SCNC: 25 MMOL/L (ref 22–29)
CREAT SERPL-MCNC: 0.88 MG/DL (ref 0.76–1.27)
DEPRECATED RDW RBC AUTO: 42.5 FL (ref 37–54)
EGFRCR SERPLBLD CKD-EPI 2021: 88 ML/MIN/1.73
EOSINOPHIL # BLD AUTO: 0.06 10*3/MM3 (ref 0–0.4)
EOSINOPHIL NFR BLD AUTO: 0.8 % (ref 0.3–6.2)
ERYTHROCYTE [DISTWIDTH] IN BLOOD BY AUTOMATED COUNT: 13.7 % (ref 12.3–15.4)
GLUCOSE BLDC GLUCOMTR-MCNC: 134 MG/DL (ref 70–130)
GLUCOSE BLDC GLUCOMTR-MCNC: 163 MG/DL (ref 70–130)
GLUCOSE BLDC GLUCOMTR-MCNC: 181 MG/DL (ref 70–130)
GLUCOSE BLDC GLUCOMTR-MCNC: 193 MG/DL (ref 70–130)
GLUCOSE BLDC GLUCOMTR-MCNC: 193 MG/DL (ref 70–130)
GLUCOSE SERPL-MCNC: 142 MG/DL (ref 65–99)
HCT VFR BLD AUTO: 31.9 % (ref 37.5–51)
HGB BLD-MCNC: 10.7 G/DL (ref 13–17.7)
IMM GRANULOCYTES # BLD AUTO: 0.03 10*3/MM3 (ref 0–0.05)
IMM GRANULOCYTES NFR BLD AUTO: 0.4 % (ref 0–0.5)
LYMPHOCYTES # BLD AUTO: 1.61 10*3/MM3 (ref 0.7–3.1)
LYMPHOCYTES NFR BLD AUTO: 20.4 % (ref 19.6–45.3)
MAXIMAL PREDICTED HEART RATE: 142 BPM
MCH RBC QN AUTO: 28.3 PG (ref 26.6–33)
MCHC RBC AUTO-ENTMCNC: 33.5 G/DL (ref 31.5–35.7)
MCV RBC AUTO: 84.4 FL (ref 79–97)
MONOCYTES # BLD AUTO: 0.72 10*3/MM3 (ref 0.1–0.9)
MONOCYTES NFR BLD AUTO: 9.1 % (ref 5–12)
NEUTROPHILS NFR BLD AUTO: 5.47 10*3/MM3 (ref 1.7–7)
NEUTROPHILS NFR BLD AUTO: 69 % (ref 42.7–76)
NRBC BLD AUTO-RTO: 0 /100 WBC (ref 0–0.2)
PLATELET # BLD AUTO: 296 10*3/MM3 (ref 140–450)
PMV BLD AUTO: 8.8 FL (ref 6–12)
POTASSIUM SERPL-SCNC: 4.4 MMOL/L (ref 3.5–5.2)
RBC # BLD AUTO: 3.78 10*6/MM3 (ref 4.14–5.8)
SARS-COV-2 RNA RESP QL NAA+PROBE: NOT DETECTED
SODIUM SERPL-SCNC: 135 MMOL/L (ref 136–145)
STJ: 2.5 CM
STRESS TARGET HR: 121 BPM
TROPONIN T SERPL HS-MCNC: 34 NG/L
WBC NRBC COR # BLD: 7.91 10*3/MM3 (ref 3.4–10.8)

## 2023-03-24 PROCEDURE — 93356 MYOCRD STRAIN IMG SPCKL TRCK: CPT | Performed by: INTERNAL MEDICINE

## 2023-03-24 PROCEDURE — A9270 NON-COVERED ITEM OR SERVICE: HCPCS | Performed by: PODIATRIST

## 2023-03-24 PROCEDURE — A9270 NON-COVERED ITEM OR SERVICE: HCPCS | Performed by: HOSPITALIST

## 2023-03-24 PROCEDURE — 63710000001 ISOSORBIDE MONONITRATE 30 MG TABLET SUSTAINED-RELEASE 24 HOUR: Performed by: HOSPITALIST

## 2023-03-24 PROCEDURE — 99024 POSTOP FOLLOW-UP VISIT: CPT | Performed by: PODIATRIST

## 2023-03-24 PROCEDURE — 80048 BASIC METABOLIC PNL TOTAL CA: CPT | Performed by: PODIATRIST

## 2023-03-24 PROCEDURE — 84484 ASSAY OF TROPONIN QUANT: CPT | Performed by: NURSE PRACTITIONER

## 2023-03-24 PROCEDURE — 85025 COMPLETE CBC W/AUTO DIFF WBC: CPT | Performed by: PODIATRIST

## 2023-03-24 PROCEDURE — 63710000001 POLYETHYLENE GLYCOL 17 G PACK: Performed by: PODIATRIST

## 2023-03-24 PROCEDURE — 63710000001 ALPRAZOLAM 0.25 MG TABLET: Performed by: HOSPITALIST

## 2023-03-24 PROCEDURE — 93306 TTE W/DOPPLER COMPLETE: CPT

## 2023-03-24 PROCEDURE — 25010000002 HEPARIN (PORCINE) PER 1000 UNITS: Performed by: PODIATRIST

## 2023-03-24 PROCEDURE — 63710000001 PROPAFENONE 150 MG TABLET: Performed by: HOSPITALIST

## 2023-03-24 PROCEDURE — 93306 TTE W/DOPPLER COMPLETE: CPT | Performed by: INTERNAL MEDICINE

## 2023-03-24 PROCEDURE — 63710000001 METOPROLOL TARTRATE 50 MG TABLET: Performed by: HOSPITALIST

## 2023-03-24 PROCEDURE — 63710000001 LISINOPRIL 5 MG TABLET: Performed by: HOSPITALIST

## 2023-03-24 PROCEDURE — 63710000001 AMLODIPINE 10 MG TABLET: Performed by: HOSPITALIST

## 2023-03-24 PROCEDURE — C9803 HOPD COVID-19 SPEC COLLECT: HCPCS

## 2023-03-24 PROCEDURE — 87635 SARS-COV-2 COVID-19 AMP PRB: CPT | Performed by: PODIATRIST

## 2023-03-24 PROCEDURE — 97162 PT EVAL MOD COMPLEX 30 MIN: CPT

## 2023-03-24 PROCEDURE — 93356 MYOCRD STRAIN IMG SPCKL TRCK: CPT

## 2023-03-24 PROCEDURE — 99232 SBSQ HOSP IP/OBS MODERATE 35: CPT | Performed by: INTERNAL MEDICINE

## 2023-03-24 PROCEDURE — 97166 OT EVAL MOD COMPLEX 45 MIN: CPT

## 2023-03-24 PROCEDURE — 63710000001 SUCRALFATE 1 G TABLET: Performed by: HOSPITALIST

## 2023-03-24 PROCEDURE — 63710000001 INSULIN ASPART PER 5 UNITS: Performed by: HOSPITALIST

## 2023-03-24 PROCEDURE — 63710000001 SENNOSIDES-DOCUSATE 8.6-50 MG TABLET: Performed by: PODIATRIST

## 2023-03-24 PROCEDURE — 82962 GLUCOSE BLOOD TEST: CPT

## 2023-03-24 PROCEDURE — 63710000001 OXYCODONE-ACETAMINOPHEN 7.5-325 MG TABLET: Performed by: PODIATRIST

## 2023-03-24 RX ORDER — OXYCODONE AND ACETAMINOPHEN 7.5; 325 MG/1; MG/1
1 TABLET ORAL EVERY 6 HOURS PRN
Qty: 30 TABLET | Refills: 0 | Status: SHIPPED | OUTPATIENT
Start: 2023-03-24 | End: 2023-04-07 | Stop reason: HOSPADM

## 2023-03-24 RX ADMIN — METOPROLOL TARTRATE 50 MG: 50 TABLET, FILM COATED ORAL at 09:35

## 2023-03-24 RX ADMIN — PROPAFENONE HYDROCHLORIDE 150 MG: 150 TABLET, FILM COATED ORAL at 20:20

## 2023-03-24 RX ADMIN — Medication 10 ML: at 09:35

## 2023-03-24 RX ADMIN — LISINOPRIL 5 MG: 5 TABLET ORAL at 09:35

## 2023-03-24 RX ADMIN — SUCRALFATE 1 G: 1 TABLET ORAL at 11:05

## 2023-03-24 RX ADMIN — OXYCODONE HYDROCHLORIDE AND ACETAMINOPHEN 1 TABLET: 7.5; 325 TABLET ORAL at 14:17

## 2023-03-24 RX ADMIN — ISOSORBIDE MONONITRATE 30 MG: 30 TABLET, EXTENDED RELEASE ORAL at 09:35

## 2023-03-24 RX ADMIN — POLYETHYLENE GLYCOL 3350 17 G: 17 POWDER, FOR SOLUTION ORAL at 17:27

## 2023-03-24 RX ADMIN — SUCRALFATE 1 G: 1 TABLET ORAL at 05:23

## 2023-03-24 RX ADMIN — SUCRALFATE 1 G: 1 TABLET ORAL at 17:27

## 2023-03-24 RX ADMIN — PROPAFENONE HYDROCHLORIDE 150 MG: 150 TABLET, FILM COATED ORAL at 15:00

## 2023-03-24 RX ADMIN — DOCUSATE SODIUM 50 MG AND SENNOSIDES 8.6 MG 2 TABLET: 8.6; 5 TABLET, FILM COATED ORAL at 20:20

## 2023-03-24 RX ADMIN — PANTOPRAZOLE SODIUM 40 MG: 40 INJECTION, POWDER, FOR SOLUTION INTRAVENOUS at 05:22

## 2023-03-24 RX ADMIN — OXYCODONE HYDROCHLORIDE AND ACETAMINOPHEN 1 TABLET: 7.5; 325 TABLET ORAL at 05:23

## 2023-03-24 RX ADMIN — INSULIN ASPART 2 UNITS: 100 INJECTION, SOLUTION INTRAVENOUS; SUBCUTANEOUS at 17:27

## 2023-03-24 RX ADMIN — SUCRALFATE 1 G: 1 TABLET ORAL at 20:20

## 2023-03-24 RX ADMIN — PROPAFENONE HYDROCHLORIDE 150 MG: 150 TABLET, FILM COATED ORAL at 06:06

## 2023-03-24 RX ADMIN — HEPARIN SODIUM 5000 UNITS: 5000 INJECTION INTRAVENOUS; SUBCUTANEOUS at 05:22

## 2023-03-24 RX ADMIN — DOCUSATE SODIUM 50 MG AND SENNOSIDES 8.6 MG 2 TABLET: 8.6; 5 TABLET, FILM COATED ORAL at 09:35

## 2023-03-24 RX ADMIN — HEPARIN SODIUM 5000 UNITS: 5000 INJECTION INTRAVENOUS; SUBCUTANEOUS at 15:00

## 2023-03-24 RX ADMIN — INSULIN ASPART 2 UNITS: 100 INJECTION, SOLUTION INTRAVENOUS; SUBCUTANEOUS at 11:06

## 2023-03-24 RX ADMIN — HEPARIN SODIUM 5000 UNITS: 5000 INJECTION INTRAVENOUS; SUBCUTANEOUS at 20:20

## 2023-03-24 RX ADMIN — ALPRAZOLAM 0.25 MG: 0.25 TABLET ORAL at 23:26

## 2023-03-24 RX ADMIN — AMLODIPINE BESYLATE 10 MG: 10 TABLET ORAL at 09:35

## 2023-03-24 NOTE — PLAN OF CARE
Goal Outcome Evaluation:              Outcome Evaluation: VSS at this point in the shift. Pt had a fall earlier today.Pt educated on importance of calling nurse to get out of bed. Pt verbalized understanding. MD aware. No injuries reported or found at this time.

## 2023-03-24 NOTE — PLAN OF CARE
Goal Outcome Evaluation:  Plan of Care Reviewed With: patient        Progress: no change  Outcome Evaluation: VSS. No acute changes this shift. Medicated for pain and anxiety x 1 this shift. Appetite good. Reports fatigue/weakness. Declines wedge for elevation. RLE elevated on pillows.

## 2023-03-24 NOTE — PROGRESS NOTES
"  [facility] Progress Note   LOS: 0 days   Patient Care Team:  Andreas Martinez MD as PCP - General (Family Medicine)  Andreas Aldana PA-C as Physician Assistant (Gastroenterology)  Glen Jain MD as Consulting Physician (Endocrinology)  David Jeffers MD as Consulting Physician (Cardiology)    No chief complaint on file.        Subjective     Interval History:   Patient Denies: shortness of air, chest pain, PND, orthopnea and edema  Patient Complaints: no complaints today  History taken from: patient chart    VSS, telemetry showing normal sinus rhythm.  No reoccurrence of chest burning.     Objective     Vital Sign Min/Max for last 24 hours  Temp  Min: 97.5 °F (36.4 °C)  Max: 98.7 °F (37.1 °C)   BP  Min: 106/59  Max: 167/74   Pulse  Min: 78  Max: 90   Resp  Min: 16  Max: 18   SpO2  Min: 95 %  Max: 100 %   Flow (L/min)  Min: 2  Max: 2   Weight  Min: 83.7 kg (184 lb 8 oz)  Max: 83.9 kg (185 lb)     Flowsheet Rows    Flowsheet Row First Filed Value   Admission Height 175.3 cm (69\") Documented at 03/23/2023 0725   Admission Weight 83.6 kg (184 lb 4.9 oz) Documented at 03/23/2023 0725            03/23/23  0725 03/23/23  1254 03/24/23  0405   Weight: 83.6 kg (184 lb 4.9 oz) 83.7 kg (184 lb 8 oz) 83.9 kg (185 lb)       Physical Exam:  Physical Exam  Vitals and nursing note reviewed.   Constitutional:       Appearance: Normal appearance. He is well-developed and well-groomed. He is not ill-appearing or diaphoretic.   HENT:      Head: Normocephalic and atraumatic.   Eyes:      General:         Right eye: No discharge.         Left eye: No discharge.      Conjunctiva/sclera: Conjunctivae normal.   Cardiovascular:      Rate and Rhythm: Normal rate and regular rhythm.      Pulses: Normal pulses.      Heart sounds: Normal heart sounds, S1 normal and S2 normal. No murmur heard.    No gallop.   Pulmonary:      Effort: Pulmonary effort is normal.      Breath sounds: Normal breath sounds and air entry. "   Musculoskeletal:      Right lower leg: No edema.      Left lower leg: No edema.   Skin:     General: Skin is warm and dry.      Capillary Refill: Capillary refill takes less than 2 seconds.      Coloration: Skin is not pale.   Neurological:      Mental Status: He is alert and oriented to person, place, and time.   Psychiatric:         Attention and Perception: Attention normal.         Mood and Affect: Mood normal.         Speech: Speech normal.          Results Reviewed by myself:     Results from last 7 days   Lab Units 03/24/23  0528 03/23/23  1318   SODIUM mmol/L 135* 132*   POTASSIUM mmol/L 4.4 4.6   CHLORIDE mmol/L 100 99   CO2 mmol/L 25.0 23.0   BUN mg/dL 22 27*   CREATININE mg/dL 0.88 0.93   CALCIUM mg/dL 9.1 8.7   GLUCOSE mg/dL 142* 159*       Estimated Creatinine Clearance: 82.1 mL/min (by C-G formula based on SCr of 0.88 mg/dL).                Results from last 7 days   Lab Units 03/24/23  0528 03/23/23  1318   WBC 10*3/mm3 7.91 5.28   HEMOGLOBIN g/dL 10.7* 10.9*   PLATELETS 10*3/mm3 296 252           Lab Results   Component Value Date    PROBNP 171.3 06/05/2020       I/O last 3 completed shifts:  In: 1350 [P.O.:1100; I.V.:250]  Out: 700 [Urine:700]    Cardiographics:  ECG/EMG Results (last 24 hours)     Procedure Component Value Units Date/Time    ECG 12 Lead Chest Pain [[id number]] Collected: 03/23/23 1204     Updated: 03/23/23 1442     QT Interval 382 ms      QTC Interval 446 ms     Narrative:      Test Reason : Chest Pain  Blood Pressure :   */*   mmHG  Vent. Rate :  82 BPM     Atrial Rate :  82 BPM     P-R Int : 166 ms          QRS Dur :  78 ms      QT Int : 382 ms       P-R-T Axes :  47  35  13 degrees     QTc Int : 446 ms    Normal sinus rhythm with sinus arrhythmia  Nonspecific ST abnormality  Abnormal ECG  When compared with ECG of 03-MAR-2023 10:09,  Nonspecific T wave abnormality no longer evident in Lateral leads    Referred By:            Confirmed By: CITLALI VASQUEZ MD    ECG 12 Lead Chest  Pain [[id number]] Collected: 03/23/23 1347     Updated: 03/23/23 1444     QT Interval 372 ms      QTC Interval 429 ms     Narrative:      Test Reason : Chest Pain  Blood Pressure :   */*   mmHG  Vent. Rate :  80 BPM     Atrial Rate :  80 BPM     P-R Int : 172 ms          QRS Dur :  76 ms      QT Int : 372 ms       P-R-T Axes :  50  34  49 degrees     QTc Int : 429 ms    Normal sinus rhythm  Normal ECG  When compared with ECG of 23-MAR-2023 12:04,  No significant change was found    Referred By:            Confirmed By: CITLALI VASQUEZ MD    SCANNED - TELEMETRY   [[id number]] Resulted: 03/23/23     Updated: 03/24/23 0918    Adult Transthoracic Echo Complete w/ Color, Spectral and Contrast if Necessary Per Protocol [[id number]] Resulted: 03/24/23 0917     Updated: 03/24/23 0922     Target HR (85%) 121 bpm      Max. Pred. HR (100%) 142 bpm      EF(MOD-bp) 61.4 %      LV GLOBAL STRAIN  -13.1 %      LVIDd 4.1 cm      LVIDs 2.6 cm      IVSd 1.30 cm      LVPWd 1.10 cm      FS 37.5 %      IVS/LVPW 1.19 cm      ESV(cubed) 17.1 ml      LV Sys Vol (BSA corrected) 23.2 cm2      EDV(cubed) 70.4 ml      LV Velásquez Vol (BSA corrected) 57.0 cm2      LVOT area 3.2 cm2      LV mass(C)d 173.1 grams      LVOT diam 2.01 cm      EDV(MOD-sp2) 62.4 ml      EDV(MOD-sp4) 81.9 ml      ESV(MOD-sp2) 22.6 ml      ESV(MOD-sp4) 33.3 ml      SV(MOD-sp2) 39.8 ml      SV(MOD-sp4) 48.6 ml      SI(MOD-sp2) 27.7 ml/m2      SI(MOD-sp4) 33.8 ml/m2      EF(MOD-sp2) 63.8 %      EF(MOD-sp4) 59.3 %      MV E max bay 70.6 cm/sec      MV A max bay 76.4 cm/sec      MV E/A 0.92     Med Peak E' Aby 8.2 cm/sec      Lat Peak E' Bay 11.4 cm/sec      Avg E/e' ratio 7.20     SV(LVOT) 57.2 ml      RVIDd 2.7 cm      TAPSE (>1.6) 2.09 cm      LA dimension (2D)  3.7 cm      LV V1 max 88.8 cm/sec      LV V1 max PG 3.2 mmHg      LV V1 mean PG 1.76 mmHg      LV V1 VTI 18.1 cm      Ao pk bay 179.8 cm/sec      Ao max PG 12.9 mmHg      Ao mean PG 8.2 mmHg      Ao V2 VTI 35.4 cm       MICHAEL(I,D) 1.61 cm2      MV max PG 2.5 mmHg      MV mean PG 1.40 mmHg      MV V2 VTI 23.2 cm      MV P1/2t 47.3 msec      MVA(P1/2t) 4.6 cm2      MVA(VTI) 2.47 cm2      MV dec slope 418.2 cm/sec2      MR max gita 443.9 cm/sec      MR max PG 78.8 mmHg      TR max gita 256.5 cm/sec      TR max PG 26.3 mmHg      RVSP(TR) 29.3 mmHg      RAP systole 3.0 mmHg      PA V2 max 133.0 cm/sec      PI end-d gita 80.1 cm/sec      Ao root diam 2.9 cm      ACS 1.17 cm      STJ 2.5 cm     Narrative:      •  Left ventricular ejection fraction appears to be 46 - 50%.  •  Estimated right ventricular systolic pressure from tricuspid   regurgitation is normal (<35 mmHg).  •  Abnormal strain            Results for orders placed during the hospital encounter of 03/23/23    Adult Transthoracic Echo Complete w/ Color, Spectral and Contrast if Necessary Per Protocol    Interpretation Summary  •  Left ventricular ejection fraction appears to be 46 - 50%.  •  Estimated right ventricular systolic pressure from tricuspid regurgitation is normal (<35 mmHg).  •  Abnormal strain      XR Tibia Fibula 2 View Right    Result Date: 3/23/2023  Lateral malleolus mildly comminuted fracture. May be a tiny fracture fragment posterior malleolus at the tibiotalar joint line as well. Minimal widening of the medial tibiotalar joint space. Note:  if pain or symptoms persist beyond reasonable expectations and follow-up imaging is anticipated,  cross sectional imaging  (CT and/or MRI) is suggested, as is deemed clinically appropriate. Electronically signed by:  Salena Anderson MD  3/23/2023 3:40 PM CDT Workstation: [id number]    XR Ankle 3+ View Right    Result Date: 3/23/2023  Comminuted fracture of the lateral malleolus with medial widening of the tibiotalar joint. There may also be a tiny nondisplaced fracture of the posterior malleolus of the tibia as described above.. Note:  if pain or symptoms persist beyond reasonable expectations and follow-up imaging is  anticipated,  cross sectional imaging  (CT and/or MRI) is suggested, as is deemed clinically appropriate. Electronically signed by:  Salena Anderson MD  3/23/2023 4:04 PM CDT Workstation: [id number]    XR Foot 3+ View Right    Result Date: 3/23/2023  Postoperative change of the first tarsometatarsal joint of the first metatarsophalangeal joints as above. Improved alignment. No evidence of hardware failure. Note:  if pain or symptoms persist beyond reasonable expectations and follow-up imaging is anticipated,  cross sectional imaging  (CT and/or MRI) is suggested, as is deemed clinically appropriate. Electronically signed by:  Salena Anderson MD  3/23/2023 1:59 PM CDT Workstation: [id number]      Medication Review:     Current Facility-Administered Medications:   •  ALPRAZolam (XANAX) tablet 0.25 mg, 0.25 mg, Oral, Nightly PRN, Nigel Hammond, DO, 0.25 mg at 03/23/23 2233  •  amLODIPine (NORVASC) tablet 10 mg, 10 mg, Oral, Q24H, Nigel Hammond R, DO, 10 mg at 03/24/23 0935  •  sennosides-docusate (PERICOLACE) 8.6-50 MG per tablet 2 tablet, 2 tablet, Oral, BID, 2 tablet at 03/24/23 0935 **AND** polyethylene glycol (MIRALAX) packet 17 g, 17 g, Oral, Daily PRN **AND** bisacodyl (DULCOLAX) EC tablet 5 mg, 5 mg, Oral, Daily PRN **AND** bisacodyl (DULCOLAX) suppository 10 mg, 10 mg, Rectal, Daily PRN, Mason Salazar DPM  •  dextrose (D50W) (25 g/50 mL) IV injection 25 g, 25 g, Intravenous, Q15 Min PRN, Nigel Hammond R, DO  •  dextrose (GLUTOSE) oral gel 15 g, 15 g, Oral, Q15 Min PRN, Nigel Hammond R, DO  •  glucagon (human recombinant) (GLUCAGEN DIAGNOSTIC) injection 1 mg, 1 mg, Intramuscular, Q15 Min PRN, Nigel Hammond, DO  •  heparin (porcine) 5000 UNIT/ML injection 5,000 Units, 5,000 Units, Subcutaneous, Q8H, Mason Salazar, DPM, 5,000 Units at 03/24/23 0522  •  Insulin Aspart (novoLOG) injection 0-9 Units, 0-9 Units, Subcutaneous, TID AC, Nigel Hammond, , 2 Units at 03/24/23 1106  •   isosorbide mononitrate (IMDUR) 24 hr tablet 30 mg, 30 mg, Oral, Q24H, Stephany, Nigel R, DO, 30 mg at 03/24/23 0935  •  lisinopril (PRINIVIL,ZESTRIL) tablet 5 mg, 5 mg, Oral, Q24H, Stephany, Nigel R, DO, 5 mg at 03/24/23 0935  •  metoprolol tartrate (LOPRESSOR) tablet 50 mg, 50 mg, Oral, Q12H, Stephany, Nigel R, DO, 50 mg at 03/24/23 0935  •  naloxone (NARCAN) injection 0.1 mg, 0.1 mg, Intravenous, Q5 Min PRN, Mason Salazar, DPM  •  nitroglycerin (NITROSTAT) ointment 0.5 inch, 0.5 inch, Topical, Q6H, Stephany, Nigel R, DO, 0.5 inch at 03/23/23 1357  •  ondansetron (ZOFRAN) injection 4 mg, 4 mg, Intravenous, Q6H PRN, Mason Salazar, DPM  •  oxyCODONE-acetaminophen (PERCOCET) 7.5-325 MG per tablet 1 tablet, 1 tablet, Oral, Q4H PRN, Mason Salazar, DPM, 1 tablet at 03/24/23 0523  •  pantoprazole (PROTONIX) injection 40 mg, 40 mg, Intravenous, Q AM, Stephany, Nigel R, DO, 40 mg at 03/24/23 0522  •  Pharmacy Consult, , Does not apply, Continuous PRN, Mason Salazar, DPM  •  propafenone (RYTHMOL) tablet 150 mg, 150 mg, Oral, Q8H, Stephany, Nigel R, DO, 150 mg at 03/24/23 0606  •  sodium chloride 0.9 % flush 10 mL, 10 mL, Intravenous, Q12H, Mason Salazar, DPM, 10 mL at 03/24/23 0935  •  sodium chloride 0.9 % flush 10 mL, 10 mL, Intravenous, PRN, Mason Salazar, DPM  •  sodium chloride 0.9 % infusion 40 mL, 40 mL, Intravenous, PRN, Mason Salazar, DPM  •  sucralfate (CARAFATE) tablet 1 g, 1 g, Oral, 4x Daily AC & at Bedtime, Nigel Hammond, DO, 1 g at 03/24/23 1105    Patient's recent labs and results as included in the subjective data were reviewed by me. Any pertinent outside data will be included.        Assessment & Plan       Status post bunionectomy    Osteoarthritis of ankle and foot, right    Closed trimalleolar fracture of right ankle    1. Chest pain/known CAD.  S/p PCI to obtuse marginal in 2015.  At that time he had patent LAD stent, patent RCA stent, first diagonal branch had 40%  lesion, ramus intermedius branch had 30 to 40% lesion.     Presented today for outpatient elective bunionectomy surgery .  Experienced chest pain that is described as burning in chest while he was in PACU.  EKG showing no ST elevation and normal sinus rhythm.  High-sensitivity troponin at 15, slightly elevated.  He has not had his heart medications such as metoprolol, amlodipine, or aspirin .  He is currently in no distress talking with family in room.  Vital signs are stable.  Telemetry showing normal sinus rhythm without ectopy    Denies recurrent chest burning.     Continue metoprolol tartrate 50 mg twice daily  Continue amlodipine 10 mg daily  Continue aspirin 81 mg    2.  Paroxysmal atrial fibrillation.  Telemetry showing NSR  Continue propafenone therapy  Restart Xarelto 20 mg daily if okay with surgery      Plan for disposition: Cardiology standpoint, we can follow him in the outpatient setting.  He will need to make an appointment with Dr. Jeffers his cardiologist for 1 month.        This document has been electronically signed by JORDEN Chaudhry on March 24, 2023 12:03 CDT   Electronically signed by JORDEN Chaudhry, 03/24/23, 12:04 PM CDT.    I personally saw and examined Aj Smithedy Cotto. after the APRN.  I personally performed a history and physical examination of the patient.  I personally reviewed independent findings and plan of care.  I discussed management with the APRN.  I agree with the APRN's documentation.    No acute overnight events.  The patient has not had any recurrence of chest discomfort since yesterday.  Transthoracic echocardiogram showed LVEF of 46-50%; his previously reported LVEF was 51-55% in June 2020.    Vitals:    03/24/23 0933 03/24/23 1025 03/24/23 1159 03/24/23 1424   BP: 106/59 118/58 136/74 106/52   BP Location: Left arm Left arm  Left arm   Patient Position: Lying Lying  Lying   Pulse: 86 87 87 88   Resp: 17 17 16 16   Temp: 98.4 °F (36.9 °C) 97.8 °F  (36.6 °C)  97.8 °F (36.6 °C)   TempSrc: Temporal Temporal  Temporal   SpO2: 95%  97% 98%   Weight:       Height:         Chest discomfort/known history of coronary artery disease/elevated serum troponin level: The patient has a known history of coronary artery disease s/p multiple PCI's.  He developed some chest discomfort in the perioperative period around ankle surgery yesterday which resolved after burping; he has not had any recurrence of this discomfort since then.  Borderline elevation in serum high-sensitivity troponin T levels is noted.  ECGs do not show any ST-T wave changes specific for ischemia.  Transthoracic echocardiogram showed LVEF of 46 to 50%, his previously reported LVEF was 51 to 55% in June 2020.  Recommend continuing medical therapy for coronary artery disease for now given resolution of his chest discomfort.  He is on amlodipine, Imdur, Lopressor therapy.  Recommend restarting aspirin therapy when okay from surgical perspective.     Atrial fibrillation: He appears to be on propafenone therapy per Dr. Jeffers; this can be reevaluated by Dr. Jeffers on outpatient basis.  Consider resuming Xarelto therapy when okay from surgical perspective.    Dr. Kimble is covering cardiology service over the weekend.  Recommend outpatient follow-up with Dr. Jeffers within 3 to 4 weeks after hospital discharge.    Electronically signed by Anastacio Gan MD, 03/24/23, 7:55 PM CDT.

## 2023-03-24 NOTE — NURSING NOTE
Dr. Salazar is on the unit and is aware of patient fall.  Dr. Salazar has seen the patient post fall.  This RN phoned Dr. Hammond with the hospitalist team and made him aware as well.  No new orders at this time.

## 2023-03-24 NOTE — DISCHARGE SUMMARY
"  Date of Discharge: 3/25/23    Discharge Diagnosis: right ankle fracture     Presenting Problem/History of Present Illness  Active Hospital Problems    Diagnosis  POA   • **Status post bunionectomy [Z98.890]  Unknown   • Closed trimalleolar fracture of right ankle [S82.856M]  Unknown   • Osteoarthritis of ankle and foot, right [M19.071]  Unknown      Resolved Hospital Problems   No resolved problems to display.        Hospital Course  Patient is a 77 y.o. male presented with right ankle fracture.  He underwent open reduction internal fixation right ankle fracture as well as hardware removal on March 23.  He was admitted postoperatively for placement.  Hospitalist and cardiology were consulted during stay.  He was discharged to [facility] and rehab on March 25.      Procedures Performed    Procedure(s):  ANKLE OPEN REDUCTION INTERNAL FIXATION               (LATEX ALLERGY)  -------------------       Consults:   Consults     Date and Time Order Name Status Description    3/23/2023  1:05 PM Inpatient Hospitalist Consult      3/23/2023 12:11 PM Inpatient Consult to Cardiology Completed             Condition on Discharge:  stable    Vital Signs  Temp:  [97.8 °F (36.6 °C)-98.6 °F (37 °C)] 97.8 °F (36.6 °C)  Heart Rate:  [80-90] 88  Resp:  [16-18] 16  BP: (106-136)/(52-74) 106/52    Physical Exam:   No exam performed today,    Discharge Disposition  Skilled Nursing Facility ([place] - External)    Discharge Medications     Discharge Medications      New Medications      Instructions Start Date   oxyCODONE-acetaminophen 7.5-325 MG per tablet  Commonly known as: Percocet   1 tablet, Oral, Every 6 Hours PRN         Continue These Medications      Instructions Start Date   Insulin Pen Needle 30G X 8 MM misc   Use 3-4 times daily.      Insulin Syringe 31G X 5/16\" 0.3 ML misc   4 x daily      Lancets 30G misc   USE 4 X DAILY      Lancing Device misc   USE AS INDICATED TO CORRELATE WITH STRIPS AND METER         ASK your " doctor about these medications      Instructions Start Date   ALPRAZolam 0.5 MG tablet  Commonly known as: XANAX   0.5 mg, Oral, Nightly PRN      amLODIPine 10 MG tablet  Commonly known as: NORVASC   10 mg, Oral, Daily      ASPIRIN 81 PO   1 tablet, Oral, Daily      Azelastine HCl 137 MCG/SPRAY solution   137 mcg, Inhalation, Daily      BASAGLAR KWIKPEN 100 UNIT/ML injection pen   15 Units, Subcutaneous, Nightly PRN      Blood Glucose Test strip   Use 4 x daily use any brand covered by insurance or same brand as before ICD10 code is E11.9      cholecalciferol 25 MCG (1000 UT) tablet  Commonly known as: VITAMIN D3   1,000 Units, Oral, Daily      coenzyme Q10 100 MG capsule   100 mg, Oral, Daily      esomeprazole 40 MG capsule  Commonly known as: nexIUM   40 mg, Oral, Every Morning Before Breakfast      Farxiga 5 MG tablet tablet  Generic drug: dapagliflozin   5 mg, Oral, Daily      HYDROcodone-acetaminophen  MG per tablet  Commonly known as: Norco   1 tablet, Oral, Every 6 Hours PRN      isosorbide mononitrate 30 MG 24 hr tablet  Commonly known as: IMDUR   30 mg, Oral, Daily      KRILL OIL OMEGA-3 PO   1 capsule, Oral, Daily      lisinopril 5 MG tablet  Commonly known as: PRINIVIL,ZESTRIL   5 mg, Oral, Daily      metoprolol tartrate 50 MG tablet  Commonly known as: LOPRESSOR   50 mg, Oral, 2 Times Daily      pantoprazole 40 MG EC tablet  Commonly known as: PROTONIX   40 mg, Oral, Daily      propafenone 150 MG tablet  Commonly known as: RYTHMOL   150 mg, Oral, Every 8 Hours      RED YEAST RICE PO   4 capsules, Oral, Daily      rivaroxaban 20 MG tablet  Commonly known as: XARELTO   20 mg, Oral, Daily      SAW PALMETTO PO   1 tablet, Oral, Daily      sucralfate 1 g tablet  Commonly known as: CARAFATE   1 g, Oral, 2 Times Daily      Trulicity 3 MG/0.5ML solution pen-injector  Generic drug: Dulaglutide   3 mg, Subcutaneous, Weekly      TURMERIC CURCUMIN PO   1,500 mg, Oral, Daily      vitamin B-12 2500 MCG  sublingual tablet tablet  Commonly known as: CYANOCOBALAMIN   5,000 mcg, Sublingual, Weekly      vitamin E 100 UNIT capsule   100 Units, Oral, Daily             Discharge Diet: regular     Activity at Discharge: Nonweightbearing right lower extremity    Follow-up Appointments  Future Appointments   Date Time Provider Department Center   3/28/2023  2:20 PM Qasim Zacarias APRN [facility]   4/18/2023  2:30 PM Andreas Martinez MD [facility]   6/26/2023  1:15 PM Glen Jain MD [facility]            Mason Salazar DPM  03/24/23  17:02 CDT

## 2023-03-24 NOTE — CASE MANAGEMENT/SOCIAL WORK
"Discharge Planning Assessment  [facility]     Patient Name: Aj Card Jr.  MRN: [id number]  Today's Date: 3/24/2023    Admit Date: 3/23/2023        Discharge Needs Assessment    No documentation.                Discharge Plan     Row Name 03/24/23 4483       Plan    Plan Comments Per Dr. Salazar, patient can d/c to SNF today. CHRISTOPHER met with patient at bedside regarding situation and both accepting facilities. Informed him [facility] can accept him today or that [facility] can accept him tomorrow. Patient asking if [place] was a private or semi-private. Per Mike, patient would be in a semi-private. Patient asked if the person he would share a room with was \"black\". Informed him that per Mike with [facility], patient is not . Per Domonique at [facility], patient would also go to semi-private room and she states that she has informed patient of this information. Patient states that he would rather go to [facility] and not [facility] today. Informed him he remains outpatient status here at hospital. He reports he is not worried about his bill that \"he will get it taken care of\". SW informed MD and CL regarding patient's decision to go to the facility that would accept him tomorrow instead of the facility that would accept him today. Patient to have COVID swab prior to d/c tomorrow. Explained to patient that will arrange PACS, facility van, or have family transport tomorrow. He voiced understanding...MIGUE Baez    Row Name 03/24/23 5106       Plan    Plan Comments CHRISTOPHER received message from both Domonique @ [facility] and Mike at [facility] stating they can both approve patient. [facility] can accept patient today or Sunday. [facility]/ can accept patient tomorrow. [facility] wanting clarification on wound care orders and needs COVID swab. CHRISTOPHER sent message to Dr. Salazar regarding if he wants to d/c patient or this weekend to SNF...MIGUE Baez    Row Name 03/24/23 2932       Plan    Plan Comments Domonique Baldomero messaged CHRISTOPHER stating she is " ending referral out for waiver approval..... Chan PADILLA    Row Name 03/24/23 1556       Plan    Plan Comments SW completed consult. Pt states he doesn't want to go to any IRF around here because they are too far away from home. Pt states he would like to stay in the [place] area. SW discussed SNF options and Pt was open to SW sending referrals out. SW has sent rerferrals to [facility], [facility], and [place] and notified all appropriate personnel.... Chan PADILLA              Continued Care and Services - Admitted Since 3/23/2023     Destination Coordination complete.    Service Provider Request Status Selected Services Address Phone Fax Patient Preferred    [facility]  Selected Skilled Nursing [address] [place] [address] [phone] --    [facility] Considering N/A [address] [facility] [facility] [address] [phone] --    [facility] Pending - Request Sent N/A [address] [facility] [address] [phone] --              Expected Discharge Date and Time     Expected Discharge Date Expected Discharge Time    Mar 24, 2023          Demographic Summary    No documentation.                Functional Status    No documentation.                Psychosocial    No documentation.                Abuse/Neglect    No documentation.                Legal    No documentation.                Substance Abuse    No documentation.                Patient Forms    No documentation.                   MIGUE Wilkerson

## 2023-03-24 NOTE — PLAN OF CARE
Goal Outcome Evaluation:  Plan of Care Reviewed With: patient           Outcome Evaluation: PT Eval completed w/ OT; Pt s/p fall at home post bunionectomy prior ,now S/P ankle ORIF for fx from fall at home. Pt able to move sup<>sit w/ SBA but sit to stand and gait very unsteady w/ FWRW and required min assist to steady himself while up.  He was  very unsteady and anxious to get to bed once up due to dizziness from his meds per pt report. He is recommended for rehab to home due to recent fall. RN and CNA notified post eval re his unsteadiness. Good rehab candidate.

## 2023-03-24 NOTE — PLAN OF CARE
Goal Outcome Evaluation:  Plan of Care Reviewed With: patient           Outcome Evaluation: OT eval complete. Pt supine upon arrival and agreeable to therapy. Pt perf sup>sit with SBA and HOB raised and bed rails. Pts BUE ROM was WNL, BUE MMT 5/5 grossly. Pt perf doffed/donned L sock with CGA while sitting EOB. Pt perf sit<>stand with Jaime and FWW. Pt perf functional mobility to bathroom with mod A and FWW d/t poor balance and impulsiveness. Pt perf toilet transfer with min A and fast, uncontrolled descent, no fall. Pt perf toilet transfer with min A and FWW. Pt perf sit>sup with SBA. Pt demonstrated decreased independence with ADLs, transfer safety, and ax tolerance. Cont inpatient OT. Pt will need continued rehab prior to d/c home due to decreased safety awareness, impulsiveness, and independence with ADLs.

## 2023-03-24 NOTE — THERAPY EVALUATION
Patient Name: Aj Card Jr.  : 1945    MRN: [id number]                              Today's Date: 3/24/2023       Admit Date: 3/23/2023    Visit Dx:     ICD-10-CM ICD-9-CM   1. Status post bunionectomy  Z98.890 V45.89   2. Closed trimalleolar fracture of right ankle, initial encounter  S82.851A 824.6   3. Osteoarthritis of ankle and foot, right  M19.071 715.97   4. Impaired mobility and ADLs  Z74.09 V49.89    Z78.9      Patient Active Problem List   Diagnosis   • Type 2 diabetes mellitus without complication, without long-term current use of insulin ([facility])   • Essential hypertension   • Mixed hyperlipidemia   • Generalized anxiety disorder   • History of colon polyps   • Diverticulosis of large intestine without hemorrhage   • Coronary artery disease with angina pectoris ([facility])   • Gastroesophageal reflux disease with esophagitis   • Vitamin D deficiency   • Overweight   • Encounter for screening for endocrine disorder   • Atopic dermatitis   • High serum vitamin B12   • Acute pain of right knee   • Tear of medial meniscus of right knee, current   • GERD (gastroesophageal reflux disease)   • PAF (paroxysmal atrial fibrillation) ([facility])   • Uncontrolled type 2 diabetes mellitus with hyperglycemia ([facility])   • Gastroesophageal reflux disease   • Chronic constipation   • Stage 2 chronic kidney disease   • Osteoarthritis of ankle and foot, right   • Closed trimalleolar fracture of right ankle   • Status post bunionectomy     Past Medical History:   Diagnosis Date   • Acute posthemorrhagic anemia    • Afib ([facility])    • Allergic rhinitis    • Anxiety state    • Chest pain    • CKD (chronic kidney disease), stage II    • Coronary arteriosclerosis     3 stents, followed by Dr. Jeffers   • Diabetes mellitus ([facility])     type 2   • Diverticular disease of colon    • Dysphagia    • Elevated cholesterol    • Epigastric pain    • GERD (gastroesophageal reflux disease)     esophagitis on Dexilant. Pt feels Nexium working  better      • History of echocardiogram     Small left atrial enlargement with mild CLVH.Ef of 55-60%.Mitral valve mildly thickened.Av thickened.Left ventricle mildly dilated.Tricuspid intact.Mild aortic insufficiency. 12/07/2015       • History of echocardiogram     Mild diastolic dysfunction and mild left atrial enlargement, otherwise normal echo. EF> 55% 01/03/2012       • Hypercholesterolemia    • Hypertension    • Insomnia    • Irritable bowel syndrome    • Osteoarthritis of multiple joints    • Pain of right foot    • PONV (postoperative nausea and vomiting)      Past Surgical History:   Procedure Laterality Date   • APPENDECTOMY       [facility] 1995       • CARDIAC CATHETERIZATION      Successful PTCA of the OMB#2.Unsuccessful PTCA of the 1st OMB, secondary to difficulty in advancing the balloon. 12/08/2015       • CHOLECYSTECTOMY      [facility] 1993       • CHOLECYSTECTOMY     • COLONOSCOPY  10/01/2012   • COLONOSCOPY N/A 12/11/2017    Procedure: COLONOSCOPY;  Surgeon: Bladimir Michael MD;  Location: [facility] ENDOSCOPY;  Service:    • COLONOSCOPY N/A 11/01/2022    Procedure: COLONOSCOPY;  Surgeon: Bladimir Michael MD;  Location: [facility] ENDOSCOPY;  Service: Gastroenterology;  Laterality: N/A;   • CORONARY ANGIOPLASTY      Successful PTCA & stenting LAD was done w/ deployment of a 2.5mm x23 Xience stent w/ reduction of stenosis from 90% to less than 0% stenosis with good LILLIAM 3 flow 02/17/2012       • ENDOSCOPY      with biopsy.  Mildly severe esophagitis. Gastritis. Normal examined duodenum.Several biopsies obtained in the lower third of the esophagus.Several biopsies obtained in the gastric antrum. 05/06/2016       • ENDOSCOPY      w tube. Normal esophagus. Gastritis in stomach. Biopsy taken. Gastric polyp found. Biopsy taken. Normal duodenum. 10/01/2012       • ENDOSCOPY AND COLONOSCOPY      Diverticulum in sigmoid colon & descending colon. Internal & external hemorrhoids found.  10/01/2012       • EYE SURGERY Bilateral     catarract   • HAND SURGERY Left      Corrective osteotomy of ring finger metacarpal. Malunited fracture of left ring finger 05/21/1985       • HERNIA REPAIR      Laparoscopic hernia repair. prepertitoneal bilateral inguinal hernia repair with mesh. 10/15/2009      • OTHER SURGICAL HISTORY      CORONARY ARTERY STENT    • SKIN TAG REMOVAL      Excision, viral skin tag,right upper eyelid 05/08/1995       General Information     Row Name 03/24/23 0931          OT Time and Intention    Document Type evaluation  -RW     Mode of Treatment co-treatment;physical therapy;occupational therapy  -RW     Row Name 03/24/23 0931          General Information    Patient Profile Reviewed yes  -RW     Prior Level of Function independent:;ADL's;feeding;grooming;dressing;bathing;cooking;cleaning;driving;all household mobility;transfer;gait  -RW     Existing Precautions/Restrictions fall;non-weight bearing   NWB RLE  -RW     Row Name 03/24/23 0931          Living Environment    People in Home spouse  -RW     Row Name 03/24/23 0931          Home Main Entrance    Number of Stairs, Main Entrance none  -RW     Row Name 03/24/23 0931          Stairs Within Home, Primary    Stairs, Within Home, Primary I with ambulation; walk in shower and tub shower; safety bars; comfort height;  -RW     Row Name 03/24/23 0931          Cognition    Orientation Status (Cognition) oriented x 4  -RW     Row Name 03/24/23 0931          Safety Issues, Functional Mobility    Safety Issues Affecting Function (Mobility) ability to follow commands;at risk behavior observed;awareness of need for assistance;impulsivity;insight into deficits/self-awareness;positioning of assistive device;safety precaution awareness;safety precautions follow-through/compliance  -RW     Impairments Affecting Function (Mobility) balance;coordination;endurance/activity tolerance;motor planning;pain  -RW           User Key  (r) = Recorded By, (t) =  Taken By, (c) = Cosigned By    Initials Name Provider Type    RW Dhara Shepherd, OT Occupational Therapist                 Mobility/ADL's     Row Name 03/24/23 0931          Bed Mobility    Bed Mobility supine-sit;sit-supine  -RW     Supine-Sit [place] (Bed Mobility) standby assist  -RW     Sit-Supine [place] (Bed Mobility) standby assist  -RW     Assistive Device (Bed Mobility) bed rails;head of bed elevated  -RW     Row Name 03/24/23 0931          Transfers    Transfers sit-stand transfer;toilet transfer;stand-sit transfer  -RW     Row Name 03/24/23 0931          Sit-Stand Transfer    Sit-Stand [place] (Transfers) minimum assist (75% patient effort)  -RW     Assistive Device (Sit-Stand Transfers) walker, front-wheeled  -RW     Row Name 03/24/23 0931          Stand-Sit Transfer    Stand-Sit [place] (Transfers) minimum assist (75% patient effort)  -     Assistive Device (Stand-Sit Transfers) walker, front-wheeled  -RW     Row Name 03/24/23 0931          Toilet Transfer    Type (Toilet Transfer) sit-stand;stand-sit  -     [place] Level (Toilet Transfer) minimum assist (75% patient effort)  -     Assistive Device (Toilet Transfer) walker, front-wheeled  -RW     Row Name 03/24/23 0931          Functional Mobility    Functional Mobility- Ind. Level minimum assist (75% patient effort)  -     Functional Mobility- Device walker, front-wheeled  -RW     Row Name 03/24/23 0931          Activities of Daily Living    BADL Assessment/Intervention lower body dressing  -     Row Name 03/24/23 0941 03/24/23 0931       Mobility    Extremity Weight-bearing Status --  -RW right lower extremity  -RW    Right Lower Extremity (Weight-bearing Status) --  -RW non weight-bearing (NWB)   -RW    Row Name 03/24/23 0931          Lower Body Dressing Assessment/Training    [place] Level (Lower Body Dressing) doff;don;socks;contact guard assist  -     Position (Lower Body Dressing) edge of bed sitting   -RW     Comment, (Lower Body Dressing) L sock only  -RW           User Key  (r) = Recorded By, (t) = Taken By, (c) = Cosigned By    Initials Name Provider Type    RW Dhara Shepherd OT Occupational Therapist               Obj/Interventions     Row Name 03/24/23 0931          Sensory Assessment (Somatosensory)    Sensory Assessment (Somatosensory) UE sensation intact  -     Row Name 03/24/23 0931          Range of Motion Comprehensive    General Range of Motion bilateral upper extremity ROM WNL  -     Row Name 03/24/23 0931          Strength Comprehensive (MMT)    General Manual Muscle Testing (MMT) Assessment no strength deficits identified  -RW     Comment, General Manual Muscle Testing (MMT) Assessment BUE MMT 5/5 grossly  -RW           User Key  (r) = Recorded By, (t) = Taken By, (c) = Cosigned By    Initials Name Provider Type    RW Dhara Shepherd OT Occupational Therapist               Goals/Plan     Row Name 03/24/23 0931          Transfer Goal 1 (OT)    Activity/Assistive Device (Transfer Goal 1, OT) transfers, all  -RW     [place] Level/Cues Needed (Transfer Goal 1, OT) modified independence  -RW     Time Frame (Transfer Goal 1, OT) long term goal (LTG);by discharge  -RW     Progress/Outcome (Transfer Goal 1, OT) goal not met  -RW     [facility] Name 03/24/23 0931          Bathing Goal 1 (OT)    Activity/Device (Bathing Goal 1, OT) lower body bathing  -RW     [place] Level/Cues Needed (Bathing Goal 1, OT) modified independence  -RW     Time Frame (Bathing Goal 1, OT) long term goal (LTG);by discharge  -RW     Progress/Outcomes (Bathing Goal 1, OT) goal not met  -[facility] Name 03/24/23 0931          Dressing Goal 1 (OT)    Activity/Device (Dressing Goal 1, OT) lower body dressing  -RW     [place]/Cues Needed (Dressing Goal 1, OT) modified independence  -RW     Time Frame (Dressing Goal 1, OT) long term goal (LTG);by discharge  -RW     Progress/Outcome (Dressing Goal 1, OT) goal not met  -RW      Row Name 03/24/23 0931          Problem Specific Goal 1 (OT)    Problem Specific Goal 1 (OT) Pt will perform OOB ax for 15 minutes to increase ax tolerance for ADLs.  -RW     Time Frame (Problem Specific Goal 1, OT) long term goal (LTG);by discharge  -RW     Progress/Outcome (Problem Specific Goal 1, OT) goal not met  -RW     Row Name 03/24/23 0931          Therapy Assessment/Plan (OT)    Planned Therapy Interventions (OT) activity tolerance training;manual therapy/joint mobilization;patient/caregiver education/training;adaptive equipment training;neuromuscular control/coordination retraining;BADL retraining;ROM/therapeutic exercise;cognitive/visual perception retraining;occupation/activity based interventions;strengthening exercise;edema control/reduction;functional balance retraining;IADL retraining;passive ROM/stretching;transfer/mobility retraining  -RW           User Key  (r) = Recorded By, (t) = Taken By, (c) = Cosigned By    Initials Name Provider Type    RW Dhara Shepherd OT Occupational Therapist               Clinical Impression     Row Name 03/24/23 0931          Pain Assessment    Pretreatment Pain Rating 3/10  -RW     Posttreatment Pain Rating 3/10  -RW     Pain Location - Side/Orientation Right  -RW     Pain Location - ankle  -RW     Pain Intervention(s) Repositioned;Ambulation/increased activity;Distraction  -RW     Row Name 03/24/23 0931          Plan of Care Review    Plan of Care Reviewed With patient  -RW     Outcome Evaluation OT eval complete. Pt supine upon arrival and agreeable to therapy. Pt perf sup>sit with SBA and HOB raised and bed rails. Pts BUE ROM was WNL, BUE MMT 5/5 grossly. Pt perf doffed/donned L sock with CGA while sitting EOB. Pt perf sit<>stand with Jaime and FWW. Pt perf functional mobility to bathroom with mod A and FWW d/t poor balance and impulsiveness. Pt perf toilet transfer with min A and fast, uncontrolled descent, no fall. Pt perf toilet transfer with min A and  FWW. Pt perf sit>sup with SBA. Pt demonstrated decreased independence with ADLs, transfer safety, and ax tolerance. Cont inpatient OT. Pt will need continued rehab prior to d/c home due to decreased safety awareness, impulsiveness, and independence with ADLs.  -     Row Name 03/24/23 0931          Therapy Assessment/Plan (OT)    Patient/Family Therapy Goal Statement (OT) to return home  -RW     Rehab Potential (OT) good, to achieve stated therapy goals  -RW     Criteria for Skilled Therapeutic Interventions Met (OT) yes;skilled treatment is necessary  -RW     Therapy Frequency (OT) other (see comments)  5-7 days per week  -RW     Predicted Duration of Therapy Intervention (OT) until all goals met or d/c from hospital  -RW     Row Name 03/24/23 0931          Therapy Plan Review/Discharge Plan (OT)    Anticipated Discharge Disposition (OT) inpatient rehabilitation facility;skilled nursing facility  -     Row Name 03/24/23 0931          Vital Signs    Pre Systolic BP Rehab 106  -RW     Pre Treatment Diastolic BP 59  -RW     Intra Systolic BP Rehab 108  -RW     Intra Treatment Diastolic BP 74  -RW     Post Systolic BP Rehab 118  -RW     Post Treatment Diastolic BP 58  -RW     Pretreatment Heart Rate (beats/min) 86  -RW     Intratreatment Heart Rate (beats/min) 93  -RW     Posttreatment Heart Rate (beats/min) 87  -RW     Pre SpO2 (%) 96  -RW     O2 Delivery Pre Treatment room air  -RW     Intra SpO2 (%) 92  -RW     O2 Delivery Intra Treatment room air  -RW     Post SpO2 (%) 98  -RW     O2 Delivery Post Treatment room air  -RW     Pre Patient Position Supine  -RW     Intra Patient Position Sitting  -RW     Post Patient Position Supine  -RW     Row Name 03/24/23 0931          Positioning and Restraints    Pre-Treatment Position in bed  -RW     Post Treatment Position bed  -RW     In Bed notified nsg;supine;call light within reach;encouraged to call for assist;exit alarm on;RLE elevated  -RW           User Key  (r) =  Recorded By, (t) = Taken By, (c) = Cosigned By    Initials Name Provider Type    Dhara Gee OT Occupational Therapist               Outcome Measures     Row Name 03/24/23 0931          How much help from another is currently needed...    Putting on and taking off regular lower body clothing? 2  -RW     Bathing (including washing, rinsing, and drying) 2  -RW     Toileting (which includes using toilet bed pan or urinal) 2  -RW     Putting on and taking off regular upper body clothing 3  -RW     Taking care of personal grooming (such as brushing teeth) 4  -RW     Eating meals 4  -RW     AM-PAC 6 Clicks Score (OT) 17  -RW     Row Name 03/24/23 0931          Functional Assessment    Outcome Measure Options AM-PAC 6 Clicks Daily Activity (OT)  -RW           User Key  (r) = Recorded By, (t) = Taken By, (c) = Cosigned By    Initials Name Provider Type    Dhara Gee OT Occupational Therapist                Occupational Therapy Education     Title: PT OT SLP Therapies (In Progress)     Topic: Occupational Therapy (In Progress)     Point: ADL training (In Progress)     Description:   Instruct learner(s) on proper safety adaptation and remediation techniques during self care or transfers.   Instruct in proper use of assistive devices.              Learning Progress Summary           Patient Acceptance, E,TB, NR,NL by  at 3/24/2023 1223    Comment: POC, Role of OT, transfer training                   Point: Home exercise program (Not Started)     Description:   Instruct learner(s) on appropriate technique for monitoring, assisting and/or progressing therapeutic exercises/activities.              Learner Progress:  Not documented in this visit.          Point: Precautions (In Progress)     Description:   Instruct learner(s) on prescribed precautions during self-care and functional transfers.              Learning Progress Summary           Patient Acceptance, E,TB, NR,NL by RW at 3/24/2023 1223    Comment:  POC, Role of OT, transfer training                   Point: Body mechanics (In Progress)     Description:   Instruct learner(s) on proper positioning and spine alignment during self-care, functional mobility activities and/or exercises.              Learning Progress Summary           Patient Acceptance, E,TB, NR,NL by RW at 3/24/2023 1223    Comment: POC, Role of OT, transfer training                               User Key     Initials Effective Dates Name Provider Type Discipline     09/22/22 -  Dhara Shepherd OT Occupational Therapist OT              OT Recommendation and Plan  Planned Therapy Interventions (OT): activity tolerance training, manual therapy/joint mobilization, patient/caregiver education/training, adaptive equipment training, neuromuscular control/coordination retraining, BADL retraining, ROM/therapeutic exercise, cognitive/visual perception retraining, occupation/activity based interventions, strengthening exercise, edema control/reduction, functional balance retraining, IADL retraining, passive ROM/stretching, transfer/mobility retraining  Therapy Frequency (OT): other (see comments) (5-7 days per week)  Plan of Care Review  Plan of Care Reviewed With: patient  Outcome Evaluation: OT eval complete. Pt supine upon arrival and agreeable to therapy. Pt perf sup>sit with SBA and HOB raised and bed rails. Pts BUE ROM was WNL, BUE MMT 5/5 grossly. Pt perf doffed/donned L sock with CGA while sitting EOB. Pt perf sit<>stand with Jaime and FWW. Pt perf functional mobility to bathroom with mod A and FWW d/t poor balance and impulsiveness. Pt perf toilet transfer with min A and fast, uncontrolled descent, no fall. Pt perf toilet transfer with min A and FWW. Pt perf sit>sup with SBA. Pt demonstrated decreased independence with ADLs, transfer safety, and ax tolerance. Cont inpatient OT. Pt will need continued rehab prior to d/c home due to decreased safety awareness, impulsiveness, and independence with  ADLs.     Time Calculation:    Time Calculation- OT     Row Name 03/24/23 1224             Time Calculation- OT    OT Start Time 0931  -RW      OT Stop Time 1013  -RW      OT Time Calculation (min) 42 min  -RW      OT Received On 03/24/23  -RW      OT Goal Re-Cert Due Date 04/06/23  -RW         Untimed Charges    OT Eval/Re-eval Minutes 42  -RW         Total Minutes    Untimed Charges Total Minutes 42  -RW       Total Minutes 42  -RW            User Key  (r) = Recorded By, (t) = Taken By, (c) = Cosigned By    Initials Name Provider Type    RW Dhara Shepherd OT Occupational Therapist              Therapy Charges for Today     Code Description Service Date Service Provider Modifiers Qty    [id number] HC OT EVAL MOD COMPLEXITY 3 3/24/2023 Dhara Shepherd OT GO 1               Dhara Shepherd OT  3/24/2023

## 2023-03-24 NOTE — PROGRESS NOTES
Podiatric Surgery Progress Note    Subjective     Post-Operative Day: 1 post-right ORIF ankle and revision 1st MTP desis    Systemic or Specific Complaints: No Complaints    Objective     Vital signs in last 24 hours:  Temp:  [97.8 °F (36.6 °C)-98.6 °F (37 °C)] 97.8 °F (36.6 °C)  Heart Rate:  [80-90] 88  Resp:  [16-18] 16  BP: (106-136)/(52-74) 106/52    General: alert, appears stated age and cooperative   Neurovascular: SILT  Capillary refill: Normal   Wound: Dressing c/d/i   Range of Motion: Splint c/d/i   DVT Exam: No evidence of DVT seen on physical exam.     Data Review  CBC:  Results from last 7 days   Lab Units 03/24/23  0528   WBC 10*3/mm3 7.91   RBC 10*6/mm3 3.78*   HEMOGLOBIN g/dL 10.7*   HEMATOCRIT % 31.9*   PLATELETS 10*3/mm3 296       Assessment & Plan     Right ankle fracture  Implant failure    Patient doing fairly well postoperatively.  Fell getting off the commode earlier today.  Continue nonweightbearing right lower extremity.  Heparin for DVT prophylaxis.  Pain adequately controlled.  Plan for discharge to skilled nursing facility. Appreciate Medicine and Cardiology recommendations. Call with questions.         LOS: 0 days     Mason Salazar DPM    Date: 3/24/2023  Time: 15:42 CDT

## 2023-03-24 NOTE — CONSULTS
[facility] Medicine Consult  Consults    Date of Admission: 3/23/2023  Date of Consult: 03/24/23    Primary Care Physician: Andreas Martinez MD  Referring Physician: Dr. Salazar  Chief Complaint/Reason for Consultation: Medical management    Subjective   History of Present Illness  The patient he is a 77-year-old male admitted to the hospital status post open reduction internal fixation right ankle per Dr. Salazar.  The patient does have a longstanding history of coronary artery disease status post stenting.  He has hypertension paroxysmal atrial fibrillation hyperlipidemia hypertension per tensive heart disease GERD and insulin-dependent diabetes mellitus.  Patient was seen for surgery and EKG was completed showing normal sinus rhythm.  Patient was noted with elevated troponin and was complaining of some chest pain.  The patient developed chest burning in the PACU and he was not having any other symptoms.  He does have some shortness of breath on exertion however otherwise stable.  Patient is otherwise stable.  Has been seen by cardiology.  Dr. Salazar to manage postop concerns.    Patient is seen and examined 3/24/2023.  On evaluation the patient is currently sitting up in bed he is awake and alert.  he states her pain is controlled with current pain regimen.  His leg does show some edema however not significant.  he has no fevers or chills.  No chest pain no headache no dizziness.  No nausea vomiting.  No GI or urinary complaints currently.  Vital signs are stable patient is afebrile.    Review of Systems   Constitutional: Positive for fatigue.   HENT: Negative.    Eyes: Negative.    Respiratory: Negative.    Cardiovascular: Positive for chest pain.   Gastrointestinal: Positive for abdominal pain.   Endocrine: Negative.    Genitourinary: Negative.    Musculoskeletal: Positive for joint swelling.        Right lower extremity.  Expected postop   Skin: Negative.     Neurological: Positive for weakness.   Hematological: Negative.    Psychiatric/Behavioral: Negative.  Negative for agitation.      Otherwise complete ROS is negative except as mentioned above.    Past Medical History:   Past Medical History:   Diagnosis Date   • Acute posthemorrhagic anemia    • Afib (HCC)    • Allergic rhinitis    • Anxiety state    • Chest pain    • CKD (chronic kidney disease), stage II    • Coronary arteriosclerosis     3 stents, followed by Dr. Jeffers   • Diabetes mellitus (HCC)     type 2   • Diverticular disease of colon    • Dysphagia    • Elevated cholesterol    • Epigastric pain    • GERD (gastroesophageal reflux disease)     esophagitis on Dexilant. Pt feels Nexium working better      • History of echocardiogram     Small left atrial enlargement with mild CLVH.Ef of 55-60%.Mitral valve mildly thickened.Av thickened.Left ventricle mildly dilated.Tricuspid intact.Mild aortic insufficiency. 12/07/2015       • History of echocardiogram     Mild diastolic dysfunction and mild left atrial enlargement, otherwise normal echo. EF> 55% 01/03/2012       • Hypercholesterolemia    • Hypertension    • Insomnia    • Irritable bowel syndrome    • Osteoarthritis of multiple joints    • Pain of right foot    • PONV (postoperative nausea and vomiting)      Past Surgical History:  Past Surgical History:   Procedure Laterality Date   • APPENDECTOMY       [facility] 1995       • CARDIAC CATHETERIZATION      Successful PTCA of the OMB#2.Unsuccessful PTCA of the 1st OMB, secondary to difficulty in advancing the balloon. 12/08/2015       • CHOLECYSTECTOMY      [facility] 1993       • CHOLECYSTECTOMY     • COLONOSCOPY  10/01/2012   • COLONOSCOPY N/A 12/11/2017    Procedure: COLONOSCOPY;  Surgeon: Bladimir Michael MD;  Location: [facility] ENDOSCOPY;  Service:    • COLONOSCOPY N/A 11/01/2022    Procedure: COLONOSCOPY;  Surgeon: Bladimir Michael MD;  Location: [facility] ENDOSCOPY;  Service:  Gastroenterology;  Laterality: N/A;   • CORONARY ANGIOPLASTY      Successful PTCA & stenting LAD was done w/ deployment of a 2.5mm x23 Xience stent w/ reduction of stenosis from 90% to less than 0% stenosis with good LILLIAM 3 flow 02/17/2012       • ENDOSCOPY      with biopsy.  Mildly severe esophagitis. Gastritis. Normal examined duodenum.Several biopsies obtained in the lower third of the esophagus.Several biopsies obtained in the gastric antrum. 05/06/2016       • ENDOSCOPY      w tube. Normal esophagus. Gastritis in stomach. Biopsy taken. Gastric polyp found. Biopsy taken. Normal duodenum. 10/01/2012       • ENDOSCOPY AND COLONOSCOPY      Diverticulum in sigmoid colon & descending colon. Internal & external hemorrhoids found. 10/01/2012       • EYE SURGERY Bilateral     catarract   • HAND SURGERY Left      Corrective osteotomy of ring finger metacarpal. Malunited fracture of left ring finger 05/21/1985       • HERNIA REPAIR      Laparoscopic hernia repair. prepertitoneal bilateral inguinal hernia repair with mesh. 10/15/2009      • OTHER SURGICAL HISTORY      CORONARY ARTERY STENT    • SKIN TAG REMOVAL      Excision, viral skin tag,right upper eyelid 05/08/1995      Social History:  reports that he has never smoked. He has never used smokeless tobacco. He reports that he does not drink alcohol and does not use drugs.    Family History: family history includes Arthritis in his father and mother; Clotting disorder in an other family member; Diabetes in his mother; Heart disease in his mother; Hypertension in his mother; Lung disease in an other family member; Obesity in his mother and sister; Schizophrenia in his sister; Stroke in his mother; Thyroid disease in his mother; Tuberculosis in his father, mother, and sister.     Allergies:   Allergies   Allergen Reactions   • Brilinta [Ticagrelor] Shortness Of Breath   • Effient [Prasugrel] Shortness Of Breath   • Warfarin And Related Shortness Of Breath   •  Ciprofloxacin Hives   • Lipitor [Atorvastatin] Swelling   • Latex Itching   • Adhesive Tape Rash   • Celexa [Citalopram Hydrobromide] Rash   • Crestor [Rosuvastatin Calcium] Rash   • Floxin [Ofloxacin] Rash   • Januvia [Sitagliptin] Rash   • Penicillins Rash   • Sulfa Antibiotics Rash     Medications: Scheduled Meds:amLODIPine, 10 mg, Oral, Q24H  heparin (porcine), 5,000 Units, Subcutaneous, Q8H  Insulin Aspart, 0-9 Units, Subcutaneous, TID AC  isosorbide mononitrate, 30 mg, Oral, Q24H  lisinopril, 5 mg, Oral, Q24H  metoprolol tartrate, 50 mg, Oral, Q12H  nitroglycerin, 0.5 inch, Topical, Q6H  pantoprazole, 40 mg, Intravenous, Q AM  propafenone, 150 mg, Oral, Q8H  senna-docusate sodium, 2 tablet, Oral, BID  sodium chloride, 10 mL, Intravenous, Q12H  sucralfate, 1 g, Oral, 4x Daily AC & at Bedtime      Continuous Infusions:Pharmacy Consult,       PRN Meds:.•  ALPRAZolam  •  senna-docusate sodium **AND** polyethylene glycol **AND** bisacodyl **AND** bisacodyl  •  dextrose  •  dextrose  •  glucagon (human recombinant)  •  naloxone  •  ondansetron  •  oxyCODONE-acetaminophen  •  Pharmacy Consult  •  sodium chloride  •  sodium chloride    Objective   Objective    Physical Exam:   Temp:  [97.5 °F (36.4 °C)-98.8 °F (37.1 °C)] 98.6 °F (37 °C)  Heart Rate:  [74-88] 82  Resp:  [16-20] 18  BP: (107-167)/(52-74) 115/57  Physical Exam  Vitals and nursing note reviewed.   HENT:      Head: Normocephalic and atraumatic.      Right Ear: External ear normal.      Left Ear: External ear normal.      Nose: Nose normal.      Mouth/Throat:      Mouth: Mucous membranes are moist.      Pharynx: Oropharynx is clear.   Eyes:      General: No scleral icterus.     Extraocular Movements: Extraocular movements intact.      Conjunctiva/sclera: Conjunctivae normal.      Pupils: Pupils are equal, round, and reactive to light.   Cardiovascular:      Rate and Rhythm: Normal rate and regular rhythm.      Pulses: Normal pulses.      Heart sounds:  Normal heart sounds.   Pulmonary:      Effort: Pulmonary effort is normal.      Breath sounds: Normal breath sounds.   Abdominal:      General: Bowel sounds are normal. There is no distension.      Palpations: Abdomen is soft.      Tenderness: There is no abdominal tenderness.   Musculoskeletal:         General: Normal range of motion.      Cervical back: Normal range of motion and neck supple.      Comments: Right ankle bandage clean dry intact.  Some edema is noted expected postop.  Remains unchanged.  Capillary refill less than 2 seconds.   Skin:     General: Skin is warm and dry.      Coloration: Skin is not jaundiced.   Neurological:      General: No focal deficit present.      Mental Status: He is alert and oriented to person, place, and time.      Motor: Weakness present.   Psychiatric:         Mood and Affect: Mood normal.         Behavior: Behavior normal.           Results Reviewed:  I have personally reviewed current lab, radiology, and data and agree with results.  Lab Results (last 24 hours)     Procedure Component Value Units Date/Time    Basic Metabolic Panel [[id number]]  (Abnormal) Collected: 03/24/23 0528    Specimen: Blood Updated: 03/24/23 0619     Glucose 142 mg/dL      BUN 22 mg/dL      Creatinine 0.88 mg/dL      Sodium 135 mmol/L      Potassium 4.4 mmol/L      Chloride 100 mmol/L      CO2 25.0 mmol/L      Calcium 9.1 mg/dL      BUN/Creatinine Ratio 25.0     Anion Gap 10.0 mmol/L      eGFR 88.0 mL/min/1.73     Narrative:      GFR Normal >60  Chronic Kidney Disease <60  Kidney Failure <15    The GFR formula is only valid for adults with stable renal function between ages 18 and 70.    High Sensitivity Troponin T [[id number]]  (Abnormal) Collected: 03/24/23 0528    Specimen: Blood Updated: 03/24/23 0617     HS Troponin T 34 ng/L     Narrative:      High Sensitive Troponin T Reference Range:  <10.0 ng/L- Negative Female for AMI  <15.0 ng/L- Negative Male for AMI  >=10 - Abnormal Female indicating  possible myocardial injury.  >=15 - Abnormal Male indicating possible myocardial injury.   Clinicians would have to utilize clinical acumen, EKG, Troponin, and serial changes to determine if it is an Acute Myocardial Infarction or myocardial injury due to an underlying chronic condition.         CBC & Differential [[id number]]  (Abnormal) Collected: 03/24/23 0528    Specimen: Blood Updated: 03/24/23 0600    Narrative:      The following orders were created for panel order CBC & Differential.  Procedure                               Abnormality         Status                     ---------                               -----------         ------                     CBC Auto Differential[[id number]]        Abnormal            Final result                 Please view results for these tests on the individual orders.    CBC Auto Differential [[id number]]  (Abnormal) Collected: 03/24/23 0528    Specimen: Blood Updated: 03/24/23 0600     WBC 7.91 10*3/mm3      RBC 3.78 10*6/mm3      Hemoglobin 10.7 g/dL      Hematocrit 31.9 %      MCV 84.4 fL      MCH 28.3 pg      MCHC 33.5 g/dL      RDW 13.7 %      RDW-SD 42.5 fl      MPV 8.8 fL      Platelets 296 10*3/mm3      Neutrophil % 69.0 %      Lymphocyte % 20.4 %      Monocyte % 9.1 %      Eosinophil % 0.8 %      Basophil % 0.3 %      Immature Grans % 0.4 %      Neutrophils, Absolute 5.47 10*3/mm3      Lymphocytes, Absolute 1.61 10*3/mm3      Monocytes, Absolute 0.72 10*3/mm3      Eosinophils, Absolute 0.06 10*3/mm3      Basophils, Absolute 0.02 10*3/mm3      Immature Grans, Absolute 0.03 10*3/mm3      nRBC 0.0 /100 WBC     POC Glucose Once [[id number]]  (Abnormal) Collected: 03/23/23 2011    Specimen: Blood Updated: 03/24/23 0506     Glucose 193 mg/dL      Comment: RN NotifiedOperator: [id number] JIMMIE CASTROINDAMeter ID: [id number]       TSH [[id number]]  (Normal) Collected: 03/23/23 1318    Specimen: Blood Updated: 03/23/23 1643     TSH 1.200 uIU/mL     Hemoglobin A1c [[id number]]   (Abnormal) Collected: 03/23/23 1318    Specimen: Blood Updated: 03/23/23 1636     Hemoglobin A1C 7.10 %     Narrative:      Hemoglobin A1C Ranges:    Increased Risk for Diabetes  5.7% to 6.4%  Diabetes                     >= 6.5%  Diabetic Goal                < 7.0%    POC Glucose Once [[id number]]  (Abnormal) Collected: 03/23/23 1620    Specimen: Blood Updated: 03/23/23 1633     Glucose 250 mg/dL      Comment: RN NotifiedOperator: [id number] ANATOLY Cisse ID: [id number]       High Sensitivity Troponin T 2Hr [[id number]]  (Abnormal) Collected: 03/23/23 1521    Specimen: Blood Updated: 03/23/23 1612     HS Troponin T 15 ng/L      Troponin T Delta 0 ng/L     Narrative:      High Sensitive Troponin T Reference Range:  <10.0 ng/L- Negative Female for AMI  <15.0 ng/L- Negative Male for AMI  >=10 - Abnormal Female indicating possible myocardial injury.  >=15 - Abnormal Male indicating possible myocardial injury.   Clinicians would have to utilize clinical acumen, EKG, Troponin, and serial changes to determine if it is an Acute Myocardial Infarction or myocardial injury due to an underlying chronic condition.         Basic Metabolic Panel [[id number]]  (Abnormal) Collected: 03/23/23 1318    Specimen: Blood Updated: 03/23/23 1400     Glucose 159 mg/dL      BUN 27 mg/dL      Creatinine 0.93 mg/dL      Sodium 132 mmol/L      Potassium 4.6 mmol/L      Chloride 99 mmol/L      CO2 23.0 mmol/L      Calcium 8.7 mg/dL      BUN/Creatinine Ratio 29.0     Anion Gap 10.0 mmol/L      eGFR 84.0 mL/min/1.73     Narrative:      GFR Normal >60  Chronic Kidney Disease <60  Kidney Failure <15    The GFR formula is only valid for adults with stable renal function between ages 18 and 70.    High Sensitivity Troponin T [[id number]]  (Abnormal) Collected: 03/23/23 1318    Specimen: Blood Updated: 03/23/23 1400     HS Troponin T 15 ng/L     Narrative:      High Sensitive Troponin T Reference Range:  <10.0 ng/L- Negative Female for AMI  <15.0  ng/L- Negative Male for AMI  >=10 - Abnormal Female indicating possible myocardial injury.  >=15 - Abnormal Male indicating possible myocardial injury.   Clinicians would have to utilize clinical acumen, EKG, Troponin, and serial changes to determine if it is an Acute Myocardial Infarction or myocardial injury due to an underlying chronic condition.         CBC & Differential [[id number]]  (Abnormal) Collected: 03/23/23 1318    Specimen: Blood Updated: 03/23/23 1326    Narrative:      The following orders were created for panel order CBC & Differential.  Procedure                               Abnormality         Status                     ---------                               -----------         ------                     CBC Auto Differential[[id number]]        Abnormal            Final result                 Please view results for these tests on the individual orders.    CBC Auto Differential [[id number]]  (Abnormal) Collected: 03/23/23 1318    Specimen: Blood Updated: 03/23/23 1326     WBC 5.28 10*3/mm3      RBC 3.87 10*6/mm3      Hemoglobin 10.9 g/dL      Hematocrit 32.8 %      MCV 84.8 fL      MCH 28.2 pg      MCHC 33.2 g/dL      RDW 13.8 %      RDW-SD 42.5 fl      MPV 8.3 fL      Platelets 252 10*3/mm3      Neutrophil % 86.8 %      Lymphocyte % 9.5 %      Monocyte % 2.5 %      Eosinophil % 0.2 %      Basophil % 0.2 %      Immature Grans % 0.8 %      Neutrophils, Absolute 4.59 10*3/mm3      Lymphocytes, Absolute 0.50 10*3/mm3      Monocytes, Absolute 0.13 10*3/mm3      Eosinophils, Absolute 0.01 10*3/mm3      Basophils, Absolute 0.01 10*3/mm3      Immature Grans, Absolute 0.04 10*3/mm3      nRBC 0.0 /100 WBC     POC Glucose Once [[id number]]  (Normal) Collected: 03/23/23 1054    Specimen: Blood Updated: 03/23/23 1134     Glucose 126 mg/dL      Comment: : [id number] [facility]  REGISDemi ID: [id number]       POC Glucose Once [[id number]]  (Abnormal) Collected: 03/23/23 0720    Specimen: Blood Updated:  03/23/23 0926     Glucose 150 mg/dL      Comment: : [id number] PATTI Emmanuel ID: [id number]           Imaging Results (Last 24 Hours)     Procedure Component Value Units Date/Time    FL C Arm During Surgery [[id number]] Resulted: 03/23/23 0940     Updated: 03/23/23 1032          Assessment / Plan   Assessment:     Active Hospital Problems    Diagnosis    • **Status post bunionectomy    • Closed trimalleolar fracture of right ankle    • Osteoarthritis of ankle and foot, right       Impression/treatment plan  Status post open reduction internal fixation right ankle  PT OT  Management per Dr. Salazar.    Chest pain  Has been seen by cardiology  Recommending echocardiogram to be completed later today.  Troponin is currently 15.  Only slightly elevated.  Cardiology feels it is more related to GERD.  Recommending continuing Protonix and Carafate which have been started.    Hypertension  Current blood pressure 115/57  Continue Rythmol  O2 saturation 96% room air  Continue Lopressor and Norvasc.  Continue aspirin    Diabetes mellitus type 2  Hemoglobin A1c is 7.1.  TSH is 1.2.  We will continue patient on ADA/cardiac diet  Sliding scale    GERD  Continue Carafate  Continue Protonix    Deconditioning  Continue PT OT    CODE STATUS full code  DVT prophylaxis heparin    Currently stable.  We will continue current medications and treatment and monitor.  Echocardiogram to be completed later today.    Medical Decision Making  Number and Complexity of problems: 3 complex problems  Differential Diagnosis:     Conditions and Status:        Condition is unchanged.     [facility] Data  External documents reviewed: Hospital charts  My EKG interpretation:   My plain film interpretation:   Tests considered but not ordered: None     Decision rules/scores evaluated (example [id number], Wells, etc): None     Discussed with: Patient and Dr. Salazar.     Treatment Plan  Above    Care Planning  Shared decision making: Patient updated on  current status and informed of proposed care plan; is in agreement with plan  Code status and discussions: Full code    Disposition  Social Determinants of Health that impact treatment or disposition: None  I expect the patient to be discharged to home when stabilized    I confirmed that the patient's Advance Care Plan is present, code status is documented, or surrogate decision maker is listed in the patient's medical record.      I discussed the patient's findings and my recommendations with patient and he agrees to care plan    The patient's surrogate decision maker is unknown    Patient seen and examined by me on 3/23/2023.    Electronically signed by Nigel Hammond DO, 03/24/23, 07:37 CDT.

## 2023-03-24 NOTE — THERAPY EVALUATION
Patient Name: Aj Card Jr.  : 1945    MRN: [id number]                              Today's Date: 3/24/2023     PT Evaluation  Admit Date: 3/23/2023    Visit Dx:     ICD-10-CM ICD-9-CM   1. Status post bunionectomy  Z98.890 V45.89   2. Closed trimalleolar fracture of right ankle, initial encounter  S82.851A 824.6   3. Osteoarthritis of ankle and foot, right  M19.071 715.97   4. Impaired mobility and ADLs  Z74.09 V49.89    Z78.9    5. Impaired functional mobility, balance, gait, and endurance  Z74.09 V49.89     Patient Active Problem List   Diagnosis   • Type 2 diabetes mellitus without complication, without long-term current use of insulin ([facility])   • Essential hypertension   • Mixed hyperlipidemia   • Generalized anxiety disorder   • History of colon polyps   • Diverticulosis of large intestine without hemorrhage   • Coronary artery disease with angina pectoris ([facility])   • Gastroesophageal reflux disease with esophagitis   • Vitamin D deficiency   • Overweight   • Encounter for screening for endocrine disorder   • Atopic dermatitis   • High serum vitamin B12   • Acute pain of right knee   • Tear of medial meniscus of right knee, current   • GERD (gastroesophageal reflux disease)   • PAF (paroxysmal atrial fibrillation) ([facility])   • Uncontrolled type 2 diabetes mellitus with hyperglycemia ([facility])   • Gastroesophageal reflux disease   • Chronic constipation   • Stage 2 chronic kidney disease   • Osteoarthritis of ankle and foot, right   • Closed trimalleolar fracture of right ankle   • Status post bunionectomy     Past Medical History:   Diagnosis Date   • Acute posthemorrhagic anemia    • Afib ([facility])    • Allergic rhinitis    • Anxiety state    • Chest pain    • CKD (chronic kidney disease), stage II    • Coronary arteriosclerosis     3 stents, followed by Dr. Jeffers   • Diabetes mellitus ([facility])     type 2   • Diverticular disease of colon    • Dysphagia    • Elevated cholesterol    • Epigastric pain    •  GERD (gastroesophageal reflux disease)     esophagitis on Dexilant. Pt feels Nexium working better      • History of echocardiogram     Small left atrial enlargement with mild CLVH.Ef of 55-60%.Mitral valve mildly thickened.Av thickened.Left ventricle mildly dilated.Tricuspid intact.Mild aortic insufficiency. 12/07/2015       • History of echocardiogram     Mild diastolic dysfunction and mild left atrial enlargement, otherwise normal echo. EF> 55% 01/03/2012       • Hypercholesterolemia    • Hypertension    • Insomnia    • Irritable bowel syndrome    • Osteoarthritis of multiple joints    • Pain of right foot    • PONV (postoperative nausea and vomiting)      Past Surgical History:   Procedure Laterality Date   • APPENDECTOMY       [facility] 1995       • CARDIAC CATHETERIZATION      Successful PTCA of the OMB#2.Unsuccessful PTCA of the 1st OMB, secondary to difficulty in advancing the balloon. 12/08/2015       • CHOLECYSTECTOMY      [facility] 1993       • CHOLECYSTECTOMY     • COLONOSCOPY  10/01/2012   • COLONOSCOPY N/A 12/11/2017    Procedure: COLONOSCOPY;  Surgeon: Bladimir Michael MD;  Location: [facility] ENDOSCOPY;  Service:    • COLONOSCOPY N/A 11/01/2022    Procedure: COLONOSCOPY;  Surgeon: Bladimir Michael MD;  Location: [facility] ENDOSCOPY;  Service: Gastroenterology;  Laterality: N/A;   • CORONARY ANGIOPLASTY      Successful PTCA & stenting LAD was done w/ deployment of a 2.5mm x23 Xience stent w/ reduction of stenosis from 90% to less than 0% stenosis with good LILLIAM 3 flow 02/17/2012       • ENDOSCOPY      with biopsy.  Mildly severe esophagitis. Gastritis. Normal examined duodenum.Several biopsies obtained in the lower third of the esophagus.Several biopsies obtained in the gastric antrum. 05/06/2016       • ENDOSCOPY      w tube. Normal esophagus. Gastritis in stomach. Biopsy taken. Gastric polyp found. Biopsy taken. Normal duodenum. 10/01/2012       • ENDOSCOPY AND COLONOSCOPY       Diverticulum in sigmoid colon & descending colon. Internal & external hemorrhoids found. 10/01/2012       • EYE SURGERY Bilateral     catarract   • HAND SURGERY Left      Corrective osteotomy of ring finger metacarpal. Malunited fracture of left ring finger 05/21/1985       • HERNIA REPAIR      Laparoscopic hernia repair. prepertitoneal bilateral inguinal hernia repair with mesh. 10/15/2009      • OTHER SURGICAL HISTORY      CORONARY ARTERY STENT    • SKIN TAG REMOVAL      Excision, viral skin tag,right upper eyelid 05/08/1995       General Information     Row Name 03/24/23 0932          Physical Therapy Time and Intention    Document Type evaluation  -GB     Mode of Treatment co-treatment;physical therapy;occupational therapy  -GB     Row Name 03/24/23 0932          General Information    Patient Profile Reviewed yes  -GB     Prior Level of Function independent:;all household mobility;community mobility;driving  -GB     Existing Precautions/Restrictions fall;non-weight bearing  NWB RLE  -GB     Barriers to Rehab medically complex;previous functional deficit  -GB     Row Name 03/24/23 0932          Living Environment    People in Home spouse  -GB     Row Name 03/24/23 0932          Home Main Entrance    Number of Stairs, Main Entrance none  -GB     Row Name 03/24/23 0932          Cognition    Orientation Status (Cognition) oriented x 4  -GB     Row Name 03/24/23 0932          Safety Issues, Functional Mobility    Safety Issues Affecting Function (Mobility) impulsivity;insight into deficits/self-awareness;positioning of assistive device;unable to maintain weight-bearing restrictions;safety precaution awareness  -GB     Impairments Affecting Function (Mobility) balance;coordination;endurance/activity tolerance;motor planning;pain  -GB     Comment, Safety Issues/Impairments (Mobility) pt very unsteady and anxious to get to bed once up due to dizziness from his meds per pt report  -GB           User Key  (r) =  Recorded By, (t) = Taken By, (c) = Cosigned By    Initials Name Provider Type    Roxanna Luke PT Physical Therapist               Mobility     Row Name 03/24/23 0932          Bed Mobility    Bed Mobility supine-sit;sit-supine  -GB     Supine-Sit [place] (Bed Mobility) standby assist  -GB     Sit-Supine [place] (Bed Mobility) standby assist  -GB     Assistive Device (Bed Mobility) bed rails;head of bed elevated  -GB     Row Name 03/24/23 0932          Bed-Chair Transfer    Bed-Chair [place] (Transfers) moderate assist (50% patient effort);minimum assist (75% patient effort);1 person assist;1 person to manage equipment  -GB     Assistive Device (Bed-Chair Transfers) walker, front-wheeled  -GB     Comment, (Bed-Chair Transfer) very unsteady to toilet and back  -GB     Row Name 03/24/23 0932          Sit-Stand Transfer    Sit-Stand [place] (Transfers) minimum assist (75% patient effort)  -GB     Assistive Device (Sit-Stand Transfers) walker, front-wheeled  -GB     Row Name 03/24/23 0932          Gait/Stairs (Locomotion)    [place] Level (Gait) moderate assist (50% patient effort);minimum assist (75% patient effort);1 person assist;1 person to manage equipment  -GB     Assistive Device (Gait) walker, front-wheeled  -GB     Distance in Feet (Gait) 6,6 very unsteady; reports unsteady due to meds making him feel dizzy  -GB     Row Name 03/24/23 0932          Mobility    Extremity Weight-bearing Status right lower extremity  -GB     Right Lower Extremity (Weight-bearing Status) non weight-bearing (NWB)  -GB           User Key  (r) = Recorded By, (t) = Taken By, (c) = Cosigned By    Initials Name Provider Type    Roxanna Luke, SURI Physical Therapist               Obj/Interventions     Row Name 03/24/23 6798          Range of Motion Comprehensive    General Range of Motion bilateral lower extremity ROM WFL  -GB     Comment, General Range of Motion RLE splinted and ankle  range blocked ; AROM ranjith HIps Knees WFL; L ankle AROM WFL  -GB     Row Name 03/24/23 1747          Strength Comprehensive (MMT)    Comment, General Manual Muscle Testing (MMT) Assessment LLE 4/5 hip knee ankle flx/ext; R hip knee flx/ext at least 3+/5 functionally;  -GB     Row Name 03/24/23 1747          Sensory Assessment (Somatosensory)    Sensory Assessment (Somatosensory) LE sensation intact  -GB           User Key  (r) = Recorded By, (t) = Taken By, (c) = Cosigned By    Initials Name Provider Type    GB Roxanna Mendez, PT Physical Therapist               Goals/Plan     Row Name 03/24/23 1738          Bed Mobility Goal 1 (PT)    Activity/Assistive Device (Bed Mobility Goal 1, PT) bed mobility activities, all  -GB     [place] Level/Cues Needed (Bed Mobility Goal 1, PT) modified independence  -GB     Time Frame (Bed Mobility Goal 1, PT) 5 days  -GB     Progress/Outcomes (Bed Mobility Goal 1, PT) goal partially met  -GB     Row Name 03/24/23 1738          Transfer Goal 1 (PT)    Activity/Assistive Device (Transfer Goal 1, PT) bed-to-chair/chair-to-bed;toilet  -GB     [place] Level/Cues Needed (Transfer Goal 1, PT) modified independence  -GB     Time Frame (Transfer Goal 1, PT) 1 week  -GB     Progress/Outcome (Transfer Goal 1, PT) goal not met  -GB     Row Name 03/24/23 1738          Gait Training Goal 1 (PT)    Activity/Assistive Device (Gait Training Goal 1, PT) gait (walking locomotion);assistive device use;walker, rolling  -GB     [place] Level (Gait Training Goal 1, PT) modified independence  -GB     Distance (Gait Training Goal 1, PT) 10 ft or less for safe transfers w/out damage to good foot from excessive pounding as primary wt bearing LE  -GB     Time Frame (Gait Training Goal 1, PT) 2 weeks  -GB     Strategies/Barriers (Gait Training Goal 1, PT) reinforce safety and decrease impulsiveness  -GB     Progress/Outcome (Gait Training Goal 1, PT) goal not met  -GB     Row Name  03/24/23 1738          Stairs Goal 1 (PT)    Activity/Assistive Device (Stairs Goal 1, PT) ascending stairs;descending stairs  -GB     [place] Level/Cues Needed (Stairs Goal 1, PT) modified independence;supervision required  -GB     Number of Stairs (Stairs Goal 1, PT) single step if needed for community access for medical care post d/c  -GB     Time Frame (Stairs Goal 1, PT) 2 weeks  -GB     Progress/Outcome (Stairs Goal 1, PT) goal not met  -GB     Row Name 03/24/23 9036          Therapy Assessment/Plan (PT)    Planned Therapy Interventions (PT) balance training;bed mobility training;gait training;home exercise program;patient/family education;neuromuscular re-education;transfer training;wheelchair management/propulsion training;stair training;strengthening  sitting transfer to protect RLE  -GB           User Key  (r) = Recorded By, (t) = Taken By, (c) = Cosigned By    Initials Name Provider Type    GB Roxanna Mendez, PT Physical Therapist               Clinical Impression     Row Name 03/24/23 0355          Pain    Pretreatment Pain Rating 3/10  -GB     Posttreatment Pain Rating 3/10  -GB     Pain Location - Side/Orientation Right  -GB     Pain Location - ankle  -GB     Pain Intervention(s) Repositioned;Nursing Notified;Ambulation/increased activity  -GB     Row Name 03/24/23 2855          Plan of Care Review    Plan of Care Reviewed With patient  -GB     Outcome Evaluation PT Eval completed w/ OT; Pt s/p fall at home post bunionectomy prior ,now S/P ankle ORIF for fx from fall at home. Pt able to move sup<>sit w/ SBA but sit to stand and gait very unsteady w/ FWRW and required min assist to steady himself while up.  He was  very unsteady and anxious to get to bed once up due to dizziness from his meds per pt report. He is recommended for rehab to home due to recent fall. RN and CNA notified post eval re his unsteadiness. Good rehab candidate.  -GB     Row Name 03/24/23 2600          Therapy  Assessment/Plan (PT)    Rehab Potential (PT) good, to achieve stated therapy goals  -GB     Criteria for Skilled Interventions Met (PT) yes  -GB     Therapy Frequency (PT) daily  -GB     Row Name 03/24/23 0932          Vital Signs    Pre Systolic BP Rehab 106  -GB     Pre Treatment Diastolic BP 59  -GB     Intra Systolic BP Rehab 108  -GB     Intra Treatment Diastolic BP 74  -GB     Post Systolic BP Rehab 118  -GB     Post Treatment Diastolic BP 58  -GB     Pretreatment Heart Rate (beats/min) 86  -GB     Intratreatment Heart Rate (beats/min) 93  -GB     Posttreatment Heart Rate (beats/min) 87  -GB     Pre SpO2 (%) 96  -GB     O2 Delivery Pre Treatment room air  -GB     Intra SpO2 (%) 92  -GB     O2 Delivery Intra Treatment room air  -GB     Post SpO2 (%) 98  -GB     O2 Delivery Post Treatment room air  -GB     Pre Patient Position Supine  -GB     Intra Patient Position Sitting  -GB     Post Patient Position Supine  -GB     Row Name 03/24/23 0932          Positioning and Restraints    Pre-Treatment Position in bed  -GB     Post Treatment Position bed  -GB     In Bed supine;notified nsg;call light within reach;encouraged to call for assist;exit alarm on;heels elevated  -GB           User Key  (r) = Recorded By, (t) = Taken By, (c) = Cosigned By    Initials Name Provider Type    Roxanna Luke, PT Physical Therapist               Outcome Measures     Row Name 03/24/23 1741 03/24/23 0933       How much help from another person do you currently need...    Turning from your back to your side while in flat bed without using bedrails? 3  -GB 2  -LM    Moving from lying on back to sitting on the side of a flat bed without bedrails? 2  -GB 2  -LM    Moving to and from a bed to a chair (including a wheelchair)? 2  -GB 2  -LM    Standing up from a chair using your arms (e.g., wheelchair, bedside chair)? 2  -GB 2  -LM    Climbing 3-5 steps with a railing? 1  -GB 2  -LM    To walk in hospital room? 2  -GB 2  -LM     -PAC 6 Clicks Score (PT) 12  -GB 12  -LM    Highest level of mobility 4 --> Transferred to chair/commode  - 4 --> Transferred to chair/commode  -    Row Name 03/24/23 0931          Functional Assessment    Outcome Measure Options AM-PAC 6 Clicks Daily Activity (OT)  -RW           User Key  (r) = Recorded By, (t) = Taken By, (c) = Cosigned By    Initials Name Provider Type     Roxanna Mendez, PT Physical Therapist    Mai Nguyen, RN Registered Nurse    RW Dhara Shepherd, OT Occupational Therapist                             Physical Therapy Education     Title: PT OT SLP Therapies (In Progress)     Topic: Physical Therapy (Done)     Point: Mobility training (Done)     Learning Progress Summary           Patient Acceptance, E,D, VU,NR by  at 3/24/2023 1741    Comment: POC: encouraged to decrease speed in gait; no OOB w/out assist;                   Point: Home exercise program (Done)     Learning Progress Summary           Patient Acceptance, E,D, VU,NR by  at 3/24/2023 1741    Comment: POC: encouraged to decrease speed in gait; no OOB w/out assist;                   Point: Body mechanics (Done)     Learning Progress Summary           Patient Acceptance, E,D, VU,NR by  at 3/24/2023 1741    Comment: POC: encouraged to decrease speed in gait; no OOB w/out assist;                   Point: Precautions (Done)     Learning Progress Summary           Patient Acceptance, E,D, VU,NR by  at 3/24/2023 1741    Comment: POC: encouraged to decrease speed in gait; no OOB w/out assist;                               User Key     Initials Effective Dates Name Provider Type [facility] 06/16/21 -  Roxanna Mendez PT Physical Therapist PT              PT Recommendation and Plan  Planned Therapy Interventions (PT): balance training, bed mobility training, gait training, home exercise program, patient/family education, neuromuscular re-education, transfer training, wheelchair  management/propulsion training, stair training, strengthening (sitting transfer to protect RLE)  Plan of Care Reviewed With: patient  Outcome Evaluation: PT Eval completed w/ OT; Pt s/p fall at home post bunionectomy prior ,now S/P ankle ORIF for fx from fall at home. Pt able to move sup<>sit w/ SBA but sit to stand and gait very unsteady w/ FWRW and required min assist to steady himself while up.  He was  very unsteady and anxious to get to bed once up due to dizziness from his meds per pt report. He is recommended for rehab to home due to recent fall. RN and CNA notified post eval re his unsteadiness. Good rehab candidate.     Time Calculation:    PT Charges     Row Name 03/24/23 0931             Time Calculation    Start Time 0931  -GB      Stop Time 1013  -GB      Time Calculation (min) 42 min  -GB      PT Received On 03/24/23  -GB      PT Goal Re-Cert Due Date 04/02/23  -GB         Untimed Charges    PT Eval/Re-eval Minutes 42  -GB         Total Minutes    Untimed Charges Total Minutes 42  -GB       Total Minutes 42  -GB            User Key  (r) = Recorded By, (t) = Taken By, (c) = Cosigned By    Initials Name Provider Type    Roxanna Luke, PT Physical Therapist              Therapy Charges for Today     Code Description Service Date Service Provider Modifiers Qty    61176128576 HC PT EVAL MOD COMPLEXITY 3 3/24/2023 Roxanna Mendez, PT GP 1    09589678096 HC PT THER SUPP EA 15 MIN 3/24/2023 Roxanna Mendez, PT GP 1          PT G-Codes  Outcome Measure Options: AM-PAC 6 Clicks Daily Activity (OT)  AM-PAC 6 Clicks Score (PT): 12  AM-PAC 6 Clicks Score (OT): 17  PT Discharge Summary  Anticipated Discharge Disposition (PT): other (see comments) (rehab to home)    Roxanna Mendez, SURI  3/24/2023

## 2023-03-24 NOTE — DISCHARGE PLACEMENT REQUEST
"Turner Blanco Jr. (77 y.o. Male)     Date of Birth   1945    Social Security Number       Address   [address] [address]    Home Phone   [phone]    MRN   [id number]       Christianity   None    Marital Status                               Admission Date   3/23/23    Admission Type   Elective    Admitting Provider   Mason Salazar DPM    Attending Provider   Mason Salazar DPM    Department, Room/Bed   [address], 427/1       Discharge Date       Discharge Disposition       Discharge Destination                               Attending Provider: Mason Salazar DPM    Allergies: Brilinta [Ticagrelor], Effient [Prasugrel], Warfarin And Related, Ciprofloxacin, Lipitor [Atorvastatin], Latex, Adhesive Tape, Celexa [Citalopram Hydrobromide], Crestor [Rosuvastatin Calcium], Floxin [Ofloxacin], Januvia [Sitagliptin], Penicillins, Sulfa Antibiotics    Isolation: None   Infection: None   Code Status: Prior    Ht: 175.3 cm (69\")   Wt: 83.9 kg (185 lb)    Admission Cmt: None   Principal Problem: Status post bunionectomy [Z98.890]                 Active Insurance as of 3/23/2023     Primary Coverage     Payor Plan Insurance Group Employer/Plan Group    MEDICARE MEDICARE A & B      Payor Plan Address Payor Plan Phone Number Payor Plan Fax Number Effective Dates    [address] [phone]  10/1/2003 - None Entered    [place] [address]       Subscriber Name Subscriber Birth Date Member ID       TURNER BLANCO JR. 1945 [id number]                 Emergency Contacts      (Rel.) Home Phone Work Phone Mobile Phone    BLANCOCHRISTOFER HAAS (Spouse) [phone] -- [phone]    Enrique Jorge (Relative) [phone] -- [phone]               History & Physical      Mason Salazar DPM at 03/23/23 0834          H&P reviewed. The patient was examined and there are no changes to the H&P.      Plan for open reduction internal " fixation of right ankle fracture.  Risks and benefits discussed in detail.  No guarantees given or implied.          This document has been electronically signed by Mason Salazar DPM on March 23, 2023 08:35 CDT           Electronically signed by Mason Salazar DPM at 03/23/23 0835   Source Note          Aj Card Jr.  1945  77 y.o. male  Bs: 130 per patient       03/21/2023    Chief Complaint   Patient presents with   • Right Foot - Post-op       History of Present Illness    Aj Card Jr. is a 77 y.o.male came to clinic for post operative appointment on right foot joint fusion. Xray obtained today because patient states he has fallen down since surgery.       Patient in the office today for follow-up evaluation of first metatarsal phalangeal joint fusion and bunionectomy.  His wife and he reports multiple falls since his operation and today is complaining of pain in area around the foot and ankle.    Past Medical History:   Diagnosis Date   • Acute posthemorrhagic anemia    • Afib (HCC)    • Allergic rhinitis    • Anxiety state    • Chest pain    • CKD (chronic kidney disease), stage II    • Coronary arteriosclerosis     3 stents, followed by Dr. Jeffers   • Diabetes mellitus (HCC)     type 2   • Diverticular disease of colon    • Dysphagia    • Elevated cholesterol    • Epigastric pain    • GERD (gastroesophageal reflux disease)     esophagitis on Dexilant. Pt feels Nexium working better      • History of echocardiogram     Small left atrial enlargement with mild CLVH.Ef of 55-60%.Mitral valve mildly thickened.Av thickened.Left ventricle mildly dilated.Tricuspid intact.Mild aortic insufficiency. 12/07/2015       • History of echocardiogram     Mild diastolic dysfunction and mild left atrial enlargement, otherwise normal echo. EF> 55% 01/03/2012       • Hypercholesterolemia    • Hypertension    • Insomnia    • Irritable bowel syndrome    • Osteoarthritis of multiple joints    • Pain  of right foot    • PONV (postoperative nausea and vomiting)          Past Surgical History:   Procedure Laterality Date   • APPENDECTOMY       [facility] 1995       • CARDIAC CATHETERIZATION      Successful PTCA of the OMB#2.Unsuccessful PTCA of the 1st OMB, secondary to difficulty in advancing the balloon. 12/08/2015       • CHOLECYSTECTOMY      [facility] [place] 1993       • CHOLECYSTECTOMY     • COLONOSCOPY  10/01/2012   • COLONOSCOPY N/A 12/11/2017    Procedure: COLONOSCOPY;  Surgeon: Bladimir Michael MD;  Location: [facility] ENDOSCOPY;  Service:    • COLONOSCOPY N/A 11/01/2022    Procedure: COLONOSCOPY;  Surgeon: Bladimir Michael MD;  Location: [facility] ENDOSCOPY;  Service: Gastroenterology;  Laterality: N/A;   • CORONARY ANGIOPLASTY      Successful PTCA & stenting LAD was done w/ deployment of a 2.5mm x23 Xience stent w/ reduction of stenosis from 90% to less than 0% stenosis with good LILLIAM 3 flow 02/17/2012       • ENDOSCOPY      with biopsy.  Mildly severe esophagitis. Gastritis. Normal examined duodenum.Several biopsies obtained in the lower third of the esophagus.Several biopsies obtained in the gastric antrum. 05/06/2016       • ENDOSCOPY      w tube. Normal esophagus. Gastritis in stomach. Biopsy taken. Gastric polyp found. Biopsy taken. Normal duodenum. 10/01/2012       • ENDOSCOPY AND COLONOSCOPY      Diverticulum in sigmoid colon & descending colon. Internal & external hemorrhoids found. 10/01/2012       • EYE SURGERY Bilateral     catarract   • HAND SURGERY Left      Corrective osteotomy of ring finger metacarpal. Malunited fracture of left ring finger 05/21/1985       • HERNIA REPAIR      Laparoscopic hernia repair. prepertitoneal bilateral inguinal hernia repair with mesh. 10/15/2009      • OTHER SURGICAL HISTORY      CORONARY ARTERY STENT    • SKIN TAG REMOVAL      Excision, viral skin tag,right upper eyelid 05/08/1995          Family History   Problem Relation Age of Onset   •  Clotting disorder Other    • Lung disease Other    • Schizophrenia Sister    • Obesity Sister    • Tuberculosis Sister    • Arthritis Mother    • Diabetes Mother    • Heart disease Mother    • Hypertension Mother    • Obesity Mother    • Stroke Mother    • Thyroid disease Mother    • Tuberculosis Mother    • Arthritis Father    • Tuberculosis Father        Allergies   Allergen Reactions   • Brilinta [Ticagrelor] Shortness Of Breath   • Effient [Prasugrel] Shortness Of Breath   • Warfarin And Related Shortness Of Breath   • Ciprofloxacin Hives   • Lipitor [Atorvastatin] Swelling   • Latex Itching   • Adhesive Tape Rash   • Celexa [Citalopram Hydrobromide] Rash   • Crestor [Rosuvastatin Calcium] Rash   • Floxin [Ofloxacin] Rash   • Januvia [Sitagliptin] Rash   • Penicillins Rash   • Sulfa Antibiotics Rash       Social History     Socioeconomic History   • Marital status:    Tobacco Use   • Smoking status: Never   • Smokeless tobacco: Never   Vaping Use   • Vaping Use: Never used   Substance and Sexual Activity   • Alcohol use: No   • Drug use: No   • Sexual activity: Defer         Current Outpatient Medications   Medication Sig Dispense Refill   • ALPRAZolam (XANAX) 0.5 MG tablet Take 1 tablet by mouth At Night As Needed for Anxiety. 60 tablet 0   • ASPIRIN 81 PO Take 1 tablet by mouth Daily.     • Azelastine HCl 137 MCG/SPRAY solution Inhale 1 spray Daily. 30 mL 3   • cholecalciferol (VITAMIN D3) 1000 units tablet Take 1 tablet by mouth Daily. 30 tablet 3   • coenzyme Q10 100 MG capsule Take 1 capsule by mouth Daily.     • dapagliflozin (Farxiga) 5 MG tablet tablet Take 1 tablet by mouth Daily.     • Dulaglutide (Trulicity) 3 MG/0.5ML solution pen-injector Inject 3 mg under the skin into the appropriate area as directed 1 (One) Time Per Week. 12 pen 3   • esomeprazole (nexIUM) 40 MG capsule Take 1 capsule by mouth Every Morning Before Breakfast. 30 capsule 3   • Glucose Blood (BLOOD GLUCOSE TEST) strip Use 4 x  "daily use any brand covered by insurance or same brand as before ICD10 code is E11.9 120 each 11   • HYDROcodone-acetaminophen (Norco)  MG per tablet Take 1 tablet by mouth Every 6 (Six) Hours As Needed for Moderate Pain. 30 tablet 0   • Insulin Glargine (BASAGLAR KWIKPEN) 100 UNIT/ML injection pen Inject 15 Units under the skin into the appropriate area as directed At Night As Needed.     • Insulin Pen Needle 30G X 8 MM misc Use 3-4 times daily. 100 each 3   • Insulin Syringe 31G X 5/16\" 0.3 ML misc 4 x daily 120 each 11   • KRILL OIL OMEGA-3 PO Take 1 capsule by mouth Daily.     • Lancet Devices (LANCING DEVICE) misc USE AS INDICATED TO CORRELATE WITH STRIPS AND METER 1 each 1   • Lancets 30G misc USE 4 X DAILY 120 each 11   • lisinopril (PRINIVIL,ZESTRIL) 5 MG tablet Take 1 tablet by mouth Daily. 90 tablet 1   • metoprolol tartrate (LOPRESSOR) 50 MG tablet Take 1 tablet by mouth 2 (Two) Times a Day. 180 tablet 1   • propafenone (RYTHMOL) 150 MG tablet Take 1 tablet by mouth Every 8 (Eight) Hours.     • Red Yeast Rice Extract (RED YEAST RICE PO) Take 4 capsules by mouth Daily.     • rivaroxaban (XARELTO) 20 MG tablet Take 1 tablet by mouth Daily.     • Saw Palmetto, Serenoa repens, (SAW PALMETTO PO) Take 1 tablet by mouth Daily.     • sucralfate (CARAFATE) 1 g tablet Take 1 tablet by mouth 2 (Two) Times a Day.     • vitamin B-12 (CYANOCOBALAMIN) 2500 MCG sublingual tablet tablet Place 2,500 mcg under the tongue 1 (One) Time Per Week.       No current facility-administered medications for this visit.       Review of Systems   Musculoskeletal:        Foot pain    All other systems reviewed and are negative.        OBJECTIVE    /69   Pulse 78   Ht 175.3 cm (69\")   Wt 83.5 kg (184 lb)   SpO2 98%   BMI 27.17 kg/m²     Body mass index is 27.17 kg/m².        Physical Exam  Vitals reviewed.   Constitutional:       Appearance: Normal appearance. He is well-developed.   HENT:      Head: Normocephalic and " atraumatic.   Neck:      Trachea: Trachea and phonation normal.   Cardiovascular:      Pulses:           Dorsalis pedis pulses are 1+ on the right side.        Posterior tibial pulses are 1+ on the right side.   Pulmonary:      Effort: Pulmonary effort is normal. No respiratory distress.   Abdominal:      General: There is no distension.      Palpations: Abdomen is soft.   Musculoskeletal:        Feet:    Skin:     General: Skin is warm and dry.   Neurological:      Mental Status: He is alert and oriented to person, place, and time.      GCS: GCS eye subscore is 4. GCS verbal subscore is 5. GCS motor subscore is 6.   Psychiatric:         Speech: Speech normal.         Behavior: Behavior normal. Behavior is cooperative.         Thought Content: Thought content normal.         Judgment: Judgment normal.                Procedures        ASSESSMENT AND PLAN    Diagnoses and all orders for this visit:    1. Closed trimalleolar fracture of right ankle, initial encounter (Primary)  -     Case Request; Standing  -     ceFAZolin (ANCEF) 2 g in sodium chloride 0.9 % 100 mL IVPB  -     Case Request    2. Osteoarthritis of ankle and foot, right  -     XR Foot 3+ View Right  -     XR Ankle 3+ View Right  -     XR Tibia Fibula 2 View Right  -     Case Request; Standing  -     ceFAZolin (ANCEF) 2 g in sodium chloride 0.9 % 100 mL IVPB  -     Case Request    3. Status post bunionectomy  -     Case Request; Standing  -     ceFAZolin (ANCEF) 2 g in sodium chloride 0.9 % 100 mL IVPB  -     Case Request    Other orders  -     Follow Anesthesia Guidelines / Protocol; Standing  -     Verify NPO Status; Standing  -     Obtain informed consent (if not collected inpatient or PAT); Standing  -     Notify Physician - Standard; Standing  -     Follow Anesthesia Guidelines / Protocol; Future      Body mass index is 27.17 kg/m².    Recommend follow-up with primary care to discuss BMI greater than 30    We reviewed the x-rays with the patient and  his family today and Dr. Salazar recommended an open reduction internal fixation of the right trimalleolar fracture.  We will get that set up for Thursday of this week, patient was instructed to hold all anticoagulants and to remain n.p.o. after midnight until follow-up with anesthesia for further instructions.  They verbalized understand plan of care.  He will continue to take pain medication/regimen in half to help control his drowsiness and contact the office for any worsening symptoms.  He needs to remain in the Ortho boot at this time and continue use the scooter/walker for mobility    The risks, benefits, and alternatives of this procedure have been discussed with the patient or the responsible party- they understand and agrees to proceed. No guarantees given or implied.           This document has been electronically signed by Qasim LEONARDO, HANNA-VY, ONP-VY on March 21, 2023 14:43 CDT       Electronically signed by Qasim Zacarias APRN at 03/21/23 1511             Qasim Zacarias APRN at 03/21/23 1310          Aj Card   1945  77 y.o. male  Bs: 130 per patient       03/21/2023    Chief Complaint   Patient presents with   • Right Foot - Post-op       History of Present Illness    Aj Card Jr. is a 77 y.o.male came to clinic for post operative appointment on right foot joint fusion. Xray obtained today because patient states he has fallen down since surgery.       Patient in the office today for follow-up evaluation of first metatarsal phalangeal joint fusion and bunionectomy.  His wife and he reports multiple falls since his operation and today is complaining of pain in area around the foot and ankle.    Past Medical History:   Diagnosis Date   • Acute posthemorrhagic anemia    • Afib (HCC)    • Allergic rhinitis    • Anxiety state    • Chest pain    • CKD (chronic kidney disease), stage II    • Coronary arteriosclerosis     3 stents, followed by Dr. Jeffers   •  Diabetes mellitus (HCC)     type 2   • Diverticular disease of colon    • Dysphagia    • Elevated cholesterol    • Epigastric pain    • GERD (gastroesophageal reflux disease)     esophagitis on Dexilant. Pt feels Nexium working better      • History of echocardiogram     Small left atrial enlargement with mild CLVH.Ef of 55-60%.Mitral valve mildly thickened.Av thickened.Left ventricle mildly dilated.Tricuspid intact.Mild aortic insufficiency. 12/07/2015       • History of echocardiogram     Mild diastolic dysfunction and mild left atrial enlargement, otherwise normal echo. EF> 55% 01/03/2012       • Hypercholesterolemia    • Hypertension    • Insomnia    • Irritable bowel syndrome    • Osteoarthritis of multiple joints    • Pain of right foot    • PONV (postoperative nausea and vomiting)          Past Surgical History:   Procedure Laterality Date   • APPENDECTOMY       [facility] 1995       • CARDIAC CATHETERIZATION      Successful PTCA of the OMB#2.Unsuccessful PTCA of the 1st OMB, secondary to difficulty in advancing the balloon. 12/08/2015       • CHOLECYSTECTOMY      [facility] 1993       • CHOLECYSTECTOMY     • COLONOSCOPY  10/01/2012   • COLONOSCOPY N/A 12/11/2017    Procedure: COLONOSCOPY;  Surgeon: Bladimir Michael MD;  Location: [facility] ENDOSCOPY;  Service:    • COLONOSCOPY N/A 11/01/2022    Procedure: COLONOSCOPY;  Surgeon: Bladimir Michael MD;  Location: [facility] ENDOSCOPY;  Service: Gastroenterology;  Laterality: N/A;   • CORONARY ANGIOPLASTY      Successful PTCA & stenting LAD was done w/ deployment of a 2.5mm x23 Xience stent w/ reduction of stenosis from 90% to less than 0% stenosis with good LILLIAM 3 flow 02/17/2012       • ENDOSCOPY      with biopsy.  Mildly severe esophagitis. Gastritis. Normal examined duodenum.Several biopsies obtained in the lower third of the esophagus.Several biopsies obtained in the gastric antrum. 05/06/2016       • ENDOSCOPY      w tube. Normal  esophagus. Gastritis in stomach. Biopsy taken. Gastric polyp found. Biopsy taken. Normal duodenum. 10/01/2012       • ENDOSCOPY AND COLONOSCOPY      Diverticulum in sigmoid colon & descending colon. Internal & external hemorrhoids found. 10/01/2012       • EYE SURGERY Bilateral     catarract   • HAND SURGERY Left      Corrective osteotomy of ring finger metacarpal. Malunited fracture of left ring finger 05/21/1985       • HERNIA REPAIR      Laparoscopic hernia repair. prepertitoneal bilateral inguinal hernia repair with mesh. 10/15/2009      • OTHER SURGICAL HISTORY      CORONARY ARTERY STENT    • SKIN TAG REMOVAL      Excision, viral skin tag,right upper eyelid 05/08/1995          Family History   Problem Relation Age of Onset   • Clotting disorder Other    • Lung disease Other    • Schizophrenia Sister    • Obesity Sister    • Tuberculosis Sister    • Arthritis Mother    • Diabetes Mother    • Heart disease Mother    • Hypertension Mother    • Obesity Mother    • Stroke Mother    • Thyroid disease Mother    • Tuberculosis Mother    • Arthritis Father    • Tuberculosis Father        Allergies   Allergen Reactions   • Brilinta [Ticagrelor] Shortness Of Breath   • Effient [Prasugrel] Shortness Of Breath   • Warfarin And Related Shortness Of Breath   • Ciprofloxacin Hives   • Lipitor [Atorvastatin] Swelling   • Latex Itching   • Adhesive Tape Rash   • Celexa [Citalopram Hydrobromide] Rash   • Crestor [Rosuvastatin Calcium] Rash   • Floxin [Ofloxacin] Rash   • Januvia [Sitagliptin] Rash   • Penicillins Rash   • Sulfa Antibiotics Rash       Social History     Socioeconomic History   • Marital status:    Tobacco Use   • Smoking status: Never   • Smokeless tobacco: Never   Vaping Use   • Vaping Use: Never used   Substance and Sexual Activity   • Alcohol use: No   • Drug use: No   • Sexual activity: Defer         Current Outpatient Medications   Medication Sig Dispense Refill   • ALPRAZolam (XANAX) 0.5 MG tablet  "Take 1 tablet by mouth At Night As Needed for Anxiety. 60 tablet 0   • ASPIRIN 81 PO Take 1 tablet by mouth Daily.     • Azelastine HCl 137 MCG/SPRAY solution Inhale 1 spray Daily. 30 mL 3   • cholecalciferol (VITAMIN D3) 1000 units tablet Take 1 tablet by mouth Daily. 30 tablet 3   • coenzyme Q10 100 MG capsule Take 1 capsule by mouth Daily.     • dapagliflozin (Farxiga) 5 MG tablet tablet Take 1 tablet by mouth Daily.     • Dulaglutide (Trulicity) 3 MG/0.5ML solution pen-injector Inject 3 mg under the skin into the appropriate area as directed 1 (One) Time Per Week. 12 pen 3   • esomeprazole (nexIUM) 40 MG capsule Take 1 capsule by mouth Every Morning Before Breakfast. 30 capsule 3   • Glucose Blood (BLOOD GLUCOSE TEST) strip Use 4 x daily use any brand covered by insurance or same brand as before ICD10 code is E11.9 120 each 11   • HYDROcodone-acetaminophen (Norco)  MG per tablet Take 1 tablet by mouth Every 6 (Six) Hours As Needed for Moderate Pain. 30 tablet 0   • Insulin Glargine (BASAGLAR KWIKPEN) 100 UNIT/ML injection pen Inject 15 Units under the skin into the appropriate area as directed At Night As Needed.     • Insulin Pen Needle 30G X 8 MM misc Use 3-4 times daily. 100 each 3   • Insulin Syringe 31G X 5/16\" 0.3 ML misc 4 x daily 120 each 11   • KRILL OIL OMEGA-3 PO Take 1 capsule by mouth Daily.     • Lancet Devices (LANCING DEVICE) misc USE AS INDICATED TO CORRELATE WITH STRIPS AND METER 1 each 1   • Lancets 30G misc USE 4 X DAILY 120 each 11   • lisinopril (PRINIVIL,ZESTRIL) 5 MG tablet Take 1 tablet by mouth Daily. 90 tablet 1   • metoprolol tartrate (LOPRESSOR) 50 MG tablet Take 1 tablet by mouth 2 (Two) Times a Day. 180 tablet 1   • propafenone (RYTHMOL) 150 MG tablet Take 1 tablet by mouth Every 8 (Eight) Hours.     • Red Yeast Rice Extract (RED YEAST RICE PO) Take 4 capsules by mouth Daily.     • rivaroxaban (XARELTO) 20 MG tablet Take 1 tablet by mouth Daily.     • Joana Umaña " "repens, (SAW PALMETTO PO) Take 1 tablet by mouth Daily.     • sucralfate (CARAFATE) 1 g tablet Take 1 tablet by mouth 2 (Two) Times a Day.     • vitamin B-12 (CYANOCOBALAMIN) 2500 MCG sublingual tablet tablet Place 2,500 mcg under the tongue 1 (One) Time Per Week.       No current facility-administered medications for this visit.       Review of Systems   Musculoskeletal:        Foot pain    All other systems reviewed and are negative.        OBJECTIVE    /69   Pulse 78   Ht 175.3 cm (69\")   Wt 83.5 kg (184 lb)   SpO2 98%   BMI 27.17 kg/m²     Body mass index is 27.17 kg/m².        Physical Exam  Vitals reviewed.   Constitutional:       Appearance: Normal appearance. He is well-developed.   HENT:      Head: Normocephalic and atraumatic.   Neck:      Trachea: Trachea and phonation normal.   Cardiovascular:      Pulses:           Dorsalis pedis pulses are 1+ on the right side.        Posterior tibial pulses are 1+ on the right side.   Pulmonary:      Effort: Pulmonary effort is normal. No respiratory distress.   Abdominal:      General: There is no distension.      Palpations: Abdomen is soft.   Musculoskeletal:        Feet:    Skin:     General: Skin is warm and dry.   Neurological:      Mental Status: He is alert and oriented to person, place, and time.      GCS: GCS eye subscore is 4. GCS verbal subscore is 5. GCS motor subscore is 6.   Psychiatric:         Speech: Speech normal.         Behavior: Behavior normal. Behavior is cooperative.         Thought Content: Thought content normal.         Judgment: Judgment normal.                Procedures        ASSESSMENT AND PLAN    Diagnoses and all orders for this visit:    1. Closed trimalleolar fracture of right ankle, initial encounter (Primary)  -     Case Request; Standing  -     ceFAZolin (ANCEF) 2 g in sodium chloride 0.9 % 100 mL IVPB  -     Case Request    2. Osteoarthritis of ankle and foot, right  -     XR Foot 3+ View Right  -     XR Ankle 3+ View " Right  -     XR Tibia Fibula 2 View Right  -     Case Request; Standing  -     ceFAZolin (ANCEF) 2 g in sodium chloride 0.9 % 100 mL IVPB  -     Case Request    3. Status post bunionectomy  -     Case Request; Standing  -     ceFAZolin (ANCEF) 2 g in sodium chloride 0.9 % 100 mL IVPB  -     Case Request    Other orders  -     Follow Anesthesia Guidelines / Protocol; Standing  -     Verify NPO Status; Standing  -     Obtain informed consent (if not collected inpatient or PAT); Standing  -     Notify Physician - Standard; Standing  -     Follow Anesthesia Guidelines / Protocol; Future      Body mass index is 27.17 kg/m².    Recommend follow-up with primary care to discuss BMI greater than 30    We reviewed the x-rays with the patient and his family today and Dr. Salazar recommended an open reduction internal fixation of the right trimalleolar fracture.  We will get that set up for Thursday of this week, patient was instructed to hold all anticoagulants and to remain n.p.o. after midnight until follow-up with anesthesia for further instructions.  They verbalized understand plan of care.  He will continue to take pain medication/regimen in half to help control his drowsiness and contact the office for any worsening symptoms.  He needs to remain in the Ortho boot at this time and continue use the scooter/walker for mobility    The risks, benefits, and alternatives of this procedure have been discussed with the patient or the responsible party- they understand and agrees to proceed. No guarantees given or implied.           This document has been electronically signed by Qasim LEONARDO, FNP-C, ONP-C on March 21, 2023 14:43 CDT       Electronically signed by Qasim Zacarias APRN at 03/21/23 1511          Physician Progress Notes (most recent note)      Mason Salazar DPM at 03/24/23 1542          Podiatric Surgery Progress Note    Subjective     Post-Operative Day: 1 post-right ORIF ankle and revision 1st MTP  desis    Systemic or Specific Complaints: No Complaints    Objective     Vital signs in last 24 hours:  Temp:  [97.8 °F (36.6 °C)-98.6 °F (37 °C)] 97.8 °F (36.6 °C)  Heart Rate:  [80-90] 88  Resp:  [16-18] 16  BP: (106-136)/(52-74) 106/52    General: alert, appears stated age and cooperative   Neurovascular: SILT  Capillary refill: Normal   Wound: Dressing c/d/i   Range of Motion: Splint c/d/i   DVT Exam: No evidence of DVT seen on physical exam.     Data Review  CBC:  Results from last 7 days   Lab Units 23  0528   WBC 10*3/mm3 7.91   RBC 10*6/mm3 3.78*   HEMOGLOBIN g/dL 10.7*   HEMATOCRIT % 31.9*   PLATELETS 10*3/mm3 296       Assessment & Plan     Right ankle fracture  Implant failure    Patient doing fairly well postoperatively.  Fell getting off the commode earlier today.  Continue nonweightbearing right lower extremity.  Heparin for DVT prophylaxis.  Pain adequately controlled.  Plan for discharge to skilled nursing facility. Appreciate Medicine and Cardiology recommendations. Call with questions.         LOS: 0 days     Mason Salazar DPM    Date: 3/24/2023  Time: 15:42 CDT      Electronically signed by Mason Salazar DPM at 23 1546       Physical Therapy Notes (most recent note)    No notes exist for this encounter.            Occupational Therapy Notes (most recent note)      Dhara Shepherd, OT at 23 1225          Patient Name: Aj Card JrJeremy  : 1945    MRN: [id number]                              Today's Date: 3/24/2023       Admit Date: 3/23/2023    Visit Dx:     ICD-10-CM ICD-9-CM   1. Status post bunionectomy  Z98.890 V45.89   2. Closed trimalleolar fracture of right ankle, initial encounter  S82.851A 824.6   3. Osteoarthritis of ankle and foot, right  M19.071 715.97   4. Impaired mobility and ADLs  Z74.09 V49.89    Z78.9      Patient Active Problem List   Diagnosis   • Type 2 diabetes mellitus without complication, without long-term current use of insulin (HCC)    • Essential hypertension   • Mixed hyperlipidemia   • Generalized anxiety disorder   • History of colon polyps   • Diverticulosis of large intestine without hemorrhage   • Coronary artery disease with angina pectoris ([facility])   • Gastroesophageal reflux disease with esophagitis   • Vitamin D deficiency   • Overweight   • Encounter for screening for endocrine disorder   • Atopic dermatitis   • High serum vitamin B12   • Acute pain of right knee   • Tear of medial meniscus of right knee, current   • GERD (gastroesophageal reflux disease)   • PAF (paroxysmal atrial fibrillation) ([facility])   • Uncontrolled type 2 diabetes mellitus with hyperglycemia ([facility])   • Gastroesophageal reflux disease   • Chronic constipation   • Stage 2 chronic kidney disease   • Osteoarthritis of ankle and foot, right   • Closed trimalleolar fracture of right ankle   • Status post bunionectomy     Past Medical History:   Diagnosis Date   • Acute posthemorrhagic anemia    • Afib ([facility])    • Allergic rhinitis    • Anxiety state    • Chest pain    • CKD (chronic kidney disease), stage II    • Coronary arteriosclerosis     3 stents, followed by Dr. Jeffers   • Diabetes mellitus ([facility])     type 2   • Diverticular disease of colon    • Dysphagia    • Elevated cholesterol    • Epigastric pain    • GERD (gastroesophageal reflux disease)     esophagitis on Dexilant. Pt feels Nexium working better      • History of echocardiogram     Small left atrial enlargement with mild CLVH.Ef of 55-60%.Mitral valve mildly thickened.Av thickened.Left ventricle mildly dilated.Tricuspid intact.Mild aortic insufficiency. 12/07/2015       • History of echocardiogram     Mild diastolic dysfunction and mild left atrial enlargement, otherwise normal echo. EF> 55% 01/03/2012       • Hypercholesterolemia    • Hypertension    • Insomnia    • Irritable bowel syndrome    • Osteoarthritis of multiple joints    • Pain of right foot    • PONV (postoperative nausea and vomiting)      Past  Surgical History:   Procedure Laterality Date   • APPENDECTOMY       [facility] 1995       • CARDIAC CATHETERIZATION      Successful PTCA of the OMB#2.Unsuccessful PTCA of the 1st OMB, secondary to difficulty in advancing the balloon. 12/08/2015       • CHOLECYSTECTOMY      [facility] 1993       • CHOLECYSTECTOMY     • COLONOSCOPY  10/01/2012   • COLONOSCOPY N/A 12/11/2017    Procedure: COLONOSCOPY;  Surgeon: Bladimir Michael MD;  Location: [facility] ENDOSCOPY;  Service:    • COLONOSCOPY N/A 11/01/2022    Procedure: COLONOSCOPY;  Surgeon: Bladimir Michael MD;  Location: [facility] ENDOSCOPY;  Service: Gastroenterology;  Laterality: N/A;   • CORONARY ANGIOPLASTY      Successful PTCA & stenting LAD was done w/ deployment of a 2.5mm x23 Xience stent w/ reduction of stenosis from 90% to less than 0% stenosis with good LILLIAM 3 flow 02/17/2012       • ENDOSCOPY      with biopsy.  Mildly severe esophagitis. Gastritis. Normal examined duodenum.Several biopsies obtained in the lower third of the esophagus.Several biopsies obtained in the gastric antrum. 05/06/2016       • ENDOSCOPY      w tube. Normal esophagus. Gastritis in stomach. Biopsy taken. Gastric polyp found. Biopsy taken. Normal duodenum. 10/01/2012       • ENDOSCOPY AND COLONOSCOPY      Diverticulum in sigmoid colon & descending colon. Internal & external hemorrhoids found. 10/01/2012       • EYE SURGERY Bilateral     catarract   • HAND SURGERY Left      Corrective osteotomy of ring finger metacarpal. Malunited fracture of left ring finger 05/21/1985       • HERNIA REPAIR      Laparoscopic hernia repair. prepertitoneal bilateral inguinal hernia repair with mesh. 10/15/2009      • OTHER SURGICAL HISTORY      CORONARY ARTERY STENT    • SKIN TAG REMOVAL      Excision, viral skin tag,right upper eyelid 05/08/1995       General Information     Row Name 03/24/23 0931          OT Time and Intention    Document Type evaluation  -RW     Mode of Treatment  co-treatment;physical therapy;occupational therapy  -RW     Row Name 03/24/23 0931          General Information    Patient Profile Reviewed yes  -RW     Prior Level of Function independent:;ADL's;feeding;grooming;dressing;bathing;cooking;cleaning;driving;all household mobility;transfer;gait  -RW     Existing Precautions/Restrictions fall;non-weight bearing   NWB RLE  -RW     Row Name 03/24/23 0931          Living Environment    People in Home spouse  -RW     Row Name 03/24/23 0931          Home Main Entrance    Number of Stairs, Main Entrance none  -RW     Row Name 03/24/23 0931          Stairs Within Home, Primary    Stairs, Within Home, Primary I with ambulation; walk in shower and tub shower; safety bars; comfort height;  -RW     Row Name 03/24/23 0931          Cognition    Orientation Status (Cognition) oriented x 4  -RW     Row Name 03/24/23 0931          Safety Issues, Functional Mobility    Safety Issues Affecting Function (Mobility) ability to follow commands;at risk behavior observed;awareness of need for assistance;impulsivity;insight into deficits/self-awareness;positioning of assistive device;safety precaution awareness;safety precautions follow-through/compliance  -RW     Impairments Affecting Function (Mobility) balance;coordination;endurance/activity tolerance;motor planning;pain  -RW           User Key  (r) = Recorded By, (t) = Taken By, (c) = Cosigned By    Initials Name Provider Type    RW Dhara Shepherd OT Occupational Therapist                 Mobility/ADL's     Row Name 03/24/23 0931          Bed Mobility    Bed Mobility supine-sit;sit-supine  -RW     Supine-Sit [place] (Bed Mobility) standby assist  -RW     Sit-Supine [place] (Bed Mobility) standby assist  -RW     Assistive Device (Bed Mobility) bed rails;head of bed elevated  -     Row Name 03/24/23 0931          Transfers    Transfers sit-stand transfer;toilet transfer;stand-sit transfer  -     Row Name 03/24/23 0931           Sit-Stand Transfer    Sit-Stand [place] (Transfers) minimum assist (75% patient effort)  -     Assistive Device (Sit-Stand Transfers) walker, front-wheeled  -RW     Row Name 03/24/23 0931          Stand-Sit Transfer    Stand-Sit [place] (Transfers) minimum assist (75% patient effort)  -     Assistive Device (Stand-Sit Transfers) walker, front-wheeled  -RW     Row Name 03/24/23 0931          Toilet Transfer    Type (Toilet Transfer) sit-stand;stand-sit  -RW     [place] Level (Toilet Transfer) minimum assist (75% patient effort)  -     Assistive Device (Toilet Transfer) walker, front-wheeled  -RW     Row Name 03/24/23 0931          Functional Mobility    Functional Mobility- Ind. Level minimum assist (75% patient effort)  -     Functional Mobility- Device walker, front-wheeled  -RW     Row Name 03/24/23 0931          Activities of Daily Living    BADL Assessment/Intervention lower body dressing  -     Row Name 03/24/23 0941 03/24/23 0931       Mobility    Extremity Weight-bearing Status --  -RW right lower extremity  -RW    Right Lower Extremity (Weight-bearing Status) --  -RW non weight-bearing (NWB)   -    Row Name 03/24/23 0931          Lower Body Dressing Assessment/Training    [place] Level (Lower Body Dressing) doff;don;socks;contact guard assist  -     Position (Lower Body Dressing) edge of bed sitting  -     Comment, (Lower Body Dressing) L sock only  -           User Key  (r) = Recorded By, (t) = Taken By, (c) = Cosigned By    Initials Name Provider Type    RW Dhara Shepherd OT Occupational Therapist               Obj/Interventions     Row Name 03/24/23 0931          Sensory Assessment (Somatosensory)    Sensory Assessment (Somatosensory) UE sensation intact  -     Row Name 03/24/23 0931          Range of Motion Comprehensive    General Range of Motion bilateral upper extremity ROM WNL  -     Row Name 03/24/23 0931          Strength Comprehensive (MMT)    General  Manual Muscle Testing (MMT) Assessment no strength deficits identified  -RW     Comment, General Manual Muscle Testing (MMT) Assessment BUE MMT 5/5 grossly  -RW           User Key  (r) = Recorded By, (t) = Taken By, (c) = Cosigned By    Initials Name Provider Type    RW Dhara Shepherd, OT Occupational Therapist               Goals/Plan     Row Name 03/24/23 0931          Transfer Goal 1 (OT)    Activity/Assistive Device (Transfer Goal 1, OT) transfers, all  -RW     [place] Level/Cues Needed (Transfer Goal 1, OT) modified independence  -RW     Time Frame (Transfer Goal 1, OT) long term goal (LTG);by discharge  -RW     Progress/Outcome (Transfer Goal 1, OT) goal not met  -RW     Row Name 03/24/23 0931          Bathing Goal 1 (OT)    Activity/Device (Bathing Goal 1, OT) lower body bathing  -RW     [place] Level/Cues Needed (Bathing Goal 1, OT) modified independence  -RW     Time Frame (Bathing Goal 1, OT) long term goal (LTG);by discharge  -RW     Progress/Outcomes (Bathing Goal 1, OT) goal not met  -RW     Row Name 03/24/23 0931          Dressing Goal 1 (OT)    Activity/Device (Dressing Goal 1, OT) lower body dressing  -RW     [place]/Cues Needed (Dressing Goal 1, OT) modified independence  -RW     Time Frame (Dressing Goal 1, OT) long term goal (LTG);by discharge  -RW     Progress/Outcome (Dressing Goal 1, OT) goal not met  -RW     Row Name 03/24/23 0931          Problem Specific Goal 1 (OT)    Problem Specific Goal 1 (OT) Pt will perform OOB ax for 15 minutes to increase ax tolerance for ADLs.  -RW     Time Frame (Problem Specific Goal 1, OT) long term goal (LTG);by discharge  -RW     Progress/Outcome (Problem Specific Goal 1, OT) goal not met  -RW     Row Name 03/24/23 0931          Therapy Assessment/Plan (OT)    Planned Therapy Interventions (OT) activity tolerance training;manual therapy/joint mobilization;patient/caregiver education/training;adaptive equipment training;neuromuscular  control/coordination retraining;BADL retraining;ROM/therapeutic exercise;cognitive/visual perception retraining;occupation/activity based interventions;strengthening exercise;edema control/reduction;functional balance retraining;IADL retraining;passive ROM/stretching;transfer/mobility retraining  -RW           User Key  (r) = Recorded By, (t) = Taken By, (c) = Cosigned By    Initials Name Provider Type    RW Dhara Shepherd OT Occupational Therapist               Clinical Impression     Row Name 03/24/23 0931          Pain Assessment    Pretreatment Pain Rating 3/10  -RW     Posttreatment Pain Rating 3/10  -RW     Pain Location - Side/Orientation Right  -RW     Pain Location - ankle  -RW     Pain Intervention(s) Repositioned;Ambulation/increased activity;Distraction  -RW     Row Name 03/24/23 0931          Plan of Care Review    Plan of Care Reviewed With patient  -RW     Outcome Evaluation OT aileen complete. Pt supine upon arrival and agreeable to therapy. Pt perf sup>sit with SBA and HOB raised and bed rails. Pts BUE ROM was WNL, BUE MMT 5/5 grossly. Pt perf doffed/donned L sock with CGA while sitting EOB. Pt perf sit<>stand with Jaime and FWW. Pt perf functional mobility to bathroom with mod A and FWW d/t poor balance and impulsiveness. Pt perf toilet transfer with min A and fast, uncontrolled descent, no fall. Pt perf toilet transfer with min A and FWW. Pt perf sit>sup with SBA. Pt demonstrated decreased independence with ADLs, transfer safety, and ax tolerance. Cont inpatient OT. Pt will need continued rehab prior to d/c home due to decreased safety awareness, impulsiveness, and independence with ADLs.  -RW     Row Name 03/24/23 0931          Therapy Assessment/Plan (OT)    Patient/Family Therapy Goal Statement (OT) to return home  -RW     Rehab Potential (OT) good, to achieve stated therapy goals  -RW     Criteria for Skilled Therapeutic Interventions Met (OT) yes;skilled treatment is necessary  -RW      Therapy Frequency (OT) other (see comments)  5-7 days per week  -RW     Predicted Duration of Therapy Intervention (OT) until all goals met or d/c from hospital  -     Row Name 03/24/23 0931          Therapy Plan Review/Discharge Plan (OT)    Anticipated Discharge Disposition (OT) inpatient rehabilitation facility;skilled nursing facility  -     Row Name 03/24/23 0931          Vital Signs    Pre Systolic BP Rehab 106  -RW     Pre Treatment Diastolic BP 59  -RW     Intra Systolic BP Rehab 108  -RW     Intra Treatment Diastolic BP 74  -RW     Post Systolic BP Rehab 118  -RW     Post Treatment Diastolic BP 58  -RW     Pretreatment Heart Rate (beats/min) 86  -RW     Intratreatment Heart Rate (beats/min) 93  -RW     Posttreatment Heart Rate (beats/min) 87  -RW     Pre SpO2 (%) 96  -RW     O2 Delivery Pre Treatment room air  -RW     Intra SpO2 (%) 92  -RW     O2 Delivery Intra Treatment room air  -RW     Post SpO2 (%) 98  -RW     O2 Delivery Post Treatment room air  -RW     Pre Patient Position Supine  -RW     Intra Patient Position Sitting  -RW     Post Patient Position Supine  -RW     Row Name 03/24/23 0931          Positioning and Restraints    Pre-Treatment Position in bed  -RW     Post Treatment Position bed  -RW     In Bed notified nsg;supine;call light within reach;encouraged to call for assist;exit alarm on;RLE elevated  -RW           User Key  (r) = Recorded By, (t) = Taken By, (c) = Cosigned By    Initials Name Provider Type    RW Dhara Shepherd, ALICIA Occupational Therapist               Outcome Measures     Row Name 03/24/23 0931          How much help from another is currently needed...    Putting on and taking off regular lower body clothing? 2  -RW     Bathing (including washing, rinsing, and drying) 2  -RW     Toileting (which includes using toilet bed pan or urinal) 2  -RW     Putting on and taking off regular upper body clothing 3  -RW     Taking care of personal grooming (such as brushing teeth) 4   -RW     Eating meals 4  -RW     AM-PAC 6 Clicks Score (OT) 17  -RW     Row Name 03/24/23 0931          Functional Assessment    Outcome Measure Options AM-PAC 6 Clicks Daily Activity (OT)  -RW           User Key  (r) = Recorded By, (t) = Taken By, (c) = Cosigned By    Initials Name Provider Type     Dhara Shepherd OT Occupational Therapist                Occupational Therapy Education     Title: PT OT SLP Therapies (In Progress)     Topic: Occupational Therapy (In Progress)     Point: ADL training (In Progress)     Description:   Instruct learner(s) on proper safety adaptation and remediation techniques during self care or transfers.   Instruct in proper use of assistive devices.              Learning Progress Summary           Patient Acceptance, E,TB, NR,NL by  at 3/24/2023 1223    Comment: POC, Role of OT, transfer training                   Point: Home exercise program (Not Started)     Description:   Instruct learner(s) on appropriate technique for monitoring, assisting and/or progressing therapeutic exercises/activities.              Learner Progress:  Not documented in this visit.          Point: Precautions (In Progress)     Description:   Instruct learner(s) on prescribed precautions during self-care and functional transfers.              Learning Progress Summary           Patient Acceptance, E,TB, NR,NL by  at 3/24/2023 1223    Comment: POC, Role of OT, transfer training                   Point: Body mechanics (In Progress)     Description:   Instruct learner(s) on proper positioning and spine alignment during self-care, functional mobility activities and/or exercises.              Learning Progress Summary           Patient Acceptance, E,TB, NR,NL by  at 3/24/2023 1223    Comment: POC, Role of OT, transfer training                               User Key     Initials Effective Dates Name Provider Type [facility] 09/22/22 -  Dhara Shepherd OT Occupational Therapist OT              OT  Recommendation and Plan  Planned Therapy Interventions (OT): activity tolerance training, manual therapy/joint mobilization, patient/caregiver education/training, adaptive equipment training, neuromuscular control/coordination retraining, BADL retraining, ROM/therapeutic exercise, cognitive/visual perception retraining, occupation/activity based interventions, strengthening exercise, edema control/reduction, functional balance retraining, IADL retraining, passive ROM/stretching, transfer/mobility retraining  Therapy Frequency (OT): other (see comments) (5-7 days per week)  Plan of Care Review  Plan of Care Reviewed With: patient  Outcome Evaluation: OT eval complete. Pt supine upon arrival and agreeable to therapy. Pt perf sup>sit with SBA and HOB raised and bed rails. Pts BUE ROM was WNL, BUE MMT 5/5 grossly. Pt perf doffed/donned L sock with CGA while sitting EOB. Pt perf sit<>stand with Jaime and FWW. Pt perf functional mobility to bathroom with mod A and FWW d/t poor balance and impulsiveness. Pt perf toilet transfer with min A and fast, uncontrolled descent, no fall. Pt perf toilet transfer with min A and FWW. Pt perf sit>sup with SBA. Pt demonstrated decreased independence with ADLs, transfer safety, and ax tolerance. Cont inpatient OT. Pt will need continued rehab prior to d/c home due to decreased safety awareness, impulsiveness, and independence with ADLs.     Time Calculation:    Time Calculation- OT     Row Name 03/24/23 1224             Time Calculation- OT    OT Start Time 0931  -RW      OT Stop Time 1013  -RW      OT Time Calculation (min) 42 min  -RW      OT Received On 03/24/23  -RW      OT Goal Re-Cert Due Date 04/06/23  -RW         Untimed Charges    OT Eval/Re-eval Minutes 42  -RW         Total Minutes    Untimed Charges Total Minutes 42  -RW       Total Minutes 42  -RW            User Key  (r) = Recorded By, (t) = Taken By, (c) = Cosigned By    Initials Name Provider Type    Dhara Gee  OT Occupational Therapist              Therapy Charges for Today     Code Description Service Date Service Provider Modifiers Qty    [id number] HC OT EVAL MOD COMPLEXITY 3 3/24/2023 Dhara Shepherd OT GO 1               Dhara Shepherd OT  3/24/2023    Electronically signed by Dhara Shepherd OT at 03/24/23 1225

## 2023-03-25 VITALS
TEMPERATURE: 97.5 F | OXYGEN SATURATION: 96 % | RESPIRATION RATE: 18 BRPM | HEIGHT: 69 IN | HEART RATE: 82 BPM | SYSTOLIC BLOOD PRESSURE: 107 MMHG | DIASTOLIC BLOOD PRESSURE: 55 MMHG | WEIGHT: 186.1 LBS | BODY MASS INDEX: 27.56 KG/M2

## 2023-03-25 LAB
ANION GAP SERPL CALCULATED.3IONS-SCNC: 10 MMOL/L (ref 5–15)
BASOPHILS # BLD AUTO: 0.04 10*3/MM3 (ref 0–0.2)
BASOPHILS NFR BLD AUTO: 0.6 % (ref 0–1.5)
BUN SERPL-MCNC: 17 MG/DL (ref 8–23)
BUN/CREAT SERPL: 16.3 (ref 7–25)
CALCIUM SPEC-SCNC: 9.2 MG/DL (ref 8.6–10.5)
CHLORIDE SERPL-SCNC: 100 MMOL/L (ref 98–107)
CO2 SERPL-SCNC: 24 MMOL/L (ref 22–29)
CREAT SERPL-MCNC: 1.04 MG/DL (ref 0.76–1.27)
DEPRECATED RDW RBC AUTO: 42.3 FL (ref 37–54)
EGFRCR SERPLBLD CKD-EPI 2021: 73.5 ML/MIN/1.73
EOSINOPHIL # BLD AUTO: 0.11 10*3/MM3 (ref 0–0.4)
EOSINOPHIL NFR BLD AUTO: 1.7 % (ref 0.3–6.2)
ERYTHROCYTE [DISTWIDTH] IN BLOOD BY AUTOMATED COUNT: 13.7 % (ref 12.3–15.4)
GLUCOSE BLDC GLUCOMTR-MCNC: 137 MG/DL (ref 70–130)
GLUCOSE BLDC GLUCOMTR-MCNC: 197 MG/DL (ref 70–130)
GLUCOSE SERPL-MCNC: 140 MG/DL (ref 65–99)
HCT VFR BLD AUTO: 33.2 % (ref 37.5–51)
HGB BLD-MCNC: 11.1 G/DL (ref 13–17.7)
IMM GRANULOCYTES # BLD AUTO: 0.04 10*3/MM3 (ref 0–0.05)
IMM GRANULOCYTES NFR BLD AUTO: 0.6 % (ref 0–0.5)
LYMPHOCYTES # BLD AUTO: 1.37 10*3/MM3 (ref 0.7–3.1)
LYMPHOCYTES NFR BLD AUTO: 21.4 % (ref 19.6–45.3)
MCH RBC QN AUTO: 28.4 PG (ref 26.6–33)
MCHC RBC AUTO-ENTMCNC: 33.4 G/DL (ref 31.5–35.7)
MCV RBC AUTO: 84.9 FL (ref 79–97)
MONOCYTES # BLD AUTO: 0.51 10*3/MM3 (ref 0.1–0.9)
MONOCYTES NFR BLD AUTO: 8 % (ref 5–12)
NEUTROPHILS NFR BLD AUTO: 4.32 10*3/MM3 (ref 1.7–7)
NEUTROPHILS NFR BLD AUTO: 67.7 % (ref 42.7–76)
NRBC BLD AUTO-RTO: 0 /100 WBC (ref 0–0.2)
PLATELET # BLD AUTO: 264 10*3/MM3 (ref 140–450)
PMV BLD AUTO: 8.6 FL (ref 6–12)
POTASSIUM SERPL-SCNC: 4.3 MMOL/L (ref 3.5–5.2)
RBC # BLD AUTO: 3.91 10*6/MM3 (ref 4.14–5.8)
SODIUM SERPL-SCNC: 134 MMOL/L (ref 136–145)
WBC NRBC COR # BLD: 6.39 10*3/MM3 (ref 3.4–10.8)

## 2023-03-25 PROCEDURE — 85025 COMPLETE CBC W/AUTO DIFF WBC: CPT | Performed by: PODIATRIST

## 2023-03-25 PROCEDURE — 63710000001 SENNOSIDES-DOCUSATE 8.6-50 MG TABLET: Performed by: PODIATRIST

## 2023-03-25 PROCEDURE — A9270 NON-COVERED ITEM OR SERVICE: HCPCS | Performed by: HOSPITALIST

## 2023-03-25 PROCEDURE — 63710000001 LISINOPRIL 5 MG TABLET: Performed by: HOSPITALIST

## 2023-03-25 PROCEDURE — 82962 GLUCOSE BLOOD TEST: CPT

## 2023-03-25 PROCEDURE — A9270 NON-COVERED ITEM OR SERVICE: HCPCS | Performed by: INTERNAL MEDICINE

## 2023-03-25 PROCEDURE — 63710000001 ACETAMINOPHEN 500 MG TABLET: Performed by: INTERNAL MEDICINE

## 2023-03-25 PROCEDURE — 25010000002 HEPARIN (PORCINE) PER 1000 UNITS: Performed by: PODIATRIST

## 2023-03-25 PROCEDURE — 63710000001 METOPROLOL TARTRATE 50 MG TABLET: Performed by: HOSPITALIST

## 2023-03-25 PROCEDURE — 63710000001 SUCRALFATE 1 G TABLET: Performed by: HOSPITALIST

## 2023-03-25 PROCEDURE — 63710000001 PROPAFENONE 150 MG TABLET: Performed by: HOSPITALIST

## 2023-03-25 PROCEDURE — A9270 NON-COVERED ITEM OR SERVICE: HCPCS | Performed by: PODIATRIST

## 2023-03-25 PROCEDURE — 63710000001 AMLODIPINE 10 MG TABLET: Performed by: HOSPITALIST

## 2023-03-25 PROCEDURE — 63710000001 ISOSORBIDE MONONITRATE 30 MG TABLET SUSTAINED-RELEASE 24 HOUR: Performed by: HOSPITALIST

## 2023-03-25 PROCEDURE — 80048 BASIC METABOLIC PNL TOTAL CA: CPT | Performed by: PODIATRIST

## 2023-03-25 RX ORDER — OXYCODONE AND ACETAMINOPHEN 7.5; 325 MG/1; MG/1
1 TABLET ORAL EVERY 4 HOURS PRN
Qty: 30 TABLET | Refills: 0 | Status: SHIPPED | OUTPATIENT
Start: 2023-03-25

## 2023-03-25 RX ORDER — ACETAMINOPHEN 500 MG
500 TABLET ORAL EVERY 6 HOURS PRN
Status: DISCONTINUED | OUTPATIENT
Start: 2023-03-25 | End: 2023-03-25 | Stop reason: HOSPADM

## 2023-03-25 RX ADMIN — PROPAFENONE HYDROCHLORIDE 150 MG: 150 TABLET, FILM COATED ORAL at 05:08

## 2023-03-25 RX ADMIN — HEPARIN SODIUM 5000 UNITS: 5000 INJECTION INTRAVENOUS; SUBCUTANEOUS at 05:08

## 2023-03-25 RX ADMIN — ISOSORBIDE MONONITRATE 30 MG: 30 TABLET, EXTENDED RELEASE ORAL at 08:21

## 2023-03-25 RX ADMIN — DOCUSATE SODIUM 50 MG AND SENNOSIDES 8.6 MG 2 TABLET: 8.6; 5 TABLET, FILM COATED ORAL at 08:21

## 2023-03-25 RX ADMIN — AMLODIPINE BESYLATE 10 MG: 10 TABLET ORAL at 08:21

## 2023-03-25 RX ADMIN — ACETAMINOPHEN 500 MG: 500 TABLET, FILM COATED ORAL at 01:52

## 2023-03-25 RX ADMIN — PANTOPRAZOLE SODIUM 40 MG: 40 INJECTION, POWDER, FOR SOLUTION INTRAVENOUS at 05:08

## 2023-03-25 RX ADMIN — Medication 10 ML: at 08:22

## 2023-03-25 RX ADMIN — METOPROLOL TARTRATE 50 MG: 50 TABLET, FILM COATED ORAL at 08:21

## 2023-03-25 RX ADMIN — LISINOPRIL 5 MG: 5 TABLET ORAL at 08:21

## 2023-03-25 RX ADMIN — SUCRALFATE 1 G: 1 TABLET ORAL at 08:21

## 2023-03-25 NOTE — PROGRESS NOTES
"DAILY PROGRESS NOTE  [facility]    Patient Identification:  Name: Aj Card Jr.  Age: 77 y.o.  Sex: male  :  1945  MRN: [id number]         Primary Care Physician: Andreas Martinez MD    Subjective:  Interval History: He complains of pain.    Objective:    Scheduled Meds:amLODIPine, 10 mg, Oral, Q24H  heparin (porcine), 5,000 Units, Subcutaneous, Q8H  Insulin Aspart, 0-9 Units, Subcutaneous, TID AC  isosorbide mononitrate, 30 mg, Oral, Q24H  lisinopril, 5 mg, Oral, Q24H  metoprolol tartrate, 50 mg, Oral, Q12H  nitroglycerin, 0.5 inch, Topical, Q6H  pantoprazole, 40 mg, Intravenous, Q AM  propafenone, 150 mg, Oral, Q8H  senna-docusate sodium, 2 tablet, Oral, BID  sodium chloride, 10 mL, Intravenous, Q12H  sucralfate, 1 g, Oral, 4x Daily AC & at Bedtime      Continuous Infusions:     Vital signs in last 24 hours:  Temp:  [97.5 °F (36.4 °C)-99.8 °F (37.7 °C)] 98.6 °F (37 °C)  Heart Rate:  [76-88] 76  Resp:  [16-19] 19  BP: ()/(52-74) 121/59    Intake/Output:    Intake/Output Summary (Last 24 hours) at 3/25/2023 1100  Last data filed at 3/25/2023 1025  Gross per 24 hour   Intake 1000 ml   Output 1700 ml   Net -700 ml       Exam:  /59   Pulse 76   Temp 98.6 °F (37 °C)   Resp 19   Ht 175.3 cm (69\")   Wt 84.4 kg (186 lb 1.6 oz)   SpO2 92%   BMI 27.48 kg/m²     General Appearance:    Alert, cooperative, no distress   Head:    Normocephalic, without obvious abnormality, atraumatic   Eyes:       Throat:   Lips, tongue, gums normal   Neck:   Supple, symmetrical, trachea midline, no JVD   Lungs:     Clear to auscultation bilaterally, respirations unlabored   Chest Wall:    No tenderness or deformity    Heart:    Regular rate and rhythm, S1 and S2 normal, no murmur,no  rub or gallop   Abdomen:     Soft, nontender, bowel sounds active, no masses, no organomegaly    Extremities:   Extremities normal, right foot with surgical changes, no cyanosis or edema   Pulses:      Skin: "   Skin is warm and dry,  no rashes or palpable lesions   Neurologic:   no focal deficits noted      Lab Results (last 72 hours)     Procedure Component Value Units Date/Time    POC Glucose Once [[id number]]  (Abnormal) Collected: 03/25/23 0711    Specimen: Blood Updated: 03/25/23 0843     Glucose 137 mg/dL      Comment: RN NotifiedOperator: [id number] MILE Perdomo ID: [id number]       Basic Metabolic Panel [[id number]]  (Abnormal) Collected: 03/25/23 0729    Specimen: Blood Updated: 03/25/23 0811     Glucose 140 mg/dL      BUN 17 mg/dL      Creatinine 1.04 mg/dL      Sodium 134 mmol/L      Potassium 4.3 mmol/L      Chloride 100 mmol/L      CO2 24.0 mmol/L      Calcium 9.2 mg/dL      BUN/Creatinine Ratio 16.3     Anion Gap 10.0 mmol/L      eGFR 73.5 mL/min/1.73     Narrative:      GFR Normal >60  Chronic Kidney Disease <60  Kidney Failure <15    The GFR formula is only valid for adults with stable renal function between ages 18 and 70.    CBC & Differential [[id number]]  (Abnormal) Collected: 03/25/23 0729    Specimen: Blood Updated: 03/25/23 0746    Narrative:      The following orders were created for panel order CBC & Differential.  Procedure                               Abnormality         Status                     ---------                               -----------         ------                     CBC Auto Differential[[id number]]        Abnormal            Final result                 Please view results for these tests on the individual orders.    CBC Auto Differential [[id number]]  (Abnormal) Collected: 03/25/23 0729    Specimen: Blood Updated: 03/25/23 0746     WBC 6.39 10*3/mm3      RBC 3.91 10*6/mm3      Hemoglobin 11.1 g/dL      Hematocrit 33.2 %      MCV 84.9 fL      MCH 28.4 pg      MCHC 33.4 g/dL      RDW 13.7 %      RDW-SD 42.3 fl      MPV 8.6 fL      Platelets 264 10*3/mm3      Neutrophil % 67.7 %      Lymphocyte % 21.4 %      Monocyte % 8.0 %      Eosinophil % 1.7 %      Basophil % 0.6 %       Immature Grans % 0.6 %      Neutrophils, Absolute 4.32 10*3/mm3      Lymphocytes, Absolute 1.37 10*3/mm3      Monocytes, Absolute 0.51 10*3/mm3      Eosinophils, Absolute 0.11 10*3/mm3      Basophils, Absolute 0.04 10*3/mm3      Immature Grans, Absolute 0.04 10*3/mm3      nRBC 0.0 /100 WBC     POC Glucose Once [[id number]]  (Abnormal) Collected: 03/24/23 1929    Specimen: Blood Updated: 03/24/23 1945     Glucose 181 mg/dL      Comment: RN NotifiedOperator: [id number] GILL GARCIACRAOL ANNMeter ID: [id number]       COVID-19,CEPHEID/JITENDRA,COR/MARIO/PAD/CONSTANCE/MAD IN-HOUSE(OR EMERGENT/ADD-ON),NP SWAB IN TRANSPORT MEDIA 3-4 HR TAT, RT-PCR - Swab, Nasopharynx [[id number]]  (Normal) Collected: 03/24/23 1730    Specimen: Swab from Nasopharynx Updated: 03/24/23 1806     COVID19 Not Detected    Narrative:      Fact sheet for providers: https://www.fda.gov/media/295884/download     Fact sheet for patients: https://www.fda.gov/media/894098/download  Fact sheet for providers: https://www.fda.gov/media/252514/download    Fact sheet for patients: https://www.fda.gov/media/951145/download    Test performed by PCR.    POC Glucose Once [[id number]]  (Abnormal) Collected: 03/24/23 1617    Specimen: Blood Updated: 03/24/23 1701     Glucose 163 mg/dL      Comment: RN NotifiedOperator: [id number] LARISSA SILVEIRAEEMeter ID: [id number]       POC Glucose Once [[id number]]  (Abnormal) Collected: 03/24/23 1009    Specimen: Blood Updated: 03/24/23 1110     Glucose 193 mg/dL      Comment: Result Not ConfirmedOperator: [id number] ANJANA LAQUITAMeter ID: [id number]       POC Glucose Once [[id number]]  (Abnormal) Collected: 03/24/23 0725    Specimen: Blood Updated: 03/24/23 0746     Glucose 134 mg/dL      Comment: RN NotifiedOperator: [id number] MEHNAZ CORONADOISSAMeter ID: [id number]       Basic Metabolic Panel [[id number]]  (Abnormal) Collected: 03/24/23 0528    Specimen: Blood Updated: 03/24/23 0619     Glucose 142 mg/dL      BUN 22 mg/dL      Creatinine 0.88  mg/dL      Sodium 135 mmol/L      Potassium 4.4 mmol/L      Chloride 100 mmol/L      CO2 25.0 mmol/L      Calcium 9.1 mg/dL      BUN/Creatinine Ratio 25.0     Anion Gap 10.0 mmol/L      eGFR 88.0 mL/min/1.73     Narrative:      GFR Normal >60  Chronic Kidney Disease <60  Kidney Failure <15    The GFR formula is only valid for adults with stable renal function between ages 18 and 70.    High Sensitivity Troponin T [[id number]]  (Abnormal) Collected: 03/24/23 0528    Specimen: Blood Updated: 03/24/23 0617     HS Troponin T 34 ng/L     Narrative:      High Sensitive Troponin T Reference Range:  <10.0 ng/L- Negative Female for AMI  <15.0 ng/L- Negative Male for AMI  >=10 - Abnormal Female indicating possible myocardial injury.  >=15 - Abnormal Male indicating possible myocardial injury.   Clinicians would have to utilize clinical acumen, EKG, Troponin, and serial changes to determine if it is an Acute Myocardial Infarction or myocardial injury due to an underlying chronic condition.         CBC & Differential [[id number]]  (Abnormal) Collected: 03/24/23 0528    Specimen: Blood Updated: 03/24/23 0600    Narrative:      The following orders were created for panel order CBC & Differential.  Procedure                               Abnormality         Status                     ---------                               -----------         ------                     CBC Auto Differential[[id number]]        Abnormal            Final result                 Please view results for these tests on the individual orders.    CBC Auto Differential [[id number]]  (Abnormal) Collected: 03/24/23 0528    Specimen: Blood Updated: 03/24/23 0600     WBC 7.91 10*3/mm3      RBC 3.78 10*6/mm3      Hemoglobin 10.7 g/dL      Hematocrit 31.9 %      MCV 84.4 fL      MCH 28.3 pg      MCHC 33.5 g/dL      RDW 13.7 %      RDW-SD 42.5 fl      MPV 8.8 fL      Platelets 296 10*3/mm3      Neutrophil % 69.0 %      Lymphocyte % 20.4 %      Monocyte % 9.1 %       Eosinophil % 0.8 %      Basophil % 0.3 %      Immature Grans % 0.4 %      Neutrophils, Absolute 5.47 10*3/mm3      Lymphocytes, Absolute 1.61 10*3/mm3      Monocytes, Absolute 0.72 10*3/mm3      Eosinophils, Absolute 0.06 10*3/mm3      Basophils, Absolute 0.02 10*3/mm3      Immature Grans, Absolute 0.03 10*3/mm3      nRBC 0.0 /100 WBC     POC Glucose Once [[id number]]  (Abnormal) Collected: 03/23/23 2011    Specimen: Blood Updated: 03/24/23 0506     Glucose 193 mg/dL      Comment: RN NotifiedOperator: [id number] SAMUEL ROSINDAMeter ID: [id number]       TSH [[id number]]  (Normal) Collected: 03/23/23 1318    Specimen: Blood Updated: 03/23/23 1643     TSH 1.200 uIU/mL     Hemoglobin A1c [[id number]]  (Abnormal) Collected: 03/23/23 1318    Specimen: Blood Updated: 03/23/23 1636     Hemoglobin A1C 7.10 %     Narrative:      Hemoglobin A1C Ranges:    Increased Risk for Diabetes  5.7% to 6.4%  Diabetes                     >= 6.5%  Diabetic Goal                < 7.0%    POC Glucose Once [[id number]]  (Abnormal) Collected: 03/23/23 1620    Specimen: Blood Updated: 03/23/23 1633     Glucose 250 mg/dL      Comment: RN NotifiedOperator: [id number] ANATOLY KATYAXIAOMeter ID: [id number]       High Sensitivity Troponin T 2Hr [[id number]]  (Abnormal) Collected: 03/23/23 1521    Specimen: Blood Updated: 03/23/23 1612     HS Troponin T 15 ng/L      Troponin T Delta 0 ng/L     Narrative:      High Sensitive Troponin T Reference Range:  <10.0 ng/L- Negative Female for AMI  <15.0 ng/L- Negative Male for AMI  >=10 - Abnormal Female indicating possible myocardial injury.  >=15 - Abnormal Male indicating possible myocardial injury.   Clinicians would have to utilize clinical acumen, EKG, Troponin, and serial changes to determine if it is an Acute Myocardial Infarction or myocardial injury due to an underlying chronic condition.         Basic Metabolic Panel [[id number]]  (Abnormal) Collected: 03/23/23 1318    Specimen: Blood Updated: 03/23/23  1400     Glucose 159 mg/dL      BUN 27 mg/dL      Creatinine 0.93 mg/dL      Sodium 132 mmol/L      Potassium 4.6 mmol/L      Chloride 99 mmol/L      CO2 23.0 mmol/L      Calcium 8.7 mg/dL      BUN/Creatinine Ratio 29.0     Anion Gap 10.0 mmol/L      eGFR 84.0 mL/min/1.73     Narrative:      GFR Normal >60  Chronic Kidney Disease <60  Kidney Failure <15    The GFR formula is only valid for adults with stable renal function between ages 18 and 70.    High Sensitivity Troponin T [[id number]]  (Abnormal) Collected: 03/23/23 1318    Specimen: Blood Updated: 03/23/23 1400     HS Troponin T 15 ng/L     Narrative:      High Sensitive Troponin T Reference Range:  <10.0 ng/L- Negative Female for AMI  <15.0 ng/L- Negative Male for AMI  >=10 - Abnormal Female indicating possible myocardial injury.  >=15 - Abnormal Male indicating possible myocardial injury.   Clinicians would have to utilize clinical acumen, EKG, Troponin, and serial changes to determine if it is an Acute Myocardial Infarction or myocardial injury due to an underlying chronic condition.         CBC & Differential [[id number]]  (Abnormal) Collected: 03/23/23 1318    Specimen: Blood Updated: 03/23/23 1326    Narrative:      The following orders were created for panel order CBC & Differential.  Procedure                               Abnormality         Status                     ---------                               -----------         ------                     CBC Auto Differential[[id number]]        Abnormal            Final result                 Please view results for these tests on the individual orders.    CBC Auto Differential [[id number]]  (Abnormal) Collected: 03/23/23 1318    Specimen: Blood Updated: 03/23/23 1326     WBC 5.28 10*3/mm3      RBC 3.87 10*6/mm3      Hemoglobin 10.9 g/dL      Hematocrit 32.8 %      MCV 84.8 fL      MCH 28.2 pg      MCHC 33.2 g/dL      RDW 13.8 %      RDW-SD 42.5 fl      MPV 8.3 fL      Platelets 252 10*3/mm3       Neutrophil % 86.8 %      Lymphocyte % 9.5 %      Monocyte % 2.5 %      Eosinophil % 0.2 %      Basophil % 0.2 %      Immature Grans % 0.8 %      Neutrophils, Absolute 4.59 10*3/mm3      Lymphocytes, Absolute 0.50 10*3/mm3      Monocytes, Absolute 0.13 10*3/mm3      Eosinophils, Absolute 0.01 10*3/mm3      Basophils, Absolute 0.01 10*3/mm3      Immature Grans, Absolute 0.04 10*3/mm3      nRBC 0.0 /100 WBC     POC Glucose Once [[id number]]  (Normal) Collected: 03/23/23 1054    Specimen: Blood Updated: 03/23/23 1134     Glucose 126 mg/dL      Comment: : [id number] [facility]IL[facility]  LYNNEMeter ID: [id number]       POC Glucose Once [[id number]]  (Abnormal) Collected: 03/23/23 0720    Specimen: Blood Updated: 03/23/23 0926     Glucose 150 mg/dL      Comment: : [id number] PATTI GARCIAMeter ID: [id number]           Data Review:  Results from last 7 days   Lab Units 03/25/23  0729 03/24/23  0528 03/23/23  1318   SODIUM mmol/L 134* 135* 132*   POTASSIUM mmol/L 4.3 4.4 4.6   CHLORIDE mmol/L 100 100 99   CO2 mmol/L 24.0 25.0 23.0   BUN mg/dL 17 22 27*   CREATININE mg/dL 1.04 0.88 0.93   GLUCOSE mg/dL 140* 142* 159*   CALCIUM mg/dL 9.2 9.1 8.7     Results from last 7 days   Lab Units 03/25/23  0729 03/24/23  0528 03/23/23  1318   WBC 10*3/mm3 6.39 7.91 5.28   HEMOGLOBIN g/dL 11.1* 10.7* 10.9*   HEMATOCRIT % 33.2* 31.9* 32.8*   PLATELETS 10*3/mm3 264 296 252     Results from last 7 days   Lab Units 03/23/23  1318   TSH uIU/mL 1.200     Results from last 7 days   Lab Units 03/23/23  1318   HEMOGLOBIN A1C % 7.10*     Lab Results   Lab Value Date/Time    TROPONINT 34 (H) 03/24/2023 0528    TROPONINT 15 (H) 03/23/2023 1521    TROPONINT 15 (H) 03/23/2023 1318    TROPONINT <0.010 06/05/2020 0508    TROPONINT <0.010 06/05/2020 0207    TROPONINT <0.010 05/27/2020 1118               Invalid input(s): PROT, LABALBU  Results from last 7 days   Lab Units 03/23/23  1318   TSH uIU/mL 1.200     Results from last 7 days   Lab Units  03/23/23  1318   HEMOGLOBIN A1C % 7.10*     Glucose   Date/Time Value Ref Range Status   03/25/2023 0711 137 (H) 70 - 130 mg/dL Final     Comment:     RN NotifiedOperator: [id number] MILE MATTHEWHMeter ID: [id number]   03/24/2023 1929 181 (H) 70 - 130 mg/dL Final     Comment:     RN NotifiedOperator: [id number] GILL GARCIAAHMeter ID: [id number]   03/24/2023 1617 163 (H) 70 - 130 mg/dL Final     Comment:     RN NotifiedOperator: [id number] LARISSA TAWNNEEMeter ID: [id number]   03/24/2023 1009 193 (H) 70 - 130 mg/dL Final     Comment:     Result Not ConfirmedOperator: [id number] ANJANA LAQUITAMeter ID: [id number]   03/24/2023 0725 134 (H) 70 - 130 mg/dL Final     Comment:     RN NotifiedOperator: [id number] MEHNAZ MELISSAMeter ID: [id number]   03/23/2023 2011 193 (H) 70 - 130 mg/dL Final     Comment:     RN NotifiedOperator: [id number] JIMMIE ROSINDAMeter ID: [id number]   03/23/2023 1620 250 (H) 70 - 130 mg/dL Final     Comment:     RN NotifiedOperator: [id number] ANATOLY CHRISTINAMeter ID: [id number]   03/23/2023 1054 126 70 - 130 mg/dL Final     Comment:     : [id number] GIRMA STACYNEMeter ID: [id number]             Assessment:  Active Hospital Problems    Diagnosis  POA   • **Status post bunionectomy [Z98.890]  Unknown   • Closed trimalleolar fracture of right ankle [S82.851A]  Unknown   • Osteoarthritis of ankle and foot, right [M19.071]  Unknown   • PAF (paroxysmal atrial fibrillation) (HCC) [I48.0]  Yes   • GERD (gastroesophageal reflux disease) [K21.9]  Yes   • Type 2 diabetes mellitus without complication, without long-term current use of insulin (HCC) [E11.9]  Yes   • Essential hypertension [I10]  Yes   • Mixed hyperlipidemia [E78.2]  Yes   • Generalized anxiety disorder [F41.1]  Yes      Resolved Hospital Problems   No resolved problems to display.       Plan:  The patient appears medically stable to go to rehab today.  Continue with current medications.  Discharge per  podiatry.    Brendan Berry MD  3/25/2023  11:00 CDT

## 2023-03-25 NOTE — NURSING NOTE
Pt continues to be impulsive, frequently setting off bed alarm and seeking staff. Pain controlled. Hypotensive at start of shift, metoprolol held.

## 2023-03-25 NOTE — OP NOTE
Date of Procedure: 03/23/2023     SURGEON: Mason Salazar DPM      Assistant: Erlinda Lang MA was responsible for performing the following activities: Retraction, Suction, Irrigation and Placing Dressing and their skilled assistance was necessary for the success of this case.      PREOPERATIVE DIAGNOSIS:   1.  Closed right ankle fracture  2.  Hardware complication right foot      POSTOPERATIVE DIAGNOSIS: Same as preop     PROCEDURES PERFORMED:   1.  Open reduction internal fixation of right fibula fracture   2.  Hardware removal right foot     ANESTHESIA: General     HEMOSTASIS: Thigh tourniquet     ESTIMATED BLOOD LOSS: 15 mL.     SPECIMEN: Explanted hardware     MATERIALS: 3.5 FT screw, Arthrex one third tubular locking plate with locking and nonlocking 3.5 and 4.0 screws     COMPLICATIONS: None.      CONDITION: Stable.        INDICATIONS FOR PROCEDURE: This is a patient of mine who has history of right foot bunion correction on March 13.  Patient fell at home postoperatively injuring his right foot and ankle.  He agrees to undergo the surgical intervention at this time. Consent is signed and documented in the chart. History and physical exam were reviewed prior to patient being taken to the operating room and n.p.o. status was confirmed. No guarantees were given or implied regarding the outcome of this treatment.      DESCRIPTION OF PROCEDURE: After mild sedation was administered by the anesthesia team in the preoperative holding area the patient was transported to the operating room and placed in a supine position.  General anesthesia was then administered and the right lower extremity was prepped and draped in usual sterile technique and a timeout was performed to confirm the correct patient, correct site, and correct procedure.      Attention was then directed to the right lower extremity which was exsanguinated using Esmarch bandage and the tourniquet inflated to 220 mmHg.  Next a linear incision was  made over the distal lateral fibula.  Sharp and blunt dissection through subcutaneous tissue down to level of bone.  Fibular fracture was identified.  Soft tissue was interposed in the fracture site were as removed.  Fracture site was flushed and reduced with point-to-point bone reduction forcep.  Fluoroscopy confirmed proper reduction and alignment of the ankle mortise.  Next a 3.5 interfrag screw was inserted perpendicular to the fracture site.  Fracture was then stabilized with a posterior lateral one third tubular locking plate with locking and nonlocking screws.  At this time under live fluoroscopy the syndesmosis was stressed and noted to be stable.  Operative site was flushed and closed in layers with Monocryl and staples    Attention then directed distally to the first MTP arthrodesis site.  Hallux is noted to be an excessive abduction likely secondary to injury during the fall.  Distal sutures were removed and dorsal medial plate was removed.  Hallux was able to be repositioned in a more rectus alignment and then fixated with a 3.5 FT screw.  Intraoperative fluoroscopy confirms proper position of the hallux and proper hardware placement.  An incision site was then flushed and closed in layers with Monocryl and nylon.  Sterile dressing applied.  Tourniquet deflated.  Patient tolerated procedure and anesthesia well and was transported from the operating room to the PACU with vital signs stable and neurovascular status intact to the operative extremity.    Plan to transfer patient back to the floor once stable.  He can resume his normal diet.  Nonweightbearing right lower extremity.  Plan for placement.          This document has been electronically signed by Mason Salazar DPM on March 25, 2023 12:10 CDT

## 2023-03-28 ENCOUNTER — OFFICE VISIT (OUTPATIENT)
Dept: PODIATRY | Facility: CLINIC | Age: 78
End: 2023-03-28
Payer: MEDICARE

## 2023-03-28 VITALS — BODY MASS INDEX: 27.55 KG/M2 | OXYGEN SATURATION: 98 % | HEART RATE: 58 BPM | HEIGHT: 69 IN | WEIGHT: 186 LBS

## 2023-03-28 DIAGNOSIS — S82.851A CLOSED TRIMALLEOLAR FRACTURE OF RIGHT ANKLE, INITIAL ENCOUNTER: Primary | ICD-10-CM

## 2023-03-28 DIAGNOSIS — Z98.890 STATUS POST BUNIONECTOMY: ICD-10-CM

## 2023-03-28 DIAGNOSIS — M20.11 HALLUX VALGUS, RIGHT: ICD-10-CM

## 2023-03-28 PROCEDURE — 99024 POSTOP FOLLOW-UP VISIT: CPT | Performed by: NURSE PRACTITIONER

## 2023-03-28 NOTE — PROGRESS NOTES
Aj Card Jr.  1945  77 y.o. male  Bs: 130 per patient       3/28/2023    Chief Complaint   Patient presents with   • Right Foot - Post-op       History of Present Illness    Aj Card Jr. is a 77 y.o.male came to clinic for post operative appointment on right foot joint fusion. Patient had had two surgeries. Patient is staying in a Nursing home for time being. Patient is not feeling well.     Past Medical History:   Diagnosis Date   • Acute posthemorrhagic anemia    • Afib (HCC)    • Allergic rhinitis    • Anxiety state    • Chest pain    • CKD (chronic kidney disease), stage II    • Coronary arteriosclerosis     3 stents, followed by Dr. Jeffers   • Diabetes mellitus (HCC)     type 2   • Diverticular disease of colon    • Dysphagia    • Elevated cholesterol    • Epigastric pain    • GERD (gastroesophageal reflux disease)     esophagitis on Dexilant. Pt feels Nexium working better      • History of echocardiogram     Small left atrial enlargement with mild CLVH.Ef of 55-60%.Mitral valve mildly thickened.Av thickened.Left ventricle mildly dilated.Tricuspid intact.Mild aortic insufficiency. 12/07/2015       • History of echocardiogram     Mild diastolic dysfunction and mild left atrial enlargement, otherwise normal echo. EF> 55% 01/03/2012       • Hypercholesterolemia    • Hypertension    • Insomnia    • Irritable bowel syndrome    • Osteoarthritis of multiple joints    • Pain of right foot    • PONV (postoperative nausea and vomiting)          Past Surgical History:   Procedure Laterality Date   • APPENDECTOMY       [facility] 1995       • CARDIAC CATHETERIZATION      Successful PTCA of the OMB#2.Unsuccessful PTCA of the 1st OMB, secondary to difficulty in advancing the balloon. 12/08/2015       • CHOLECYSTECTOMY      [facility] 1993       • CHOLECYSTECTOMY     • COLONOSCOPY  10/01/2012   • COLONOSCOPY N/A 12/11/2017    Procedure: COLONOSCOPY;  Surgeon: Bladimir WONG  MD Alec;  Location: [facility] ENDOSCOPY;  Service:    • COLONOSCOPY N/A 11/01/2022    Procedure: COLONOSCOPY;  Surgeon: Bladimir Michael MD;  Location: [facility] ENDOSCOPY;  Service: Gastroenterology;  Laterality: N/A;   • CORONARY ANGIOPLASTY      Successful PTCA & stenting LAD was done w/ deployment of a 2.5mm x23 Xience stent w/ reduction of stenosis from 90% to less than 0% stenosis with good LILLIAM 3 flow 02/17/2012       • ENDOSCOPY      with biopsy.  Mildly severe esophagitis. Gastritis. Normal examined duodenum.Several biopsies obtained in the lower third of the esophagus.Several biopsies obtained in the gastric antrum. 05/06/2016       • ENDOSCOPY      w tube. Normal esophagus. Gastritis in stomach. Biopsy taken. Gastric polyp found. Biopsy taken. Normal duodenum. 10/01/2012       • ENDOSCOPY AND COLONOSCOPY      Diverticulum in sigmoid colon & descending colon. Internal & external hemorrhoids found. 10/01/2012       • EYE SURGERY Bilateral     catarract   • HAND SURGERY Left      Corrective osteotomy of ring finger metacarpal. Malunited fracture of left ring finger 05/21/1985       • HERNIA REPAIR      Laparoscopic hernia repair. prepertitoneal bilateral inguinal hernia repair with mesh. 10/15/2009      • OTHER SURGICAL HISTORY      CORONARY ARTERY STENT    • SKIN TAG REMOVAL      Excision, viral skin tag,right upper eyelid 05/08/1995          Family History   Problem Relation Age of Onset   • Clotting disorder Other    • Lung disease Other    • Schizophrenia Sister    • Obesity Sister    • Tuberculosis Sister    • Arthritis Mother    • Diabetes Mother    • Heart disease Mother    • Hypertension Mother    • Obesity Mother    • Stroke Mother    • Thyroid disease Mother    • Tuberculosis Mother    • Arthritis Father    • Tuberculosis Father        Allergies   Allergen Reactions   • Brilinta [Ticagrelor] Shortness Of Breath   • Effient [Prasugrel] Shortness Of Breath   • Warfarin And Related Shortness Of Breath    • Ciprofloxacin Hives   • Lipitor [Atorvastatin] Swelling   • Latex Itching   • Adhesive Tape Rash   • Celexa [Citalopram Hydrobromide] Rash   • Crestor [Rosuvastatin Calcium] Rash   • Floxin [Ofloxacin] Rash   • Januvia [Sitagliptin] Rash   • Penicillins Rash   • Sulfa Antibiotics Rash       Social History     Socioeconomic History   • Marital status:    Tobacco Use   • Smoking status: Never   • Smokeless tobacco: Never   Vaping Use   • Vaping Use: Never used   Substance and Sexual Activity   • Alcohol use: No   • Drug use: Never   • Sexual activity: Defer         Current Outpatient Medications   Medication Sig Dispense Refill   • ALPRAZolam (XANAX) 0.5 MG tablet Take 1 tablet by mouth At Night As Needed for Anxiety. 60 tablet 0   • amLODIPine (NORVASC) 10 MG tablet Take 1 tablet by mouth Daily.     • ASPIRIN 81 PO Take 1 tablet by mouth Daily.     • Azelastine HCl 137 MCG/SPRAY solution Inhale 1 spray Daily. 30 mL 3   • coenzyme Q10 100 MG capsule Take 1 capsule by mouth Daily.     • dapagliflozin (Farxiga) 5 MG tablet tablet Take 1 tablet by mouth Daily.     • Dulaglutide (Trulicity) 3 MG/0.5ML solution pen-injector Inject 3 mg under the skin into the appropriate area as directed 1 (One) Time Per Week. (Patient taking differently: Inject 1.5 mg under the skin into the appropriate area as directed 1 (One) Time Per Week.) 12 pen 3   • esomeprazole (nexIUM) 40 MG capsule Take 1 capsule by mouth Every Morning Before Breakfast. 30 capsule 3   • Glucose Blood (BLOOD GLUCOSE TEST) strip Use 4 x daily use any brand covered by insurance or same brand as before ICD10 code is E11.9 120 each 11   • HYDROcodone-acetaminophen (Norco)  MG per tablet Take 1 tablet by mouth Every 6 (Six) Hours As Needed for Moderate Pain. 30 tablet 0   • Insulin Glargine (BASAGLAR KWIKPEN) 100 UNIT/ML injection pen Inject 15 Units under the skin into the appropriate area as directed At Night As Needed.     • Insulin Pen Needle 30G  "X 8 MM misc Use 3-4 times daily. 100 each 3   • Insulin Syringe 31G X 5/16\" 0.3 ML misc 4 x daily 120 each 11   • isosorbide mononitrate (IMDUR) 30 MG 24 hr tablet Take 1 tablet by mouth Daily.     • KRILL OIL OMEGA-3 PO Take 1 capsule by mouth Daily.     • Lancet Devices (LANCING DEVICE) misc USE AS INDICATED TO CORRELATE WITH STRIPS AND METER 1 each 1   • Lancets 30G misc USE 4 X DAILY 120 each 11   • lisinopril (PRINIVIL,ZESTRIL) 5 MG tablet Take 1 tablet by mouth Daily. (Patient taking differently: Take 8 tablets by mouth Daily.) 90 tablet 1   • metoprolol tartrate (LOPRESSOR) 50 MG tablet Take 1 tablet by mouth 2 (Two) Times a Day. 180 tablet 1   • oxyCODONE-acetaminophen (Percocet) 7.5-325 MG per tablet Take 1 tablet by mouth Every 6 (Six) Hours As Needed for Moderate Pain. 30 tablet 0   • oxyCODONE-acetaminophen (PERCOCET) 7.5-325 MG per tablet Take 1 tablet by mouth Every 4 (Four) Hours As Needed for Moderate Pain. 30 tablet 0   • pantoprazole (PROTONIX) 40 MG EC tablet Take 1 tablet by mouth Daily.     • propafenone (RYTHMOL) 150 MG tablet Take 1 tablet by mouth Every 8 (Eight) Hours.     • Red Yeast Rice Extract (RED YEAST RICE PO) Take 4 capsules by mouth Daily.     • rivaroxaban (XARELTO) 20 MG tablet Take 1 tablet by mouth Daily.     • Saw Palmetto, Serenoa repens, (SAW PALMETTO PO) Take 1 tablet by mouth Daily.     • sucralfate (CARAFATE) 1 g tablet Take 1 tablet by mouth 2 (Two) Times a Day.     • TURMERIC CURCUMIN PO Take 1,500 mg by mouth Daily.     • vitamin B-12 (CYANOCOBALAMIN) 2500 MCG sublingual tablet tablet Place 5,000 mcg under the tongue 1 (One) Time Per Week.     • vitamin E 100 UNIT capsule Take 1 capsule by mouth Daily.       No current facility-administered medications for this visit.       Review of Systems   Musculoskeletal:        Foot pain    All other systems reviewed and are negative.        OBJECTIVE    Pulse 58   Ht 175.3 cm (69\")   Wt 84.4 kg (186 lb)   SpO2 98%   BMI 27.47 " kg/m²     Body mass index is 27.47 kg/m².        Physical Exam  Vitals reviewed.   Constitutional:       Appearance: Normal appearance. He is well-developed.   HENT:      Head: Normocephalic and atraumatic.   Neck:      Trachea: Trachea and phonation normal.   Cardiovascular:      Pulses:           Dorsalis pedis pulses are 1+ on the right side.        Posterior tibial pulses are 1+ on the right side.   Pulmonary:      Effort: Pulmonary effort is normal. No respiratory distress.   Abdominal:      General: There is no distension.      Palpations: Abdomen is soft.   Musculoskeletal:        Feet:    Skin:     General: Skin is warm and dry.   Neurological:      Mental Status: He is alert and oriented to person, place, and time.      GCS: GCS eye subscore is 4. GCS verbal subscore is 5. GCS motor subscore is 6.   Psychiatric:         Speech: Speech normal.         Behavior: Behavior normal. Behavior is cooperative.         Thought Content: Thought content normal.         Judgment: Judgment normal.                Procedures        ASSESSMENT AND PLAN    Diagnoses and all orders for this visit:    1. Closed trimalleolar fracture of right ankle, initial encounter (Primary)    2. Status post bunionectomy    3. Hallux valgus, right      Body mass index is 27.47 kg/m².    Recommend follow-up with primary care to discuss BMI greater than 30    Continue elevation  Continue nonweightbearing status  Posterior splint reapplied today in office, areas padded patient tolerated well  Follow-up 1 week for recheck and repeat x-rays of the foot and ankle at that time, nonweightbearing        This document has been electronically signed by Qasim LEONARDO, FNP-C, ONP-C on March 28, 2023 15:43 CDT

## 2023-03-30 ENCOUNTER — APPOINTMENT (OUTPATIENT)
Dept: GENERAL RADIOLOGY | Facility: HOSPITAL | Age: 78
DRG: 726 | End: 2023-03-30
Payer: MEDICARE

## 2023-03-30 ENCOUNTER — HOSPITAL ENCOUNTER (INPATIENT)
Facility: HOSPITAL | Age: 78
LOS: 5 days | Discharge: HOME HEALTH | DRG: 726 | End: 2023-04-07
Attending: FAMILY MEDICINE | Admitting: FAMILY MEDICINE
Payer: MEDICARE

## 2023-03-30 DIAGNOSIS — M19.071 OSTEOARTHRITIS OF ANKLE AND FOOT, RIGHT: ICD-10-CM

## 2023-03-30 DIAGNOSIS — Z98.890 STATUS POST BUNIONECTOMY: ICD-10-CM

## 2023-03-30 DIAGNOSIS — R11.2 NAUSEA AND VOMITING, UNSPECIFIED VOMITING TYPE: ICD-10-CM

## 2023-03-30 DIAGNOSIS — Z74.09 IMPAIRED MOBILITY AND ADLS: ICD-10-CM

## 2023-03-30 DIAGNOSIS — E86.0 DEHYDRATION: ICD-10-CM

## 2023-03-30 DIAGNOSIS — Z74.09 IMPAIRED FUNCTIONAL MOBILITY, BALANCE, GAIT, AND ENDURANCE: ICD-10-CM

## 2023-03-30 DIAGNOSIS — I48.91 ATRIAL FIBRILLATION WITH RVR: ICD-10-CM

## 2023-03-30 DIAGNOSIS — Z78.9 IMPAIRED MOBILITY AND ADLS: ICD-10-CM

## 2023-03-30 DIAGNOSIS — R33.9 URINARY RETENTION: ICD-10-CM

## 2023-03-30 DIAGNOSIS — R33.8 ACUTE URINARY RETENTION: ICD-10-CM

## 2023-03-30 DIAGNOSIS — N17.9 AKI (ACUTE KIDNEY INJURY): Primary | ICD-10-CM

## 2023-03-30 LAB
ALBUMIN SERPL-MCNC: 3.7 G/DL (ref 3.5–5.2)
ALBUMIN/GLOB SERPL: 1 G/DL
ALP SERPL-CCNC: 91 U/L (ref 39–117)
ALT SERPL W P-5'-P-CCNC: 13 U/L (ref 1–41)
ANION GAP SERPL CALCULATED.3IONS-SCNC: 16 MMOL/L (ref 5–15)
AST SERPL-CCNC: 11 U/L (ref 1–40)
BASOPHILS # BLD AUTO: 0.03 10*3/MM3 (ref 0–0.2)
BASOPHILS NFR BLD AUTO: 0.2 % (ref 0–1.5)
BILIRUB SERPL-MCNC: 0.7 MG/DL (ref 0–1.2)
BILIRUB UR QL STRIP: ABNORMAL
BUN SERPL-MCNC: 81 MG/DL (ref 8–23)
BUN/CREAT SERPL: 29.1 (ref 7–25)
CALCIUM SPEC-SCNC: 9.1 MG/DL (ref 8.6–10.5)
CHLORIDE SERPL-SCNC: 93 MMOL/L (ref 98–107)
CK SERPL-CCNC: 34 U/L (ref 20–200)
CLARITY UR: ABNORMAL
CO2 SERPL-SCNC: 20 MMOL/L (ref 22–29)
COLOR UR: ABNORMAL
CRE SCREEN PCR: NOT DETECTED
CREAT SERPL-MCNC: 2.78 MG/DL (ref 0.76–1.27)
D-LACTATE SERPL-SCNC: 1.2 MMOL/L (ref 0.5–2)
DEPRECATED RDW RBC AUTO: 42.5 FL (ref 37–54)
EGFRCR SERPLBLD CKD-EPI 2021: 22.6 ML/MIN/1.73
EOSINOPHIL # BLD AUTO: 0.05 10*3/MM3 (ref 0–0.4)
EOSINOPHIL NFR BLD AUTO: 0.3 % (ref 0.3–6.2)
ERYTHROCYTE [DISTWIDTH] IN BLOOD BY AUTOMATED COUNT: 13.8 % (ref 12.3–15.4)
GLOBULIN UR ELPH-MCNC: 3.7 GM/DL
GLUCOSE SERPL-MCNC: 159 MG/DL (ref 65–99)
GLUCOSE UR STRIP-MCNC: ABNORMAL MG/DL
HCT VFR BLD AUTO: 37.7 % (ref 37.5–51)
HGB BLD-MCNC: 12.5 G/DL (ref 13–17.7)
HGB UR QL STRIP.AUTO: NEGATIVE
HOLD SPECIMEN: NORMAL
HOLD SPECIMEN: NORMAL
IMM GRANULOCYTES # BLD AUTO: 0.16 10*3/MM3 (ref 0–0.05)
IMM GRANULOCYTES NFR BLD AUTO: 0.9 % (ref 0–0.5)
IMP STRAIN: NOT DETECTED
KETONES UR QL STRIP: NEGATIVE
KPC STRAIN: NOT DETECTED
LEUKOCYTE ESTERASE UR QL STRIP.AUTO: NEGATIVE
LIPASE SERPL-CCNC: 49 U/L (ref 13–60)
LYMPHOCYTES # BLD AUTO: 0.98 10*3/MM3 (ref 0.7–3.1)
LYMPHOCYTES NFR BLD AUTO: 5.8 % (ref 19.6–45.3)
MAGNESIUM SERPL-MCNC: 2.6 MG/DL (ref 1.6–2.4)
MCH RBC QN AUTO: 27.8 PG (ref 26.6–33)
MCHC RBC AUTO-ENTMCNC: 33.2 G/DL (ref 31.5–35.7)
MCV RBC AUTO: 84 FL (ref 79–97)
MONOCYTES # BLD AUTO: 1.33 10*3/MM3 (ref 0.1–0.9)
MONOCYTES NFR BLD AUTO: 7.8 % (ref 5–12)
NDM STRAIN: NOT DETECTED
NEUTROPHILS NFR BLD AUTO: 14.42 10*3/MM3 (ref 1.7–7)
NEUTROPHILS NFR BLD AUTO: 85 % (ref 42.7–76)
NITRITE UR QL STRIP: NEGATIVE
NRBC BLD AUTO-RTO: 0 /100 WBC (ref 0–0.2)
OXA 48 STRAIN: NOT DETECTED
PH UR STRIP.AUTO: <=5 [PH] (ref 5–9)
PLATELET # BLD AUTO: 391 10*3/MM3 (ref 140–450)
PMV BLD AUTO: 8.8 FL (ref 6–12)
POTASSIUM SERPL-SCNC: 4.5 MMOL/L (ref 3.5–5.2)
PROT SERPL-MCNC: 7.4 G/DL (ref 6–8.5)
PROT UR QL STRIP: ABNORMAL
RBC # BLD AUTO: 4.49 10*6/MM3 (ref 4.14–5.8)
SODIUM SERPL-SCNC: 129 MMOL/L (ref 136–145)
SP GR UR STRIP: 1.02 (ref 1–1.03)
UROBILINOGEN UR QL STRIP: ABNORMAL
VIM STRAIN: NOT DETECTED
WBC NRBC COR # BLD: 16.97 10*3/MM3 (ref 3.4–10.8)
WHOLE BLOOD HOLD COAG: NORMAL
WHOLE BLOOD HOLD SPECIMEN: NORMAL

## 2023-03-30 PROCEDURE — 25010000002 CEFTRIAXONE PER 250 MG: Performed by: FAMILY MEDICINE

## 2023-03-30 PROCEDURE — 74022 RADEX COMPL AQT ABD SERIES: CPT

## 2023-03-30 PROCEDURE — 85025 COMPLETE CBC W/AUTO DIFF WBC: CPT

## 2023-03-30 PROCEDURE — 83690 ASSAY OF LIPASE: CPT

## 2023-03-30 PROCEDURE — 83735 ASSAY OF MAGNESIUM: CPT | Performed by: FAMILY MEDICINE

## 2023-03-30 PROCEDURE — 87081 CULTURE SCREEN ONLY: CPT | Performed by: FAMILY MEDICINE

## 2023-03-30 PROCEDURE — 81003 URINALYSIS AUTO W/O SCOPE: CPT

## 2023-03-30 PROCEDURE — 93010 ELECTROCARDIOGRAM REPORT: CPT | Performed by: INTERNAL MEDICINE

## 2023-03-30 PROCEDURE — 83605 ASSAY OF LACTIC ACID: CPT

## 2023-03-30 PROCEDURE — 82550 ASSAY OF CK (CPK): CPT | Performed by: FAMILY MEDICINE

## 2023-03-30 PROCEDURE — 99285 EMERGENCY DEPT VISIT HI MDM: CPT

## 2023-03-30 PROCEDURE — 87086 URINE CULTURE/COLONY COUNT: CPT | Performed by: FAMILY MEDICINE

## 2023-03-30 PROCEDURE — 87102 FUNGUS ISOLATION CULTURE: CPT | Performed by: FAMILY MEDICINE

## 2023-03-30 PROCEDURE — 93005 ELECTROCARDIOGRAM TRACING: CPT

## 2023-03-30 PROCEDURE — G0378 HOSPITAL OBSERVATION PER HR: HCPCS

## 2023-03-30 PROCEDURE — 80053 COMPREHEN METABOLIC PANEL: CPT

## 2023-03-30 RX ORDER — SODIUM CHLORIDE 0.9 % (FLUSH) 0.9 %
10 SYRINGE (ML) INJECTION AS NEEDED
Status: DISCONTINUED | OUTPATIENT
Start: 2023-03-30 | End: 2023-04-07 | Stop reason: HOSPADM

## 2023-03-30 RX ORDER — OXYCODONE AND ACETAMINOPHEN 7.5; 325 MG/1; MG/1
1 TABLET ORAL EVERY 4 HOURS PRN
Status: DISCONTINUED | OUTPATIENT
Start: 2023-03-30 | End: 2023-04-07 | Stop reason: HOSPADM

## 2023-03-30 RX ORDER — ALPRAZOLAM 0.5 MG/1
0.5 TABLET ORAL NIGHTLY PRN
Status: DISCONTINUED | OUTPATIENT
Start: 2023-03-30 | End: 2023-04-07 | Stop reason: HOSPADM

## 2023-03-30 RX ORDER — PROPAFENONE HYDROCHLORIDE 150 MG/1
150 TABLET, COATED ORAL EVERY 8 HOURS
Status: DISCONTINUED | OUTPATIENT
Start: 2023-03-30 | End: 2023-04-07 | Stop reason: HOSPADM

## 2023-03-30 RX ORDER — AMLODIPINE BESYLATE 10 MG/1
10 TABLET ORAL DAILY
Status: DISCONTINUED | OUTPATIENT
Start: 2023-03-31 | End: 2023-04-07 | Stop reason: HOSPADM

## 2023-03-30 RX ORDER — SODIUM CHLORIDE 9 MG/ML
50 INJECTION, SOLUTION INTRAVENOUS CONTINUOUS
Status: DISCONTINUED | OUTPATIENT
Start: 2023-03-30 | End: 2023-04-04

## 2023-03-30 RX ORDER — SODIUM CHLORIDE 9 MG/ML
125 INJECTION, SOLUTION INTRAVENOUS CONTINUOUS
Status: DISCONTINUED | OUTPATIENT
Start: 2023-03-30 | End: 2023-03-30 | Stop reason: DRUGHIGH

## 2023-03-30 RX ORDER — NALOXONE HCL 0.4 MG/ML
0.4 VIAL (ML) INJECTION
Status: DISCONTINUED | OUTPATIENT
Start: 2023-03-30 | End: 2023-04-07 | Stop reason: HOSPADM

## 2023-03-30 RX ORDER — SODIUM CHLORIDE 9 MG/ML
40 INJECTION, SOLUTION INTRAVENOUS AS NEEDED
Status: DISCONTINUED | OUTPATIENT
Start: 2023-03-30 | End: 2023-04-07 | Stop reason: HOSPADM

## 2023-03-30 RX ORDER — SODIUM CHLORIDE 0.9 % (FLUSH) 0.9 %
10 SYRINGE (ML) INJECTION EVERY 12 HOURS SCHEDULED
Status: DISCONTINUED | OUTPATIENT
Start: 2023-03-30 | End: 2023-04-07 | Stop reason: HOSPADM

## 2023-03-30 RX ORDER — PANTOPRAZOLE SODIUM 40 MG/1
40 TABLET, DELAYED RELEASE ORAL
Status: DISCONTINUED | OUTPATIENT
Start: 2023-03-31 | End: 2023-04-07 | Stop reason: HOSPADM

## 2023-03-30 RX ORDER — MORPHINE SULFATE 2 MG/ML
1 INJECTION, SOLUTION INTRAMUSCULAR; INTRAVENOUS EVERY 4 HOURS PRN
Status: ACTIVE | OUTPATIENT
Start: 2023-03-30 | End: 2023-04-06

## 2023-03-30 RX ORDER — DILTIAZEM HYDROCHLORIDE 5 MG/ML
10 INJECTION INTRAVENOUS ONCE
Status: COMPLETED | OUTPATIENT
Start: 2023-03-30 | End: 2023-03-30

## 2023-03-30 RX ORDER — ISOSORBIDE MONONITRATE 30 MG/1
30 TABLET, EXTENDED RELEASE ORAL DAILY
Status: DISCONTINUED | OUTPATIENT
Start: 2023-03-31 | End: 2023-04-07 | Stop reason: HOSPADM

## 2023-03-30 RX ADMIN — Medication 10 ML: at 20:40

## 2023-03-30 RX ADMIN — SODIUM CHLORIDE 125 ML/HR: 9 INJECTION, SOLUTION INTRAVENOUS at 14:49

## 2023-03-30 RX ADMIN — CEFTRIAXONE SODIUM 1 G: 1 INJECTION, POWDER, FOR SOLUTION INTRAMUSCULAR; INTRAVENOUS at 20:38

## 2023-03-30 RX ADMIN — SODIUM CHLORIDE 5 MG/HR: 900 INJECTION, SOLUTION INTRAVENOUS at 16:18

## 2023-03-30 RX ADMIN — SODIUM CHLORIDE 500 ML: 9 INJECTION, SOLUTION INTRAVENOUS at 14:49

## 2023-03-30 RX ADMIN — PROPAFENONE HYDROCHLORIDE 150 MG: 150 TABLET, FILM COATED ORAL at 20:38

## 2023-03-30 RX ADMIN — ALPRAZOLAM 0.5 MG: 0.5 TABLET ORAL at 20:38

## 2023-03-30 RX ADMIN — OXYCODONE HYDROCHLORIDE AND ACETAMINOPHEN 1 TABLET: 7.5; 325 TABLET ORAL at 20:38

## 2023-03-30 RX ADMIN — DILTIAZEM HYDROCHLORIDE 10 MG: 5 INJECTION INTRAVENOUS at 14:47

## 2023-03-30 NOTE — ED PROVIDER NOTES
Subjective   History of Present Illness  Patient presents emergency department with vomiting x3 days and acute urinary retention.  He spent the last week in a nursing home for a rehabilitation stay.  He and the family members have several complaints about the nursing home.  A Gray catheter was placed in the emergency department and 1000 mL of urine was removed.  Patient states that he has not urinated in 1 week.  He is also in atrial fibrillation with rapid ventricular response.  Patient also mentions he has a history of coronary artery disease with stents x3.         Vomiting  The primary symptoms include abdominal pain, nausea, vomiting and diarrhea. Primary symptoms do not include fever, fatigue, dysuria, myalgias or rash.   The illness is also significant for chills.   Nausea  The primary symptoms include abdominal pain, nausea, vomiting and diarrhea. Primary symptoms do not include fever, fatigue, dysuria, myalgias or rash.   The illness is also significant for chills.       Review of Systems   Constitutional: Positive for chills. Negative for appetite change, diaphoresis, fatigue and fever.   HENT: Positive for congestion. Negative for ear discharge, ear pain, nosebleeds, rhinorrhea, sinus pressure, sore throat and trouble swallowing.    Eyes: Negative for discharge and redness.   Respiratory: Negative for apnea, cough, chest tightness, shortness of breath and wheezing.    Cardiovascular: Negative for chest pain.   Gastrointestinal: Positive for abdominal pain, diarrhea, nausea and vomiting.   Endocrine: Negative for polyuria.   Genitourinary: Negative for dysuria, frequency and urgency.   Musculoskeletal: Negative for myalgias and neck pain.   Skin: Negative for color change and rash.   Allergic/Immunologic: Negative for immunocompromised state.   Neurological: Negative for dizziness, seizures, syncope, weakness, light-headedness and headaches.   Hematological: Negative for adenopathy. Does not bruise/bleed  easily.   Psychiatric/Behavioral: Negative for behavioral problems and confusion.   All other systems reviewed and are negative.      Past Medical History:   Diagnosis Date   • Acute posthemorrhagic anemia    • Afib (HCC)    • Allergic rhinitis    • Anxiety state    • Chest pain    • CKD (chronic kidney disease), stage II    • Coronary arteriosclerosis     3 stents, followed by Dr. Jeffers   • Diabetes mellitus (HCC)     type 2   • Diverticular disease of colon    • Dysphagia    • Elevated cholesterol    • Epigastric pain    • GERD (gastroesophageal reflux disease)     esophagitis on Dexilant. Pt feels Nexium working better      • History of echocardiogram     Small left atrial enlargement with mild CLVH.Ef of 55-60%.Mitral valve mildly thickened.Av thickened.Left ventricle mildly dilated.Tricuspid intact.Mild aortic insufficiency. 12/07/2015       • History of echocardiogram     Mild diastolic dysfunction and mild left atrial enlargement, otherwise normal echo. EF> 55% 01/03/2012       • Hypercholesterolemia    • Hypertension    • Insomnia    • Irritable bowel syndrome    • Osteoarthritis of multiple joints    • Pain of right foot    • PONV (postoperative nausea and vomiting)        Allergies   Allergen Reactions   • Brilinta [Ticagrelor] Shortness Of Breath   • Effient [Prasugrel] Shortness Of Breath   • Warfarin And Related Shortness Of Breath   • Ciprofloxacin Hives   • Lipitor [Atorvastatin] Swelling   • Latex Itching   • Adhesive Tape Rash   • Celexa [Citalopram Hydrobromide] Rash   • Crestor [Rosuvastatin Calcium] Rash   • Floxin [Ofloxacin] Rash   • Januvia [Sitagliptin] Rash   • Penicillins Rash   • Sulfa Antibiotics Rash       Past Surgical History:   Procedure Laterality Date   • APPENDECTOMY       [facility] 1995       • CARDIAC CATHETERIZATION      Successful PTCA of the OMB#2.Unsuccessful PTCA of the 1st OMB, secondary to difficulty in advancing the balloon. 12/08/2015       •  CHOLECYSTECTOMY      [facility] 1993       • CHOLECYSTECTOMY     • COLONOSCOPY  10/01/2012   • COLONOSCOPY N/A 12/11/2017    Procedure: COLONOSCOPY;  Surgeon: Bladimir Michael MD;  Location: [facility] ENDOSCOPY;  Service:    • COLONOSCOPY N/A 11/01/2022    Procedure: COLONOSCOPY;  Surgeon: Bladimir Michael MD;  Location: [facility] ENDOSCOPY;  Service: Gastroenterology;  Laterality: N/A;   • CORONARY ANGIOPLASTY      Successful PTCA & stenting LAD was done w/ deployment of a 2.5mm x23 Xience stent w/ reduction of stenosis from 90% to less than 0% stenosis with good LILLIAM 3 flow 02/17/2012       • ENDOSCOPY      with biopsy.  Mildly severe esophagitis. Gastritis. Normal examined duodenum.Several biopsies obtained in the lower third of the esophagus.Several biopsies obtained in the gastric antrum. 05/06/2016       • ENDOSCOPY      w tube. Normal esophagus. Gastritis in stomach. Biopsy taken. Gastric polyp found. Biopsy taken. Normal duodenum. 10/01/2012       • ENDOSCOPY AND COLONOSCOPY      Diverticulum in sigmoid colon & descending colon. Internal & external hemorrhoids found. 10/01/2012       • EYE SURGERY Bilateral     catarract   • HAND SURGERY Left      Corrective osteotomy of ring finger metacarpal. Malunited fracture of left ring finger 05/21/1985       • HERNIA REPAIR      Laparoscopic hernia repair. prepertitoneal bilateral inguinal hernia repair with mesh. 10/15/2009      • OTHER SURGICAL HISTORY      CORONARY ARTERY STENT    • SKIN TAG REMOVAL      Excision, viral skin tag,right upper eyelid 05/08/1995        Family History   Problem Relation Age of Onset   • Clotting disorder Other    • Lung disease Other    • Schizophrenia Sister    • Obesity Sister    • Tuberculosis Sister    • Arthritis Mother    • Diabetes Mother    • Heart disease Mother    • Hypertension Mother    • Obesity Mother    • Stroke Mother    • Thyroid disease Mother    • Tuberculosis Mother    • Arthritis Father    • Tuberculosis  Father        Social History     Socioeconomic History   • Marital status:    Tobacco Use   • Smoking status: Never   • Smokeless tobacco: Never   Vaping Use   • Vaping Use: Never used   Substance and Sexual Activity   • Alcohol use: No   • Drug use: Never   • Sexual activity: Defer           Objective   Physical Exam  Vitals and nursing note reviewed.   Constitutional:       Appearance: He is well-developed.   HENT:      Head: Normocephalic and atraumatic.      Nose: Nose normal.   Eyes:      General: No scleral icterus.        Right eye: No discharge.         Left eye: No discharge.      Conjunctiva/sclera: Conjunctivae normal.      Pupils: Pupils are equal, round, and reactive to light.   Neck:      Trachea: No tracheal deviation.   Cardiovascular:      Rate and Rhythm: Tachycardia present. Rhythm irregularly irregular.      Heart sounds: Normal heart sounds. No murmur heard.  Pulmonary:      Effort: Pulmonary effort is normal. No respiratory distress.      Breath sounds: Normal breath sounds. No stridor. No wheezing or rales.   Abdominal:      General: Bowel sounds are normal. There is no distension.      Palpations: Abdomen is soft. There is no mass.      Tenderness: There is no abdominal tenderness. There is no guarding or rebound.   Musculoskeletal:      Cervical back: Normal range of motion and neck supple.   Skin:     General: Skin is warm and dry.      Findings: No erythema or rash.   Neurological:      Mental Status: He is alert and oriented to person, place, and time.      Coordination: Coordination normal.   Psychiatric:         Behavior: Behavior normal.         Thought Content: Thought content normal.         ECG 12 Lead      Date/Time: 3/30/2023 2:20 PM  Performed by: Dat Stewart MD  Authorized by: Dat Stewart MD   Interpreted by physician  Rhythm: atrial fibrillation  BPM: 123  ST Segments: ST segments normal                   ED Course              Labs Reviewed    COMPREHENSIVE METABOLIC PANEL - Abnormal; Notable for the following components:       Result Value    Glucose 159 (*)     BUN 81 (*)     Creatinine 2.78 (*)     Sodium 129 (*)     Chloride 93 (*)     CO2 20.0 (*)     BUN/Creatinine Ratio 29.1 (*)     Anion Gap 16.0 (*)     eGFR 22.6 (*)     All other components within normal limits    Narrative:     GFR Normal >60  Chronic Kidney Disease <60  Kidney Failure <15    The GFR formula is only valid for adults with stable renal function between ages 18 and 70.   URINALYSIS W/ MICROSCOPIC IF INDICATED (NO CULTURE) - Abnormal; Notable for the following components:    Appearance, UA Cloudy (*)     Glucose, UA >=1000 mg/dL (3+) (*)     Bilirubin, UA Small (1+) (*)     Protein, UA Trace (*)     All other components within normal limits    Narrative:     Urine microscopic not indicated.   CBC WITH AUTO DIFFERENTIAL - Abnormal; Notable for the following components:    WBC 16.97 (*)     Hemoglobin 12.5 (*)     Neutrophil % 85.0 (*)     Lymphocyte % 5.8 (*)     Immature Grans % 0.9 (*)     Neutrophils, Absolute 14.42 (*)     Monocytes, Absolute 1.33 (*)     Immature Grans, Absolute 0.16 (*)     All other components within normal limits   MAGNESIUM - Abnormal; Notable for the following components:    Magnesium 2.6 (*)     All other components within normal limits   LIPASE - Normal   LACTIC ACID, PLASMA - Normal   CK - Normal   RAINBOW DRAW    Narrative:     The following orders were created for panel order [place] Draw.  Procedure                               Abnormality         Status                     ---------                               -----------         ------                     Green Top (Gel)[[id number]]                                  Final result               Lavender Top[[id number]]                                     Final result               Gold Top - [facility][[id number]]                                   Final result               Light Blue Top[[id number]]                                    Final result                 Please view results for these tests on the individual orders.   CBC AND DIFFERENTIAL    Narrative:     The following orders were created for panel order CBC & Differential.  Procedure                               Abnormality         Status                     ---------                               -----------         ------                     CBC Auto Differential[[id number]]        Abnormal            Final result                 Please view results for these tests on the individual orders.   GREEN TOP   LAVENDER TOP   GOLD TOP - SST   LIGHT BLUE TOP       XR Abdomen 2+ VW with Chest 1 VW    (Results Pending)                                         MDM    Final diagnoses:   OPAL (acute kidney injury) (HCC)   Urinary retention   Atrial fibrillation with RVR (HCC)   Dehydration   Nausea and vomiting, unspecified vomiting type       ED Disposition  ED Disposition     ED Disposition   Decision to Admit    Condition   --    Comment   Level of Care: Telemetry [5]   Diagnosis: OPAL (acute kidney injury) (HCC) [224866]   Admitting Physician: LATIA REYNOLDS [[id number]]   Attending Physician: LATIA REYNOLDS [[id number]]               No follow-up provider specified.       Medication List      No changes were made to your prescriptions during this visit.          Dat Stewart MD  03/30/23 [id number]

## 2023-03-30 NOTE — ED NOTES
Nursing report ED to floor  Aj Card Jr.  77 y.o.  male    HPI:   Chief Complaint   Patient presents with    Vomiting    Nausea    Rapid Heart Rate       Admitting doctor:   Pete Torres MD    Consulting provider(s):  Consults       Date and Time Order Name Status Description    3/30/2023  4:21 PM Hospitalist (on-call MD unless specified)      3/23/2023 12:11 PM Inpatient Consult to Cardiology Completed              Admitting diagnosis:   The primary encounter diagnosis was OPAL (acute kidney injury) ([facility]). Diagnoses of Urinary retention, Atrial fibrillation with RVR ([facility]), Dehydration, and Nausea and vomiting, unspecified vomiting type were also pertinent to this visit.    Code status:   Current Code Status       Date Active Code Status Order ID Comments User Context       Prior            Allergies:   Brilinta [ticagrelor], Effient [prasugrel], Warfarin and related, Ciprofloxacin, Lipitor [atorvastatin], Latex, Adhesive tape, Celexa [citalopram hydrobromide], Crestor [rosuvastatin calcium], Floxin [ofloxacin], Januvia [sitagliptin], Penicillins, and Sulfa antibiotics    Intake and Output    Intake/Output Summary (Last 24 hours) at 3/30/2023 1709  Last data filed at 3/30/2023 1616  Gross per 24 hour   Intake 500 ml   Output --   Net 500 ml       Weight:       03/30/23  1349   Weight: 80.8 kg (178 lb 1.6 oz)       Most recent vitals:   Vitals:    03/30/23 1349 03/30/23 1447 03/30/23 1618 03/30/23 1618   BP:  112/58 107/65 107/65   BP Location:    Right arm   Patient Position:    Lying   Pulse:  120 (!) 121 106   Resp:    18   Temp:       TempSrc:       SpO2:    97%   Weight: 80.8 kg (178 lb 1.6 oz)      Height:         Oxygen Therapy: RA    Active LDAs/IV Access:   Lines, Drains & Airways       Active LDAs       Name Placement date Placement time Site Days    Peripheral IV 03/30/23 Left Antecubital 03/30/23  --  Antecubital  less than 1                    Labs (abnormal labs have a star):   Labs  Reviewed   COMPREHENSIVE METABOLIC PANEL - Abnormal; Notable for the following components:       Result Value    Glucose 159 (*)     BUN 81 (*)     Creatinine 2.78 (*)     Sodium 129 (*)     Chloride 93 (*)     CO2 20.0 (*)     BUN/Creatinine Ratio 29.1 (*)     Anion Gap 16.0 (*)     eGFR 22.6 (*)     All other components within normal limits    Narrative:     GFR Normal >60  Chronic Kidney Disease <60  Kidney Failure <15    The GFR formula is only valid for adults with stable renal function between ages 18 and 70.   URINALYSIS W/ MICROSCOPIC IF INDICATED (NO CULTURE) - Abnormal; Notable for the following components:    Appearance, UA Cloudy (*)     Glucose, UA >=1000 mg/dL (3+) (*)     Bilirubin, UA Small (1+) (*)     Protein, UA Trace (*)     All other components within normal limits    Narrative:     Urine microscopic not indicated.   CBC WITH AUTO DIFFERENTIAL - Abnormal; Notable for the following components:    WBC 16.97 (*)     Hemoglobin 12.5 (*)     Neutrophil % 85.0 (*)     Lymphocyte % 5.8 (*)     Immature Grans % 0.9 (*)     Neutrophils, Absolute 14.42 (*)     Monocytes, Absolute 1.33 (*)     Immature Grans, Absolute 0.16 (*)     All other components within normal limits   MAGNESIUM - Abnormal; Notable for the following components:    Magnesium 2.6 (*)     All other components within normal limits   LIPASE - Normal   LACTIC ACID, PLASMA - Normal   CK - Normal   RAINBOW DRAW    Narrative:     The following orders were created for panel order [place] Draw.  Procedure                               Abnormality         Status                     ---------                               -----------         ------                     Green Top (Gel)[[id number]]                                  Final result               Lavender Top[[id number]]                                     Final result               Gold Top - [facility][[id number]]                                   Final result               Light Blue  Top[[id number]]                                   Final result                 Please view results for these tests on the individual orders.   CBC AND DIFFERENTIAL    Narrative:     The following orders were created for panel order CBC & Differential.  Procedure                               Abnormality         Status                     ---------                               -----------         ------                     CBC Auto Differential[[id number]]        Abnormal            Final result                 Please view results for these tests on the individual orders.   GREEN TOP   LAVENDER TOP   GOLD TOP - SST   LIGHT BLUE TOP       Meds given in ED:   Medications   sodium chloride 0.9 % flush 10 mL (has no administration in time range)   sodium chloride 0.9 % infusion (125 mL/hr Intravenous New Bag 3/30/23 1449)   dilTIAZem (CARDIZEM) 100 mg in 100 mL NS infusion (ADV) (5 mg/hr Intravenous New Bag 3/30/23 1618)   sodium chloride 0.9 % bolus 500 mL (0 mL Intravenous Stopped 3/30/23 1616)   dilTIAZem (CARDIZEM) injection 10 mg (10 mg Intravenous Given 3/30/23 1447)     dilTIAZem, 5-15 mg/hr, Last Rate: 5 mg/hr (03/30/23 1618)  sodium chloride, 125 mL/hr, Last Rate: 125 mL/hr (03/30/23 1449)         NIH Stroke Scale:       Isolation/Infection(s):  No active isolations   No active infections     COVID Testing  Collected NA  Resulted NA    Nursing report ED to floor:  Mental status: A&O x4  Ambulatory status: Bedbound - non weight bearing on right foot/ankle  Precautions: Fall, Standard     ED nurse phone extentsiig- [phone]

## 2023-03-30 NOTE — H&P
[facility] Medicine Admission      Date of Admission: 3/30/2023      Primary Care Physician: Andreas Martinez MD      Chief Complaint: Urinary retention    HPI:    This is a 77-year-old male with past medical history of CKD, recent ankle fracture, osteoarthritis, CKD stage II presenting to the ER with complaint of urinary retention and nausea vomiting going on for the past 3 days.  He was found to have more than 1000 cc in his bladder scan.  His creatinine level was also elevated above baseline.  He was also found to be in atrial fibrillation with RVR.  He denies any chest pain or any shortness of air.  He was recently discharged to nursing for rehab after his ankle fracture surgery.    Past Medical History:  has a past medical history of Acute posthemorrhagic anemia, Afib (HCC), Allergic rhinitis, Anxiety state, Chest pain, CKD (chronic kidney disease), stage II, Coronary arteriosclerosis, Diabetes mellitus (HCC), Diverticular disease of colon, Dysphagia, Elevated cholesterol, Epigastric pain, GERD (gastroesophageal reflux disease), History of echocardiogram, History of echocardiogram, Hypercholesterolemia, Hypertension, Insomnia, Irritable bowel syndrome, Osteoarthritis of multiple joints, Pain of right foot, and PONV (postoperative nausea and vomiting).    Past Surgical History:  has a past surgical history that includes Coronary angioplasty; Appendectomy; Cardiac catheterization; Cholecystectomy; endoscopy and colonoscopy; Other surgical history; Esophagogastroduodenoscopy; Esophagogastroduodenoscopy; Hand surgery (Left); Hernia repair; Skin tag removal; Colonoscopy (10/01/2012); Colonoscopy (N/A, 12/11/2017); Colonoscopy (N/A, 11/01/2022); Eye surgery (Bilateral); and Cholecystectomy.    Family History: family history includes Arthritis in his father and mother; Clotting disorder in an other family member; Diabetes in his mother; Heart disease in his mother;  Hypertension in his mother; Lung disease in an other family member; Obesity in his mother and sister; Schizophrenia in his sister; Stroke in his mother; Thyroid disease in his mother; Tuberculosis in his father, mother, and sister.    Social History:  reports that he has never smoked. He has never used smokeless tobacco. He reports that he does not drink alcohol and does not use drugs.    Allergies:   Allergies   Allergen Reactions   • Brilinta [Ticagrelor] Shortness Of Breath   • Effient [Prasugrel] Shortness Of Breath   • Warfarin And Related Shortness Of Breath   • Ciprofloxacin Hives   • Lipitor [Atorvastatin] Swelling   • Latex Itching   • Adhesive Tape Rash   • Celexa [Citalopram Hydrobromide] Rash   • Crestor [Rosuvastatin Calcium] Rash   • Floxin [Ofloxacin] Rash   • Januvia [Sitagliptin] Rash   • Penicillins Rash   • Sulfa Antibiotics Rash       Medications: Scheduled Meds:   Continuous Infusions:dilTIAZem, 5-15 mg/hr, Last Rate: 5 mg/hr (03/30/23 1618)  sodium chloride, 125 mL/hr, Last Rate: 125 mL/hr (03/30/23 1449)      PRN Meds:.•  sodium chloride  No current facility-administered medications on file prior to encounter.     Current Outpatient Medications on File Prior to Encounter   Medication Sig Dispense Refill   • ALPRAZolam (XANAX) 0.5 MG tablet Take 1 tablet by mouth At Night As Needed for Anxiety. 60 tablet 0   • amLODIPine (NORVASC) 10 MG tablet Take 1 tablet by mouth Daily.     • ASPIRIN 81 PO Take 1 tablet by mouth Daily.     • Azelastine HCl 137 MCG/SPRAY solution Inhale 1 spray Daily. 30 mL 3   • coenzyme Q10 100 MG capsule Take 1 capsule by mouth Daily.     • dapagliflozin (Farxiga) 5 MG tablet tablet Take 1 tablet by mouth Daily.     • Dulaglutide (Trulicity) 3 MG/0.5ML solution pen-injector Inject 3 mg under the skin into the appropriate area as directed 1 (One) Time Per Week. (Patient taking differently: Inject 1.5 mg under the skin into the appropriate area as directed 1 (One) Time Per  "Week.) 12 pen 3   • esomeprazole (nexIUM) 40 MG capsule Take 1 capsule by mouth Every Morning Before Breakfast. 30 capsule 3   • Glucose Blood (BLOOD GLUCOSE TEST) strip Use 4 x daily use any brand covered by insurance or same brand as before ICD10 code is E11.9 120 each 11   • HYDROcodone-acetaminophen (Norco)  MG per tablet Take 1 tablet by mouth Every 6 (Six) Hours As Needed for Moderate Pain. 30 tablet 0   • Insulin Pen Needle 30G X 8 MM misc Use 3-4 times daily. 100 each 3   • Insulin Syringe 31G X 5/16\" 0.3 ML misc 4 x daily 120 each 11   • isosorbide mononitrate (IMDUR) 30 MG 24 hr tablet Take 1 tablet by mouth Daily.     • KRILL OIL OMEGA-3 PO Take 1 capsule by mouth Daily.     • Lancet Devices (LANCING DEVICE) misc USE AS INDICATED TO CORRELATE WITH STRIPS AND METER 1 each 1   • Lancets 30G misc USE 4 X DAILY 120 each 11   • lisinopril (PRINIVIL,ZESTRIL) 5 MG tablet Take 1 tablet by mouth Daily. (Patient taking differently: Take 8 tablets by mouth Daily.) 90 tablet 1   • metoprolol tartrate (LOPRESSOR) 50 MG tablet Take 1 tablet by mouth 2 (Two) Times a Day. 180 tablet 1   • oxyCODONE-acetaminophen (PERCOCET) 7.5-325 MG per tablet Take 1 tablet by mouth Every 4 (Four) Hours As Needed for Moderate Pain. 30 tablet 0   • pantoprazole (PROTONIX) 40 MG EC tablet Take 1 tablet by mouth Daily.     • propafenone (RYTHMOL) 150 MG tablet Take 1 tablet by mouth Every 8 (Eight) Hours.     • Red Yeast Rice Extract (RED YEAST RICE PO) Take 4 capsules by mouth Daily.     • rivaroxaban (XARELTO) 20 MG tablet Take 1 tablet by mouth Daily.     • Saw Palmetto, Serenoa repens, (SAW PALMETTO PO) Take 1 tablet by mouth Daily.     • sucralfate (CARAFATE) 1 g tablet Take 1 tablet by mouth 2 (Two) Times a Day.     • TURMERIC CURCUMIN PO Take 1,500 mg by mouth Daily.     • vitamin B-12 (CYANOCOBALAMIN) 2500 MCG sublingual tablet tablet Place 5,000 mcg under the tongue 1 (One) Time Per Week.     • vitamin E 100 UNIT capsule " Take 1 capsule by mouth Daily.     • Insulin Glargine (BASAGLAR KWIKPEN) 100 UNIT/ML injection pen Inject 15 Units under the skin into the appropriate area as directed At Night As Needed.     • oxyCODONE-acetaminophen (Percocet) 7.5-325 MG per tablet Take 1 tablet by mouth Every 6 (Six) Hours As Needed for Moderate Pain. 30 tablet 0       Review of Systems:  Review of Systems   Constitutional: Negative for fatigue.   HENT: Negative for congestion.    Respiratory: Negative for shortness of breath.    Cardiovascular: Negative for chest pain.   Gastrointestinal: Negative for abdominal pain.   Genitourinary: Negative for difficulty urinating.      Otherwise complete ROS is negative except as mentioned above.    Physical Exam:   Temp:  [98.4 °F (36.9 °C)] 98.4 °F (36.9 °C)  Heart Rate:  [106-121] 106  Resp:  [18] 18  BP: (107-112)/(57-65) 107/65  Physical Exam  Constitutional:       General: He is not in acute distress.     Appearance: He is well-developed. He is not diaphoretic.   HENT:      Head: Normocephalic and atraumatic.   Cardiovascular:      Rate and Rhythm: Normal rate.   Pulmonary:      Effort: Pulmonary effort is normal. No respiratory distress.      Breath sounds: No wheezing.   Abdominal:      General: There is no distension.      Palpations: Abdomen is soft.   Musculoskeletal:         General: Normal range of motion.   Skin:     General: Skin is warm and dry.   Neurological:      Mental Status: He is alert.      Cranial Nerves: No cranial nerve deficit.   Psychiatric:         Behavior: Behavior normal.         Thought Content: Thought content normal.         Judgment: Judgment normal.           Results Reviewed:  I have personally reviewed current lab, radiology, and data and agree with results.  Lab Results (last 24 hours)     Procedure Component Value Units Date/Time    [place] Draw [[id number]] Collected: 03/30/23 1411    Specimen: Blood Updated: 03/30/23 1515    Narrative:      The following orders were  created for panel order [place] Draw.  Procedure                               Abnormality         Status                     ---------                               -----------         ------                     Green Top (Gel)[[id number]]                                  Final result               Lavender Top[[id number]]                                     Final result               Gold Top - SST[[id number]]                                   Final result               Light Blue Top[[id number]]                                   Final result                 Please view results for these tests on the individual orders.    Light Blue Top [[id number]] Collected: 03/30/23 1411    Specimen: Blood Updated: 03/30/23 1515     Extra Tube Hold for add-ons.     Comment: Auto resulted       Green Top (Gel) [[id number]] Collected: 03/30/23 1411    Specimen: Blood Updated: 03/30/23 1515     Extra Tube Hold for add-ons.     Comment: Auto resulted.       Gold Top - SST [[id number]] Collected: 03/30/23 1411    Specimen: Blood Updated: 03/30/23 1515     Extra Tube Hold for add-ons.     Comment: Auto resulted.       Lavender Top [[id number]] Collected: 03/30/23 1411    Specimen: Blood Updated: 03/30/23 1515     Extra Tube hold for add-on     Comment: Auto resulted       Comprehensive Metabolic Panel [[id number]]  (Abnormal) Collected: 03/30/23 1411    Specimen: Blood Updated: 03/30/23 1450     Glucose 159 mg/dL      BUN 81 mg/dL      Creatinine 2.78 mg/dL      Sodium 129 mmol/L      Potassium 4.5 mmol/L      Chloride 93 mmol/L      CO2 20.0 mmol/L      Calcium 9.1 mg/dL      Total Protein 7.4 g/dL      Albumin 3.7 g/dL      ALT (SGPT) 13 U/L      AST (SGOT) 11 U/L      Alkaline Phosphatase 91 U/L      Total Bilirubin 0.7 mg/dL      Globulin 3.7 gm/dL      A/G Ratio 1.0 g/dL      BUN/Creatinine Ratio 29.1     Anion Gap 16.0 mmol/L      eGFR 22.6 mL/min/1.73     Narrative:      GFR Normal >60  Chronic Kidney Disease <60  Kidney Failure  <15    The GFR formula is only valid for adults with stable renal function between ages 18 and 70.    Urinalysis With Microscopic If Indicated (No Culture) - Urine, Clean Catch [[id number]]  (Abnormal) Collected: 03/30/23 1410    Specimen: Urine, Clean Catch Updated: 03/30/23 1447     Color, UA Dark Yellow     Appearance, UA Cloudy     pH, UA <=5.0     Specific Gravity, UA 1.022     Glucose, UA >=1000 mg/dL (3+)     Ketones, UA Negative     Bilirubin, UA Small (1+)     Blood, UA Negative     Protein, UA Trace     Leuk Esterase, UA Negative     Nitrite, UA Negative     Urobilinogen, UA 1.0 E.U./dL    Narrative:      Urine microscopic not indicated.    Lipase [[id number]]  (Normal) Collected: 03/30/23 1411    Specimen: Blood Updated: 03/30/23 1446     Lipase 49 U/L     CK [[id number]]  (Normal) Collected: 03/30/23 1411    Specimen: Blood Updated: 03/30/23 1446     Creatine Kinase 34 U/L     Magnesium [[id number]]  (Abnormal) Collected: 03/30/23 1411    Specimen: Blood Updated: 03/30/23 1446     Magnesium 2.6 mg/dL     Lactic Acid, Plasma [[id number]]  (Normal) Collected: 03/30/23 1411    Specimen: Blood Updated: 03/30/23 1442     Lactate 1.2 mmol/L     CBC & Differential [[id number]]  (Abnormal) Collected: 03/30/23 1411    Specimen: Blood Updated: 03/30/23 1430    Narrative:      The following orders were created for panel order CBC & Differential.  Procedure                               Abnormality         Status                     ---------                               -----------         ------                     CBC Auto Differential[[id number]]        Abnormal            Final result                 Please view results for these tests on the individual orders.    CBC Auto Differential [[id number]]  (Abnormal) Collected: 03/30/23 1411    Specimen: Blood Updated: 03/30/23 1430     WBC 16.97 10*3/mm3      RBC 4.49 10*6/mm3      Hemoglobin 12.5 g/dL      Hematocrit 37.7 %      MCV 84.0 fL      MCH 27.8 pg      MCHC 33.2  g/dL      RDW 13.8 %      RDW-SD 42.5 fl      MPV 8.8 fL      Platelets 391 10*3/mm3      Neutrophil % 85.0 %      Lymphocyte % 5.8 %      Monocyte % 7.8 %      Eosinophil % 0.3 %      Basophil % 0.2 %      Immature Grans % 0.9 %      Neutrophils, Absolute 14.42 10*3/mm3      Lymphocytes, Absolute 0.98 10*3/mm3      Monocytes, Absolute 1.33 10*3/mm3      Eosinophils, Absolute 0.05 10*3/mm3      Basophils, Absolute 0.03 10*3/mm3      Immature Grans, Absolute 0.16 10*3/mm3      nRBC 0.0 /100 WBC         Imaging Results (Last 24 Hours)     Procedure Component Value Units Date/Time    XR Abdomen 2+ VW with Chest 1 VW [[id number]] Collected: 03/30/23 1637     Updated: 03/30/23 1704    Narrative:      Acute Abdominal Series Withe Upright Chest.    History: Abdominal pain. Vomiting. Acute kidney failure.  Retention of urine. Nausea.    Upright frontal film of the chest and supine and upright films of  the abdomen were obtained.    Comparison: July 20, 2022    FINDINGS:    EKG leads.  The lungs are clear of an acute process.  Coronary artery stents.  The heart is not enlarged.  The pulmonary vasculature is not increased.  No pleural effusion.  No pneumothorax.  No acute osseous abnormality.  Degenerative changes are present in the thoracic spine.    No free air.  Some gaseous distention of the colon and some feces scattered  throughout the colon.  No mechanical bowel obstruction.  No organomegaly.  Atherosclerotic calcification splenic artery.  No acute osseous abnormality.  Minimal degenerative changes lumbar spine.  Cholecystectomy.  Surgical clips right lower quadrant.  Prior inguinal hernia repair.      Impression:      Conclusion:  The lungs are clear of an acute process.  Coronary artery stents.  No free air.  Some gaseous distention of the colon and some feces scattered  throughout the colon.  No mechanical bowel obstruction.  Cholecystectomy.  Surgical clips right lower quadrant.  Prior inguinal hernia  repair.    [id number]    Electronically signed by:  Estuardo Maciel MD  3/30/2023 5:02 PM CDT  Workstation: [id number]            Assessment:    Active Hospital Problems    Diagnosis    • **OPAL (acute kidney injury) (HCC)        OPAL-IV fluids will be started, likely due to retention, bladder scans will be done    Urinary retention-continue bladder scanning, will anchor Gray if necessary    Atrial fibrillation with RVR-continue with Cardizem drip    Hypertension-continue with home medication continue to monitor    Recent ankle surgery-continue with supportive care and pain management, he is supposed to be nonweightbearing.    Hyponatremia-continue monitoring sodium levels, we will continue to monitor    DVT prophylaxis-SCD    Medical Decision Making  Number and Complexity of problems: 3 major complex  Differential Diagnosis: Sepsis    Conditions and Status:        Condition is worsening.     [facility] Data  External documents reviewed: Previous medical records  My EKG interpretation: Atrial fibrillation with RVR  My plain film interpretation: None  Tests considered but not ordered: None     Decision rules/scores evaluated (example [id number], [place], etc): None     Discussed with: ER physician     Treatment Plan  As above    Care Planning  Shared decision making: Patient and family  Code status and discussions: Full code    Disposition  Social Determinants of Health that impact treatment or disposition: None  I expect the patient to be discharged to home in 2-3 days.      I have utilized all available immediate resources to obtain, update, or review the patient's current medications (including all prescriptions, over-the-counter products, herbals, cannabis/cannabidiol products, and vitamin/mineral/dietary (nutritional) supplements).     I confirmed that the patient's Advance Care Plan is present, code status is documented, or surrogate decision maker is listed in the patient's medical record.       Pete Torres,  MD  03/30/23  17:38 CDT

## 2023-03-30 NOTE — ED NOTES
Pt stated he has not been able to urinate in 2 days. Pt in and out catheterized for urine sample.

## 2023-03-31 ENCOUNTER — TELEPHONE (OUTPATIENT)
Dept: PODIATRY | Facility: CLINIC | Age: 78
End: 2023-03-31

## 2023-03-31 LAB
ANION GAP SERPL CALCULATED.3IONS-SCNC: 9 MMOL/L (ref 5–15)
BASOPHILS # BLD AUTO: 0.03 10*3/MM3 (ref 0–0.2)
BASOPHILS NFR BLD AUTO: 0.3 % (ref 0–1.5)
BUN SERPL-MCNC: 55 MG/DL (ref 8–23)
BUN/CREAT SERPL: 33.7 (ref 7–25)
CALCIUM SPEC-SCNC: 8.3 MG/DL (ref 8.6–10.5)
CHLORIDE SERPL-SCNC: 99 MMOL/L (ref 98–107)
CO2 SERPL-SCNC: 22 MMOL/L (ref 22–29)
CREAT SERPL-MCNC: 1.63 MG/DL (ref 0.76–1.27)
DEPRECATED RDW RBC AUTO: 43.2 FL (ref 37–54)
EGFRCR SERPLBLD CKD-EPI 2021: 42.9 ML/MIN/1.73
EOSINOPHIL # BLD AUTO: 0.13 10*3/MM3 (ref 0–0.4)
EOSINOPHIL NFR BLD AUTO: 1.2 % (ref 0.3–6.2)
ERYTHROCYTE [DISTWIDTH] IN BLOOD BY AUTOMATED COUNT: 13.9 % (ref 12.3–15.4)
GLUCOSE BLDC GLUCOMTR-MCNC: 153 MG/DL (ref 70–130)
GLUCOSE SERPL-MCNC: 118 MG/DL (ref 65–99)
HCT VFR BLD AUTO: 34.2 % (ref 37.5–51)
HGB BLD-MCNC: 11.3 G/DL (ref 13–17.7)
IMM GRANULOCYTES # BLD AUTO: 0.09 10*3/MM3 (ref 0–0.05)
IMM GRANULOCYTES NFR BLD AUTO: 0.8 % (ref 0–0.5)
LYMPHOCYTES # BLD AUTO: 1.1 10*3/MM3 (ref 0.7–3.1)
LYMPHOCYTES NFR BLD AUTO: 9.9 % (ref 19.6–45.3)
MAGNESIUM SERPL-MCNC: 2.5 MG/DL (ref 1.6–2.4)
MCH RBC QN AUTO: 28.1 PG (ref 26.6–33)
MCHC RBC AUTO-ENTMCNC: 33 G/DL (ref 31.5–35.7)
MCV RBC AUTO: 85.1 FL (ref 79–97)
MONOCYTES # BLD AUTO: 0.88 10*3/MM3 (ref 0.1–0.9)
MONOCYTES NFR BLD AUTO: 8 % (ref 5–12)
NEUTROPHILS NFR BLD AUTO: 79.8 % (ref 42.7–76)
NEUTROPHILS NFR BLD AUTO: 8.83 10*3/MM3 (ref 1.7–7)
NRBC BLD AUTO-RTO: 0 /100 WBC (ref 0–0.2)
PLATELET # BLD AUTO: 307 10*3/MM3 (ref 140–450)
PMV BLD AUTO: 9 FL (ref 6–12)
POTASSIUM SERPL-SCNC: 4.3 MMOL/L (ref 3.5–5.2)
QT INTERVAL: 278 MS
QTC INTERVAL: 398 MS
RBC # BLD AUTO: 4.02 10*6/MM3 (ref 4.14–5.8)
SODIUM SERPL-SCNC: 130 MMOL/L (ref 136–145)
WBC NRBC COR # BLD: 11.06 10*3/MM3 (ref 3.4–10.8)

## 2023-03-31 PROCEDURE — G0378 HOSPITAL OBSERVATION PER HR: HCPCS

## 2023-03-31 PROCEDURE — 25010000002 CEFTRIAXONE PER 250 MG: Performed by: FAMILY MEDICINE

## 2023-03-31 PROCEDURE — 82962 GLUCOSE BLOOD TEST: CPT

## 2023-03-31 PROCEDURE — 97165 OT EVAL LOW COMPLEX 30 MIN: CPT

## 2023-03-31 PROCEDURE — 85025 COMPLETE CBC W/AUTO DIFF WBC: CPT | Performed by: FAMILY MEDICINE

## 2023-03-31 PROCEDURE — 80048 BASIC METABOLIC PNL TOTAL CA: CPT | Performed by: FAMILY MEDICINE

## 2023-03-31 PROCEDURE — 83735 ASSAY OF MAGNESIUM: CPT | Performed by: FAMILY MEDICINE

## 2023-03-31 RX ADMIN — CEFTRIAXONE SODIUM 1 G: 1 INJECTION, POWDER, FOR SOLUTION INTRAMUSCULAR; INTRAVENOUS at 19:52

## 2023-03-31 RX ADMIN — PROPAFENONE HYDROCHLORIDE 150 MG: 150 TABLET, FILM COATED ORAL at 11:08

## 2023-03-31 RX ADMIN — RIVAROXABAN 15 MG: 15 TABLET, FILM COATED ORAL at 08:00

## 2023-03-31 RX ADMIN — OXYCODONE HYDROCHLORIDE AND ACETAMINOPHEN 1 TABLET: 7.5; 325 TABLET ORAL at 12:01

## 2023-03-31 RX ADMIN — PANTOPRAZOLE SODIUM 40 MG: 40 TABLET, DELAYED RELEASE ORAL at 08:00

## 2023-03-31 RX ADMIN — ALPRAZOLAM 0.5 MG: 0.5 TABLET ORAL at 21:42

## 2023-03-31 RX ADMIN — SODIUM CHLORIDE 75 ML/HR: 9 INJECTION, SOLUTION INTRAVENOUS at 03:59

## 2023-03-31 RX ADMIN — OXYCODONE HYDROCHLORIDE AND ACETAMINOPHEN 1 TABLET: 7.5; 325 TABLET ORAL at 19:52

## 2023-03-31 RX ADMIN — PROPAFENONE HYDROCHLORIDE 150 MG: 150 TABLET, FILM COATED ORAL at 03:59

## 2023-03-31 RX ADMIN — Medication 10 ML: at 21:15

## 2023-03-31 RX ADMIN — PROPAFENONE HYDROCHLORIDE 150 MG: 150 TABLET, FILM COATED ORAL at 19:52

## 2023-03-31 NOTE — THERAPY EVALUATION
Patient Name: Aj Card Jr.  : 1945    MRN: [id number]                              Today's Date: 3/31/2023       Admit Date: 3/30/2023    Visit Dx:     ICD-10-CM ICD-9-CM   1. OPAL (acute kidney injury) ([facility])  N17.9 584.9   2. Urinary retention  R33.9 788.20   3. Atrial fibrillation with RVR ([facility])  I48.91 427.31   4. Dehydration  E86.0 276.51   5. Nausea and vomiting, unspecified vomiting type  R11.2 787.01   6. Osteoarthritis of ankle and foot, right  M19.071 715.97     Patient Active Problem List   Diagnosis   • Type 2 diabetes mellitus without complication, without long-term current use of insulin ([facility])   • Essential hypertension   • Mixed hyperlipidemia   • Generalized anxiety disorder   • History of colon polyps   • Diverticulosis of large intestine without hemorrhage   • Coronary artery disease with angina pectoris ([facility])   • Gastroesophageal reflux disease with esophagitis   • Vitamin D deficiency   • Overweight   • Encounter for screening for endocrine disorder   • Atopic dermatitis   • High serum vitamin B12   • Acute pain of right knee   • Tear of medial meniscus of right knee, current   • GERD (gastroesophageal reflux disease)   • PAF (paroxysmal atrial fibrillation) ([facility])   • Uncontrolled type 2 diabetes mellitus with hyperglycemia ([facility])   • Gastroesophageal reflux disease   • Chronic constipation   • Stage 2 chronic kidney disease   • Osteoarthritis of ankle and foot, right   • Closed trimalleolar fracture of right ankle   • Status post bunionectomy   • OPAL (acute kidney injury) ([facility])     Past Medical History:   Diagnosis Date   • Acute posthemorrhagic anemia    • Afib ([facility])    • Allergic rhinitis    • Anxiety state    • Chest pain    • CKD (chronic kidney disease), stage II    • Coronary arteriosclerosis     3 stents, followed by Dr. Jeffers   • Diabetes mellitus ([facility])     type 2   • Diverticular disease of colon    • Dysphagia    • Elevated cholesterol    • Epigastric pain    • GERD  (gastroesophageal reflux disease)     esophagitis on Dexilant. Pt feels Nexium working better      • History of echocardiogram     Small left atrial enlargement with mild CLVH.Ef of 55-60%.Mitral valve mildly thickened.Av thickened.Left ventricle mildly dilated.Tricuspid intact.Mild aortic insufficiency. 12/07/2015       • History of echocardiogram     Mild diastolic dysfunction and mild left atrial enlargement, otherwise normal echo. EF> 55% 01/03/2012       • Hypercholesterolemia    • Hypertension    • Insomnia    • Irritable bowel syndrome    • Osteoarthritis of multiple joints    • Pain of right foot    • PONV (postoperative nausea and vomiting)      Past Surgical History:   Procedure Laterality Date   • APPENDECTOMY       [facility] 1995       • CARDIAC CATHETERIZATION      Successful PTCA of the OMB#2.Unsuccessful PTCA of the 1st OMB, secondary to difficulty in advancing the balloon. 12/08/2015       • CHOLECYSTECTOMY      [facility] 1993       • CHOLECYSTECTOMY     • COLONOSCOPY  10/01/2012   • COLONOSCOPY N/A 12/11/2017    Procedure: COLONOSCOPY;  Surgeon: Bladimir Michael MD;  Location: [facility] ENDOSCOPY;  Service:    • COLONOSCOPY N/A 11/01/2022    Procedure: COLONOSCOPY;  Surgeon: Bladimir Michael MD;  Location: [facility] ENDOSCOPY;  Service: Gastroenterology;  Laterality: N/A;   • CORONARY ANGIOPLASTY      Successful PTCA & stenting LAD was done w/ deployment of a 2.5mm x23 Xience stent w/ reduction of stenosis from 90% to less than 0% stenosis with good LILLIAM 3 flow 02/17/2012       • ENDOSCOPY      with biopsy.  Mildly severe esophagitis. Gastritis. Normal examined duodenum.Several biopsies obtained in the lower third of the esophagus.Several biopsies obtained in the gastric antrum. 05/06/2016       • ENDOSCOPY      w tube. Normal esophagus. Gastritis in stomach. Biopsy taken. Gastric polyp found. Biopsy taken. Normal duodenum. 10/01/2012       • ENDOSCOPY AND COLONOSCOPY       Diverticulum in sigmoid colon & descending colon. Internal & external hemorrhoids found. 10/01/2012       • EYE SURGERY Bilateral     catarract   • HAND SURGERY Left      Corrective osteotomy of ring finger metacarpal. Malunited fracture of left ring finger 05/21/1985       • HERNIA REPAIR      Laparoscopic hernia repair. prepertitoneal bilateral inguinal hernia repair with mesh. 10/15/2009      • OTHER SURGICAL HISTORY      CORONARY ARTERY STENT    • SKIN TAG REMOVAL      Excision, viral skin tag,right upper eyelid 05/08/1995       General Information     Row Name 03/31/23 1253          OT Time and Intention    Document Type evaluation  -     Mode of Treatment occupational therapy  -     Row Name 03/31/23 1251          General Information    Patient Profile Reviewed yes  -     Existing Precautions/Restrictions non-weight bearing;right   -     Row Name 03/31/23 1258          Living Environment    People in Home facility resident  [facility] for rehab to home program  -     Row Name 03/31/23 1252          Stairs Within Home, Primary    Stairs, Within Home, Primary Reports he was a one person transfer and MHR, with a RW. Had a w/c and MHR, but does not currently own one for home use. Was showering with one assist and shower chair. Plans to d/c home, but does not own any DME. Tub/shower, has a shower chair.  -     Row Name 03/31/23 1250          Cognition    Orientation Status (Cognition) oriented to;person;place;situation  -     Row Name 03/31/23 125          Safety Issues, Functional Mobility    Safety Issues Affecting Function (Mobility) positioning of assistive device  -     Impairments Affecting Function (Mobility) pain;endurance/activity tolerance  -           User Key  (r) = Recorded By, (t) = Taken By, (c) = Cosigned By    Initials Name Provider Type     Marino Trejo, ALICIA Occupational Therapist                 Mobility/ADL's     Row Name 03/31/23 1251          Bed  Mobility    Bed Mobility supine-sit;sit-supine  -     Supine-Sit [place] (Bed Mobility) standby assist  -     Sit-Supine [place] (Bed Mobility) standby assist  -     Assistive Device (Bed Mobility) bed rails;head of bed elevated  -     Row Name 03/31/23 1255          Transfers    Transfers sit-stand transfer  -     Row Name 03/31/23 1255          Sit-Stand Transfer    Sit-Stand [place] (Transfers) contact guard  -     Assistive Device (Sit-Stand Transfers) walker, front-wheeled  -     Row Name 03/31/23 1255          Activities of Daily Living    BADL Assessment/Intervention feeding  -[facility] Name 03/31/23 1255          Mobility    Extremity Weight-bearing Status right lower extremity  -     Right Lower Extremity (Weight-bearing Status) non weight-bearing (NWB)   -[facility] Name 03/31/23 1255          Self-Feeding Assessment/Training    [place] Level (Feeding) set up  -     Position (Self-Feeding) supine  -           User Key  (r) = Recorded By, (t) = Taken By, (c) = Cosigned By    Initials Name Provider Type     Marino Trejo OT Occupational Therapist               Obj/Interventions     Row Name 03/31/23 1255          Sensory Assessment (Somatosensory)    Sensory Assessment (Somatosensory) UE sensation intact  -[facility] Name 03/31/23 1255          Range of Motion Comprehensive    General Range of Motion bilateral upper extremity ROM WFL  -[facility] Name 03/31/23 1255          Strength Comprehensive (MMT)    General Manual Muscle Testing (MMT) Assessment other (see comments)  -     Comment, General Manual Muscle Testing (MMT) Assessment BUE 4/5 grossly  -           User Key  (r) = Recorded By, (t) = Taken By, (c) = Cosigned By    Initials Name Provider Type    Marino Franco OT Occupational Therapist               Goals/Plan     Row Name 03/31/23 1255          Transfer Goal 1 (OT)    Activity/Assistive Device (Transfer Goal 1, OT) toilet  -      [place] Level/Cues Needed (Transfer Goal 1, OT) supervision required  -SJ     Time Frame (Transfer Goal 1, OT) long term goal (LTG);by discharge  -SJ     Progress/Outcome (Transfer Goal 1, OT) goal not met  -     Row Name 03/31/23 1255          Bathing Goal 1 (OT)    Activity/Device (Bathing Goal 1, OT) lower body bathing  -SJ     [place] Level/Cues Needed (Bathing Goal 1, OT) minimum assist (75% or more patient effort)  -SJ     Time Frame (Bathing Goal 1, OT) long term goal (LTG);by discharge  -SJ     Progress/Outcomes (Bathing Goal 1, OT) goal not met  -     Row Name 03/31/23 1255          Dressing Goal 1 (OT)    Activity/Device (Dressing Goal 1, OT) lower body dressing  -SJ     [place]/Cues Needed (Dressing Goal 1, OT) minimum assist (75% or more patient effort)  -SJ     Time Frame (Dressing Goal 1, OT) long term goal (LTG);by discharge  -     Progress/Outcome (Dressing Goal 1, OT) goal not met  -     Row Name 03/31/23 1257          Toileting Goal 1 (OT)    Activity/Device (Toileting Goal 1, OT) toileting skills, all  -SJ     [place] Level/Cues Needed (Toileting Goal 1, OT) supervision required  -SJ     Time Frame (Toileting Goal 1, OT) long term goal (LTG);by discharge  -SJ     Progress/Outcome (Toileting Goal 1, OT) goal not met  -     Row Name 03/31/23 1255          Therapy Assessment/Plan (OT)    Planned Therapy Interventions (OT) activity tolerance training;adaptive equipment training;BADL retraining;edema control/reduction;cognitive/visual perception retraining;IADL retraining;manual therapy/joint mobilization;functional balance retraining;neuromuscular control/coordination retraining;occupation/activity based interventions;passive ROM/stretching;patient/caregiver education/training;strengthening exercise;transfer/mobility retraining;ROM/therapeutic exercise  -           User Key  (r) = Recorded By, (t) = Taken By, (c) = Cosigned By    Initials Name Provider Type      Marino Trejo, OT Occupational Therapist               Clinical Impression     Row Name 03/31/23 1258          Pain Assessment    Pretreatment Pain Rating 3/10  -     Posttreatment Pain Rating 1/10  -SJ     Pain Location - Side/Orientation Right  -SJ     Pain Location lower  -SJ     Pain Location - extremity  -SJ     Pain Intervention(s) Medication (See MAR);Repositioned;Ambulation/increased activity  -     Row Name 03/31/23 1251          Plan of Care Review    Plan of Care Reviewed With patient  -     Outcome Evaluation OT eval complete, supine in bed, alert x 4, agreeable for evaluation. 3/13/23 patient with bunion correction. Patient went home, had a fall with R fibular fx. s/p Open reduction internal fixation of right fibula fracture perform 3/23/23, has been NWB R LE since. Patient was at rehab to home program; at rehab was a 1 person assist for transfers. This session, supine<>sit SBA. Sit to stand and side stepping with CGA and RW, patient does maintain NWB RLE well. Patient deferred any transfers at this time, defers ADLs. Patient did reports some dizziness, /53 sitting EOB with dizziness; but resolved with prolonged sitting. Returned to bed all needs in reach. Patient with decreased balance, decreased safety in transfers with RW, decreased independence in ADLs, and decreased activity tolerance. Cont inpatient OT. Anticipate home with 24/7 assist and home OT. DME recs: tub transfer bench, Bedside commode  -     Row Name 03/31/23 7621          Therapy Assessment/Plan (OT)    Patient/Family Therapy Goal Statement (OT) return home  -     Rehab Potential (OT) good, to achieve stated therapy goals  -     Criteria for Skilled Therapeutic Interventions Met (OT) yes;skilled treatment is necessary  -     Therapy Frequency (OT) other (see comments)  5-7 d/wk  -     Predicted Duration of Therapy Intervention (OT) until d/c or all goals met  -     Row Name 03/31/23 3982          Therapy Plan  Review/Discharge Plan (OT)    Equipment Needs Upon Discharge (OT) commode chair;tub bench  -SJ     Anticipated Discharge Disposition (OT) home with 24/7 care;home with home health  -     Row Name 03/31/23 1255          Vital Signs    Pre Systolic BP Rehab 106  -SJ     Pre Treatment Diastolic BP 54  -SJ     Intra Systolic BP Rehab 102  -SJ     Intra Treatment Diastolic BP 53  -SJ     Post Systolic BP Rehab 141  -SJ     Post Treatment Diastolic BP 60  -SJ     Pretreatment Heart Rate (beats/min) 81  -SJ     Posttreatment Heart Rate (beats/min) 81  -SJ     Pre SpO2 (%) 98  -SJ     O2 Delivery Pre Treatment room air  -SJ     Post SpO2 (%) 97  -SJ     O2 Delivery Post Treatment room air  -SJ     Pre Patient Position Supine  -SJ     Intra Patient Position Sitting  -SJ     Post Patient Position Supine  -SJ     Row Name 03/31/23 1255          Positioning and Restraints    Pre-Treatment Position in bed  -SJ     Post Treatment Position bed  -SJ     In Bed notified nsg;supine;call light within reach;encouraged to call for assist;side rails up x2  -SJ           User Key  (r) = Recorded By, (t) = Taken By, (c) = Cosigned By    Initials Name Provider Type     Marino Trejo, OT Occupational Therapist               Outcome Measures     Row Name 03/31/23 1255          How much help from another is currently needed...    Putting on and taking off regular lower body clothing? 2  -SJ     Bathing (including washing, rinsing, and drying) 3  -SJ     Toileting (which includes using toilet bed pan or urinal) 2  -SJ     Putting on and taking off regular upper body clothing 4  -SJ     Taking care of personal grooming (such as brushing teeth) 3  -SJ     Eating meals 4  -SJ     AM-PAC 6 Clicks Score (OT) 18  -SJ     Row Name 03/31/23 1255          Functional Assessment    Outcome Measure Options AM-PAC 6 Clicks Daily Activity (OT)  -           User Key  (r) = Recorded By, (t) = Taken By, (c) = Cosigned By    Initials Name Provider Type      Marino Trejo, OT Occupational Therapist                Occupational Therapy Education     Title: PT OT SLP Therapies (In Progress)     Topic: Occupational Therapy (In Progress)     Point: ADL training (Done)     Description:   Instruct learner(s) on proper safety adaptation and remediation techniques during self care or transfers.   Instruct in proper use of assistive devices.              Learning Progress Summary           Patient Acceptance, E,TB, VU,NR by  at 3/31/2023 6889    Comment: POC, role of OT, transfer training                   Point: Home exercise program (Not Started)     Description:   Instruct learner(s) on appropriate technique for monitoring, assisting and/or progressing therapeutic exercises/activities.              Learner Progress:  Not documented in this visit.          Point: Precautions (Not Started)     Description:   Instruct learner(s) on prescribed precautions during self-care and functional transfers.              Learner Progress:  Not documented in this visit.          Point: Body mechanics (Not Started)     Description:   Instruct learner(s) on proper positioning and spine alignment during self-care, functional mobility activities and/or exercises.              Learner Progress:  Not documented in this visit.                      User Key     Initials Effective Dates Name Provider Type Discipline     06/14/21 -  Marino Trejo OT Occupational Therapist OT              OT Recommendation and Plan  Planned Therapy Interventions (OT): activity tolerance training, adaptive equipment training, BADL retraining, edema control/reduction, cognitive/visual perception retraining, IADL retraining, manual therapy/joint mobilization, functional balance retraining, neuromuscular control/coordination retraining, occupation/activity based interventions, passive ROM/stretching, patient/caregiver education/training, strengthening exercise, transfer/mobility retraining, ROM/therapeutic  exercise  Therapy Frequency (OT): other (see comments) (5-7 d/wk)  Plan of Care Review  Plan of Care Reviewed With: patient  Outcome Evaluation: OT eval complete, supine in bed, alert x 4, agreeable for evaluation. 3/13/23 patient with bunion correction. Patient went home, had a fall with R fibular fx. s/p Open reduction internal fixation of right fibula fracture perform 3/23/23, has been NWB R LE since. Patient was at rehab to home program; at rehab was a 1 person assist for transfers. This session, supine<>sit SBA. Sit to stand and side stepping with CGA and RW, patient does maintain NWB RLE well. Patient deferred any transfers at this time, defers ADLs. Patient did reports some dizziness, /53 sitting EOB with dizziness; but resolved with prolonged sitting. Returned to bed all needs in reach. Patient with decreased balance, decreased safety in transfers with RW, decreased independence in ADLs, and decreased activity tolerance. Cont inpatient OT. Anticipate home with 24/7 assist and home OT. DME recs: tub transfer bench, Bedside commode     Time Calculation:    Time Calculation- OT     Row Name 03/31/23 1355             Time Calculation- OT    OT Start Time 1255  -      OT Stop Time 1337  -      OT Time Calculation (min) 42 min  -      OT Received On 03/31/23  -      OT Goal Re-Cert Due Date 04/13/23  -         Untimed Charges    OT Eval/Re-eval Minutes 42  -SJ         Total Minutes    Untimed Charges Total Minutes 42  -SJ       Total Minutes 42  -SJ            User Key  (r) = Recorded By, (t) = Taken By, (c) = Cosigned By    Initials Name Provider Type     Marino Trejo OT Occupational Therapist              Therapy Charges for Today     Code Description Service Date Service Provider Modifiers Qty    77780249227 HC OT EVAL LOW COMPLEXITY 3 3/31/2023 Marino Trejo OT GO 1               Marino Trejo OT  3/31/2023

## 2023-03-31 NOTE — TELEPHONE ENCOUNTER
FIDEL, CCU CHARGE NURSE, REQUESTS A CALL BACK RE WANTS TO KNOW IF INITIAL BANDAGE FROM SURGERY NEEDS TO BE LEFT ON UNTIL PT DISMISSAL.  CALL BACK # [phone].  THANK YOU.

## 2023-03-31 NOTE — DISCHARGE PLACEMENT REQUEST
"Aj Blanco Jr. (77 y.o. Male)     Date of Birth   1945    Social Security Number       Address   [address] [address]    Home Phone   [phone]    MRN   [id number]       Sabianism   None    Marital Status                               Admission Date   3/30/23    Admission Type   Emergency    Admitting Provider   Pete Torres MD    Attending Provider   Pete Torres MD    Department, Room/Bed   [facility] STEPDOWN, 12/A       Discharge Date       Discharge Disposition       Discharge Destination                               Attending Provider: Pete Torres MD    Allergies: Brilinta [Ticagrelor], Effient [Prasugrel], Warfarin And Related, Ciprofloxacin, Lipitor [Atorvastatin], Latex, Adhesive Tape, Celexa [Citalopram Hydrobromide], Crestor [Rosuvastatin Calcium], Floxin [Ofloxacin], Januvia [Sitagliptin], Penicillins, Sulfa Antibiotics    Isolation: None   Infection: Candida Auris (rule out) (03/30/23)   Code Status: Prior    Ht: 175.3 cm (69\")   Wt: 80.8 kg (178 lb 1.6 oz)    Admission Cmt: None   Principal Problem: OPAL (acute kidney injury) (HCC) [N17.9]                 Active Insurance as of 3/30/2023     Primary Coverage     Payor Plan Insurance Group Employer/Plan Group    MEDICARE MEDICARE A & B      Payor Plan Address Payor Plan Phone Number Payor Plan Fax Number Effective Dates    [address] [phone]  10/1/2003 - None Entered    [place] [address]       Subscriber Name Subscriber Birth Date Member ID       AJ BLANCO JR. 1945 [id number]                 Emergency Contacts      (Rel.) Home Phone Work Phone Mobile Phone    BLANCOCHRISTOFRE HAAS (Spouse) [phone] -- [phone]    Enrique Jorge (Relative) [phone] -- [phone]               History & Physical      Pete Torres MD at 03/30/23 7105                [facility] " Hospital Medicine Admission      Date of Admission: 3/30/2023      Primary Care Physician: Andreas Martinez MD      Chief Complaint: Urinary retention    HPI:    This is a 77-year-old male with past medical history of CKD, recent ankle fracture, osteoarthritis, CKD stage II presenting to the ER with complaint of urinary retention and nausea vomiting going on for the past 3 days.  He was found to have more than 1000 cc in his bladder scan.  His creatinine level was also elevated above baseline.  He was also found to be in atrial fibrillation with RVR.  He denies any chest pain or any shortness of air.  He was recently discharged to nursing for rehab after his ankle fracture surgery.    Past Medical History:  has a past medical history of Acute posthemorrhagic anemia, Afib (HCC), Allergic rhinitis, Anxiety state, Chest pain, CKD (chronic kidney disease), stage II, Coronary arteriosclerosis, Diabetes mellitus (HCC), Diverticular disease of colon, Dysphagia, Elevated cholesterol, Epigastric pain, GERD (gastroesophageal reflux disease), History of echocardiogram, History of echocardiogram, Hypercholesterolemia, Hypertension, Insomnia, Irritable bowel syndrome, Osteoarthritis of multiple joints, Pain of right foot, and PONV (postoperative nausea and vomiting).    Past Surgical History:  has a past surgical history that includes Coronary angioplasty; Appendectomy; Cardiac catheterization; Cholecystectomy; endoscopy and colonoscopy; Other surgical history; Esophagogastroduodenoscopy; Esophagogastroduodenoscopy; Hand surgery (Left); Hernia repair; Skin tag removal; Colonoscopy (10/01/2012); Colonoscopy (N/A, 12/11/2017); Colonoscopy (N/A, 11/01/2022); Eye surgery (Bilateral); and Cholecystectomy.    Family History: family history includes Arthritis in his father and mother; Clotting disorder in an other family member; Diabetes in his mother; Heart disease in his mother; Hypertension in his mother; Lung disease in an other  family member; Obesity in his mother and sister; Schizophrenia in his sister; Stroke in his mother; Thyroid disease in his mother; Tuberculosis in his father, mother, and sister.    Social History:  reports that he has never smoked. He has never used smokeless tobacco. He reports that he does not drink alcohol and does not use drugs.    Allergies:   Allergies   Allergen Reactions   • Brilinta [Ticagrelor] Shortness Of Breath   • Effient [Prasugrel] Shortness Of Breath   • Warfarin And Related Shortness Of Breath   • Ciprofloxacin Hives   • Lipitor [Atorvastatin] Swelling   • Latex Itching   • Adhesive Tape Rash   • Celexa [Citalopram Hydrobromide] Rash   • Crestor [Rosuvastatin Calcium] Rash   • Floxin [Ofloxacin] Rash   • Januvia [Sitagliptin] Rash   • Penicillins Rash   • Sulfa Antibiotics Rash       Medications: Scheduled Meds:   Continuous Infusions:dilTIAZem, 5-15 mg/hr, Last Rate: 5 mg/hr (03/30/23 1618)  sodium chloride, 125 mL/hr, Last Rate: 125 mL/hr (03/30/23 1449)      PRN Meds:.•  sodium chloride  No current facility-administered medications on file prior to encounter.     Current Outpatient Medications on File Prior to Encounter   Medication Sig Dispense Refill   • ALPRAZolam (XANAX) 0.5 MG tablet Take 1 tablet by mouth At Night As Needed for Anxiety. 60 tablet 0   • amLODIPine (NORVASC) 10 MG tablet Take 1 tablet by mouth Daily.     • ASPIRIN 81 PO Take 1 tablet by mouth Daily.     • Azelastine HCl 137 MCG/SPRAY solution Inhale 1 spray Daily. 30 mL 3   • coenzyme Q10 100 MG capsule Take 1 capsule by mouth Daily.     • dapagliflozin (Farxiga) 5 MG tablet tablet Take 1 tablet by mouth Daily.     • Dulaglutide (Trulicity) 3 MG/0.5ML solution pen-injector Inject 3 mg under the skin into the appropriate area as directed 1 (One) Time Per Week. (Patient taking differently: Inject 1.5 mg under the skin into the appropriate area as directed 1 (One) Time Per Week.) 12 pen 3   • esomeprazole (nexIUM) 40 MG  "capsule Take 1 capsule by mouth Every Morning Before Breakfast. 30 capsule 3   • Glucose Blood (BLOOD GLUCOSE TEST) strip Use 4 x daily use any brand covered by insurance or same brand as before ICD10 code is E11.9 120 each 11   • HYDROcodone-acetaminophen (Norco)  MG per tablet Take 1 tablet by mouth Every 6 (Six) Hours As Needed for Moderate Pain. 30 tablet 0   • Insulin Pen Needle 30G X 8 MM misc Use 3-4 times daily. 100 each 3   • Insulin Syringe 31G X 5/16\" 0.3 ML misc 4 x daily 120 each 11   • isosorbide mononitrate (IMDUR) 30 MG 24 hr tablet Take 1 tablet by mouth Daily.     • KRILL OIL OMEGA-3 PO Take 1 capsule by mouth Daily.     • Lancet Devices (LANCING DEVICE) misc USE AS INDICATED TO CORRELATE WITH STRIPS AND METER 1 each 1   • Lancets 30G misc USE 4 X DAILY 120 each 11   • lisinopril (PRINIVIL,ZESTRIL) 5 MG tablet Take 1 tablet by mouth Daily. (Patient taking differently: Take 8 tablets by mouth Daily.) 90 tablet 1   • metoprolol tartrate (LOPRESSOR) 50 MG tablet Take 1 tablet by mouth 2 (Two) Times a Day. 180 tablet 1   • oxyCODONE-acetaminophen (PERCOCET) 7.5-325 MG per tablet Take 1 tablet by mouth Every 4 (Four) Hours As Needed for Moderate Pain. 30 tablet 0   • pantoprazole (PROTONIX) 40 MG EC tablet Take 1 tablet by mouth Daily.     • propafenone (RYTHMOL) 150 MG tablet Take 1 tablet by mouth Every 8 (Eight) Hours.     • Red Yeast Rice Extract (RED YEAST RICE PO) Take 4 capsules by mouth Daily.     • rivaroxaban (XARELTO) 20 MG tablet Take 1 tablet by mouth Daily.     • Saw Palmetto, Serenoa repens, (SAW PALMETTO PO) Take 1 tablet by mouth Daily.     • sucralfate (CARAFATE) 1 g tablet Take 1 tablet by mouth 2 (Two) Times a Day.     • TURMERIC CURCUMIN PO Take 1,500 mg by mouth Daily.     • vitamin B-12 (CYANOCOBALAMIN) 2500 MCG sublingual tablet tablet Place 5,000 mcg under the tongue 1 (One) Time Per Week.     • vitamin E 100 UNIT capsule Take 1 capsule by mouth Daily.     • Insulin " Glargine (BASAGLAR KWIKPEN) 100 UNIT/ML injection pen Inject 15 Units under the skin into the appropriate area as directed At Night As Needed.     • oxyCODONE-acetaminophen (Percocet) 7.5-325 MG per tablet Take 1 tablet by mouth Every 6 (Six) Hours As Needed for Moderate Pain. 30 tablet 0       Review of Systems:  Review of Systems   Constitutional: Negative for fatigue.   HENT: Negative for congestion.    Respiratory: Negative for shortness of breath.    Cardiovascular: Negative for chest pain.   Gastrointestinal: Negative for abdominal pain.   Genitourinary: Negative for difficulty urinating.      Otherwise complete ROS is negative except as mentioned above.    Physical Exam:   Temp:  [98.4 °F (36.9 °C)] 98.4 °F (36.9 °C)  Heart Rate:  [106-121] 106  Resp:  [18] 18  BP: (107-112)/(57-65) 107/65  Physical Exam  Constitutional:       General: He is not in acute distress.     Appearance: He is well-developed. He is not diaphoretic.   HENT:      Head: Normocephalic and atraumatic.   Cardiovascular:      Rate and Rhythm: Normal rate.   Pulmonary:      Effort: Pulmonary effort is normal. No respiratory distress.      Breath sounds: No wheezing.   Abdominal:      General: There is no distension.      Palpations: Abdomen is soft.   Musculoskeletal:         General: Normal range of motion.   Skin:     General: Skin is warm and dry.   Neurological:      Mental Status: He is alert.      Cranial Nerves: No cranial nerve deficit.   Psychiatric:         Behavior: Behavior normal.         Thought Content: Thought content normal.         Judgment: Judgment normal.           Results Reviewed:  I have personally reviewed current lab, radiology, and data and agree with results.  Lab Results (last 24 hours)     Procedure Component Value Units Date/Time    [place] Draw [[id number]] Collected: 03/30/23 1411    Specimen: Blood Updated: 03/30/23 1515    Narrative:      The following orders were created for panel order [place]  Draw.  Procedure                               Abnormality         Status                     ---------                               -----------         ------                     Green Top (Gel)[[id number]]                                  Final result               Lavender Top[[id number]]                                     Final result               Gold Top - SST[[id number]]                                   Final result               Light Blue Top[[id number]]                                   Final result                 Please view results for these tests on the individual orders.    Light Blue Top [[id number]] Collected: 03/30/23 1411    Specimen: Blood Updated: 03/30/23 1515     Extra Tube Hold for add-ons.     Comment: Auto resulted       Green Top (Gel) [[id number]] Collected: 03/30/23 1411    Specimen: Blood Updated: 03/30/23 1515     Extra Tube Hold for add-ons.     Comment: Auto resulted.       Gold Top - SST [[id number]] Collected: 03/30/23 1411    Specimen: Blood Updated: 03/30/23 1515     Extra Tube Hold for add-ons.     Comment: Auto resulted.       Lavender Top [[id number]] Collected: 03/30/23 1411    Specimen: Blood Updated: 03/30/23 1515     Extra Tube hold for add-on     Comment: Auto resulted       Comprehensive Metabolic Panel [[id number]]  (Abnormal) Collected: 03/30/23 1411    Specimen: Blood Updated: 03/30/23 1450     Glucose 159 mg/dL      BUN 81 mg/dL      Creatinine 2.78 mg/dL      Sodium 129 mmol/L      Potassium 4.5 mmol/L      Chloride 93 mmol/L      CO2 20.0 mmol/L      Calcium 9.1 mg/dL      Total Protein 7.4 g/dL      Albumin 3.7 g/dL      ALT (SGPT) 13 U/L      AST (SGOT) 11 U/L      Alkaline Phosphatase 91 U/L      Total Bilirubin 0.7 mg/dL      Globulin 3.7 gm/dL      A/G Ratio 1.0 g/dL      BUN/Creatinine Ratio 29.1     Anion Gap 16.0 mmol/L      eGFR 22.6 mL/min/1.73     Narrative:      GFR Normal >60  Chronic Kidney Disease <60  Kidney Failure <15    The GFR formula is only valid  for adults with stable renal function between ages 18 and 70.    Urinalysis With Microscopic If Indicated (No Culture) - Urine, Clean Catch [[id number]]  (Abnormal) Collected: 03/30/23 1410    Specimen: Urine, Clean Catch Updated: 03/30/23 1447     Color, UA Dark Yellow     Appearance, UA Cloudy     pH, UA <=5.0     Specific Gravity, UA 1.022     Glucose, UA >=1000 mg/dL (3+)     Ketones, UA Negative     Bilirubin, UA Small (1+)     Blood, UA Negative     Protein, UA Trace     Leuk Esterase, UA Negative     Nitrite, UA Negative     Urobilinogen, UA 1.0 E.U./dL    Narrative:      Urine microscopic not indicated.    Lipase [[id number]]  (Normal) Collected: 03/30/23 1411    Specimen: Blood Updated: 03/30/23 1446     Lipase 49 U/L     CK [[id number]]  (Normal) Collected: 03/30/23 1411    Specimen: Blood Updated: 03/30/23 1446     Creatine Kinase 34 U/L     Magnesium [[id number]]  (Abnormal) Collected: 03/30/23 1411    Specimen: Blood Updated: 03/30/23 1446     Magnesium 2.6 mg/dL     Lactic Acid, Plasma [[id number]]  (Normal) Collected: 03/30/23 1411    Specimen: Blood Updated: 03/30/23 1442     Lactate 1.2 mmol/L     CBC & Differential [[id number]]  (Abnormal) Collected: 03/30/23 1411    Specimen: Blood Updated: 03/30/23 1430    Narrative:      The following orders were created for panel order CBC & Differential.  Procedure                               Abnormality         Status                     ---------                               -----------         ------                     CBC Auto Differential[[id number]]        Abnormal            Final result                 Please view results for these tests on the individual orders.    CBC Auto Differential [[id number]]  (Abnormal) Collected: 03/30/23 1411    Specimen: Blood Updated: 03/30/23 1430     WBC 16.97 10*3/mm3      RBC 4.49 10*6/mm3      Hemoglobin 12.5 g/dL      Hematocrit 37.7 %      MCV 84.0 fL      MCH 27.8 pg      MCHC 33.2 g/dL      RDW 13.8 %      RDW-SD 42.5  fl      MPV 8.8 fL      Platelets 391 10*3/mm3      Neutrophil % 85.0 %      Lymphocyte % 5.8 %      Monocyte % 7.8 %      Eosinophil % 0.3 %      Basophil % 0.2 %      Immature Grans % 0.9 %      Neutrophils, Absolute 14.42 10*3/mm3      Lymphocytes, Absolute 0.98 10*3/mm3      Monocytes, Absolute 1.33 10*3/mm3      Eosinophils, Absolute 0.05 10*3/mm3      Basophils, Absolute 0.03 10*3/mm3      Immature Grans, Absolute 0.16 10*3/mm3      nRBC 0.0 /100 WBC         Imaging Results (Last 24 Hours)     Procedure Component Value Units Date/Time    XR Abdomen 2+ VW with Chest 1 VW [[id number]] Collected: 03/30/23 1637     Updated: 03/30/23 1704    Narrative:      Acute Abdominal Series Withe Upright Chest.    History: Abdominal pain. Vomiting. Acute kidney failure.  Retention of urine. Nausea.    Upright frontal film of the chest and supine and upright films of  the abdomen were obtained.    Comparison: July 20, 2022    FINDINGS:    EKG leads.  The lungs are clear of an acute process.  Coronary artery stents.  The heart is not enlarged.  The pulmonary vasculature is not increased.  No pleural effusion.  No pneumothorax.  No acute osseous abnormality.  Degenerative changes are present in the thoracic spine.    No free air.  Some gaseous distention of the colon and some feces scattered  throughout the colon.  No mechanical bowel obstruction.  No organomegaly.  Atherosclerotic calcification splenic artery.  No acute osseous abnormality.  Minimal degenerative changes lumbar spine.  Cholecystectomy.  Surgical clips right lower quadrant.  Prior inguinal hernia repair.      Impression:      Conclusion:  The lungs are clear of an acute process.  Coronary artery stents.  No free air.  Some gaseous distention of the colon and some feces scattered  throughout the colon.  No mechanical bowel obstruction.  Cholecystectomy.  Surgical clips right lower quadrant.  Prior inguinal hernia repair.    [id number]    Electronically signed by:  Estuardo  Raheem TREJO  3/30/2023 5:02 PM CDT  Workstation: [id number]            Assessment:    Active Hospital Problems    Diagnosis    • **OPAL (acute kidney injury) (HCC)        OPAL-IV fluids will be started, likely due to retention, bladder scans will be done    Urinary retention-continue bladder scanning, will anchor Gray if necessary    Atrial fibrillation with RVR-continue with Cardizem drip    Hypertension-continue with home medication continue to monitor    Recent ankle surgery-continue with supportive care and pain management, he is supposed to be nonweightbearing.    Hyponatremia-continue monitoring sodium levels, we will continue to monitor    DVT prophylaxis-SCD    Medical Decision Making  Number and Complexity of problems: 3 major complex  Differential Diagnosis: Sepsis    Conditions and Status:        Condition is worsening.     MDM Data  External documents reviewed: Previous medical records  My EKG interpretation: Atrial fibrillation with RVR  My plain film interpretation: None  Tests considered but not ordered: None     Decision rules/scores evaluated (example [id number], [place], etc): None     Discussed with: ER physician     Treatment Plan  As above    Care Planning  Shared decision making: Patient and family  Code status and discussions: Full code    Disposition  Social Determinants of Health that impact treatment or disposition: None  I expect the patient to be discharged to home in 2-3 days.      I have utilized all available immediate resources to obtain, update, or review the patient's current medications (including all prescriptions, over-the-counter products, herbals, cannabis/cannabidiol products, and vitamin/mineral/dietary (nutritional) supplements).     I confirmed that the patient's Advance Care Plan is present, code status is documented, or surrogate decision maker is listed in the patient's medical record.       Pete Torres MD  03/30/23  17:38 CDT                  Electronically signed by  Pete Torres MD at 03/30/23 3831

## 2023-03-31 NOTE — PROGRESS NOTES
THC Physician - Brief Progress Note  PERMANENT  03/30/2023 19:42    [facility] - CCU/SD - 20 - [place], [place] ([facility])    TURNER BLANCO JR.    Date of Service 03/30/2023 19:42    HPI/Events of Note [facility] Provider Assessment Note      Mr. Quezada is a 77 year old male admitted to the ICU for of atrial fibrillation with rapid ventricular response.  Presented to the emergency department 3/30 for evaluation of urinary retention.    Pertinent medical history:  Atrial fibrillation, chronic kidney disease, coronary artery disease, diabetes, hyperlipidemia, hypertension    Most recent vital signs:  Afebrile   Heart rate 106   Respirations 18   Blood pressure 83/51   Pulse oximetry 97% on room air    Pertinent labs:   Mild hyperglycemia   Moderate hyponatremia 129   Elevated anion gap metabolic acidosis, a gap 16, bicarb 20   Acute kidney injury creatinine 2.7 from 1.04 5 days prior   Leukocytosis, neutrophil predominant with 0.9% bands  Urinalysis glucosuria, otherwise without ketonuria, hematuria, nitrites  Urine culture obtained and pending     Pertinent imaging:   Acute abdominal series without significant acute processes    Current interventions:   Empiric antibiotics  Diltiazem infusion  Continuation of home anti arrhythmics, propafenone  Continuation of home anticoagulation    Additional assessment/intervention:  Chart reviewed - discussed with bedside.  Stepdown patient.  Defer plan of care to bedside.    __X___   Video Assessment performed  __X___   Most recent labs reviewed  __X___   Vital Signs reviewed  __X___   Best Practices addressed:                 VTE prophylaxis: rivaroxaban                 SUP (when indicated):                 Current Glucose:                      Please notify bedside physician when present or [email or web address] if glc > 180 X 2                 Sepsis guidelines:                 Lung protective strategy                 Targeted  Temperature Management:    __X___     Spoke with bedside RN  _____     Orders written      Contact [facility] for any needs if bedside physician is not present.      Interventions Intermediate-Arrhythmia - evaluation and management        Electronically Signed by: Bayron Montemayor) on 03/30/2023 19:44

## 2023-03-31 NOTE — PLAN OF CARE
Goal Outcome Evaluation:            A&O x4. Posterior splint intact to RLE; skin warm and able to wiggle toes. Denies pain/numbness.  Spoke with Podiatry office this am for dressing change orders for RLE, advised that bandage is usually left on until next appointment (scheduled for the 4th).  was going to reach out to APRN to confirm. VSS. Urine output adequate. Awaiting transfer to floor.

## 2023-03-31 NOTE — PROGRESS NOTES
[facility] Medicine Services  INPATIENT PROGRESS NOTE    Length of Stay: 0  Date of Admission: 3/30/2023  Primary Care Physician: Andreas Martinez MD    Subjective   Chief Complaint: Generalized weakness  HPI:    Patient has some generalized weakness.  He was unable to urinate on his own so Gray catheter was anchored overnight.  Urine output has been adequate.  Heart rate is controlled now has not needed the Cardizem drip.    Review of Systems   Respiratory: Negative for shortness of breath.    Cardiovascular: Negative for chest pain.   Gastrointestinal: Negative for abdominal pain.          All pertinent negatives and positives are as above. All other systems have been reviewed and are negative unless otherwise stated.     Objective    Temp:  [97.5 °F (36.4 °C)-98.4 °F (36.9 °C)] 97.5 °F (36.4 °C)  Heart Rate:  [] 84  Resp:  [18] 18  BP: ()/(50-79) 130/62  Physical Exam  Vitals and nursing note reviewed.   Constitutional:       General: He is not in acute distress.     Appearance: He is well-developed. He is not diaphoretic.   HENT:      Head: Normocephalic and atraumatic.   Cardiovascular:      Rate and Rhythm: Normal rate.   Pulmonary:      Effort: Pulmonary effort is normal. No respiratory distress.      Breath sounds: No wheezing.   Abdominal:      General: There is no distension.      Palpations: Abdomen is soft.   Musculoskeletal:         General: Normal range of motion.   Skin:     General: Skin is warm and dry.   Neurological:      Mental Status: He is alert.      Cranial Nerves: No cranial nerve deficit.   Psychiatric:         Behavior: Behavior normal.         Thought Content: Thought content normal.         Judgment: Judgment normal.             Results Review:  I have reviewed the labs, radiology results, and diagnostic studies.    Laboratory Data:   Lab Results (last 24 hours)     Procedure Component Value Units Date/Time    POC Glucose Once  [[id number]]  (Abnormal) Collected: 03/31/23 1107    Specimen: Blood Updated: 03/31/23 1123     Glucose 153 mg/dL      Comment: : [id number] TOVAR REVAMeter ID: [id number]       Urine Culture - Urine, Urine, Clean Catch [[id number]]  (Normal) Collected: 03/30/23 1410    Specimen: Urine, Clean Catch Updated: 03/31/23 1000     Urine Culture No growth    Basic Metabolic Panel [[id number]]  (Abnormal) Collected: 03/31/23 0549    Specimen: Blood Updated: 03/31/23 0634     Glucose 118 mg/dL      BUN 55 mg/dL      Creatinine 1.63 mg/dL      Sodium 130 mmol/L      Potassium 4.3 mmol/L      Chloride 99 mmol/L      CO2 22.0 mmol/L      Calcium 8.3 mg/dL      BUN/Creatinine Ratio 33.7     Anion Gap 9.0 mmol/L      eGFR 42.9 mL/min/1.73     Narrative:      GFR Normal >60  Chronic Kidney Disease <60  Kidney Failure <15    The GFR formula is only valid for adults with stable renal function between ages 18 and 70.    Magnesium [[id number]]  (Abnormal) Collected: 03/31/23 0549    Specimen: Blood Updated: 03/31/23 0634     Magnesium 2.5 mg/dL     CBC & Differential [[id number]]  (Abnormal) Collected: 03/31/23 0549    Specimen: Blood Updated: 03/31/23 0614    Narrative:      The following orders were created for panel order CBC & Differential.  Procedure                               Abnormality         Status                     ---------                               -----------         ------                     CBC Auto Differential[[id number]]        Abnormal            Final result                 Please view results for these tests on the individual orders.    CBC Auto Differential [[id number]]  (Abnormal) Collected: 03/31/23 0549    Specimen: Blood Updated: 03/31/23 0614     WBC 11.06 10*3/mm3      RBC 4.02 10*6/mm3      Hemoglobin 11.3 g/dL      Hematocrit 34.2 %      MCV 85.1 fL      MCH 28.1 pg      MCHC 33.0 g/dL      RDW 13.9 %      RDW-SD 43.2 fl      MPV 9.0 fL      Platelets 307 10*3/mm3      Neutrophil % 79.8 %       Lymphocyte % 9.9 %      Monocyte % 8.0 %      Eosinophil % 1.2 %      Basophil % 0.3 %      Immature Grans % 0.8 %      Neutrophils, Absolute 8.83 10*3/mm3      Lymphocytes, Absolute 1.10 10*3/mm3      Monocytes, Absolute 0.88 10*3/mm3      Eosinophils, Absolute 0.13 10*3/mm3      Basophils, Absolute 0.03 10*3/mm3      Immature Grans, Absolute 0.09 10*3/mm3      nRBC 0.0 /100 WBC     CRE Screen by PCR - Swab, Large Intestine, Rectum [[id number]] Collected: 03/30/23 1922    Specimen: Swab from Large Intestine, Rectum Updated: 03/30/23 2246     CRE SCREEN Not Detected     Comment: Test performed by real-time polymerase chain reaction (qPCR).        OXA 48 Strain Not Detected     IMP STRAIN Not Detected     VIM STRAIN Not Detected     NDM Strain Not Detected     KPC Strain Not Detected    MICHAEL AURIS SCREEN - Swab, Axilla Right, Axilla Left and Groin [[id number]] Collected: 03/30/23 1922    Specimen: Swab from Axilla Right, Axilla Left and Groin Updated: 03/30/23 1922    [place] Draw [[id number]] Collected: 03/30/23 1411    Specimen: Blood Updated: 03/30/23 1515    Narrative:      The following orders were created for panel order [place] Draw.  Procedure                               Abnormality         Status                     ---------                               -----------         ------                     Green Top (Gel)[[id number]]                                  Final result               Lavender Top[[id number]]                                     Final result               Gold Top - SST[[id number]]                                   Final result               Light Blue Top[[id number]]                                   Final result                 Please view results for these tests on the individual orders.    Light Blue Top [[id number]] Collected: 03/30/23 1411    Specimen: Blood Updated: 03/30/23 1515     Extra Tube Hold for add-ons.     Comment: Auto resulted       Green Top (Gel) [[id number]] Collected:  03/30/23 1411    Specimen: Blood Updated: 03/30/23 1515     Extra Tube Hold for add-ons.     Comment: Auto resulted.       Gold Top - SST [[id number]] Collected: 03/30/23 1411    Specimen: Blood Updated: 03/30/23 1515     Extra Tube Hold for add-ons.     Comment: Auto resulted.       Lavender Top [[id number]] Collected: 03/30/23 1411    Specimen: Blood Updated: 03/30/23 1515     Extra Tube hold for add-on     Comment: Auto resulted       Comprehensive Metabolic Panel [[id number]]  (Abnormal) Collected: 03/30/23 1411    Specimen: Blood Updated: 03/30/23 1450     Glucose 159 mg/dL      BUN 81 mg/dL      Creatinine 2.78 mg/dL      Sodium 129 mmol/L      Potassium 4.5 mmol/L      Chloride 93 mmol/L      CO2 20.0 mmol/L      Calcium 9.1 mg/dL      Total Protein 7.4 g/dL      Albumin 3.7 g/dL      ALT (SGPT) 13 U/L      AST (SGOT) 11 U/L      Alkaline Phosphatase 91 U/L      Total Bilirubin 0.7 mg/dL      Globulin 3.7 gm/dL      A/G Ratio 1.0 g/dL      BUN/Creatinine Ratio 29.1     Anion Gap 16.0 mmol/L      eGFR 22.6 mL/min/1.73     Narrative:      GFR Normal >60  Chronic Kidney Disease <60  Kidney Failure <15    The GFR formula is only valid for adults with stable renal function between ages 18 and 70.    Urinalysis With Microscopic If Indicated (No Culture) - Urine, Clean Catch [[id number]]  (Abnormal) Collected: 03/30/23 1410    Specimen: Urine, Clean Catch Updated: 03/30/23 1447     Color, UA Dark Yellow     Appearance, UA Cloudy     pH, UA <=5.0     Specific Gravity, UA 1.022     Glucose, UA >=1000 mg/dL (3+)     Ketones, UA Negative     Bilirubin, UA Small (1+)     Blood, UA Negative     Protein, UA Trace     Leuk Esterase, UA Negative     Nitrite, UA Negative     Urobilinogen, UA 1.0 E.U./dL    Narrative:      Urine microscopic not indicated.    Lipase [[id number]]  (Normal) Collected: 03/30/23 1411    Specimen: Blood Updated: 03/30/23 1446     Lipase 49 U/L     CK [[id number]]  (Normal) Collected: 03/30/23 141     Specimen: Blood Updated: 03/30/23 1446     Creatine Kinase 34 U/L     Magnesium [[id number]]  (Abnormal) Collected: 03/30/23 1411    Specimen: Blood Updated: 03/30/23 1446     Magnesium 2.6 mg/dL     Lactic Acid, Plasma [[id number]]  (Normal) Collected: 03/30/23 1411    Specimen: Blood Updated: 03/30/23 1442     Lactate 1.2 mmol/L     CBC & Differential [[id number]]  (Abnormal) Collected: 03/30/23 1411    Specimen: Blood Updated: 03/30/23 1430    Narrative:      The following orders were created for panel order CBC & Differential.  Procedure                               Abnormality         Status                     ---------                               -----------         ------                     CBC Auto Differential[[id number]]        Abnormal            Final result                 Please view results for these tests on the individual orders.    CBC Auto Differential [[id number]]  (Abnormal) Collected: 03/30/23 1411    Specimen: Blood Updated: 03/30/23 1430     WBC 16.97 10*3/mm3      RBC 4.49 10*6/mm3      Hemoglobin 12.5 g/dL      Hematocrit 37.7 %      MCV 84.0 fL      MCH 27.8 pg      MCHC 33.2 g/dL      RDW 13.8 %      RDW-SD 42.5 fl      MPV 8.8 fL      Platelets 391 10*3/mm3      Neutrophil % 85.0 %      Lymphocyte % 5.8 %      Monocyte % 7.8 %      Eosinophil % 0.3 %      Basophil % 0.2 %      Immature Grans % 0.9 %      Neutrophils, Absolute 14.42 10*3/mm3      Lymphocytes, Absolute 0.98 10*3/mm3      Monocytes, Absolute 1.33 10*3/mm3      Eosinophils, Absolute 0.05 10*3/mm3      Basophils, Absolute 0.03 10*3/mm3      Immature Grans, Absolute 0.16 10*3/mm3      nRBC 0.0 /100 WBC           Culture Data:   No results found for: BLOODCX  Urine Culture   Date Value Ref Range Status   03/30/2023 No growth  Preliminary     No results found for: RESPCX  No results found for: WOUNDCX  No results found for: STOOLCX  No components found for: BODYFLD    Radiology Data:   Imaging Results (Last 24 Hours)      Procedure Component Value Units Date/Time    XR Abdomen 2+ VW with Chest 1 VW [[id number]] Collected: 03/30/23 1637     Updated: 03/30/23 1704    Narrative:      Acute Abdominal Series Withe Upright Chest.    History: Abdominal pain. Vomiting. Acute kidney failure.  Retention of urine. Nausea.    Upright frontal film of the chest and supine and upright films of  the abdomen were obtained.    Comparison: July 20, 2022    FINDINGS:    EKG leads.  The lungs are clear of an acute process.  Coronary artery stents.  The heart is not enlarged.  The pulmonary vasculature is not increased.  No pleural effusion.  No pneumothorax.  No acute osseous abnormality.  Degenerative changes are present in the thoracic spine.    No free air.  Some gaseous distention of the colon and some feces scattered  throughout the colon.  No mechanical bowel obstruction.  No organomegaly.  Atherosclerotic calcification splenic artery.  No acute osseous abnormality.  Minimal degenerative changes lumbar spine.  Cholecystectomy.  Surgical clips right lower quadrant.  Prior inguinal hernia repair.      Impression:      Conclusion:  The lungs are clear of an acute process.  Coronary artery stents.  No free air.  Some gaseous distention of the colon and some feces scattered  throughout the colon.  No mechanical bowel obstruction.  Cholecystectomy.  Surgical clips right lower quadrant.  Prior inguinal hernia repair.    [id number]    Electronically signed by:  Estuardo Maciel MD  3/30/2023 5:02 PM CDT  Workstation: [id number]          I have reviewed the patient's current medications.     Assessment/Plan     Active Hospital Problems    Diagnosis    • **OPAL (acute kidney injury) (HCC)        OPAL-IV fluids will be started, likely due to retention, bladder scans will be done  Gray catheter has been anchored, creatinine level improving slowly.     Urinary retention-continue bladder scanning, will anchor Gray if necessary  Tuttle Gray catheter now.  Will need urology  consult     Atrial fibrillation with RVR-continue with Cardizem drip  Cardizem drip has been turned off, will resume his p.o. medications.  Heart rate is under control     Hypertension-continue with home medication continue to monitor     Recent ankle surgery-continue with supportive care and pain management, he is supposed to be nonweightbearing.     Hyponatremia-continue monitoring sodium levels, we will continue to monitor     DVT prophylaxis-SCD     Medical Decision Making  Number and Complexity of problems: 3 major complex  Differential Diagnosis: Sepsis     Conditions and Status:        Condition is worsening.     [facility] Data  External documents reviewed: Previous medical records  My EKG interpretation: Atrial fibrillation with RVR  My plain film interpretation: None  Tests considered but not ordered: None     Decision rules/scores evaluated (example [id number], [place], etc): None     Discussed with: ER physician     Treatment Plan  As above     Care Planning  Shared decision making: Patient and family  Code status and discussions: Full code     Disposition  Social Determinants of Health that impact treatment or disposition: None  I expect the patient to be discharged to home in 2-3 days.       I have utilized all available immediate resources to obtain, update, or review the patient's current medications (including all prescriptions, over-the-counter products, herbals, cannabis/cannabidiol products, and vitamin/mineral/dietary (nutritional) supplements).      I confirmed that the patient's Advance Care Plan is present, code status is documented, or surrogate decision maker is listed in the patient's medical record.     Pete Torres MD   03/31/23   12:16 CDT

## 2023-03-31 NOTE — PLAN OF CARE
Goal Outcome Evaluation:  Plan of Care Reviewed With: patient           Outcome Evaluation: OT eval complete, supine in bed, alert x 4, agreeable for evaluation. 3/13/23 patient with bunion correction. Patient went home, had a fall with R fibular fx. s/p Open reduction internal fixation of right fibula fracture perform 3/23/23, has been NWB R LE since. Patient was at rehab to home program; at rehab was a 1 person assist for transfers. This session, supine<>sit SBA. Sit to stand and side stepping with CGA and RW, patient does maintain NWB RLE well. Patient deferred any transfers at this time, defers ADLs. Patient did reports some dizziness, /53 sitting EOB with dizziness; but resolved with prolonged sitting. Returned to bed all needs in reach. Patient with decreased balance, decreased safety in transfers with RW, decreased independence in ADLs, and decreased activity tolerance. Cont inpatient OT. Anticipate home with 24/7 assist and home OT. DME recs: tub transfer bench, Bedside commode

## 2023-03-31 NOTE — DISCHARGE PLACEMENT REQUEST
"[facility]  Lorin CRUZ RN   Case Management  [phone]    Turner Blanco Jr. (77 y.o. Male)     Date of Birth   1945    Social Security Number       Address   [address] [address]    Home Phone   [phone]    MRN   [id number]       Faith   None    Marital Status                               Admission Date   3/30/23    Admission Type   Emergency    Admitting Provider   Pete Torres MD    Attending Provider   Pete Torres MD    Department, Room/Bed   [facility] CRITICAL CARE STEPDOWN, 12/A       Discharge Date       Discharge Disposition       Discharge Destination                               Attending Provider: Pete Torres MD    Allergies: Brilinta [Ticagrelor], Effient [Prasugrel], Warfarin And Related, Ciprofloxacin, Lipitor [Atorvastatin], Latex, Adhesive Tape, Celexa [Citalopram Hydrobromide], Crestor [Rosuvastatin Calcium], Floxin [Ofloxacin], Januvia [Sitagliptin], Penicillins, Sulfa Antibiotics    Isolation: None   Infection: Candida Auris (rule out) (03/30/23)   Code Status: Prior    Ht: 175.3 cm (69\")   Wt: 80.8 kg (178 lb 1.6 oz)    Admission Cmt: None   Principal Problem: OPAL (acute kidney injury) (HCC) [N17.9]                 Active Insurance as of 3/30/2023     Primary Coverage     Payor Plan Insurance Group Employer/Plan Group    MEDICARE MEDICARE A & B      Payor Plan Address Payor Plan Phone Number Payor Plan Fax Number Effective Dates    [address] [phone]  10/1/2003 - None Entered    [place] [address]       Subscriber Name Subscriber Birth Date Member ID       TURNER BLANCO JR. 1945 [id number]                 Emergency Contacts      (Rel.) Home Phone Work Phone Mobile Phone    BELLACHRISSCHRISTOFER (Spouse) [phone] -- [phone]    Enrique Jorge (Relative) [phone] -- [phone]          [facility] " "CRITICAL CARE [address] [address]  Dept. Phone:  [phone]  Dept. Fax:  [phone] Date Ordered: Mar 31, 2023         Patient:  Turner Blanco Jr. MRN:  [id number]   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 Phone:  [phone]  Dept. Fax:  [phone] Date Ordered: Mar 31, 2023         Patient:  Turner Blanco Jr. 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

## 2023-04-01 LAB
ANION GAP SERPL CALCULATED.3IONS-SCNC: 11 MMOL/L (ref 5–15)
BACTERIA SPEC AEROBE CULT: NO GROWTH
BASOPHILS # BLD AUTO: 0.03 10*3/MM3 (ref 0–0.2)
BASOPHILS NFR BLD AUTO: 0.5 % (ref 0–1.5)
BUN SERPL-MCNC: 24 MG/DL (ref 8–23)
BUN/CREAT SERPL: 26.4 (ref 7–25)
CALCIUM SPEC-SCNC: 8.8 MG/DL (ref 8.6–10.5)
CHLORIDE SERPL-SCNC: 101 MMOL/L (ref 98–107)
CO2 SERPL-SCNC: 20 MMOL/L (ref 22–29)
CREAT SERPL-MCNC: 0.91 MG/DL (ref 0.76–1.27)
DEPRECATED RDW RBC AUTO: 41.3 FL (ref 37–54)
EGFRCR SERPLBLD CKD-EPI 2021: 86.3 ML/MIN/1.73
EOSINOPHIL # BLD AUTO: 0.13 10*3/MM3 (ref 0–0.4)
EOSINOPHIL NFR BLD AUTO: 2 % (ref 0.3–6.2)
ERYTHROCYTE [DISTWIDTH] IN BLOOD BY AUTOMATED COUNT: 13.6 % (ref 12.3–15.4)
GLUCOSE SERPL-MCNC: 149 MG/DL (ref 65–99)
HCT VFR BLD AUTO: 35.1 % (ref 37.5–51)
HGB BLD-MCNC: 11.7 G/DL (ref 13–17.7)
IMM GRANULOCYTES # BLD AUTO: 0.04 10*3/MM3 (ref 0–0.05)
IMM GRANULOCYTES NFR BLD AUTO: 0.6 % (ref 0–0.5)
LYMPHOCYTES # BLD AUTO: 1.14 10*3/MM3 (ref 0.7–3.1)
LYMPHOCYTES NFR BLD AUTO: 17.3 % (ref 19.6–45.3)
MAGNESIUM SERPL-MCNC: 2.2 MG/DL (ref 1.6–2.4)
MCH RBC QN AUTO: 27.9 PG (ref 26.6–33)
MCHC RBC AUTO-ENTMCNC: 33.3 G/DL (ref 31.5–35.7)
MCV RBC AUTO: 83.8 FL (ref 79–97)
MONOCYTES # BLD AUTO: 0.51 10*3/MM3 (ref 0.1–0.9)
MONOCYTES NFR BLD AUTO: 7.7 % (ref 5–12)
NEUTROPHILS NFR BLD AUTO: 4.75 10*3/MM3 (ref 1.7–7)
NEUTROPHILS NFR BLD AUTO: 71.9 % (ref 42.7–76)
NRBC BLD AUTO-RTO: 0 /100 WBC (ref 0–0.2)
PLATELET # BLD AUTO: 303 10*3/MM3 (ref 140–450)
PMV BLD AUTO: 8.8 FL (ref 6–12)
POTASSIUM SERPL-SCNC: 4.5 MMOL/L (ref 3.5–5.2)
PSA SERPL-MCNC: 1.46 NG/ML (ref 0–4)
RBC # BLD AUTO: 4.19 10*6/MM3 (ref 4.14–5.8)
SODIUM SERPL-SCNC: 132 MMOL/L (ref 136–145)
WBC NRBC COR # BLD: 6.6 10*3/MM3 (ref 3.4–10.8)

## 2023-04-01 PROCEDURE — 97161 PT EVAL LOW COMPLEX 20 MIN: CPT

## 2023-04-01 PROCEDURE — 80048 BASIC METABOLIC PNL TOTAL CA: CPT | Performed by: FAMILY MEDICINE

## 2023-04-01 PROCEDURE — 85025 COMPLETE CBC W/AUTO DIFF WBC: CPT | Performed by: FAMILY MEDICINE

## 2023-04-01 PROCEDURE — 25010000002 ONDANSETRON PER 1 MG: Performed by: INTERNAL MEDICINE

## 2023-04-01 PROCEDURE — G0378 HOSPITAL OBSERVATION PER HR: HCPCS

## 2023-04-01 PROCEDURE — 84153 ASSAY OF PSA TOTAL: CPT | Performed by: UROLOGY

## 2023-04-01 PROCEDURE — 93010 ELECTROCARDIOGRAM REPORT: CPT | Performed by: INTERNAL MEDICINE

## 2023-04-01 PROCEDURE — 25010000002 CEFTRIAXONE PER 250 MG: Performed by: FAMILY MEDICINE

## 2023-04-01 PROCEDURE — 83735 ASSAY OF MAGNESIUM: CPT | Performed by: FAMILY MEDICINE

## 2023-04-01 PROCEDURE — 93005 ELECTROCARDIOGRAM TRACING: CPT

## 2023-04-01 RX ORDER — TERAZOSIN 2 MG/1
2 CAPSULE ORAL NIGHTLY
Status: DISCONTINUED | OUTPATIENT
Start: 2023-04-01 | End: 2023-04-07 | Stop reason: HOSPADM

## 2023-04-01 RX ORDER — DOCUSATE SODIUM 100 MG/1
100 CAPSULE, LIQUID FILLED ORAL 2 TIMES DAILY
Status: DISCONTINUED | OUTPATIENT
Start: 2023-04-01 | End: 2023-04-07 | Stop reason: HOSPADM

## 2023-04-01 RX ORDER — ONDANSETRON 2 MG/ML
4 INJECTION INTRAMUSCULAR; INTRAVENOUS ONCE AS NEEDED
Status: COMPLETED | OUTPATIENT
Start: 2023-04-01 | End: 2023-04-01

## 2023-04-01 RX ORDER — METOPROLOL SUCCINATE 25 MG/1
25 TABLET, EXTENDED RELEASE ORAL
Status: DISCONTINUED | OUTPATIENT
Start: 2023-04-01 | End: 2023-04-07 | Stop reason: HOSPADM

## 2023-04-01 RX ADMIN — OXYCODONE HYDROCHLORIDE AND ACETAMINOPHEN 1 TABLET: 7.5; 325 TABLET ORAL at 11:37

## 2023-04-01 RX ADMIN — PROPAFENONE HYDROCHLORIDE 150 MG: 150 TABLET, FILM COATED ORAL at 18:22

## 2023-04-01 RX ADMIN — DOCUSATE SODIUM 100 MG: 100 CAPSULE, LIQUID FILLED ORAL at 20:01

## 2023-04-01 RX ADMIN — TERAZOSIN HYDROCHLORIDE 2 MG: 2 CAPSULE ORAL at 20:01

## 2023-04-01 RX ADMIN — METOPROLOL TARTRATE 5 MG: 5 INJECTION INTRAVENOUS at 09:56

## 2023-04-01 RX ADMIN — ISOSORBIDE MONONITRATE 30 MG: 30 TABLET, EXTENDED RELEASE ORAL at 08:25

## 2023-04-01 RX ADMIN — RIVAROXABAN 15 MG: 15 TABLET, FILM COATED ORAL at 08:25

## 2023-04-01 RX ADMIN — ONDANSETRON 4 MG: 2 INJECTION INTRAMUSCULAR; INTRAVENOUS at 20:44

## 2023-04-01 RX ADMIN — Medication 10 ML: at 20:01

## 2023-04-01 RX ADMIN — ALPRAZOLAM 0.5 MG: 0.5 TABLET ORAL at 23:45

## 2023-04-01 RX ADMIN — PROPAFENONE HYDROCHLORIDE 150 MG: 150 TABLET, FILM COATED ORAL at 03:03

## 2023-04-01 RX ADMIN — DOCUSATE SODIUM 100 MG: 100 CAPSULE, LIQUID FILLED ORAL at 12:17

## 2023-04-01 RX ADMIN — METOPROLOL SUCCINATE 25 MG: 25 TABLET, FILM COATED, EXTENDED RELEASE ORAL at 15:13

## 2023-04-01 RX ADMIN — PROPAFENONE HYDROCHLORIDE 150 MG: 150 TABLET, FILM COATED ORAL at 11:35

## 2023-04-01 RX ADMIN — AMLODIPINE BESYLATE 10 MG: 10 TABLET ORAL at 08:25

## 2023-04-01 RX ADMIN — SODIUM CHLORIDE 50 ML/HR: 9 INJECTION, SOLUTION INTRAVENOUS at 08:30

## 2023-04-01 RX ADMIN — CEFTRIAXONE SODIUM 1 G: 1 INJECTION, POWDER, FOR SOLUTION INTRAMUSCULAR; INTRAVENOUS at 20:00

## 2023-04-01 RX ADMIN — PANTOPRAZOLE SODIUM 40 MG: 40 TABLET, DELAYED RELEASE ORAL at 05:52

## 2023-04-01 NOTE — NURSING NOTE
Upon entering room, patient asked signee to check his ratliff bag. Ratliff bag was empty. He stated he felt abdominal pressure like he had to pee. Ratliff assessed and re-achored. 900ml clear yellow urine flow to ratliff bag. Abdominal pressure relieved.

## 2023-04-01 NOTE — PROGRESS NOTES
"   LOS: 0 days   Patient Care Team:  Andreas Martinez MD as PCP - General (Family Medicine)  Andreas Aldana PA-C as Physician Assistant (Gastroenterology)  Glen Jain MD as Consulting Physician (Endocrinology)  David Jeffers MD as Consulting Physician (Cardiology)    Subjective     Subjective:  Symptoms:  Stable.    Diet:  No nausea or vomiting.    Pain:  He reports no pain.        History taken from: patient chart    Objective     Vital Signs  Temp:  [97 °F (36.1 °C)-98.1 °F (36.7 °C)] 98.1 °F (36.7 °C)  Heart Rate:  [] 107  Resp:  [18] 18  BP: ()/(55-69) 99/58    Objective:  General Appearance:  In no acute distress.    Vital signs: (most recent): Blood pressure 99/58, pulse 107, temperature 98.1 °F (36.7 °C), temperature source Temporal, resp. rate 18, height 175.3 cm (69.02\"), weight 79.2 kg (174 lb 9.6 oz), SpO2 92 %.  Vital signs are normal.  No fever.    Output: Producing urine and producing stool.    HEENT: Normal HEENT exam.    Lungs:  Normal effort and normal respiratory rate.    Heart: Normal rate.    Chest: Symmetric chest wall expansion.   Abdomen: Abdomen is soft and non-distended.  Bowel sounds are normal.   There is no abdominal tenderness.     Neurological: Patient is alert.    Skin:  Warm and dry.  No rash.             Results Review:    Lab Results (last 24 hours)     Procedure Component Value Units Date/Time    Basic Metabolic Panel [[id number]]  (Abnormal) Collected: 04/01/23 0509    Specimen: Blood Updated: 04/01/23 0624     Glucose 149 mg/dL      BUN 24 mg/dL      Creatinine 0.91 mg/dL      Sodium 132 mmol/L      Potassium 4.5 mmol/L      Chloride 101 mmol/L      CO2 20.0 mmol/L      Calcium 8.8 mg/dL      BUN/Creatinine Ratio 26.4     Anion Gap 11.0 mmol/L      eGFR 86.3 mL/min/1.73     Narrative:      GFR Normal >60  Chronic Kidney Disease <60  Kidney Failure <15    The GFR formula is only valid for adults with stable renal function between ages 18 and 70.    " Magnesium [[id number]]  (Normal) Collected: 04/01/23 0509    Specimen: Blood Updated: 04/01/23 0622     Magnesium 2.2 mg/dL     CBC & Differential [[id number]]  (Abnormal) Collected: 04/01/23 0509    Specimen: Blood Updated: 04/01/23 0605    Narrative:      The following orders were created for panel order CBC & Differential.  Procedure                               Abnormality         Status                     ---------                               -----------         ------                     CBC Auto Differential[[id number]]        Abnormal            Final result                 Please view results for these tests on the individual orders.    CBC Auto Differential [[id number]]  (Abnormal) Collected: 04/01/23 0509    Specimen: Blood Updated: 04/01/23 0605     WBC 6.60 10*3/mm3      RBC 4.19 10*6/mm3      Hemoglobin 11.7 g/dL      Hematocrit 35.1 %      MCV 83.8 fL      MCH 27.9 pg      MCHC 33.3 g/dL      RDW 13.6 %      RDW-SD 41.3 fl      MPV 8.8 fL      Platelets 303 10*3/mm3      Neutrophil % 71.9 %      Lymphocyte % 17.3 %      Monocyte % 7.7 %      Eosinophil % 2.0 %      Basophil % 0.5 %      Immature Grans % 0.6 %      Neutrophils, Absolute 4.75 10*3/mm3      Lymphocytes, Absolute 1.14 10*3/mm3      Monocytes, Absolute 0.51 10*3/mm3      Eosinophils, Absolute 0.13 10*3/mm3      Basophils, Absolute 0.03 10*3/mm3      Immature Grans, Absolute 0.04 10*3/mm3      nRBC 0.0 /100 WBC     CANDIDA AURIS SCREEN - Swab, Axilla Right, Axilla Left and Groin [[id number]]  (Normal) Collected: 03/30/23 1922    Specimen: Swab from Axilla Right, Axilla Left and Groin Updated: 03/31/23 1930     Jamilah Auris Screen Culture No Candida auris isolated at 24 hours         Imaging Results (Last 24 Hours)     ** No results found for the last 24 hours. **           I reviewed the patient's new clinical results.  I reviewed the patient's new imaging results and agree with the interpretation.  I reviewed the patient's other test  results and agree with the interpretation      Assessment & Plan       OPAL (acute kidney injury)      Assessment & Plan    Consulted to Urology for retention of urine, Gray anchored, no hematuria, afebrile. UA on 3/30/23 showed glucose. Last PSA from 2014 was 0.87.   Estimated Creatinine Clearance: 74.9 mL/min (by C-G formula based on SCr of 0.91 mg/dL). No constipation.     Plan:  Check PSA  Keep Gray  Add Hytrin  Can attempt voiding trial in 2-3 days    JORDEN Cardenas  04/01/23  11:46 CDT

## 2023-04-01 NOTE — SIGNIFICANT NOTE
04/01/23 1508   OTHER   Discipline occupational therapist   Rehab Time/Intention   Session Not Performed patient/family declined treatment     Attempted OT tx, patient deferring at this time as he just finished working with PT. Will f/u as able.

## 2023-04-01 NOTE — THERAPY EVALUATION
Patient Name: Aj Card Jr.  : 1945    MRN: [id number]                              Today's Date: 2023       Admit Date: 3/30/2023    Visit Dx:     ICD-10-CM ICD-9-CM   1. OPLA (acute kidney injury)  N17.9 584.9   2. Urinary retention  R33.9 788.20   3. Atrial fibrillation with RVR  I48.91 427.31   4. Dehydration  E86.0 276.51   5. Nausea and vomiting, unspecified vomiting type  R11.2 787.01   6. Osteoarthritis of ankle and foot, right  M19.071 715.97   7. Impaired functional mobility, balance, gait, and endurance  Z74.09 V49.89     Patient Active Problem List   Diagnosis   • Type 2 diabetes mellitus without complication, without long-term current use of insulin ([facility])   • Essential hypertension   • Mixed hyperlipidemia   • Generalized anxiety disorder   • History of colon polyps   • Diverticulosis of large intestine without hemorrhage   • Coronary artery disease with angina pectoris   • Gastroesophageal reflux disease with esophagitis   • Vitamin D deficiency   • Overweight   • Encounter for screening for endocrine disorder   • Atopic dermatitis   • High serum vitamin B12   • Acute pain of right knee   • Tear of medial meniscus of right knee, current   • GERD (gastroesophageal reflux disease)   • PAF (paroxysmal atrial fibrillation) ([facility])   • Uncontrolled type 2 diabetes mellitus with hyperglycemia   • Gastroesophageal reflux disease   • Chronic constipation   • Stage 2 chronic kidney disease   • Osteoarthritis of ankle and foot, right   • Closed trimalleolar fracture of right ankle   • Status post bunionectomy   • OPAL (acute kidney injury)     Past Medical History:   Diagnosis Date   • Acute posthemorrhagic anemia    • Afib    • Allergic rhinitis    • Anxiety state    • Chest pain    • CKD (chronic kidney disease), stage II    • Coronary arteriosclerosis     3 stents, followed by Dr. Jeffers   • Diabetes mellitus     type 2   • Diverticular disease of colon    • Dysphagia    • Elevated  cholesterol    • Epigastric pain    • GERD (gastroesophageal reflux disease)     esophagitis on Dexilant. Pt feels Nexium working better      • History of echocardiogram     Small left atrial enlargement with mild CLVH.Ef of 55-60%.Mitral valve mildly thickened.Av thickened.Left ventricle mildly dilated.Tricuspid intact.Mild aortic insufficiency. 12/07/2015       • History of echocardiogram     Mild diastolic dysfunction and mild left atrial enlargement, otherwise normal echo. EF> 55% 01/03/2012       • Hypercholesterolemia    • Hypertension    • Insomnia    • Irritable bowel syndrome    • Osteoarthritis of multiple joints    • Pain of right foot    • PONV (postoperative nausea and vomiting)      Past Surgical History:   Procedure Laterality Date   • APPENDECTOMY       [facility] 1995       • CARDIAC CATHETERIZATION      Successful PTCA of the OMB#2.Unsuccessful PTCA of the 1st OMB, secondary to difficulty in advancing the balloon. 12/08/2015       • CHOLECYSTECTOMY      [facility] [place] 1993       • CHOLECYSTECTOMY     • COLONOSCOPY  10/01/2012   • COLONOSCOPY N/A 12/11/2017    Procedure: COLONOSCOPY;  Surgeon: Bladimir Michael MD;  Location: [facility] ENDOSCOPY;  Service:    • COLONOSCOPY N/A 11/01/2022    Procedure: COLONOSCOPY;  Surgeon: Bladimir Michael MD;  Location: [facility] ENDOSCOPY;  Service: Gastroenterology;  Laterality: N/A;   • CORONARY ANGIOPLASTY      Successful PTCA & stenting LAD was done w/ deployment of a 2.5mm x23 Xience stent w/ reduction of stenosis from 90% to less than 0% stenosis with good LILLIAM 3 flow 02/17/2012       • ENDOSCOPY      with biopsy.  Mildly severe esophagitis. Gastritis. Normal examined duodenum.Several biopsies obtained in the lower third of the esophagus.Several biopsies obtained in the gastric antrum. 05/06/2016       • ENDOSCOPY      w tube. Normal esophagus. Gastritis in stomach. Biopsy taken. Gastric polyp found. Biopsy taken. Normal duodenum. 10/01/2012        • ENDOSCOPY AND COLONOSCOPY      Diverticulum in sigmoid colon & descending colon. Internal & external hemorrhoids found. 10/01/2012       • EYE SURGERY Bilateral     catarract   • HAND SURGERY Left      Corrective osteotomy of ring finger metacarpal. Malunited fracture of left ring finger 05/21/1985       • HERNIA REPAIR      Laparoscopic hernia repair. prepertitoneal bilateral inguinal hernia repair with mesh. 10/15/2009      • OTHER SURGICAL HISTORY      CORONARY ARTERY STENT    • SKIN TAG REMOVAL      Excision, viral skin tag,right upper eyelid 05/08/1995       General Information     Row Name 04/01/23 1236          Physical Therapy Time and Intention    Document Type evaluation  -ND     Mode of Treatment physical therapy  -ND     Row Name 04/01/23 1236          General Information    Patient Profile Reviewed yes  -ND     Prior Level of Function gait;dependent:;mod assist:;transfer  While at rehab facility  -ND     Existing Precautions/Restrictions non-weight bearing;right   -ND     Barriers to Rehab previous functional deficit  -ND     Row Name 04/01/23 1236          Living Environment    People in Home facility resident  [facility] for rehab to home program  -ND     Row Name 04/01/23 1236          Home Main Entrance    Number of Stairs, Main Entrance none  -ND     Row Name 04/01/23 1236          Stairs Within Home, Primary    Stairs, Within Home, Primary Pt reports that while at the rehab facility he was a one person transfer with RW. He is currently NWB on his RLE. Plans to d/c to home where he lives with his spouse. He has a SPC and states that they are sending him a RW and WC. He has a tub shower with a shower chair.  -ND     Number of Stairs, Within Home, Primary none  -ND     Row Name 04/01/23 1236          Cognition    Orientation Status (Cognition) oriented x 4  -ND     Row Name 04/01/23 1236          Safety Issues, Functional Mobility    Safety Issues Affecting Function  (Mobility) positioning of assistive device  -ND     Impairments Affecting Function (Mobility) balance;endurance/activity tolerance;pain;strength  -ND           User Key  (r) = Recorded By, (t) = Taken By, (c) = Cosigned By    Initials Name Provider Type    Karthik Erwin, PT Physical Therapist               Mobility     Row Name 04/01/23 1236          Bed Mobility    Bed Mobility supine-sit;sit-supine  -ND     Supine-Sit Maury (Bed Mobility) standby assist  -ND     Sit-Supine [place] (Bed Mobility) standby assist  -ND     Assistive Device (Bed Mobility) bed rails;head of bed elevated  -ND     Row Name 04/01/23 1236          Sit-Stand Transfer    Sit-Stand Maury (Transfers) contact guard  -ND     Assistive Device (Sit-Stand Transfers) walker, front-wheeled  -ND     Row Name 04/01/23 1236          Gait/Stairs (Locomotion)    Maury Level (Gait) contact guard  -ND     Assistive Device (Gait) walker, front-wheeled  -ND     Distance in Feet (Gait) Pt able to side step two steps in each direction with FWW while maintaining WB status.  -ND     Row Name 04/01/23 1236          Mobility    Extremity Weight-bearing Status right lower extremity  -ND     Right Lower Extremity (Weight-bearing Status) non weight-bearing (NWB)   -ND           User Key  (r) = Recorded By, (t) = Taken By, (c) = Cosigned By    Initials Name Provider Type    Karthik Erwin, PT Physical Therapist               Obj/Interventions     Row Name 04/01/23 1236          Range of Motion Comprehensive    General Range of Motion bilateral lower extremity ROM WFL  -ND     Row Name 04/01/23 1236          Strength Comprehensive (MMT)    Comment, General Manual Muscle Testing (MMT) Assessment LLE MMT grossly 4/5. Right hip flexion 4/5. Right knee flexion/extension grossly 4/5 (functional assessment).  -ND     Row Name 04/01/23 1236          Sensory Assessment (Somatosensory)    Sensory Assessment (Somatosensory) LE sensation intact  -ND            User Key  (r) = Recorded By, (t) = Taken By, (c) = Cosigned By    Initials Name Provider Type    Karthik Erwin PT Physical Therapist               Goals/Plan     Row Name 04/01/23 1236          Transfer Goal 1 (PT)    Activity/Assistive Device (Transfer Goal 1, PT) sit-to-stand/stand-to-sit;walker, rolling  -ND     [place] Level/Cues Needed (Transfer Goal 1, PT) standby assist  -ND     Time Frame (Transfer Goal 1, PT) long term goal (LTG);by discharge  -ND     Strategies/Barriers (Transfers Goal 1, PT) Maintain weightbearing status  -ND     Progress/Outcome (Transfer Goal 1, PT) goal not met  -ND     Row Name 04/01/23 1236          Gait Training Goal 1 (PT)    Activity/Assistive Device (Gait Training Goal 1, PT) gait (walking locomotion);assistive device use;maintain weight-bearing status  -ND     [place] Level (Gait Training Goal 1, PT) contact guard required  -ND     Distance (Gait Training Goal 1, PT) 10' to and from bathroom  -ND     Time Frame (Gait Training Goal 1, PT) long term goal (LTG);by discharge  -ND     Progress/Outcome (Gait Training Goal 1, PT) goal not met  -ND           User Key  (r) = Recorded By, (t) = Taken By, (c) = Cosigned By    Initials Name Provider Type    Karthik Erwin PT Physical Therapist               Clinical Impression     Row Name 04/01/23 1236          Pain    Pretreatment Pain Rating 0/10 - no pain  -ND     Posttreatment Pain Rating 0/10 - no pain  -ND     Row Name 04/01/23 1236          Plan of Care Review    Plan of Care Reviewed With patient  -ND     Progress no change  -ND     Outcome Evaluation PT eval completed. Pt AO x4 and agreeable to treatment. Pt supine in bed upon arrival. 3/13/23 patient with bunion correction. Patient went home, had a fall with R fibular fx. s/p Open reduction internal fixation of right fibula fracture perform 3/23/23, has been NWB R LE since. Patient was at rehab to home program; at rehab was a 1 person assist for transfers.  This session pt was SBA with supine<>sit and scooting up in bed using LLE to push through. Pt is CGA with sit to stand and side stepping with RW. Patient does maintain weight bearing status well. Pt reported some dizziness upon sitting back down. BP had elevated to 139/84 and HR has elevated to 124 bpm. Vitals recovered with lying supine in bed for 5 minutes. Patient with decreased balance, decreased safety in transfers with RW, decreased independence in ADLs, and decreased activity tolerance. Continue with PT while in hospital setting. Pt would benefit from discharge to home with 24/7 assist and home health PT. Pt states that he is having a FWW delivered to his home.  -ND     Row Name 04/01/23 1236          Therapy Assessment/Plan (PT)    Patient/Family Therapy Goals Statement (PT) Return home with assist from familty and home health PT.  -ND     Rehab Potential (PT) good, to achieve stated therapy goals  -ND     Criteria for Skilled Interventions Met (PT) yes  -ND     Therapy Frequency (PT) other (see comments)  5-7 times/wk  -ND     Predicted Duration of Therapy Intervention (PT) Until goals are met or patient discharged.  -ND     Row Name 04/01/23 1236          Vital Signs    Pre Systolic BP Rehab 96  -ND     Pre Treatment Diastolic BP 53  -ND     Post Systolic BP Rehab 139  -ND     Post Treatment Diastolic BP 84  -ND     Pretreatment Heart Rate (beats/min) 102  -ND     Intratreatment Heart Rate (beats/min) 107  -ND     Posttreatment Heart Rate (beats/min) 124  -ND     Pre SpO2 (%) 94  -ND     O2 Delivery Pre Treatment room air  -ND     Intra SpO2 (%) 98  -ND     O2 Delivery Intra Treatment room air  -ND     Post SpO2 (%) 95  -ND     O2 Delivery Post Treatment room air  -ND     Pre Patient Position Supine  -ND     Intra Patient Position Sitting  -ND     Post Patient Position Supine  -ND     Recovery Time BP recovered to 122/68 and HR decreased to 113 bpm with resting supine in bed for 5 minutes.  -ND     Row  Name 04/01/23 1236          Positioning and Restraints    Pre-Treatment Position in bed  -ND     Post Treatment Position bed  -ND     In Bed notified nsg;supine;fowlers;call light within reach;encouraged to call for assist;exit alarm on;side rails up x3  -ND           User Key  (r) = Recorded By, (t) = Taken By, (c) = Cosigned By    Initials Name Provider Type    Karthik Erwin, SURI Physical Therapist               Outcome Measures     Row Name 04/01/23 1236 04/01/23 0800       How much help from another person do you currently need...    Turning from your back to your side while in flat bed without using bedrails? 3  -ND 3  -CC    Moving from lying on back to sitting on the side of a flat bed without bedrails? 3  -ND 3  -CC    Moving to and from a bed to a chair (including a wheelchair)? 1  -ND 1  -CC    Standing up from a chair using your arms (e.g., wheelchair, bedside chair)? 2  -ND 1  -CC    Climbing 3-5 steps with a railing? 1  -ND 1  -CC    To walk in hospital room? 1  -ND 1  -CC    AM-PAC 6 Clicks Score (PT) 11  -ND 10  -CC    Highest level of mobility 4 --> Transferred to chair/commode  -ND 4 --> Transferred to chair/commode  -CC    Row Name 04/01/23 1236          Functional Assessment    Outcome Measure Options AM-PAC 6 Clicks Basic Mobility (PT)  -ND           User Key  (r) = Recorded By, (t) = Taken By, (c) = Cosigned By    Initials Name Provider Type    CC Beverly Card RN Registered Nurse    Karthik Erwin, SURI Physical Therapist                             Physical Therapy Education     Title: PT OT SLP Therapies (In Progress)     Topic: Physical Therapy (Done)     Point: Mobility training (Done)     Learning Progress Summary           Patient Acceptance, E,D, VU by ND at 4/1/2023 1332    Comment: Weight bearing status, mobility, POC                   Point: Home exercise program (Done)     Learning Progress Summary           Patient Acceptance, E,D, VU by ND at 4/1/2023 1332    Comment: Weight  bearing status, mobility, POC                   Point: Body mechanics (Done)     Learning Progress Summary           Patient Acceptance, E,D, VU by ND at 4/1/2023 1332    Comment: Weight bearing status, mobility, POC                   Point: Precautions (Done)     Learning Progress Summary           Patient Acceptance, E,D, VU by ND at 4/1/2023 1332    Comment: Weight bearing status, mobility, POC                               User Key     Initials Effective Dates Name Provider Type Discipline    ND 01/30/23 -  Karthik Ralph, PT Physical Therapist PT              PT Recommendation and Plan     Plan of Care Reviewed With: patient  Progress: no change  Outcome Evaluation: PT eval completed. Pt AO x4 and agreeable to treatment. Pt supine in bed upon arrival. 3/13/23 patient with bunion correction. Patient went home, had a fall with R fibular fx. s/p Open reduction internal fixation of right fibula fracture perform 3/23/23, has been NWB R LE since. Patient was at rehab to home program; at rehab was a 1 person assist for transfers. This session pt was SBA with supine<>sit and scooting up in bed using LLE to push through. Pt is CGA with sit to stand and side stepping with RW. Patient does maintain weight bearing status well. Pt reported some dizziness upon sitting back down. BP had elevated to 139/84 and HR has elevated to 124 bpm. Vitals recovered with lying supine in bed for 5 minutes. Patient with decreased balance, decreased safety in transfers with RW, decreased independence in ADLs, and decreased activity tolerance. Continue with PT while in hospital setting. Pt would benefit from discharge to home with 24/7 assist and home health PT. Pt states that he is having a FWW delivered to his home.     Time Calculation:    PT Charges     Row Name 04/01/23 1236             Time Calculation    Start Time 1236  -ND      Stop Time 1325  -ND      Time Calculation (min) 49 min  -ND      PT Received On 04/01/23  -ND      PT Goal  Re-Cert Due Date 04/10/23  -ND         Untimed Charges    PT Eval/Re-eval Minutes 49  -ND         Total Minutes    Untimed Charges Total Minutes 49  -ND       Total Minutes 49  -ND            User Key  (r) = Recorded By, (t) = Taken By, (c) = Cosigned By    Initials Name Provider Type    ND Karthik Ralph, PT Physical Therapist              Therapy Charges for Today     Code Description Service Date Service Provider Modifiers Qty    26774084604 HC PT EVAL LOW COMPLEXITY 3 4/1/2023 Karthik Ralph, PT GP 1          PT G-Codes  Outcome Measure Options: AM-PAC 6 Clicks Basic Mobility (PT)  AM-PAC 6 Clicks Score (PT): 11  AM-PAC 6 Clicks Score (OT): 18  PT Discharge Summary  Anticipated Discharge Disposition (PT): home with 24/7 care, home with home health    Karthik Ralph, PT  4/1/2023

## 2023-04-01 NOTE — CONSULTS
[facility]   Consult Note    Patient Name: Aj Card Jr.  : 1945  MRN: [id number]  Primary Care Physician:  Andreas Martinez MD  Referring Physician: Dat Stewart MD  Date of admission: 3/30/2023    Consults  Subjective   Subjective     Reason for Consult/ Chief Complaint: Urinary retention    History of Present Illness  Aj Card Jr. is a 77 y.o. male known history of BPH urinary retention admitted to the hospital we have been asked to manage retention and Gray catheter    Review of Systems no recent chest pain history of palpitation    Personal History     Past Medical History:   Diagnosis Date    Acute posthemorrhagic anemia     Afib (HCC)     Allergic rhinitis     Anxiety state     Chest pain     CKD (chronic kidney disease), stage II     Coronary arteriosclerosis     3 stents, followed by Dr. Jeffers    Diabetes mellitus ([facility])     type 2    Diverticular disease of colon     Dysphagia     Elevated cholesterol     Epigastric pain     GERD (gastroesophageal reflux disease)     esophagitis on Dexilant. Pt feels Nexium working better       History of echocardiogram     Small left atrial enlargement with mild CLVH.Ef of 55-60%.Mitral valve mildly thickened.Av thickened.Left ventricle mildly dilated.Tricuspid intact.Mild aortic insufficiency. 2015        History of echocardiogram     Mild diastolic dysfunction and mild left atrial enlargement, otherwise normal echo. EF> 55% 2012        Hypercholesterolemia     Hypertension     Insomnia     Irritable bowel syndrome     Osteoarthritis of multiple joints     Pain of right foot     PONV (postoperative nausea and vomiting)        Past Surgical History:   Procedure Laterality Date    APPENDECTOMY       [facility]         CARDIAC CATHETERIZATION      Successful PTCA of the OMB#2.Unsuccessful PTCA of the 1st OMB, secondary to difficulty in advancing the balloon. 2015        CHOLECYSTECTOMY       [facility] 1993        CHOLECYSTECTOMY      COLONOSCOPY  10/01/2012    COLONOSCOPY N/A 12/11/2017    Procedure: COLONOSCOPY;  Surgeon: Bladimir Michael MD;  Location: [facility] ENDOSCOPY;  Service:     COLONOSCOPY N/A 11/01/2022    Procedure: COLONOSCOPY;  Surgeon: Bladimir Michael MD;  Location: [facility] ENDOSCOPY;  Service: Gastroenterology;  Laterality: N/A;    CORONARY ANGIOPLASTY      Successful PTCA & stenting LAD was done w/ deployment of a 2.5mm x23 Xience stent w/ reduction of stenosis from 90% to less than 0% stenosis with good LILLIAM 3 flow 02/17/2012        ENDOSCOPY      with biopsy.  Mildly severe esophagitis. Gastritis. Normal examined duodenum.Several biopsies obtained in the lower third of the esophagus.Several biopsies obtained in the gastric antrum. 05/06/2016        ENDOSCOPY      w tube. Normal esophagus. Gastritis in stomach. Biopsy taken. Gastric polyp found. Biopsy taken. Normal duodenum. 10/01/2012        ENDOSCOPY AND COLONOSCOPY      Diverticulum in sigmoid colon & descending colon. Internal & external hemorrhoids found. 10/01/2012        EYE SURGERY Bilateral     catarract    HAND SURGERY Left      Corrective osteotomy of ring finger metacarpal. Malunited fracture of left ring finger 05/21/1985        HERNIA REPAIR      Laparoscopic hernia repair. prepertitoneal bilateral inguinal hernia repair with mesh. 10/15/2009       OTHER SURGICAL HISTORY      CORONARY ARTERY STENT     SKIN TAG REMOVAL      Excision, viral skin tag,right upper eyelid 05/08/1995        Family History: family history includes Arthritis in his father and mother; Clotting disorder in an other family member; Diabetes in his mother; Heart disease in his mother; Hypertension in his mother; Lung disease in an other family member; Obesity in his mother and sister; Schizophrenia in his sister; Stroke in his mother; Thyroid disease in his mother; Tuberculosis in his father, mother, and sister. Otherwise pertinent FHx was  reviewed and not pertinent to current issue.    Social History:  reports that he has never smoked. He has never been exposed to tobacco smoke. He has never used smokeless tobacco. He reports that he does not drink alcohol and does not use drugs.    Home Medications:   ALPRAZolam, Aspirin, Azelastine HCl, BASAGLAR KWIKPEN, Blood Glucose Test, Dulaglutide, HYDROcodone-acetaminophen, Insulin Pen Needle, Insulin Syringe, Krill Oil, Lancets 30G, Lancing Device, Red Yeast Rice Extract, Saw Palmetto (Serenoa repens), Turmeric, amLODIPine, coenzyme Q10, dapagliflozin, esomeprazole, isosorbide mononitrate, lisinopril, metoprolol tartrate, oxyCODONE-acetaminophen, pantoprazole, propafenone, rivaroxaban, sucralfate, vitamin B-12, and vitamin E    Allergies:  Allergies   Allergen Reactions    Brilinta [Ticagrelor] Shortness Of Breath    Effient [Prasugrel] Shortness Of Breath    Warfarin And Related Shortness Of Breath    Ciprofloxacin Hives    Lipitor [Atorvastatin] Swelling    Latex Itching    Adhesive Tape Rash    Celexa [Citalopram Hydrobromide] Rash    Crestor [Rosuvastatin Calcium] Rash    Floxin [Ofloxacin] Rash    Januvia [Sitagliptin] Rash    Penicillins Rash    Sulfa Antibiotics Rash       Objective    Objective     Vitals:  Temp:  [97 °F (36.1 °C)-97.8 °F (36.6 °C)] 97 °F (36.1 °C)  Heart Rate:  [] 80  Resp:  [18] 18  BP: ()/(50-79) 137/63    Physical Exam normocephalic neck supple lungs clear distant heart sounds abdomen benign    Result Review    Result Review:  I have personally reviewed the results from the time of this admission to 3/31/2023 20:58 CDT and agree with these findings:  []  Laboratory list / accordion  []  Microbiology  []  Radiology  []  EKG/Telemetry   []  Cardiology/Vascular   []  Pathology  []  Old records  []  Other:  Most notable findings include: Urinary retention with Gray      Assessment & Plan   Assessment / Plan     Brief Patient Summary:  Aj Hankins Kyaw Cole is a 77  y.o. male who BPH urinary retention    Active Hospital Problems:  Active Hospital Problems    Diagnosis     **OPAL (acute kidney injury) (HCC)      Plan: Continue Gray urine CNS and plan cystoscopy for voiding trial      Vinicio Dan MD

## 2023-04-01 NOTE — PLAN OF CARE
Goal Outcome Evaluation:  Plan of Care Reviewed With: caregiver, patient        Progress: no change  Outcome Evaluation: Nutrition Assessment:  Pt reports a decreased appetite over the past month--below his norm.  He is also having intermittent nausa which is effecting his appetite.  Wt loss of ~10#. Will monitor POC and add supplements in an effort to optimize po/protein itnake.

## 2023-04-01 NOTE — PLAN OF CARE
Goal Outcome Evaluation:  Plan of Care Reviewed With: patient        Progress: no change  Outcome Evaluation: PT eval completed. Pt AO x4 and agreeable to treatment. Pt supine in bed upon arrival. 3/13/23 patient with bunion correction. Patient went home, had a fall with R fibular fx. s/p Open reduction internal fixation of right fibula fracture perform 3/23/23, has been NWB R LE since. Patient was at rehab to home program; at rehab was a 1 person assist for transfers. This session pt was SBA with supine<>sit and scooting up in bed using LLE to push through. Pt is CGA with sit to stand and side stepping with RW. Patient does maintain weight bearing status well. Pt reported some dizziness upon sitting back down. BP had elevated to 139/84 and HR has elevated to 124 bpm. Vitals recovered with lying supine in bed for 5 minutes. Patient with decreased balance, decreased safety in transfers with RW, decreased independence in ADLs, and decreased activity tolerance. Continue with PT while in hospital setting. Pt would benefit from discharge to home with 24/7 assist and home health PT. Pt states that he is having a FWW delivered to his home.

## 2023-04-01 NOTE — PLAN OF CARE
Problem: Fall Injury Risk  Goal: Absence of Fall and Fall-Related Injury  Outcome: Ongoing, Progressing  Intervention: Promote Injury-Free Environment  Recent Flowsheet Documentation  Taken 3/31/2023 2100 by Jayashree Cooper RN  Safety Promotion/Fall Prevention: safety round/check completed  Taken 3/31/2023 2000 by Jayashree Cooper RN  Safety Promotion/Fall Prevention: safety round/check completed  Taken 3/31/2023 1952 by Jayashree Cooper RN  Safety Promotion/Fall Prevention: safety round/check completed  Taken 3/31/2023 1900 by Jayashree Cooper RN  Safety Promotion/Fall Prevention: safety round/check completed     Problem: Skin Injury Risk Increased  Goal: Skin Health and Integrity  Outcome: Ongoing, Progressing     Problem: Dysrhythmia  Goal: Normalized Cardiac Rhythm  Outcome: Ongoing, Progressing  Intervention: Monitor and Manage Cardiac Rhythm Effect  Recent Flowsheet Documentation  Taken 3/31/2023 1952 by Jayashree Cooper RN  VTE Prevention/Management: sequential compression devices on     Problem: Fluid and Electrolyte Imbalance (Acute Kidney Injury/Impairment)  Goal: Fluid and Electrolyte Balance  Outcome: Ongoing, Progressing     Problem: Oral Intake Inadequate (Acute Kidney Injury/Impairment)  Goal: Optimal Nutrition Intake  Outcome: Ongoing, Progressing     Problem: Renal Function Impairment (Acute Kidney Injury/Impairment)  Goal: Effective Renal Function  Outcome: Ongoing, Progressing     Problem: Diabetes Comorbidity  Goal: Blood Glucose Level Within Targeted Range  Outcome: Ongoing, Progressing     Problem: Hypertension Comorbidity  Goal: Blood Pressure in Desired Range  Outcome: Ongoing, Progressing     Problem: Osteoarthritis Comorbidity  Goal: Maintenance of Osteoarthritis Symptom Control  Outcome: Ongoing, Progressing  Intervention: Maintain Osteoarthritis Symptom Control  Recent Flowsheet Documentation  Taken 3/31/2023 2100 by Jayashree Cooper RN  Activity Management: activity adjusted per  tolerance  Taken 3/31/2023 2000 by Jayashree Cooper RN  Activity Management: activity adjusted per tolerance  Taken 3/31/2023 1952 by Jayashree Cooper RN  Activity Management: activity adjusted per tolerance  Taken 3/31/2023 1900 by Jayashree Cooper RN  Activity Management: activity adjusted per tolerance     Problem: Bleeding (Surgery Nonspecified)  Goal: Absence of Bleeding  Outcome: Ongoing, Progressing     Problem: Bowel Motility Impaired (Surgery Nonspecified)  Goal: Effective Bowel Elimination  Outcome: Ongoing, Progressing     Problem: Fluid and Electrolyte Imbalance (Surgery Nonspecified)  Goal: Fluid and Electrolyte Balance  Outcome: Ongoing, Progressing     Problem: Glycemic Control Impaired (Surgery Nonspecified)  Goal: Blood Glucose Level Within Targeted Range  Outcome: Ongoing, Progressing     Problem: Infection (Surgery Nonspecified)  Goal: Absence of Infection Signs and Symptoms  Outcome: Ongoing, Progressing     Problem: Ongoing Anesthesia Effects (Surgery Nonspecified)  Goal: Anesthesia/Sedation Recovery  Outcome: Ongoing, Progressing  Intervention: Optimize Anesthesia Recovery  Recent Flowsheet Documentation  Taken 3/31/2023 2100 by Jayashree Cooper RN  Safety Promotion/Fall Prevention: safety round/check completed  Taken 3/31/2023 2000 by Jayashree Cooper RN  Safety Promotion/Fall Prevention: safety round/check completed  Taken 3/31/2023 1952 by Jayashree Cooper RN  Safety Promotion/Fall Prevention: safety round/check completed  Taken 3/31/2023 1900 by Jayashree Cooper RN  Safety Promotion/Fall Prevention: safety round/check completed     Problem: Pain (Surgery Nonspecified)  Goal: Acceptable Pain Control  Outcome: Ongoing, Progressing  Intervention: Prevent or Manage Pain  Recent Flowsheet Documentation  Taken 3/31/2023 1952 by Jayashree Cooper RN  Pain Management Interventions: see MAR     Problem: Postoperative Nausea and Vomiting (Surgery Nonspecified)  Goal: Nausea and Vomiting Relief  Outcome:  Ongoing, Progressing     Problem: Postoperative Urinary Retention (Surgery Nonspecified)  Goal: Effective Urinary Elimination  Outcome: Ongoing, Progressing     Problem: Respiratory Compromise (Surgery Nonspecified)  Goal: Effective Oxygenation and Ventilation  Outcome: Ongoing, Progressing   Goal Outcome Evaluation:

## 2023-04-01 NOTE — CONSULTS
Adult Nutrition  Assessment/PES    Patient Name:  Aj Card Jr.  YOB: 1945  MRN: [id number]  Admit Date:  3/30/2023    Assessment Date:  4/1/2023    Comments:  Nutrition Assessment:  Rd visited for consult regarding chronic Poor intake.  Pt reports that his appetite has been depressed over the past month since his ankle surgery.  He has been having intermittent nausea that he contributes to his medication.  His UBW is ~180# and CBW is 174# standing scale wt.  This is a 5.4% wt loss.  He reports that his appetite is below his norm.  Willing to try Boost GC. Will add Boost GC and SF ice cream in an effort to optimize his po/protein intake.  Encouraged him to try these at [facility].   Rd offered menu suggestions and alternatives. Will continue to monitor POC and po intake.       Reason for Assessment     Row Name 04/01/23 1500          Reason for Assessment    Reason For Assessment identified at risk by screening criteria;physician consult     Diagnosis other (see comments)  Urianry retention     Identified At Risk by Screening Criteria reduced oral intake over the last month                Nutrition/Diet History     Row Name 04/01/23 1501          Nutrition/Diet History    Typical Intake (Food/Fluid/EN/PN) Pt reports that his appetite has been depressed since he went to the NH. + intermittent nausea.  He thinks it is his meds.  He may have lost a few pounds.  UBW ~180#. Willing to try Boost.                Labs/Tests/Procedures/Meds     Row Name 04/01/23 1504          Labs/Procedures/Meds    Lab Results Reviewed reviewed, pertinent     Lab Results Comments Na 133; Gluc 149; Bun 24        Diagnostic Tests/Procedures    Diagnostic Test/Procedure Reviewed reviewed, pertinent     Diagnostic Test/Procedures Comments IVF        Medications    Pertinent Medications Reviewed reviewed, pertinent     Pertinent Medications Comments NaCl 50cc/hr; Rocephin                  Estimated/Assessed Needs -  "Anthropometrics     Row Name 04/01/23 1505          Anthropometrics    Height for Calculation 1.753 m (5' 9\")     Weight for Calculation 78.9 kg (174 lb)        Estimated/Assessed Needs    Additional Documentation Additional Requirements (Group);Fluid Requirements (Group);Modified Sukhdev State Equation (Group);Estimated Calorie Needs (Group);KCAL/KG (Group);[place]-St. Jeor Equation (Group);Protein Requirements (Group)        Estimated Calorie Needs    Estimated Calorie Need Method [place]-St Jeor        [place]-St. Jeor Equation    RMR (Carolina-St. Jeor Equation) 9479.889     Carolina-St. Jeor Activity Factors 1.4 - 1.5     Activity Factors (Carolina-St. Jeor) 6471.6581 - [phone]        Protein Requirements    Weight Used For Protein Calculations 78.9 kg (174 lb)     Est Protein Requirement Amount (gms/kg) 1.1 gm protein     Estimated Protein Requirements (gms/day) 86.82        Fluid Requirements    Fluid Requirements (mL/day) 2100     Estimated Fluid Requirement Method RDA Method     RDA Method (mL) 2100                Nutrition Prescription Ordered     Row Name 04/01/23 1506          Nutrition Prescription PO    Current PO Diet Regular     Common Modifiers Cardiac;Consistent Carbohydrate;Renal                Evaluation of Received Nutrient/Fluid Intake     Row Name 04/01/23 1507          PO Evaluation    Number of Meals 2     % PO Intake 25%                   Problem/Interventions:   Problem 1     Row Name 04/01/23 1509          Nutrition Diagnoses Problem 1    Problem 1 Unintended Weight Loss     Etiology (related to) Factors Affecting Nutrition     Appetite Poor;Poor Due to GI Factor;Fair     Reported GI Symptoms Other  Nausea     Signs/Symptoms (evidenced by) Unintended Weight Change     Unintended Weight Change Loss     Number of Pounds Lost ~10# in once month     Other Comment Decreased oral intake from his norm over the past month.                Problem 2     Row Name 04/01/23 1512          Nutrition " Diagnoses Problem 2    Problem 2 Inadequate Intake/Infusion     Inadequate Intake Type Oral     Macronutrient Kcal;Protein     Etiology (related to) Factors Affecting Nutrition     Appetite Fair;Poor     Signs/Symptoms (evidenced by) Report/Observation     Reported/Observed By Patient                    Intervention Goal     Row Name 04/01/23 1512          Intervention Goal    General Meet nutritional needs for age/condition;Reduce/improve symptoms     PO Tolerate PO;Increase intake;Meet estimated needs     Weight Maintain weight                Nutrition Intervention     Row Name 04/01/23 1513          Nutrition Intervention    RD/Tech Action Follow Tx progress;Care plan reviewd;Encourage intake;Recommend/ordered;Advise alternate selection;Advise available snack;Interview for preference;Menu provided     Recommended/Ordered Supplement                Nutrition Prescription     Row Name 04/01/23 1513          Nutrition Prescription PO    PO Prescription Begin/change supplement     Supplement Boost Glucose Control;Ice Cream     Supplement Frequency 3 times a day;2 times a day     New PO Prescription Ordered? Yes                Education/Evaluation     Row Name 04/01/23 1515          Education    Education Education topics;Advised regarding habits/behavior     Education Topics Basic nutrition;Protein     Advised Regarding Habits/Behavior Increased nutrient density;Snacks;Use supplement;Food choices        Monitor/Evaluation    Monitor Per protocol;I&O;PO intake;Pertinent labs;Supplement intake;Weight;Skin status;Symptoms     Education Follow-up Reinforce PRN                 Electronically signed by:  Cece Avila RD  04/01/23 15:19 CDT

## 2023-04-01 NOTE — PROGRESS NOTES
[facility] Medicine Services  INPATIENT PROGRESS NOTE    Length of Stay: 0  Date of Admission: 3/30/2023  Primary Care Physician: Andreas Martinez MD    Subjective   Chief Complaint: Urinary retention  HPI:    Having urinary retention and has a catheter in right now.  Otherwise feeling fine.    Review of Systems   Respiratory: Negative for shortness of breath.    Cardiovascular: Negative for chest pain.        All pertinent negatives and positives are as above. All other systems have been reviewed and are negative unless otherwise stated.     Objective    Temp:  [97 °F (36.1 °C)-98.1 °F (36.7 °C)] 98.1 °F (36.7 °C)  Heart Rate:  [] 107  Resp:  [18] 18  BP: ()/(55-69) 99/58  Physical Exam  Vitals and nursing note reviewed.   Constitutional:       General: He is not in acute distress.     Appearance: He is well-developed. He is not diaphoretic.   HENT:      Head: Normocephalic and atraumatic.   Cardiovascular:      Rate and Rhythm: Normal rate.   Pulmonary:      Effort: Pulmonary effort is normal. No respiratory distress.      Breath sounds: No wheezing.   Abdominal:      General: There is no distension.      Palpations: Abdomen is soft.   Musculoskeletal:         General: Normal range of motion.   Skin:     General: Skin is warm and dry.   Neurological:      Mental Status: He is alert.      Cranial Nerves: No cranial nerve deficit.   Psychiatric:         Behavior: Behavior normal.         Thought Content: Thought content normal.         Judgment: Judgment normal.             Results Review:  I have reviewed the labs, radiology results, and diagnostic studies.    Laboratory Data:   Lab Results (last 24 hours)     Procedure Component Value Units Date/Time    Urine Culture - Urine, Urine, Clean Catch [[id number]]  (Normal) Collected: 03/30/23 1410    Specimen: Urine, Clean Catch Updated: 04/01/23 1230     Urine Culture No growth    PSA DIAGNOSTIC [[id number]]  Collected: 04/01/23 0509    Specimen: Blood Updated: 04/01/23 1202    Basic Metabolic Panel [[id number]]  (Abnormal) Collected: 04/01/23 0509    Specimen: Blood Updated: 04/01/23 0624     Glucose 149 mg/dL      BUN 24 mg/dL      Creatinine 0.91 mg/dL      Sodium 132 mmol/L      Potassium 4.5 mmol/L      Chloride 101 mmol/L      CO2 20.0 mmol/L      Calcium 8.8 mg/dL      BUN/Creatinine Ratio 26.4     Anion Gap 11.0 mmol/L      eGFR 86.3 mL/min/1.73     Narrative:      GFR Normal >60  Chronic Kidney Disease <60  Kidney Failure <15    The GFR formula is only valid for adults with stable renal function between ages 18 and 70.    Magnesium [[id number]]  (Normal) Collected: 04/01/23 0509    Specimen: Blood Updated: 04/01/23 0622     Magnesium 2.2 mg/dL     CBC & Differential [[id number]]  (Abnormal) Collected: 04/01/23 0509    Specimen: Blood Updated: 04/01/23 0605    Narrative:      The following orders were created for panel order CBC & Differential.  Procedure                               Abnormality         Status                     ---------                               -----------         ------                     CBC Auto Differential[[id number]]        Abnormal            Final result                 Please view results for these tests on the individual orders.    CBC Auto Differential [[id number]]  (Abnormal) Collected: 04/01/23 0509    Specimen: Blood Updated: 04/01/23 0605     WBC 6.60 10*3/mm3      RBC 4.19 10*6/mm3      Hemoglobin 11.7 g/dL      Hematocrit 35.1 %      MCV 83.8 fL      MCH 27.9 pg      MCHC 33.3 g/dL      RDW 13.6 %      RDW-SD 41.3 fl      MPV 8.8 fL      Platelets 303 10*3/mm3      Neutrophil % 71.9 %      Lymphocyte % 17.3 %      Monocyte % 7.7 %      Eosinophil % 2.0 %      Basophil % 0.5 %      Immature Grans % 0.6 %      Neutrophils, Absolute 4.75 10*3/mm3      Lymphocytes, Absolute 1.14 10*3/mm3      Monocytes, Absolute 0.51 10*3/mm3      Eosinophils, Absolute 0.13 10*3/mm3       Basophils, Absolute 0.03 10*3/mm3      Immature Grans, Absolute 0.04 10*3/mm3      nRBC 0.0 /100 WBC     CANDIDA AURIS SCREEN - Swab, Axilla Right, Axilla Left and Groin [[id number]]  (Normal) Collected: 03/30/23 1922    Specimen: Swab from Axilla Right, Axilla Left and Groin Updated: 03/31/23 1930     Jamilah Auris Screen Culture No Candida auris isolated at 24 hours          Culture Data:   No results found for: BLOODCX  Urine Culture   Date Value Ref Range Status   03/30/2023 No growth  Final     No results found for: RESPCX  No results found for: WOUNDCX  No results found for: STOOLCX  No components found for: BODYFLD    Radiology Data:   Imaging Results (Last 24 Hours)     ** No results found for the last 24 hours. **          I have reviewed the patient's current medications.     Assessment/Plan     Active Hospital Problems    Diagnosis    • **OPAL (acute kidney injury)        OPAL-IV fluids will be started, likely due to retention, bladder scans will be done  Gray catheter has been anchored, creatinine level improving slowly.  Creatinine level further improving and is back to baseline now.     Urinary retention-continue bladder scanning, will anchor Gray if necessary  [place] Gray catheter now.  Will need urology consult  Urology has also seen the patient and we will continue to monitor     Atrial fibrillation with RVR-continue with Cardizem drip  Cardizem drip has been turned off, will resume his p.o. medications.  Heart rate is under control  Metoprolol p.o. will be added     Hypertension-continue with home medication continue to monitor     Recent ankle surgery-continue with supportive care and pain management, he is supposed to be nonweightbearing.     Hyponatremia-continue monitoring sodium levels, we will continue to monitor     DVT prophylaxis-SCD     Medical Decision Making  Number and Complexity of problems: 3 major complex  Differential Diagnosis: Sepsis     Conditions and Status:        Condition  is worsening.     [facility] Data  External documents reviewed: Previous medical records  My EKG interpretation: Atrial fibrillation with RVR  My plain film interpretation: None  Tests considered but not ordered: None     Decision rules/scores evaluated (example [id number], [place], etc): None     Discussed with: ER physician     Treatment Plan  As above     Care Planning  Shared decision making: Patient and family  Code status and discussions: Full code     Disposition  Social Determinants of Health that impact treatment or disposition: None  I expect the patient to be discharged to home in 2-3 days.       I have utilized all available immediate resources to obtain, update, or review the patient's current medications (including all prescriptions, over-the-counter products, herbals, cannabis/cannabidiol products, and vitamin/mineral/dietary (nutritional) supplements).      I confirmed that the patient's Advance Care Plan is present, code status is documented, or surrogate decision maker is listed in the patient's medical record.     Pete Torres MD   04/01/23   12:59 CDT

## 2023-04-02 LAB
QT INTERVAL: 352 MS
QTC INTERVAL: 476 MS

## 2023-04-02 PROCEDURE — 97110 THERAPEUTIC EXERCISES: CPT

## 2023-04-02 PROCEDURE — 25010000002 CEFTRIAXONE PER 250 MG: Performed by: FAMILY MEDICINE

## 2023-04-02 RX ADMIN — Medication 10 ML: at 07:36

## 2023-04-02 RX ADMIN — OXYCODONE HYDROCHLORIDE AND ACETAMINOPHEN 1 TABLET: 7.5; 325 TABLET ORAL at 17:29

## 2023-04-02 RX ADMIN — OXYCODONE HYDROCHLORIDE AND ACETAMINOPHEN 1 TABLET: 7.5; 325 TABLET ORAL at 07:33

## 2023-04-02 RX ADMIN — RIVAROXABAN 15 MG: 15 TABLET, FILM COATED ORAL at 07:35

## 2023-04-02 RX ADMIN — CEFTRIAXONE SODIUM 1 G: 1 INJECTION, POWDER, FOR SOLUTION INTRAMUSCULAR; INTRAVENOUS at 19:53

## 2023-04-02 RX ADMIN — AMLODIPINE BESYLATE 10 MG: 10 TABLET ORAL at 07:34

## 2023-04-02 RX ADMIN — ALPRAZOLAM 0.5 MG: 0.5 TABLET ORAL at 23:06

## 2023-04-02 RX ADMIN — SODIUM CHLORIDE 50 ML/HR: 9 INJECTION, SOLUTION INTRAVENOUS at 05:54

## 2023-04-02 RX ADMIN — ISOSORBIDE MONONITRATE 30 MG: 30 TABLET, EXTENDED RELEASE ORAL at 07:34

## 2023-04-02 RX ADMIN — PROPAFENONE HYDROCHLORIDE 150 MG: 150 TABLET, FILM COATED ORAL at 11:56

## 2023-04-02 RX ADMIN — Medication 10 ML: at 21:44

## 2023-04-02 RX ADMIN — DOCUSATE SODIUM 100 MG: 100 CAPSULE, LIQUID FILLED ORAL at 07:34

## 2023-04-02 RX ADMIN — PROPAFENONE HYDROCHLORIDE 150 MG: 150 TABLET, FILM COATED ORAL at 17:28

## 2023-04-02 RX ADMIN — PROPAFENONE HYDROCHLORIDE 150 MG: 150 TABLET, FILM COATED ORAL at 04:02

## 2023-04-02 RX ADMIN — METOPROLOL SUCCINATE 25 MG: 25 TABLET, FILM COATED, EXTENDED RELEASE ORAL at 07:34

## 2023-04-02 RX ADMIN — TERAZOSIN HYDROCHLORIDE 2 MG: 2 CAPSULE ORAL at 21:44

## 2023-04-02 RX ADMIN — PANTOPRAZOLE SODIUM 40 MG: 40 TABLET, DELAYED RELEASE ORAL at 05:54

## 2023-04-02 NOTE — PROGRESS NOTES
"   LOS: 0 days   Patient Care Team:  Andreas Martinez MD as PCP - General (Family Medicine)  Andreas Aldana PA-C as Physician Assistant (Gastroenterology)  Glen Jain MD as Consulting Physician (Endocrinology)  David Jeffers MD as Consulting Physician (Cardiology)    Subjective     Subjective:  Symptoms:  Stable.        History taken from: patient chart    Objective     Vital Signs  Temp:  [97 °F (36.1 °C)-98.1 °F (36.7 °C)] 97.7 °F (36.5 °C)  Heart Rate:  [] 85  Resp:  [18] 18  BP: ()/(54-65) 110/59    Objective:  General Appearance:  In no acute distress.    Vital signs: (most recent): Blood pressure 110/59, pulse 85, temperature 97.7 °F (36.5 °C), temperature source Temporal, resp. rate 18, height 175.3 cm (69.02\"), weight 79.1 kg (174 lb 6.4 oz), SpO2 95 %.  Vital signs are normal.  No fever.    Output: Producing urine (Gray).    Lungs:  Normal effort and normal respiratory rate.    Heart: Normal rate.    Abdomen: Abdomen is soft and non-distended.  There is no abdominal tenderness.     Neurological: Patient is alert.    Pupils:  Pupils are equal, round, and reactive to light.    Skin:  Warm and dry.              Results Review:    Lab Results (last 24 hours)     Procedure Component Value Units Date/Time    PSA DIAGNOSTIC [[id number]]  (Normal) Collected: 04/01/23 0509    Specimen: Blood Updated: 04/01/23 2256     PSA 1.460 ng/mL     Narrative:      Results may be falsely decreased if patient taking Biotin.      CANDIDA AURIS SCREEN - Swab, Axilla Right, Axilla Left and Groin [[id number]]  (Normal) Collected: 03/30/23 1922    Specimen: Swab from Axilla Right, Axilla Left and Groin Updated: 04/01/23 1931     Jamilah Auris Screen Culture No Candida auris isolated at 2 days    Urine Culture - Urine, Urine, Clean Catch [[id number]]  (Normal) Collected: 03/30/23 1410    Specimen: Urine, Clean Catch Updated: 04/01/23 1230     Urine Culture No growth         Imaging Results (Last 24 " Hours)     ** No results found for the last 24 hours. **           I reviewed the patient's new clinical results.  I reviewed the patient's new imaging results and agree with the interpretation.  I reviewed the patient's other test results and agree with the interpretation      Assessment & Plan       OPAL (acute kidney injury)      Assessment & Plan    Consulted to Urology for retention of urine, Gray anchored, no hematuria, afebrile. UA on 3/30/23 showed glucose. No constipation.   --Estimated Creatinine Clearance: 74.9 mL/min (by C-G formula based on SCr of 0.91 mg/dL).     PSA  1.46 on 4/1/23  0.87 on 3/19/14     Plan:  Keep Gray, continue Hytrin  Voiding trial 1-2 days    JORDEN Cardenas  04/02/23  09:54 CDT

## 2023-04-02 NOTE — PROGRESS NOTES
[facility]  INPATIENT PROGRESS NOTE    Length of Stay: 0  Date of Admission: 3/30/2023  Primary Care Physician: Andreas Martinez MD    Subjective   Chief Complaint: Urinary retention  HPI:    He was having urinary retention and Gray catheter has been anchored.  Urology is also seeing the patient otherwise he has been doing well.  No complaints.    Review of Systems   Respiratory: Negative for shortness of breath.    Cardiovascular: Negative for chest pain.        All pertinent negatives and positives are as above. All other systems have been reviewed and are negative unless otherwise stated.     Objective    Temp:  [97 °F (36.1 °C)-97.7 °F (36.5 °C)] 97.7 °F (36.5 °C)  Heart Rate:  [] 86  Resp:  [18] 18  BP: ()/(54-65) 100/56  Physical Exam  Vitals and nursing note reviewed.   Constitutional:       General: He is not in acute distress.     Appearance: He is well-developed. He is not diaphoretic.   HENT:      Head: Normocephalic and atraumatic.   Cardiovascular:      Rate and Rhythm: Normal rate.   Pulmonary:      Effort: Pulmonary effort is normal. No respiratory distress.      Breath sounds: No wheezing.   Abdominal:      General: There is no distension.      Palpations: Abdomen is soft.   Musculoskeletal:         General: Normal range of motion.   Skin:     General: Skin is warm and dry.   Neurological:      Mental Status: He is alert.      Cranial Nerves: No cranial nerve deficit.   Psychiatric:         Behavior: Behavior normal.         Thought Content: Thought content normal.         Judgment: Judgment normal.             Results Review:  I have reviewed the labs, radiology results, and diagnostic studies.    Laboratory Data:   Lab Results (last 24 hours)     Procedure Component Value Units Date/Time    PSA DIAGNOSTIC [[id number]]  (Normal) Collected: 04/01/23 0509    Specimen: Blood Updated: 04/01/23 2256     PSA 1.460 ng/mL     Narrative:       Results may be falsely decreased if patient taking Biotin.      CANDIDA AURIS SCREEN - Swab, Axilla Right, Axilla Left and Groin [[id number]]  (Normal) Collected: 03/30/23 1922    Specimen: Swab from Axilla Right, Axilla Left and Groin Updated: 04/01/23 1931     Jamilah Auris Screen Culture No Candida auris isolated at 2 days    Urine Culture - Urine, Urine, Clean Catch [[id number]]  (Normal) Collected: 03/30/23 1410    Specimen: Urine, Clean Catch Updated: 04/01/23 1230     Urine Culture No growth          Culture Data:   No results found for: BLOODCX  Urine Culture   Date Value Ref Range Status   03/30/2023 No growth  Final     No results found for: RESPCX  No results found for: WOUNDCX  No results found for: STOOLCX  No components found for: BODYFLD    Radiology Data:   Imaging Results (Last 24 Hours)     ** No results found for the last 24 hours. **          I have reviewed the patient's current medications.     Assessment/Plan     Active Hospital Problems    Diagnosis    • **OPAL (acute kidney injury)        OPAL-IV fluids will be started, likely due to retention, bladder scans will be done  Gray catheter has been anchored, creatinine level improving slowly.  Creatinine level further improving and is back to baseline now.     Urinary retention-continue bladder scanning, will anchor Gray if necessary  [place] Gray catheter now.  Will need urology consult  Urology has also seen the patient and we will continue to monitor  They are planning for voiding trial, we will continue to monitor     Atrial fibrillation with RVR-continue with Cardizem drip  Cardizem drip has been turned off, will resume his p.o. medications.  Heart rate is under control  Metoprolol p.o. will be added.  Heart rate more controlled now.     Hypertension-continue with home medication continue to monitor     Recent ankle surgery-continue with supportive care and pain management, he is supposed to be nonweightbearing.     Hyponatremia-continue  monitoring sodium levels, we will continue to monitor     DVT prophylaxis-SCD     Medical Decision Making  Number and Complexity of problems: 3 major complex  Differential Diagnosis: Sepsis     Conditions and Status:        Condition is worsening.     MDM Data  External documents reviewed: Previous medical records  My EKG interpretation: Atrial fibrillation with RVR  My plain film interpretation: None  Tests considered but not ordered: None     Decision rules/scores evaluated (example [id number], Wells, etc): None     Discussed with: ER physician     Treatment Plan  As above     Care Planning  Shared decision making: Patient and family  Code status and discussions: Full code     Disposition  Social Determinants of Health that impact treatment or disposition: None  I expect the patient to be discharged to home in 2-3 days.       I have utilized all available immediate resources to obtain, update, or review the patient's current medications (including all prescriptions, over-the-counter products, herbals, cannabis/cannabidiol products, and vitamin/mineral/dietary (nutritional) supplements).      I confirmed that the patient's Advance Care Plan is present, code status is documented, or surrogate decision maker is listed in the patient's medical record.     Pete Torres MD   04/02/23   11:48 CDT

## 2023-04-02 NOTE — PLAN OF CARE
Problem: Fall Injury Risk  Goal: Absence of Fall and Fall-Related Injury  Outcome: Ongoing, Progressing  Intervention: Promote Injury-Free Environment  Recent Flowsheet Documentation  Taken 4/2/2023 0400 by Jayashree Cooper RN  Safety Promotion/Fall Prevention: safety round/check completed  Taken 4/2/2023 0200 by Jayashree Cooper RN  Safety Promotion/Fall Prevention: safety round/check completed  Taken 4/2/2023 0000 by Jayashree Cooper RN  Safety Promotion/Fall Prevention: safety round/check completed  Taken 4/1/2023 2200 by Jayashree Cooper RN  Safety Promotion/Fall Prevention: safety round/check completed  Taken 4/1/2023 2100 by Jayashree Cooper RN  Safety Promotion/Fall Prevention: safety round/check completed  Taken 4/1/2023 2005 by Jayashree Cooper RN  Safety Promotion/Fall Prevention: safety round/check completed  Taken 4/1/2023 1900 by Jayashree Cooper RN  Safety Promotion/Fall Prevention: safety round/check completed     Problem: Skin Injury Risk Increased  Goal: Skin Health and Integrity  Outcome: Ongoing, Progressing     Problem: Dysrhythmia  Goal: Normalized Cardiac Rhythm  Outcome: Ongoing, Progressing     Problem: Fluid and Electrolyte Imbalance (Acute Kidney Injury/Impairment)  Goal: Fluid and Electrolyte Balance  Outcome: Ongoing, Progressing     Problem: Oral Intake Inadequate (Acute Kidney Injury/Impairment)  Goal: Optimal Nutrition Intake  Outcome: Ongoing, Progressing     Problem: Renal Function Impairment (Acute Kidney Injury/Impairment)  Goal: Effective Renal Function  Outcome: Ongoing, Progressing     Problem: Diabetes Comorbidity  Goal: Blood Glucose Level Within Targeted Range  Outcome: Ongoing, Progressing     Problem: Hypertension Comorbidity  Goal: Blood Pressure in Desired Range  Outcome: Ongoing, Progressing     Problem: Osteoarthritis Comorbidity  Goal: Maintenance of Osteoarthritis Symptom Control  Outcome: Ongoing, Progressing  Intervention: Maintain Osteoarthritis Symptom Control  Recent  Flowsheet Documentation  Taken 4/2/2023 0400 by Jayashree Cooper RN  Activity Management: activity adjusted per tolerance  Taken 4/2/2023 0200 by Jayashree Cooper RN  Activity Management: activity adjusted per tolerance  Taken 4/2/2023 0000 by Jayashree Cooper RN  Activity Management: activity adjusted per tolerance  Taken 4/1/2023 2200 by Jayashree Cooper RN  Activity Management: activity adjusted per tolerance  Taken 4/1/2023 2100 by Jayashree Cooper RN  Activity Management: activity adjusted per tolerance  Taken 4/1/2023 2005 by Jayashree Cooper RN  Activity Management: activity adjusted per tolerance  Taken 4/1/2023 1900 by Jayashree Cooper RN  Activity Management: activity adjusted per tolerance     Problem: Bleeding (Surgery Nonspecified)  Goal: Absence of Bleeding  Outcome: Ongoing, Progressing     Problem: Bowel Motility Impaired (Surgery Nonspecified)  Goal: Effective Bowel Elimination  Outcome: Ongoing, Progressing     Problem: Fluid and Electrolyte Imbalance (Surgery Nonspecified)  Goal: Fluid and Electrolyte Balance  Outcome: Ongoing, Progressing     Problem: Glycemic Control Impaired (Surgery Nonspecified)  Goal: Blood Glucose Level Within Targeted Range  Outcome: Ongoing, Progressing     Problem: Infection (Surgery Nonspecified)  Goal: Absence of Infection Signs and Symptoms  Outcome: Ongoing, Progressing     Problem: Ongoing Anesthesia Effects (Surgery Nonspecified)  Goal: Anesthesia/Sedation Recovery  Outcome: Ongoing, Progressing  Intervention: Optimize Anesthesia Recovery  Recent Flowsheet Documentation  Taken 4/2/2023 0400 by Jayashree Cooper RN  Safety Promotion/Fall Prevention: safety round/check completed  Taken 4/2/2023 0200 by Jayashree Cooper RN  Safety Promotion/Fall Prevention: safety round/check completed  Taken 4/2/2023 0000 by Jayashree Cooper RN  Safety Promotion/Fall Prevention: safety round/check completed  Taken 4/1/2023 2200 by Jayashree Cooper RN  Safety Promotion/Fall Prevention: safety  round/check completed  Taken 4/1/2023 2100 by Jayashree Cooper RN  Safety Promotion/Fall Prevention: safety round/check completed  Taken 4/1/2023 2005 by Jayashree Cooper RN  Safety Promotion/Fall Prevention: safety round/check completed  Taken 4/1/2023 1900 by Jayashree Cooper RN  Safety Promotion/Fall Prevention: safety round/check completed     Problem: Pain (Surgery Nonspecified)  Goal: Acceptable Pain Control  Outcome: Ongoing, Progressing     Problem: Postoperative Nausea and Vomiting (Surgery Nonspecified)  Goal: Nausea and Vomiting Relief  Outcome: Ongoing, Progressing     Problem: Postoperative Urinary Retention (Surgery Nonspecified)  Goal: Effective Urinary Elimination  Outcome: Ongoing, Progressing     Problem: Respiratory Compromise (Surgery Nonspecified)  Goal: Effective Oxygenation and Ventilation  Outcome: Ongoing, Progressing     Problem: Adult Inpatient Plan of Care  Goal: Plan of Care Review  Outcome: Ongoing, Progressing  Goal: Patient-Specific Goal (Individualized)  Outcome: Ongoing, Progressing  Goal: Absence of Hospital-Acquired Illness or Injury  Outcome: Ongoing, Progressing  Intervention: Identify and Manage Fall Risk  Recent Flowsheet Documentation  Taken 4/2/2023 0400 by Jayashree Cooper RN  Safety Promotion/Fall Prevention: safety round/check completed  Taken 4/2/2023 0200 by Jayashree Cooper RN  Safety Promotion/Fall Prevention: safety round/check completed  Taken 4/2/2023 0000 by Jayashree Cooper RN  Safety Promotion/Fall Prevention: safety round/check completed  Taken 4/1/2023 2200 by Jayashree Cooper RN  Safety Promotion/Fall Prevention: safety round/check completed  Taken 4/1/2023 2100 by Jayashree Cooper RN  Safety Promotion/Fall Prevention: safety round/check completed  Taken 4/1/2023 2005 by Jayashree Cooper RN  Safety Promotion/Fall Prevention: safety round/check completed  Taken 4/1/2023 1900 by Jayashree Cooper RN  Safety Promotion/Fall Prevention: safety round/check  completed  Intervention: Prevent Skin Injury  Recent Flowsheet Documentation  Taken 4/2/2023 0400 by Jayashree Cooper RN  Body Position:   left   side-lying  Taken 4/2/2023 0200 by Jayashree Cooper RN  Body Position:   right   side-lying  Taken 4/2/2023 0000 by Jayashree Cooper RN  Body Position:   left   side-lying  Taken 4/1/2023 2200 by Jayashree Cooper RN  Body Position:   right   side-lying  Taken 4/1/2023 2005 by Jayashree Cooper RN  Body Position:   left   side-lying  Taken 4/1/2023 1900 by Jayashree Cooper RN  Body Position: supine  Intervention: Prevent and Manage VTE (Venous Thromboembolism) Risk  Recent Flowsheet Documentation  Taken 4/2/2023 0400 by Jayashree Cooper RN  Activity Management: activity adjusted per tolerance  Taken 4/2/2023 0200 by Jayashree Cooper RN  Activity Management: activity adjusted per tolerance  Taken 4/2/2023 0000 by Jayashree Cooper RN  Activity Management: activity adjusted per tolerance  Taken 4/1/2023 2200 by Jayashree Cooper RN  Activity Management: activity adjusted per tolerance  Taken 4/1/2023 2100 by Jayashree Cooper RN  Activity Management: activity adjusted per tolerance  Taken 4/1/2023 2005 by Jayashree Cooper RN  Activity Management: activity adjusted per tolerance  Taken 4/1/2023 1900 by Jayashree Cooper RN  Activity Management: activity adjusted per tolerance  Goal: Optimal Comfort and Wellbeing  Outcome: Ongoing, Progressing  Goal: Readiness for Transition of Care  Outcome: Ongoing, Progressing   Goal Outcome Evaluation:

## 2023-04-02 NOTE — PLAN OF CARE
Goal Outcome Evaluation:  Plan of Care Reviewed With: patient        Progress: improving  Outcome Evaluation: Pt defers any OOB but is agreeable to bed ex w/ encouragement. Pt tolerated UE ther ex w/ 2lb HW and good tolerance. Cont OT POC.

## 2023-04-02 NOTE — THERAPY TREATMENT NOTE
Patient Name: Aj Card Jr.  : 1945    MRN: [id number]                              Today's Date: 2023       Admit Date: 3/30/2023    Visit Dx:     ICD-10-CM ICD-9-CM   1. OPAL (acute kidney injury)  N17.9 584.9   2. Urinary retention  R33.9 788.20   3. Atrial fibrillation with RVR  I48.91 427.31   4. Dehydration  E86.0 276.51   5. Nausea and vomiting, unspecified vomiting type  R11.2 787.01   6. Osteoarthritis of ankle and foot, right  M19.071 715.97   7. Impaired functional mobility, balance, gait, and endurance  Z74.09 V49.89   8. Impaired mobility and ADLs  Z74.09 V49.89    Z78.9      Patient Active Problem List   Diagnosis   • Type 2 diabetes mellitus without complication, without long-term current use of insulin ([facility])   • Essential hypertension   • Mixed hyperlipidemia   • Generalized anxiety disorder   • History of colon polyps   • Diverticulosis of large intestine without hemorrhage   • Coronary artery disease with angina pectoris   • Gastroesophageal reflux disease with esophagitis   • Vitamin D deficiency   • Overweight   • Encounter for screening for endocrine disorder   • Atopic dermatitis   • High serum vitamin B12   • Acute pain of right knee   • Tear of medial meniscus of right knee, current   • GERD (gastroesophageal reflux disease)   • PAF (paroxysmal atrial fibrillation) ([facility])   • Uncontrolled type 2 diabetes mellitus with hyperglycemia   • Gastroesophageal reflux disease   • Chronic constipation   • Stage 2 chronic kidney disease   • Osteoarthritis of ankle and foot, right   • Closed trimalleolar fracture of right ankle   • Status post bunionectomy   • OPAL (acute kidney injury)     Past Medical History:   Diagnosis Date   • Acute posthemorrhagic anemia    • Afib    • Allergic rhinitis    • Anxiety state    • Chest pain    • CKD (chronic kidney disease), stage II    • Coronary arteriosclerosis     3 stents, followed by Dr. Jeffers   • Diabetes mellitus     type 2   •  Diverticular disease of colon    • Dysphagia    • Elevated cholesterol    • Epigastric pain    • GERD (gastroesophageal reflux disease)     esophagitis on Dexilant. Pt feels Nexium working better      • History of echocardiogram     Small left atrial enlargement with mild CLVH.Ef of 55-60%.Mitral valve mildly thickened.Av thickened.Left ventricle mildly dilated.Tricuspid intact.Mild aortic insufficiency. 12/07/2015       • History of echocardiogram     Mild diastolic dysfunction and mild left atrial enlargement, otherwise normal echo. EF> 55% 01/03/2012       • Hypercholesterolemia    • Hypertension    • Insomnia    • Irritable bowel syndrome    • Osteoarthritis of multiple joints    • Pain of right foot    • PONV (postoperative nausea and vomiting)      Past Surgical History:   Procedure Laterality Date   • APPENDECTOMY       [facility] 1995       • CARDIAC CATHETERIZATION      Successful PTCA of the OMB#2.Unsuccessful PTCA of the 1st OMB, secondary to difficulty in advancing the balloon. 12/08/2015       • CHOLECYSTECTOMY      [facility] 1993       • CHOLECYSTECTOMY     • COLONOSCOPY  10/01/2012   • COLONOSCOPY N/A 12/11/2017    Procedure: COLONOSCOPY;  Surgeon: Bladimir Michael MD;  Location: [facility] ENDOSCOPY;  Service:    • COLONOSCOPY N/A 11/01/2022    Procedure: COLONOSCOPY;  Surgeon: Bladimir Michael MD;  Location: [facility] ENDOSCOPY;  Service: Gastroenterology;  Laterality: N/A;   • CORONARY ANGIOPLASTY      Successful PTCA & stenting LAD was done w/ deployment of a 2.5mm x23 Xience stent w/ reduction of stenosis from 90% to less than 0% stenosis with good LILLIAM 3 flow 02/17/2012       • ENDOSCOPY      with biopsy.  Mildly severe esophagitis. Gastritis. Normal examined duodenum.Several biopsies obtained in the lower third of the esophagus.Several biopsies obtained in the gastric antrum. 05/06/2016       • ENDOSCOPY      w tube. Normal esophagus. Gastritis in stomach. Biopsy taken.  Gastric polyp found. Biopsy taken. Normal duodenum. 10/01/2012       • ENDOSCOPY AND COLONOSCOPY      Diverticulum in sigmoid colon & descending colon. Internal & external hemorrhoids found. 10/01/2012       • EYE SURGERY Bilateral     catarract   • HAND SURGERY Left      Corrective osteotomy of ring finger metacarpal. Malunited fracture of left ring finger 05/21/1985       • HERNIA REPAIR      Laparoscopic hernia repair. prepertitoneal bilateral inguinal hernia repair with mesh. 10/15/2009      • OTHER SURGICAL HISTORY      CORONARY ARTERY STENT    • SKIN TAG REMOVAL      Excision, viral skin tag,right upper eyelid 05/08/1995       General Information     Row Name 04/02/23 1101          OT Time and Intention    Document Type therapy note (daily note)  -KD     Mode of Treatment individual therapy;occupational therapy  -KD     Row Name 04/02/23 1101          General Information    Patient Profile Reviewed yes  -KD     Existing Precautions/Restrictions non-weight bearing;right  -KD     Row Name 04/02/23 1101          Cognition    Orientation Status (Cognition) oriented x 4  -KD     Row Name 04/02/23 1101          Safety Issues, Functional Mobility    Impairments Affecting Function (Mobility) balance;endurance/activity tolerance;pain;strength  -KD           User Key  (r) = Recorded By, (t) = Taken By, (c) = Cosigned By    Initials Name Provider Type    Flaquita Christensen COTA Occupational Therapist Assistant                 Mobility/ADL's     Row Name 04/02/23 1101          Mobility    Extremity Weight-bearing Status right lower extremity  -KD     Right Lower Extremity (Weight-bearing Status) non weight-bearing (NWB)  -KD           User Key  (r) = Recorded By, (t) = Taken By, (c) = Cosigned By    Initials Name Provider Type    Flaquita Christensen COTA Occupational Therapist Assistant               Obj/Interventions     Row Name 04/02/23 1101          Shoulder (Therapeutic Exercise)    Shoulder (Therapeutic Exercise)  AROM (active range of motion)  -     Shoulder AROM (Therapeutic Exercise) bilateral;flexion;extension;aBduction;aDduction;external rotation;internal rotation;horizontal aBduction/aDduction  -     Row Name 04/02/23 1101          Elbow/Forearm (Therapeutic Exercise)    Elbow/Forearm (Therapeutic Exercise) AROM (active range of motion)  -KD     Elbow/Forearm AROM (Therapeutic Exercise) bilateral;flexion;extension;supination;pronation  -     Row Name 04/02/23 1101          Wrist (Therapeutic Exercise)    Wrist (Therapeutic Exercise) AROM (active range of motion)  -     Wrist AROM (Therapeutic Exercise) bilateral;flexion;extension  -     Row Name 04/02/23 1101          Hand (Therapeutic Exercise)    Hand (Therapeutic Exercise) AROM (active range of motion)  -     Hand AROM/AAROM (Therapeutic Exercise) bilateral;finger flexion;finger extension  -     Row Name 04/02/23 1101          Motor Skills    Therapeutic Exercise shoulder;elbow/forearm;wrist;hand  -KD           User Key  (r) = Recorded By, (t) = Taken By, (c) = Cosigned By    Initials Name Provider Type     Flaquita Reyes COTA Occupational Therapist Assistant               Goals/Plan     Row Name 04/02/23 1101          Transfer Goal 1 (OT)    Activity/Assistive Device (Transfer Goal 1, OT) toilet  -KD     [place] Level/Cues Needed (Transfer Goal 1, OT) supervision required  -KD     Time Frame (Transfer Goal 1, OT) long term goal (LTG);by discharge  -KD     Progress/Outcome (Transfer Goal 1, OT) goal not met  -     Row Name 04/02/23 1101          Bathing Goal 1 (OT)    Activity/Device (Bathing Goal 1, OT) lower body bathing  -KD     [place] Level/Cues Needed (Bathing Goal 1, OT) minimum assist (75% or more patient effort)  -KD     Time Frame (Bathing Goal 1, OT) long term goal (LTG);by discharge  -KD     Progress/Outcomes (Bathing Goal 1, OT) goal not met  -     Row Name 04/02/23 1101          Dressing Goal 1 (OT)    Activity/Device  (Dressing Goal 1, OT) lower body dressing  -KD     [place]/Cues Needed (Dressing Goal 1, OT) minimum assist (75% or more patient effort)  -KD     Time Frame (Dressing Goal 1, OT) long term goal (LTG);by discharge  -KD     Progress/Outcome (Dressing Goal 1, OT) goal not met  -KD     Row Name 04/02/23 1101          Toileting Goal 1 (OT)    Activity/Device (Toileting Goal 1, OT) toileting skills, all  -KD     [place] Level/Cues Needed (Toileting Goal 1, OT) supervision required  -KD     Time Frame (Toileting Goal 1, OT) long term goal (LTG);by discharge  -KD     Progress/Outcome (Toileting Goal 1, OT) goal not met  -KD           User Key  (r) = Recorded By, (t) = Taken By, (c) = Cosigned By    Initials Name Provider Type    KD Flaquita Reyes COTA Occupational Therapist Assistant               Clinical Impression     Row Name 04/02/23 1101          Pain Assessment    Pretreatment Pain Rating 0/10 - no pain  -KD     Posttreatment Pain Rating 0/10 - no pain  -KD     Row Name 04/02/23 1101          Plan of Care Review    Plan of Care Reviewed With patient  -KD     Progress improving  -KD     Outcome Evaluation Pt defers any OOB but is agreeable to bed ex w/ encouragement. Pt tolerated UE ther ex w/ 2lb HW and good tolerance. Cont OT POC.  -KD     Row Name 04/02/23 1101          Therapy Assessment/Plan (OT)    Therapy Frequency (OT) other (see comments)  5-7 days a week  -KD     Row Name 04/02/23 1101          Therapy Plan Review/Discharge Plan (OT)    Anticipated Discharge Disposition (OT) home with 24/7 care;home with home health  -KD     Row Name 04/02/23 1101          Vital Signs    Pre Systolic BP Rehab 110  -KD     Pre Treatment Diastolic BP 59  -KD     Pretreatment Heart Rate (beats/min) 84  -KD     Pre SpO2 (%) 95  -KD     O2 Delivery Pre Treatment room air  -KD     Pre Patient Position Supine  -KD     Intra Patient Position Sitting  -KD     Post Patient Position Sitting  -KD     Row Name 04/02/23 1101           Positioning and Restraints    Pre-Treatment Position in bed  -KD     Post Treatment Position bed  -KD     In Bed fowlers;encouraged to call for assist;exit alarm on;call light within reach  -KD           User Key  (r) = Recorded By, (t) = Taken By, (c) = Cosigned By    Initials Name Provider Type    Flaquita Christensen COTA Occupational Therapist Assistant               Outcome Measures     Row Name 04/02/23 1101          How much help from another is currently needed...    Putting on and taking off regular lower body clothing? 2  -KD     Bathing (including washing, rinsing, and drying) 3  -KD     Toileting (which includes using toilet bed pan or urinal) 2  -KD     Putting on and taking off regular upper body clothing 4  -KD     Taking care of personal grooming (such as brushing teeth) 3  -KD     Eating meals 4  -KD     AM-PAC 6 Clicks Score (OT) 18  -KD           User Key  (r) = Recorded By, (t) = Taken By, (c) = Cosigned By    Initials Name Provider Type    Flaquita Christensen COTA Occupational Therapist Assistant                Occupational Therapy Education     Title: PT OT SLP Therapies (In Progress)     Topic: Occupational Therapy (In Progress)     Point: ADL training (Done)     Description:   Instruct learner(s) on proper safety adaptation and remediation techniques during self care or transfers.   Instruct in proper use of assistive devices.              Learning Progress Summary           Patient Acceptance, E,TB, VU,NR by  at 3/31/2023 2288    Comment: POC, role of OT, transfer training                   Point: Home exercise program (Not Started)     Description:   Instruct learner(s) on appropriate technique for monitoring, assisting and/or progressing therapeutic exercises/activities.              Learner Progress:  Not documented in this visit.          Point: Precautions (Not Started)     Description:   Instruct learner(s) on prescribed precautions during self-care and functional transfers.               Learner Progress:  Not documented in this visit.          Point: Body mechanics (Not Started)     Description:   Instruct learner(s) on proper positioning and spine alignment during self-care, functional mobility activities and/or exercises.              Learner Progress:  Not documented in this visit.                      User Key     Initials Effective Dates Name Provider Type Discipline    SJ 06/14/21 -  Marino Trejo OT Occupational Therapist OT              OT Recommendation and Plan  Therapy Frequency (OT): other (see comments) (5-7 days a week)  Plan of Care Review  Plan of Care Reviewed With: patient  Progress: improving  Outcome Evaluation: Pt defers any OOB but is agreeable to bed ex w/ encouragement. Pt tolerated UE ther ex w/ 2lb HW and good tolerance. Cont OT POC.     Time Calculation:    Time Calculation- OT     Row Name 04/02/23 1101             Time Calculation- OT    OT Start Time 1101  -KD      OT Stop Time 1124  -KD      OT Time Calculation (min) 23 min  -KD      Total Timed Code Minutes- OT 23 minute(s)  -KD      OT Received On 04/02/23  -KD         Timed Charges    34182 - OT Therapeutic Exercise Minutes 23  -KD         Total Minutes    Timed Charges Total Minutes 23  -KD       Total Minutes 23  -KD            User Key  (r) = Recorded By, (t) = Taken By, (c) = Cosigned By    Initials Name Provider Type    KD Flaquita Reyes COTA Occupational Therapist Assistant              Therapy Charges for Today     Code Description Service Date Service Provider Modifiers Qty    [id number] HC OT THER PROC EA 15 MIN 4/2/2023 Flaquita Reyes COTA GO 2               KELLEE Tiwari  4/2/2023

## 2023-04-03 PROBLEM — R33.8 ACUTE URINARY RETENTION: Status: ACTIVE | Noted: 2023-04-03

## 2023-04-03 LAB
ALBUMIN SERPL-MCNC: 3.4 G/DL (ref 3.5–5.2)
ALBUMIN/GLOB SERPL: 1.1 G/DL
ALP SERPL-CCNC: 56 U/L (ref 39–117)
ALT SERPL W P-5'-P-CCNC: 15 U/L (ref 1–41)
ANION GAP SERPL CALCULATED.3IONS-SCNC: 11 MMOL/L (ref 5–15)
AST SERPL-CCNC: 15 U/L (ref 1–40)
BASOPHILS # BLD AUTO: 0.04 10*3/MM3 (ref 0–0.2)
BASOPHILS NFR BLD AUTO: 0.6 % (ref 0–1.5)
BILIRUB SERPL-MCNC: 0.2 MG/DL (ref 0–1.2)
BUN SERPL-MCNC: 14 MG/DL (ref 8–23)
BUN/CREAT SERPL: 15.7 (ref 7–25)
CALCIUM SPEC-SCNC: 8.9 MG/DL (ref 8.6–10.5)
CHLORIDE SERPL-SCNC: 102 MMOL/L (ref 98–107)
CO2 SERPL-SCNC: 22 MMOL/L (ref 22–29)
CREAT SERPL-MCNC: 0.89 MG/DL (ref 0.76–1.27)
DEPRECATED RDW RBC AUTO: 40.8 FL (ref 37–54)
EGFRCR SERPLBLD CKD-EPI 2021: 87.7 ML/MIN/1.73
EOSINOPHIL # BLD AUTO: 0.16 10*3/MM3 (ref 0–0.4)
EOSINOPHIL NFR BLD AUTO: 2.2 % (ref 0.3–6.2)
ERYTHROCYTE [DISTWIDTH] IN BLOOD BY AUTOMATED COUNT: 13.2 % (ref 12.3–15.4)
GLOBULIN UR ELPH-MCNC: 3.2 GM/DL
GLUCOSE SERPL-MCNC: 145 MG/DL (ref 65–99)
HCT VFR BLD AUTO: 33 % (ref 37.5–51)
HGB BLD-MCNC: 11 G/DL (ref 13–17.7)
IMM GRANULOCYTES # BLD AUTO: 0.11 10*3/MM3 (ref 0–0.05)
IMM GRANULOCYTES NFR BLD AUTO: 1.5 % (ref 0–0.5)
LYMPHOCYTES # BLD AUTO: 1.48 10*3/MM3 (ref 0.7–3.1)
LYMPHOCYTES NFR BLD AUTO: 20.6 % (ref 19.6–45.3)
MCH RBC QN AUTO: 28 PG (ref 26.6–33)
MCHC RBC AUTO-ENTMCNC: 33.3 G/DL (ref 31.5–35.7)
MCV RBC AUTO: 84 FL (ref 79–97)
MONOCYTES # BLD AUTO: 0.59 10*3/MM3 (ref 0.1–0.9)
MONOCYTES NFR BLD AUTO: 8.2 % (ref 5–12)
NEUTROPHILS NFR BLD AUTO: 4.82 10*3/MM3 (ref 1.7–7)
NEUTROPHILS NFR BLD AUTO: 66.9 % (ref 42.7–76)
NRBC BLD AUTO-RTO: 0 /100 WBC (ref 0–0.2)
PLATELET # BLD AUTO: 323 10*3/MM3 (ref 140–450)
PMV BLD AUTO: 8.8 FL (ref 6–12)
POTASSIUM SERPL-SCNC: 4.4 MMOL/L (ref 3.5–5.2)
PROT SERPL-MCNC: 6.6 G/DL (ref 6–8.5)
RBC # BLD AUTO: 3.93 10*6/MM3 (ref 4.14–5.8)
SODIUM SERPL-SCNC: 135 MMOL/L (ref 136–145)
WBC NRBC COR # BLD: 7.2 10*3/MM3 (ref 3.4–10.8)

## 2023-04-03 PROCEDURE — 85025 COMPLETE CBC W/AUTO DIFF WBC: CPT | Performed by: FAMILY MEDICINE

## 2023-04-03 PROCEDURE — 97110 THERAPEUTIC EXERCISES: CPT

## 2023-04-03 PROCEDURE — 25010000002 CEFTRIAXONE PER 250 MG: Performed by: FAMILY MEDICINE

## 2023-04-03 PROCEDURE — 97530 THERAPEUTIC ACTIVITIES: CPT

## 2023-04-03 PROCEDURE — 97535 SELF CARE MNGMENT TRAINING: CPT

## 2023-04-03 PROCEDURE — 80053 COMPREHEN METABOLIC PANEL: CPT | Performed by: FAMILY MEDICINE

## 2023-04-03 RX ADMIN — ALPRAZOLAM 0.5 MG: 0.5 TABLET ORAL at 20:31

## 2023-04-03 RX ADMIN — SODIUM CHLORIDE 50 ML/HR: 9 INJECTION, SOLUTION INTRAVENOUS at 00:01

## 2023-04-03 RX ADMIN — Medication 10 ML: at 20:31

## 2023-04-03 RX ADMIN — SODIUM CHLORIDE 50 ML/HR: 9 INJECTION, SOLUTION INTRAVENOUS at 18:49

## 2023-04-03 RX ADMIN — DOCUSATE SODIUM 100 MG: 100 CAPSULE, LIQUID FILLED ORAL at 09:04

## 2023-04-03 RX ADMIN — TERAZOSIN HYDROCHLORIDE 2 MG: 2 CAPSULE ORAL at 20:31

## 2023-04-03 RX ADMIN — DOCUSATE SODIUM 100 MG: 100 CAPSULE, LIQUID FILLED ORAL at 20:31

## 2023-04-03 RX ADMIN — CEFTRIAXONE SODIUM 1 G: 1 INJECTION, POWDER, FOR SOLUTION INTRAMUSCULAR; INTRAVENOUS at 18:48

## 2023-04-03 RX ADMIN — PROPAFENONE HYDROCHLORIDE 150 MG: 150 TABLET, FILM COATED ORAL at 11:14

## 2023-04-03 RX ADMIN — PANTOPRAZOLE SODIUM 40 MG: 40 TABLET, DELAYED RELEASE ORAL at 06:45

## 2023-04-03 RX ADMIN — AMLODIPINE BESYLATE 10 MG: 10 TABLET ORAL at 09:04

## 2023-04-03 RX ADMIN — PROPAFENONE HYDROCHLORIDE 150 MG: 150 TABLET, FILM COATED ORAL at 18:48

## 2023-04-03 RX ADMIN — RIVAROXABAN 15 MG: 15 TABLET, FILM COATED ORAL at 09:04

## 2023-04-03 RX ADMIN — ISOSORBIDE MONONITRATE 30 MG: 30 TABLET, EXTENDED RELEASE ORAL at 09:04

## 2023-04-03 RX ADMIN — OXYCODONE HYDROCHLORIDE AND ACETAMINOPHEN 1 TABLET: 7.5; 325 TABLET ORAL at 18:48

## 2023-04-03 RX ADMIN — PROPAFENONE HYDROCHLORIDE 150 MG: 150 TABLET, FILM COATED ORAL at 04:30

## 2023-04-03 RX ADMIN — Medication 10 ML: at 09:04

## 2023-04-03 RX ADMIN — METOPROLOL SUCCINATE 25 MG: 25 TABLET, FILM COATED, EXTENDED RELEASE ORAL at 09:04

## 2023-04-03 NOTE — PLAN OF CARE
Goal Outcome Evaluation:  Plan of Care Reviewed With: patient           Outcome Evaluation: sup-sit-sup ind;sit-stand-sit cga  of 1;amb 2'x4-pt able to maintain nwb right le

## 2023-04-03 NOTE — THERAPY TREATMENT NOTE
Patient Name: Aj Card Jr.  : 1945    MRN: [id number]                              Today's Date: 4/3/2023       Admit Date: 3/30/2023    Visit Dx:     ICD-10-CM ICD-9-CM   1. OPAL (acute kidney injury)  N17.9 584.9   2. Urinary retention  R33.9 788.20   3. Atrial fibrillation with RVR  I48.91 427.31   4. Dehydration  E86.0 276.51   5. Nausea and vomiting, unspecified vomiting type  R11.2 787.01   6. Osteoarthritis of ankle and foot, right  M19.071 715.97   7. Impaired functional mobility, balance, gait, and endurance  Z74.09 V49.89   8. Impaired mobility and ADLs  Z74.09 V49.89    Z78.9      Patient Active Problem List   Diagnosis   • Type 2 diabetes mellitus without complication, without long-term current use of insulin ([facility])   • Essential hypertension   • Mixed hyperlipidemia   • Generalized anxiety disorder   • History of colon polyps   • Diverticulosis of large intestine without hemorrhage   • Coronary artery disease with angina pectoris   • Gastroesophageal reflux disease with esophagitis   • Vitamin D deficiency   • Overweight   • Encounter for screening for endocrine disorder   • Atopic dermatitis   • High serum vitamin B12   • Acute pain of right knee   • Tear of medial meniscus of right knee, current   • GERD (gastroesophageal reflux disease)   • PAF (paroxysmal atrial fibrillation) ([facility])   • Uncontrolled type 2 diabetes mellitus with hyperglycemia   • Gastroesophageal reflux disease   • Chronic constipation   • Stage 2 chronic kidney disease   • Osteoarthritis of ankle and foot, right   • Closed trimalleolar fracture of right ankle   • Status post bunionectomy   • OPAL (acute kidney injury)     Past Medical History:   Diagnosis Date   • Acute posthemorrhagic anemia    • Afib    • Allergic rhinitis    • Anxiety state    • Chest pain    • CKD (chronic kidney disease), stage II    • Coronary arteriosclerosis     3 stents, followed by Dr. Jeffers   • Diabetes mellitus     type 2   •  Diverticular disease of colon    • Dysphagia    • Elevated cholesterol    • Epigastric pain    • GERD (gastroesophageal reflux disease)     esophagitis on Dexilant. Pt feels Nexium working better      • History of echocardiogram     Small left atrial enlargement with mild CLVH.Ef of 55-60%.Mitral valve mildly thickened.Av thickened.Left ventricle mildly dilated.Tricuspid intact.Mild aortic insufficiency. 12/07/2015       • History of echocardiogram     Mild diastolic dysfunction and mild left atrial enlargement, otherwise normal echo. EF> 55% 01/03/2012       • Hypercholesterolemia    • Hypertension    • Insomnia    • Irritable bowel syndrome    • Osteoarthritis of multiple joints    • Pain of right foot    • PONV (postoperative nausea and vomiting)      Past Surgical History:   Procedure Laterality Date   • APPENDECTOMY       [facility] 1995       • CARDIAC CATHETERIZATION      Successful PTCA of the OMB#2.Unsuccessful PTCA of the 1st OMB, secondary to difficulty in advancing the balloon. 12/08/2015       • CHOLECYSTECTOMY      [facility] 1993       • CHOLECYSTECTOMY     • COLONOSCOPY  10/01/2012   • COLONOSCOPY N/A 12/11/2017    Procedure: COLONOSCOPY;  Surgeon: Bladimir Michael MD;  Location: [facility] ENDOSCOPY;  Service:    • COLONOSCOPY N/A 11/01/2022    Procedure: COLONOSCOPY;  Surgeon: Bladimir Michael MD;  Location: [facility] ENDOSCOPY;  Service: Gastroenterology;  Laterality: N/A;   • CORONARY ANGIOPLASTY      Successful PTCA & stenting LAD was done w/ deployment of a 2.5mm x23 Xience stent w/ reduction of stenosis from 90% to less than 0% stenosis with good LILLIAM 3 flow 02/17/2012       • ENDOSCOPY      with biopsy.  Mildly severe esophagitis. Gastritis. Normal examined duodenum.Several biopsies obtained in the lower third of the esophagus.Several biopsies obtained in the gastric antrum. 05/06/2016       • ENDOSCOPY      w tube. Normal esophagus. Gastritis in stomach. Biopsy taken.  Gastric polyp found. Biopsy taken. Normal duodenum. 10/01/2012       • ENDOSCOPY AND COLONOSCOPY      Diverticulum in sigmoid colon & descending colon. Internal & external hemorrhoids found. 10/01/2012       • EYE SURGERY Bilateral     catarract   • HAND SURGERY Left      Corrective osteotomy of ring finger metacarpal. Malunited fracture of left ring finger 05/21/1985       • HERNIA REPAIR      Laparoscopic hernia repair. prepertitoneal bilateral inguinal hernia repair with mesh. 10/15/2009      • OTHER SURGICAL HISTORY      CORONARY ARTERY STENT    • SKIN TAG REMOVAL      Excision, viral skin tag,right upper eyelid 05/08/1995       General Information     Row Name 04/03/23 0855          OT Time and Intention    Document Type therapy note (daily note)  -CM     Mode of Treatment individual therapy;occupational therapy  -CM     Row Name 04/03/23 0855          General Information    Patient Profile Reviewed yes  -CM     Existing Precautions/Restrictions non-weight bearing;right   -CM     Row Name 04/03/23 0855          Cognition    Orientation Status (Cognition) oriented x 4  -CM     Row Name 04/03/23 0855          Safety Issues, Functional Mobility    Impairments Affecting Function (Mobility) balance;endurance/activity tolerance;pain;strength  -CM           User Key  (r) = Recorded By, (t) = Taken By, (c) = Cosigned By    Initials Name Provider Type    CM Pastora Roberts OT Occupational Therapist                 Mobility/ADL's     Row Name 04/03/23 0855          Bed Mobility    Bed Mobility supine-sit  -CM     Supine-Sit Martin (Bed Mobility) standby assist  -CM     Assistive Device (Bed Mobility) bed rails;head of bed elevated  -CM     Row Name 04/03/23 0855          Transfers    Transfers sit-stand transfer;stand-sit transfer;bed-chair transfer  -CM     Row Name 04/03/23 0855          Bed-Chair Transfer    Bed-Chair Martin (Transfers) contact guard;1 person assist  -CM     Assistive Device (Bed-Chair  Transfers) walker, front-wheeled  -CM     Row Name 04/03/23 0855          Sit-Stand Transfer    Sit-Stand [place] (Transfers) contact guard;1 person assist  -CM     Assistive Device (Sit-Stand Transfers) walker, front-wheeled  -CM     Row Name 04/03/23 0855          Stand-Sit Transfer    Stand-Sit [place] (Transfers) contact guard;1 person assist  -CM     Assistive Device (Stand-Sit Transfers) walker, front-wheeled  -CM     Row Name 04/03/23 0855          Activities of Daily Living    BADL Assessment/Intervention lower body dressing;upper body dressing;bathing;grooming  -CM     Row Name 04/03/23 0855          Mobility    Extremity Weight-bearing Status right lower extremity  -CM     Right Lower Extremity (Weight-bearing Status) non weight-bearing (NWB)  -CM     Row Name 04/03/23 0855          Lower Body Dressing Assessment/Training    Ellsworth Level (Lower Body Dressing) lower body dressing skills;doff;don;socks;set up  -CM     Position (Lower Body Dressing) supported sitting  -CM     Row Name 04/03/23 08          Upper Body Dressing Assessment/Training    [place] Level (Upper Body Dressing) upper body dressing skills;doff;don;other (see comments);set up  HG  -CM     Position (Upper Body Dressing) supported sitting  -CM     Row Name 04/03/23 0855          Bathing Assessment/Intervention    [place] Level (Bathing) bathing skills;lower body;minimum assist (75% patient effort);upper body;set up  -CM     Position (Bathing) supported sitting  -CM     Row Name 04/03/23 08          Grooming Assessment/Training    [place] Level (Grooming) grooming skills;hair care, combing/brushing;wash face, hands;set up  -CM     Position (Grooming) supported sitting  -CM           User Key  (r) = Recorded By, (t) = Taken By, (c) = Cosigned By    Initials Name Provider Type    Pastora Cristina OT Occupational Therapist               Obj/Interventions    No documentation.                Goals/Plan     Row  Name 04/03/23 0855          Transfer Goal 1 (OT)    Activity/Assistive Device (Transfer Goal 1, OT) toilet  -CM     Clinch Level/Cues Needed (Transfer Goal 1, OT) supervision required  -CM     Time Frame (Transfer Goal 1, OT) long term goal (LTG);by discharge  -CM     Progress/Outcome (Transfer Goal 1, OT) goal not met  -CM     Row Name 04/03/23 0855          Bathing Goal 1 (OT)    Activity/Device (Bathing Goal 1, OT) lower body bathing  -CM     Clinch Level/Cues Needed (Bathing Goal 1, OT) minimum assist (75% or more patient effort)  -CM     Time Frame (Bathing Goal 1, OT) long term goal (LTG);by discharge  -CM     Progress/Outcomes (Bathing Goal 1, OT) goal not met  -CM     Row Name 04/03/23 0855          Dressing Goal 1 (OT)    Activity/Device (Dressing Goal 1, OT) lower body dressing  -CM     Clinch/Cues Needed (Dressing Goal 1, OT) minimum assist (75% or more patient effort)  -CM     Time Frame (Dressing Goal 1, OT) long term goal (LTG);by discharge  -CM     Progress/Outcome (Dressing Goal 1, OT) goal not met;good progress toward goal  -CM     Row Name 04/03/23 0855          Toileting Goal 1 (OT)    Activity/Device (Toileting Goal 1, OT) toileting skills, all  -CM     Clinch Level/Cues Needed (Toileting Goal 1, OT) supervision required  -CM     Time Frame (Toileting Goal 1, OT) long term goal (LTG);by discharge  -CM     Progress/Outcome (Toileting Goal 1, OT) goal not met  -CM           User Key  (r) = Recorded By, (t) = Taken By, (c) = Cosigned By    Initials Name Provider Type    Pastora Cristina, OT Occupational Therapist               Clinical Impression     Row Name 04/03/23 0855          Pain Assessment    Pretreatment Pain Rating 2/10  -CM     Posttreatment Pain Rating 2/10  -CM     Pain Location - Side/Orientation Right  -CM     Pain Location lower  -CM     Pain Location - extremity  -CM     Pain Intervention(s) Repositioned;Ambulation/increased activity  -CM     Row Name  04/03/23 0855          Plan of Care Review    Plan of Care Reviewed With patient  -CM     Outcome Evaluation OT tx completed. Alert and agreeable to theapy. VSS. Pt was SBA for sup to sit. CGA for sit <> stand and t/f to chair with FWW. Pt was set-up for UB and LB dressing. Min A for LB bathing and set-up for LB dressing. Pt left sitting up in chair with exit alarm on and all needs in reach. Cont OT POC. Good progression towards ADL goals.  -CM     Row Name 04/03/23 0855          Therapy Assessment/Plan (OT)    Rehab Potential (OT) good, to achieve stated therapy goals  -CM     Criteria for Skilled Therapeutic Interventions Met (OT) yes;skilled treatment is necessary  -CM     Therapy Frequency (OT) other (see comments)  5-7 d/wk  -CM     Row Name 04/03/23 0855          Therapy Plan Review/Discharge Plan (OT)    Anticipated Discharge Disposition (OT) home with 24/7 care;home with home health  -CM     Row Name 04/03/23 0855          Vital Signs    Pre Systolic BP Rehab 123  -CM     Pre Treatment Diastolic BP 62  -CM     Pretreatment Heart Rate (beats/min) 90  -CM     Pre SpO2 (%) 95  -CM     O2 Delivery Pre Treatment room air  -CM     Pre Patient Position Supine  -CM     Row Name 04/03/23 0855          Positioning and Restraints    Pre-Treatment Position in bed  -CM     Post Treatment Position chair  -CM     In Chair notified nsg;reclined;sitting;call light within reach;encouraged to call for assist;exit alarm on;legs elevated  -CM           User Key  (r) = Recorded By, (t) = Taken By, (c) = Cosigned By    Initials Name Provider Type    Pastora Cristina, OT Occupational Therapist               Outcome Measures     Row Name 04/03/23 0855          How much help from another is currently needed...    Putting on and taking off regular lower body clothing? 3  -CM     Bathing (including washing, rinsing, and drying) 3  -CM     Toileting (which includes using toilet bed pan or urinal) 2  -CM     Putting on and taking off  regular upper body clothing 4  -CM     Taking care of personal grooming (such as brushing teeth) 4  -CM     Eating meals 4  -CM     AM-PAC 6 Clicks Score (OT) 20  -CM     Row Name 04/03/23 0855          Functional Assessment    Outcome Measure Options AM-PAC 6 Clicks Daily Activity (OT)  -CM           User Key  (r) = Recorded By, (t) = Taken By, (c) = Cosigned By    Initials Name Provider Type    CM Pastora Roberts OT Occupational Therapist                Occupational Therapy Education     Title: PT OT SLP Therapies (In Progress)     Topic: Occupational Therapy (In Progress)     Point: ADL training (Done)     Description:   Instruct learner(s) on proper safety adaptation and remediation techniques during self care or transfers.   Instruct in proper use of assistive devices.              Learning Progress Summary           Patient Acceptance, E,TB, VU,NR by  at 3/31/2023 5600    Comment: POC, role of OT, transfer training                   Point: Home exercise program (Not Started)     Description:   Instruct learner(s) on appropriate technique for monitoring, assisting and/or progressing therapeutic exercises/activities.              Learner Progress:  Not documented in this visit.          Point: Precautions (Not Started)     Description:   Instruct learner(s) on prescribed precautions during self-care and functional transfers.              Learner Progress:  Not documented in this visit.          Point: Body mechanics (Not Started)     Description:   Instruct learner(s) on proper positioning and spine alignment during self-care, functional mobility activities and/or exercises.              Learner Progress:  Not documented in this visit.                      User Key     Initials Effective Dates Name Provider Type Discipline     06/14/21 -  Marino Trejo OT Occupational Therapist OT              OT Recommendation and Plan  Therapy Frequency (OT): other (see comments) (5-7 d/wk)  Plan of Care Review  Plan of  Care Reviewed With: patient  Outcome Evaluation: OT tx completed. Alert and agreeable to theapy. VSS. Pt was SBA for sup to sit. CGA for sit <> stand and t/f to chair with FWW. Pt was set-up for UB and LB dressing. Min A for LB bathing and set-up for LB dressing. Pt left sitting up in chair with exit alarm on and all needs in reach. Cont OT POC. Good progression towards ADL goals.     Time Calculation:    Time Calculation- OT     Row Name 04/03/23 0936             Time Calculation- OT    OT Start Time 0855  -CM      OT Stop Time 0934  -CM      OT Time Calculation (min) 39 min  -CM      Total Timed Code Minutes- OT 39 minute(s)  -CM      OT Received On 04/03/23  -CM         Timed Charges    87332 - OT Therapeutic Activity Minutes 14  -CM      44343 - OT Self Care/Mgmt Minutes 25  -CM         Total Minutes    Timed Charges Total Minutes 39  -CM       Total Minutes 39  -CM            User Key  (r) = Recorded By, (t) = Taken By, (c) = Cosigned By    Initials Name Provider Type    Pastora Cristina OT Occupational Therapist              Therapy Charges for Today     Code Description Service Date Service Provider Modifiers Qty    04227637832 HC OT SELF CARE/MGMT/TRAIN EA 15 MIN 4/3/2023 Pastora Roberts OT GO 2    07151962981 HC OT THERAPEUTIC ACT EA 15 MIN 4/3/2023 Pastora Roberts OT GO 1               Pastora Roberts OT  4/3/2023

## 2023-04-03 NOTE — PLAN OF CARE
Goal Outcome Evaluation:  Plan of Care Reviewed With: patient        Progress: no change     Pt agreed to stay tonight (see previously note), but wants to be discharged Tuesday in time to keep his midafternoon podiatry appointment. He recently had foot/ankle surgery. The surgical dressing is scheduled to be changed Tuesday. Podiatry reportedly is unable to provide hospital coverage this week due to provider being out of office. Gray catheter still in place.

## 2023-04-03 NOTE — NURSING NOTE
From the start of shift today, Pt has been anxious about going home and claiming he had not seen a doctor in several days. He appears a little disoriented at times and claims he is tired of getting the round-around. Provider was informed of Pt's unhappiness and met with the Pt late morning. Urology apparently wants to keep ratliff catheter in another night.    At midafternoon, Pt started getting belligerent and demanded that a doctor come to his room and discharge him. Provider was against contacted. Provider said he is willing to discharge Pt today if urology signs off on sending Pt home with the ratliff. At this writing, provider is waiting to see what urology wants to do. Signee called Pt's wife to provide an update on the Pt's demands and see if she is able to soothe his frustration.

## 2023-04-03 NOTE — PROGRESS NOTES
[facility] Medicine   INPATIENT PROGRESS NOTE      Patient Name: Aj Card Jr.  Date of Admission: 3/30/2023  Today's Date: 04/03/23  Length of Stay: 1  Primary Care Physician: Andreas Martinez MD    Subjective   Chief Complaint:   HPI   Overnight course reviewed, he wanted the catheter to be taken out and go home today, kept reiterating the family support that he has once he is out of the hospital. Urine clear, no other issues but he had been confused. Later in the day, contacted urology and found out that urology saw him yesterday evening, they were concerned about his increased confusion. Hence wanted him to stay in the hospital and maintain the gray. Urologist is probably going to see him in the clinic. He had additional concerns about not getting his trulicity which he is supposed to get today (explained that its not in the hospital formulary; last time his home supply was given to him). He is also supposed to see the podiatrist after his surgery and that appointment is tomorrow afternoon. Called the podiatrist but there is no hospital coverage this week since his podiatrist is off this week.   Review of Systems   All pertinent negatives and positives are as above. All other systems have been reviewed and are negative unless otherwise stated.     Objective    Temp:  [97.3 °F (36.3 °C)-97.8 °F (36.6 °C)] 97.5 °F (36.4 °C)  Heart Rate:  [80-99] 86  Resp:  [18] 18  BP: ()/(52-61) 117/58  Physical Exam  Laying in bed, NAD  PERRL, EOMI  Mucosae moist  Breathing stable  Heart rate controlled. Normotensive  Gray in place  Moving all limbs  Pleasantly confused  Results Review:  I have reviewed the labs, radiology results, and diagnostic studies.    Laboratory Data:   Results from last 7 days   Lab Units 04/03/23  0534 04/01/23  0509 03/31/23  0549   WBC 10*3/mm3 7.20 6.60 11.06*   HEMOGLOBIN g/dL 11.0* 11.7* 11.3*   HEMATOCRIT % 33.0* 35.1* 34.2*    PLATELETS 10*3/mm3 323 303 307        Results from last 7 days   Lab Units 04/03/23  0534 04/01/23  0509 03/31/23  0549 03/30/23  1411   SODIUM mmol/L 135* 132* 130* 129*   POTASSIUM mmol/L 4.4 4.5 4.3 4.5   CHLORIDE mmol/L 102 101 99 93*   CO2 mmol/L 22.0 20.0* 22.0 20.0*   BUN mg/dL 14 24* 55* 81*   CREATININE mg/dL 0.89 0.91 1.63* 2.78*   CALCIUM mg/dL 8.9 8.8 8.3* 9.1   BILIRUBIN mg/dL 0.2  --   --  0.7   ALK PHOS U/L 56  --   --  91   ALT (SGPT) U/L 15  --   --  13   AST (SGOT) U/L 15  --   --  11   GLUCOSE mg/dL 145* 149* 118* 159*   Culture Data:   No results found for: BLOODCX  No results found for: URINECX  No results found for: RESPCX  No results found for: WOUNDCX  No results found for: STOOLCX  No components found for: BODYFLD    Radiology Data:   Imaging Results (Last 24 Hours)     ** No results found for the last 24 hours. **      I have reviewed the patient's current medications.   Assessment/Plan     Active Hospital Problems    Diagnosis    • **OPAL (acute kidney injury) Resolved at this time, likely secondary to urinary retention   • Acute urinary retention Urology following him and they want to continue the ratliff for now, will defer taking it out till urology is OK with it. Rocephin and hytrin continued   • Atrial fibrillation with RVR Rate controlled at this time with metoprolol and rhythmol   • Gastroesophageal reflux disease with esophagitis No change in PPI   • Type 2 diabetes mellitus without complication, without long-term current use of insulin (HCC) Currently not on insulin, he only uses trulicity weekly. Doesn't want to consider insulin   • Essential hypertension Continue amlodipine and metoprolol   • Mixed hyperlipidemia Stable for now   • Generalized anxiety disorder Contributing to his urgency for why he wants to go home   Stay today, waiting for urology final recommendations   Electronically signed by Samina Hennessy MD, 04/03/23, 09:21 CDT.

## 2023-04-03 NOTE — PLAN OF CARE
Goal Outcome Evaluation:  Plan of Care Reviewed With: patient           Outcome Evaluation: OT tx completed. Alert and agreeable to theapy. VSS. Pt was SBA for sup to sit. CGA for sit <> stand and t/f to chair with FWW. Pt was set-up for UB and LB dressing. Min A for LB bathing and set-up for LB dressing. Pt left sitting up in chair with exit alarm on and all needs in reach. Cont OT POC. Good progression towards ADL goals.

## 2023-04-03 NOTE — THERAPY TREATMENT NOTE
Acute Care - Physical Therapy Treatment Note  [facility]     Patient Name: Aj Card Jr.  : 1945  MRN: [id number]  Today's Date: 4/3/2023      Visit Dx:     ICD-10-CM ICD-9-CM   1. OPAL (acute kidney injury)  N17.9 584.9   2. Urinary retention  R33.9 788.20   3. Atrial fibrillation with RVR  I48.91 427.31   4. Dehydration  E86.0 276.51   5. Nausea and vomiting, unspecified vomiting type  R11.2 787.01   6. Osteoarthritis of ankle and foot, right  M19.071 715.97   7. Impaired functional mobility, balance, gait, and endurance  Z74.09 V49.89   8. Impaired mobility and ADLs  Z74.09 V49.89    Z78.9      Patient Active Problem List   Diagnosis   • Type 2 diabetes mellitus without complication, without long-term current use of insulin ([facility])   • Essential hypertension   • Mixed hyperlipidemia   • Generalized anxiety disorder   • History of colon polyps   • Diverticulosis of large intestine without hemorrhage   • Coronary artery disease with angina pectoris   • Gastroesophageal reflux disease with esophagitis   • Vitamin D deficiency   • Overweight   • Encounter for screening for endocrine disorder   • Atopic dermatitis   • High serum vitamin B12   • Acute pain of right knee   • Tear of medial meniscus of right knee, current   • GERD (gastroesophageal reflux disease)   • PAF (paroxysmal atrial fibrillation) ([facility])   • Uncontrolled type 2 diabetes mellitus with hyperglycemia   • Gastroesophageal reflux disease   • Chronic constipation   • Stage 2 chronic kidney disease   • Osteoarthritis of ankle and foot, right   • Closed trimalleolar fracture of right ankle   • Status post bunionectomy   • OPAL (acute kidney injury)     Past Medical History:   Diagnosis Date   • Acute posthemorrhagic anemia    • Afib    • Allergic rhinitis    • Anxiety state    • Chest pain    • CKD (chronic kidney disease), stage II    • Coronary arteriosclerosis     3 stents, followed by Dr. Jeffers   • Diabetes mellitus     type 2    • Diverticular disease of colon    • Dysphagia    • Elevated cholesterol    • Epigastric pain    • GERD (gastroesophageal reflux disease)     esophagitis on Dexilant. Pt feels Nexium working better      • History of echocardiogram     Small left atrial enlargement with mild CLVH.Ef of 55-60%.Mitral valve mildly thickened.Av thickened.Left ventricle mildly dilated.Tricuspid intact.Mild aortic insufficiency. 12/07/2015       • History of echocardiogram     Mild diastolic dysfunction and mild left atrial enlargement, otherwise normal echo. EF> 55% 01/03/2012       • Hypercholesterolemia    • Hypertension    • Insomnia    • Irritable bowel syndrome    • Osteoarthritis of multiple joints    • Pain of right foot    • PONV (postoperative nausea and vomiting)      Past Surgical History:   Procedure Laterality Date   • APPENDECTOMY       [facility] 1995       • CARDIAC CATHETERIZATION      Successful PTCA of the OMB#2.Unsuccessful PTCA of the 1st OMB, secondary to difficulty in advancing the balloon. 12/08/2015       • CHOLECYSTECTOMY      [facility] 1993       • CHOLECYSTECTOMY     • COLONOSCOPY  10/01/2012   • COLONOSCOPY N/A 12/11/2017    Procedure: COLONOSCOPY;  Surgeon: Bladimir Michael MD;  Location: [facility] ENDOSCOPY;  Service:    • COLONOSCOPY N/A 11/01/2022    Procedure: COLONOSCOPY;  Surgeon: Bladimir Michael MD;  Location: [facility] ENDOSCOPY;  Service: Gastroenterology;  Laterality: N/A;   • CORONARY ANGIOPLASTY      Successful PTCA & stenting LAD was done w/ deployment of a 2.5mm x23 Xience stent w/ reduction of stenosis from 90% to less than 0% stenosis with good LILLIAM 3 flow 02/17/2012       • ENDOSCOPY      with biopsy.  Mildly severe esophagitis. Gastritis. Normal examined duodenum.Several biopsies obtained in the lower third of the esophagus.Several biopsies obtained in the gastric antrum. 05/06/2016       • ENDOSCOPY      w tube. Normal esophagus. Gastritis in stomach. Biopsy taken.  Gastric polyp found. Biopsy taken. Normal duodenum. 10/01/2012       • ENDOSCOPY AND COLONOSCOPY      Diverticulum in sigmoid colon & descending colon. Internal & external hemorrhoids found. 10/01/2012       • EYE SURGERY Bilateral     catarract   • HAND SURGERY Left      Corrective osteotomy of ring finger metacarpal. Malunited fracture of left ring finger 05/21/1985       • HERNIA REPAIR      Laparoscopic hernia repair. prepertitoneal bilateral inguinal hernia repair with mesh. 10/15/2009      • OTHER SURGICAL HISTORY      CORONARY ARTERY STENT    • SKIN TAG REMOVAL      Excision, viral skin tag,right upper eyelid 05/08/1995      PT Assessment (last 12 hours)     PT Evaluation and Treatment     Row Name 04/03/23 1439          Physical Therapy Time and Intention    Subjective Information complains of;pain  -LN     Document Type therapy note (daily note)  -LN     Mode of Treatment physical therapy  -LN     Row Name 04/03/23 1439          General Information    Patient Profile Reviewed yes  -LN     Existing Precautions/Restrictions non-weight bearing;right  -LN     Row Name 04/03/23 1439          Pain    Pretreatment Pain Rating 0/10 - no pain  -LN     Posttreatment Pain Rating 2/10  -LN     Pain Location - Side/Orientation Right  -LN     Pain Location - back  -LN     Pain Intervention(s) Repositioned;Declines  -LN     Row Name 04/03/23 1439          Cognition    Orientation Status (Cognition) oriented x 4  -LN     Row Name 04/03/23 1439          Range of Motion Comprehensive    General Range of Motion bilateral lower extremity ROM WFL  -LN     Row Name 04/03/23 1439          Mobility    Extremity Weight-bearing Status right lower extremity  -LN     Right Lower Extremity (Weight-bearing Status) non weight-bearing (NWB)  -LN     Row Name 04/03/23 1439          Bed Mobility    Bed Mobility supine-sit;sit-supine  -LN     Supine-Sit [place] (Bed Mobility) independent  -LN     Sit-Supine [place] (Bed Mobility)  independent  -LN     Row Name 04/03/23 1439          Sit-Stand Transfer    Sit-Stand [place] (Transfers) contact guard  -LN     Assistive Device (Sit-Stand Transfers) walker, front-wheeled  -LN     Row Name 04/03/23 1439          Stand-Sit Transfer    Stand-Sit [place] (Transfers) --  to reach back  -LN     Assistive Device (Stand-Sit Transfers) walker, front-wheeled  -LN     Row Name 04/03/23 1439          Gait/Stairs (Locomotion)    [place] Level (Gait) contact guard  -LN     Assistive Device (Gait) walker, front-wheeled  -LN     Distance in Feet (Gait) 2'x4  -LN     Row Name 04/03/23 1439          Safety Issues, Functional Mobility    Impairments Affecting Function (Mobility) balance;endurance/activity tolerance;pain;strength  -     Row Name 04/03/23 1439          Motor Skills    Therapeutic Exercise hip;knee;ankle  -[facility] Name 04/03/23 1439          Hip (Therapeutic Exercise)    Hip (Therapeutic Exercise) AROM (active range of motion)  -LN     Hip AROM (Therapeutic Exercise) bilateral;flexion  -     Row Name 04/03/23 1439          Knee (Therapeutic Exercise)    Knee (Therapeutic Exercise) AROM (active range of motion)  -LN     Knee AROM (Therapeutic Exercise) bilateral;LAQ (long arc quad)  -     Row Name 04/03/23 1439          Ankle (Therapeutic Exercise)    Ankle (Therapeutic Exercise) AROM (active range of motion)  -LN     Ankle AROM (Therapeutic Exercise) left;dorsiflexion;plantarflexion  -     Row Name 04/03/23 1439          Plan of Care Review    Plan of Care Reviewed With patient  -LN     Outcome Evaluation sup-sit-sup ind;sit-stand-sit cga  of 1;amb 2'x4-pt able to maintain nwb right le  -     Row Name 04/03/23 1439          Vital Signs    Pre Systolic BP Rehab 108  -LN     Pre Treatment Diastolic BP 59  -LN     Post Systolic BP Rehab 137  -LN     Post Treatment Diastolic BP 70  -LN     Pretreatment Heart Rate (beats/min) 87  -LN     Posttreatment Heart Rate (beats/min) 88   -LN     Pre SpO2 (%) 94  -LN     O2 Delivery Pre Treatment room air  -LN     Post SpO2 (%) 96  -LN     Pre Patient Position Supine  -LN     Intra Patient Position Standing  -LN     Post Patient Position Supine  -LN     Row Name 04/03/23 1439          Positioning and Restraints    In Bed supine;call light within reach;encouraged to call for assist;exit alarm on  -LN     Row Name 04/03/23 1439          Therapy Assessment/Plan (PT)    Rehab Potential (PT) good, to achieve stated therapy goals  -LN     Criteria for Skilled Interventions Met (PT) yes  -LN     Therapy Frequency (PT) other (see comments)  5-7 times/wk  -LN     Row Name 04/03/23 1439          Transfer Goal 1 (PT)    Activity/Assistive Device (Transfer Goal 1, PT) sit-to-stand/stand-to-sit;walker, rolling  -LN     [place] Level/Cues Needed (Transfer Goal 1, PT) standby assist  -LN     Time Frame (Transfer Goal 1, PT) long term goal (LTG);by discharge  -LN     Strategies/Barriers (Transfers Goal 1, PT) Maintain weightbearing status  -LN     Progress/Outcome (Transfer Goal 1, PT) goal not met  -LN     Row Name 04/03/23 1439          Gait Training Goal 1 (PT)    Activity/Assistive Device (Gait Training Goal 1, PT) gait (walking locomotion);assistive device use;maintain weight-bearing status  -LN     [place] Level (Gait Training Goal 1, PT) contact guard required  -LN     Distance (Gait Training Goal 1, PT) 10' to and from bathroom  -LN     Time Frame (Gait Training Goal 1, PT) long term goal (LTG);by discharge  -LN     Progress/Outcome (Gait Training Goal 1, PT) goal not met  -LN           User Key  (r) = Recorded By, (t) = Taken By, (c) = Cosigned By    Initials Name Provider Type    LN Miroslava Ken PTA Physical Therapist Assistant                Physical Therapy Education     Title: PT OT SLP Therapies (In Progress)     Topic: Physical Therapy (Done)     Point: Mobility training (Done)     Learning Progress Summary           Patient Acceptance,  E,D, VU by ND at 4/1/2023 1332    Comment: Weight bearing status, mobility, POC                   Point: Home exercise program (Done)     Learning Progress Summary           Patient Acceptance, E,D, VU by ND at 4/1/2023 1332    Comment: Weight bearing status, mobility, POC                   Point: Body mechanics (Done)     Learning Progress Summary           Patient Acceptance, E,D, VU by ND at 4/1/2023 1332    Comment: Weight bearing status, mobility, POC                   Point: Precautions (Done)     Learning Progress Summary           Patient Acceptance, E,D, VU by ND at 4/1/2023 1332    Comment: Weight bearing status, mobility, POC                               User Key     Initials Effective Dates Name Provider Type Discipline    ND 01/30/23 -  Karthik Ralph, PT Physical Therapist PT              PT Recommendation and Plan  Anticipated Discharge Disposition (PT): home with 24/7 care, home with home health  Therapy Frequency (PT): other (see comments) (5-7 times/wk)  Plan of Care Reviewed With: patient  Outcome Evaluation: sup-sit-sup ind;sit-stand-sit cga  of 1;amb 2'x4-pt able to maintain nwb right le   Outcome Measures     Row Name 04/03/23 1449 04/03/23 1439          How much help from another person do you currently need...    Turning from your back to your side while in flat bed without using bedrails? -- 4  -LN     Moving from lying on back to sitting on the side of a flat bed without bedrails? -- 4  -LN     Moving to and from a bed to a chair (including a wheelchair)? -- 3  -LN     Standing up from a chair using your arms (e.g., wheelchair, bedside chair)? -- 3  -LN     Climbing 3-5 steps with a railing? -- 2  -LN     To walk in hospital room? -- 3  -LN     AM-PAC 6 Clicks Score (PT) -- 19  -LN        Functional Assessment    Outcome Measure Options AM-PAC 6 Clicks Basic Mobility (PT)  -LN AM-PAC 6 Clicks Basic Mobility (PT)  -LN           User Key  (r) = Recorded By, (t) = Taken By, (c) = Cosigned By     Initials Name Provider Type    Miroslava Polanco PTA Physical Therapist Assistant                 Time Calculation:    PT Charges     Row Name 04/03/23 1505             Time Calculation    Start Time 1439  -LN      Stop Time 1505  -LN      Time Calculation (min) 26 min  -LN      PT Received On 04/03/23  -LN         Time Calculation- PT    Total Timed Code Minutes- PT 26 minute(s)  -LN            User Key  (r) = Recorded By, (t) = Taken By, (c) = Cosigned By    Initials Name Provider Type    LN Miroslava Ken, AURA Physical Therapist Assistant              Therapy Charges for Today     Code Description Service Date Service Provider Modifiers Qty    49314042723 HC PT THERAPEUTIC ACT EA 15 MIN 4/3/2023 Miroslava Ken PTA GP 1    50052962706 HC PT THER PROC EA 15 MIN 4/3/2023 Miroslava Ken PTA GP 1          PT G-Codes  Outcome Measure Options: AM-PAC 6 Clicks Basic Mobility (PT)  AM-PAC 6 Clicks Score (PT): 19  AM-PAC 6 Clicks Score (OT): 20    Miroslava Ken PTA  4/3/2023

## 2023-04-04 PROCEDURE — 97535 SELF CARE MNGMENT TRAINING: CPT

## 2023-04-04 PROCEDURE — 25010000002 CEFTRIAXONE PER 250 MG: Performed by: FAMILY MEDICINE

## 2023-04-04 PROCEDURE — 97530 THERAPEUTIC ACTIVITIES: CPT

## 2023-04-04 RX ORDER — ACETAMINOPHEN 325 MG/1
650 TABLET ORAL EVERY 6 HOURS PRN
Status: DISCONTINUED | OUTPATIENT
Start: 2023-04-04 | End: 2023-04-07 | Stop reason: HOSPADM

## 2023-04-04 RX ADMIN — TERAZOSIN HYDROCHLORIDE 2 MG: 2 CAPSULE ORAL at 21:16

## 2023-04-04 RX ADMIN — PROPAFENONE HYDROCHLORIDE 150 MG: 150 TABLET, FILM COATED ORAL at 13:41

## 2023-04-04 RX ADMIN — ALPRAZOLAM 0.5 MG: 0.5 TABLET ORAL at 23:23

## 2023-04-04 RX ADMIN — CEFTRIAXONE SODIUM 1 G: 1 INJECTION, POWDER, FOR SOLUTION INTRAMUSCULAR; INTRAVENOUS at 18:38

## 2023-04-04 RX ADMIN — RIVAROXABAN 15 MG: 15 TABLET, FILM COATED ORAL at 09:06

## 2023-04-04 RX ADMIN — Medication 10 ML: at 21:17

## 2023-04-04 RX ADMIN — Medication 10 ML: at 09:06

## 2023-04-04 RX ADMIN — PROPAFENONE HYDROCHLORIDE 150 MG: 150 TABLET, FILM COATED ORAL at 05:46

## 2023-04-04 RX ADMIN — PROPAFENONE HYDROCHLORIDE 150 MG: 150 TABLET, FILM COATED ORAL at 21:16

## 2023-04-04 RX ADMIN — DOCUSATE SODIUM 100 MG: 100 CAPSULE, LIQUID FILLED ORAL at 21:16

## 2023-04-04 RX ADMIN — PANTOPRAZOLE SODIUM 40 MG: 40 TABLET, DELAYED RELEASE ORAL at 05:46

## 2023-04-04 RX ADMIN — DOCUSATE SODIUM 100 MG: 100 CAPSULE, LIQUID FILLED ORAL at 09:06

## 2023-04-04 NOTE — PLAN OF CARE
Goal Outcome Evaluation:  Plan of Care Reviewed With: patient        Progress: improving  Outcome Evaluation: OT tx completed this date. Sup>sit-sba. Pt sat EOB SBA w/ good sitting balance. Sit>stand-CGA. Stand pivot from bed>chair -CGA w/ RW while maintaing WBS to RLE. Grooming-set up w/ vc's. Pt up in recliner upon exit eating breakfast w/ all needs in reach. Cont OT POC.

## 2023-04-04 NOTE — PLAN OF CARE
Goal Outcome Evaluation:  Plan of Care Reviewed With: patient           Outcome Evaluation: sup-sit ind,sit-stand-sit cga of 1;std pivot bed-chair with rw and cga of 1-leg elevated

## 2023-04-04 NOTE — PROGRESS NOTES
[facility]   INPATIENT PROGRESS NOTE      Patient Name: Aj Card Jr.  Date of Admission: 3/30/2023  Today's Date: 04/04/23  Length of Stay: 2  Primary Care Physician: Andreas Martinez MD    Subjective   Chief Complaint:   HPI   Overnight course reviewed, laying in bed but woke up. Asked him how his conversation with the urologist went last night and this morning, and he just mentioned that he was told he was OK even though there were no orders to have the gray removed. Moved on to needing to go to the podiatrist office to get his dressing changed which had been bothering him since yesterday. Called the urologist and spoke with him, found out that patient is scheduled to have a  Cystoscopy tomorrow morning, but if he absolutely has to go out of the hospital today, then they would follow up with him in the clinic and then schedule it then.    Review of Systems   Gastrointestinal: Positive for nausea and vomiting.   All pertinent negatives and positives are as above. All other systems have been reviewed and are negative unless otherwise stated.     Objective    Temp:  [97.7 °F (36.5 °C)-98.5 °F (36.9 °C)] 97.7 °F (36.5 °C)  Heart Rate:  [76-97] 76  Resp:  [18] 18  BP: (101-137)/(49-70) 108/54  Physical Exam  Laying in bed but woke up  Breathing stable  Heart rate controlled. Normotensive  Gray in place  Moving all limbs, right leg dressed and splinted  confused  Results Review:  I have reviewed the labs, radiology results, and diagnostic studies.    Laboratory Data:   Results from last 7 days   Lab Units 04/03/23  0534 04/01/23  0509 03/31/23  0549   WBC 10*3/mm3 7.20 6.60 11.06*   HEMOGLOBIN g/dL 11.0* 11.7* 11.3*   HEMATOCRIT % 33.0* 35.1* 34.2*   PLATELETS 10*3/mm3 323 303 307        Results from last 7 days   Lab Units 04/03/23  0534 04/01/23  0509 03/31/23  0549 03/30/23  1411   SODIUM mmol/L 135* 132* 130* 129*   POTASSIUM mmol/L 4.4 4.5  4.3 4.5   CHLORIDE mmol/L 102 101 99 93*   CO2 mmol/L 22.0 20.0* 22.0 20.0*   BUN mg/dL 14 24* 55* 81*   CREATININE mg/dL 0.89 0.91 1.63* 2.78*   CALCIUM mg/dL 8.9 8.8 8.3* 9.1   BILIRUBIN mg/dL 0.2  --   --  0.7   ALK PHOS U/L 56  --   --  91   ALT (SGPT) U/L 15  --   --  13   AST (SGOT) U/L 15  --   --  11   GLUCOSE mg/dL 145* 149* 118* 159*   Culture Data:   No results found for: BLOODCX  No results found for: URINECX  No results found for: RESPCX  No results found for: WOUNDCX  No results found for: STOOLCX  No components found for: BODYFLD    Radiology Data:   Imaging Results (Last 24 Hours)     ** No results found for the last 24 hours. **      I have reviewed the patient's current medications.   Assessment/Plan     Active Hospital Problems    Diagnosis    • **OPAL (acute kidney injury) No new issues at this time   • Acute urinary retention Urology is continuing the ratliff today and are planning for a cystoscopy tomorrow. Rocephin and hytrin continued   • Atrial fibrillation with RVR Rate controlled at this time with metoprolol and rhythmol   • Gastroesophageal reflux disease with esophagitis No change in PPI   • Type 2 diabetes mellitus without complication, without long-term current use of insulin (HCC) Currently not on insulin, he only uses trulicity weekly. Doesn't want to consider insulin   • Essential hypertension Continue amlodipine and metoprolol   • Mixed hyperlipidemia Stable for now   • Generalized anxiety disorder Seems to forget that what is being said to him and that worsens his anxiety   Stay today. Ordered wound care to see if they can change his dressing while he is in the hospital if he will allow them  Electronically signed by Samina Hennessy MD, 04/04/23, 09:10 CDT.    Addendum: at 1320 hrs, had to go back and reiterate the plan because he doesn't remember seeing any doctor for several days still. Doesn't remember writer from yesterday or earlier this morning or what was discussed. Discussed  that he is supposed to have procedure tomorrow morning and he cant go home, or the podiatry office today but wound care is ordered to change his dressing. He keeps asking the same questions.  Informed the RN that the writer has spoken with him but that his short term memory remains impaired.   Samina Hennessy MD

## 2023-04-04 NOTE — PROGRESS NOTES
LOS: 1 day     Patient Care Team:  Andreas Martinez MD as PCP - General (Family Medicine)  Andreas Aldana PA-C as Physician Assistant (Gastroenterology)  Glen Jain MD as Consulting Physician (Endocrinology)  David Jeffers MD as Consulting Physician (Cardiology)      Subjective     Urinary retention    Objective       Vital Signs  Temp:  [97.3 °F (36.3 °C)-98.2 °F (36.8 °C)] 98.2 °F (36.8 °C)  Heart Rate:  [80-97] 97  Resp:  [18] 18  BP: (101-137)/(49-70) 101/49    Physical Exam:        General Appearance:  Confused     Respiratory:    UNLABORED RESPIRATIONS.     Abdomen:     SOFT.       Genitourinary: Gray in place     Rectal:     DEFERRED       Results Review:       Imaging Results (Last 24 Hours)       ** No results found for the last 24 hours. **          Lab Results (last 24 hours)       Procedure Component Value Units Date/Time    CANDIDA AURIS SCREEN - Swab, Axilla Right, Axilla Left and Groin [[id number]]  (Normal) Collected: 03/30/23 1922    Specimen: Swab from Axilla Right, Axilla Left and Groin Updated: 04/03/23 0726     Candida Auris Screen Culture No Candida auris isolated at 3 days    Comprehensive Metabolic Panel [[id number]]  (Abnormal) Collected: 04/03/23 0534    Specimen: Blood Updated: 04/03/23 0635     Glucose 145 mg/dL      BUN 14 mg/dL      Creatinine 0.89 mg/dL      Sodium 135 mmol/L      Potassium 4.4 mmol/L      Chloride 102 mmol/L      CO2 22.0 mmol/L      Calcium 8.9 mg/dL      Total Protein 6.6 g/dL      Albumin 3.4 g/dL      ALT (SGPT) 15 U/L      AST (SGOT) 15 U/L      Alkaline Phosphatase 56 U/L      Total Bilirubin 0.2 mg/dL      Globulin 3.2 gm/dL      A/G Ratio 1.1 g/dL      BUN/Creatinine Ratio 15.7     Anion Gap 11.0 mmol/L      eGFR 87.7 mL/min/1.73     Narrative:      GFR Normal >60  Chronic Kidney Disease <60  Kidney Failure <15    The GFR formula is only valid for adults with stable renal function between ages 18 and 70.    CBC & Differential [[id number]]   (Abnormal) Collected: 04/03/23 0534    Specimen: Blood Updated: 04/03/23 0613    Narrative:      The following orders were created for panel order CBC & Differential.  Procedure                               Abnormality         Status                     ---------                               -----------         ------                     CBC Auto Differential[[id number]]        Abnormal            Final result                 Please view results for these tests on the individual orders.    CBC Auto Differential [[id number]]  (Abnormal) Collected: 04/03/23 0534    Specimen: Blood Updated: 04/03/23 0613     WBC 7.20 10*3/mm3      RBC 3.93 10*6/mm3      Hemoglobin 11.0 g/dL      Hematocrit 33.0 %      MCV 84.0 fL      MCH 28.0 pg      MCHC 33.3 g/dL      RDW 13.2 %      RDW-SD 40.8 fl      MPV 8.8 fL      Platelets 323 10*3/mm3      Neutrophil % 66.9 %      Lymphocyte % 20.6 %      Monocyte % 8.2 %      Eosinophil % 2.2 %      Basophil % 0.6 %      Immature Grans % 1.5 %      Neutrophils, Absolute 4.82 10*3/mm3      Lymphocytes, Absolute 1.48 10*3/mm3      Monocytes, Absolute 0.59 10*3/mm3      Eosinophils, Absolute 0.16 10*3/mm3      Basophils, Absolute 0.04 10*3/mm3      Immature Grans, Absolute 0.11 10*3/mm3      nRBC 0.0 /100 WBC               I reviewed the patient's new clinical results.  I reviewed the patient's new imaging results and agree with the interpretation.  I reviewed the patient's other test results and agree with the interpretation        Assessment:    Urinary retention    Plan:     Can continue Gray in view of the confusion      Vinicio Dan MD  04/03/23  20:47 CDT

## 2023-04-04 NOTE — PROGRESS NOTES
LOS: 2 days     Patient Care Team:  Andreas Martinez MD as PCP - General (Family Medicine)  Andreas Aldana PA-C as Physician Assistant (Gastroenterology)  Glen Jain MD as Consulting Physician (Endocrinology)  David Jeffers MD as Consulting Physician (Cardiology)      Subjective     Urinary retention    Objective       Vital Signs  Temp:  [97.7 °F (36.5 °C)-98.5 °F (36.9 °C)] 97.7 °F (36.5 °C)  Heart Rate:  [76-97] 76  Resp:  [18] 18  BP: (101-137)/(49-70) 108/54    Physical Exam:        General Appearance:   No acute distress     Respiratory:    UNLABORED RESPIRATIONS.     Abdomen:     SOFT.       Genitourinary:  Gray draining well     Rectal:     DEFERRED       Results Review:       Imaging Results (Last 24 Hours)     ** No results found for the last 24 hours. **        Lab Results (last 24 hours)     ** No results found for the last 24 hours. **            I reviewed the patient's new clinical results.  I reviewed the patient's new imaging results and agree with the interpretation.  I reviewed the patient's other test results and agree with the interpretation        Assessment:    Urinary retention BPH    Plan:     Cystoscopy risk and benefits of been discussed      Vinicio Dan MD  04/04/23  07:46 CDT

## 2023-04-04 NOTE — THERAPY TREATMENT NOTE
Patient Name: Aj Card Jr.  : 1945    MRN: [id number]                              Today's Date: 2023       Admit Date: 3/30/2023    Visit Dx:     ICD-10-CM ICD-9-CM   1. OPAL (acute kidney injury)  N17.9 584.9   2. Urinary retention  R33.9 788.20   3. Atrial fibrillation with RVR  I48.91 427.31   4. Dehydration  E86.0 276.51   5. Nausea and vomiting, unspecified vomiting type  R11.2 787.01   6. Osteoarthritis of ankle and foot, right  M19.071 715.97   7. Impaired functional mobility, balance, gait, and endurance  Z74.09 V49.89   8. Impaired mobility and ADLs  Z74.09 V49.89    Z78.9    9. Acute urinary retention  R33.8 788.29     Patient Active Problem List   Diagnosis   • Type 2 diabetes mellitus without complication, without long-term current use of insulin ([facility])   • Essential hypertension   • Mixed hyperlipidemia   • Generalized anxiety disorder   • History of colon polyps   • Diverticulosis of large intestine without hemorrhage   • Coronary artery disease with angina pectoris   • Gastroesophageal reflux disease with esophagitis   • Vitamin D deficiency   • Overweight   • Encounter for screening for endocrine disorder   • Atopic dermatitis   • High serum vitamin B12   • Acute pain of right knee   • Tear of medial meniscus of right knee, current   • Atrial fibrillation with RVR   • GERD (gastroesophageal reflux disease)   • PAF (paroxysmal atrial fibrillation) ([facility])   • Uncontrolled type 2 diabetes mellitus with hyperglycemia   • Gastroesophageal reflux disease   • Chronic constipation   • Stage 2 chronic kidney disease   • Osteoarthritis of ankle and foot, right   • Closed trimalleolar fracture of right ankle   • Status post bunionectomy   • OPAL (acute kidney injury)   • Acute urinary retention     Past Medical History:   Diagnosis Date   • Acute posthemorrhagic anemia    • Afib    • Allergic rhinitis    • Anxiety state    • Chest pain    • CKD (chronic kidney disease), stage II    •  Coronary arteriosclerosis     3 stents, followed by Dr. Jeffers   • Diabetes mellitus     type 2   • Diverticular disease of colon    • Dysphagia    • Elevated cholesterol    • Epigastric pain    • GERD (gastroesophageal reflux disease)     esophagitis on Dexilant. Pt feels Nexium working better      • History of echocardiogram     Small left atrial enlargement with mild CLVH.Ef of 55-60%.Mitral valve mildly thickened.Av thickened.Left ventricle mildly dilated.Tricuspid intact.Mild aortic insufficiency. 12/07/2015       • History of echocardiogram     Mild diastolic dysfunction and mild left atrial enlargement, otherwise normal echo. EF> 55% 01/03/2012       • Hypercholesterolemia    • Hypertension    • Insomnia    • Irritable bowel syndrome    • Osteoarthritis of multiple joints    • Pain of right foot    • PONV (postoperative nausea and vomiting)      Past Surgical History:   Procedure Laterality Date   • APPENDECTOMY       [facility] 1995       • CARDIAC CATHETERIZATION      Successful PTCA of the OMB#2.Unsuccessful PTCA of the 1st OMB, secondary to difficulty in advancing the balloon. 12/08/2015       • CHOLECYSTECTOMY      [facility] 1993       • CHOLECYSTECTOMY     • COLONOSCOPY  10/01/2012   • COLONOSCOPY N/A 12/11/2017    Procedure: COLONOSCOPY;  Surgeon: Bladimir Michael MD;  Location: [facility] ENDOSCOPY;  Service:    • COLONOSCOPY N/A 11/01/2022    Procedure: COLONOSCOPY;  Surgeon: Bladimir Michael MD;  Location: [facility] ENDOSCOPY;  Service: Gastroenterology;  Laterality: N/A;   • CORONARY ANGIOPLASTY      Successful PTCA & stenting LAD was done w/ deployment of a 2.5mm x23 Xience stent w/ reduction of stenosis from 90% to less than 0% stenosis with good LILLIAM 3 flow 02/17/2012       • ENDOSCOPY      with biopsy.  Mildly severe esophagitis. Gastritis. Normal examined duodenum.Several biopsies obtained in the lower third of the esophagus.Several biopsies obtained in the gastric  antrum. 05/06/2016       • ENDOSCOPY      w tube. Normal esophagus. Gastritis in stomach. Biopsy taken. Gastric polyp found. Biopsy taken. Normal duodenum. 10/01/2012       • ENDOSCOPY AND COLONOSCOPY      Diverticulum in sigmoid colon & descending colon. Internal & external hemorrhoids found. 10/01/2012       • EYE SURGERY Bilateral     catarract   • HAND SURGERY Left      Corrective osteotomy of ring finger metacarpal. Malunited fracture of left ring finger 05/21/1985       • HERNIA REPAIR      Laparoscopic hernia repair. prepertitoneal bilateral inguinal hernia repair with mesh. 10/15/2009      • OTHER SURGICAL HISTORY      CORONARY ARTERY STENT    • SKIN TAG REMOVAL      Excision, viral skin tag,right upper eyelid 05/08/1995    • TOE FUSION Right 3/13/2023    Procedure: RIGHT FIRST TARSOMETATARSAL JOINT ARTHRODESIS, FIRST METATARSOPHALANGEAL JOINT ARTHRODESIS, CALCANEAL BONE GRAFT AND ALL OTHER INDICATED PROCEDURES                (LATEX [facility]ERGY);  Surgeon: Mason Salazar DPM;  Location: [facility];  Service: Podiatry;  Laterality: Right;      General Information     Row Name 04/04/23 0745          OT Time and Intention    Document Type therapy note (daily note)  -KD     Mode of Treatment individual therapy;occupational therapy  -KD     Row Name 04/04/23 0745          General Information    Patient Profile Reviewed yes  -KD     Existing Precautions/Restrictions non-weight bearing;right  -KD     Row Name 04/04/23 0745          Cognition    Orientation Status (Cognition) oriented x 4  -KD     Row Name 04/04/23 0745          Safety Issues, Functional Mobility    Impairments Affecting Function (Mobility) balance;endurance/activity tolerance;pain;strength  -KD           User Key  (r) = Recorded By, (t) = Taken By, (c) = Cosigned By    Initials Name Provider Type    Flaquita Christensen COTA Occupational Therapist Assistant                 Mobility/ADL's     Row Name 04/04/23 0745          Bed Mobility    Supine-Sit  Branch (Bed Mobility) independent  -KD     Sit-Supine Branch (Bed Mobility) not tested  -KD     Assistive Device (Bed Mobility) bed rails;head of bed elevated  -KD     Row Name 04/04/23 0745          Bed-Chair Transfer    Bed-Chair Branch (Transfers) contact guard;1 person assist  -KD     Assistive Device (Bed-Chair Transfers) walker, front-wheeled  -KD     Row Name 04/04/23 0745          Sit-Stand Transfer    Sit-Stand Branch (Transfers) contact guard  -KD     Assistive Device (Sit-Stand Transfers) walker, front-wheeled  -KD     Row Name 04/04/23 0745          Stand-Sit Transfer    Stand-Sit Branch (Transfers) contact guard  -KD     Assistive Device (Stand-Sit Transfers) walker, front-wheeled  -KD     Row Name 04/04/23 0745          Activities of Daily Living    BADL Assessment/Intervention grooming  -KD     Row Name 04/04/23 0745          Mobility    Extremity Weight-bearing Status right lower extremity  -KD     Right Lower Extremity (Weight-bearing Status) non weight-bearing (NWB)  -KD     Row Name 04/04/23 0745          Grooming Assessment/Training    Branch Level (Grooming) grooming skills;wash face, hands;set up;verbal cues  -KD     Position (Grooming) unsupported sitting  -KD           User Key  (r) = Recorded By, (t) = Taken By, (c) = Cosigned By    Initials Name Provider Type    KD Flaquita Reyes COTA Occupational Therapist Assistant               Obj/Interventions    No documentation.                Goals/Plan     Row Name 04/04/23 0745          Transfer Goal 1 (OT)    Activity/Assistive Device (Transfer Goal 1, OT) toilet  -KD     Branch Level/Cues Needed (Transfer Goal 1, OT) supervision required  -KD     Time Frame (Transfer Goal 1, OT) long term goal (LTG);by discharge  -KD     Progress/Outcome (Transfer Goal 1, OT) goal not met  -KD     Row Name 04/04/23 0745          Bathing Goal 1 (OT)    Activity/Device (Bathing Goal 1, OT) lower body bathing  -KD      [place] Level/Cues Needed (Bathing Goal 1, OT) minimum assist (75% or more patient effort)  -KD     Time Frame (Bathing Goal 1, OT) long term goal (LTG);by discharge  -KD     Progress/Outcomes (Bathing Goal 1, OT) goal not met  -KD     Row Name 04/04/23 0745          Dressing Goal 1 (OT)    Activity/Device (Dressing Goal 1, OT) lower body dressing  -KD     [place]/Cues Needed (Dressing Goal 1, OT) minimum assist (75% or more patient effort)  -KD     Time Frame (Dressing Goal 1, OT) long term goal (LTG);by discharge  -KD     Progress/Outcome (Dressing Goal 1, OT) goal not met;good progress toward goal  -KD     Row Name 04/04/23 0745          Toileting Goal 1 (OT)    Activity/Device (Toileting Goal 1, OT) toileting skills, all  -KD     [place] Level/Cues Needed (Toileting Goal 1, OT) supervision required  -KD     Time Frame (Toileting Goal 1, OT) long term goal (LTG);by discharge  -KD     Progress/Outcome (Toileting Goal 1, OT) goal not met  -KD           User Key  (r) = Recorded By, (t) = Taken By, (c) = Cosigned By    Initials Name Provider Type    KD Flaquita Reyes COTA Occupational Therapist Assistant               Clinical Impression     Row Name 04/04/23 0745          Pain Assessment    Pretreatment Pain Rating 0/10 - no pain  -KD     Posttreatment Pain Rating 0/10 - no pain  -KD     Row Name 04/04/23 0745          Plan of Care Review    Plan of Care Reviewed With patient  -KD     Progress improving  -KD     Outcome Evaluation OT tx completed this date. Sup>sit-sba. Pt sat EOB SBA w/ good sitting balance. Sit>stand-CGA. Stand pivot from bed>chair -CGA w/ RW while maintaing WBS to RLE. Grooming-set up w/ vc's. Pt up in recliner upon exit eating breakfast w/ all needs in reach. Cont OT POC.  -KD     Row Name 04/04/23 0745          Therapy Assessment/Plan (OT)    Therapy Frequency (OT) other (see comments)  5-7 d/wk  -KD     Row Name 04/04/23 0745          Therapy Plan Review/Discharge Plan (OT)     Anticipated Discharge Disposition (OT) home with 24/7 care;home with home health  -KD     Row Name 04/04/23 0745          Vital Signs    Pre Systolic BP Rehab 125  -KD     Pre Treatment Diastolic BP 59  -KD     Pretreatment Heart Rate (beats/min) 88  -KD     Pre SpO2 (%) 97  -KD     O2 Delivery Pre Treatment room air  -KD     Pre Patient Position Supine  -KD     Intra Patient Position Standing  -KD     Post Patient Position Sitting  -KD     Row Name 04/04/23 0745          Positioning and Restraints    Pre-Treatment Position in bed  -KD     Post Treatment Position chair  -KD     In Chair sitting;call light within reach;encouraged to call for assist;exit alarm on  -KD           User Key  (r) = Recorded By, (t) = Taken By, (c) = Cosigned By    Initials Name Provider Type    Flaquita Christensen COTA Occupational Therapist Assistant               Outcome Measures     Row Name 04/04/23 0745          How much help from another is currently needed...    Putting on and taking off regular lower body clothing? 3  -KD     Bathing (including washing, rinsing, and drying) 3  -KD     Toileting (which includes using toilet bed pan or urinal) 2  -KD     Putting on and taking off regular upper body clothing 4  -KD     Taking care of personal grooming (such as brushing teeth) 4  -KD     Eating meals 4  -KD     AM-PAC 6 Clicks Score (OT) 20  -KD     Row Name 04/04/23 1130 04/04/23 0908       How much help from another person do you currently need...    Turning from your back to your side while in flat bed without using bedrails? 4  -LN 4  -KDA    Moving from lying on back to sitting on the side of a flat bed without bedrails? 4  -LN 4  -KDA    Moving to and from a bed to a chair (including a wheelchair)? 3  -LN 3  -KDA    Standing up from a chair using your arms (e.g., wheelchair, bedside chair)? 3  -LN 3  -KDA    Climbing 3-5 steps with a railing? 2  -LN 2  -KDA    To walk in hospital room? 3  -LN 3  -KDA    AM-PAC 6 Clicks Score (PT)  19  -LN 19  -KDA    Highest level of mobility 6 --> Walked 10 steps or more  -LN 6 --> Walked 10 steps or more  -KDA    Row Name 04/04/23 1130          Functional Assessment    Outcome Measure Options AM-PAC 6 Clicks Basic Mobility (PT)  -LN           User Key  (r) = Recorded By, (t) = Taken By, (c) = Cosigned By    Initials Name Provider Type    LN Miroslava Ken, PTA Physical Therapist Assistant    KD Flaquita Reyes COTA Occupational Therapist Assistant    KDA Crista Fields, RN Registered Nurse                Occupational Therapy Education     Title: PT OT SLP Therapies (In Progress)     Topic: Occupational Therapy (In Progress)     Point: ADL training (Done)     Description:   Instruct learner(s) on proper safety adaptation and remediation techniques during self care or transfers.   Instruct in proper use of assistive devices.              Learning Progress Summary           Patient Acceptance, E,TB, VU,NR by  at 3/31/2023 2633    Comment: POC, role of OT, transfer training                   Point: Home exercise program (Not Started)     Description:   Instruct learner(s) on appropriate technique for monitoring, assisting and/or progressing therapeutic exercises/activities.              Learner Progress:  Not documented in this visit.          Point: Precautions (Not Started)     Description:   Instruct learner(s) on prescribed precautions during self-care and functional transfers.              Learner Progress:  Not documented in this visit.          Point: Body mechanics (Not Started)     Description:   Instruct learner(s) on proper positioning and spine alignment during self-care, functional mobility activities and/or exercises.              Learner Progress:  Not documented in this visit.                      User Key     Initials Effective Dates Name Provider Type [facility] 06/14/21 -  Marino Trejo, OT Occupational Therapist OT              OT Recommendation and Plan  Therapy Frequency (OT):  other (see comments) (5-7 d/wk)  Plan of Care Review  Plan of Care Reviewed With: patient  Progress: improving  Outcome Evaluation: OT tx completed this date. Sup>sit-sba. Pt sat EOB SBA w/ good sitting balance. Sit>stand-CGA. Stand pivot from bed>chair -CGA w/ RW while maintaing WBS to RLE. Grooming-set up w/ vc's. Pt up in recliner upon exit eating breakfast w/ all needs in reach. Cont OT POC.     Time Calculation:    Time Calculation- OT     Row Name 04/04/23 0745             Time Calculation- OT    OT Start Time 0745  -KD      OT Stop Time 0808  -KD      OT Time Calculation (min) 23 min  -KD      Total Timed Code Minutes- OT 23 minute(s)  -KD      OT Received On 04/04/23  -KD         Timed Charges    09436 - OT Self Care/Mgmt Minutes 23  -KD         Total Minutes    Timed Charges Total Minutes 23  -KD       Total Minutes 23  -KD            User Key  (r) = Recorded By, (t) = Taken By, (c) = Cosigned By    Initials Name Provider Type    Flaquita Christensen COTA Occupational Therapist Assistant              Therapy Charges for Today     Code Description Service Date Service Provider Modifiers Qty    [id number] HC OT SELF CARE/MGMT/TRAIN EA 15 MIN 4/4/2023 Flaquita Reyes COTA GO 2               KELLEE Tiwari  4/4/2023

## 2023-04-04 NOTE — PLAN OF CARE
Goal Outcome Evaluation:  Plan of Care Reviewed With: patient   Patient worked with PT and OT today. Patient denies chest pain at this time. VSS. Plan of care continues.

## 2023-04-04 NOTE — PROGRESS NOTES
Adult Nutrition  Assessment/PES    Patient Name:  Aj Card Jr.  YOB: 1945  MRN: [id number]  Admit Date:  3/30/2023    Assessment Date:  4/4/2023    Comments:  Nutrition F/U:  Pt with improving appetite and intake is ~65% average.  Good acceptance of supplements.  Being followed by Urology for Urinary retention.  Anticipate discharge soon.      Nutrient dense foods and beverages encouraged at discharge along with supplements to promote good Nutrition and overall good health and healing.      Will monitor POC and po intake.               Estimated/Assessed Needs - Anthropometrics     Row Name 04/04/23 0552          Anthropometrics    Weight 81 kg (178 lb 9.6 oz)                       Problem/Interventions:                    Electronically signed by:  Cece Avila RD  04/04/23 12:06 CDT

## 2023-04-04 NOTE — PLAN OF CARE
Goal Outcome Evaluation:  Plan of Care Reviewed With: patient        Progress: improving  Outcome Evaluation: Patient has scheduled appointment today with Podiatry to get dressing changed to RLE. Rested well with no complaints. Vss.

## 2023-04-05 ENCOUNTER — ANESTHESIA EVENT (OUTPATIENT)
Dept: PERIOP | Facility: HOSPITAL | Age: 78
DRG: 726 | End: 2023-04-05
Payer: MEDICARE

## 2023-04-05 ENCOUNTER — ANESTHESIA (OUTPATIENT)
Dept: PERIOP | Facility: HOSPITAL | Age: 78
DRG: 726 | End: 2023-04-05
Payer: MEDICARE

## 2023-04-05 LAB — BACTERIA ISLT: NORMAL

## 2023-04-05 PROCEDURE — 97110 THERAPEUTIC EXERCISES: CPT

## 2023-04-05 PROCEDURE — 25010000002 CEFAZOLIN PER 500 MG: Performed by: NURSE ANESTHETIST, CERTIFIED REGISTERED

## 2023-04-05 PROCEDURE — 0TJB8ZZ INSPECTION OF BLADDER, VIA NATURAL OR ARTIFICIAL OPENING ENDOSCOPIC: ICD-10-PCS | Performed by: UROLOGY

## 2023-04-05 PROCEDURE — 25010000002 CEFTRIAXONE PER 250 MG: Performed by: FAMILY MEDICINE

## 2023-04-05 PROCEDURE — 25010000002 PROPOFOL 10 MG/ML EMULSION: Performed by: NURSE ANESTHETIST, CERTIFIED REGISTERED

## 2023-04-05 RX ORDER — LIDOCAINE HYDROCHLORIDE 20 MG/ML
JELLY TOPICAL AS NEEDED
Status: DISCONTINUED | OUTPATIENT
Start: 2023-04-05 | End: 2023-04-05 | Stop reason: HOSPADM

## 2023-04-05 RX ORDER — ONDANSETRON 2 MG/ML
4 INJECTION INTRAMUSCULAR; INTRAVENOUS EVERY 6 HOURS PRN
Status: DISCONTINUED | OUTPATIENT
Start: 2023-04-05 | End: 2023-04-07 | Stop reason: HOSPADM

## 2023-04-05 RX ORDER — CEFAZOLIN SODIUM 1 G/3ML
INJECTION, POWDER, FOR SOLUTION INTRAMUSCULAR; INTRAVENOUS AS NEEDED
Status: DISCONTINUED | OUTPATIENT
Start: 2023-04-05 | End: 2023-04-05 | Stop reason: SURG

## 2023-04-05 RX ORDER — SODIUM CHLORIDE 9 MG/ML
1000 INJECTION, SOLUTION INTRAVENOUS CONTINUOUS
Status: DISPENSED | OUTPATIENT
Start: 2023-04-05 | End: 2023-04-07

## 2023-04-05 RX ORDER — ONDANSETRON 4 MG/1
4 TABLET, FILM COATED ORAL EVERY 6 HOURS PRN
Status: DISCONTINUED | OUTPATIENT
Start: 2023-04-05 | End: 2023-04-07 | Stop reason: HOSPADM

## 2023-04-05 RX ORDER — PROPOFOL 10 MG/ML
VIAL (ML) INTRAVENOUS AS NEEDED
Status: DISCONTINUED | OUTPATIENT
Start: 2023-04-05 | End: 2023-04-05 | Stop reason: SURG

## 2023-04-05 RX ADMIN — Medication 10 ML: at 21:09

## 2023-04-05 RX ADMIN — PROPOFOL 50 MG: 10 INJECTION, EMULSION INTRAVENOUS at 18:03

## 2023-04-05 RX ADMIN — TERAZOSIN HYDROCHLORIDE 2 MG: 2 CAPSULE ORAL at 21:08

## 2023-04-05 RX ADMIN — PANTOPRAZOLE SODIUM 40 MG: 40 TABLET, DELAYED RELEASE ORAL at 05:25

## 2023-04-05 RX ADMIN — CEFTRIAXONE SODIUM 1 G: 1 INJECTION, POWDER, FOR SOLUTION INTRAMUSCULAR; INTRAVENOUS at 21:08

## 2023-04-05 RX ADMIN — CEFAZOLIN SODIUM 1 G: 1 INJECTION, POWDER, FOR SOLUTION INTRAMUSCULAR; INTRAVENOUS at 17:53

## 2023-04-05 RX ADMIN — DOCUSATE SODIUM 100 MG: 100 CAPSULE, LIQUID FILLED ORAL at 21:08

## 2023-04-05 RX ADMIN — PROPAFENONE HYDROCHLORIDE 150 MG: 150 TABLET, FILM COATED ORAL at 05:25

## 2023-04-05 RX ADMIN — PROPOFOL 50 MG: 10 INJECTION, EMULSION INTRAVENOUS at 17:52

## 2023-04-05 RX ADMIN — PROPAFENONE HYDROCHLORIDE 150 MG: 150 TABLET, FILM COATED ORAL at 21:08

## 2023-04-05 RX ADMIN — PROPOFOL 50 MG: 10 INJECTION, EMULSION INTRAVENOUS at 17:49

## 2023-04-05 RX ADMIN — ALPRAZOLAM 0.5 MG: 0.5 TABLET ORAL at 22:35

## 2023-04-05 RX ADMIN — PROPOFOL 50 MG: 10 INJECTION, EMULSION INTRAVENOUS at 17:57

## 2023-04-05 RX ADMIN — SODIUM CHLORIDE 1000 ML: 9 INJECTION, SOLUTION INTRAVENOUS at 15:59

## 2023-04-05 RX ADMIN — OXYCODONE HYDROCHLORIDE AND ACETAMINOPHEN 1 TABLET: 7.5; 325 TABLET ORAL at 21:08

## 2023-04-05 NOTE — PLAN OF CARE
Goal Outcome Evaluation:  Plan of Care Reviewed With: patient        Progress: improving   VSS. Pt did not rest well through the night. No complaints at this time. Cysto with fellows today. Pt NPO after midnight. Will cont with pt care.

## 2023-04-05 NOTE — THERAPY TREATMENT NOTE
Acute Care - Physical Therapy Treatment Note  [facility]     Patient Name: Aj Card Jr.  : 1945  MRN: [id number]  Today's Date: 2023      Visit Dx:     ICD-10-CM ICD-9-CM   1. OPAL (acute kidney injury)  N17.9 584.9   2. Urinary retention  R33.9 788.20   3. Atrial fibrillation with RVR  I48.91 427.31   4. Dehydration  E86.0 276.51   5. Nausea and vomiting, unspecified vomiting type  R11.2 787.01   6. Osteoarthritis of ankle and foot, right  M19.071 715.97   7. Impaired functional mobility, balance, gait, and endurance  Z74.09 V49.89   8. Impaired mobility and ADLs  Z74.09 V49.89    Z78.9    9. Acute urinary retention  R33.8 788.29     Patient Active Problem List   Diagnosis   • Type 2 diabetes mellitus without complication, without long-term current use of insulin ([facility])   • Essential hypertension   • Mixed hyperlipidemia   • Generalized anxiety disorder   • History of colon polyps   • Diverticulosis of large intestine without hemorrhage   • Coronary artery disease with angina pectoris   • Gastroesophageal reflux disease with esophagitis   • Vitamin D deficiency   • Overweight   • Encounter for screening for endocrine disorder   • Atopic dermatitis   • High serum vitamin B12   • Acute pain of right knee   • Tear of medial meniscus of right knee, current   • Atrial fibrillation with RVR   • GERD (gastroesophageal reflux disease)   • PAF (paroxysmal atrial fibrillation) ([facility])   • Uncontrolled type 2 diabetes mellitus with hyperglycemia   • Gastroesophageal reflux disease   • Chronic constipation   • Stage 2 chronic kidney disease   • Osteoarthritis of ankle and foot, right   • Closed trimalleolar fracture of right ankle   • Status post bunionectomy   • OPAL (acute kidney injury)   • Acute urinary retention     Past Medical History:   Diagnosis Date   • Acute posthemorrhagic anemia    • Afib    • Allergic rhinitis    • Anxiety state    • Chest pain    • CKD (chronic kidney disease), stage II     • Coronary arteriosclerosis     3 stents, followed by Dr. Jeffers   • Diabetes mellitus     type 2   • Diverticular disease of colon    • Dysphagia    • Elevated cholesterol    • Epigastric pain    • GERD (gastroesophageal reflux disease)     esophagitis on Dexilant. Pt feels Nexium working better      • History of echocardiogram     Small left atrial enlargement with mild CLVH.Ef of 55-60%.Mitral valve mildly thickened.Av thickened.Left ventricle mildly dilated.Tricuspid intact.Mild aortic insufficiency. 12/07/2015       • History of echocardiogram     Mild diastolic dysfunction and mild left atrial enlargement, otherwise normal echo. EF> 55% 01/03/2012       • Hypercholesterolemia    • Hypertension    • Insomnia    • Irritable bowel syndrome    • Osteoarthritis of multiple joints    • Pain of right foot    • PONV (postoperative nausea and vomiting)      Past Surgical History:   Procedure Laterality Date   • APPENDECTOMY       [facility] 1995       • CARDIAC CATHETERIZATION      Successful PTCA of the OMB#2.Unsuccessful PTCA of the 1st OMB, secondary to difficulty in advancing the balloon. 12/08/2015       • CHOLECYSTECTOMY      [facility] [place], operated by [facility] 1993       • CHOLECYSTECTOMY     • COLONOSCOPY  10/01/2012   • COLONOSCOPY N/A 12/11/2017    Procedure: COLONOSCOPY;  Surgeon: Bladimir Michael MD;  Location: [facility] ENDOSCOPY;  Service:    • COLONOSCOPY N/A 11/01/2022    Procedure: COLONOSCOPY;  Surgeon: Bladimir Michael MD;  Location: [facility] ENDOSCOPY;  Service: Gastroenterology;  Laterality: N/A;   • CORONARY ANGIOPLASTY      Successful PTCA & stenting LAD was done w/ deployment of a 2.5mm x23 Xience stent w/ reduction of stenosis from 90% to less than 0% stenosis with good LILLIAM 3 flow 02/17/2012       • ENDOSCOPY      with biopsy.  Mildly severe esophagitis. Gastritis. Normal examined duodenum.Several biopsies obtained in the lower third of the esophagus.Several biopsies obtained in the gastric  antrum. 05/06/2016       • ENDOSCOPY      w tube. Normal esophagus. Gastritis in stomach. Biopsy taken. Gastric polyp found. Biopsy taken. Normal duodenum. 10/01/2012       • ENDOSCOPY AND COLONOSCOPY      Diverticulum in sigmoid colon & descending colon. Internal & external hemorrhoids found. 10/01/2012       • EYE SURGERY Bilateral     catarract   • HAND SURGERY Left      Corrective osteotomy of ring finger metacarpal. Malunited fracture of left ring finger 05/21/1985       • HERNIA REPAIR      Laparoscopic hernia repair. prepertitoneal bilateral inguinal hernia repair with mesh. 10/15/2009      • OTHER SURGICAL HISTORY      CORONARY ARTERY STENT    • SKIN TAG REMOVAL      Excision, viral skin tag,right upper eyelid 05/08/1995    • TOE FUSION Right 3/13/2023    Procedure: RIGHT FIRST TARSOMETATARSAL JOINT ARTHRODESIS, FIRST METATARSOPHALANGEAL JOINT ARTHRODESIS, CALCANEAL BONE GRAFT AND ALL OTHER INDICATED PROCEDURES                (LATEX ALLERGY);  Surgeon: Mason Salazar DPM;  Location: [facility];  Service: Podiatry;  Laterality: Right;     PT Assessment (last 12 hours)     PT Evaluation and Treatment     Row Name 04/05/23 1517          Physical Therapy Time and Intention    Subjective Information complains of;weakness  -TW     Document Type therapy note (daily note)  -TW     Mode of Treatment physical therapy;individual therapy  -TW     Patient Effort adequate  -TW     Row Name 04/05/23 1515          General Information    Patient Profile Reviewed yes  -TW     Patient Observations alert;cooperative;agree to therapy  -TW     Patient/Family/Caregiver Comments/Observations family present.  -TW     General Observations of Patient Pt sup in bed.  -TW     Existing Precautions/Restrictions non-weight bearing;right   -TW     Row Name 04/05/23 1517          Pain    Pretreatment Pain Rating 1/10  -TW     Posttreatment Pain Rating 1/10  -TW     Pain Location - Side/Orientation Right  -TW     Pain Location lower  -TW      Pain Location - extremity  -TW     Row Name 04/05/23 1517          Cognition    Affect/Mental Status (Cognition) WFL  -TW     Orientation Status (Cognition) oriented x 4  -TW     Follows Commands (Cognition) WFL  -TW     Cognitive Function WFL  -TW     Personal Safety Interventions muscle strengthening facilitated  -TW     Row Name 04/05/23 1517          Bed Mobility    Supine-Sit Brady (Bed Mobility) not tested  -TW     Sit-Supine Brady (Bed Mobility) not tested  -TW     Row Name 04/05/23 1517          Sit-Stand Transfer    Sit-Stand Brady (Transfers) unable to assess  -TW     Row Name 04/05/23 1517          Gait/Stairs (Locomotion)    Brady Level (Gait) unable to assess  -TW     Row Name 04/05/23 1517          Hip (Therapeutic Exercise)    Hip AROM (Therapeutic Exercise) bilateral;supine  -TW     Row Name 04/05/23 1517          Knee (Therapeutic Exercise)    Knee AROM (Therapeutic Exercise) bilateral;supine  -TW     Row Name 04/05/23 1517          Ankle (Therapeutic Exercise)    Ankle AROM (Therapeutic Exercise) left;supine  -TW     Row Name             Wound 03/13/23 1152 Right anterior foot Incision    Wound - Properties Group Placement Date: 03/13/23  -BP Placement Time: 1152  -BP Present on Hospital Admission: N  -BP Side: Right  -BP Orientation: anterior  -BP Location: foot  -BP Primary Wound Type: Incision  -BP    Retired Wound - Properties Group Placement Date: 03/13/23  -BP Placement Time: 1152  -BP Present on Hospital Admission: N  -BP Side: Right  -BP Orientation: anterior  -BP Location: foot  -BP Primary Wound Type: Incision  -BP    Retired Wound - Properties Group Date first assessed: 03/13/23  -BP Time first assessed: 1152  -BP Present on Hospital Admission: N  -BP Side: Right  -BP Location: foot  -BP Primary Wound Type: Incision  -BP    Row Name             Wound 03/23/23 1033 Right lateral ankle Incision    Wound - Properties Group Placement Date: 03/23/23  -EW Placement  Time: 1033 -EW Side: Right  -EW Orientation: lateral  -EW Location: ankle  -EW Primary Wound Type: Incision  -EW    Retired Wound - Properties Group Placement Date: 03/23/23 -EW Placement Time: 1033  -EW Side: Right  -EW Orientation: lateral  -EW Location: ankle  -EW Primary Wound Type: Incision  -EW    Retired Wound - Properties Group Date first assessed: 03/23/23 -EW Time first assessed: 1033  -EW Side: Right  -EW Location: ankle  -EW Primary Wound Type: Incision  -EW    Row Name             Wound 03/23/23 1037 Right great toe Incision    Wound - Properties Group Placement Date: 03/23/23 -EW Placement Time: 1037  -EW Side: Right  -EW Location: great toe  -EW Primary Wound Type: Incision  -EW    Retired Wound - Properties Group Placement Date: 03/23/23 -EW Placement Time: 1037  -EW Side: Right  -EW Location: great toe  -EW Primary Wound Type: Incision  -EW    Retired Wound - Properties Group Date first assessed: 03/23/23 -EW Time first assessed: 1037  -EW Side: Right  -EW Location: great toe  -EW Primary Wound Type: Incision  -EW    Row Name 04/05/23 1517          Plan of Care Review    Plan of Care Reviewed With patient  -TW     Progress no change  -TW     Outcome Evaluation Pt agreeable to therapy in sup today. Pt performed AROM to B LE except R ankle while sup. Pt declined EOB/OOB due to pt being scheduled for cysto this pm. Pt would cont to benefit from therapy at this time.  -TW     Row Name 04/05/23 1517          Vital Signs    Pre Patient Position Side Lying  -TW     Intra Patient Position Supine  -TW     Post Patient Position Supine  -TW     Row Name 04/05/23 1517          Positioning and Restraints    Pre-Treatment Position in bed  -TW     Post Treatment Position bed  -TW     In Bed supine;call light within reach;encouraged to call for assist;exit alarm on;with family/caregiver  -TW     Row Name 04/05/23 1517          Therapy Assessment/Plan (PT)    Rehab Potential (PT) good, to achieve stated  therapy goals  -TW     Row Name 04/05/23 1517          Transfer Goal 1 (PT)    Activity/Assistive Device (Transfer Goal 1, PT) sit-to-stand/stand-to-sit;walker, rolling  -TW     [place] Level/Cues Needed (Transfer Goal 1, PT) standby assist  -TW     Time Frame (Transfer Goal 1, PT) long term goal (LTG);by discharge  -TW     Strategies/Barriers (Transfers Goal 1, PT) Maintain weightbearing status  -TW     Progress/Outcome (Transfer Goal 1, PT) goal not met  -TW     Row Name 04/05/23 1517          Gait Training Goal 1 (PT)    Activity/Assistive Device (Gait Training Goal 1, PT) gait (walking locomotion);assistive device use;maintain weight-bearing status  -TW     [place] Level (Gait Training Goal 1, PT) contact guard required  -TW     Distance (Gait Training Goal 1, PT) 10' to and from bathroom  -TW     Time Frame (Gait Training Goal 1, PT) long term goal (LTG);by discharge  -TW     Progress/Outcome (Gait Training Goal 1, PT) goal not met  -TW           User Key  (r) = Recorded By, (t) = Taken By, (c) = Cosigned By    Initials Name Provider Type    TW Tyshawn Shepherd, PTA Physical Therapist Assistant    BP Skylar Johnson, RN Registered Nurse    Gretchen Rose, RN Registered Nurse                Physical Therapy Education     Title: PT OT SLP Therapies (In Progress)     Topic: Physical Therapy (Done)     Point: Mobility training (Done)     Learning Progress Summary           Patient Acceptance, E,D, VU by ND at 4/1/2023 1332    Comment: Weight bearing status, mobility, POC                   Point: Home exercise program (Done)     Learning Progress Summary           Patient Acceptance, E,D, VU by ND at 4/1/2023 1332    Comment: Weight bearing status, mobility, POC                   Point: Body mechanics (Done)     Learning Progress Summary           Patient Acceptance, E,D, VU by ND at 4/1/2023 1332    Comment: Weight bearing status, mobility, POC                   Point: Precautions (Done)      Learning Progress Summary           Patient Acceptance, E,D, VU by ND at 4/1/2023 1332    Comment: Weight bearing status, mobility, POC                               User Key     Initials Effective Dates Name Provider Type Discipline    ND 01/30/23 -  Karthik Ralph, PT Physical Therapist PT              PT Recommendation and Plan  Anticipated Discharge Disposition (PT): home with 24/7 care, home with home health  Plan of Care Reviewed With: patient  Progress: no change  Outcome Evaluation: Pt agreeable to therapy in sup today. Pt performed AROM to B LE except R ankle while sup. Pt declined EOB/OOB due to pt being scheduled for cysto this pm. Pt would cont to benefit from therapy at this time.   Outcome Measures     Row Name 04/04/23 1130 04/03/23 1449 04/03/23 1439       How much help from another person do you currently need...    Turning from your back to your side while in flat bed without using bedrails? 4  -LN -- 4  -LN    Moving from lying on back to sitting on the side of a flat bed without bedrails? 4  -LN -- 4  -LN    Moving to and from a bed to a chair (including a wheelchair)? 3  -LN -- 3  -LN    Standing up from a chair using your arms (e.g., wheelchair, bedside chair)? 3  -LN -- 3  -LN    Climbing 3-5 steps with a railing? 2  -LN -- 2  -LN    To walk in hospital room? 3  -LN -- 3  -LN    AM-PAC 6 Clicks Score (PT) 19  -LN -- 19  -LN       Functional Assessment    Outcome Measure Options AM-PAC 6 Clicks Basic Mobility (PT)  -LN AM-PAC 6 Clicks Basic Mobility (PT)  -LN AM-PAC 6 Clicks Basic Mobility (PT)  -LN          User Key  (r) = Recorded By, (t) = Taken By, (c) = Cosigned By    Initials Name Provider Type    LN Miroslava Ken, PTA Physical Therapist Assistant                 Time Calculation:    PT Charges     Row Name 04/05/23 1621             Time Calculation    Start Time 1517  -TW      Stop Time 1540  -TW      Time Calculation (min) 23 min  -TW         Time Calculation- PT    Total Timed Code  Minutes- PT 23 minute(s)  -TW            User Key  (r) = Recorded By, (t) = Taken By, (c) = Cosigned By    Initials Name Provider Type    TW Tyshawn Shepherd PTA Physical Therapist Assistant              Therapy Charges for Today     Code Description Service Date Service Provider Modifiers Qty    [id number] HC PT THER PROC EA 15 MIN 4/5/2023 Tyshawn Shepherd PTA GP 2          PT G-Codes  Outcome Measure Options: AM-PAC 6 Clicks Basic Mobility (PT)  AM-PAC 6 Clicks Score (PT): 18  AM-PAC 6 Clicks Score (OT): 20    Tyshawn Shepherd PTA  4/5/2023

## 2023-04-05 NOTE — PLAN OF CARE
Goal Outcome Evaluation:  Plan of Care Reviewed With: patient        Progress: no change  Outcome Evaluation: Pt agreeable to therapy in sup today. Pt performed AROM to B LE except R ankle while sup. Pt declined EOB/OOB due to pt being scheduled for cysto this pm. Pt would cont to benefit from therapy at this time.

## 2023-04-05 NOTE — PLAN OF CARE
Goal Outcome Evaluation:  Plan of Care Reviewed With: patient   Patient will have cystoscopy today. Patient denies pain. VSS. Plan of care continues.

## 2023-04-05 NOTE — PROGRESS NOTES
[facility]   INPATIENT PROGRESS NOTE      Patient Name: Aj Card Jr.  Date of Admission: 3/30/2023  Today's Date: 04/05/23  Length of Stay: 3  Primary Care Physician: Andreas Martinez MD    Subjective   Chief Complaint:   Vomiting     Nausea  Associated symptoms include nausea and vomiting.      Overnight course reviewed, laying in bed and irate that he has not had the cystoscopy today which was scheduled for 2 PM. In his mind he got ready at 4 AM yesterday and has been delayed since then, offered explanation but with the understanding that he is staying confused with impaired short term memory now    Review of Systems   Gastrointestinal: Positive for nausea and vomiting.   All pertinent negatives and positives are as above. All other systems have been reviewed and are negative unless otherwise stated.     Objective    Temp:  [97.7 °F (36.5 °C)-98.6 °F (37 °C)] 98.5 °F (36.9 °C)  Heart Rate:  [66-91] 83  Resp:  [18] 18  BP: (110-143)/(55-71) 140/71  Physical Exam  Laying in bed NAD  Breathing stable  Heart rate controlled. Mildly hypertensive  Moving all limbs, right leg dressed and splinted  Confused with impaired short term memory  Results Review:  I have reviewed the labs, radiology results, and diagnostic studies.    Laboratory Data:   Results from last 7 days   Lab Units 04/03/23  0534 04/01/23  0509 03/31/23  0549   WBC 10*3/mm3 7.20 6.60 11.06*   HEMOGLOBIN g/dL 11.0* 11.7* 11.3*   HEMATOCRIT % 33.0* 35.1* 34.2*   PLATELETS 10*3/mm3 323 303 307        Results from last 7 days   Lab Units 04/03/23  0534 04/01/23  0509 03/31/23  0549 03/30/23  1411   SODIUM mmol/L 135* 132* 130* 129*   POTASSIUM mmol/L 4.4 4.5 4.3 4.5   CHLORIDE mmol/L 102 101 99 93*   CO2 mmol/L 22.0 20.0* 22.0 20.0*   BUN mg/dL 14 24* 55* 81*   CREATININE mg/dL 0.89 0.91 1.63* 2.78*   CALCIUM mg/dL 8.9 8.8 8.3* 9.1   BILIRUBIN mg/dL 0.2  --   --  0.7   ALK PHOS U/L 56   --   --  91   ALT (SGPT) U/L 15  --   --  13   AST (SGOT) U/L 15  --   --  11   GLUCOSE mg/dL 145* 149* 118* 159*   Culture Data:   No results found for: BLOODCX  No results found for: URINECX  No results found for: RESPCX  No results found for: WOUNDCX  No results found for: STOOLCX  No components found for: BODYFLD    Radiology Data:   Imaging Results (Last 24 Hours)     ** No results found for the last 24 hours. **      I have reviewed the patient's current medications.   Assessment/Plan     Active Hospital Problems    Diagnosis    • **OPAL (acute kidney injury) No new issues at this time. Check BMP tomorrow morning   • Acute urinary retention Rocephin finishing up today. Cystoscopy today. Waiting for urology final recommendations afterwards    • Atrial fibrillation with RVR Rate controlled at this time with metoprolol and rhythmol   • Gastroesophageal reflux disease with esophagitis No change in PPI. stable   • Type 2 diabetes mellitus without complication, without long-term current use of insulin (HCC) Currently not on insulin, he only uses trulicity weekly. Doesn't want to consider insulin or other treatment in the hospital. Keeps asking about trulicity daily and has to be reminded that the hospital doesn't carry it, he can get it if his home supple is available   • Essential hypertension Continue amlodipine and metoprolol   • Mixed hyperlipidemia Stable for now   • Generalized anxiety disorder Seems to forget that what is being said to him and that worsens his anxiety   Stay today.   Electronically signed by Samina Hennessy MD, 04/05/23, 10:42 CDT.

## 2023-04-05 NOTE — OP NOTE
CYSTOSCOPY  Procedure Note    Aj Card Jr.  4/5/2023    Pre-op Diagnosis:   Acute urinary retention [R33.8]    Post-op Diagnosis:     Post-Op Diagnosis Codes:     * Acute urinary retention [R33.8]    Procedure(s):  CYSTOSCOPY         (LATEX ALLERGY)    Surgeon(s):  Vinicio Dan MD    Anesthesia: Monitored Anesthesia Care    Staff:   Simónulator: Amelia Crespo RN  Scrub Person: Ruma Valle  Assistant: Jia Shah CSA    Assistant: Jia Shah CSA was responsible for performing the following activities: Irrigation and their skilled assistance was necessary for the success of this case.     Estimated Blood Loss: none    Specimens:                None      Drains:   Urethral Catheter Silicone 10 Fr. (Active)       [REMOVED] Urethral Catheter Non-latex (Removed)   Daily Indications Acute Urinary Retention 04/05/23 0936   Site Assessment Clean;Skin intact 04/05/23 0936   Collection Container Standard drainage bag 04/05/23 0936   Securement Method Securing device 04/05/23 0936   Catheter care complete Yes 04/05/23 0936   Output (mL) 200 mL 04/05/23 1524       Findings: Mildly obstructing prostate catheter cystoscopy    Complications: None    Indications: Same    Description of Procedure: Patient brought surgery placed in supine position sterile prep and drape of the genitalia and routine fashion present Xylocaine gel placed per urethra I placed a flexible cystoscope down the ant urethra into the bladder mildly obstructing prostate was noted.  Once in the bladder most tumors or calculi was found but catheter cystitis was present I retracted the flexible cystoscope under direct vision and then anchored #16 [place] Gray catheter    Vinicio Dan MD     Date: 4/5/2023  Time: 18:16 CDT

## 2023-04-05 NOTE — ANESTHESIA POSTPROCEDURE EVALUATION
Patient: Aj Card Jr.    Procedure Summary     Date: 04/05/23 Room / Location: [facility] OR 02 / [facility] OR    Anesthesia Start: 1744 Anesthesia Stop: 1819    Procedure: CYSTOSCOPY         (LATEX ALLERGY) (Urethra) Diagnosis:       Acute urinary retention      (Acute urinary retention [R33.8])    Surgeons: Sy, Vinicio PATEL MD Provider: Rosario Barnes CRNA    Anesthesia Type: MAC ASA Status: 3          Anesthesia Type: MAC    Vitals  No vitals data found for the desired time range.          Post Anesthesia Care and Evaluation    Patient location during evaluation: PHASE II  Patient participation: complete - patient participated  Level of consciousness: awake  Pain score: 0  Pain management: adequate    Airway patency: patent  Anesthetic complications: No anesthetic complications  PONV Status: none  Cardiovascular status: hemodynamically stable  Respiratory status: spontaneous ventilation and room air  Hydration status: acceptable    Comments: 107/60 71 20 97.8

## 2023-04-05 NOTE — SIGNIFICANT NOTE
04/05/23 1356   OTHER   Discipline occupational therapy assistant   Rehab Time/Intention   Session Not Performed patient/family declined treatment

## 2023-04-05 NOTE — ANESTHESIA PREPROCEDURE EVALUATION
Anesthesia Evaluation     Patient summary reviewed and Nursing notes reviewed   history of anesthetic complications: PONV  NPO Solid Status: > 8 hours  NPO Liquid Status: > 8 hours           Airway   Mallampati: II  TM distance: >3 FB  Neck ROM: full  possible difficult intubation  Comment: Final Airway Details  Final airway type: supraglottic airway        Successful airway: I-gel  Size 4     Number of attempts at approach: 1  Assessment: lips, teeth, and gum same as pre-op  Dental    (+) poor dentation    Pulmonary - negative pulmonary ROS    breath sounds clear to auscultation  (-) asthma, shortness of breath, sleep apnea, rhonchi, decreased breath sounds, wheezes, rales, not a smoker  Cardiovascular   Exercise tolerance: poor (<4 METS)    ECG reviewed  PT is on anticoagulation therapy  Patient on routine beta blocker and Beta blocker given within 24 hours of surgery  Rhythm: regular  Rate: normal    (+) hypertension 2 medications or greater, past MI (2016)  >12 months, CAD, cardiac stents (One stent . Two stents .) Drug eluting stent dysrhythmias Paroxysmal Atrial Fib, hyperlipidemia,   (-) angina, GALVAN, murmur, carotid bruits, DVT    ROS comment: EK23  Atrial fibrillation with rapid ventricular response  Nonspecific ST and T wave abnormality , probably digitalis effect  Abnormal ECG  When compared with ECG of 30-MAR-2023 13:47,  Minimal criteria for Anterior infarct are no longer Present     Referred By:            Confirmed By: CHEIKH SARMIENTO      TTE: 3/24/23  Interpretation Summary       •  Left ventricular ejection fraction appears to be 46 - 50%.  •  Estimated right ventricular systolic pressure from tricuspid regurgitation is normal (<35 mmHg).  •  Abnormal strain         Neuro/Psych  (+) psychiatric history Anxiety,    (-) seizures, TIA, CVA  GI/Hepatic/Renal/Endo    (+) obesity,  GERD well controlled,  renal disease (Creatinine 1.08) CRI, diabetes mellitus type 2 using insulin,      Musculoskeletal     Abdominal   (+) obese,    Substance History   (-) alcohol use, drug use     OB/GYN negative ob/gyn ROS         Other   arthritis,      ROS/Med Hx Other: 3 stents placed followed by Dr. Jeffers. Last dose of Xarelto was on 3/30/23  .    T2DM: last HgbA1C: 7.1    Hx:GERD: controlled on Nexium           Anesthesia Evaluation     Patient summary reviewed and Nursing notes reviewed   no history of anesthetic complications:  NPO Solid Status: > 8 hours  NPO Liquid Status: > 2 hours           Airway   Mallampati: II  TM distance: >3 FB  Neck ROM: full  possible difficult intubation  Dental    (+) poor dentation    Pulmonary - negative pulmonary ROS    breath sounds clear to auscultation  (-) shortness of breath, not a smoker  Cardiovascular     ECG reviewed  PT is on anticoagulation therapy  Patient on routine beta blocker and Beta blocker given within 24 hours of surgery  Rhythm: regular  Rate: normal    (+) hypertension, past MI  >12 months, CAD, cardiac stents (One stent 2005. Two stents 2010.) dysrhythmias Paroxysmal Atrial Fib, hyperlipidemia,   (-) angina, GALVAN, murmur, carotid bruits    ROS comment: Normal sinus rhythm  Cannot rule out Anterior infarct , age undetermined  Abnormal ECG  When compared with ECG of 05-JUN-2020 10:46,  Non-specific change in ST segment in Lateral leads  Nonspecific T wave abnormality now evident in Lateral leadsEstimated EF appears to be in the range of 51 - 55%. Normal left ventricular cavity size noted. All left ventricular wall segments contract normally. Left ventricular wall thickness is consistent with mild concentric hypertrophy. Left ventricular diastolic dysfunction is noted (grade I) consistent with impaired relaxation. There is no evidence of a left ventricular mass or thrombus present.    Neuro/Psych  (+) psychiatric history Anxiety,    GI/Hepatic/Renal/Endo    (+) obesity,  GERD well controlled,  diabetes mellitus type 2,     Musculoskeletal      Abdominal   (+) obese,    Substance History - negative use     OB/GYN negative ob/gyn ROS         Other   arthritis,                    Anesthesia Plan    ASA 3     general with block     (Discussed peripheral nerve block(popliteal) & adductor canal for post op pain relief and patient understands possible complications,risks and agrees.)  intravenous induction     Anesthetic plan, risks, benefits, and alternatives have been provided, discussed and informed consent has been obtained with: patient and spouse/significant other.  Pre-procedure education provided  Plan discussed with CRNA.        CODE STATUS:                Anesthesia Plan    ASA 3     MAC     intravenous induction     Anesthetic plan, risks, benefits, and alternatives have been provided, discussed and informed consent has been obtained with: patient.        CODE STATUS:

## 2023-04-05 NOTE — PROGRESS NOTES
LOS: 3 days     Patient Care Team:  Andreas Martinez MD as PCP - General (Family Medicine)  Andreas Aldana PA-C as Physician Assistant (Gastroenterology)  Glen Jain MD as Consulting Physician (Endocrinology)  David Jeffers MD as Consulting Physician (Cardiology)      Subjective     Urinary retention    Objective       Vital Signs  Temp:  [97.5 °F (36.4 °C)-98.9 °F (37.2 °C)] 97.9 °F (36.6 °C)  Heart Rate:  [82-91] 87  Resp:  [18-20] 20  BP: (109-143)/(53-71) 129/69    Physical Exam:        General Appearance:   No distress     Respiratory:    UNLABORED RESPIRATIONS.     Abdomen:     SOFT.       Genitourinary:  Gray in place urine clear     Rectal:     DEFERRED       Results Review:       Imaging Results (Last 24 Hours)     ** No results found for the last 24 hours. **        Lab Results (last 24 hours)     Procedure Component Value Units Date/Time    CANDIDA AURIS SCREEN - Swab, Axilla Right, Axilla Left and Groin [[id number]]  (Normal) Collected: 03/30/23 1922    Specimen: Swab from Axilla Right, Axilla Left and Groin Updated: 04/05/23 1141     Jamilah Auris Screen Culture No Candida auris isolated at 5 days            I reviewed the patient's new clinical results.  I reviewed the patient's new imaging results and agree with the interpretation.  I reviewed the patient's other test results and agree with the interpretation        Assessment:    Urinary retention BPH    Plan:     Cystoscopy risk and benefits of been discussed      Vinicio Dan MD  04/05/23  18:14 CDT

## 2023-04-06 LAB
ANION GAP SERPL CALCULATED.3IONS-SCNC: 12 MMOL/L (ref 5–15)
ANISOCYTOSIS BLD QL: NORMAL
BASOPHILS # BLD AUTO: 0.04 10*3/MM3 (ref 0–0.2)
BASOPHILS NFR BLD AUTO: 0.6 % (ref 0–1.5)
BUN SERPL-MCNC: 16 MG/DL (ref 8–23)
BUN/CREAT SERPL: 17.4 (ref 7–25)
CALCIUM SPEC-SCNC: 9.4 MG/DL (ref 8.6–10.5)
CHLORIDE SERPL-SCNC: 100 MMOL/L (ref 98–107)
CO2 SERPL-SCNC: 22 MMOL/L (ref 22–29)
CREAT SERPL-MCNC: 0.92 MG/DL (ref 0.76–1.27)
DEPRECATED RDW RBC AUTO: 41.6 FL (ref 37–54)
EGFRCR SERPLBLD CKD-EPI 2021: 85.1 ML/MIN/1.73
EOSINOPHIL # BLD AUTO: 0.17 10*3/MM3 (ref 0–0.4)
EOSINOPHIL NFR BLD AUTO: 2.6 % (ref 0.3–6.2)
ERYTHROCYTE [DISTWIDTH] IN BLOOD BY AUTOMATED COUNT: 13.8 % (ref 12.3–15.4)
GLUCOSE SERPL-MCNC: 160 MG/DL (ref 65–99)
HCT VFR BLD AUTO: 36 % (ref 37.5–51)
HGB BLD-MCNC: 12.1 G/DL (ref 13–17.7)
IMM GRANULOCYTES # BLD AUTO: 0.15 10*3/MM3 (ref 0–0.05)
IMM GRANULOCYTES NFR BLD AUTO: 2.3 % (ref 0–0.5)
LYMPHOCYTES # BLD AUTO: 1.81 10*3/MM3 (ref 0.7–3.1)
LYMPHOCYTES NFR BLD AUTO: 27.8 % (ref 19.6–45.3)
MCH RBC QN AUTO: 28.1 PG (ref 26.6–33)
MCHC RBC AUTO-ENTMCNC: 33.6 G/DL (ref 31.5–35.7)
MCV RBC AUTO: 83.7 FL (ref 79–97)
MONOCYTES # BLD AUTO: 0.51 10*3/MM3 (ref 0.1–0.9)
MONOCYTES NFR BLD AUTO: 7.8 % (ref 5–12)
NEUTROPHILS NFR BLD AUTO: 3.82 10*3/MM3 (ref 1.7–7)
NEUTROPHILS NFR BLD AUTO: 58.9 % (ref 42.7–76)
NRBC BLD AUTO-RTO: 0 /100 WBC (ref 0–0.2)
PLATELET # BLD AUTO: 271 10*3/MM3 (ref 140–450)
PMV BLD AUTO: 9.7 FL (ref 6–12)
POTASSIUM SERPL-SCNC: 4.2 MMOL/L (ref 3.5–5.2)
RBC # BLD AUTO: 4.3 10*6/MM3 (ref 4.14–5.8)
SMALL PLATELETS BLD QL SMEAR: ADEQUATE
SODIUM SERPL-SCNC: 134 MMOL/L (ref 136–145)
WBC MORPH BLD: NORMAL
WBC NRBC COR # BLD: 6.5 10*3/MM3 (ref 3.4–10.8)

## 2023-04-06 PROCEDURE — 25010000002 CEFAZOLIN PER 500 MG: Performed by: STUDENT IN AN ORGANIZED HEALTH CARE EDUCATION/TRAINING PROGRAM

## 2023-04-06 PROCEDURE — 97535 SELF CARE MNGMENT TRAINING: CPT

## 2023-04-06 PROCEDURE — 85025 COMPLETE CBC W/AUTO DIFF WBC: CPT | Performed by: UROLOGY

## 2023-04-06 PROCEDURE — 85007 BL SMEAR W/DIFF WBC COUNT: CPT | Performed by: UROLOGY

## 2023-04-06 PROCEDURE — 80048 BASIC METABOLIC PNL TOTAL CA: CPT | Performed by: UROLOGY

## 2023-04-06 RX ORDER — CEFAZOLIN SODIUM 1 G/3ML
1 INJECTION, POWDER, FOR SOLUTION INTRAMUSCULAR; INTRAVENOUS EVERY 8 HOURS
Status: DISCONTINUED | OUTPATIENT
Start: 2023-04-06 | End: 2023-04-06 | Stop reason: ALTCHOICE

## 2023-04-06 RX ADMIN — CEFAZOLIN SODIUM 1 G: 1 INJECTION, POWDER, FOR SOLUTION INTRAMUSCULAR; INTRAVENOUS at 15:47

## 2023-04-06 RX ADMIN — PROPAFENONE HYDROCHLORIDE 150 MG: 150 TABLET, FILM COATED ORAL at 14:02

## 2023-04-06 RX ADMIN — ALPRAZOLAM 0.5 MG: 0.5 TABLET ORAL at 23:13

## 2023-04-06 RX ADMIN — TERAZOSIN HYDROCHLORIDE 2 MG: 2 CAPSULE ORAL at 22:20

## 2023-04-06 RX ADMIN — CEFAZOLIN SODIUM 1 G: 1 INJECTION, POWDER, FOR SOLUTION INTRAMUSCULAR; INTRAVENOUS at 22:20

## 2023-04-06 RX ADMIN — PANTOPRAZOLE SODIUM 40 MG: 40 TABLET, DELAYED RELEASE ORAL at 05:43

## 2023-04-06 RX ADMIN — METOPROLOL SUCCINATE 25 MG: 25 TABLET, FILM COATED, EXTENDED RELEASE ORAL at 08:34

## 2023-04-06 RX ADMIN — DOCUSATE SODIUM 100 MG: 100 CAPSULE, LIQUID FILLED ORAL at 08:34

## 2023-04-06 RX ADMIN — PROPAFENONE HYDROCHLORIDE 150 MG: 150 TABLET, FILM COATED ORAL at 22:20

## 2023-04-06 RX ADMIN — RIVAROXABAN 15 MG: 15 TABLET, FILM COATED ORAL at 08:34

## 2023-04-06 RX ADMIN — PROPAFENONE HYDROCHLORIDE 150 MG: 150 TABLET, FILM COATED ORAL at 05:43

## 2023-04-06 RX ADMIN — SODIUM CHLORIDE 1000 ML: 9 INJECTION, SOLUTION INTRAVENOUS at 22:39

## 2023-04-06 RX ADMIN — DOCUSATE SODIUM 100 MG: 100 CAPSULE, LIQUID FILLED ORAL at 22:20

## 2023-04-06 NOTE — PROGRESS NOTES
FAMILY MEDICINE DAILY PROGRESS NOTE  NAME: Aj Card Jr.  : 1945  MRN: [id number]     LOS: 4 days     PROVIDER OF SERVICE: Aristeo Christopher MD    Chief Complaint: OPAL (acute kidney injury)    Subjective:     Interval History:  History taken from: Patient, RN and chart    Patient with no complaints overnight.  Status post cystoscopy with Gray placement yesterday.  Patient with good urine output.  No blood noted in his urine today.  Overnight patient did have a period of A-fib with RVR overnight.  This morning heart rate in normal range.    Review of Systems:   Review of Systems   Constitutional: Negative for chills, fatigue and fever.   Respiratory: Negative for cough, shortness of breath and wheezing.    Cardiovascular: Negative for chest pain, palpitations and leg swelling.   Gastrointestinal: Negative for abdominal pain, constipation, diarrhea, nausea and vomiting.   Genitourinary: Negative for difficulty urinating, dysuria, flank pain, frequency, genital sores and hematuria.   Neurological: Negative for syncope, weakness and headaches.   Psychiatric/Behavioral: Negative for dysphoric mood. The patient is not nervous/anxious.        Objective:     Vital Signs  Temp:  [97.1 °F (36.2 °C)-98.9 °F (37.2 °C)] 97.7 °F (36.5 °C)  Heart Rate:  [] 81  Resp:  [18-20] 20  BP: (103-129)/(53-90) 104/53    Physical Exam  Physical Exam  Vitals and nursing note reviewed.   Constitutional:       General: He is not in acute distress.  HENT:      Head: Normocephalic and atraumatic.      Mouth/Throat:      Pharynx: No oropharyngeal exudate.   Eyes:      General: No scleral icterus.     Conjunctiva/sclera: Conjunctivae normal.      Pupils: Pupils are equal, round, and reactive to light.   Cardiovascular:      Rate and Rhythm: Normal rate and regular rhythm.      Heart sounds: Normal heart sounds.   Pulmonary:      Effort: Pulmonary effort is normal.      Breath sounds: Normal breath sounds. No wheezing or  rales.   Abdominal:      General: Bowel sounds are normal. There is no distension.      Palpations: Abdomen is soft.      Tenderness: There is abdominal tenderness. There is no right CVA tenderness, left CVA tenderness or rebound.   Genitourinary:     Comments: Ratliff in place, no blood noted in ratliff bag  Musculoskeletal:         General: No deformity.      Right lower leg: No edema.      Left lower leg: No edema.   Skin:     General: Skin is warm and dry.   Neurological:      General: No focal deficit present.      Mental Status: He is alert. Mental status is at baseline.   Psychiatric:         Mood and Affect: Mood normal.         Behavior: Behavior normal.         Medication Review    Current Facility-Administered Medications:   •  acetaminophen (TYLENOL) tablet 650 mg, 650 mg, Oral, Q6H PRN, Vinicio Dan MD  •  ALPRAZolam (XANAX) tablet 0.5 mg, 0.5 mg, Oral, Nightly PRN, Vinicio Dan MD, 0.5 mg at 04/05/23 2235  •  amLODIPine (NORVASC) tablet 10 mg, 10 mg, Oral, Daily, Vinicio Dan MD, 10 mg at 04/03/23 0904  •  docusate sodium (COLACE) capsule 100 mg, 100 mg, Oral, BID, Vinicio Dan MD, 100 mg at 04/06/23 0834  •  isosorbide mononitrate (IMDUR) 24 hr tablet 30 mg, 30 mg, Oral, Daily, Vinicio Dan MD, 30 mg at 04/03/23 0904  •  metoprolol succinate XL (TOPROL-XL) 24 hr tablet 25 mg, 25 mg, Oral, Q24H, Vinicio Dan MD, 25 mg at 04/06/23 0834  •  metoprolol tartrate (LOPRESSOR) injection 5 mg, 5 mg, Intravenous, Q15 Min PRN, Vinicio Dan MD, 5 mg at 04/01/23 0956  •  [MAR Hold] morphine injection 1 mg, 1 mg, Intravenous, Q4H PRN **AND** [MAR Hold] naloxone (NARCAN) injection 0.4 mg, 0.4 mg, Intravenous, Q5 Min PRN, Pete Torres MD  •  ondansetron (ZOFRAN) tablet 4 mg, 4 mg, Oral, Q6H PRN **OR** ondansetron (ZOFRAN) injection 4 mg, 4 mg, Intravenous, Q6H PRN, [place], Vinicio PATEL MD  •  oxyCODONE-acetaminophen (PERCOCET) 7.5-325 MG per tablet 1 tablet, 1 tablet, Oral, Q4H PRN,  Vinicio Dan MD, 1 tablet at 04/05/23 2108  •  pantoprazole (PROTONIX) EC tablet 40 mg, 40 mg, Oral, Q AM, Vinicio Dan MD, 40 mg at 04/06/23 0543  •  propafenone (RYTHMOL) tablet 150 mg, 150 mg, Oral, Q8H, Vinicio Dan MD, 150 mg at 04/06/23 0543  •  rivaroxaban (XARELTO) tablet 15 mg, 15 mg, Oral, Daily, Vinicio Dan MD, 15 mg at 04/06/23 0834  •  sodium chloride 0.9 % flush 10 mL, 10 mL, Intravenous, PRN, Vinicio Dan MD  •  sodium chloride 0.9 % flush 10 mL, 10 mL, Intravenous, Q12H, Vinicio Dan MD, 10 mL at 04/05/23 2109  •  sodium chloride 0.9 % flush 10 mL, 10 mL, Intravenous, PRN, Vinicio Dan MD  •  sodium chloride 0.9 % infusion 1,000 mL, 1,000 mL, Intravenous, Continuous, Quita Douglas DO, Last Rate: 25 mL/hr at 04/05/23 1744, Restarted at 04/05/23 1753  •  sodium chloride 0.9 % infusion 40 mL, 40 mL, Intravenous, PRN, Vinicio Dan MD  •  terazosin (HYTRIN) capsule 2 mg, 2 mg, Oral, Nightly, Vinicio Dan MD, 2 mg at 04/05/23 2108     Diagnostic Data    Lab Results (last 24 hours)     Procedure Component Value Units Date/Time    CBC & Differential [[id number]]  (Abnormal) Collected: 04/06/23 0637    Specimen: Blood Updated: 04/06/23 0746    Narrative:      The following orders were created for panel order CBC & Differential.  Procedure                               Abnormality         Status                     ---------                               -----------         ------                     CBC Auto Differential[[id number]]        Abnormal            Final result               Scan Slide[[id number]]                                       Final result                 Please view results for these tests on the individual orders.    Scan Slide [[id number]] Collected: 04/06/23 0637    Specimen: Blood Updated: 04/06/23 0746     Anisocytosis Slight/1+     WBC Morphology Normal     Platelet Estimate Adequate     Comment: Rare fibrin strand observed        Basic Metabolic Panel [[id number]]  (Abnormal) Collected: 04/06/23 0637    Specimen: Blood Updated: 04/06/23 0719     Glucose 160 mg/dL      BUN 16 mg/dL      Creatinine 0.92 mg/dL      Sodium 134 mmol/L      Potassium 4.2 mmol/L      Chloride 100 mmol/L      CO2 22.0 mmol/L      Calcium 9.4 mg/dL      BUN/Creatinine Ratio 17.4     Anion Gap 12.0 mmol/L      eGFR 85.1 mL/min/1.73     Narrative:      GFR Normal >60  Chronic Kidney Disease <60  Kidney Failure <15    The GFR formula is only valid for adults with stable renal function between ages 18 and 70.    CBC Auto Differential [[id number]]  (Abnormal) Collected: 04/06/23 0637    Specimen: Blood Updated: 04/06/23 0658     WBC 6.50 10*3/mm3      RBC 4.30 10*6/mm3      Hemoglobin 12.1 g/dL      Hematocrit 36.0 %      MCV 83.7 fL      MCH 28.1 pg      MCHC 33.6 g/dL      RDW 13.8 %      RDW-SD 41.6 fl      MPV 9.7 fL      Platelets 271 10*3/mm3      Neutrophil % 58.9 %      Lymphocyte % 27.8 %      Monocyte % 7.8 %      Eosinophil % 2.6 %      Basophil % 0.6 %      Immature Grans % 2.3 %      Neutrophils, Absolute 3.82 10*3/mm3      Lymphocytes, Absolute 1.81 10*3/mm3      Monocytes, Absolute 0.51 10*3/mm3      Eosinophils, Absolute 0.17 10*3/mm3      Basophils, Absolute 0.04 10*3/mm3      Immature Grans, Absolute 0.15 10*3/mm3      nRBC 0.0 /100 WBC     CANDIDA AURIS SCREEN - Swab, Axilla Right, Axilla Left and Groin [[id number]]  (Normal) Collected: 03/30/23 1922    Specimen: Swab from Axilla Right, Axilla Left and Groin Updated: 04/05/23 1141     Jamilah Auris Screen Culture No Candida auris isolated at 5 days           Imaging Results (Last 24 Hours)     ** No results found for the last 24 hours. **          I reviewed the patient's new clinical results.    Assessment/Plan:     Active and Resolved Problems  Active Hospital Problems    Diagnosis  POA   • **OPAL (acute kidney injury) [N17.9]  Yes   • Acute urinary retention [R33.8]  Yes   • Atrial fibrillation with RVR  [I48.91]  Yes   • Gastroesophageal reflux disease with esophagitis [K21.00]  Yes   • Type 2 diabetes mellitus without complication, without long-term current use of insulin (HCC) [E11.9]  Yes   • Essential hypertension [I10]  Yes   • Mixed hyperlipidemia [E78.2]  Yes   • Generalized anxiety disorder [F41.1]  Yes      Resolved Hospital Problems   No resolved problems to display.     #Ur Retention: s/p cystoscopy. Good Ur output today. No blood noted in ratliff bag  - Discuss with Urology on plans for d/c ratliff today  - Assess ability to urinate after d/c ratliff  - Bladder scan PRN  - I/Os    #OPAL: resolved, Cr today 0.92    #Afib w/ RVR: BB and anticoagulated with DOAC  - Tele    #HTN: continue current regimen    #DM: Continue current regimen    DVT prophylaxis: NOAC  Code Status and Medical Interventions:   Ordered at: 04/05/23 1835     Level Of Support Discussed With:    Patient     Code Status (Patient has no pulse and is not breathing):    CPR (Attempt to Resuscitate)     Medical Interventions (Patient has pulse or is breathing):    Full Support       Plan for disposition:home today or tomorrow      Time: 19 minutes       Aristeo Christopher MD   PGY-3    [facility]  [address] [place] [address]  Office: [phone]    This document has been electronically signed by Aristeo Christopher MD on April 6, 2023 09:18 CDT

## 2023-04-06 NOTE — PLAN OF CARE
Goal Outcome Evaluation:  Plan of Care Reviewed With: patient        Progress: no change  Outcome Evaluation: Intake 100% - 2x, 75% - 1x, 50% - 1x, NPO - 2x.  Wll maintain pt on prescribed diet, honor food preferences, sned Boost Glucose Control and Milk each meal, monitor labs, monitor wt, monitor intake.

## 2023-04-06 NOTE — PLAN OF CARE
Goal Outcome Evaluation:  Plan of Care Reviewed With: patient        Progress: improving  Outcome Evaluation: OT tx completed this date. Sup-sit-Min A. Sit>stand-CGA. Fxl mobility ~ 6' CGA of 1 w/ RW. Pt able to maintain NWB status. Toilet t/f-Min A. Pericare-Max A. Grooming-set up. UB bathing/dressing-SBA. LB bathing/dressing-Max A. Pt then returned to supine-Min A for BLE's. Pt w/ all needs in reach upon exit. Cont OT POC.

## 2023-04-06 NOTE — THERAPY TREATMENT NOTE
Patient Name: Aj Card Jr.  : 1945    MRN: [id number]                              Today's Date: 2023       Admit Date: 3/30/2023    Visit Dx:     ICD-10-CM ICD-9-CM   1. OPAL (acute kidney injury)  N17.9 584.9   2. Urinary retention  R33.9 788.20   3. Atrial fibrillation with RVR  I48.91 427.31   4. Dehydration  E86.0 276.51   5. Nausea and vomiting, unspecified vomiting type  R11.2 787.01   6. Osteoarthritis of ankle and foot, right  M19.071 715.97   7. Impaired functional mobility, balance, gait, and endurance  Z74.09 V49.89   8. Impaired mobility and ADLs  Z74.09 V49.89    Z78.9    9. Acute urinary retention  R33.8 788.29     Patient Active Problem List   Diagnosis   • Type 2 diabetes mellitus without complication, without long-term current use of insulin (Formerly [facility])   • Essential hypertension   • Mixed hyperlipidemia   • Generalized anxiety disorder   • History of colon polyps   • Diverticulosis of large intestine without hemorrhage   • Coronary artery disease with angina pectoris   • Gastroesophageal reflux disease with esophagitis   • Vitamin D deficiency   • Overweight   • Encounter for screening for endocrine disorder   • Atopic dermatitis   • High serum vitamin B12   • Acute pain of right knee   • Tear of medial meniscus of right knee, current   • Atrial fibrillation with RVR   • GERD (gastroesophageal reflux disease)   • PAF (paroxysmal atrial fibrillation) (Formerly [facility])   • Uncontrolled type 2 diabetes mellitus with hyperglycemia   • Gastroesophageal reflux disease   • Chronic constipation   • Stage 2 chronic kidney disease   • Osteoarthritis of ankle and foot, right   • Closed trimalleolar fracture of right ankle   • Status post bunionectomy   • OPAL (acute kidney injury)   • Acute urinary retention     Past Medical History:   Diagnosis Date   • Acute posthemorrhagic anemia    • Afib    • Allergic rhinitis    • Anxiety state    • Chest pain    • CKD (chronic kidney disease), stage II    •  Coronary arteriosclerosis     3 stents, followed by Dr. Jeffers   • Diabetes mellitus     type 2   • Diverticular disease of colon    • Dysphagia    • Elevated cholesterol    • Epigastric pain    • GERD (gastroesophageal reflux disease)     esophagitis on Dexilant. Pt feels Nexium working better      • History of echocardiogram     Small left atrial enlargement with mild CLVH.Ef of 55-60%.Mitral valve mildly thickened.Av thickened.Left ventricle mildly dilated.Tricuspid intact.Mild aortic insufficiency. 12/07/2015       • History of echocardiogram     Mild diastolic dysfunction and mild left atrial enlargement, otherwise normal echo. EF> 55% 01/03/2012       • Hypercholesterolemia    • Hypertension    • Insomnia    • Irritable bowel syndrome    • Osteoarthritis of multiple joints    • Pain of right foot    • PONV (postoperative nausea and vomiting)      Past Surgical History:   Procedure Laterality Date   • APPENDECTOMY       [facility] 1995       • CARDIAC CATHETERIZATION      Successful PTCA of the OMB#2.Unsuccessful PTCA of the 1st OMB, secondary to difficulty in advancing the balloon. 12/08/2015       • CHOLECYSTECTOMY      [facility] 1993       • CHOLECYSTECTOMY     • COLONOSCOPY  10/01/2012   • COLONOSCOPY N/A 12/11/2017    Procedure: COLONOSCOPY;  Surgeon: Bladimir Michael MD;  Location: [facility] ENDOSCOPY;  Service:    • COLONOSCOPY N/A 11/01/2022    Procedure: COLONOSCOPY;  Surgeon: Bladimir Michael MD;  Location: [facility] ENDOSCOPY;  Service: Gastroenterology;  Laterality: N/A;   • CORONARY ANGIOPLASTY      Successful PTCA & stenting LAD was done w/ deployment of a 2.5mm x23 Xience stent w/ reduction of stenosis from 90% to less than 0% stenosis with good LILLIAM 3 flow 02/17/2012       • ENDOSCOPY      with biopsy.  Mildly severe esophagitis. Gastritis. Normal examined duodenum.Several biopsies obtained in the lower third of the esophagus.Several biopsies obtained in the gastric  antrum. 05/06/2016       • ENDOSCOPY      w tube. Normal esophagus. Gastritis in stomach. Biopsy taken. Gastric polyp found. Biopsy taken. Normal duodenum. 10/01/2012       • ENDOSCOPY AND COLONOSCOPY      Diverticulum in sigmoid colon & descending colon. Internal & external hemorrhoids found. 10/01/2012       • EYE SURGERY Bilateral     catarract   • HAND SURGERY Left      Corrective osteotomy of ring finger metacarpal. Malunited fracture of left ring finger 05/21/1985       • HERNIA REPAIR      Laparoscopic hernia repair. prepertitoneal bilateral inguinal hernia repair with mesh. 10/15/2009      • OTHER SURGICAL HISTORY      CORONARY ARTERY STENT    • SKIN TAG REMOVAL      Excision, viral skin tag,right upper eyelid 05/08/1995    • TOE FUSION Right 3/13/2023    Procedure: RIGHT FIRST TARSOMETATARSAL JOINT ARTHRODESIS, FIRST METATARSOPHALANGEAL JOINT ARTHRODESIS, CALCANEAL BONE GRAFT AND ALL OTHER INDICATED PROCEDURES                (LATEX ALLERGY);  Surgeon: Mason Salazar DPM;  Location: [facility];  Service: Podiatry;  Laterality: Right;      General Information     Row Name 04/06/23 0910          OT Time and Intention    Document Type therapy note (daily note)  -KD     Mode of Treatment individual therapy;occupational therapy  -KD     Row Name 04/06/23 0910          General Information    Patient Profile Reviewed yes  -KD     Existing Precautions/Restrictions non-weight bearing;right  -KD     Row Name 04/06/23 0910          Cognition    Orientation Status (Cognition) oriented x 4  -KD     Row Name 04/06/23 0910          Safety Issues, Functional Mobility    Impairments Affecting Function (Mobility) balance;endurance/activity tolerance;pain;strength  -KD           User Key  (r) = Recorded By, (t) = Taken By, (c) = Cosigned By    Initials Name Provider Type    Flaquita Christensen COTA Occupational Therapist Assistant                 Mobility/ADL's     Row Name 04/06/23 0910          Bed Mobility    Supine-Sit  Baxter (Bed Mobility) minimum assist (75% patient effort)  -KD     Sit-Supine Baxter (Bed Mobility) minimum assist (75% patient effort)  -KD     Assistive Device (Bed Mobility) bed rails;head of bed elevated  -KD     Row Name 04/06/23 0910          Sit-Stand Transfer    Sit-Stand Baxter (Transfers) contact guard  -KD     Assistive Device (Sit-Stand Transfers) walker, front-wheeled  -KD     Row Name 04/06/23 0910          Stand-Sit Transfer    Stand-Sit Baxter (Transfers) contact guard  -KD     Assistive Device (Stand-Sit Transfers) walker, front-wheeled  -KD     Row Name 04/06/23 0910          Functional Mobility    Functional Mobility- Ind. Level contact guard assist  -KD     Functional Mobility- Device walker, front-wheeled  -KD     Functional Mobility-Distance (Feet) 6  -KD     Row Name 04/06/23 0910          Activities of Daily Living    BADL Assessment/Intervention bathing;upper body dressing;lower body dressing;grooming;toileting  -     Row Name 04/06/23 0910          Mobility    Extremity Weight-bearing Status right lower extremity  -KD     Right Lower Extremity (Weight-bearing Status) non weight-bearing (NWB)  -KD     Row Name 04/06/23 0910          Lower Body Dressing Assessment/Training    Baxter Level (Lower Body Dressing) lower body dressing skills;don;socks;set up  -KD     Row Name 04/06/23 0910          Upper Body Dressing Assessment/Training    Baxter Level (Upper Body Dressing) upper body dressing skills;doff;don;other (see comments);set up  -KD     Position (Upper Body Dressing) supported sitting  -KD     Row Name 04/06/23 0910          Bathing Assessment/Intervention    Baxter Level (Bathing) bathing skills;lower body;minimum assist (75% patient effort);upper body;set up  -KD     Position (Bathing) supported sitting  -KD     Row Name 04/06/23 0910          Grooming Assessment/Training    Baxter Level (Grooming) grooming skills;wash face, hands;set  up;verbal cues  -KD     Position (Grooming) unsupported sitting  -KD           User Key  (r) = Recorded By, (t) = Taken By, (c) = Cosigned By    Initials Name Provider Type    Flaquita Christensen COTA Occupational Therapist Assistant               Obj/Interventions    No documentation.                Goals/Plan    No documentation.                Clinical Impression     Row Name 04/06/23 0910          Pain Assessment    Pretreatment Pain Rating 0/10 - no pain  -KD     Posttreatment Pain Rating 0/10 - no pain  -KD     Row Name 04/06/23 0910          Plan of Care Review    Plan of Care Reviewed With patient  -KD     Progress improving  -KD     Outcome Evaluation OT tx completed this date. Sup-sit-Min A. Sit>stand-CGA. Fxl mobility ~ 6' CGA of 1 w/ RW. Pt able to maintain NWB status. Toilet t/f-Min A. Pericare-Max A. Grooming-set up. UB bathing/dressing-SBA. LB bathing/dressing-Max A. Pt then returned to supine-Min A for BLE's. Pt w/ all needs in reach upon exit. Cont OT POC.  -KD     Row Name 04/06/23 0910          Therapy Assessment/Plan (OT)    Therapy Frequency (OT) other (see comments)  5-7 days a week  -KD     Row Name 04/06/23 0910          Therapy Plan Review/Discharge Plan (OT)    Anticipated Discharge Disposition (OT) home with 24/7 care;home with home health  -KD     Row Name 04/06/23 0910          Vital Signs    Pre Systolic BP Rehab 112  -KD     Pre Treatment Diastolic BP 72  -KD     Pretreatment Heart Rate (beats/min) 86  -KD     Pre SpO2 (%) 92  -KD           User Key  (r) = Recorded By, (t) = Taken By, (c) = Cosigned By    Initials Name Provider Type    Flaquita Christensen COTA Occupational Therapist Assistant               Outcome Measures     Row Name 04/06/23 5620          How much help from another is currently needed...    Putting on and taking off regular lower body clothing? 3  -KD     Bathing (including washing, rinsing, and drying) 3  -KD     Toileting (which includes using toilet bed pan or  urinal) 2  -KD     Putting on and taking off regular upper body clothing 4  -KD     Taking care of personal grooming (such as brushing teeth) 4  -KD     Eating meals 4  -KD     AM-PAC 6 Clicks Score (OT) 20  -KD     Row Name 04/06/23 0838          How much help from another person do you currently need...    Turning from your back to your side while in flat bed without using bedrails? 4  -KDA     Moving from lying on back to sitting on the side of a flat bed without bedrails? 4  -KDA     Moving to and from a bed to a chair (including a wheelchair)? 3  -KDA     Standing up from a chair using your arms (e.g., wheelchair, bedside chair)? 3  -KDA     Climbing 3-5 steps with a railing? 2  -KDA     To walk in hospital room? 2  -KDA     AM-PAC 6 Clicks Score (PT) 18  -KDA     Highest level of mobility 6 --> Walked 10 steps or more  -KDA           User Key  (r) = Recorded By, (t) = Taken By, (c) = Cosigned By    Initials Name Provider Type    Flaquita Christensen COTA Occupational Therapist Assistant    KDA Crista Fields, RN Registered Nurse                Occupational Therapy Education     Title: PT OT SLP Therapies (In Progress)     Topic: Occupational Therapy (In Progress)     Point: ADL training (Done)     Description:   Instruct learner(s) on proper safety adaptation and remediation techniques during self care or transfers.   Instruct in proper use of assistive devices.              Learning Progress Summary           Patient Acceptance, E,TB, VU,NR by  at 3/31/2023 0320    Comment: POC, role of OT, transfer training                   Point: Home exercise program (Not Started)     Description:   Instruct learner(s) on appropriate technique for monitoring, assisting and/or progressing therapeutic exercises/activities.              Learner Progress:  Not documented in this visit.          Point: Precautions (Not Started)     Description:   Instruct learner(s) on prescribed precautions during self-care and functional  transfers.              Learner Progress:  Not documented in this visit.          Point: Body mechanics (Not Started)     Description:   Instruct learner(s) on proper positioning and spine alignment during self-care, functional mobility activities and/or exercises.              Learner Progress:  Not documented in this visit.                      User Key     Initials Effective Dates Name Provider Type Discipline     06/14/21 -  Marino Trejo, OT Occupational Therapist OT              OT Recommendation and Plan  Therapy Frequency (OT): other (see comments) (5-7 days a week)  Plan of Care Review  Plan of Care Reviewed With: patient  Progress: improving  Outcome Evaluation: OT tx completed this date. Sup-sit-Min A. Sit>stand-CGA. Fxl mobility ~ 6' CGA of 1 w/ RW. Pt able to maintain NWB status. Toilet t/f-Min A. Pericare-Max A. Grooming-set up. UB bathing/dressing-SBA. LB bathing/dressing-Max A. Pt then returned to supine-Min A for BLE's. Pt w/ all needs in reach upon exit. Cont OT POC.     Time Calculation:    Time Calculation- OT     Row Name 04/06/23 0910             Time Calculation- OT    OT Start Time 0910  -KD      OT Stop Time 1003  -KD      OT Time Calculation (min) 53 min  -KD      Total Timed Code Minutes- OT 53 minute(s)  -KD         Timed Charges    87933 - OT Self Care/Mgmt Minutes 53  -KD         Total Minutes    Timed Charges Total Minutes 53  -KD       Total Minutes 53  -KD            User Key  (r) = Recorded By, (t) = Taken By, (c) = Cosigned By    Initials Name Provider Type     Flaquita Reyes COTA Occupational Therapist Assistant              Therapy Charges for Today     Code Description Service Date Service Provider Modifiers Qty    [id number]  OT SELF CARE/MGMT/TRAIN EA 15 MIN 4/6/2023 Flaquita Reyes COTA GO 4               KELLEE Tiwari  4/6/2023

## 2023-04-06 NOTE — CONSULTS
Adult Nutrition  Assessment/PES    Patient Name:  Aj Card Jr.  YOB: 1945  MRN: [id number]  Admit Date:  3/30/2023    Assessment Date:  4/6/2023    Comments:  Pt reports fair appetite.  Takes some of the Boost Glucose Control he receives but isn't eating the ice cream.  Willing to receive milk with meals instead of ice cream.  Voiced food preferences and would rater receive fruit with meals instead of dessert.  Intake 100% - 2x, 75% - 1x, 50% - 1x, NPO - 2x.  Per Wt Report wt has dranged form 172-178 lb.  Pt reported hs nausea/vomiting.  Protonix, PRN Zofran prescribed.  Reported hx diarrhea but reports constipation at present.  Na is low and is being replaced.  Pt with right medial ankle incision, right great toe incision.  Will maintain pt on prescribed diet, honor food preferences, send Boost Glucose Control and Milk with each meal, monitor labs, monitor wt, monitor intake.      Reason for Assessment     Row Name 04/06/23 1602          Reason for Assessment    Reason For Assessment follow-up protocol                  Labs/Tests/Procedures/Meds     Row Name 04/06/23 1603          Labs/Procedures/Meds    Lab Results Reviewed reviewed, pertinent        Medications    Pertinent Medications Reviewed reviewed, pertinent                Physical Findings     Row Name 04/06/23 1604          Physical Findings    Overall Physical Appearance right lateral ankle incision, right great toe incision                  Nutrition Prescription Ordered     Row Name 04/06/23 1604          Nutrition Prescription PO    Current PO Diet Clear Liquid     Supplement Boost Glucose Control (Glucerna Shake);Ice Cream     Supplement Frequency Other (comment)  Boost Glucose Control each meal, Ice Cream with lunch and supper     Common Modifiers Consistent Carbohydrate;Cardiac                Evaluation of Received Nutrient/Fluid Intake     Row Name 04/06/23 1606          PO Evaluation    Number of Meals 6     % PO  Intake 100% - 2x, 75% - 1x, 50% - 1x, NPO - 2x                   Problem/Interventions:   Problem 1     Row Name 04/06/23 1607          Nutrition Diagnoses Problem 1    Problem 1 Unintended Weight Loss     Etiology (related to) Factors Affecting Nutrition     Appetite Poor;Poor Due to GI Factor;Fair     Reported GI Symptoms Other  Nausea     Signs/Symptoms (evidenced by) Unintended Weight Change     Unintended Weight Change Loss     Number of Pounds Lost ~10# in once month     Other Comment Decreased oral intake from his norm over the past month.                Problem 2     Row Name 04/06/23 1608          Nutrition Diagnoses Problem 2    Problem 2 Inadequate Intake/Infusion     Inadequate Intake Type Oral     Macronutrient Kcal;Protein     Etiology (related to) Factors Affecting Nutrition     Appetite Fair     Signs/Symptoms (evidenced by) Report/Observation;PO Intake     Percent (%) intake recorded --  100% - 2x, 75% - 1x, 50% - 1x, NPO - 2x     Over number of meals 6     Reported/Observed By Patient                    Intervention Goal     Row Name 04/06/23 1609          Intervention Goal    General Nutrition support treatment;Meet nutritional needs for age/condition     PO Meet estimated needs     Weight Maintain weight                Nutrition Intervention     Row Name 04/06/23 1610          Nutrition Intervention    RD/Tech Action Interview for preference;Encourage intake;Recommend/ordered;Follow Tx progress     Recommended/Ordered Supplement                Nutrition Prescription     Row Name 04/06/23 1610          Nutrition Prescription PO    PO Prescription Begin/change supplement     Supplement Boost Glucose Control;Milk     Supplement Frequency 3 times a day                Education/Evaluation     Row Name 04/06/23 1611          Education    Education Previous education by RD/LD;Other (comment)  Encourage intake of meals and supplements.        Monitor/Evaluation    Monitor Per protocol;PO  intake;Supplement intake;Pertinent labs;Weight;Skin status     Education Follow-up Reinforce PRN                 Electronically signed by:  Estrella Verma RD  04/06/23 16:12 CDT

## 2023-04-06 NOTE — PROGRESS NOTES
[facility] Medicine Services  INPATIENT PROGRESS NOTE    Length of Stay: 4  Date of Admission: 3/30/2023  Primary Care Physician: Andreas Martinez MD    Subjective   Chief Complaint: Urinary retention  HPI:    Had some tachycardia overnight but overall doing stable with his heart rate as of right now.  Still has a Gray catheter and urine output has been fine.    Review of Systems   Respiratory: Negative for shortness of breath.    Cardiovascular: Negative for chest pain.        All pertinent negatives and positives are as above. All other systems have been reviewed and are negative unless otherwise stated.     Objective    Temp:  [97.1 °F (36.2 °C)-98.4 °F (36.9 °C)] 97.8 °F (36.6 °C)  Heart Rate:  [] 83  Resp:  [20] 20  BP: (103-134)/(53-90) 133/63  Physical Exam  Vitals and nursing note reviewed.   Constitutional:       General: He is not in acute distress.     Appearance: He is well-developed. He is not diaphoretic.   HENT:      Head: Normocephalic and atraumatic.   Cardiovascular:      Rate and Rhythm: Normal rate.   Pulmonary:      Effort: Pulmonary effort is normal. No respiratory distress.      Breath sounds: No wheezing.   Abdominal:      General: There is no distension.      Palpations: Abdomen is soft.   Musculoskeletal:         General: Normal range of motion.   Skin:     General: Skin is warm and dry.   Neurological:      Mental Status: He is alert.      Cranial Nerves: No cranial nerve deficit.   Psychiatric:         Behavior: Behavior normal.         Thought Content: Thought content normal.         Judgment: Judgment normal.             Results Review:  I have reviewed the labs, radiology results, and diagnostic studies.    Laboratory Data:   Lab Results (last 24 hours)     Procedure Component Value Units Date/Time    CBC & Differential [[id number]]  (Abnormal) Collected: 04/06/23 0637    Specimen: Blood Updated: 04/06/23 0746    Narrative:      The  following orders were created for panel order CBC & Differential.  Procedure                               Abnormality         Status                     ---------                               -----------         ------                     CBC Auto Differential[[id number]]        Abnormal            Final result               Scan Slide[[id number]]                                       Final result                 Please view results for these tests on the individual orders.    Scan Slide [[id number]] Collected: 04/06/23 0637    Specimen: Blood Updated: 04/06/23 0746     Anisocytosis Slight/1+     WBC Morphology Normal     Platelet Estimate Adequate     Comment: Rare fibrin strand observed       Basic Metabolic Panel [[id number]]  (Abnormal) Collected: 04/06/23 0637    Specimen: Blood Updated: 04/06/23 0719     Glucose 160 mg/dL      BUN 16 mg/dL      Creatinine 0.92 mg/dL      Sodium 134 mmol/L      Potassium 4.2 mmol/L      Chloride 100 mmol/L      CO2 22.0 mmol/L      Calcium 9.4 mg/dL      BUN/Creatinine Ratio 17.4     Anion Gap 12.0 mmol/L      eGFR 85.1 mL/min/1.73     Narrative:      GFR Normal >60  Chronic Kidney Disease <60  Kidney Failure <15    The GFR formula is only valid for adults with stable renal function between ages 18 and 70.    CBC Auto Differential [[id number]]  (Abnormal) Collected: 04/06/23 0637    Specimen: Blood Updated: 04/06/23 0658     WBC 6.50 10*3/mm3      RBC 4.30 10*6/mm3      Hemoglobin 12.1 g/dL      Hematocrit 36.0 %      MCV 83.7 fL      MCH 28.1 pg      MCHC 33.6 g/dL      RDW 13.8 %      RDW-SD 41.6 fl      MPV 9.7 fL      Platelets 271 10*3/mm3      Neutrophil % 58.9 %      Lymphocyte % 27.8 %      Monocyte % 7.8 %      Eosinophil % 2.6 %      Basophil % 0.6 %      Immature Grans % 2.3 %      Neutrophils, Absolute 3.82 10*3/mm3      Lymphocytes, Absolute 1.81 10*3/mm3      Monocytes, Absolute 0.51 10*3/mm3      Eosinophils, Absolute 0.17 10*3/mm3      Basophils, Absolute 0.04  10*3/mm3      Immature Grans, Absolute 0.15 10*3/mm3      nRBC 0.0 /100 WBC           Culture Data:   No results found for: BLOODCX  No results found for: URINECX  No results found for: RESPCX  No results found for: WOUNDCX  No results found for: STOOLCX  No components found for: BODYFLD    Radiology Data:   Imaging Results (Last 24 Hours)     ** No results found for the last 24 hours. **          I have reviewed the patient's current medications.     Assessment/Plan     Active Hospital Problems    Diagnosis    • **OPAL (acute kidney injury)    • Acute urinary retention    • Atrial fibrillation with RVR    • Gastroesophageal reflux disease with esophagitis    • Type 2 diabetes mellitus without complication, without long-term current use of insulin (HCC)    • Essential hypertension    • Mixed hyperlipidemia    • Generalized anxiety disorder      Urinary retention-status post cystoscopy-still has catheter.  Plans for discontinuing catheter, will monitor.    OPAL-creatinine level is back to baseline we will monitor.  Urine output has been good    Atrial fibrillation-continue with anticoagulation, continue with telemetry monitoring    Hypertension-continue monitor blood pressure    Diabetes mellitus-continue with glucose control.      Medical Decision Making  Number and Complexity of problems: 2 major complex  Differential Diagnosis: Sepsis    Conditions and Status:        Condition is improving.     [facility] Data  External documents reviewed: Previous medical records  My EKG interpretation: None  My plain film interpretation:none      Tests considered but not ordered: none     Decision rules/scores evaluated (example [id number], Wells, etc): None     Discussed with: Urology     Treatment Plan  As above.      Care Planning  Shared decision making: Patient  Code status and discussions: Full code    Disposition  Social Determinants of Health that impact treatment or disposition: None  I expect the patient to be discharged to  home in 1-2 days.      I have utilized all available immediate resources to obtain, update, or review the patient's current medications (including all prescriptions, over-the-counter products, herbals, cannabis/cannabidiol products, and vitamin/mineral/dietary (nutritional) supplements).     I confirmed that the patient's Advance Care Plan is present, code status is documented, or surrogate decision maker is listed in the patient's medical record.         Pete Torres MD   04/06/23   15:26 CDT

## 2023-04-06 NOTE — PLAN OF CARE
Goal Outcome Evaluation:    Pt continues in the hospital with small improvements. Pt's indwelling cath was dc'd at 1600, as of this writing patient has not felt the urge to urinate. Pt had been trying to drink a bit more fluid so that he would have the urge to urinate, but unfortunately became nauseated to the point he sent his dinner back and wanted no other. Pt was offered a PRN medication to help with nausea but he declined. VS have been stable, pt has been compliant with fall measures and he has remained oriented throughout the day. No other needs voiced at this time.

## 2023-04-07 ENCOUNTER — HOME HEALTH ADMISSION (OUTPATIENT)
Dept: HOME HEALTH SERVICES | Facility: HOME HEALTHCARE | Age: 78
End: 2023-04-07
Payer: MEDICARE

## 2023-04-07 ENCOUNTER — READMISSION MANAGEMENT (OUTPATIENT)
Dept: CALL CENTER | Facility: HOSPITAL | Age: 78
End: 2023-04-07
Payer: MEDICARE

## 2023-04-07 VITALS
DIASTOLIC BLOOD PRESSURE: 58 MMHG | TEMPERATURE: 97.8 F | HEIGHT: 69 IN | HEART RATE: 82 BPM | SYSTOLIC BLOOD PRESSURE: 104 MMHG | OXYGEN SATURATION: 95 % | BODY MASS INDEX: 25.56 KG/M2 | RESPIRATION RATE: 18 BRPM | WEIGHT: 172.6 LBS

## 2023-04-07 PROBLEM — N17.9 AKI (ACUTE KIDNEY INJURY): Status: RESOLVED | Noted: 2023-03-30 | Resolved: 2023-04-07

## 2023-04-07 PROBLEM — R33.8 ACUTE URINARY RETENTION: Status: RESOLVED | Noted: 2023-04-03 | Resolved: 2023-04-07

## 2023-04-07 PROCEDURE — 97535 SELF CARE MNGMENT TRAINING: CPT

## 2023-04-07 PROCEDURE — 25010000002 CEFAZOLIN PER 500 MG: Performed by: STUDENT IN AN ORGANIZED HEALTH CARE EDUCATION/TRAINING PROGRAM

## 2023-04-07 RX ORDER — TERAZOSIN 2 MG/1
2 CAPSULE ORAL NIGHTLY
Qty: 30 CAPSULE | Refills: 0 | Status: SHIPPED | OUTPATIENT
Start: 2023-04-07

## 2023-04-07 RX ORDER — CEPHALEXIN 500 MG/1
500 CAPSULE ORAL 2 TIMES DAILY
Qty: 10 CAPSULE | Refills: 0 | Status: SHIPPED | OUTPATIENT
Start: 2023-04-07 | End: 2023-04-12

## 2023-04-07 RX ORDER — PSEUDOEPHEDRINE HCL 30 MG
100 TABLET ORAL 2 TIMES DAILY
Qty: 14 CAPSULE | Refills: 0 | Status: SHIPPED | OUTPATIENT
Start: 2023-04-07

## 2023-04-07 RX ADMIN — CEFAZOLIN SODIUM 1 G: 1 INJECTION, POWDER, FOR SOLUTION INTRAMUSCULAR; INTRAVENOUS at 06:16

## 2023-04-07 RX ADMIN — AMLODIPINE BESYLATE 10 MG: 10 TABLET ORAL at 09:09

## 2023-04-07 RX ADMIN — DOCUSATE SODIUM 100 MG: 100 CAPSULE, LIQUID FILLED ORAL at 09:09

## 2023-04-07 RX ADMIN — PANTOPRAZOLE SODIUM 40 MG: 40 TABLET, DELAYED RELEASE ORAL at 06:16

## 2023-04-07 RX ADMIN — PROPAFENONE HYDROCHLORIDE 150 MG: 150 TABLET, FILM COATED ORAL at 06:16

## 2023-04-07 RX ADMIN — ISOSORBIDE MONONITRATE 30 MG: 30 TABLET, EXTENDED RELEASE ORAL at 10:53

## 2023-04-07 RX ADMIN — METOPROLOL SUCCINATE 25 MG: 25 TABLET, FILM COATED, EXTENDED RELEASE ORAL at 09:09

## 2023-04-07 RX ADMIN — RIVAROXABAN 15 MG: 15 TABLET, FILM COATED ORAL at 09:09

## 2023-04-07 NOTE — PLAN OF CARE
Goal Outcome Evaluation:  Plan of Care Reviewed With: patient        Progress: improving   VSS. Pt rested some through the night. Pt is voiding good since cath was pulled today. No complaints at this time. Will cont to provide pt care.

## 2023-04-07 NOTE — DISCHARGE SUMMARY
[facility] Medicine Services  DISCHARGE SUMMARY       Date of Admission: 3/30/2023  Date of Discharge:  4/7/2023  Primary Care Physician: Andreas Martinez MD    Presenting Problem/History of Present Illness:  Dehydration [E86.0]  Urinary retention [R33.9]  OPAL (acute kidney injury) [N17.9]  Atrial fibrillation with RVR [I48.91]  Nausea and vomiting, unspecified vomiting type [R11.2]       Final Discharge Diagnoses:  Active Hospital Problems    Diagnosis    • Atrial fibrillation with RVR    • Gastroesophageal reflux disease with esophagitis    • Type 2 diabetes mellitus without complication, without long-term current use of insulin (HCC)    • Essential hypertension    • Mixed hyperlipidemia    • Generalized anxiety disorder        Consults:   Consults     Date and Time Order Name Status Description    3/31/2023 12:16 PM Inpatient Urology Consult      3/30/2023  4:21 PM Hospitalist (on-call MD unless specified)      3/23/2023 12:11 PM Inpatient Consult to Cardiology Completed           Procedures Performed: Procedure(s):  CYSTOSCOPY         (LATEX ALLERGY)                    Chief Complaint on Day of Discharge: none    Hospital Course:  The patient is a 77 y.o. male who presented to [facility] with past medical history of CKD, recent ankle fracture, osteoarthritis, CKD stage II presenting to the ER with complaint of urinary retention and nausea vomiting going on for the past 3 days.  He was found to have more than 1000 cc in his bladder scan.  His creatinine level was also elevated above baseline.  He was also found to be in atrial fibrillation with RVR.  He denies any chest pain or any shortness of air.  He was recently discharged to nursing for rehab after his ankle fracture surgery.  Patient was kept in the ICU for his atrial fibrillation and Cardizem was started.  He was later transition to p.o. medication after yesterday's atrial fibrillation had  "resolved.  Patient was then transferred to the floor however he was having urinary retention and Gray catheter was placed.  Urology was also consulted and cystoscopy was also done for him.  His Gray catheter was done discontinued and voiding trial was done and he had done well with urinating fine.  Patient then opted to go back home instead of nursing home as he reports he can take care of himself home with the help of his family.    Condition on Discharge: Stable    Physical Exam on Discharge:  /58 (BP Location: Right arm, Patient Position: Lying)   Pulse 82   Temp 97.8 °F (36.6 °C) (Infrared)   Resp 18   Ht 175.3 cm (69.02\")   Wt 78.3 kg (172 lb 9.6 oz)   SpO2 95%   BMI 25.48 kg/m²   Physical Exam  Vitals and nursing note reviewed.   Constitutional:       General: He is not in acute distress.     Appearance: He is well-developed. He is not diaphoretic.   HENT:      Head: Normocephalic and atraumatic.   Cardiovascular:      Rate and Rhythm: Normal rate.   Pulmonary:      Effort: Pulmonary effort is normal. No respiratory distress.      Breath sounds: No wheezing.   Abdominal:      General: There is no distension.      Palpations: Abdomen is soft.   Musculoskeletal:         General: Normal range of motion.   Skin:     General: Skin is warm and dry.   Neurological:      Mental Status: He is alert.      Cranial Nerves: No cranial nerve deficit.   Psychiatric:         Behavior: Behavior normal.         Thought Content: Thought content normal.         Judgment: Judgment normal.           Discharge Disposition:  Home or Self Care    Discharge Medications:     Discharge Medications      New Medications      Instructions Start Date   cephalexin 500 MG capsule  Commonly known as: Keflex   500 mg, Oral, 2 Times Daily      docusate sodium 100 MG capsule   100 mg, Oral, 2 Times Daily      terazosin 2 MG capsule  Commonly known as: HYTRIN   2 mg, Oral, Nightly         Changes to Medications      Instructions " "Start Date   oxyCODONE-acetaminophen 7.5-325 MG per tablet  Commonly known as: PERCOCET  What changed: Another medication with the same name was removed. Continue taking this medication, and follow the directions you see here.   1 tablet, Oral, Every 4 Hours PRN      rivaroxaban 15 MG tablet  Commonly known as: XARELTO  What changed:   · medication strength  · how much to take   15 mg, Oral, Daily   Start Date: April 8, 2023        Continue These Medications      Instructions Start Date   ALPRAZolam 0.5 MG tablet  Commonly known as: XANAX   0.5 mg, Oral, Nightly PRN      amLODIPine 10 MG tablet  Commonly known as: NORVASC   10 mg, Oral, Daily      ASPIRIN 81 PO   1 tablet, Oral, Daily      Azelastine HCl 137 MCG/SPRAY solution   137 mcg, Inhalation, Daily      BASAGLAR KWIKPEN 100 UNIT/ML injection pen   15 Units, Subcutaneous, Nightly PRN      Blood Glucose Test strip   Use 4 x daily use any brand covered by insurance or same brand as before ICD10 code is E11.9      coenzyme Q10 100 MG capsule   100 mg, Oral, Daily      esomeprazole 40 MG capsule  Commonly known as: nexIUM   40 mg, Oral, Every Morning Before Breakfast      Farxiga 5 MG tablet tablet  Generic drug: dapagliflozin   5 mg, Oral, Daily      HYDROcodone-acetaminophen  MG per tablet  Commonly known as: Norco   1 tablet, Oral, Every 6 Hours PRN      Insulin Pen Needle 30G X 8 MM misc   Use 3-4 times daily.      Insulin Syringe 31G X 5/16\" 0.3 ML misc   4 x daily      isosorbide mononitrate 30 MG 24 hr tablet  Commonly known as: IMDUR   30 mg, Oral, Daily      KRILL OIL OMEGA-3 PO   1 capsule, Oral, Daily      Lancets 30G misc   USE 4 X DAILY      Lancing Device misc   USE AS INDICATED TO CORRELATE WITH STRIPS AND METER      metoprolol tartrate 50 MG tablet  Commonly known as: LOPRESSOR   50 mg, Oral, 2 Times Daily      propafenone 150 MG tablet  Commonly known as: RYTHMOL   150 mg, Oral, Every 8 Hours      RED YEAST RICE PO   4 capsules, Oral, " Daily      SAW PALMETTO PO   1 tablet, Oral, Daily      sucralfate 1 g tablet  Commonly known as: CARAFATE   1 g, Oral, 2 Times Daily      TURMERIC CURCUMIN PO   1,500 mg, Oral, Daily      vitamin B-12 2500 MCG sublingual tablet tablet  Commonly known as: CYANOCOBALAMIN   5,000 mcg, Sublingual, Weekly      vitamin E 100 UNIT capsule   100 Units, Oral, Daily         Stop These Medications    lisinopril 5 MG tablet  Commonly known as: PRINIVIL,ZESTRIL     pantoprazole 40 MG EC tablet  Commonly known as: PROTONIX        ASK your doctor about these medications      Instructions Start Date   Trulicity 3 MG/0.5ML solution pen-injector  Generic drug: Dulaglutide   3 mg, Subcutaneous, Weekly             Discharge Diet:   Diet Instructions     Diet: Cardiac Diets, Diabetic Diets; Low Sodium (2g); Regular Texture (IDDSI 7); Thin (IDDSI 0); Consistent Carbohydrate      Discharge Diet:  Cardiac Diets  Diabetic Diets       Cardiac Diet: Low Sodium (2g)    Texture: Regular Texture (IDDSI 7)    Fluid Consistency: Thin (IDDSI 0)    Diabetic Diet: Consistent Carbohydrate          Activity at Discharge:   Activity Instructions     Activity as Tolerated      No weight bearing on right foot. Utilize walker and wheelchair.    Other Activity Restrictions      Type of Restriction: Other    Explain Other Restrictions: No weight bearing on right foot until cleared by podiatry          Discharge Care Plan/Instructions: Continue with current medications, follow-up with PCP and urology and cardiology    Follow-up Appointments:   Future Appointments   Date Time Provider Department Center   4/9/2023 To Be Determined Felecia Adair, GANESH [facility]   4/10/2023 To Be Determined Dominique Pruitt, PT [facility]   4/12/2023 To Be Determined Faith Chu OT [facility]   4/18/2023  2:30 PM Andreas Martinez MD [facility]   4/20/2023  8:50 AM Qasim Zacarias APRN [facility]   6/26/2023  1:15 PM Glen Jain MD [facility]  YOSHI Torres MD  04/07/23  13:43 CDT    Time: 38 minutes

## 2023-04-07 NOTE — OUTREACH NOTE
Prep Survey    Flowsheet Row Responses   [facility] facility patient discharged from? [place]   Is LACE score < 7 ? No   Eligibility [facility]   Date of Admission 03/30/23   Date of Discharge 04/07/23   Discharge Disposition Home-Health Care Svc   Discharge diagnosis OPAL, urinary retention, cystoscopy, A-fib with RVR   Does the patient have one of the following disease processes/diagnoses(primary or secondary)? Other   Does the patient have Home health ordered? Yes   What is the Home health agency?  [facility]   Is there a DME ordered? Yes   What DME was ordered? walker (did not qualify for W/C) from [facility] medical   Prep survey completed? Yes          Amanda HAAS - Registered Nurse

## 2023-04-07 NOTE — THERAPY TREATMENT NOTE
Patient Name: Aj Card Jr.  : 1945    MRN: [id number]                              Today's Date: 2023       Admit Date: 3/30/2023    Visit Dx:     ICD-10-CM ICD-9-CM   1. OPAL (acute kidney injury)  N17.9 584.9   2. Urinary retention  R33.9 788.20   3. Atrial fibrillation with RVR  I48.91 427.31   4. Dehydration  E86.0 276.51   5. Nausea and vomiting, unspecified vomiting type  R11.2 787.01   6. Osteoarthritis of ankle and foot, right  M19.071 715.97   7. Impaired functional mobility, balance, gait, and endurance  Z74.09 V49.89   8. Impaired mobility and ADLs  Z74.09 V49.89    Z78.9    9. Acute urinary retention  R33.8 788.29   10. Status post bunionectomy  Z98.890 V45.89     Patient Active Problem List   Diagnosis   • Type 2 diabetes mellitus without complication, without long-term current use of insulin (Formerly [facility])   • Essential hypertension   • Mixed hyperlipidemia   • Generalized anxiety disorder   • History of colon polyps   • Diverticulosis of large intestine without hemorrhage   • Coronary artery disease with angina pectoris   • Gastroesophageal reflux disease with esophagitis   • Vitamin D deficiency   • Overweight   • Encounter for screening for endocrine disorder   • Atopic dermatitis   • High serum vitamin B12   • Acute pain of right knee   • Tear of medial meniscus of right knee, current   • Atrial fibrillation with RVR   • GERD (gastroesophageal reflux disease)   • PAF (paroxysmal atrial fibrillation) (Formerly [facility])   • Uncontrolled type 2 diabetes mellitus with hyperglycemia   • Gastroesophageal reflux disease   • Chronic constipation   • Stage 2 chronic kidney disease   • Osteoarthritis of ankle and foot, right   • Closed trimalleolar fracture of right ankle   • Status post bunionectomy     Past Medical History:   Diagnosis Date   • Acute posthemorrhagic anemia    • Afib    • Allergic rhinitis    • Anxiety state    • Chest pain    • CKD (chronic kidney disease), stage II    • Coronary  arteriosclerosis     3 stents, followed by Dr. Jeffers   • Diabetes mellitus     type 2   • Diverticular disease of colon    • Dysphagia    • Elevated cholesterol    • Epigastric pain    • GERD (gastroesophageal reflux disease)     esophagitis on Dexilant. Pt feels Nexium working better      • History of echocardiogram     Small left atrial enlargement with mild CLVH.Ef of 55-60%.Mitral valve mildly thickened.Av thickened.Left ventricle mildly dilated.Tricuspid intact.Mild aortic insufficiency. 12/07/2015       • History of echocardiogram     Mild diastolic dysfunction and mild left atrial enlargement, otherwise normal echo. EF> 55% 01/03/2012       • Hypercholesterolemia    • Hypertension    • Insomnia    • Irritable bowel syndrome    • Osteoarthritis of multiple joints    • Pain of right foot    • PONV (postoperative nausea and vomiting)      Past Surgical History:   Procedure Laterality Date   • APPENDECTOMY       [facility] 1995       • CARDIAC CATHETERIZATION      Successful PTCA of the OMB#2.Unsuccessful PTCA of the 1st OMB, secondary to difficulty in advancing the balloon. 12/08/2015       • CHOLECYSTECTOMY      [facility] 1993       • CHOLECYSTECTOMY     • COLONOSCOPY  10/01/2012   • COLONOSCOPY N/A 12/11/2017    Procedure: COLONOSCOPY;  Surgeon: Bladimir Michael MD;  Location: [facility] ENDOSCOPY;  Service:    • COLONOSCOPY N/A 11/01/2022    Procedure: COLONOSCOPY;  Surgeon: Bladimir Michael MD;  Location: [facility] ENDOSCOPY;  Service: Gastroenterology;  Laterality: N/A;   • CORONARY ANGIOPLASTY      Successful PTCA & stenting LAD was done w/ deployment of a 2.5mm x23 Xience stent w/ reduction of stenosis from 90% to less than 0% stenosis with good LILLIAM 3 flow 02/17/2012       • ENDOSCOPY      with biopsy.  Mildly severe esophagitis. Gastritis. Normal examined duodenum.Several biopsies obtained in the lower third of the esophagus.Several biopsies obtained in the gastric antrum.  05/06/2016       • ENDOSCOPY      w tube. Normal esophagus. Gastritis in stomach. Biopsy taken. Gastric polyp found. Biopsy taken. Normal duodenum. 10/01/2012       • ENDOSCOPY AND COLONOSCOPY      Diverticulum in sigmoid colon & descending colon. Internal & external hemorrhoids found. 10/01/2012       • EYE SURGERY Bilateral     catarract   • HAND SURGERY Left      Corrective osteotomy of ring finger metacarpal. Malunited fracture of left ring finger 05/21/1985       • HERNIA REPAIR      Laparoscopic hernia repair. prepertitoneal bilateral inguinal hernia repair with mesh. 10/15/2009      • OTHER SURGICAL HISTORY      CORONARY ARTERY STENT    • SKIN TAG REMOVAL      Excision, viral skin tag,right upper eyelid 05/08/1995    • TOE FUSION Right 3/13/2023    Procedure: RIGHT FIRST TARSOMETATARSAL JOINT ARTHRODESIS, FIRST METATARSOPHALANGEAL JOINT ARTHRODESIS, CALCANEAL BONE GRAFT AND ALL OTHER INDICATED PROCEDURES                (LATEX ALLERGY);  Surgeon: Mason Salazar DPM;  Location: [facility];  Service: Podiatry;  Laterality: Right;      General Information     Row Name 04/07/23 0828          OT Time and Intention    Document Type therapy note (daily note)  -KD     Mode of Treatment individual therapy;occupational therapy  -KD     Row Name 04/07/23 0828          General Information    Patient Profile Reviewed yes  -KD     Existing Precautions/Restrictions non-weight bearing;right  -KD     Row Name 04/07/23 0828          Cognition    Orientation Status (Cognition) oriented x 4  -KD     Row Name 04/07/23 0828          Safety Issues, Functional Mobility    Impairments Affecting Function (Mobility) balance;endurance/activity tolerance;pain;strength  -KD           User Key  (r) = Recorded By, (t) = Taken By, (c) = Cosigned By    Initials Name Provider Type    KD Flaquita Reyes COTA Occupational Therapist Assistant                 Mobility/ADL's     Row Name 04/07/23 0828          Bed Mobility    Supine-Sit  [place] (Bed Mobility) minimum assist (75% patient effort)  -     Sit-Supine [place] (Bed Mobility) not tested  -KD     Assistive Device (Bed Mobility) bed rails;head of bed elevated  -     Row Name 04/07/23 0828          Bed-Chair Transfer    Bed-Chair [place] (Transfers) contact guard;1 person assist  -KD     Assistive Device (Bed-Chair Transfers) walker, front-wheeled  -KD     Row Name 04/07/23 0828          Sit-Stand Transfer    Sit-Stand [place] (Transfers) contact guard  -KD     Assistive Device (Sit-Stand Transfers) walker, front-wheeled  -KD     Row Name 04/07/23 0828          Stand-Sit Transfer    Stand-Sit [place] (Transfers) contact guard  -KD     Assistive Device (Stand-Sit Transfers) walker, front-wheeled  -KD     Row Name 04/07/23 0828          Functional Mobility    Functional Mobility- Ind. Level contact guard assist  -KD     Functional Mobility- Device walker, front-wheeled  -KD     Functional Mobility-Distance (Feet) 4  -KD     Row Name 04/07/23 0828          Mobility    Extremity Weight-bearing Status right lower extremity  -KD     Right Lower Extremity (Weight-bearing Status) non weight-bearing (NWB)  -KD           User Key  (r) = Recorded By, (t) = Taken By, (c) = Cosigned By    Initials Name Provider Type     Flaquita Reyes COTA Occupational Therapist Assistant               Obj/Interventions     Row Name 04/07/23 0828          Shoulder (Therapeutic Exercise)    Shoulder AROM (Therapeutic Exercise) bilateral;flexion;extension;aBduction;aDduction;external rotation;internal rotation;horizontal aBduction/aDduction  -     Row Name 04/07/23 0828          Elbow/Forearm (Therapeutic Exercise)    Elbow/Forearm (Therapeutic Exercise) AROM (active range of motion)  -     Elbow/Forearm AROM (Therapeutic Exercise) bilateral;flexion;extension;supination;pronation  -     Row Name 04/07/23 0828          Wrist (Therapeutic Exercise)    Wrist (Therapeutic Exercise) AROM  (active range of motion)  -KD     Wrist AROM (Therapeutic Exercise) bilateral;flexion;extension  -KD     Row Name 04/07/23 0828          Hand (Therapeutic Exercise)    Hand (Therapeutic Exercise) AROM (active range of motion)  -KD     Hand AROM/AAROM (Therapeutic Exercise) bilateral;finger flexion;finger extension  -KD     Row Name 04/07/23 0828          Motor Skills    Therapeutic Exercise shoulder;elbow/forearm;wrist;hand  -KD           User Key  (r) = Recorded By, (t) = Taken By, (c) = Cosigned By    Initials Name Provider Type     Flaquita Reyes COTA Occupational Therapist Assistant               Goals/Plan     Row Name 04/07/23 0828          Transfer Goal 1 (OT)    Activity/Assistive Device (Transfer Goal 1, OT) toilet  -KD     [place] Level/Cues Needed (Transfer Goal 1, OT) supervision required  -KD     Time Frame (Transfer Goal 1, OT) long term goal (LTG);by discharge  -KD     Progress/Outcome (Transfer Goal 1, OT) goal not met  -KD     Row Name 04/07/23 0828          Bathing Goal 1 (OT)    Activity/Device (Bathing Goal 1, OT) lower body bathing  -KD     [place] Level/Cues Needed (Bathing Goal 1, OT) minimum assist (75% or more patient effort)  -KD     Time Frame (Bathing Goal 1, OT) long term goal (LTG);by discharge  -KD     Progress/Outcomes (Bathing Goal 1, OT) goal not met  -KD     Row Name 04/07/23 0828          Dressing Goal 1 (OT)    Activity/Device (Dressing Goal 1, OT) lower body dressing  -KD     [place]/Cues Needed (Dressing Goal 1, OT) minimum assist (75% or more patient effort)  -KD     Time Frame (Dressing Goal 1, OT) long term goal (LTG);by discharge  -KD     Progress/Outcome (Dressing Goal 1, OT) goal not met;good progress toward goal  -KD     Row Name 04/07/23 0828          Toileting Goal 1 (OT)    Activity/Device (Toileting Goal 1, OT) toileting skills, all  -KD     [place] Level/Cues Needed (Toileting Goal 1, OT) supervision required  -KD     Time Frame (Toileting  Goal 1, OT) long term goal (LTG);by discharge  -KD     Progress/Outcome (Toileting Goal 1, OT) goal not met  -KD           User Key  (r) = Recorded By, (t) = Taken By, (c) = Cosigned By    Initials Name Provider Type    Flaquita Christensen COTA Occupational Therapist Assistant               Clinical Impression     Row Name 04/07/23 0828          Pain Assessment    Pretreatment Pain Rating 0/10 - no pain  -KD     Posttreatment Pain Rating 0/10 - no pain  -KD     Row Name 04/07/23 0828          Therapy Assessment/Plan (OT)    Therapy Frequency (OT) other (see comments)  5-7 days a week  -KD     Row Name 04/07/23 0828          Therapy Plan Review/Discharge Plan (OT)    Anticipated Discharge Disposition (OT) home with 24/7 care;home with home health  -KD     Row Name 04/07/23 0828          Vital Signs    Pre Systolic BP Rehab 113  -KD     Pre Treatment Diastolic BP 58  -KD     Pretreatment Heart Rate (beats/min) 71  -KD     Pre SpO2 (%) 94  -KD     O2 Delivery Pre Treatment room air  -KD     Pre Patient Position Supine  -KD     Intra Patient Position Standing  -KD     Post Patient Position Sitting  -KD     Row Name 04/07/23 0828          Positioning and Restraints    Pre-Treatment Position in bed  -KD     Post Treatment Position chair  -KD     In Chair sitting;call light within reach;encouraged to call for assist;exit alarm on  -KD           User Key  (r) = Recorded By, (t) = Taken By, (c) = Cosigned By    Initials Name Provider Type    Flaquita Christensen COTA Occupational Therapist Assistant               Outcome Measures     Row Name 04/07/23 0828          How much help from another is currently needed...    Putting on and taking off regular lower body clothing? 3  -KD     Bathing (including washing, rinsing, and drying) 3  -KD     Toileting (which includes using toilet bed pan or urinal) 2  -KD     Putting on and taking off regular upper body clothing 4  -KD     Taking care of personal grooming (such as brushing  teeth) 4  -KD     Eating meals 4  -KD     AM-PAC 6 Clicks Score (OT) 20  -KD     Row Name 04/07/23 0909          How much help from another person do you currently need...    Turning from your back to your side while in flat bed without using bedrails? 4  -LC     Moving from lying on back to sitting on the side of a flat bed without bedrails? 4  -LC     Moving to and from a bed to a chair (including a wheelchair)? 3  -LC     Standing up from a chair using your arms (e.g., wheelchair, bedside chair)? 3  -LC     Climbing 3-5 steps with a railing? 2  -LC     To walk in hospital room? 2  -LC     AM-PAC 6 Clicks Score (PT) 18  -LC     Highest level of mobility 6 --> Walked 10 steps or more  -LC           User Key  (r) = Recorded By, (t) = Taken By, (c) = Cosigned By    Initials Name Provider Type    Flaquita Christensen COTA Occupational Therapist Assistant    Bhavani Guillory, RN Registered Nurse                Occupational Therapy Education     Title: PT OT SLP Therapies (Resolved)     Topic: Occupational Therapy (Resolved)     Point: ADL training (Resolved)     Description:   Instruct learner(s) on proper safety adaptation and remediation techniques during self care or transfers.   Instruct in proper use of assistive devices.              Learning Progress Summary           Patient Acceptance, E,TB, VU,NR by  at 3/31/2023 3724    Comment: POC, role of OT, transfer training                   Point: Home exercise program (Resolved)     Description:   Instruct learner(s) on appropriate technique for monitoring, assisting and/or progressing therapeutic exercises/activities.              Learner Progress:  Not documented in this visit.          Point: Precautions (Resolved)     Description:   Instruct learner(s) on prescribed precautions during self-care and functional transfers.              Learner Progress:  Not documented in this visit.          Point: Body mechanics (Resolved)     Description:   Instruct  learner(s) on proper positioning and spine alignment during self-care, functional mobility activities and/or exercises.              Learner Progress:  Not documented in this visit.                      User Key     Initials Effective Dates Name Provider Type [facility] 06/14/21 -  Marino Trejo, OT Occupational Therapist OT              OT Recommendation and Plan  Therapy Frequency (OT): other (see comments) (5-7 days a week)  Plan of Care Review  Plan of Care Reviewed With: patient  Progress: improving  Outcome Evaluation: OT tx completed this date. Sup-sit-Min A. Sit>stand-CGA. Fxl mobility ~ 6' CGA of 1 w/ RW. Pt able to maintain NWB status. Toilet t/f-Min A. Pericare-Max A. Grooming-set up. UB bathing/dressing-SBA. LB bathing/dressing-Max A. Pt then returned to supine-Min A for BLE's. Pt w/ all needs in reach upon exit. Cont OT POC.     Time Calculation:    Time Calculation- OT     Row Name 04/07/23 0828             Time Calculation- OT    OT Start Time 0828  -KD      OT Stop Time 0852  -KD      OT Time Calculation (min) 24 min  -KD      Total Timed Code Minutes- OT 24 minute(s)  -KD      OT Received On 04/07/23  -KD         Timed Charges    51335 - OT Self Care/Mgmt Minutes 24  -KD         Total Minutes    Timed Charges Total Minutes 24  -KD       Total Minutes 24  -KD            User Key  (r) = Recorded By, (t) = Taken By, (c) = Cosigned By    Initials Name Provider Type     Flaquita Reyes COTA Occupational Therapist Assistant              Therapy Charges for Today     Code Description Service Date Service Provider Modifiers Qty    36652631557 HC OT SELF CARE/MGMT/TRAIN EA 15 MIN 4/6/2023 Flaquita Reyes COTA GO 4    64088911656 HC OT SELF CARE/MGMT/TRAIN EA 15 MIN 4/7/2023 Flaquita Reyes COTA GO 2               KELLEE Tiwari  4/7/2023

## 2023-04-07 NOTE — DISCHARGE PLACEMENT REQUEST
"Aj Blanco Jr. (77 y.o. Male)     Date of Birth   1945    Social Security Number       Address   [address] [address]    Home Phone   [phone]    MRN   [id number]       Yarsanism   None    Marital Status                               Admission Date   3/30/23    Admission Type   Emergency    Admitting Provider   Pete Torres MD    Attending Provider   Samina Hennessy MD    Department, Room/Bed   [address], [facility]/1       Discharge Date       Discharge Disposition   Home or Self Care    Discharge Destination                               Attending Provider: Samina Hennessy MD    Allergies: Brilinta [Ticagrelor], Effient [Prasugrel], Warfarin And Related, Ciprofloxacin, Lipitor [Atorvastatin], Latex, Adhesive Tape, Celexa [Citalopram Hydrobromide], Crestor [Rosuvastatin Calcium], Floxin [Ofloxacin], Januvia [Sitagliptin], Penicillins, Sulfa Antibiotics    Isolation: None   Infection: None   Code Status: CPR    Ht: 175.3 cm (69.02\")   Wt: 78.3 kg (172 lb 9.6 oz)    Admission Cmt: None   Principal Problem: OPAL (acute kidney injury) [N17.9]                 Active Insurance as of 3/30/2023     Primary Coverage     Payor Plan Insurance Group Employer/Plan Group    MEDICARE MEDICARE A & B      Payor Plan Address Payor Plan Phone Number Payor Plan Fax Number Effective Dates    [address] [phone]  10/1/2003 - None Entered    [place] [address]       Subscriber Name Subscriber Birth Date Member ID       AJ BLANCO  1945 [id number]                 Emergency Contacts      (Rel.) Home Phone Work Phone Mobile Phone    CHRISTOFER BLANCO (Spouse) [phone] -- [phone]    Enrique Jorge (Relative) [phone] -- [phone]            Insurance Information                MEDICARE/MEDICARE A & B Phone: [phone]    Subscriber: Aj Blanco Cr Cole Subscriber#: [id number]    Group#: -- Precert#: " --

## 2023-04-07 NOTE — DISCHARGE PLACEMENT REQUEST
"Aj Blanco Jr. (77 y.o. Male)     Date of Birth   1945    Social Security Number       Address   [address], [place] [address]    Home Phone   [phone]    MRN   [id number]       Muslim   None    Marital Status                               Admission Date   3/30/23    Admission Type   Emergency    Admitting Provider   Pete Torres MD    Attending Provider   Samina Hennessy MD    Department, Room/Bed   [address], [facility]/1       Discharge Date       Discharge Disposition   Home or Self Care    Discharge Destination                               Attending Provider: Samina Hennessy MD    Allergies: Brilinta [Ticagrelor], Effient [Prasugrel], Warfarin And Related, Ciprofloxacin, Lipitor [Atorvastatin], Latex, Adhesive Tape, Celexa [Citalopram Hydrobromide], Crestor [Rosuvastatin Calcium], Floxin [Ofloxacin], Januvia [Sitagliptin], Penicillins, Sulfa Antibiotics    Isolation: None   Infection: None   Code Status: CPR    Ht: 175.3 cm (69.02\")   Wt: 78.3 kg (172 lb 9.6 oz)    Admission Cmt: None   Principal Problem: OPAL (acute kidney injury) [N17.9]                 Active Insurance as of 3/30/2023     Primary Coverage     Payor Plan Insurance Group Employer/Plan Group    MEDICARE MEDICARE A & B      Payor Plan Address Payor Plan Phone Number Payor Plan Fax Number Effective Dates    [address] [phone]  10/1/2003 - None Entered    [place] [address]       Subscriber Name Subscriber Birth Date Member ID       AJ BLANCO JR. 1945 [id number]                 Emergency Contacts      (Rel.) Home Phone Work Phone Mobile Phone    CHRISTOFER BLANCO (Spouse) [phone] -- [phone]    Enrique Jorge (Relative) [phone] -- [phone]        [address] [address]  Dept. Phone:  [phone]  Dept. Fax:  [phone] Date Ordered: Mar 31, 2023 " "        Patient:  Turner Card Jr. MRN:  [id number]   [address] [place] [address] :  1945  SSN:    Phone: [phone] Sex:  M     Weight: 78.3 kg (172 lb 9.6 oz)         Ht Readings from Last 1 Encounters:   23 175.3 cm (69.02\")         Standard Wheelchair              (Order ID: [id number])    Diagnosis:  Osteoarthritis of ankle and foot, right (M19.071 [ICD-10-CM] 715.97 [ICD-9-CM])   Quantity:  1     Equipment:  Standard Wheelchair  Wheelchair accessories:  Manual W/C Seat Widths 20-23 inches  Wheelchair accessories:  Elevating Leg Rest (pair)  Length of Need (99 Months = Lifetime): 99 Months = Lifetime        Authorizing Provider's Phone: [phone]  Authorizing Provider:Pete Torres MD  Authorizing Provider's NPI: [id number]  Order Entered By: Pete Torres MD 3/31/2023 12:15 PM     Electronically signed by: Pete Torres MD 3/31/2023 12:15 PM         Insurance Information                MEDICARE/MEDICARE A & B Phone: [phone]    Subscriber: Turner Card Jr. Subscriber#: [id number]    Group#: -- Precert#: --             History & Physical      Pete Torres MD at 23 1738                [facility] Medicine Admission      Date of Admission: 3/30/2023      Primary Care Physician: Andreas Martinez MD      Chief Complaint: Urinary retention    HPI:    This is a 77-year-old male with past medical history of CKD, recent ankle fracture, osteoarthritis, CKD stage II presenting to the ER with complaint of urinary retention and nausea vomiting going on for the past 3 days.  He was found to have more than 1000 cc in his bladder scan.  His creatinine level was also elevated above baseline.  He was also found to be in atrial fibrillation with RVR.  He denies any chest pain or any shortness of air.  He was recently discharged to nursing for rehab after his ankle fracture " surgery.    Past Medical History:  has a past medical history of Acute posthemorrhagic anemia, Afib (HCC), Allergic rhinitis, Anxiety state, Chest pain, CKD (chronic kidney disease), stage II, Coronary arteriosclerosis, Diabetes mellitus (HCC), Diverticular disease of colon, Dysphagia, Elevated cholesterol, Epigastric pain, GERD (gastroesophageal reflux disease), History of echocardiogram, History of echocardiogram, Hypercholesterolemia, Hypertension, Insomnia, Irritable bowel syndrome, Osteoarthritis of multiple joints, Pain of right foot, and PONV (postoperative nausea and vomiting).    Past Surgical History:  has a past surgical history that includes Coronary angioplasty; Appendectomy; Cardiac catheterization; Cholecystectomy; endoscopy and colonoscopy; Other surgical history; Esophagogastroduodenoscopy; Esophagogastroduodenoscopy; Hand surgery (Left); Hernia repair; Skin tag removal; Colonoscopy (10/01/2012); Colonoscopy (N/A, 12/11/2017); Colonoscopy (N/A, 11/01/2022); Eye surgery (Bilateral); and Cholecystectomy.    Family History: family history includes Arthritis in his father and mother; Clotting disorder in an other family member; Diabetes in his mother; Heart disease in his mother; Hypertension in his mother; Lung disease in an other family member; Obesity in his mother and sister; Schizophrenia in his sister; Stroke in his mother; Thyroid disease in his mother; Tuberculosis in his father, mother, and sister.    Social History:  reports that he has never smoked. He has never used smokeless tobacco. He reports that he does not drink alcohol and does not use drugs.    Allergies:   Allergies   Allergen Reactions   • Brilinta [Ticagrelor] Shortness Of Breath   • Effient [Prasugrel] Shortness Of Breath   • Warfarin And Related Shortness Of Breath   • Ciprofloxacin Hives   • Lipitor [Atorvastatin] Swelling   • Latex Itching   • Adhesive Tape Rash   • Celexa [Citalopram Hydrobromide] Rash   • Crestor  "[Rosuvastatin Calcium] Rash   • Floxin [Ofloxacin] Rash   • Januvia [Sitagliptin] Rash   • Penicillins Rash   • Sulfa Antibiotics Rash       Medications: Scheduled Meds:   Continuous Infusions:dilTIAZem, 5-15 mg/hr, Last Rate: 5 mg/hr (03/30/23 1618)  sodium chloride, 125 mL/hr, Last Rate: 125 mL/hr (03/30/23 1449)      PRN Meds:.•  sodium chloride  No current facility-administered medications on file prior to encounter.     Current Outpatient Medications on File Prior to Encounter   Medication Sig Dispense Refill   • ALPRAZolam (XANAX) 0.5 MG tablet Take 1 tablet by mouth At Night As Needed for Anxiety. 60 tablet 0   • amLODIPine (NORVASC) 10 MG tablet Take 1 tablet by mouth Daily.     • ASPIRIN 81 PO Take 1 tablet by mouth Daily.     • Azelastine HCl 137 MCG/SPRAY solution Inhale 1 spray Daily. 30 mL 3   • coenzyme Q10 100 MG capsule Take 1 capsule by mouth Daily.     • dapagliflozin (Farxiga) 5 MG tablet tablet Take 1 tablet by mouth Daily.     • Dulaglutide (Trulicity) 3 MG/0.5ML solution pen-injector Inject 3 mg under the skin into the appropriate area as directed 1 (One) Time Per Week. (Patient taking differently: Inject 1.5 mg under the skin into the appropriate area as directed 1 (One) Time Per Week.) 12 pen 3   • esomeprazole (nexIUM) 40 MG capsule Take 1 capsule by mouth Every Morning Before Breakfast. 30 capsule 3   • Glucose Blood (BLOOD GLUCOSE TEST) strip Use 4 x daily use any brand covered by insurance or same brand as before ICD10 code is E11.9 120 each 11   • HYDROcodone-acetaminophen (Norco)  MG per tablet Take 1 tablet by mouth Every 6 (Six) Hours As Needed for Moderate Pain. 30 tablet 0   • Insulin Pen Needle 30G X 8 MM misc Use 3-4 times daily. 100 each 3   • Insulin Syringe 31G X 5/16\" 0.3 ML misc 4 x daily 120 each 11   • isosorbide mononitrate (IMDUR) 30 MG 24 hr tablet Take 1 tablet by mouth Daily.     • KRILL OIL OMEGA-3 PO Take 1 capsule by mouth Daily.     • Lancet Devices (LANCING " DEVICE) misc USE AS INDICATED TO CORRELATE WITH STRIPS AND METER 1 each 1   • Lancets 30G misc USE 4 X DAILY 120 each 11   • lisinopril (PRINIVIL,ZESTRIL) 5 MG tablet Take 1 tablet by mouth Daily. (Patient taking differently: Take 8 tablets by mouth Daily.) 90 tablet 1   • metoprolol tartrate (LOPRESSOR) 50 MG tablet Take 1 tablet by mouth 2 (Two) Times a Day. 180 tablet 1   • oxyCODONE-acetaminophen (PERCOCET) 7.5-325 MG per tablet Take 1 tablet by mouth Every 4 (Four) Hours As Needed for Moderate Pain. 30 tablet 0   • pantoprazole (PROTONIX) 40 MG EC tablet Take 1 tablet by mouth Daily.     • propafenone (RYTHMOL) 150 MG tablet Take 1 tablet by mouth Every 8 (Eight) Hours.     • Red Yeast Rice Extract (RED YEAST RICE PO) Take 4 capsules by mouth Daily.     • rivaroxaban (XARELTO) 20 MG tablet Take 1 tablet by mouth Daily.     • Saw Palmetto, Serenoa repens, (SAW PALMETTO PO) Take 1 tablet by mouth Daily.     • sucralfate (CARAFATE) 1 g tablet Take 1 tablet by mouth 2 (Two) Times a Day.     • TURMERIC CURCUMIN PO Take 1,500 mg by mouth Daily.     • vitamin B-12 (CYANOCOBALAMIN) 2500 MCG sublingual tablet tablet Place 5,000 mcg under the tongue 1 (One) Time Per Week.     • vitamin E 100 UNIT capsule Take 1 capsule by mouth Daily.     • Insulin Glargine (BASAGLAR KWIKPEN) 100 UNIT/ML injection pen Inject 15 Units under the skin into the appropriate area as directed At Night As Needed.     • oxyCODONE-acetaminophen (Percocet) 7.5-325 MG per tablet Take 1 tablet by mouth Every 6 (Six) Hours As Needed for Moderate Pain. 30 tablet 0       Review of Systems:  Review of Systems   Constitutional: Negative for fatigue.   HENT: Negative for congestion.    Respiratory: Negative for shortness of breath.    Cardiovascular: Negative for chest pain.   Gastrointestinal: Negative for abdominal pain.   Genitourinary: Negative for difficulty urinating.      Otherwise complete ROS is negative except as mentioned above.    Physical  Exam:   Temp:  [98.4 °F (36.9 °C)] 98.4 °F (36.9 °C)  Heart Rate:  [106-121] 106  Resp:  [18] 18  BP: (107-112)/(57-65) 107/65  Physical Exam  Constitutional:       General: He is not in acute distress.     Appearance: He is well-developed. He is not diaphoretic.   HENT:      Head: Normocephalic and atraumatic.   Cardiovascular:      Rate and Rhythm: Normal rate.   Pulmonary:      Effort: Pulmonary effort is normal. No respiratory distress.      Breath sounds: No wheezing.   Abdominal:      General: There is no distension.      Palpations: Abdomen is soft.   Musculoskeletal:         General: Normal range of motion.   Skin:     General: Skin is warm and dry.   Neurological:      Mental Status: He is alert.      Cranial Nerves: No cranial nerve deficit.   Psychiatric:         Behavior: Behavior normal.         Thought Content: Thought content normal.         Judgment: Judgment normal.           Results Reviewed:  I have personally reviewed current lab, radiology, and data and agree with results.  Lab Results (last 24 hours)     Procedure Component Value Units Date/Time    [place] Draw [[id number]] Collected: 03/30/23 1411    Specimen: Blood Updated: 03/30/23 1515    Narrative:      The following orders were created for panel order [place] Draw.  Procedure                               Abnormality         Status                     ---------                               -----------         ------                     Green Top (Gel)[[id number]]                                  Final result               Lavender Top[[id number]]                                     Final result               Gold Top - SST[[id number]]                                   Final result               Light Blue Top[[id number]]                                   Final result                 Please view results for these tests on the individual orders.    Light Blue Top [[id number]] Collected: 03/30/23 1411    Specimen: Blood Updated: 03/30/23 1515      Extra Tube Hold for add-ons.     Comment: Auto resulted       Green Top (Gel) [[id number]] Collected: 03/30/23 1411    Specimen: Blood Updated: 03/30/23 1515     Extra Tube Hold for add-ons.     Comment: Auto resulted.       Gold Top - SST [[id number]] Collected: 03/30/23 1411    Specimen: Blood Updated: 03/30/23 1515     Extra Tube Hold for add-ons.     Comment: Auto resulted.       Lavender Top [[id number]] Collected: 03/30/23 1411    Specimen: Blood Updated: 03/30/23 1515     Extra Tube hold for add-on     Comment: Auto resulted       Comprehensive Metabolic Panel [[id number]]  (Abnormal) Collected: 03/30/23 1411    Specimen: Blood Updated: 03/30/23 1450     Glucose 159 mg/dL      BUN 81 mg/dL      Creatinine 2.78 mg/dL      Sodium 129 mmol/L      Potassium 4.5 mmol/L      Chloride 93 mmol/L      CO2 20.0 mmol/L      Calcium 9.1 mg/dL      Total Protein 7.4 g/dL      Albumin 3.7 g/dL      ALT (SGPT) 13 U/L      AST (SGOT) 11 U/L      Alkaline Phosphatase 91 U/L      Total Bilirubin 0.7 mg/dL      Globulin 3.7 gm/dL      A/G Ratio 1.0 g/dL      BUN/Creatinine Ratio 29.1     Anion Gap 16.0 mmol/L      eGFR 22.6 mL/min/1.73     Narrative:      GFR Normal >60  Chronic Kidney Disease <60  Kidney Failure <15    The GFR formula is only valid for adults with stable renal function between ages 18 and 70.    Urinalysis With Microscopic If Indicated (No Culture) - Urine, Clean Catch [[id number]]  (Abnormal) Collected: 03/30/23 1410    Specimen: Urine, Clean Catch Updated: 03/30/23 1447     Color, UA Dark Yellow     Appearance, UA Cloudy     pH, UA <=5.0     Specific Gravity, UA 1.022     Glucose, UA >=1000 mg/dL (3+)     Ketones, UA Negative     Bilirubin, UA Small (1+)     Blood, UA Negative     Protein, UA Trace     Leuk Esterase, UA Negative     Nitrite, UA Negative     Urobilinogen, UA 1.0 E.U./dL    Narrative:      Urine microscopic not indicated.    Lipase [[id number]]  (Normal) Collected: 03/30/23 1411    Specimen: Blood  Updated: 03/30/23 1446     Lipase 49 U/L     CK [[id number]]  (Normal) Collected: 03/30/23 1411    Specimen: Blood Updated: 03/30/23 1446     Creatine Kinase 34 U/L     Magnesium [[id number]]  (Abnormal) Collected: 03/30/23 1411    Specimen: Blood Updated: 03/30/23 1446     Magnesium 2.6 mg/dL     Lactic Acid, Plasma [[id number]]  (Normal) Collected: 03/30/23 1411    Specimen: Blood Updated: 03/30/23 1442     Lactate 1.2 mmol/L     CBC & Differential [[id number]]  (Abnormal) Collected: 03/30/23 1411    Specimen: Blood Updated: 03/30/23 1430    Narrative:      The following orders were created for panel order CBC & Differential.  Procedure                               Abnormality         Status                     ---------                               -----------         ------                     CBC Auto Differential[[id number]]        Abnormal            Final result                 Please view results for these tests on the individual orders.    CBC Auto Differential [[id number]]  (Abnormal) Collected: 03/30/23 1411    Specimen: Blood Updated: 03/30/23 1430     WBC 16.97 10*3/mm3      RBC 4.49 10*6/mm3      Hemoglobin 12.5 g/dL      Hematocrit 37.7 %      MCV 84.0 fL      MCH 27.8 pg      MCHC 33.2 g/dL      RDW 13.8 %      RDW-SD 42.5 fl      MPV 8.8 fL      Platelets 391 10*3/mm3      Neutrophil % 85.0 %      Lymphocyte % 5.8 %      Monocyte % 7.8 %      Eosinophil % 0.3 %      Basophil % 0.2 %      Immature Grans % 0.9 %      Neutrophils, Absolute 14.42 10*3/mm3      Lymphocytes, Absolute 0.98 10*3/mm3      Monocytes, Absolute 1.33 10*3/mm3      Eosinophils, Absolute 0.05 10*3/mm3      Basophils, Absolute 0.03 10*3/mm3      Immature Grans, Absolute 0.16 10*3/mm3      nRBC 0.0 /100 WBC         Imaging Results (Last 24 Hours)     Procedure Component Value Units Date/Time    XR Abdomen 2+ VW with Chest 1 VW [[id number]] Collected: 03/30/23 1637     Updated: 03/30/23 1704    Narrative:      Acute Abdominal Series  Withe Upright Chest.    History: Abdominal pain. Vomiting. Acute kidney failure.  Retention of urine. Nausea.    Upright frontal film of the chest and supine and upright films of  the abdomen were obtained.    Comparison: July 20, 2022    FINDINGS:    EKG leads.  The lungs are clear of an acute process.  Coronary artery stents.  The heart is not enlarged.  The pulmonary vasculature is not increased.  No pleural effusion.  No pneumothorax.  No acute osseous abnormality.  Degenerative changes are present in the thoracic spine.    No free air.  Some gaseous distention of the colon and some feces scattered  throughout the colon.  No mechanical bowel obstruction.  No organomegaly.  Atherosclerotic calcification splenic artery.  No acute osseous abnormality.  Minimal degenerative changes lumbar spine.  Cholecystectomy.  Surgical clips right lower quadrant.  Prior inguinal hernia repair.      Impression:      Conclusion:  The lungs are clear of an acute process.  Coronary artery stents.  No free air.  Some gaseous distention of the colon and some feces scattered  throughout the colon.  No mechanical bowel obstruction.  Cholecystectomy.  Surgical clips right lower quadrant.  Prior inguinal hernia repair.    [id number]    Electronically signed by:  Estuardo Maciel MD  3/30/2023 5:02 PM CDT  Workstation: [id number]            Assessment:    Active Hospital Problems    Diagnosis    • **OPAL (acute kidney injury) (HCC)        OPAL-IV fluids will be started, likely due to retention, bladder scans will be done    Urinary retention-continue bladder scanning, will anchor Rgay if necessary    Atrial fibrillation with RVR-continue with Cardizem drip    Hypertension-continue with home medication continue to monitor    Recent ankle surgery-continue with supportive care and pain management, he is supposed to be nonweightbearing.    Hyponatremia-continue monitoring sodium levels, we will continue to monitor    [place] [place]  Decision Making  Number and Complexity of problems: 3 major complex  Differential Diagnosis: Sepsis    Conditions and Status:        Condition is worsening.     MDM Data  External documents reviewed: Previous medical records  My EKG interpretation: Atrial fibrillation with RVR  My plain film interpretation: None  Tests considered but not ordered: None     Decision rules/scores evaluated (example [id number], [place], etc): None     Discussed with: ER physician     Treatment Plan  As above    Care Planning  Shared decision making: Patient and family  Code status and discussions: Full code    Disposition  Social Determinants of Health that impact treatment or disposition: None  I expect the patient to be discharged to home in 2-3 days.      I have utilized all available immediate resources to obtain, update, or review the patient's current medications (including all prescriptions, over-the-counter products, herbals, cannabis/cannabidiol products, and vitamin/mineral/dietary (nutritional) supplements).     I confirmed that the patient's Advance Care Plan is present, code status is documented, or surrogate decision maker is listed in the patient's medical record.       Pete Torres MD  03/30/23  17:38 CDT                  Electronically signed by Pete Torres MD at 03/30/23 5875

## 2023-04-07 NOTE — THERAPY TREATMENT NOTE
Patient Name: Aj Card Jr.  : 1945    MRN: [id number]                              Today's Date: 2023       Admit Date: 3/30/2023    Visit Dx:     ICD-10-CM ICD-9-CM   1. OPAL (acute kidney injury)  N17.9 584.9   2. Urinary retention  R33.9 788.20   3. Atrial fibrillation with RVR  I48.91 427.31   4. Dehydration  E86.0 276.51   5. Nausea and vomiting, unspecified vomiting type  R11.2 787.01   6. Osteoarthritis of ankle and foot, right  M19.071 715.97   7. Impaired functional mobility, balance, gait, and endurance  Z74.09 V49.89   8. Impaired mobility and ADLs  Z74.09 V49.89    Z78.9    9. Acute urinary retention  R33.8 788.29   10. Status post bunionectomy  Z98.890 V45.89     Patient Active Problem List   Diagnosis   • Type 2 diabetes mellitus without complication, without long-term current use of insulin ([facility])   • Essential hypertension   • Mixed hyperlipidemia   • Generalized anxiety disorder   • History of colon polyps   • Diverticulosis of large intestine without hemorrhage   • Coronary artery disease with angina pectoris   • Gastroesophageal reflux disease with esophagitis   • Vitamin D deficiency   • Overweight   • Encounter for screening for endocrine disorder   • Atopic dermatitis   • High serum vitamin B12   • Acute pain of right knee   • Tear of medial meniscus of right knee, current   • Atrial fibrillation with RVR   • GERD (gastroesophageal reflux disease)   • PAF (paroxysmal atrial fibrillation) ([facility])   • Uncontrolled type 2 diabetes mellitus with hyperglycemia   • Gastroesophageal reflux disease   • Chronic constipation   • Stage 2 chronic kidney disease   • Osteoarthritis of ankle and foot, right   • Closed trimalleolar fracture of right ankle   • Status post bunionectomy     Past Medical History:   Diagnosis Date   • Acute posthemorrhagic anemia    • Afib    • Allergic rhinitis    • Anxiety state    • Chest pain    • CKD (chronic kidney disease), stage II    • Coronary  arteriosclerosis     3 stents, followed by Dr. Jeffers   • Diabetes mellitus     type 2   • Diverticular disease of colon    • Dysphagia    • Elevated cholesterol    • Epigastric pain    • GERD (gastroesophageal reflux disease)     esophagitis on Dexilant. Pt feels Nexium working better      • History of echocardiogram     Small left atrial enlargement with mild CLVH.Ef of 55-60%.Mitral valve mildly thickened.Av thickened.Left ventricle mildly dilated.Tricuspid intact.Mild aortic insufficiency. 12/07/2015       • History of echocardiogram     Mild diastolic dysfunction and mild left atrial enlargement, otherwise normal echo. EF> 55% 01/03/2012       • Hypercholesterolemia    • Hypertension    • Insomnia    • Irritable bowel syndrome    • Osteoarthritis of multiple joints    • Pain of right foot    • PONV (postoperative nausea and vomiting)      Past Surgical History:   Procedure Laterality Date   • APPENDECTOMY       [facility] 1995       • CARDIAC CATHETERIZATION      Successful PTCA of the OMB#2.Unsuccessful PTCA of the 1st OMB, secondary to difficulty in advancing the balloon. 12/08/2015       • CHOLECYSTECTOMY      [facility] 1993       • CHOLECYSTECTOMY     • COLONOSCOPY  10/01/2012   • COLONOSCOPY N/A 12/11/2017    Procedure: COLONOSCOPY;  Surgeon: Bladimir Michael MD;  Location: [facility] ENDOSCOPY;  Service:    • COLONOSCOPY N/A 11/01/2022    Procedure: COLONOSCOPY;  Surgeon: Bladimir Michael MD;  Location: [facility] ENDOSCOPY;  Service: Gastroenterology;  Laterality: N/A;   • CORONARY ANGIOPLASTY      Successful PTCA & stenting LAD was done w/ deployment of a 2.5mm x23 Xience stent w/ reduction of stenosis from 90% to less than 0% stenosis with good LILLIAM 3 flow 02/17/2012       • ENDOSCOPY      with biopsy.  Mildly severe esophagitis. Gastritis. Normal examined duodenum.Several biopsies obtained in the lower third of the esophagus.Several biopsies obtained in the gastric antrum.  05/06/2016       • ENDOSCOPY      w tube. Normal esophagus. Gastritis in stomach. Biopsy taken. Gastric polyp found. Biopsy taken. Normal duodenum. 10/01/2012       • ENDOSCOPY AND COLONOSCOPY      Diverticulum in sigmoid colon & descending colon. Internal & external hemorrhoids found. 10/01/2012       • EYE SURGERY Bilateral     catarract   • HAND SURGERY Left      Corrective osteotomy of ring finger metacarpal. Malunited fracture of left ring finger 05/21/1985       • HERNIA REPAIR      Laparoscopic hernia repair. prepertitoneal bilateral inguinal hernia repair with mesh. 10/15/2009      • OTHER SURGICAL HISTORY      CORONARY ARTERY STENT    • SKIN TAG REMOVAL      Excision, viral skin tag,right upper eyelid 05/08/1995    • TOE FUSION Right 3/13/2023    Procedure: RIGHT FIRST TARSOMETATARSAL JOINT ARTHRODESIS, FIRST METATARSOPHALANGEAL JOINT ARTHRODESIS, CALCANEAL BONE GRAFT AND ALL OTHER INDICATED PROCEDURES                ([facility]);  Surgeon: Mason Salazar DPM;  Location: [facility];  Service: Podiatry;  Laterality: Right;      General Information     Row Name 04/07/23 1128 04/07/23 0828       OT Time and Intention    Document Type therapy note (daily note)  -KD therapy note (daily note)  -KD    Mode of Treatment individual therapy;occupational therapy  -KD individual therapy;occupational therapy  -KD    Row Name 04/07/23 1128 04/07/23 0828       General Information    Patient Profile Reviewed yes  -KD yes  -KD    Existing Precautions/Restrictions non-weight bearing;right  -KD non-weight bearing;right  -KD    Row Name 04/07/23 1128 04/07/23 0828       Cognition    Orientation Status (Cognition) oriented x 4  -KD oriented x 4  -KD    Row Name 04/07/23 1128 04/07/23 0828       Safety Issues, Functional Mobility    Impairments Affecting Function (Mobility) balance;endurance/activity tolerance;pain;strength  -KD balance;endurance/activity tolerance;pain;strength  -KD          User Key  (r) = Recorded By, (t)  = Taken By, (c) = Cosigned By    Initials Name Provider Type    KD Flaquita Reyes COTA Occupational Therapist Assistant                 Mobility/ADL's     Row Name 04/07/23 1128 04/07/23 0828       Bed Mobility    Supine-Sit [place] (Bed Mobility) contact guard  -KD minimum assist (75% patient effort)  -KD    Sit-Supine [place] (Bed Mobility) supervision  -KD not tested  -KD    Assistive Device (Bed Mobility) bed rails;head of bed elevated  -KD bed rails;head of bed elevated  -KD    Row Name 04/07/23 1128 04/07/23 0828       Bed-Chair Transfer    Bed-Chair [place] (Transfers) contact guard;1 person assist  -KD contact guard;1 person assist  -KD    Assistive Device (Bed-Chair Transfers) walker, front-wheeled  -KD walker, front-wheeled  -KD    Row Name 04/07/23 1128 04/07/23 0828       Sit-Stand Transfer    Sit-Stand [place] (Transfers) contact guard  -KD contact guard  -KD    Assistive Device (Sit-Stand Transfers) walker, front-wheeled  -KD walker, front-wheeled  -KD    Row Name 04/07/23 1128 04/07/23 0828       Stand-Sit Transfer    Stand-Sit [place] (Transfers) contact guard  -KD contact guard  -KD    Assistive Device (Stand-Sit Transfers) walker, front-wheeled  -KD walker, front-wheeled  -KD    Row Name 04/07/23 1128 04/07/23 0828       Functional Mobility    Functional Mobility- Ind. Level contact guard assist  -KD contact guard assist  -KD    Functional Mobility- Device walker, front-wheeled  -KD walker, front-wheeled  -KD    Functional Mobility-Distance (Feet) 6  -KD 4  -KD    Row Name 04/07/23 1128          Activities of Daily Living    BADL Assessment/Intervention toileting;grooming  -KD     Row Name 04/07/23 1128 04/07/23 0828       Mobility    Extremity Weight-bearing Status right lower extremity  -KD right lower extremity  -KD    Right Lower Extremity (Weight-bearing Status) non weight-bearing (NWB)  -KD non weight-bearing (NWB)  -KD    Row Name 04/07/23 1128          Grooming  Assessment/Training    [place] Level (Grooming) grooming skills;wash face, hands;set up;verbal cues  -     Position (Grooming) unsupported sitting  -     Row Name 04/07/23 1128          Toileting Assessment/Training    [place] Level (Toileting) toileting skills;adjust/manage clothing;perform perineal hygiene;standby assist  -     Position (Toileting) supported standing  -           User Key  (r) = Recorded By, (t) = Taken By, (c) = Cosigned By    Initials Name Provider Type    Flaquita Christensen COTA Occupational Therapist Assistant               Obj/Interventions     Row Name 04/07/23 0828          Shoulder (Therapeutic Exercise)    Shoulder AROM (Therapeutic Exercise) bilateral;flexion;extension;aBduction;aDduction;external rotation;internal rotation;horizontal aBduction/aDduction  -     Row Name 04/07/23 0828          Elbow/Forearm (Therapeutic Exercise)    Elbow/Forearm (Therapeutic Exercise) AROM (active range of motion)  -     Elbow/Forearm AROM (Therapeutic Exercise) bilateral;flexion;extension;supination;pronation  -     Row Name 04/07/23 0828          Wrist (Therapeutic Exercise)    Wrist (Therapeutic Exercise) AROM (active range of motion)  -     Wrist AROM (Therapeutic Exercise) bilateral;flexion;extension  -     Row Name 04/07/23 0828          Hand (Therapeutic Exercise)    Hand (Therapeutic Exercise) AROM (active range of motion)  -     Hand AROM/AAROM (Therapeutic Exercise) bilateral;finger flexion;finger extension  -     Row Name 04/07/23 1319 04/07/23 0828       Motor Skills    Therapeutic Exercise --  Pt given handouts on HEP and HS. Pt verbalized understanding.  -KD shoulder;elbow/forearm;wrist;hand  -          User Key  (r) = Recorded By, (t) = Taken By, (c) = Cosigned By    Initials Name Provider Type    Flaquita Christensen COTA Occupational Therapist Assistant               Goals/Plan     Row Name 04/07/23 0828          Transfer Goal 1 (OT)     Activity/Assistive Device (Transfer Goal 1, OT) toilet  -KD     [place] Level/Cues Needed (Transfer Goal 1, OT) supervision required  -KD     Time Frame (Transfer Goal 1, OT) long term goal (LTG);by discharge  -KD     Progress/Outcome (Transfer Goal 1, OT) goal not met  -KD     Row Name 04/07/23 0828          Bathing Goal 1 (OT)    Activity/Device (Bathing Goal 1, OT) lower body bathing  -KD     [place] Level/Cues Needed (Bathing Goal 1, OT) minimum assist (75% or more patient effort)  -KD     Time Frame (Bathing Goal 1, OT) long term goal (LTG);by discharge  -KD     Progress/Outcomes (Bathing Goal 1, OT) goal not met  -KD     Row Name 04/07/23 0828          Dressing Goal 1 (OT)    Activity/Device (Dressing Goal 1, OT) lower body dressing  -KD     [place]/Cues Needed (Dressing Goal 1, OT) minimum assist (75% or more patient effort)  -KD     Time Frame (Dressing Goal 1, OT) long term goal (LTG);by discharge  -KD     Progress/Outcome (Dressing Goal 1, OT) goal not met;good progress toward goal  -KD     Row Name 04/07/23 0828          Toileting Goal 1 (OT)    Activity/Device (Toileting Goal 1, OT) toileting skills, all  -KD     [place] Level/Cues Needed (Toileting Goal 1, OT) supervision required  -KD     Time Frame (Toileting Goal 1, OT) long term goal (LTG);by discharge  -KD     Progress/Outcome (Toileting Goal 1, OT) goal not met  -KD           User Key  (r) = Recorded By, (t) = Taken By, (c) = Cosigned By    Initials Name Provider Type    KD Flaquita Reyes COTA Occupational Therapist Assistant               Clinical Impression     Row Name 04/07/23 0828          Pain Assessment    Pretreatment Pain Rating 0/10 - no pain  -KD     Posttreatment Pain Rating 0/10 - no pain  -KD     Row Name 04/07/23 0828          Therapy Assessment/Plan (OT)    Therapy Frequency (OT) other (see comments)  5-7 days a week  -KD     Row Name 04/07/23 0828          Therapy Plan Review/Discharge Plan (OT)     Anticipated Discharge Disposition (OT) home with 24/7 care;home with home health  -KD     Row Name 04/07/23 0828          Vital Signs    Pre Systolic BP Rehab 113  -KD     Pre Treatment Diastolic BP 58  -KD     Pretreatment Heart Rate (beats/min) 71  -KD     Pre SpO2 (%) 94  -KD     O2 Delivery Pre Treatment room air  -KD     Pre Patient Position Supine  -KD     Intra Patient Position Standing  -KD     Post Patient Position Sitting  -KD     Row Name 04/07/23 0828          Positioning and Restraints    Pre-Treatment Position in bed  -KD     Post Treatment Position chair  -KD     In Chair sitting;call light within reach;encouraged to call for assist;exit alarm on  -KD           User Key  (r) = Recorded By, (t) = Taken By, (c) = Cosigned By    Initials Name Provider Type    Flaquita Christensen COTA Occupational Therapist Assistant               Outcome Measures     Row Name 04/07/23 0828          How much help from another is currently needed...    Putting on and taking off regular lower body clothing? 3  -KD     Bathing (including washing, rinsing, and drying) 3  -KD     Toileting (which includes using toilet bed pan or urinal) 2  -KD     Putting on and taking off regular upper body clothing 4  -KD     Taking care of personal grooming (such as brushing teeth) 4  -KD     Eating meals 4  -KD     AM-PAC 6 Clicks Score (OT) 20  -KD     Row Name 04/07/23 0909          How much help from another person do you currently need...    Turning from your back to your side while in flat bed without using bedrails? 4  -LC     Moving from lying on back to sitting on the side of a flat bed without bedrails? 4  -LC     Moving to and from a bed to a chair (including a wheelchair)? 3  -LC     Standing up from a chair using your arms (e.g., wheelchair, bedside chair)? 3  -LC     Climbing 3-5 steps with a railing? 2  -LC     To walk in hospital room? 2  -LC     AM-PAC 6 Clicks Score (PT) 18  -LC     Highest level of mobility 6 --> Walked  10 steps or more  -           User Key  (r) = Recorded By, (t) = Taken By, (c) = Cosigned By    Initials Name Provider Type    Flaquita Christensen COTA Occupational Therapist Assistant    Bhavani Guillory, RN Registered Nurse                Occupational Therapy Education     Title: PT OT SLP Therapies (Resolved)     Topic: Occupational Therapy (Resolved)     Point: ADL training (Resolved)     Description:   Instruct learner(s) on proper safety adaptation and remediation techniques during self care or transfers.   Instruct in proper use of assistive devices.              Learning Progress Summary           Patient Acceptance, E,TB, VU,NR by  at 3/31/2023 1675    Comment: POC, role of OT, transfer training                   Point: Home exercise program (Resolved)     Description:   Instruct learner(s) on appropriate technique for monitoring, assisting and/or progressing therapeutic exercises/activities.              Learner Progress:  Not documented in this visit.          Point: Precautions (Resolved)     Description:   Instruct learner(s) on prescribed precautions during self-care and functional transfers.              Learner Progress:  Not documented in this visit.          Point: Body mechanics (Resolved)     Description:   Instruct learner(s) on proper positioning and spine alignment during self-care, functional mobility activities and/or exercises.              Learner Progress:  Not documented in this visit.                      User Key     Initials Effective Dates Name Provider Type Discipline     06/14/21 -  Marino Trejo OT Occupational Therapist OT              OT Recommendation and Plan  Therapy Frequency (OT): other (see comments) (5-7 days a week)  Plan of Care Review  Plan of Care Reviewed With: patient  Progress: improving  Outcome Evaluation: OT tx completed this date. Sup-sit-Min A. Sit>stand-CGA. Fxl mobility ~ 6' CGA of 1 w/ RW. Pt able to maintain NWB status. Toilet t/f-Min A.  [facility]-Max A. Grooming-set up. UB bathing/dressing-SBA. LB bathing/dressing-Max A. Pt then returned to supine-Min A for BLE's. Pt w/ all needs in reach upon exit. Cont OT POC.     Time Calculation:    Time Calculation- OT     Row Name 04/07/23 1128 04/07/23 0828          Time Calculation- OT    OT Start Time 1128  -KD 0828  -KD     OT Stop Time 1153  -KD 0852  -KD     OT Time Calculation (min) 25 min  -KD 24 min  -KD     Total Timed Code Minutes- OT 25 minute(s)  -KD 24 minute(s)  -KD     OT Received On 04/07/23  -KD 04/07/23  -KD        Timed Charges    63630 - OT Self Care/Mgmt Minutes 25  -KD 24  -KD        Total Minutes    Timed Charges Total Minutes 25  -KD 24  -KD      Total Minutes 25  -KD 24  -KD           User Key  (r) = Recorded By, (t) = Taken By, (c) = Cosigned By    Initials Name Provider Type    Flaquita Christensen COTA Occupational Therapist Assistant              Therapy Charges for Today     Code Description Service Date Service Provider Modifiers Qty    26368075785 HC OT SELF CARE/MGMT/TRAIN EA 15 MIN 4/6/2023 Flaquita Reyes COTA GO 4    84069579401 HC OT SELF CARE/MGMT/TRAIN EA 15 MIN 4/7/2023 Flaquita Reyes COTA GO 2    54500977196 HC OT SELF CARE/MGMT/TRAIN EA 15 MIN 4/7/2023 Flaquita Reyes COTA GO 2               KELLEE Tiwari  4/7/2023

## 2023-04-07 NOTE — DISCHARGE SUMMARY
DISCHARGE SUMMARY    PATIENT NAME: Aj Card Jr.       PHYSICIAN: Aristeo Christopher MD  : 1945  MRN: [id number]    ADMITTED: 3/30/2023     DISCHARGED: 23    ADMISSION DIAGNOSES:  Active Hospital Problems    Diagnosis  POA   • Atrial fibrillation with RVR [I48.91]  Yes   • Gastroesophageal reflux disease with esophagitis [K21.00]  Yes   • Type 2 diabetes mellitus without complication, without long-term current use of insulin (HCC) [E11.9]  Yes   • Essential hypertension [I10]  Yes   • Mixed hyperlipidemia [E78.2]  Yes   • Generalized anxiety disorder [F41.1]  Yes      Resolved Hospital Problems    Diagnosis Date Resolved POA   • **OPAL (acute kidney injury) [N17.9] 2023 Yes   • Acute urinary retention [R33.8] 2023 Yes     DISCHARGE DIAGNOSES:   Active Hospital Problems    Diagnosis  POA   • Atrial fibrillation with RVR [I48.91]  Yes   • Gastroesophageal reflux disease with esophagitis [K21.00]  Yes   • Type 2 diabetes mellitus without complication, without long-term current use of insulin (HCC) [E11.9]  Yes   • Essential hypertension [I10]  Yes   • Mixed hyperlipidemia [E78.2]  Yes   • Generalized anxiety disorder [F41.1]  Yes      Resolved Hospital Problems    Diagnosis Date Resolved POA   • **OPAL (acute kidney injury) [N17.9] 2023 Yes   • Acute urinary retention [R33.8] 2023 Yes       SERVICE: [facility]  Attending: Dr. Torres  Resident: Aristeo Christopher MD    CONSULTS:   Consult Orders (all) (From admission, onward)     Start     Ordered    23 1216  Inpatient Urology Consult  Once        Specialty:  Urology  Provider:  Vinicio Dan MD    23 1215    23 0925  Inpatient Case Management  Consult  Once        Provider:  (Not yet assigned)    23 0925    23 162  Hospitalist (on-call MD unless specified)  Once        Specialty:  Hospitalist  Provider:  Pete Torres MD    23 1621                 PROCEDURES:   Cystoscopy - Dr. Dan - 4/5/23    HISTORY OF PRESENT ILLNESS:     This is a 77-year-old male with past medical history of CKD, recent ankle fracture, osteoarthritis, CKD stage II presenting to the ER with complaint of urinary retention and nausea vomiting going on for the past 3 days.  He was found to have more than 1000 cc in his bladder scan.  His creatinine level was also elevated above baseline.  He was also found to be in atrial fibrillation with RVR.  He denies any chest pain or any shortness of air.  He was recently discharged to nursing for rehab after his ankle fracture surgery.    Per Dr. Torres's H&P 3/30/23    DIAGNOSTIC DATA:     Adult Transthoracic Echo Complete w/ Color, Spectral and Contrast if Necessary Per Protocol    Result Date: 3/24/2023  •  Left ventricular ejection fraction appears to be 46 - 50%. •  Estimated right ventricular systolic pressure from tricuspid regurgitation is normal (<35 mmHg). •  Abnormal strain     XR Tibia Fibula 2 View Right    Result Date: 3/23/2023  PROCEDURE: XR TIBIA FIBULA 2 VW RIGHT VIEWS: 4 INDICATION: Pain COMPARISON: None FINDINGS:   - fracture: Mildly comminuted, obliquely oriented fractures of the lateral malleolus. There could also be a tiny fracture fragment of the posterior tibial malleolus   - alignment: Minimal widening of the tibiotalar joint space medially - misc: Mild subcutaneous stranding compatible with edema.   Vascular calcification is present     Lateral malleolus mildly comminuted fracture. May be a tiny fracture fragment posterior malleolus at the tibiotalar joint line as well. Minimal widening of the medial tibiotalar joint space. Note:  if pain or symptoms persist beyond reasonable expectations and follow-up imaging is anticipated,  cross sectional imaging  (CT and/or MRI) is suggested, as is deemed clinically appropriate. Electronically signed by:  Salena Anderson MD  3/23/2023 3:40 PM CDT Workstation: [id number]    XR  Ankle 3+ View Right    Result Date: 3/23/2023  PROCEDURE: XR ANKLE 3+ VW RIGHT VIEWS:   3 INDICATION: Pain COMPARISON: None FINDINGS:   - fracture: Comminuted fracture of the lateral malleolus, with medial aspect of the fracture at the level of the tibiotalar joint. There may be a tiny fracture fragment posterior annulus at the joint line. Posterior fixation of the first tarsometatarsal joint and the first metatarsophalangeal joint.   - alignment: Widening of the medial tibiotalar joint   - misc: Calcaneal enthesopathy at the origin of the plantar aponeurosis and the insertion of the Achilles tendon  Atherosclerotic vascular calcification     Comminuted fracture of the lateral malleolus with medial widening of the tibiotalar joint. There may also be a tiny nondisplaced fracture of the posterior malleolus of the tibia as described above.. Note:  if pain or symptoms persist beyond reasonable expectations and follow-up imaging is anticipated,  cross sectional imaging  (CT and/or MRI) is suggested, as is deemed clinically appropriate. Electronically signed by:  Salena Anderson MD  3/23/2023 4:04 PM CDT Workstation: [id number]    XR Foot 3+ View Right    Result Date: 3/23/2023  PROCEDURE: XR FOOT 3+ VW RIGHT VIEWS: 3 INDICATION: Pain, osteoarthritis COMPARISON: 2/14/2023 FINDINGS:   -Interval bunionectomy and arthrodesis of the first metatarsophalangeal joint, and the first tarsometatarsal joint. Overall alignment is much improved. No evidence of hardware failure. Mild soft tissue swelling overlies the metatarsals. Calcaneal enthesopathy at the insertion of the Achilles tendon and the origin of the plantar aponeurosis are noted. Bone graft harvest site is now present in the calcaneus.     Postoperative change of the first tarsometatarsal joint of the first metatarsophalangeal joints as above. Improved alignment. No evidence of hardware failure. Note:  if pain or symptoms persist beyond reasonable expectations and follow-up  imaging is anticipated,  cross sectional imaging  (CT and/or MRI) is suggested, as is deemed clinically appropriate. Electronically signed by:  Salena Anderson MD  3/23/2023 1:59 PM CDT Workstation: [id number]    XR Abdomen 2+ VW with Chest 1 VW    Result Date: 3/30/2023  Acute Abdominal Series Withe Upright Chest. History: Abdominal pain. Vomiting. Acute kidney failure. Retention of urine. Nausea. Upright frontal film of the chest and supine and upright films of the abdomen were obtained. Comparison: July 20, 2022 FINDINGS:  EKG leads. The lungs are clear of an acute process. Coronary artery stents. The heart is not enlarged. The pulmonary vasculature is not increased. No pleural effusion. No pneumothorax. No acute osseous abnormality. Degenerative changes are present in the thoracic spine. No free air. Some gaseous distention of the colon and some feces scattered throughout the colon. No mechanical bowel obstruction. No organomegaly. Atherosclerotic calcification splenic artery. No acute osseous abnormality. Minimal degenerative changes lumbar spine. Cholecystectomy. Surgical clips right lower quadrant. Prior inguinal hernia repair.     Conclusion: The lungs are clear of an acute process. Coronary artery stents. No free air. Some gaseous distention of the colon and some feces scattered throughout the colon. No mechanical bowel obstruction. Cholecystectomy. Surgical clips right lower quadrant. Prior inguinal hernia repair. [id number] Electronically signed by:  Estuardo Maciel MD  3/30/2023 5:02 PM CDT Workstation: [id number]    Peripheral Block    Result Date: 3/13/2023  Quita Douglas DO     3/13/2023 10:51 AM Peripheral Block Patient reassessed immediately prior to procedure Patient location during procedure: pre-op Start time: 3/13/2023 10:20 AM Stop time: 3/13/2023 10:30 AM Reason for block: procedure for pain, at surgeon's request, post-op pain management and secondary anesthetic Performed by Anesthesiologist:  Quita Douglas,  Preanesthetic Checklist Completed: patient identified, IV checked, site marked, risks and benefits discussed, surgical consent, monitors and equipment checked, pre-op evaluation and timeout performed Prep: Pt Position: supine Sterile barriers:cap, gloves and sterile barriers Prep: ChloraPrep Patient monitoring: blood pressure monitoring, continuous pulse oximetry and EKG Procedure Sedation: no Performed under: PNB Guidance:ultrasound guided ULTRASOUND INTERPRETATION.  Using ultrasound guidance a 21 G gauge needle was placed in close proximity to the femoral and sciatic nerve, at which point, under ultrasound guidance anesthetic was injected in the area of the nerve and spread of the anesthesia was seen on ultrasound in close proximity thereto.  There were no abnormalities seen on ultrasound; a digital image was taken; and the patient tolerated the procedure with no complications. Images:still images obtained, printed/placed on chart Laterality:right Block Type:adductor canal block and popliteal Injection Technique:single-shot Needle Type:echogenic Needle Gauge:21 G Resistance on Injection: none Medications Used: lidocaine PF 1% (XYLOCAINE) injection - Injection  30 mg - 3/13/2023 10:20:00 AM ropivacaine (NAROPIN) 0.5 % injection - Injection  30 mL - 3/13/2023 10:20:00 AM Medications Comment:5mL 0.5% ropivacaine to adductor canal 25mL 0.5% ropivacaine to popliteal Post Assessment Injection Assessment: negative aspiration for heme, no paresthesia on injection and incremental injection Patient Tolerance:comfortable throughout block Complications:no Additional Notes Pt & side identified U/S used throughout and needle seen throughout No complications Pt tolerated procedure well     Lab Results (last 72 hours)     Procedure Component Value Units Date/Time    CBC & Differential [[id number]]  (Abnormal) Collected: 04/06/23 0637    Specimen: Blood Updated: 04/06/23 0722    Narrative:      The  following orders were created for panel order CBC & Differential.  Procedure                               Abnormality         Status                     ---------                               -----------         ------                     CBC Auto Differential[[id number]]        Abnormal            Final result               Scan Slide[[id number]]                                       Final result                 Please view results for these tests on the individual orders.    Scan Slide [[id number]] Collected: 04/06/23 0637    Specimen: Blood Updated: 04/06/23 0746     Anisocytosis Slight/1+     WBC Morphology Normal     Platelet Estimate Adequate     Comment: Rare fibrin strand observed       Basic Metabolic Panel [[id number]]  (Abnormal) Collected: 04/06/23 0637    Specimen: Blood Updated: 04/06/23 0719     Glucose 160 mg/dL      BUN 16 mg/dL      Creatinine 0.92 mg/dL      Sodium 134 mmol/L      Potassium 4.2 mmol/L      Chloride 100 mmol/L      CO2 22.0 mmol/L      Calcium 9.4 mg/dL      BUN/Creatinine Ratio 17.4     Anion Gap 12.0 mmol/L      eGFR 85.1 mL/min/1.73     Narrative:      GFR Normal >60  Chronic Kidney Disease <60  Kidney Failure <15    The GFR formula is only valid for adults with stable renal function between ages 18 and 70.    CBC Auto Differential [[id number]]  (Abnormal) Collected: 04/06/23 0637    Specimen: Blood Updated: 04/06/23 0658     WBC 6.50 10*3/mm3      RBC 4.30 10*6/mm3      Hemoglobin 12.1 g/dL      Hematocrit 36.0 %      MCV 83.7 fL      MCH 28.1 pg      MCHC 33.6 g/dL      RDW 13.8 %      RDW-SD 41.6 fl      MPV 9.7 fL      Platelets 271 10*3/mm3      Neutrophil % 58.9 %      Lymphocyte % 27.8 %      Monocyte % 7.8 %      Eosinophil % 2.6 %      Basophil % 0.6 %      Immature Grans % 2.3 %      Neutrophils, Absolute 3.82 10*3/mm3      Lymphocytes, Absolute 1.81 10*3/mm3      Monocytes, Absolute 0.51 10*3/mm3      Eosinophils, Absolute 0.17 10*3/mm3      Basophils, Absolute 0.04  10*3/mm3      Immature Grans, Absolute 0.15 10*3/mm3      nRBC 0.0 /100 WBC     CANDIDA AURIS SCREEN - Swab, Axilla Right, Axilla Left and Groin [[id number]]  (Normal) Collected: 03/30/23 1922    Specimen: Swab from Axilla Right, Axilla Left and Groin Updated: 04/05/23 1141     Jamilah Auris Screen Culture No Candida auris isolated at 5 days           HOSPITAL COURSE:    Patient was admitted for OPAL most likely secondary to urinary retention.  Patient was seen by urology who did a cystoscope.  OPAL resolved throughout this admission.  Creatinine at baseline day before discharge.  Patient was able to urinate without issue 24 hours after Gray was removed.  Hospital course was complicated by patient having A-fib with RVR.  Initially started on Cardizem drip however greater than 48 hours he was off drip and on home oral medications.  Patient is on anticoagulation.  Patient still recovering from bunionectomy.  Secondary to being admitted to the hospital patient missed follow-up with podiatry.  Day of discharge patient was stable and urinating without issue.  Patient able to ambulate to bathroom without weightbearing on right foot.  Patient was provided with home health, PT and OT.  Cystoscopy 2 days prior showed possible cystitis and patient is being treated with cefazolin.  Transition to cephalexin p.o.  Patient given follow-up with urology, podiatry and his PCP.  Nursing staff confirmed patient was going to going to stay with with his wife.  Was also provided with wheelchair and walker.  Patient given instructions and felt comfortable being discharged home.    DISCHARGE CONDITION:   stable    DISPOSITION:  Home or Self Care    DISCHARGE MEDICATIONS     Discharge Medications      New Medications      Instructions Start Date   cephalexin 500 MG capsule  Commonly known as: Keflex   500 mg, Oral, 2 Times Daily      docusate sodium 100 MG capsule   100 mg, Oral, 2 Times Daily      terazosin 2 MG capsule  Commonly known as:  "HYTRIN   2 mg, Oral, Nightly         Changes to Medications      Instructions Start Date   oxyCODONE-acetaminophen 7.5-325 MG per tablet  Commonly known as: PERCOCET  What changed: Another medication with the same name was removed. Continue taking this medication, and follow the directions you see here.   1 tablet, Oral, Every 4 Hours PRN      rivaroxaban 15 MG tablet  Commonly known as: XARELTO  What changed:   · medication strength  · how much to take   15 mg, Oral, Daily   Start Date: April 8, 2023        Continue These Medications      Instructions Start Date   ALPRAZolam 0.5 MG tablet  Commonly known as: XANAX   0.5 mg, Oral, Nightly PRN      amLODIPine 10 MG tablet  Commonly known as: NORVASC   10 mg, Oral, Daily      ASPIRIN 81 PO   1 tablet, Oral, Daily      Azelastine HCl 137 MCG/SPRAY solution   137 mcg, Inhalation, Daily      BASAGLAR KWIKPEN 100 UNIT/ML injection pen   15 Units, Subcutaneous, Nightly PRN      Blood Glucose Test strip   Use 4 x daily use any brand covered by insurance or same brand as before ICD10 code is E11.9      coenzyme Q10 100 MG capsule   100 mg, Oral, Daily      esomeprazole 40 MG capsule  Commonly known as: nexIUM   40 mg, Oral, Every Morning Before Breakfast      Farxiga 5 MG tablet tablet  Generic drug: dapagliflozin   5 mg, Oral, Daily      HYDROcodone-acetaminophen  MG per tablet  Commonly known as: Norco   1 tablet, Oral, Every 6 Hours PRN      Insulin Pen Needle 30G X 8 MM misc   Use 3-4 times daily.      Insulin Syringe 31G X 5/16\" 0.3 ML misc   4 x daily      isosorbide mononitrate 30 MG 24 hr tablet  Commonly known as: IMDUR   30 mg, Oral, Daily      KRILL OIL OMEGA-3 PO   1 capsule, Oral, Daily      Lancets 30G misc   USE 4 X DAILY      Lancing Device misc   USE AS INDICATED TO CORRELATE WITH STRIPS AND METER      metoprolol tartrate 50 MG tablet  Commonly known as: LOPRESSOR   50 mg, Oral, 2 Times Daily      propafenone 150 MG tablet  Commonly known as: RYTHMOL   " 150 mg, Oral, Every 8 Hours      RED YEAST RICE PO   4 capsules, Oral, Daily      SAW PALMETTO PO   1 tablet, Oral, Daily      sucralfate 1 g tablet  Commonly known as: CARAFATE   1 g, Oral, 2 Times Daily      TURMERIC CURCUMIN PO   1,500 mg, Oral, Daily      vitamin B-12 2500 MCG sublingual tablet tablet  Commonly known as: CYANOCOBALAMIN   5,000 mcg, Sublingual, Weekly      vitamin E 100 UNIT capsule   100 Units, Oral, Daily         Stop These Medications    lisinopril 5 MG tablet  Commonly known as: PRINIVIL,ZESTRIL     pantoprazole 40 MG EC tablet  Commonly known as: PROTONIX        ASK your doctor about these medications      Instructions Start Date   Trulicity 3 MG/0.5ML solution pen-injector  Generic drug: Dulaglutide   3 mg, Subcutaneous, Weekly             INSTRUCTIONS:  Activity:   Activity Instructions     Activity as Tolerated      No weight bearing on right foot. Utilize walker and wheelchair.    Other Activity Restrictions      Type of Restriction: Other    Explain Other Restrictions: No weight bearing on right foot until cleared by podiatry        Diet:   Diet Instructions     Diet: Cardiac Diets, Diabetic Diets; Low Sodium (2g); Regular Texture (IDDSI 7); Thin (IDDSI 0); Consistent Carbohydrate      Discharge Diet:  Cardiac Diets  Diabetic Diets       Cardiac Diet: Low Sodium (2g)    Texture: Regular Texture (IDDSI 7)    Fluid Consistency: Thin (IDDSI 0)    Diabetic Diet: Consistent Carbohydrate          FOLLOW UP:   Additional Instructions for the Follow-ups that You Need to Schedule     Ambulatory Referral to Home Health (Hospital)   As directed      Face to Face Visit Date: 4/7/2023    Follow-up provider for Plan of Care?: I treated the patient in an acute care facility and will not continue treatment after discharge.    Follow-up provider: MEGHAN FRY [[id number]]    Reason/Clinical Findings: Debility, nonweight bearing in right foot s/p surgery    Describe mobility limitations that make leaving  home difficult: nonweight bearing in right foot s/p surgery    Nursing/Therapeutic Services Requested: Skilled Nursing Physical Therapy Occupational Therapy    Skilled nursing orders: Pain management Medication education    PT orders: Home safety assessment Transfer training Gait Training    Weight Bearing Status: Partial Weight Bearing    Occupational orders: Activities of daily living Strengthening    Frequency: 1 Week 1         Discharge Follow-up with PCP   As directed       Currently Documented PCP:    Andreas Martinez MD    PCP Phone Number:    [phone]     Follow Up Details: Follow up in 1 week with PCP         Discharge Follow-up with Specified Provider: Dr. Salazar/Podiatry team; 4 Days   As directed      To: Dr. Salazar/Podiatry team    Follow Up: 4 Days         Discharge Follow-up with Specified Provider: Dr. Dan; 2 Weeks   As directed      To: Dr. Dan    Follow Up: 2 Weeks            Follow-up Information     Sy, Vinicio PATEL MD Follow up.    Specialty: Urology  Why: OFFICE WILL CALL TO SCHEDULE HOSPITAL FOLLOW UP APPOINTMENT  Contact information:  [address]  [place] [address]  [phone]                         PENDING TEST RESULTS AT DISCHARGE      Time: >30 minutes were spent in discharge planning, medication reconciliation and coordination of care for this patient.    Dr. Torres is the attending at time of discharge, He is aware of the patient's status and agrees with the above discharge summary.       Aristeo Christopher MD   PGY-3    [facility]  [address] [place] [address], [place] [address]  Office: [phone]    This document has been electronically signed by Aristeo Christopher MD on April 7, 2023 13:44 CDT

## 2023-04-10 ENCOUNTER — HOME CARE VISIT (OUTPATIENT)
Dept: HOME HEALTH SERVICES | Facility: CLINIC | Age: 78
End: 2023-04-10
Payer: MEDICARE

## 2023-04-10 ENCOUNTER — TRANSITIONAL CARE MANAGEMENT TELEPHONE ENCOUNTER (OUTPATIENT)
Dept: CALL CENTER | Facility: HOSPITAL | Age: 78
End: 2023-04-10
Payer: MEDICARE

## 2023-04-10 ENCOUNTER — TELEPHONE (OUTPATIENT)
Dept: PODIATRY | Facility: CLINIC | Age: 78
End: 2023-04-10
Payer: MEDICARE

## 2023-04-10 VITALS
RESPIRATION RATE: 18 BRPM | DIASTOLIC BLOOD PRESSURE: 60 MMHG | HEART RATE: 86 BPM | OXYGEN SATURATION: 99 % | SYSTOLIC BLOOD PRESSURE: 120 MMHG | TEMPERATURE: 98.7 F

## 2023-04-10 VITALS
SYSTOLIC BLOOD PRESSURE: 120 MMHG | DIASTOLIC BLOOD PRESSURE: 64 MMHG | TEMPERATURE: 98.8 F | HEART RATE: 88 BPM | OXYGEN SATURATION: 99 % | RESPIRATION RATE: 18 BRPM

## 2023-04-10 PROCEDURE — G0151 HHCP-SERV OF PT,EA 15 MIN: HCPCS

## 2023-04-10 PROCEDURE — G0152 HHCP-SERV OF OT,EA 15 MIN: HCPCS

## 2023-04-10 PROCEDURE — G0299 HHS/HOSPICE OF RN EA 15 MIN: HCPCS

## 2023-04-10 NOTE — Clinical Note
[facility]  / Case Conference Note  Medical Necessity and focus of care:  PATIENT PRESENTS WITH HIGH FALL RISK AND DECREASED ADL INDEPENDENCE AFTER RECENT HOSPITALIZATIONS X2 FOR ORIF RIGHT ANKLE FRACTURE AND DEHYDRATION, OPAL, URINARY RETENTION, A-FIB WITH RVR, S/P CYSTOSCOPY.  PATIENT REQUIRES EDUCATION FOR SAFETY/FALL PREVENTION DURING ADLS/FUNCTIONAL TASKS.  NO FURTHER OT WILL BE COMPLETED AT THIS TIME AS PER PATIENT'S REQUEST.   Caregiver Status: SELF AND SPOUSE  Patient's goal(s): NONE STATED FOR OT  Environmental/ Physical issues: NONE  Any additional needs: NONE    Based upon record review and collaboration conference, the recommended frequency for this patient is   SN-----  PT-----  OT---- 1 DAY 1 (EVALUATION ONLY)  ST-----  HHA---  MSW---  Provider_DR FRY_ Notified 4/10/23_ and agrees with POC     Please verify your eval  scores: (Answer the scores  that pertain to your area of focus remove the others)   : 5  : 2  : 5

## 2023-04-10 NOTE — OUTREACH NOTE
Call Center TCM Note    Flowsheet Row Responses   [facility] patient discharged from? [facility]   Does the patient have one of the following disease processes/diagnoses(primary or secondary)? Other   TCM attempt successful? Yes  [verbal release for Dalia, wife]   Call start time 1304   Call end time 1312   Discharge diagnosis OPAL, urinary retention, cystoscopy, A-fib with RVR   Meds reviewed with patient/caregiver? Yes   Is the patient having any side effects they believe may be caused by any medication additions or changes? No   Does the patient have all medications ordered at discharge? Yes   Prescription comments Pt AVS indicates to resume Percocet at discharge but he reports he has none to resume--he reports that HH visited this am and was aware of need.  Pt unable to sleep at night due to pain-tylenol is not relieving.   Is the patient taking all medications as directed (includes completed medication regime)? Yes   Comments PCP FOLLOW UP APPOINTMENT IS 4/18/23@230pm, Pt is scheduling an appt with Dr. Salazar for dressing change    Does the patient have an appointment with their PCP within 7 days of discharge? Yes   What is the Home health agency?  [facility]   Has home health visited the patient within 72 hours of discharge? Yes   Home health interventions Called Home Health agency   Home health comments This RN called  to request  nurse to f/u with patient regarding pain mgmt/medications (family would like meds sent to [facility] on [address] in [place])   What DME was ordered? walker (did not qualify for W/C) from [facility]   Has all DME been delivered? Yes   Psychosocial issues? No   Did the patient receive a copy of their discharge instructions? Yes   Nursing interventions Reviewed instructions with patient   What is the patient's perception of their health status since discharge? Same  [Pt reports pain at HS and difficulty with sleep (see note above).  Pt contacting MD to schedule  dressing change to foot as reports HH will not complete. Reports no issues with urinary retention,  f/c discontinued.]   Is the patient/caregiver able to teach back signs and symptoms related to disease process for when to call PCP? Yes   Is the patient/caregiver able to teach back signs and symptoms related to disease process for when to call 911? Yes   TCM call completed? Yes   Call end time 1312          Trixie Lara RN    4/10/2023, 13:25 CDT

## 2023-04-10 NOTE — HOME HEALTH
"Reason for referral:The patient presents a 77 y.o. male s/p hospitalization at [facility] [place] 3/30 to 4-7-23  for urinary retention and nausea vomiting. He was also found to be in atrial fibrillation with RVR.   He was recently discharged to nursing home for rehab after his ankle fracture surgery which was ORIF of tri mallelolar fx right foot on 3-23-23 by Dr Salazar. Pt presents with typical signs and sx of dependence in transfers, decreased ROM right ankle, decreased strength right leg, gait deviations including NWB right lower leg. Pt would benefit from skilled PT to address deficts and work toward safety and indep at home. Pt presents wearing ace wrapped splint on right lower leg.    Skilled nursing and OT have both been in home today and pt states they have called Dr Salazar office for pain med question, and for request for manual WC as pt feels unsafe using knee scooter (this may have contributed to his fall after bunion surgery leading to fx). Pt staes he has r walker adn is hopping and keeping right leg off floor but this is fatiguing. Pt also hopes to get into see Dr Salazar before 4-20.23.    Past Med Hx:  GERD, DM, HTN, HLD, CKD, OA, CAD, A-FIB, Anxiety, Diverticular disease, IBS, OA, CORONARY ANGIOPLASTY, HERNIA REPAIR, BILATERAL EYE SURGERY, CHOLECYSTECTOMY.  Patient Goal: \"Get back walking.\"  Prior level of function: Pt was completely Indep all IADLs, drove  Numbness/tingling: pt denies  Precautions: cont NWB right lower leg until Dr Salazar changes  Restrictions:"

## 2023-04-10 NOTE — TELEPHONE ENCOUNTER
alondrachristopher Card is having a lot of trouble getting around and would like a prescription sent in for a wheelchair.    Call bk is [phone]

## 2023-04-10 NOTE — HOME HEALTH
OCCUPATIONAL THERAPY EVALUATION     REASON FOR REFERRAL:  78 Y/O MALE HOSPITALIZED AT [facility] 3/23/23-3/24/23 AND UNDERWENT ORIF RIGHT ANKLE FRACTURE ON 3/23/23 BY DR CHAVEZ AND DISCHARGED TO [facility] (3/24/23-3/20/23).  PATIENT REPORTED THAT HE FELL DUE TO AFTER HAVING BUNIONECTOMY COMPLETED, HE GOT TRIPPED UP IN BED CLOTHING AND FELL.  HE WAS THEN REHOSPITALIZED AT [facility] 3/30/23-4/7/23 WITH COMPLAINTS OF NAUSEA/VOMITING.  HE WAS TREATED FOR DEHYDRATION, OPAL, URINARY RETENTION, A-FIB WITH RVR, S/P CYSTOSCOPY 4/5/23 BY DR HUERTAS.  PATIENT IS NONWB ON R FOOT.  HE REPORTED PROBLEMS WITH MOBILITY DUE TO UNABLE TO HOP VERY FAR ON JUST HIS LEFT FOOT.  HE IS REQUESTING MANUAL WHEELCHAIR TO ASSIST WITH MOBILITY.  MESSAGE LEFT WITH DR CHAVEZ/CYN LEONARDO REQUESTING PRESCRIPTION BE SENT TO [facility].  PATIENT/CAREGIVER EDUCATION COMPLETED THAT INSURANCE MAY NOT COVER W/C DUE TO RECENTLY PURCHASED PATIENT RW WITH PATIENT/CAREGIVER VERBALIZING UNDERSTANDING OF COMPLETED EDUCATION.  PATIENT REPORTED THAT HE IS ONLY PIVOT TRANSFERRING AT THIS TIME TO BSC OR OLD WHEELCHAIR.  PATIENT EDUCATION COMPLETED TO WORK ON STANDING TOLERANCE WITH NONWB THROUGH RIGHT LE.  PATIENT HAS B UE STRENGTHENING HEP AND IS INDEPENDENT COMPLETING HEP.  PATIENT REPORTED THAT HE IS COMPLETING HIS OWN SPONGE BATHING/DRESSING WITH HIS SPOUSE TO SETUP AND ASSIST ONLY RARELY.     PAST MEDICAL HISTORY:  GERD, DM, HTN, HLD, CKD, OA, CAD, A-FIB, ANXIETY, DIVERTICULAR DISEASE, IBS, OA-MULTIPLE JOINTS, CORONARY ANGIOPLASTY, HERNIA REPAIR, BILATERAL EYE SURGERY, CHOLECYSTECTOMY.    HOME SOCIAL ENVIRONMENT: PATIENT LIVES IN ONE LEVEL HOME WITH HIS SPOUSE WITH SMALL RAMP TO GET UP ONTO PORCH WITH NO HANDRAILS.      PRIOR LEVEL OF FUNCTION:  PRIOR TO ANKLE FRACTURE, PATIENT WAS INDEPENDENT WITH ALL INCLUDING ADLS, IADLS, FUNCTIONAL TRANSFERS/FUNCTIONAL MOBILITY WITHOUT AD, DROVE SELF AND INDEPENDENT OU TIN THE COMMUNITY.       CLINICAL FINDINGS:  UPPER EXTREMITY ASSESSMENT:   AROM B UES: WFL AND STRENGTH: 5/5 GROSSLY THROUGHOUT.   /PINCH: RIGHT HAND DOMINANT, B  STRENGTHS: 5/5.                                        FINE MOTOR COORDINATION:   WFL                 UPPER EXTREMITY GROSS MOTOR COORDINATION:  WFL  SENSATION: INTACT B UES GROSSLY THROUGHOUT.  DENIES NUMBNESS/TINGLING.                                                                  FUNCTIONAL ENDURANCE FOR SAFE ACTIVITIES OF DAILY LIVING/INSTRUMENTAL ACTIVITIES OF DAILY LIVING: FAIR       BALANCE:             SITTING BALANCE: GOOD  STANDING BALANCE: FAIR   WEIGHT BEARING STATUS/PRECAUTIONS:  NONWB R LE/FALL RISK  ASSISTIVE DEVICES: RW, BSC, URINAL, KNEE SCOOTER, OLD MANUAL WHEELCHAIR.  PATIENT REPORTED THAT HE MAY HAVE ACCESS TO POWER W/C AND POSSIBLY TRANSPORT CHAIR.     MEDICAL NECESSITY:  PATIENT PRESENTS WITH HIGH FALL RISK AND DECREASED ADL INDEPENDENCE AFTER RECENT HOSPITALIZATIONS X2 FOR ORIF RIGHT ANKLE FRACTURE AND DEHYDRATION, OPAL, URINARY RETENTION, A-FIB WITH RVR, S/P CYSTOSCOPY.  PATIENT REQUIRES EDUCATION FOR SAFETY/FALL PREVENTION DURING ADLS/FUNCTIONAL TASKS.  NO FURTHER OT WILL BE COMPLETED AT THIS TIME AS PER PATIENT'S REQUEST.      PATIENT GOAL FOR THIS EPISODE OF CARE: NONE STATED FOR OT  REHAB POTENTIAL :  GOOD FOR GOAL  DATE OF NEXT APPOINTMENT WITH DOCTOR: 4/18/23 DR FRY, 4/20/23 ANAND LEONARDO.    ANTICIPATED DISCHARGE PLAN:  TO SELF/CAREGIVER  PATIENT / CAREGIVER AGREE WITH DISCHARGE PLAN: YES  COMMUNICATION / CARE COORDINATION:  Case communication note to admitting RN with Functional [place] Scores/# of visits.  WISH TO ADDRESS BATHING WITH OT: NO

## 2023-04-10 NOTE — Clinical Note
"[facility]uddle  / Case Conference Note  Medical Necessity and focus of care:The patient presents a 77 y.o. male s/p hospitalization at [facility] 3/30 to 4-7-23  for urinary retention and nausea vomiting. He was also found to be in atrial fibrillation with RVR.   He was recently discharged to nursing home for rehab after his ankle fracture surgery which was ORIF of tri mallelolar fx right foot on 3-23-23 by Dr Salazar. Pt presents with typical signs and sx of dependence in transfers, decreased ROM right ankle, decreased strength right leg, gait deviations including NWB right lower leg. Pt would benefit from skilled PT to address deficts and work toward safety and indep at home. Pt presents wearing ace wrapped splint on right lower leg.    Skilled nursing and OT have both been in home today and pt states they have called Dr Salazar office for pain med question, and for request for manual WC as pt feels unsafe using knee scooter (this may have contributed to his fall after bunion surgery leading to fx). Pt staes he has r walker adn is hopping and keeping right leg off floor but this is fatiguing. Pt also hopes to get into see Dr Salazar before 4-20.23.      Patient Goal: \"Get back walking.\"    Caregiver Status:Pt's wife is capapble and available    GG Discharge Goal: amb 50 6  Medication Iss[place]: pt has meds, MD has been called about pain meds   Environmental/ Physical issues: slight ramp to enter home, some narrow pathways  Any additional needs:    Based upon record review and collaboration conference, the recommended frequency for this patient is   SN-----  PT-----1w4  OT----  ST-----  HHA---  MSW---  Provider____Dr Martinez____ Notified @ ____4-10_____ and agrees with POC    Please verify your eval  scores: (Answer the scores  that pertain to your area of focus remove the others)    5    2   5  "

## 2023-04-10 NOTE — TELEPHONE ENCOUNTER
Marie or alondra     Mr Card has appt on the 20th however, his dressing is coming off and he wishes to have that redone before his next appt.     please advise thank you    His call trent is [phone] or  can call

## 2023-04-12 VITALS
DIASTOLIC BLOOD PRESSURE: 80 MMHG | SYSTOLIC BLOOD PRESSURE: 126 MMHG | HEART RATE: 90 BPM | OXYGEN SATURATION: 100 % | RESPIRATION RATE: 20 BRPM | TEMPERATURE: 97.8 F

## 2023-04-14 ENCOUNTER — HOME CARE VISIT (OUTPATIENT)
Dept: HOME HEALTH SERVICES | Facility: CLINIC | Age: 78
End: 2023-04-14
Payer: MEDICARE

## 2023-04-14 VITALS
HEART RATE: 76 BPM | SYSTOLIC BLOOD PRESSURE: 130 MMHG | TEMPERATURE: 97.8 F | DIASTOLIC BLOOD PRESSURE: 70 MMHG | RESPIRATION RATE: 16 BRPM

## 2023-04-14 PROCEDURE — G0151 HHCP-SERV OF PT,EA 15 MIN: HCPCS

## 2023-04-14 PROCEDURE — G0299 HHS/HOSPICE OF RN EA 15 MIN: HCPCS

## 2023-04-14 NOTE — HOME HEALTH
"\"We went to the doctor office and just saw nurse to change dressing, but I did not see doctor. I see him next week and I am going to ask about getting WC, I have this borrowed elec WC from family mmeber. The nurse came this morning and checked vitals all good. I am getting around better.\""

## 2023-04-17 ENCOUNTER — HOME CARE VISIT (OUTPATIENT)
Dept: HOME HEALTH SERVICES | Facility: CLINIC | Age: 78
End: 2023-04-17
Payer: MEDICARE

## 2023-04-17 VITALS
HEART RATE: 80 BPM | TEMPERATURE: 98.2 F | OXYGEN SATURATION: 97 % | RESPIRATION RATE: 16 BRPM | SYSTOLIC BLOOD PRESSURE: 136 MMHG | DIASTOLIC BLOOD PRESSURE: 70 MMHG

## 2023-04-17 VITALS
OXYGEN SATURATION: 97 % | SYSTOLIC BLOOD PRESSURE: 136 MMHG | HEART RATE: 80 BPM | TEMPERATURE: 98.2 F | RESPIRATION RATE: 16 BRPM | DIASTOLIC BLOOD PRESSURE: 70 MMHG

## 2023-04-17 PROCEDURE — G0151 HHCP-SERV OF PT,EA 15 MIN: HCPCS

## 2023-04-17 PROCEDURE — G0299 HHS/HOSPICE OF RN EA 15 MIN: HCPCS

## 2023-04-17 NOTE — HOME HEALTH
"\"I am doing ok, I am seeing Dr Martinez tomorrow and then ortho office thursday so I am getting one of them to order me a WC.\""

## 2023-04-17 NOTE — HOME HEALTH
FOC: Primary Dx: Displaced trimalleolar fracture of right lower leg, subsequent encounter for closed fracture    SN PROVIDED EDUCATION RLE FRACTURE ON PAIN MANAGEMENT, WEIGHT BEARING STATUS, FALLS PRECAUTIONS AND INFECTION PRECAUTION EDUCATION. PT VERBALIZED UNDERSTANDING

## 2023-04-18 ENCOUNTER — OFFICE VISIT (OUTPATIENT)
Dept: FAMILY MEDICINE CLINIC | Facility: CLINIC | Age: 78
End: 2023-04-18
Payer: MEDICARE

## 2023-04-18 ENCOUNTER — READMISSION MANAGEMENT (OUTPATIENT)
Dept: CALL CENTER | Facility: HOSPITAL | Age: 78
End: 2023-04-18
Payer: MEDICARE

## 2023-04-18 VITALS
BODY MASS INDEX: 25.47 KG/M2 | SYSTOLIC BLOOD PRESSURE: 120 MMHG | HEART RATE: 86 BPM | TEMPERATURE: 97.3 F | HEIGHT: 69 IN | OXYGEN SATURATION: 98 % | DIASTOLIC BLOOD PRESSURE: 50 MMHG

## 2023-04-18 DIAGNOSIS — K21.00 GASTROESOPHAGEAL REFLUX DISEASE WITH ESOPHAGITIS WITHOUT HEMORRHAGE: ICD-10-CM

## 2023-04-18 DIAGNOSIS — I25.119 CORONARY ARTERY DISEASE WITH ANGINA PECTORIS, UNSPECIFIED VESSEL OR LESION TYPE, UNSPECIFIED WHETHER NATIVE OR TRANSPLANTED HEART: ICD-10-CM

## 2023-04-18 DIAGNOSIS — R33.9 URINARY RETENTION: Primary | ICD-10-CM

## 2023-04-18 DIAGNOSIS — F41.1 GENERALIZED ANXIETY DISORDER: ICD-10-CM

## 2023-04-18 DIAGNOSIS — I10 ESSENTIAL HYPERTENSION: ICD-10-CM

## 2023-04-18 DIAGNOSIS — N18.2 STAGE 2 CHRONIC KIDNEY DISEASE: ICD-10-CM

## 2023-04-18 DIAGNOSIS — R79.89 HIGH SERUM VITAMIN B12: ICD-10-CM

## 2023-04-18 DIAGNOSIS — E11.65 UNCONTROLLED TYPE 2 DIABETES MELLITUS WITH HYPERGLYCEMIA: ICD-10-CM

## 2023-04-18 DIAGNOSIS — I48.0 PAF (PAROXYSMAL ATRIAL FIBRILLATION): ICD-10-CM

## 2023-04-18 DIAGNOSIS — Z98.890 HISTORY OF FOOT SURGERY: ICD-10-CM

## 2023-04-18 DIAGNOSIS — E78.2 MIXED HYPERLIPIDEMIA: ICD-10-CM

## 2023-04-18 DIAGNOSIS — Z79.01 CHRONIC ANTICOAGULATION: ICD-10-CM

## 2023-04-18 NOTE — OUTREACH NOTE
Medical Week 2 Survey    Flowsheet Row Responses   [facility] patient discharged from? [facility]   Does the patient have one of the following disease processes/diagnoses(primary or secondary)? Other   Week 2 attempt successful? Yes   Call start time 1616   Discharge diagnosis OPAL, urinary retention, cystoscopy, A-fib with RVR   Call end time 1622   Is patient permission given to speak with other caregiver? Yes   Person spoke with today (if not patient) and relationship spouse   Meds reviewed with patient/caregiver? Yes   Is the patient having any side effects they believe may be caused by any medication additions or changes? No   Does the patient have all medications ordered at discharge? Yes   Is the patient taking all medications as directed (includes completed medication regime)? Yes   Medication comments not taking xanax while taking Norco.   Does the patient have a primary care provider?  Yes   Does the patient have an appointment with their PCP within 7 days of discharge? Yes   Has the patient kept scheduled appointments due by today? Yes   What is the Home health agency?  [facility]   Has home health visited the patient within 72 hours of discharge? Yes   Has all DME been delivered? Yes   Psychosocial issues? No   Did the patient receive a copy of their discharge instructions? Yes   Nursing interventions Reviewed instructions with patient   What is the patient's perception of their health status since discharge? Improving   Is the patient/caregiver able to teach back signs and symptoms related to disease process for when to call PCP? Yes   Is the patient/caregiver able to teach back signs and symptoms related to disease process for when to call 911? Yes   Is the patient/caregiver able to teach back the hierarchy of who to call/visit for symptoms/problems? PCP, Specialist, Home health nurse, Urgent Care, ED, 911 Yes   If the patient is a current smoker, are they able to teach back resources for cessation?  Not a smoker   Additional teach back comments He has podiatry on Thursday. He is improving.   Week 2 Call Completed? Yes   Is the patient interested in additional calls from an ambulatory ?  NOTE:  applies to high risk patients requiring additional follow-up. No   Wrap up additional comments No questions at this time.          Liz TREVIÑO - Registered Nurse

## 2023-04-20 ENCOUNTER — OFFICE VISIT (OUTPATIENT)
Dept: PODIATRY | Facility: CLINIC | Age: 78
End: 2023-04-20
Payer: MEDICARE

## 2023-04-20 VITALS — BODY MASS INDEX: 25.48 KG/M2 | WEIGHT: 172 LBS | HEIGHT: 69 IN | OXYGEN SATURATION: 99 % | HEART RATE: 106 BPM

## 2023-04-20 DIAGNOSIS — M20.11 HALLUX VALGUS, RIGHT: Primary | ICD-10-CM

## 2023-04-20 DIAGNOSIS — Z98.890 STATUS POST BUNIONECTOMY: ICD-10-CM

## 2023-04-20 DIAGNOSIS — M19.071 OSTEOARTHRITIS OF ANKLE AND FOOT, RIGHT: ICD-10-CM

## 2023-04-20 DIAGNOSIS — S82.851A CLOSED TRIMALLEOLAR FRACTURE OF RIGHT ANKLE, INITIAL ENCOUNTER: ICD-10-CM

## 2023-04-20 RX ORDER — TRAMADOL HYDROCHLORIDE 50 MG/1
50 TABLET ORAL EVERY 8 HOURS PRN
Qty: 21 TABLET | Refills: 0 | Status: SHIPPED | OUTPATIENT
Start: 2023-04-20 | End: 2023-04-20 | Stop reason: SDUPTHER

## 2023-04-20 RX ORDER — TRAMADOL HYDROCHLORIDE 50 MG/1
50 TABLET ORAL EVERY 8 HOURS PRN
Qty: 21 TABLET | Refills: 0 | Status: SHIPPED | OUTPATIENT
Start: 2023-04-20 | End: 2023-04-27

## 2023-04-20 NOTE — PROGRESS NOTES
Aj Card Jr.  1945  77 y.o. male   PCP: Jeet Martinez   Bs: 130 per patient       4/20/2023    Chief Complaint   Patient presents with   • Right Ankle - Post-op       History of Present Illness    Aj Card Jr. is a 77 y.o.male came to clinic for post operative appointment on right foot joint fusion. Patient had had two surgeries. Patient was placed in a splint. Patient seems to be doing well. Xray obtained today.     Past Medical History:   Diagnosis Date   • Acute posthemorrhagic anemia    • Afib    • Allergic rhinitis    • Anxiety state    • Chest pain    • CKD (chronic kidney disease), stage II    • Coronary arteriosclerosis     3 stents, followed by Dr. Jeffers   • Diabetes mellitus     type 2   • Diverticular disease of colon    • Dysphagia    • Elevated cholesterol    • Epigastric pain    • GERD (gastroesophageal reflux disease)     esophagitis on Dexilant. Pt feels Nexium working better      • History of echocardiogram     Small left atrial enlargement with mild CLVH.Ef of 55-60%.Mitral valve mildly thickened.Av thickened.Left ventricle mildly dilated.Tricuspid intact.Mild aortic insufficiency. 12/07/2015       • History of echocardiogram     Mild diastolic dysfunction and mild left atrial enlargement, otherwise normal echo. EF> 55% 01/03/2012       • Hypercholesterolemia    • Hypertension    • Insomnia    • Irritable bowel syndrome    • Osteoarthritis of multiple joints    • Pain of right foot    • PONV (postoperative nausea and vomiting)          Past Surgical History:   Procedure Laterality Date   • ANKLE OPEN REDUCTION INTERNAL FIXATION Right 3/23/2023    Procedure: ANKLE OPEN REDUCTION INTERNAL FIXATION               (LATEX ALLERGY);  Surgeon: Mason Salazar DPM;  Location: [facility];  Service: Podiatry;  Laterality: Right;   • APPENDECTOMY       [facility] 1995       • CARDIAC CATHETERIZATION      Successful PTCA of the OMB#2.Unsuccessful PTCA of the 1st  OMB, secondary to difficulty in advancing the balloon. 12/08/2015       • CHOLECYSTECTOMY      [facility] 1993       • CHOLECYSTECTOMY     • COLONOSCOPY  10/01/2012   • COLONOSCOPY N/A 12/11/2017    Procedure: COLONOSCOPY;  Surgeon: Bladimir Michael MD;  Location: [facility] ENDOSCOPY;  Service:    • COLONOSCOPY N/A 11/01/2022    Procedure: COLONOSCOPY;  Surgeon: Bladimir Michael MD;  Location: [facility] ENDOSCOPY;  Service: Gastroenterology;  Laterality: N/A;   • CORONARY ANGIOPLASTY      Successful PTCA & stenting LAD was done w/ deployment of a 2.5mm x23 Xience stent w/ reduction of stenosis from 90% to less than 0% stenosis with good LILLIMA 3 flow 02/17/2012       • CYSTOSCOPY N/A 4/5/2023    Procedure: CYSTOSCOPY         (LATEX ALLERGY);  Surgeon: Vinicio Dan MD;  Location: [facility] OR;  Service: Urology;  Laterality: N/A;   • ENDOSCOPY      with biopsy.  Mildly severe esophagitis. Gastritis. Normal examined duodenum.Several biopsies obtained in the lower third of the esophagus.Several biopsies obtained in the gastric antrum. 05/06/2016       • ENDOSCOPY      w tube. Normal esophagus. Gastritis in stomach. Biopsy taken. Gastric polyp found. Biopsy taken. Normal duodenum. 10/01/2012       • ENDOSCOPY AND COLONOSCOPY      Diverticulum in sigmoid colon & descending colon. Internal & external hemorrhoids found. 10/01/2012       • EYE SURGERY Bilateral     catarract   • HAND SURGERY Left      Corrective osteotomy of ring finger metacarpal. Malunited fracture of left ring finger 05/21/1985       • HERNIA REPAIR      Laparoscopic hernia repair. prepertitoneal bilateral inguinal hernia repair with mesh. 10/15/2009      • OTHER SURGICAL HISTORY      CORONARY ARTERY STENT    • SKIN TAG REMOVAL      Excision, viral skin tag,right upper eyelid 05/08/1995    • TOE FUSION Right 3/13/2023    Procedure: RIGHT FIRST TARSOMETATARSAL JOINT ARTHRODESIS, FIRST METATARSOPHALANGEAL JOINT ARTHRODESIS, CALCANEAL BONE GRAFT AND  ALL OTHER INDICATED PROCEDURES                (LATEX ALLERGY);  Surgeon: Mason Salazar DPM;  Location: [facility];  Service: Podiatry;  Laterality: Right;         Family History   Problem Relation Age of Onset   • Clotting disorder Other    • Lung disease Other    • Schizophrenia Sister    • Obesity Sister    • Tuberculosis Sister    • Arthritis Mother    • Diabetes Mother    • Heart disease Mother    • Hypertension Mother    • Obesity Mother    • Stroke Mother    • Thyroid disease Mother    • Tuberculosis Mother    • Arthritis Father    • Tuberculosis Father        Allergies   Allergen Reactions   • Brilinta [Ticagrelor] Shortness Of Breath   • Effient [Prasugrel] Shortness Of Breath   • Latex Itching   • Warfarin And Related Shortness Of Breath   • Ciprofloxacin Hives   • Lipitor [Atorvastatin] Swelling   • Norco [Hydrocodone-Acetaminophen] Confusion   • Adhesive Tape Rash   • Celexa [Citalopram Hydrobromide] Rash   • Crestor [Rosuvastatin Calcium] Rash   • Floxin [Ofloxacin] Rash   • Januvia [Sitagliptin] Rash   • Penicillins Rash   • Sulfa Antibiotics Rash       Social History     Socioeconomic History   • Marital status:    Tobacco Use   • Smoking status: Never     Passive exposure: Never   • Smokeless tobacco: Never   Vaping Use   • Vaping Use: Never used   Substance and Sexual Activity   • Alcohol use: No   • Drug use: Never   • Sexual activity: Defer         Current Outpatient Medications   Medication Sig Dispense Refill   • ALPRAZolam (XANAX) 0.5 MG tablet Take 1 tablet by mouth At Night As Needed for Anxiety. 60 tablet 0   • amLODIPine (NORVASC) 10 MG tablet Take 1 tablet by mouth Daily.     • ASPIRIN 81 PO Take 1 tablet by mouth Daily.     • Azelastine HCl 137 MCG/SPRAY solution Inhale 1 spray Daily. 30 mL 3   • coenzyme Q10 100 MG capsule Take 1 capsule by mouth Daily.     • dapagliflozin (Farxiga) 5 MG tablet tablet Take 1 tablet by mouth Daily.     • docusate sodium 100 MG capsule Take 1  "capsule by mouth 2 (Two) Times a Day. 14 capsule 0   • Dulaglutide (Trulicity) 3 MG/0.5ML solution pen-injector Inject 3 mg under the skin into the appropriate area as directed 1 (One) Time Per Week. (Patient taking differently: Inject 1.5 mg under the skin into the appropriate area as directed 1 (One) Time Per Week.) 12 pen 3   • esomeprazole (nexIUM) 40 MG capsule Take 1 capsule by mouth Every Morning Before Breakfast. 30 capsule 3   • Glucose Blood (BLOOD GLUCOSE TEST) strip Use 4 x daily use any brand covered by insurance or same brand as before ICD10 code is E11.9 120 each 11   • Insulin Glargine (BASAGLAR KWIKPEN) 100 UNIT/ML injection pen Inject 15 Units under the skin into the appropriate area as directed At Night As Needed.     • Insulin Pen Needle 30G X 8 MM misc Use 3-4 times daily. 100 each 3   • Insulin Syringe 31G X 5/16\" 0.3 ML misc 4 x daily 120 each 11   • isosorbide mononitrate (IMDUR) 30 MG 24 hr tablet Take 1 tablet by mouth Daily.     • KRILL OIL OMEGA-3 PO Take 1 capsule by mouth Daily.     • Lancet Devices (LANCING DEVICE) misc USE AS INDICATED TO CORRELATE WITH STRIPS AND METER 1 each 1   • Lancets 30G misc USE 4 X DAILY 120 each 11   • metoprolol tartrate (LOPRESSOR) 50 MG tablet Take 1 tablet by mouth 2 (Two) Times a Day. 180 tablet 1   • propafenone (RYTHMOL) 150 MG tablet Take 1 tablet by mouth Every 8 (Eight) Hours.     • Red Yeast Rice Extract (RED YEAST RICE PO) Take 4 capsules by mouth Daily.     • rivaroxaban (XARELTO) 15 MG tablet Take 1 tablet by mouth Daily. Indications: Atrial Fibrillation 30 tablet 0   • Saw Palmetto, Serenoa repens, (SAW PALMETTO PO) Take 1 tablet by mouth Daily.     • sucralfate (CARAFATE) 1 g tablet Take 1 tablet by mouth 2 (Two) Times a Day.     • terazosin (HYTRIN) 2 MG capsule Take 1 capsule by mouth Every Night. 30 capsule 0   • traMADol (ULTRAM) 50 MG tablet Take 1 tablet by mouth Every 8 (Eight) Hours As Needed for Moderate Pain for up to 7 days. 21 " "tablet 0   • TURMERIC CURCUMIN PO Take 1,500 mg by mouth Daily.     • vitamin B-12 (CYANOCOBALAMIN) 2500 MCG sublingual tablet tablet Place 5,000 mcg under the tongue 1 (One) Time Per Week.     • vitamin E 100 UNIT capsule Take 1 capsule by mouth Daily.     • oxyCODONE-acetaminophen (PERCOCET) 7.5-325 MG per tablet Take 1 tablet by mouth Every 4 (Four) Hours As Needed for Moderate Pain. (Patient not taking: Reported on 4/20/2023) 30 tablet 0     No current facility-administered medications for this visit.       Review of Systems   Musculoskeletal:        Foot pain    All other systems reviewed and are negative.        OBJECTIVE    Pulse 106   Ht 175.3 cm (69.02\")   Wt 78 kg (172 lb)   SpO2 99%   BMI 25.39 kg/m²     Body mass index is 25.39 kg/m².        Physical Exam  Vitals reviewed.   Constitutional:       Appearance: Normal appearance. He is well-developed.   HENT:      Head: Normocephalic and atraumatic.   Neck:      Trachea: Trachea and phonation normal.   Cardiovascular:      Pulses:           Dorsalis pedis pulses are 1+ on the right side.        Posterior tibial pulses are 1+ on the right side.   Pulmonary:      Effort: Pulmonary effort is normal. No respiratory distress.   Abdominal:      General: There is no distension.      Palpations: Abdomen is soft.   Musculoskeletal:        Feet:    Skin:     General: Skin is warm and dry.   Neurological:      Mental Status: He is alert and oriented to person, place, and time.      GCS: GCS eye subscore is 4. GCS verbal subscore is 5. GCS motor subscore is 6.   Psychiatric:         Speech: Speech normal.         Behavior: Behavior normal. Behavior is cooperative.         Thought Content: Thought content normal.         Judgment: Judgment normal.                Procedures  Reviewed x-rays of the foot and ankle      ASSESSMENT AND PLAN    Diagnoses and all orders for this visit:    1. Hallux valgus, right (Primary)  -     XR Foot 3+ View Right  -     XR Ankle 3+ " View Right  -     Discontinue: traMADol (ULTRAM) 50 MG tablet; Take 1 tablet by mouth Every 8 (Eight) Hours As Needed for Moderate Pain for up to 7 days.  Dispense: 21 tablet; Refill: 0  -     traMADol (ULTRAM) 50 MG tablet; Take 1 tablet by mouth Every 8 (Eight) Hours As Needed for Moderate Pain for up to 7 days.  Dispense: 21 tablet; Refill: 0  -     Standard Wheelchair    2. Osteoarthritis of ankle and foot, right  -     XR Ankle 3+ View Right  -     Discontinue: traMADol (ULTRAM) 50 MG tablet; Take 1 tablet by mouth Every 8 (Eight) Hours As Needed for Moderate Pain for up to 7 days.  Dispense: 21 tablet; Refill: 0  -     traMADol (ULTRAM) 50 MG tablet; Take 1 tablet by mouth Every 8 (Eight) Hours As Needed for Moderate Pain for up to 7 days.  Dispense: 21 tablet; Refill: 0  -     Standard Wheelchair    3. Closed trimalleolar fracture of right ankle, initial encounter  -     Standard Wheelchair    4. Status post bunionectomy  -     Standard Wheelchair      Body mass index is 25.39 kg/m².    Recommend follow-up with primary care to discuss BMI greater than 30    Continue strict swelling modalities  Continue minimal weightbearing status  Follow-up in 1 week for recheck  Short leg fiberglass cast placed  Tramadol prescription sent in to take along with the Tylenol for any discomfort, stop medication for any side effects          This document has been electronically signed by Qasim LEONARDO, FNP-C, ONP-C on April 20, 2023 09:03 CDT

## 2023-04-24 ENCOUNTER — HOME CARE VISIT (OUTPATIENT)
Dept: HOME HEALTH SERVICES | Facility: CLINIC | Age: 78
End: 2023-04-24
Payer: MEDICARE

## 2023-04-24 VITALS
RESPIRATION RATE: 18 BRPM | DIASTOLIC BLOOD PRESSURE: 70 MMHG | OXYGEN SATURATION: 99 % | HEART RATE: 82 BPM | SYSTOLIC BLOOD PRESSURE: 125 MMHG | TEMPERATURE: 98.1 F

## 2023-04-24 PROCEDURE — G0151 HHCP-SERV OF PT,EA 15 MIN: HCPCS

## 2023-04-24 NOTE — HOME HEALTH
"\"I am doing ok, they took the staples out and clened up the leg and put this cast on last  week. They got me a WC and we keep in the car for now. I drove this borrowed elec WC out to the mailbox yesterday. I am still keeping my foot off the ground.\""

## 2023-04-25 ENCOUNTER — OFFICE VISIT (OUTPATIENT)
Dept: PODIATRY | Facility: CLINIC | Age: 78
End: 2023-04-25
Payer: MEDICARE

## 2023-04-25 VITALS
HEIGHT: 69 IN | HEART RATE: 91 BPM | DIASTOLIC BLOOD PRESSURE: 67 MMHG | OXYGEN SATURATION: 99 % | SYSTOLIC BLOOD PRESSURE: 107 MMHG | WEIGHT: 172 LBS | BODY MASS INDEX: 25.48 KG/M2

## 2023-04-25 DIAGNOSIS — M19.071 OSTEOARTHRITIS OF ANKLE AND FOOT, RIGHT: ICD-10-CM

## 2023-04-25 DIAGNOSIS — M20.11 HALLUX VALGUS, RIGHT: Primary | ICD-10-CM

## 2023-04-25 DIAGNOSIS — S82.851A CLOSED TRIMALLEOLAR FRACTURE OF RIGHT ANKLE, INITIAL ENCOUNTER: ICD-10-CM

## 2023-04-25 PROCEDURE — 99024 POSTOP FOLLOW-UP VISIT: CPT | Performed by: PODIATRIST

## 2023-04-25 PROCEDURE — 3078F DIAST BP <80 MM HG: CPT | Performed by: PODIATRIST

## 2023-04-25 PROCEDURE — 1159F MED LIST DOCD IN RCRD: CPT | Performed by: PODIATRIST

## 2023-04-25 PROCEDURE — 3074F SYST BP LT 130 MM HG: CPT | Performed by: PODIATRIST

## 2023-04-25 PROCEDURE — 1160F RVW MEDS BY RX/DR IN RCRD: CPT | Performed by: PODIATRIST

## 2023-04-25 NOTE — PROGRESS NOTES
Aj Card Jr.  1945  77 y.o. male  PCP: Andreas Martinez 1/23  BS: 120     4/25/2023    Chief Complaint   Patient presents with   • Right Foot - Post-op   • Right Ankle - Post-op       History of Present Illness    Aj Card Jr. is a 77 y.o.male returns to clinic for post operative appointment on right foot and ankle. DOS was on 3/23/2023. Patient was placed in a fiber glass cast. Xray obtained on 4/20/2023.    Past Medical History:   Diagnosis Date   • Acute posthemorrhagic anemia    • Afib    • Allergic rhinitis    • Anxiety state    • Chest pain    • CKD (chronic kidney disease), stage II    • Coronary arteriosclerosis     3 stents, followed by Dr. Jeffers   • Diabetes mellitus     type 2   • Diverticular disease of colon    • Dysphagia    • Elevated cholesterol    • Epigastric pain    • GERD (gastroesophageal reflux disease)     esophagitis on Dexilant. Pt feels Nexium working better      • History of echocardiogram     Small left atrial enlargement with mild CLVH.Ef of 55-60%.Mitral valve mildly thickened.Av thickened.Left ventricle mildly dilated.Tricuspid intact.Mild aortic insufficiency. 12/07/2015       • History of echocardiogram     Mild diastolic dysfunction and mild left atrial enlargement, otherwise normal echo. EF> 55% 01/03/2012       • Hypercholesterolemia    • Hypertension    • Insomnia    • Irritable bowel syndrome    • Osteoarthritis of multiple joints    • Pain of right foot    • PONV (postoperative nausea and vomiting)          Past Surgical History:   Procedure Laterality Date   • ANKLE OPEN REDUCTION INTERNAL FIXATION Right 3/23/2023    Procedure: ANKLE OPEN REDUCTION INTERNAL FIXATION               (LATEX ALLERGY);  Surgeon: Mason Salazar DPM;  Location: [facility];  Service: Podiatry;  Laterality: Right;   • APPENDECTOMY       [facility] 1995       • CARDIAC CATHETERIZATION      Successful PTCA of the OMB#2.Unsuccessful PTCA of the 1st OMB,  secondary to difficulty in advancing the balloon. 12/08/2015       • CHOLECYSTECTOMY      [facility] 1993       • CHOLECYSTECTOMY     • COLONOSCOPY  10/01/2012   • COLONOSCOPY N/A 12/11/2017    Procedure: COLONOSCOPY;  Surgeon: Bladimir Michael MD;  Location: [facility] ENDOSCOPY;  Service:    • COLONOSCOPY N/A 11/01/2022    Procedure: COLONOSCOPY;  Surgeon: Bladimir Michael MD;  Location: [facility] ENDOSCOPY;  Service: Gastroenterology;  Laterality: N/A;   • CORONARY ANGIOPLASTY      Successful PTCA & stenting LAD was done w/ deployment of a 2.5mm x23 Xience stent w/ reduction of stenosis from 90% to less than 0% stenosis with good LILLIAM 3 flow 02/17/2012       • CYSTOSCOPY N/A 4/5/2023    Procedure: CYSTOSCOPY         (LATEX ALLERGY);  Surgeon: Vinicio Dan MD;  Location: [facility] OR;  Service: Urology;  Laterality: N/A;   • ENDOSCOPY      with biopsy.  Mildly severe esophagitis. Gastritis. Normal examined duodenum.Several biopsies obtained in the lower third of the esophagus.Several biopsies obtained in the gastric antrum. 05/06/2016       • ENDOSCOPY      w tube. Normal esophagus. Gastritis in stomach. Biopsy taken. Gastric polyp found. Biopsy taken. Normal duodenum. 10/01/2012       • ENDOSCOPY AND COLONOSCOPY      Diverticulum in sigmoid colon & descending colon. Internal & external hemorrhoids found. 10/01/2012       • EYE SURGERY Bilateral     catarract   • HAND SURGERY Left      Corrective osteotomy of ring finger metacarpal. Malunited fracture of left ring finger 05/21/1985       • HERNIA REPAIR      Laparoscopic hernia repair. prepertitoneal bilateral inguinal hernia repair with mesh. 10/15/2009      • OTHER SURGICAL HISTORY      CORONARY ARTERY STENT    • SKIN TAG REMOVAL      Excision, viral skin tag,right upper eyelid 05/08/1995    • TOE FUSION Right 3/13/2023    Procedure: RIGHT FIRST TARSOMETATARSAL JOINT ARTHRODESIS, FIRST METATARSOPHALANGEAL JOINT ARTHRODESIS, CALCANEAL BONE GRAFT AND ALL  OTHER INDICATED PROCEDURES                (LATEX ALLERGY);  Surgeon: Mason Salazar DPM;  Location: [facility];  Service: Podiatry;  Laterality: Right;         Family History   Problem Relation Age of Onset   • Clotting disorder Other    • Lung disease Other    • Schizophrenia Sister    • Obesity Sister    • Tuberculosis Sister    • Arthritis Mother    • Diabetes Mother    • Heart disease Mother    • Hypertension Mother    • Obesity Mother    • Stroke Mother    • Thyroid disease Mother    • Tuberculosis Mother    • Arthritis Father    • Tuberculosis Father        Allergies   Allergen Reactions   • Brilinta [Ticagrelor] Shortness Of Breath   • Effient [Prasugrel] Shortness Of Breath   • Latex Itching   • Warfarin And Related Shortness Of Breath   • Ciprofloxacin Hives   • Lipitor [Atorvastatin] Swelling   • Norco [Hydrocodone-Acetaminophen] Confusion   • Adhesive Tape Rash   • Celexa [Citalopram Hydrobromide] Rash   • Crestor [Rosuvastatin Calcium] Rash   • Floxin [Ofloxacin] Rash   • Januvia [Sitagliptin] Rash   • Penicillins Rash   • Sulfa Antibiotics Rash       Social History     Socioeconomic History   • Marital status:    Tobacco Use   • Smoking status: Never     Passive exposure: Never   • Smokeless tobacco: Never   Vaping Use   • Vaping Use: Never used   Substance and Sexual Activity   • Alcohol use: No   • Drug use: Never   • Sexual activity: Defer         Current Outpatient Medications   Medication Sig Dispense Refill   • ALPRAZolam (XANAX) 0.5 MG tablet Take 1 tablet by mouth At Night As Needed for Anxiety. 60 tablet 0   • amLODIPine (NORVASC) 10 MG tablet Take 1 tablet by mouth Daily.     • ASPIRIN 81 PO Take 1 tablet by mouth Daily.     • Azelastine HCl 137 MCG/SPRAY solution Inhale 1 spray Daily. 30 mL 3   • coenzyme Q10 100 MG capsule Take 1 capsule by mouth Daily.     • dapagliflozin (Farxiga) 5 MG tablet tablet Take 1 tablet by mouth Daily.     • docusate sodium 100 MG capsule Take 1 capsule  "by mouth 2 (Two) Times a Day. 14 capsule 0   • Dulaglutide (Trulicity) 3 MG/0.5ML solution pen-injector Inject 3 mg under the skin into the appropriate area as directed 1 (One) Time Per Week. (Patient taking differently: Inject 1.5 mg under the skin into the appropriate area as directed 1 (One) Time Per Week.) 12 pen 3   • esomeprazole (nexIUM) 40 MG capsule Take 1 capsule by mouth Every Morning Before Breakfast. 30 capsule 3   • Glucose Blood (BLOOD GLUCOSE TEST) strip Use 4 x daily use any brand covered by insurance or same brand as before ICD10 code is E11.9 120 each 11   • Insulin Glargine (BASAGLAR KWIKPEN) 100 UNIT/ML injection pen Inject 15 Units under the skin into the appropriate area as directed At Night As Needed.     • Insulin Pen Needle 30G X 8 MM misc Use 3-4 times daily. 100 each 3   • Insulin Syringe 31G X 5/16\" 0.3 ML misc 4 x daily 120 each 11   • isosorbide mononitrate (IMDUR) 30 MG 24 hr tablet Take 1 tablet by mouth Daily.     • KRILL OIL OMEGA-3 PO Take 1 capsule by mouth Daily.     • Lancet Devices (LANCING DEVICE) misc USE AS INDICATED TO CORRELATE WITH STRIPS AND METER 1 each 1   • Lancets 30G misc USE 4 X DAILY 120 each 11   • metoprolol tartrate (LOPRESSOR) 50 MG tablet Take 1 tablet by mouth 2 (Two) Times a Day. 180 tablet 1   • oxyCODONE-acetaminophen (PERCOCET) 7.5-325 MG per tablet Take 1 tablet by mouth Every 4 (Four) Hours As Needed for Moderate Pain. 30 tablet 0   • propafenone (RYTHMOL) 150 MG tablet Take 1 tablet by mouth Every 8 (Eight) Hours.     • Red Yeast Rice Extract (RED YEAST RICE PO) Take 4 capsules by mouth Daily.     • rivaroxaban (XARELTO) 15 MG tablet Take 1 tablet by mouth Daily. Indications: Atrial Fibrillation 30 tablet 0   • Saw Palmetto, Serenoa repens, (SAW PALMETTO PO) Take 1 tablet by mouth Daily.     • sucralfate (CARAFATE) 1 g tablet Take 1 tablet by mouth 2 (Two) Times a Day.     • terazosin (HYTRIN) 2 MG capsule Take 1 capsule by mouth Every Night. 30 " "capsule 0   • traMADol (ULTRAM) 50 MG tablet Take 1 tablet by mouth Every 8 (Eight) Hours As Needed for Moderate Pain for up to 7 days. 21 tablet 0   • TURMERIC CURCUMIN PO Take 1,500 mg by mouth Daily.     • vitamin B-12 (CYANOCOBALAMIN) 2500 MCG sublingual tablet tablet Place 5,000 mcg under the tongue 1 (One) Time Per Week.     • vitamin E 100 UNIT capsule Take 1 capsule by mouth Daily.       No current facility-administered medications for this visit.       Review of Systems   Constitutional: Negative.    Psychiatric/Behavioral: Negative.          OBJECTIVE    /67   Pulse 91   Ht 175.3 cm (69.02\")   Wt 78 kg (172 lb)   SpO2 99%   BMI 25.39 kg/m²     Physical Exam  Vitals reviewed.   Constitutional:       General: He is not in acute distress.     Appearance: He is well-developed.   HENT:      Head: Normocephalic and atraumatic.      Nose: Nose normal.   Eyes:      Conjunctiva/sclera: Conjunctivae normal.      Pupils: Pupils are equal, round, and reactive to light.   Pulmonary:      Effort: Pulmonary effort is normal. No respiratory distress.      Breath sounds: No wheezing.   Neurological:      Mental Status: He is alert and oriented to person, place, and time.   Psychiatric:         Behavior: Behavior normal.         Thought Content: Thought content normal.            Right lower extremity: Incision site to lateral ankle is healed.  Incision site to first ray improving.  Hallux rectus.  Moderate edema.  Negative Homans.    Procedures        ASSESSMENT AND PLAN    Diagnoses and all orders for this visit:    1. Hallux valgus, right (Primary)    2. Osteoarthritis of ankle and foot, right    3. Closed trimalleolar fracture of right ankle, initial encounter        -Patient doing well postoperatively.  Applied Unna boot for edema control.  Recheck 1 week, repeat radiographs(ankle and foot)          This document has been electronically signed by Mason Salazar DPM on April 25, 2023 13:28 CDT "     4/25/2023  13:28 CDT

## 2023-04-26 ENCOUNTER — READMISSION MANAGEMENT (OUTPATIENT)
Dept: CALL CENTER | Facility: HOSPITAL | Age: 78
End: 2023-04-26
Payer: MEDICARE

## 2023-04-26 ENCOUNTER — HOME CARE VISIT (OUTPATIENT)
Dept: HOME HEALTH SERVICES | Facility: CLINIC | Age: 78
End: 2023-04-26
Payer: MEDICARE

## 2023-04-26 PROCEDURE — G0299 HHS/HOSPICE OF RN EA 15 MIN: HCPCS

## 2023-04-26 NOTE — OUTREACH NOTE
"Medical Week 3 Survey    Flowsheet Row Responses   [facility] patient discharged from? [place]   Does the patient have one of the following disease processes/diagnoses(primary or secondary)? Other   Week 3 attempt successful? Yes   Call start time 1402   Call end time 1404   Discharge diagnosis OPAL, urinary retention, cystoscopy, A-fib with RVR   Meds reviewed with patient/caregiver? Yes   Is the patient taking all medications as directed (includes completed medication regime)? Yes   Does the patient have a primary care provider?  Yes   Has the patient kept scheduled appointments due by today? Yes   What is the Home health agency?  [facility]   Has home health visited the patient within 72 hours of discharge? Yes   Psychosocial issues? No   Did the patient receive a copy of their discharge instructions? Yes   Nursing interventions Reviewed instructions with patient   What is the patient's perception of their health status since discharge? Improving   Is the patient/caregiver able to teach back signs and symptoms related to disease process for when to call PCP? Yes   Is the patient/caregiver able to teach back signs and symptoms related to disease process for when to call 911? Yes   Is the patient/caregiver able to teach back the hierarchy of who to call/visit for symptoms/problems? PCP, Specialist, Home health nurse, Urgent Care, ED, 911 Yes   Week 3 Call Completed? Yes   Graduated Yes   Graduated/Revoked comments Pt reports he is doing better . No questions. Has another appt with \"jenny Quintero\"          JOSE LLANOS - Registered Nurse  "

## 2023-04-27 NOTE — HOME HEALTH
SN STILL UNABLE TO ASSESS INCISION DUE TO SURGICAL DRESSING THAT IS NOT REMOVABLE AT THIS TIME. MD DID REMOVE CAST IN OFFICE AND APPLIED A WRAP THAT THEY WILL CHANGE AT HIS NEXT APPOINTMENT. PATIENT STATED THAT HIS PAIN IMPROVED SINCE CAST WAS REMOVED.

## 2023-05-02 ENCOUNTER — OFFICE VISIT (OUTPATIENT)
Dept: PODIATRY | Facility: CLINIC | Age: 78
End: 2023-05-02
Payer: MEDICARE

## 2023-05-02 VITALS — HEIGHT: 69 IN | BODY MASS INDEX: 25.48 KG/M2 | WEIGHT: 172 LBS

## 2023-05-02 DIAGNOSIS — M19.071 OSTEOARTHRITIS OF ANKLE AND FOOT, RIGHT: ICD-10-CM

## 2023-05-02 DIAGNOSIS — M20.11 HALLUX VALGUS, RIGHT: Primary | ICD-10-CM

## 2023-05-02 DIAGNOSIS — S82.851A CLOSED TRIMALLEOLAR FRACTURE OF RIGHT ANKLE, INITIAL ENCOUNTER: ICD-10-CM

## 2023-05-02 DIAGNOSIS — Z98.890 STATUS POST BUNIONECTOMY: ICD-10-CM

## 2023-05-02 RX ORDER — DOXYCYCLINE HYCLATE 100 MG/1
100 CAPSULE ORAL 2 TIMES DAILY
Qty: 20 CAPSULE | Refills: 0 | Status: SHIPPED | OUTPATIENT
Start: 2023-05-02 | End: 2023-05-08 | Stop reason: SDUPTHER

## 2023-05-02 NOTE — PROGRESS NOTES
Aj Card Jr.  1945  77 y.o. male      05/02/2023    Chief Complaint   Patient presents with   • Right Foot - Pain   • Right Ankle - Pain       History of Present Illness    Aj Card Jr. is a 77 y.o.male presents to clinic for follow up on right ankle. Patient had a right ankle open reduction on 03/23/2023.      Past Medical History:   Diagnosis Date   • Acute posthemorrhagic anemia    • Afib    • Allergic rhinitis    • Anxiety state    • Chest pain    • CKD (chronic kidney disease), stage II    • Coronary arteriosclerosis     3 stents, followed by Dr. Jeffers   • Diabetes mellitus     type 2   • Diverticular disease of colon    • Dysphagia    • Elevated cholesterol    • Epigastric pain    • GERD (gastroesophageal reflux disease)     esophagitis on Dexilant. Pt feels Nexium working better      • History of echocardiogram     Small left atrial enlargement with mild CLVH.Ef of 55-60%.Mitral valve mildly thickened.Av thickened.Left ventricle mildly dilated.Tricuspid intact.Mild aortic insufficiency. 12/07/2015       • History of echocardiogram     Mild diastolic dysfunction and mild left atrial enlargement, otherwise normal echo. EF> 55% 01/03/2012       • Hypercholesterolemia    • Hypertension    • Insomnia    • Irritable bowel syndrome    • Osteoarthritis of multiple joints    • Pain of right foot    • PONV (postoperative nausea and vomiting)          Past Surgical History:   Procedure Laterality Date   • ANKLE OPEN REDUCTION INTERNAL FIXATION Right 3/23/2023    Procedure: ANKLE OPEN REDUCTION INTERNAL FIXATION               (LATEX ALLERGY);  Surgeon: Mason Salazar DPM;  [facility]: [facility];  Service: Podiatry;  Laterality: Right;   • APPENDECTOMY       [facility] 1995       • CARDIAC CATHETERIZATION      Successful PTCA of the OMB#2.Unsuccessful PTCA of the 1st OMB, secondary to difficulty in advancing the balloon. 12/08/2015       • CHOLECYSTECTOMY      [facility] 1993       • CHOLECYSTECTOMY     • COLONOSCOPY  10/01/2012   • COLONOSCOPY N/A 12/11/2017    Procedure: COLONOSCOPY;  Surgeon: Bladimir Michael MD;  Location: [facility] ENDOSCOPY;  Service:    • COLONOSCOPY N/A 11/01/2022    Procedure: COLONOSCOPY;  Surgeon: Bladimir Michael MD;  Location: [facility] ENDOSCOPY;  Service: Gastroenterology;  Laterality: N/A;   • CORONARY ANGIOPLASTY      Successful PTCA & stenting LAD was done w/ deployment of a 2.5mm x23 Xience stent w/ reduction of stenosis from 90% to less than 0% stenosis with good LILLIAM 3 flow 02/17/2012       • CYSTOSCOPY N/A 4/5/2023    Procedure: CYSTOSCOPY         (LATEX ALLERGY);  Surgeon: Vinicio Dan MD;  Location: [facility] OR;  Service: Urology;  Laterality: N/A;   • ENDOSCOPY      with biopsy.  Mildly severe esophagitis. Gastritis. Normal examined duodenum.Several biopsies obtained in the lower third of the esophagus.Several biopsies obtained in the gastric antrum. 05/06/2016       • ENDOSCOPY      w tube. Normal esophagus. Gastritis in stomach. Biopsy taken. Gastric polyp found. Biopsy taken. Normal duodenum. 10/01/2012       • ENDOSCOPY AND COLONOSCOPY      Diverticulum in sigmoid colon & descending colon. Internal & external hemorrhoids found. 10/01/2012       • EYE SURGERY Bilateral     catarract   • HAND SURGERY Left      Corrective osteotomy of ring finger metacarpal. Malunited fracture of left ring finger 05/21/1985       • HERNIA REPAIR      Laparoscopic hernia repair. prepertitoneal bilateral inguinal hernia repair with mesh. 10/15/2009      • OTHER SURGICAL HISTORY      CORONARY ARTERY STENT    • SKIN TAG REMOVAL      Excision, viral skin tag,right upper eyelid 05/08/1995    • TOE FUSION Right 3/13/2023    Procedure: RIGHT FIRST TARSOMETATARSAL JOINT ARTHRODESIS, FIRST METATARSOPHALANGEAL JOINT ARTHRODESIS, CALCANEAL BONE GRAFT AND ALL OTHER INDICATED PROCEDURES                (LATEX ALLERGY);  Surgeon: Mason Salazar DPM;  Location:  [facility] OR;  Service: Podiatry;  Laterality: Right;         Family History   Problem Relation Age of Onset   • Clotting disorder Other    • Lung disease Other    • Schizophrenia Sister    • Obesity Sister    • Tuberculosis Sister    • Arthritis Mother    • Diabetes Mother    • Heart disease Mother    • Hypertension Mother    • Obesity Mother    • Stroke Mother    • Thyroid disease Mother    • Tuberculosis Mother    • Arthritis Father    • Tuberculosis Father        Allergies   Allergen Reactions   • Brilinta [Ticagrelor] Shortness Of Breath   • Effient [Prasugrel] Shortness Of Breath   • Latex Itching   • Warfarin And Related Shortness Of Breath   • Ciprofloxacin Hives   • Lipitor [Atorvastatin] Swelling   • Norco [Hydrocodone-Acetaminophen] Confusion   • Adhesive Tape Rash   • Celexa [Citalopram Hydrobromide] Rash   • Crestor [Rosuvastatin Calcium] Rash   • Floxin [Ofloxacin] Rash   • Januvia [Sitagliptin] Rash   • Penicillins Rash   • Sulfa Antibiotics Rash       Social History     Socioeconomic History   • Marital status:    Tobacco Use   • Smoking status: Never     Passive exposure: Never   • Smokeless tobacco: Never   Vaping Use   • Vaping Use: Never used   Substance and Sexual Activity   • Alcohol use: No   • Drug use: Never   • Sexual activity: Defer         Current Outpatient Medications   Medication Sig Dispense Refill   • ALPRAZolam (XANAX) 0.5 MG tablet Take 1 tablet by mouth At Night As Needed for Anxiety. 60 tablet 0   • amLODIPine (NORVASC) 10 MG tablet Take 1 tablet by mouth Daily.     • ASPIRIN 81 PO Take 1 tablet by mouth Daily.     • Azelastine HCl 137 MCG/SPRAY solution Inhale 1 spray Daily. 30 mL 3   • coenzyme Q10 100 MG capsule Take 1 capsule by mouth Daily.     • dapagliflozin (Farxiga) 5 MG tablet tablet Take 1 tablet by mouth Daily.     • docusate sodium 100 MG capsule Take 1 capsule by mouth 2 (Two) Times a Day. 14 capsule 0   • doxycycline (VIBRAMYCIN) 100 MG capsule Take 1 capsule  "by mouth 2 (Two) Times a Day. 20 capsule 0   • Dulaglutide (Trulicity) 3 MG/0.5ML solution pen-injector Inject 3 mg under the skin into the appropriate area as directed 1 (One) Time Per Week. (Patient taking differently: Inject 1.5 mg under the skin into the appropriate area as directed 1 (One) Time Per Week.) 12 pen 3   • esomeprazole (nexIUM) 40 MG capsule Take 1 capsule by mouth Every Morning Before Breakfast. 30 capsule 3   • Glucose Blood (BLOOD GLUCOSE TEST) strip Use 4 x daily use any brand covered by insurance or same brand as before ICD10 code is E11.9 120 each 11   • Insulin Glargine (BASAGLAR KWIKPEN) 100 UNIT/ML injection pen Inject 15 Units under the skin into the appropriate area as directed At Night As Needed.     • Insulin Pen Needle 30G X 8 MM misc Use 3-4 times daily. 100 each 3   • Insulin Syringe 31G X 5/16\" 0.3 ML misc 4 x daily 120 each 11   • isosorbide mononitrate (IMDUR) 30 MG 24 hr tablet Take 1 tablet by mouth Daily.     • KRILL OIL OMEGA-3 PO Take 1 capsule by mouth Daily.     • Lancet Devices (LANCING DEVICE) misc USE AS INDICATED TO CORRELATE WITH STRIPS AND METER 1 each 1   • Lancets 30G misc USE 4 X DAILY 120 each 11   • metoprolol tartrate (LOPRESSOR) 50 MG tablet Take 1 tablet by mouth 2 (Two) Times a Day. 180 tablet 1   • oxyCODONE-acetaminophen (PERCOCET) 7.5-325 MG per tablet Take 1 tablet by mouth Every 4 (Four) Hours As Needed for Moderate Pain. 30 tablet 0   • propafenone (RYTHMOL) 150 MG tablet Take 1 tablet by mouth Every 8 (Eight) Hours.     • Red Yeast Rice Extract (RED YEAST RICE PO) Take 4 capsules by mouth Daily.     • rivaroxaban (XARELTO) 15 MG tablet Take 1 tablet by mouth Daily. Indications: Atrial Fibrillation 30 tablet 0   • Saw Palmetto, Serenoa repens, (SAW PALMETTO PO) Take 1 tablet by mouth Daily.     • sucralfate (CARAFATE) 1 g tablet Take 1 tablet by mouth 2 (Two) Times a Day.     • terazosin (HYTRIN) 2 MG capsule Take 1 capsule by mouth Every Night. 30 " "capsule 0   • TURMERIC CURCUMIN PO Take 1,500 mg by mouth Daily.     • vitamin B-12 (CYANOCOBALAMIN) 2500 MCG sublingual tablet tablet Place 5,000 mcg under the tongue 1 (One) Time Per Week.     • vitamin E 100 UNIT capsule Take 1 capsule by mouth Daily.       No current facility-administered medications for this visit.       Review of Systems   Cardiovascular: Positive for leg swelling.   Psychiatric/Behavioral: Negative.          OBJECTIVE    Ht 175.3 cm (69.02\")   Wt 78 kg (172 lb)   BMI 25.39 kg/m²     Physical Exam  Pulmonary:      Effort: Pulmonary effort is normal.   Neurological:      Mental Status: He is alert.   Psychiatric:         Mood and Affect: Mood normal.                Procedures        ASSESSMENT AND PLAN    Diagnoses and all orders for this visit:    1. Hallux valgus, right (Primary)    2. Osteoarthritis of ankle and foot, right    3. Closed trimalleolar fracture of right ankle, initial encounter    4. Status post bunionectomy    Other orders  -     Discontinue: doxycycline (VIBRAMYCIN) 100 MG capsule; Take 1 capsule by mouth 2 (Two) Times a Day.  Dispense: 20 capsule; Refill: 0        -Patient continues to improve postoperatively.  Unna boot reapplied.  Weightbearing as tolerated in cam boot.  Recheck 1 week           This document has been electronically signed by Mason Salazar DPM on May 13, 2023 13:43 CDT     5/13/2023  13:43 CDT    "

## 2023-05-03 ENCOUNTER — HOME CARE VISIT (OUTPATIENT)
Dept: HOME HEALTH SERVICES | Facility: CLINIC | Age: 78
End: 2023-05-03
Payer: MEDICARE

## 2023-05-03 VITALS
TEMPERATURE: 98 F | HEART RATE: 80 BPM | RESPIRATION RATE: 8 BRPM | OXYGEN SATURATION: 98 % | DIASTOLIC BLOOD PRESSURE: 80 MMHG | SYSTOLIC BLOOD PRESSURE: 130 MMHG

## 2023-05-03 PROCEDURE — G0151 HHCP-SERV OF PT,EA 15 MIN: HCPCS

## 2023-05-03 NOTE — Clinical Note
30 day Reassessment Note:  The patient presented a 77 y.o. male after ORIF of tri mallelolar fx right foot on 3-23-23 by Dr Salazar. ( pt had complications of s/p hospitalization at [facility] 3/30 to 4-7-23 for urinary retention and nausea vomiting. He was also found to be in atrial fibrillation with RVR.)  At home pt presented with typical signs and sx of dependence in transfers, decreased ROM right ankle, decreased strength right leg, gait deviations including NWB right lower leg. Pt has received skilled PT to address deficts and work toward safety and indep at home.   Pt has made good improvements and is is indep in WC mobility in home, indep transfers. Pt has recently had cast removed and in walking boot and is allowed TTWB right leg and gentle AROM right ankle and foot. Worked on that today with denny gama for easier access to all parts of home. Requesting a couple more PT vsiits with new WB status.  Thank you for allowing us to share in the care of your pt.

## 2023-05-03 NOTE — HOME HEALTH
"\"He gave me an antibiotic for a spot on toe, took cast off and havve boot now and allowed to put foot down on ground and set weight of leg thru it.\""

## 2023-05-08 ENCOUNTER — OFFICE VISIT (OUTPATIENT)
Dept: PODIATRY | Facility: CLINIC | Age: 78
End: 2023-05-08
Payer: MEDICARE

## 2023-05-08 VITALS
OXYGEN SATURATION: 98 % | HEIGHT: 69 IN | WEIGHT: 172 LBS | BODY MASS INDEX: 25.48 KG/M2 | HEART RATE: 83 BPM | SYSTOLIC BLOOD PRESSURE: 137 MMHG | DIASTOLIC BLOOD PRESSURE: 83 MMHG

## 2023-05-08 DIAGNOSIS — M20.11 HALLUX VALGUS, RIGHT: ICD-10-CM

## 2023-05-08 DIAGNOSIS — S82.851A CLOSED TRIMALLEOLAR FRACTURE OF RIGHT ANKLE, INITIAL ENCOUNTER: Primary | ICD-10-CM

## 2023-05-08 RX ORDER — DOXYCYCLINE HYCLATE 100 MG/1
100 CAPSULE ORAL 2 TIMES DAILY
Qty: 20 CAPSULE | Refills: 0 | Status: SHIPPED | OUTPATIENT
Start: 2023-05-08

## 2023-05-08 NOTE — PROGRESS NOTES
Aj Card Jr.  1945  77 y.o. male   PCP: Jeet Martinez   BS: 130 per patient     05/08/2023    Chief Complaint   Patient presents with   • Right Foot - Follow-up   • Right Ankle - Follow-up     Post op        History of Present Illness    Aj Card Jr. is a 77 y.o.male came to clinic for post operative appointment on right foot joint fusion om 03/13/2023. Patient also had a right ankle ORIF on 03/23/2023. Xray's obtained today.     Past Medical History:   Diagnosis Date   • Acute posthemorrhagic anemia    • Afib    • Allergic rhinitis    • Anxiety state    • Chest pain    • CKD (chronic kidney disease), stage II    • Coronary arteriosclerosis     3 stents, followed by Dr. Jeffers   • Diabetes mellitus     type 2   • Diverticular disease of colon    • Dysphagia    • Elevated cholesterol    • Epigastric pain    • GERD (gastroesophageal reflux disease)     esophagitis on Dexilant. Pt feels Nexium working better      • History of echocardiogram     Small left atrial enlargement with mild CLVH.Ef of 55-60%.Mitral valve mildly thickened.Av thickened.Left ventricle mildly dilated.Tricuspid intact.Mild aortic insufficiency. 12/07/2015       • History of echocardiogram     Mild diastolic dysfunction and mild left atrial enlargement, otherwise normal echo. EF> 55% 01/03/2012       • Hypercholesterolemia    • Hypertension    • Insomnia    • Irritable bowel syndrome    • Osteoarthritis of multiple joints    • Pain of right foot    • PONV (postoperative nausea and vomiting)          Past Surgical History:   Procedure Laterality Date   • ANKLE OPEN REDUCTION INTERNAL FIXATION Right 3/23/2023    Procedure: ANKLE OPEN REDUCTION INTERNAL FIXATION               (LATEX ALLERGY);  Surgeon: Mason Salazar DPM;  Location: [facility];  Service: Podiatry;  Laterality: Right;   • APPENDECTOMY       [facility] 1995       • CARDIAC CATHETERIZATION      Successful PTCA of the OMB#2.Unsuccessful PTCA  of the 1st OMB, secondary to difficulty in advancing the balloon. 12/08/2015       • CHOLECYSTECTOMY      [facility] [place] 1993       • CHOLECYSTECTOMY     • COLONOSCOPY  10/01/2012   • COLONOSCOPY N/A 12/11/2017    Procedure: COLONOSCOPY;  Surgeon: Bladimir Michael MD;  Location: [facility] ENDOSCOPY;  Service:    • COLONOSCOPY N/A 11/01/2022    Procedure: COLONOSCOPY;  Surgeon: Bladimir Michael MD;  Location: [facility] ENDOSCOPY;  Service: Gastroenterology;  Laterality: N/A;   • CORONARY ANGIOPLASTY      Successful PTCA & stenting LAD was done w/ deployment of a 2.5mm x23 Xience stent w/ reduction of stenosis from 90% to less than 0% stenosis with good LILLIAM 3 flow 02/17/2012       • CYSTOSCOPY N/A 4/5/2023    Procedure: CYSTOSCOPY         (LATEX ALLERGY);  Surgeon: Vinicio Dan MD;  Location: [facility] OR;  Service: Urology;  Laterality: N/A;   • ENDOSCOPY      with biopsy.  Mildly severe esophagitis. Gastritis. Normal examined duodenum.Several biopsies obtained in the lower third of the esophagus.Several biopsies obtained in the gastric antrum. 05/06/2016       • ENDOSCOPY      w tube. Normal esophagus. Gastritis in stomach. Biopsy taken. Gastric polyp found. Biopsy taken. Normal duodenum. 10/01/2012       • ENDOSCOPY AND COLONOSCOPY      Diverticulum in sigmoid colon & descending colon. Internal & external hemorrhoids found. 10/01/2012       • EYE SURGERY Bilateral     catarract   • HAND SURGERY Left      Corrective osteotomy of ring finger metacarpal. Malunited fracture of left ring finger 05/21/1985       • HERNIA REPAIR      Laparoscopic hernia repair. prepertitoneal bilateral inguinal hernia repair with mesh. 10/15/2009      • OTHER SURGICAL HISTORY      CORONARY ARTERY STENT    • SKIN TAG REMOVAL      Excision, viral skin tag,right upper eyelid 05/08/1995    • TOE FUSION Right 3/13/2023    Procedure: RIGHT FIRST TARSOMETATARSAL JOINT ARTHRODESIS, FIRST METATARSOPHALANGEAL JOINT ARTHRODESIS, CALCANEAL BONE  GRAFT AND ALL OTHER INDICATED PROCEDURES                (LATEX ALLERGY);  Surgeon: Mason Salazar DPM;  Location: [facility];  Service: Podiatry;  Laterality: Right;         Family History   Problem Relation Age of Onset   • Clotting disorder Other    • Lung disease Other    • Schizophrenia Sister    • Obesity Sister    • Tuberculosis Sister    • Arthritis Mother    • Diabetes Mother    • Heart disease Mother    • Hypertension Mother    • Obesity Mother    • Stroke Mother    • Thyroid disease Mother    • Tuberculosis Mother    • Arthritis Father    • Tuberculosis Father        Allergies   Allergen Reactions   • Brilinta [Ticagrelor] Shortness Of Breath   • Effient [Prasugrel] Shortness Of Breath   • Latex Itching   • Warfarin And Related Shortness Of Breath   • Ciprofloxacin Hives   • Lipitor [Atorvastatin] Swelling   • Norco [Hydrocodone-Acetaminophen] Confusion   • Adhesive Tape Rash   • Celexa [Citalopram Hydrobromide] Rash   • Crestor [Rosuvastatin Calcium] Rash   • Floxin [Ofloxacin] Rash   • Januvia [Sitagliptin] Rash   • Penicillins Rash   • Sulfa Antibiotics Rash       Social History     Socioeconomic History   • Marital status:    Tobacco Use   • Smoking status: Never     Passive exposure: Never   • Smokeless tobacco: Never   Vaping Use   • Vaping Use: Never used   Substance and Sexual Activity   • Alcohol use: No   • Drug use: Never   • Sexual activity: Defer         Current Outpatient Medications   Medication Sig Dispense Refill   • ALPRAZolam (XANAX) 0.5 MG tablet Take 1 tablet by mouth At Night As Needed for Anxiety. 60 tablet 0   • amLODIPine (NORVASC) 10 MG tablet Take 1 tablet by mouth Daily.     • ASPIRIN 81 PO Take 1 tablet by mouth Daily.     • Azelastine HCl 137 MCG/SPRAY solution Inhale 1 spray Daily. 30 mL 3   • coenzyme Q10 100 MG capsule Take 1 capsule by mouth Daily.     • dapagliflozin (Farxiga) 5 MG tablet tablet Take 1 tablet by mouth Daily.     • docusate sodium 100 MG capsule  "Take 1 capsule by mouth 2 (Two) Times a Day. 14 capsule 0   • doxycycline (VIBRAMYCIN) 100 MG capsule Take 1 capsule by mouth 2 (Two) Times a Day. 20 capsule 0   • Dulaglutide (Trulicity) 3 MG/0.5ML solution pen-injector Inject 3 mg under the skin into the appropriate area as directed 1 (One) Time Per Week. (Patient taking differently: Inject 1.5 mg under the skin into the appropriate area as directed 1 (One) Time Per Week.) 12 pen 3   • esomeprazole (nexIUM) 40 MG capsule Take 1 capsule by mouth Every Morning Before Breakfast. 30 capsule 3   • Glucose Blood (BLOOD GLUCOSE TEST) strip Use 4 x daily use any brand covered by insurance or same brand as before ICD10 code is E11.9 120 each 11   • Insulin Glargine (BASAGLAR KWIKPEN) 100 UNIT/ML injection pen Inject 15 Units under the skin into the appropriate area as directed At Night As Needed.     • Insulin Pen Needle 30G X 8 MM misc Use 3-4 times daily. 100 each 3   • Insulin Syringe 31G X 5/16\" 0.3 ML misc 4 x daily 120 each 11   • isosorbide mononitrate (IMDUR) 30 MG 24 hr tablet Take 1 tablet by mouth Daily.     • KRILL OIL OMEGA-3 PO Take 1 capsule by mouth Daily.     • Lancet Devices (LANCING DEVICE) misc USE AS INDICATED TO CORRELATE WITH STRIPS AND METER 1 each 1   • Lancets 30G misc USE 4 X DAILY 120 each 11   • metoprolol tartrate (LOPRESSOR) 50 MG tablet Take 1 tablet by mouth 2 (Two) Times a Day. 180 tablet 1   • oxyCODONE-acetaminophen (PERCOCET) 7.5-325 MG per tablet Take 1 tablet by mouth Every 4 (Four) Hours As Needed for Moderate Pain. 30 tablet 0   • propafenone (RYTHMOL) 150 MG tablet Take 1 tablet by mouth Every 8 (Eight) Hours.     • Red Yeast Rice Extract (RED YEAST RICE PO) Take 4 capsules by mouth Daily.     • rivaroxaban (XARELTO) 15 MG tablet Take 1 tablet by mouth Daily. Indications: Atrial Fibrillation 30 tablet 0   • Saw Palmetto, Serenoa repens, (SAW PALMETTO PO) Take 1 tablet by mouth Daily.     • sucralfate (CARAFATE) 1 g tablet Take 1 " "tablet by mouth 2 (Two) Times a Day.     • terazosin (HYTRIN) 2 MG capsule Take 1 capsule by mouth Every Night. 30 capsule 0   • TURMERIC CURCUMIN PO Take 1,500 mg by mouth Daily.     • vitamin B-12 (CYANOCOBALAMIN) 2500 MCG sublingual tablet tablet Place 5,000 mcg under the tongue 1 (One) Time Per Week.     • vitamin E 100 UNIT capsule Take 1 capsule by mouth Daily.       No current facility-administered medications for this visit.       Review of Systems   Musculoskeletal:        Foot pain    All other systems reviewed and are negative.        OBJECTIVE    /83   Pulse 83   Ht 175.3 cm (69.02\")   Wt 78 kg (172 lb)   SpO2 98%   BMI 25.39 kg/m²     Body mass index is 25.39 kg/m².        Physical Exam  Vitals reviewed.   Constitutional:       Appearance: Normal appearance. He is well-developed.   HENT:      Head: Normocephalic and atraumatic.   Neck:      Trachea: Trachea and phonation normal.   Cardiovascular:      Pulses:           Dorsalis pedis pulses are 1+ on the right side.        Posterior tibial pulses are 1+ on the right side.   Pulmonary:      Effort: Pulmonary effort is normal. No respiratory distress.   Abdominal:      General: There is no distension.      Palpations: Abdomen is soft.   Musculoskeletal:        Feet:    Skin:     General: Skin is warm and dry.   Neurological:      Mental Status: He is alert and oriented to person, place, and time.      GCS: GCS eye subscore is 4. GCS verbal subscore is 5. GCS motor subscore is 6.   Psychiatric:         Speech: Speech normal.         Behavior: Behavior normal. Behavior is cooperative.         Thought Content: Thought content normal.         Judgment: Judgment normal.                Procedures    Reapplied to the right lower extremity without difficulty.  Patient was neurovascularly intact      ASSESSMENT AND PLAN    Diagnoses and all orders for this visit:    1. Closed trimalleolar fracture of right ankle, initial encounter (Primary)    2. Hallux " valgus, right  -     XR Foot 3+ View Right  -     XR Ankle 3+ View Right    Other orders  -     doxycycline (VIBRAMYCIN) 100 MG capsule; Take 1 capsule by mouth 2 (Two) Times a Day.  Dispense: 20 capsule; Refill: 0      Body mass index is 25.39 kg/m².    Recommend follow-up with primary care to discuss BMI greater than 30    Collagen to the wound bed  Reapplied Unna boot  Continue modified weightbearing  Follow-up in 1 week for wound recheck  Continue antibiotics          This document has been electronically signed by Qasim LEONARDO, FNP-C, ONP-C on May 8, 2023 10:25 CDT

## 2023-05-11 ENCOUNTER — HOME CARE VISIT (OUTPATIENT)
Dept: HOME HEALTH SERVICES | Facility: CLINIC | Age: 78
End: 2023-05-11
Payer: MEDICARE

## 2023-05-11 VITALS
DIASTOLIC BLOOD PRESSURE: 78 MMHG | RESPIRATION RATE: 18 BRPM | HEART RATE: 88 BPM | OXYGEN SATURATION: 97 % | SYSTOLIC BLOOD PRESSURE: 128 MMHG | TEMPERATURE: 97.9 F

## 2023-05-11 PROCEDURE — G0151 HHCP-SERV OF PT,EA 15 MIN: HCPCS

## 2023-05-11 NOTE — HOME HEALTH
"\"The doctor went well Monday, changed dressing and wrap, peeled dead skin off, he said I can walk without boot a little bit. I am going every week for a while.\""

## 2023-05-16 ENCOUNTER — OFFICE VISIT (OUTPATIENT)
Dept: PODIATRY | Facility: CLINIC | Age: 78
End: 2023-05-16
Payer: MEDICARE

## 2023-05-16 VITALS — HEIGHT: 69 IN | OXYGEN SATURATION: 98 % | WEIGHT: 172 LBS | HEART RATE: 92 BPM | BODY MASS INDEX: 25.48 KG/M2

## 2023-05-16 DIAGNOSIS — M20.11 HALLUX VALGUS, RIGHT: Primary | ICD-10-CM

## 2023-05-16 DIAGNOSIS — Z98.890 STATUS POST BUNIONECTOMY: ICD-10-CM

## 2023-05-16 DIAGNOSIS — R09.89 DECREASED PEDAL PULSES: ICD-10-CM

## 2023-05-16 DIAGNOSIS — M79.671 RIGHT FOOT PAIN: ICD-10-CM

## 2023-05-16 NOTE — PROGRESS NOTES
Aj Card Jr.  1945  77 y.o. male   PCP: Jeet Martinez   BS: 107 per patient     5/16/2023    Chief Complaint   Patient presents with   • Right Ankle - Follow-up   • Right Foot - Wound Check       History of Present Illness    Aj Card Jr. is a 77 y.o.male came to clinic for post operative appointment on right foot joint fusion om 03/13/2023. Patient also had a right ankle ORIF on 03/23/2023.   Past Medical History:   Diagnosis Date   • Acute posthemorrhagic anemia    • Afib    • Allergic rhinitis    • Anxiety state    • Chest pain    • CKD (chronic kidney disease), stage II    • Coronary arteriosclerosis     3 stents, followed by Dr. Jeffers   • Diabetes mellitus     type 2   • Diverticular disease of colon    • Dysphagia    • Elevated cholesterol    • Epigastric pain    • GERD (gastroesophageal reflux disease)     esophagitis on Dexilant. Pt feels Nexium working better      • History of echocardiogram     Small left atrial enlargement with mild CLVH.Ef of 55-60%.Mitral valve mildly thickened.Av thickened.Left ventricle mildly dilated.Tricuspid intact.Mild aortic insufficiency. 12/07/2015       • History of echocardiogram     Mild diastolic dysfunction and mild left atrial enlargement, otherwise normal echo. EF> 55% 01/03/2012       • Hypercholesterolemia    • Hypertension    • Insomnia    • Irritable bowel syndrome    • Osteoarthritis of multiple joints    • Pain of right foot    • PONV (postoperative nausea and vomiting)          Past Surgical History:   Procedure Laterality Date   • ANKLE OPEN REDUCTION INTERNAL FIXATION Right 3/23/2023    Procedure: ANKLE OPEN REDUCTION INTERNAL FIXATION               (LATEX ALLERGY);  Surgeon: Mason Salazar DPM;  Location: [facility];  Service: Podiatry;  Laterality: Right;   • APPENDECTOMY       [facility] 1995       • CARDIAC CATHETERIZATION      Successful PTCA of the OMB#2.Unsuccessful PTCA of the 1st OMB, secondary to  difficulty in advancing the balloon. 12/08/2015       • CHOLECYSTECTOMY      [facility] 1993       • CHOLECYSTECTOMY     • COLONOSCOPY  10/01/2012   • COLONOSCOPY N/A 12/11/2017    Procedure: COLONOSCOPY;  Surgeon: Bladimir Michael MD;  Location: [facility] ENDOSCOPY;  Service:    • COLONOSCOPY N/A 11/01/2022    Procedure: COLONOSCOPY;  Surgeon: Bladimir Michael MD;  Location: [facility] ENDOSCOPY;  Service: Gastroenterology;  Laterality: N/A;   • CORONARY ANGIOPLASTY      Successful PTCA & stenting LAD was done w/ deployment of a 2.5mm x23 Xience stent w/ reduction of stenosis from 90% to less than 0% stenosis with good LILLIAM 3 flow 02/17/2012       • CYSTOSCOPY N/A 4/5/2023    Procedure: CYSTOSCOPY         (LATEX ALLERGY);  Surgeon: Vinicio Dan MD;  Location: [facility] OR;  Service: Urology;  Laterality: N/A;   • ENDOSCOPY      with biopsy.  Mildly severe esophagitis. Gastritis. Normal examined duodenum.Several biopsies obtained in the lower third of the esophagus.Several biopsies obtained in the gastric antrum. 05/06/2016       • ENDOSCOPY      w tube. Normal esophagus. Gastritis in stomach. Biopsy taken. Gastric polyp found. Biopsy taken. Normal duodenum. 10/01/2012       • ENDOSCOPY AND COLONOSCOPY      Diverticulum in sigmoid colon & descending colon. Internal & external hemorrhoids found. 10/01/2012       • EYE SURGERY Bilateral     catarract   • HAND SURGERY Left      Corrective osteotomy of ring finger metacarpal. Malunited fracture of left ring finger 05/21/1985       • HERNIA REPAIR      Laparoscopic hernia repair. prepertitoneal bilateral inguinal hernia repair with mesh. 10/15/2009      • OTHER SURGICAL HISTORY      CORONARY ARTERY STENT    • SKIN TAG REMOVAL      Excision, viral skin tag,right upper eyelid 05/08/1995    • TOE FUSION Right 3/13/2023    Procedure: RIGHT FIRST TARSOMETATARSAL JOINT ARTHRODESIS, FIRST METATARSOPHALANGEAL JOINT ARTHRODESIS, CALCANEAL BONE GRAFT AND ALL OTHER INDICATED  PROCEDURES                (LATEX ALLERGY);  Surgeon: Mason Salazar DPM;  Location: [facility];  Service: Podiatry;  Laterality: Right;         Family History   Problem Relation Age of Onset   • Clotting disorder Other    • Lung disease Other    • Schizophrenia Sister    • Obesity Sister    • Tuberculosis Sister    • Arthritis Mother    • Diabetes Mother    • Heart disease Mother    • Hypertension Mother    • Obesity Mother    • Stroke Mother    • Thyroid disease Mother    • Tuberculosis Mother    • Arthritis Father    • Tuberculosis Father        Allergies   Allergen Reactions   • Brilinta [Ticagrelor] Shortness Of Breath   • Effient [Prasugrel] Shortness Of Breath   • Latex Itching   • Warfarin And Related Shortness Of Breath   • Ciprofloxacin Hives   • Lipitor [Atorvastatin] Swelling   • Norco [Hydrocodone-Acetaminophen] Confusion   • Adhesive Tape Rash   • Celexa [Citalopram Hydrobromide] Rash   • Crestor [Rosuvastatin Calcium] Rash   • Floxin [Ofloxacin] Rash   • Januvia [Sitagliptin] Rash   • Penicillins Rash   • Sulfa Antibiotics Rash       Social History     Socioeconomic History   • Marital status:    Tobacco Use   • Smoking status: Never     Passive exposure: Never   • Smokeless tobacco: Never   Vaping Use   • Vaping Use: Never used   Substance and Sexual Activity   • Alcohol use: No   • Drug use: Never   • Sexual activity: Defer         Current Outpatient Medications   Medication Sig Dispense Refill   • ALPRAZolam (XANAX) 0.5 MG tablet Take 1 tablet by mouth At Night As Needed for Anxiety. 60 tablet 0   • amLODIPine (NORVASC) 10 MG tablet Take 1 tablet by mouth Daily.     • ASPIRIN 81 PO Take 1 tablet by mouth Daily.     • Azelastine HCl 137 MCG/SPRAY solution Inhale 1 spray Daily. 30 mL 3   • coenzyme Q10 100 MG capsule Take 1 capsule by mouth Daily.     • dapagliflozin (Farxiga) 5 MG tablet tablet Take 1 tablet by mouth Daily.     • docusate sodium 100 MG capsule Take 1 capsule by mouth 2 (Two)  "Times a Day. 14 capsule 0   • doxycycline (VIBRAMYCIN) 100 MG capsule Take 1 capsule by mouth 2 (Two) Times a Day. 20 capsule 0   • Dulaglutide (Trulicity) 3 MG/0.5ML solution pen-injector Inject 3 mg under the skin into the appropriate area as directed 1 (One) Time Per Week. (Patient taking differently: Inject 1.5 mg under the skin into the appropriate area as directed 1 (One) Time Per Week.) 12 pen 3   • esomeprazole (nexIUM) 40 MG capsule Take 1 capsule by mouth Every Morning Before Breakfast. 30 capsule 3   • Glucose Blood (BLOOD GLUCOSE TEST) strip Use 4 x daily use any brand covered by insurance or same brand as before ICD10 code is E11.9 120 each 11   • Insulin Glargine (BASAGLAR KWIKPEN) 100 UNIT/ML injection pen Inject 15 Units under the skin into the appropriate area as directed At Night As Needed.     • Insulin Pen Needle 30G X 8 MM misc Use 3-4 times daily. 100 each 3   • Insulin Syringe 31G X 5/16\" 0.3 ML misc 4 x daily 120 each 11   • isosorbide mononitrate (IMDUR) 30 MG 24 hr tablet Take 1 tablet by mouth Daily.     • KRILL OIL OMEGA-3 PO Take 1 capsule by mouth Daily.     • Lancet Devices (LANCING DEVICE) misc USE AS INDICATED TO CORRELATE WITH STRIPS AND METER 1 each 1   • Lancets 30G misc USE 4 X DAILY 120 each 11   • metoprolol tartrate (LOPRESSOR) 50 MG tablet Take 1 tablet by mouth 2 (Two) Times a Day. 180 tablet 1   • oxyCODONE-acetaminophen (PERCOCET) 7.5-325 MG per tablet Take 1 tablet by mouth Every 4 (Four) Hours As Needed for Moderate Pain. 30 tablet 0   • propafenone (RYTHMOL) 150 MG tablet Take 1 tablet by mouth Every 8 (Eight) Hours.     • Red Yeast Rice Extract (RED YEAST RICE PO) Take 4 capsules by mouth Daily.     • rivaroxaban (XARELTO) 15 MG tablet Take 1 tablet by mouth Daily. Indications: Atrial Fibrillation 30 tablet 0   • Saw Palmetto, Serenoa repens, (SAW PALMETTO PO) Take 1 tablet by mouth Daily.     • sucralfate (CARAFATE) 1 g tablet Take 1 tablet by mouth 2 (Two) Times a " "Day.     • terazosin (HYTRIN) 2 MG capsule Take 1 capsule by mouth Every Night. 30 capsule 0   • TURMERIC CURCUMIN PO Take 1,500 mg by mouth Daily.     • vitamin B-12 (CYANOCOBALAMIN) 2500 MCG sublingual tablet tablet Place 5,000 mcg under the tongue 1 (One) Time Per Week.     • vitamin E 100 UNIT capsule Take 1 capsule by mouth Daily.       No current facility-administered medications for this visit.       Review of Systems   Musculoskeletal:        Foot pain    All other systems reviewed and are negative.        OBJECTIVE    Pulse 92   Ht 175.3 cm (69.02\")   Wt 78 kg (172 lb)   SpO2 98%   BMI 25.39 kg/m²     Body mass index is 25.39 kg/m².        Physical Exam  Vitals reviewed.   Constitutional:       Appearance: Normal appearance. He is well-developed.   HENT:      Head: Normocephalic and atraumatic.   Neck:      Trachea: Trachea and phonation normal.   Cardiovascular:      Pulses:           Dorsalis pedis pulses are 1+ on the right side.        Posterior tibial pulses are 1+ on the right side.   Pulmonary:      Effort: Pulmonary effort is normal. No respiratory distress.   Abdominal:      General: There is no distension.      Palpations: Abdomen is soft.   Musculoskeletal:        Feet:    Skin:     General: Skin is warm and dry.   Neurological:      Mental Status: He is alert and oriented to person, place, and time.      GCS: GCS eye subscore is 4. GCS verbal subscore is 5. GCS motor subscore is 6.   Psychiatric:         Speech: Speech normal.         Behavior: Behavior normal. Behavior is cooperative.         Thought Content: Thought content normal.         Judgment: Judgment normal.                Procedures    Reapplied to the right lower extremity without difficulty.  Patient was neurovascularly intact      ASSESSMENT AND PLAN    There are no diagnoses linked to this encounter.  Body mass index is 25.39 kg/m².    Recommend follow-up with primary care to discuss BMI greater than 30    Collagen to the " wound bed  Reapplied Unna boot  Continue modified weightbearing  Follow-up in 1 week for wound recheck  Continue antibiotics          This document has been electronically signed by Qasim LEONARDO FNP-C, ONP-C on May 16, 2023 14:09 CDT

## 2023-05-17 ENCOUNTER — LAB (OUTPATIENT)
Dept: LAB | Facility: HOSPITAL | Age: 78
End: 2023-05-17
Payer: MEDICARE

## 2023-05-17 DIAGNOSIS — K76.0 NAFLD (NONALCOHOLIC FATTY LIVER DISEASE): ICD-10-CM

## 2023-05-17 DIAGNOSIS — E78.2 MIXED HYPERLIPIDEMIA: ICD-10-CM

## 2023-05-17 DIAGNOSIS — I10 ESSENTIAL HYPERTENSION: ICD-10-CM

## 2023-05-17 DIAGNOSIS — E55.9 VITAMIN D DEFICIENCY: ICD-10-CM

## 2023-05-17 DIAGNOSIS — E11.65 TYPE 2 DIABETES MELLITUS WITH HYPERGLYCEMIA, WITHOUT LONG-TERM CURRENT USE OF INSULIN: ICD-10-CM

## 2023-05-17 LAB
ALBUMIN SERPL-MCNC: 4.2 G/DL (ref 3.5–5.2)
ALBUMIN UR-MCNC: <1.2 MG/DL
ALBUMIN/GLOB SERPL: 1.3 G/DL
ALP SERPL-CCNC: 66 U/L (ref 39–117)
ALT SERPL W P-5'-P-CCNC: 7 U/L (ref 1–41)
ANION GAP SERPL CALCULATED.3IONS-SCNC: 10 MMOL/L (ref 5–15)
AST SERPL-CCNC: 14 U/L (ref 1–40)
BASOPHILS # BLD AUTO: 0.03 10*3/MM3 (ref 0–0.2)
BASOPHILS NFR BLD AUTO: 0.5 % (ref 0–1.5)
BILIRUB SERPL-MCNC: 0.2 MG/DL (ref 0–1.2)
BUN SERPL-MCNC: 14 MG/DL (ref 8–23)
BUN/CREAT SERPL: 15.6 (ref 7–25)
CALCIUM SPEC-SCNC: 10.1 MG/DL (ref 8.6–10.5)
CHLORIDE SERPL-SCNC: 100 MMOL/L (ref 98–107)
CHOLEST SERPL-MCNC: 149 MG/DL (ref 0–200)
CO2 SERPL-SCNC: 25 MMOL/L (ref 22–29)
CREAT SERPL-MCNC: 0.9 MG/DL (ref 0.76–1.27)
CREAT UR-MCNC: 50.4 MG/DL
DEPRECATED RDW RBC AUTO: 44.3 FL (ref 37–54)
EGFRCR SERPLBLD CKD-EPI 2021: 87.4 ML/MIN/1.73
EOSINOPHIL # BLD AUTO: 0.13 10*3/MM3 (ref 0–0.4)
EOSINOPHIL NFR BLD AUTO: 2.2 % (ref 0.3–6.2)
ERYTHROCYTE [DISTWIDTH] IN BLOOD BY AUTOMATED COUNT: 14.2 % (ref 12.3–15.4)
GLOBULIN UR ELPH-MCNC: 3.3 GM/DL
GLUCOSE SERPL-MCNC: 141 MG/DL (ref 65–99)
HBA1C MFR BLD: 6.2 % (ref 4.8–5.6)
HCT VFR BLD AUTO: 41.2 % (ref 37.5–51)
HDLC SERPL-MCNC: 28 MG/DL (ref 40–60)
HGB BLD-MCNC: 13.4 G/DL (ref 13–17.7)
IMM GRANULOCYTES # BLD AUTO: 0.03 10*3/MM3 (ref 0–0.05)
IMM GRANULOCYTES NFR BLD AUTO: 0.5 % (ref 0–0.5)
LDLC SERPL CALC-MCNC: 70 MG/DL (ref 0–100)
LDLC/HDLC SERPL: 2.02 {RATIO}
LYMPHOCYTES # BLD AUTO: 1.36 10*3/MM3 (ref 0.7–3.1)
LYMPHOCYTES NFR BLD AUTO: 23 % (ref 19.6–45.3)
MCH RBC QN AUTO: 28 PG (ref 26.6–33)
MCHC RBC AUTO-ENTMCNC: 32.5 G/DL (ref 31.5–35.7)
MCV RBC AUTO: 86 FL (ref 79–97)
MICROALBUMIN/CREAT UR: NORMAL MG/G{CREAT}
MONOCYTES # BLD AUTO: 0.4 10*3/MM3 (ref 0.1–0.9)
MONOCYTES NFR BLD AUTO: 6.8 % (ref 5–12)
NEUTROPHILS NFR BLD AUTO: 3.97 10*3/MM3 (ref 1.7–7)
NEUTROPHILS NFR BLD AUTO: 67 % (ref 42.7–76)
NRBC BLD AUTO-RTO: 0 /100 WBC (ref 0–0.2)
PLATELET # BLD AUTO: 295 10*3/MM3 (ref 140–450)
PMV BLD AUTO: 10 FL (ref 6–12)
POTASSIUM SERPL-SCNC: 4.5 MMOL/L (ref 3.5–5.2)
PROT SERPL-MCNC: 7.5 G/DL (ref 6–8.5)
RBC # BLD AUTO: 4.79 10*6/MM3 (ref 4.14–5.8)
SODIUM SERPL-SCNC: 135 MMOL/L (ref 136–145)
TRIGL SERPL-MCNC: 322 MG/DL (ref 0–150)
TSH SERPL DL<=0.05 MIU/L-ACNC: 2.14 UIU/ML (ref 0.27–4.2)
VIT B12 BLD-MCNC: 558 PG/ML (ref 211–946)
VLDLC SERPL-MCNC: 51 MG/DL (ref 5–40)
WBC NRBC COR # BLD: 5.92 10*3/MM3 (ref 3.4–10.8)

## 2023-05-17 PROCEDURE — 82570 ASSAY OF URINE CREATININE: CPT

## 2023-05-17 PROCEDURE — 82607 VITAMIN B-12: CPT

## 2023-05-17 PROCEDURE — 85025 COMPLETE CBC W/AUTO DIFF WBC: CPT

## 2023-05-17 PROCEDURE — 80053 COMPREHEN METABOLIC PANEL: CPT

## 2023-05-17 PROCEDURE — 80061 LIPID PANEL: CPT

## 2023-05-17 PROCEDURE — 83036 HEMOGLOBIN GLYCOSYLATED A1C: CPT

## 2023-05-17 PROCEDURE — 82043 UR ALBUMIN QUANTITATIVE: CPT

## 2023-05-17 PROCEDURE — 84443 ASSAY THYROID STIM HORMONE: CPT

## 2023-05-18 ENCOUNTER — HOME CARE VISIT (OUTPATIENT)
Dept: HOME HEALTH SERVICES | Facility: CLINIC | Age: 78
End: 2023-05-18
Payer: MEDICARE

## 2023-05-18 PROCEDURE — G0151 HHCP-SERV OF PT,EA 15 MIN: HCPCS

## 2023-05-18 NOTE — Clinical Note
REASON FOR REFERRAL/WHAT WE SAW THEM FOR/WHY WE ARE DISCHARGING: The patient presented a 77 y.o. male after ORIF of tri mallelolar fx right foot on 3-23-23 by Dr Salazar. ( pt had complications of s/p hospitalization at [facility] 3/30 to 4-7-23 for urinary retention and nausea vomiting. He was also found to be in atrial fibrillation with RVR.) At home pt presented with typical signs and sx of dependence in transfers, decreased ROM right ankle, decreased strength right leg, gait deviations including NWB right lower leg. Pt has received skilled PT to address deficts and work toward safety and indep at home. Pt has nmet all goals, is back to walking with boot on and no device, is back to driving.    PRIOR VS CURRENT LEVEL OF FUNCTION: Pt was dependent in transfers and mobility and now amb over 150 feet with walking boot and no device, pt is back to driving and indep transfers and ADLs. Pt has HEP for ankle ROM and will cont seeing Dr Salazar for healing of right great toe surgery incision.  DISCHARGE CONDITION: GOOD  DISCHARGE REASON: GOALS MET  DISCHARGE TO: SELF CARE

## 2023-05-18 NOTE — HOME HEALTH
"\" I am doing fine. It doesn't hurt unless I am very tired of walking. I drove to [place] yesterday to do things for my sister and walked a lot, the I drove to [place]. I wear the boot out of house.I will keep seeing Dr Salazar every week for the bunion surgery skin to heal.\""

## 2023-05-22 ENCOUNTER — OFFICE VISIT (OUTPATIENT)
Dept: PODIATRY | Facility: CLINIC | Age: 78
End: 2023-05-22
Payer: MEDICARE

## 2023-05-22 ENCOUNTER — TELEPHONE (OUTPATIENT)
Dept: PODIATRY | Facility: CLINIC | Age: 78
End: 2023-05-22

## 2023-05-22 VITALS
HEART RATE: 88 BPM | WEIGHT: 172 LBS | SYSTOLIC BLOOD PRESSURE: 100 MMHG | DIASTOLIC BLOOD PRESSURE: 67 MMHG | OXYGEN SATURATION: 99 % | HEIGHT: 69 IN | BODY MASS INDEX: 25.48 KG/M2

## 2023-05-22 DIAGNOSIS — S82.851A CLOSED TRIMALLEOLAR FRACTURE OF RIGHT ANKLE, INITIAL ENCOUNTER: Primary | ICD-10-CM

## 2023-05-22 DIAGNOSIS — S82.851A CLOSED TRIMALLEOLAR FRACTURE OF RIGHT ANKLE, INITIAL ENCOUNTER: ICD-10-CM

## 2023-05-22 DIAGNOSIS — M20.11 HALLUX VALGUS, RIGHT: Primary | ICD-10-CM

## 2023-05-22 RX ORDER — DOXYCYCLINE HYCLATE 100 MG/1
100 CAPSULE ORAL 2 TIMES DAILY
Qty: 20 CAPSULE | Refills: 0 | Status: SHIPPED | OUTPATIENT
Start: 2023-05-22

## 2023-05-22 RX ORDER — TRAMADOL HYDROCHLORIDE 50 MG/1
50 TABLET ORAL EVERY 8 HOURS PRN
Qty: 30 TABLET | Refills: 0 | Status: SHIPPED | OUTPATIENT
Start: 2023-05-22 | End: 2023-06-01

## 2023-05-22 RX ORDER — TRAMADOL HYDROCHLORIDE 50 MG/1
50 TABLET ORAL EVERY 8 HOURS PRN
Qty: 30 TABLET | Refills: 0 | Status: SHIPPED | OUTPATIENT
Start: 2023-05-22 | End: 2023-05-22 | Stop reason: SDUPTHER

## 2023-05-22 NOTE — TELEPHONE ENCOUNTER
PT WIFE REQUESTS A CALL BACK RE SAYS THE PAIN MED PT WAS ON WAS TRAMADOL.  PT WIFE REQUESTS THAT A REFILL FOR THIS BE CALLED IN TO [place].  CALL BACK # [phone].  THANK YOUI.

## 2023-05-22 NOTE — PROGRESS NOTES
Aj Card Jr.  1945  77 y.o. male   PCP: Jeet Martinez 04/18/2023  BS: 120 per patient     5/22/2023    Chief Complaint   Patient presents with   • Right Foot - Follow-up     Post op       History of Present Illness    Aj Card Jr. is a 77 y.o.male came to clinic for post operative appointment on right foot joint fusion om 03/13/2023. Patient also had a right ankle ORIF on 03/23/2023. Patient seems to be doing well. patient states there is some drainage, and his foot is tender.   Past Medical History:   Diagnosis Date   • Acute posthemorrhagic anemia    • Afib    • Allergic rhinitis    • Anxiety state    • Chest pain    • CKD (chronic kidney disease), stage II    • Coronary arteriosclerosis     3 stents, followed by Dr. Jeffers   • Diabetes mellitus     type 2   • Diverticular disease of colon    • Dysphagia    • Elevated cholesterol    • Epigastric pain    • GERD (gastroesophageal reflux disease)     esophagitis on Dexilant. Pt feels Nexium working better      • History of echocardiogram     Small left atrial enlargement with mild CLVH.Ef of 55-60%.Mitral valve mildly thickened.Av thickened.Left ventricle mildly dilated.Tricuspid intact.Mild aortic insufficiency. 12/07/2015       • History of echocardiogram     Mild diastolic dysfunction and mild left atrial enlargement, otherwise normal echo. EF> 55% 01/03/2012       • Hypercholesterolemia    • Hypertension    • Insomnia    • Irritable bowel syndrome    • Osteoarthritis of multiple joints    • Pain of right foot    • PONV (postoperative nausea and vomiting)          Past Surgical History:   Procedure Laterality Date   • ANKLE OPEN REDUCTION INTERNAL FIXATION Right 3/23/2023    Procedure: ANKLE OPEN REDUCTION INTERNAL FIXATION               (LATEX ALLERGY);  Surgeon: Mason Salazar DPM;  Location: [facility];  Service: Podiatry;  Laterality: Right;   • APPENDECTOMY       [facility] 1995       • CARDIAC CATHETERIZATION       Successful PTCA of the OMB#2.Unsuccessful PTCA of the 1st OMB, secondary to difficulty in advancing the balloon. 12/08/2015       • CHOLECYSTECTOMY      [facility] [place] 1993       • CHOLECYSTECTOMY     • COLONOSCOPY  10/01/2012   • COLONOSCOPY N/A 12/11/2017    Procedure: COLONOSCOPY;  Surgeon: Bladimir Michael MD;  Location: [facility] ENDOSCOPY;  Service:    • COLONOSCOPY N/A 11/01/2022    Procedure: COLONOSCOPY;  Surgeon: Bladimir Michael MD;  Location: [facility] ENDOSCOPY;  Service: Gastroenterology;  Laterality: N/A;   • CORONARY ANGIOPLASTY      Successful PTCA & stenting LAD was done w/ deployment of a 2.5mm x23 Xience stent w/ reduction of stenosis from 90% to less than 0% stenosis with good LILLIAM 3 flow 02/17/2012       • CYSTOSCOPY N/A 4/5/2023    Procedure: CYSTOSCOPY         (LATEX ALLERGY);  Surgeon: Vinicio Dan MD;  Location: [facility] OR;  Service: Urology;  Laterality: N/A;   • ENDOSCOPY      with biopsy.  Mildly severe esophagitis. Gastritis. Normal examined duodenum.Several biopsies obtained in the lower third of the esophagus.Several biopsies obtained in the gastric antrum. 05/06/2016       • ENDOSCOPY      w tube. Normal esophagus. Gastritis in stomach. Biopsy taken. Gastric polyp found. Biopsy taken. Normal duodenum. 10/01/2012       • ENDOSCOPY AND COLONOSCOPY      Diverticulum in sigmoid colon & descending colon. Internal & external hemorrhoids found. 10/01/2012       • EYE SURGERY Bilateral     catarract   • HAND SURGERY Left      Corrective osteotomy of ring finger metacarpal. Malunited fracture of left ring finger 05/21/1985       • HERNIA REPAIR      Laparoscopic hernia repair. prepertitoneal bilateral inguinal hernia repair with mesh. 10/15/2009      • OTHER SURGICAL HISTORY      CORONARY ARTERY STENT    • SKIN TAG REMOVAL      Excision, viral skin tag,right upper eyelid 05/08/1995    • TOE FUSION Right 3/13/2023    Procedure: RIGHT FIRST TARSOMETATARSAL JOINT ARTHRODESIS, FIRST  METATARSOPHALANGEAL JOINT ARTHRODESIS, CALCANEAL BONE GRAFT AND ALL OTHER INDICATED PROCEDURES                (LATEX ALLERGY);  Surgeon: Mason Salazar DPM;  Location: [facility];  Service: Podiatry;  Laterality: Right;         Family History   Problem Relation Age of Onset   • Clotting disorder Other    • Lung disease Other    • Schizophrenia Sister    • Obesity Sister    • Tuberculosis Sister    • Arthritis Mother    • Diabetes Mother    • Heart disease Mother    • Hypertension Mother    • Obesity Mother    • Stroke Mother    • Thyroid disease Mother    • Tuberculosis Mother    • Arthritis Father    • Tuberculosis Father        Allergies   Allergen Reactions   • Brilinta [Ticagrelor] Shortness Of Breath   • Effient [Prasugrel] Shortness Of Breath   • Latex Itching   • Warfarin And Related Shortness Of Breath   • Ciprofloxacin Hives   • Lipitor [Atorvastatin] Swelling   • Norco [Hydrocodone-Acetaminophen] Confusion   • Adhesive Tape Rash   • Celexa [Citalopram Hydrobromide] Rash   • Crestor [Rosuvastatin Calcium] Rash   • Floxin [Ofloxacin] Rash   • Januvia [Sitagliptin] Rash   • Penicillins Rash   • Sulfa Antibiotics Rash       Social History     Socioeconomic History   • Marital status:    Tobacco Use   • Smoking status: Never     Passive exposure: Never   • Smokeless tobacco: Never   Vaping Use   • Vaping Use: Never used   Substance and Sexual Activity   • Alcohol use: No   • Drug use: Never   • Sexual activity: Defer         Current Outpatient Medications   Medication Sig Dispense Refill   • doxycycline (VIBRAMYCIN) 100 MG capsule Take 1 capsule by mouth 2 (Two) Times a Day. 20 capsule 0   • ALPRAZolam (XANAX) 0.5 MG tablet Take 1 tablet by mouth At Night As Needed for Anxiety. 60 tablet 0   • amLODIPine (NORVASC) 10 MG tablet Take 1 tablet by mouth Daily.     • ASPIRIN 81 PO Take 1 tablet by mouth Daily.     • Azelastine HCl 137 MCG/SPRAY solution Inhale 1 spray Daily. 30 mL 3   • coenzyme Q10 100 MG  "capsule Take 1 capsule by mouth Daily.     • dapagliflozin (Farxiga) 5 MG tablet tablet Take 1 tablet by mouth Daily.     • docusate sodium 100 MG capsule Take 1 capsule by mouth 2 (Two) Times a Day. 14 capsule 0   • Dulaglutide (Trulicity) 3 MG/0.5ML solution pen-injector Inject 3 mg under the skin into the appropriate area as directed 1 (One) Time Per Week. (Patient taking differently: Inject 1.5 mg under the skin into the appropriate area as directed 1 (One) Time Per Week.) 12 pen 3   • esomeprazole (nexIUM) 40 MG capsule Take 1 capsule by mouth Every Morning Before Breakfast. 30 capsule 3   • Glucose Blood (BLOOD GLUCOSE TEST) strip Use 4 x daily use any brand covered by insurance or same brand as before ICD10 code is E11.9 120 each 11   • Insulin Glargine (BASAGLAR KWIKPEN) 100 UNIT/ML injection pen Inject 15 Units under the skin into the appropriate area as directed At Night As Needed.     • Insulin Pen Needle 30G X 8 MM misc Use 3-4 times daily. 100 each 3   • Insulin Syringe 31G X 5/16\" 0.3 ML misc 4 x daily 120 each 11   • isosorbide mononitrate (IMDUR) 30 MG 24 hr tablet Take 1 tablet by mouth Daily.     • KRILL OIL OMEGA-3 PO Take 1 capsule by mouth Daily.     • Lancet Devices (LANCING DEVICE) misc USE AS INDICATED TO CORRELATE WITH STRIPS AND METER 1 each 1   • Lancets 30G misc USE 4 X DAILY 120 each 11   • metoprolol tartrate (LOPRESSOR) 50 MG tablet Take 1 tablet by mouth 2 (Two) Times a Day. 180 tablet 1   • oxyCODONE-acetaminophen (PERCOCET) 7.5-325 MG per tablet Take 1 tablet by mouth Every 4 (Four) Hours As Needed for Moderate Pain. 30 tablet 0   • propafenone (RYTHMOL) 150 MG tablet Take 1 tablet by mouth Every 8 (Eight) Hours.     • Red Yeast Rice Extract (RED YEAST RICE PO) Take 4 capsules by mouth Daily.     • rivaroxaban (XARELTO) 15 MG tablet Take 1 tablet by mouth Daily. Indications: Atrial Fibrillation 30 tablet 0   • Saw Palmetto, Serenoa repens, (SAW PALMETTO PO) Take 1 tablet by mouth " "Daily.     • terazosin (HYTRIN) 2 MG capsule Take 1 capsule by mouth Every Night. 30 capsule 0   • TURMERIC CURCUMIN PO Take 1,500 mg by mouth Daily.     • vitamin B-12 (CYANOCOBALAMIN) 2500 MCG sublingual tablet tablet Place 5,000 mcg under the tongue 1 (One) Time Per Week.     • vitamin E 100 UNIT capsule Take 1 capsule by mouth Daily.       No current facility-administered medications for this visit.       Review of Systems   Musculoskeletal:        Foot pain    All other systems reviewed and are negative.        OBJECTIVE    /67   Pulse 88   Ht 175.3 cm (69.02\")   Wt 78 kg (172 lb)   SpO2 99%   BMI 25.39 kg/m²     Body mass index is 25.39 kg/m².        Physical Exam  Vitals reviewed.   Constitutional:       Appearance: Normal appearance. He is well-developed.   HENT:      Head: Normocephalic and atraumatic.   Neck:      Trachea: Trachea and phonation normal.   Cardiovascular:      Pulses:           Dorsalis pedis pulses are 1+ on the right side.        Posterior tibial pulses are 1+ on the right side.   Pulmonary:      Effort: Pulmonary effort is normal. No respiratory distress.   Abdominal:      General: There is no distension.      Palpations: Abdomen is soft.   Musculoskeletal:        Feet:    Skin:     General: Skin is warm and dry.   Neurological:      Mental Status: He is alert and oriented to person, place, and time.      GCS: GCS eye subscore is 4. GCS verbal subscore is 5. GCS motor subscore is 6.   Psychiatric:         Speech: Speech normal.         Behavior: Behavior normal. Behavior is cooperative.         Thought Content: Thought content normal.         Judgment: Judgment normal.                Procedures      Reapplied Unna boot to the right lower extremity without difficulty.  Patient was neurovascularly intact      ASSESSMENT AND PLAN    Diagnoses and all orders for this visit:    1. Hallux valgus, right (Primary)  -     XR Foot 3+ View Right  -     XR Ankle 3+ View Right    2. Closed " trimalleolar fracture of right ankle, initial encounter    Other orders  -     doxycycline (VIBRAMYCIN) 100 MG capsule; Take 1 capsule by mouth 2 (Two) Times a Day.  Dispense: 20 capsule; Refill: 0      Body mass index is 25.39 kg/m².    Recommend follow-up with primary care to discuss BMI greater than 30    Continue antibiotics  Reapplied right lower extremity Unna boot  Follow-up 1 week  Reiterated the need to remain in the boot and a limited nonweightbearing status until the wound is healed        This document has been electronically signed by Qasim LEONARDO, FNP-C, ONP-C on May 22, 2023 13:20 CDT

## 2023-05-23 ENCOUNTER — DOCUMENTATION (OUTPATIENT)
Dept: ENDOCRINOLOGY | Facility: CLINIC | Age: 78
End: 2023-05-23
Payer: MEDICARE

## 2023-05-30 ENCOUNTER — OFFICE VISIT (OUTPATIENT)
Dept: PODIATRY | Facility: CLINIC | Age: 78
End: 2023-05-30

## 2023-05-30 VITALS — HEIGHT: 69 IN | BODY MASS INDEX: 25.48 KG/M2 | WEIGHT: 172 LBS

## 2023-05-30 DIAGNOSIS — M20.11 HALLUX VALGUS, RIGHT: ICD-10-CM

## 2023-05-30 DIAGNOSIS — Z98.890 S/P FOOT SURGERY, RIGHT: Primary | ICD-10-CM

## 2023-05-30 DIAGNOSIS — Z98.890 STATUS POST BUNIONECTOMY: ICD-10-CM

## 2023-05-30 PROCEDURE — 99024 POSTOP FOLLOW-UP VISIT: CPT | Performed by: NURSE PRACTITIONER

## 2023-05-30 NOTE — PROGRESS NOTES
Aj Card Jr.  1945  77 y.o. male   PCP: Jeet Martinez 04/18/2023  BS: 120 per patient     05/30/2023    Chief Complaint   Patient presents with   • Right Foot - Pain, Follow-up       History of Present Illness    Aj Card Jr. is a 77 y.o.male came to clinic for post operative appointment on right foot joint fusion om 03/13/2023. Patient also had a right ankle ORIF on 03/23/2023. Patient seems to be doing well. patient states there is some drainage, and his foot is tender.     Past Medical History:   Diagnosis Date   • Acute posthemorrhagic anemia    • Afib    • Allergic rhinitis    • Anxiety state    • Chest pain    • CKD (chronic kidney disease), stage II    • Coronary arteriosclerosis     3 stents, followed by Dr. Jeffers   • Diabetes mellitus     type 2   • Diverticular disease of colon    • Dysphagia    • Elevated cholesterol    • Epigastric pain    • GERD (gastroesophageal reflux disease)     esophagitis on Dexilant. Pt feels Nexium working better      • History of echocardiogram     Small left atrial enlargement with mild CLVH.Ef of 55-60%.Mitral valve mildly thickened.Av thickened.Left ventricle mildly dilated.Tricuspid intact.Mild aortic insufficiency. 12/07/2015       • History of echocardiogram     Mild diastolic dysfunction and mild left atrial enlargement, otherwise normal echo. EF> 55% 01/03/2012       • Hypercholesterolemia    • Hypertension    • Insomnia    • Irritable bowel syndrome    • Osteoarthritis of multiple joints    • Pain of right foot    • PONV (postoperative nausea and vomiting)          Past Surgical History:   Procedure Laterality Date   • ANKLE OPEN REDUCTION INTERNAL FIXATION Right 3/23/2023    Procedure: ANKLE OPEN REDUCTION INTERNAL FIXATION               (LATEX ALLERGY);  Surgeon: Mason Salazar DPM;  Location: [facility];  Service: Podiatry;  Laterality: Right;   • APPENDECTOMY       [facility] 1995       • CARDIAC CATHETERIZATION       Successful PTCA of the OMB#2.Unsuccessful PTCA of the 1st OMB, secondary to difficulty in advancing the balloon. 12/08/2015       • CHOLECYSTECTOMY      [facility] [place] 1993       • CHOLECYSTECTOMY     • COLONOSCOPY  10/01/2012   • COLONOSCOPY N/A 12/11/2017    Procedure: COLONOSCOPY;  Surgeon: Bladimir Michael MD;  Location: [facility] ENDOSCOPY;  Service:    • COLONOSCOPY N/A 11/01/2022    Procedure: COLONOSCOPY;  Surgeon: Bladimir Michael MD;  Location: [facility] ENDOSCOPY;  Service: Gastroenterology;  Laterality: N/A;   • CORONARY ANGIOPLASTY      Successful PTCA & stenting LAD was done w/ deployment of a 2.5mm x23 Xience stent w/ reduction of stenosis from 90% to less than 0% stenosis with good LILLIAM 3 flow 02/17/2012       • CYSTOSCOPY N/A 4/5/2023    Procedure: CYSTOSCOPY         (LATEX ALLERGY);  Surgeon: Vinicio Dan MD;  Location: [facility] OR;  Service: Urology;  Laterality: N/A;   • ENDOSCOPY      with biopsy.  Mildly severe esophagitis. Gastritis. Normal examined duodenum.Several biopsies obtained in the lower third of the esophagus.Several biopsies obtained in the gastric antrum. 05/06/2016       • ENDOSCOPY      w tube. Normal esophagus. Gastritis in stomach. Biopsy taken. Gastric polyp found. Biopsy taken. Normal duodenum. 10/01/2012       • ENDOSCOPY AND COLONOSCOPY      Diverticulum in sigmoid colon & descending colon. Internal & external hemorrhoids found. 10/01/2012       • EYE SURGERY Bilateral     catarract   • HAND SURGERY Left      Corrective osteotomy of ring finger metacarpal. Malunited fracture of left ring finger 05/21/1985       • HERNIA REPAIR      Laparoscopic hernia repair. prepertitoneal bilateral inguinal hernia repair with mesh. 10/15/2009      • OTHER SURGICAL HISTORY      CORONARY ARTERY STENT    • SKIN TAG REMOVAL      Excision, viral skin tag,right upper eyelid 05/08/1995    • TOE FUSION Right 3/13/2023    Procedure: RIGHT FIRST TARSOMETATARSAL JOINT ARTHRODESIS, FIRST  METATARSOPHALANGEAL JOINT ARTHRODESIS, CALCANEAL BONE GRAFT AND ALL OTHER INDICATED PROCEDURES                (LATEX ALLERGY);  Surgeon: Mason Salazar DPM;  Location: [facility];  Service: Podiatry;  Laterality: Right;         Family History   Problem Relation Age of Onset   • Clotting disorder Other    • Lung disease Other    • Schizophrenia Sister    • Obesity Sister    • Tuberculosis Sister    • Arthritis Mother    • Diabetes Mother    • Heart disease Mother    • Hypertension Mother    • Obesity Mother    • Stroke Mother    • Thyroid disease Mother    • Tuberculosis Mother    • Arthritis Father    • Tuberculosis Father        Allergies   Allergen Reactions   • Brilinta [Ticagrelor] Shortness Of Breath   • Effient [Prasugrel] Shortness Of Breath   • Latex Itching   • Warfarin And Related Shortness Of Breath   • Ciprofloxacin Hives   • Lipitor [Atorvastatin] Swelling   • Norco [Hydrocodone-Acetaminophen] Confusion   • Adhesive Tape Rash   • Celexa [Citalopram Hydrobromide] Rash   • Crestor [Rosuvastatin Calcium] Rash   • Floxin [Ofloxacin] Rash   • Januvia [Sitagliptin] Rash   • Penicillins Rash   • Sulfa Antibiotics Rash       Social History     Socioeconomic History   • Marital status:    Tobacco Use   • Smoking status: Never     Passive exposure: Never   • Smokeless tobacco: Never   Vaping Use   • Vaping Use: Never used   Substance and Sexual Activity   • Alcohol use: No   • Drug use: Never   • Sexual activity: Defer         Current Outpatient Medications   Medication Sig Dispense Refill   • ALPRAZolam (XANAX) 0.5 MG tablet Take 1 tablet by mouth At Night As Needed for Anxiety. 60 tablet 0   • amLODIPine (NORVASC) 10 MG tablet Take 1 tablet by mouth Daily.     • ASPIRIN 81 PO Take 1 tablet by mouth Daily.     • Azelastine HCl 137 MCG/SPRAY solution Inhale 1 spray Daily. 30 mL 3   • coenzyme Q10 100 MG capsule Take 1 capsule by mouth Daily.     • dapagliflozin (Farxiga) 5 MG tablet tablet Take 1 tablet  "by mouth Daily.     • docusate sodium 100 MG capsule Take 1 capsule by mouth 2 (Two) Times a Day. 14 capsule 0   • doxycycline (VIBRAMYCIN) 100 MG capsule Take 1 capsule by mouth 2 (Two) Times a Day. 20 capsule 0   • Dulaglutide (Trulicity) 3 MG/0.5ML solution pen-injector Inject 3 mg under the skin into the appropriate area as directed 1 (One) Time Per Week. (Patient taking differently: Inject 1.5 mg under the skin into the appropriate area as directed 1 (One) Time Per Week.) 12 pen 3   • esomeprazole (nexIUM) 40 MG capsule Take 1 capsule by mouth Every Morning Before Breakfast. 30 capsule 3   • Glucose Blood (BLOOD GLUCOSE TEST) strip Use 4 x daily use any brand covered by insurance or same brand as before ICD10 code is E11.9 120 each 11   • Insulin Glargine (BASAGLAR KWIKPEN) 100 UNIT/ML injection pen Inject 15 Units under the skin into the appropriate area as directed At Night As Needed.     • Insulin Pen Needle 30G X 8 MM misc Use 3-4 times daily. 100 each 3   • Insulin Syringe 31G X 5/16\" 0.3 ML misc 4 x daily 120 each 11   • isosorbide mononitrate (IMDUR) 30 MG 24 hr tablet Take 1 tablet by mouth Daily.     • KRILL OIL OMEGA-3 PO Take 1 capsule by mouth Daily.     • Lancet Devices (LANCING DEVICE) misc USE AS INDICATED TO CORRELATE WITH STRIPS AND METER 1 each 1   • Lancets 30G misc USE 4 X DAILY 120 each 11   • metoprolol tartrate (LOPRESSOR) 50 MG tablet Take 1 tablet by mouth 2 (Two) Times a Day. 180 tablet 1   • propafenone (RYTHMOL) 150 MG tablet Take 1 tablet by mouth Every 8 (Eight) Hours.     • Red Yeast Rice Extract (RED YEAST RICE PO) Take 4 capsules by mouth Daily.     • rivaroxaban (XARELTO) 15 MG tablet Take 1 tablet by mouth Daily. Indications: Atrial Fibrillation 30 tablet 0   • Saw Palmetto, Serenoa repens, (SAW PALMETTO PO) Take 1 tablet by mouth Daily.     • terazosin (HYTRIN) 2 MG capsule Take 1 capsule by mouth Every Night. 30 capsule 0   • traMADol (ULTRAM) 50 MG tablet Take 1 tablet by " "mouth Every 8 (Eight) Hours As Needed for Moderate Pain for up to 10 days. 30 tablet 0   • TURMERIC CURCUMIN PO Take 1,500 mg by mouth Daily.     • vitamin B-12 (CYANOCOBALAMIN) 2500 MCG sublingual tablet tablet Place 5,000 mcg under the tongue 1 (One) Time Per Week.     • vitamin E 100 UNIT capsule Take 1 capsule by mouth Daily.       No current facility-administered medications for this visit.       Review of Systems   Musculoskeletal:        Foot pain    All other systems reviewed and are negative.        OBJECTIVE    Ht 175.3 cm (69.02\")   Wt 78 kg (172 lb)   BMI 25.39 kg/m²     Body mass index is 25.39 kg/m².        Physical Exam  Vitals reviewed.   Constitutional:       Appearance: Normal appearance. He is well-developed.   HENT:      Head: Normocephalic and atraumatic.   Neck:      Trachea: Trachea and phonation normal.   Cardiovascular:      Pulses:           Dorsalis pedis pulses are 1+ on the right side.        Posterior tibial pulses are 1+ on the right side.   Pulmonary:      Effort: Pulmonary effort is normal. No respiratory distress.   Abdominal:      General: There is no distension.      Palpations: Abdomen is soft.   Musculoskeletal:        Feet:    Skin:     General: Skin is warm and dry.   Neurological:      Mental Status: He is alert and oriented to person, place, and time.      GCS: GCS eye subscore is 4. GCS verbal subscore is 5. GCS motor subscore is 6.   Psychiatric:         Speech: Speech normal.         Behavior: Behavior normal. Behavior is cooperative.         Thought Content: Thought content normal.         Judgment: Judgment normal.                Procedures    Reapplied Unna boot to the right lower extremity without difficulty.  Patient was neurovascularly intact      ASSESSMENT AND PLAN    Diagnoses and all orders for this visit:    1. S/P foot surgery, right (Primary)    2. Hallux valgus, right  -     XR Ankle 3+ View Right  -     XR Foot 3+ View Right    3. Status post " bunionectomy      Body mass index is 25.39 kg/m².    Recommend follow-up with primary care to discuss BMI greater than 30    Continue antibiotics  Reapplied right lower extremity Unna boot  Follow-up 1 week  Reiterated the need to remain in the boot and a limited nonweightbearing status until the wound is healed        This document has been electronically signed by Qasim LEONARDO, FNP-C, ONP-C on May 30, 2023 14:49 CDT

## 2023-05-31 ENCOUNTER — DOCUMENTATION (OUTPATIENT)
Dept: ENDOCRINOLOGY | Facility: CLINIC | Age: 78
End: 2023-05-31

## 2023-06-05 ENCOUNTER — OFFICE VISIT (OUTPATIENT)
Dept: PODIATRY | Facility: CLINIC | Age: 78
End: 2023-06-05
Payer: MEDICARE

## 2023-06-05 VITALS — BODY MASS INDEX: 25.48 KG/M2 | HEIGHT: 69 IN | WEIGHT: 172 LBS

## 2023-06-05 DIAGNOSIS — Z98.890 STATUS POST BUNIONECTOMY: Primary | ICD-10-CM

## 2023-06-05 NOTE — PROGRESS NOTES
Aj Card Jr.  1945  77 y.o. male   PCP: Jeet Martinez 04/18/2023  BS: 120 per patient     06/05/2023    Chief Complaint   Patient presents with    Right Foot - Pain, Follow-up       History of Present Illness    Aj Card Jr. is a 77 y.o.male came to clinic for post operative appointment on right foot joint fusion om 03/13/2023. Patient also had a right ankle ORIF on 03/23/2023. Patient seems to be doing well. patient states there is some drainage, and his foot is tender.     Past Medical History:   Diagnosis Date    Acute posthemorrhagic anemia     Afib     Allergic rhinitis     Anxiety state     Chest pain     CKD (chronic kidney disease), stage II     Coronary arteriosclerosis     3 stents, followed by Dr. Jeffers    Diabetes mellitus     type 2    Diverticular disease of colon     Dysphagia     Elevated cholesterol     Epigastric pain     GERD (gastroesophageal reflux disease)     esophagitis on Dexilant. Pt feels Nexium working better       History of echocardiogram     Small left atrial enlargement with mild CLVH.Ef of 55-60%.Mitral valve mildly thickened.Av thickened.Left ventricle mildly dilated.Tricuspid intact.Mild aortic insufficiency. 12/07/2015        History of echocardiogram     Mild diastolic dysfunction and mild left atrial enlargement, otherwise normal echo. EF> 55% 01/03/2012        Hypercholesterolemia     Hypertension     Insomnia     Irritable bowel syndrome     Osteoarthritis of multiple joints     Pain of right foot     PONV (postoperative nausea and vomiting)          Past Surgical History:   Procedure Laterality Date    ANKLE OPEN REDUCTION INTERNAL FIXATION Right 3/23/2023    Procedure: ANKLE OPEN REDUCTION INTERNAL FIXATION               (LATEX ALLERGY);  Surgeon: Mason Salazar DPM;  Location: [facility];  Service: Podiatry;  Laterality: Right;    APPENDECTOMY       [facility] 1995        CARDIAC CATHETERIZATION      Successful PTCA of the  OMB#2.Unsuccessful PTCA of the 1st OMB, secondary to difficulty in advancing the balloon. 12/08/2015        CHOLECYSTECTOMY      [facility] [place] 1993        CHOLECYSTECTOMY      COLONOSCOPY  10/01/2012    COLONOSCOPY N/A 12/11/2017    Procedure: COLONOSCOPY;  Surgeon: Bladimir Michael MD;  Location: [facility] ENDOSCOPY;  Service:     COLONOSCOPY N/A 11/01/2022    Procedure: COLONOSCOPY;  Surgeon: Bladimir Michael MD;  Location: [facility] ENDOSCOPY;  Service: Gastroenterology;  Laterality: N/A;    CORONARY ANGIOPLASTY      Successful PTCA & stenting LAD was done w/ deployment of a 2.5mm x23 Xience stent w/ reduction of stenosis from 90% to less than 0% stenosis with good LILLIAM 3 flow 02/17/2012        CYSTOSCOPY N/A 4/5/2023    Procedure: CYSTOSCOPY         (LATEX ALLERGY);  Surgeon: Vinicio Dan MD;  Location: [facility] OR;  Service: Urology;  Laterality: N/A;    ENDOSCOPY      with biopsy.  Mildly severe esophagitis. Gastritis. Normal examined duodenum.Several biopsies obtained in the lower third of the esophagus.Several biopsies obtained in the gastric antrum. 05/06/2016        ENDOSCOPY      w tube. Normal esophagus. Gastritis in stomach. Biopsy taken. Gastric polyp found. Biopsy taken. Normal duodenum. 10/01/2012        ENDOSCOPY AND COLONOSCOPY      Diverticulum in sigmoid colon & descending colon. Internal & external hemorrhoids found. 10/01/2012        EYE SURGERY Bilateral     catarract    HAND SURGERY Left      Corrective osteotomy of ring finger metacarpal. Malunited fracture of left ring finger 05/21/1985        HERNIA REPAIR      Laparoscopic hernia repair. prepertitoneal bilateral inguinal hernia repair with mesh. 10/15/2009       OTHER SURGICAL HISTORY      CORONARY ARTERY STENT     SKIN TAG REMOVAL      Excision, viral skin tag,right upper eyelid 05/08/1995     TOE FUSION Right 3/13/2023    Procedure: RIGHT FIRST TARSOMETATARSAL JOINT ARTHRODESIS, FIRST METATARSOPHALANGEAL JOINT ARTHRODESIS,  CALCANEAL BONE GRAFT AND ALL OTHER INDICATED PROCEDURES                (LATEX ALLERGY);  Surgeon: Mason Salazar DPM;  Location: [facility] OR;  Service: Podiatry;  Laterality: Right;         Family History   Problem Relation Age of Onset    Clotting disorder Other     Lung disease Other     Schizophrenia Sister     Obesity Sister     Tuberculosis Sister     Arthritis Mother     Diabetes Mother     Heart disease Mother     Hypertension Mother     Obesity Mother     Stroke Mother     Thyroid disease Mother     Tuberculosis Mother     Arthritis Father     Tuberculosis Father        Allergies   Allergen Reactions    Brilinta [Ticagrelor] Shortness Of Breath    Effient [Prasugrel] Shortness Of Breath    Latex Itching    Warfarin And Related Shortness Of Breath    Ciprofloxacin Hives    Lipitor [Atorvastatin] Swelling    Norco [Hydrocodone-Acetaminophen] Confusion    Adhesive Tape Rash    Celexa [Citalopram Hydrobromide] Rash    Crestor [Rosuvastatin Calcium] Rash    Floxin [Ofloxacin] Rash    Januvia [Sitagliptin] Rash    Penicillins Rash    Sulfa Antibiotics Rash       Social History     Socioeconomic History    Marital status:    Tobacco Use    Smoking status: Never     Passive exposure: Never    Smokeless tobacco: Never   Vaping Use    Vaping Use: Never used   Substance and Sexual Activity    Alcohol use: No    Drug use: Never    Sexual activity: Defer         Current Outpatient Medications   Medication Sig Dispense Refill    ALPRAZolam (XANAX) 0.5 MG tablet Take 1 tablet by mouth At Night As Needed for Anxiety. 60 tablet 0    amLODIPine (NORVASC) 10 MG tablet Take 1 tablet by mouth Daily.      ASPIRIN 81 PO Take 1 tablet by mouth Daily.      Azelastine HCl 137 MCG/SPRAY solution Inhale 1 spray Daily. 30 mL 3    coenzyme Q10 100 MG capsule Take 1 capsule by mouth Daily.      dapagliflozin (Farxiga) 5 MG tablet tablet Take 1 tablet by mouth Daily.      docusate sodium 100 MG capsule Take 1 capsule by mouth 2  "(Two) Times a Day. 14 capsule 0    doxycycline (VIBRAMYCIN) 100 MG capsule Take 1 capsule by mouth 2 (Two) Times a Day. 20 capsule 0    Dulaglutide (Trulicity) 3 MG/0.5ML solution pen-injector Inject 3 mg under the skin into the appropriate area as directed 1 (One) Time Per Week. (Patient taking differently: Inject 1.5 mg under the skin into the appropriate area as directed 1 (One) Time Per Week.) 12 pen 3    esomeprazole (nexIUM) 40 MG capsule Take 1 capsule by mouth Every Morning Before Breakfast. 30 capsule 3    Glucose Blood (BLOOD GLUCOSE TEST) strip Use 4 x daily use any brand covered by insurance or same brand as before ICD10 code is E11.9 120 each 11    Insulin Glargine (BASAGLAR KWIKPEN) 100 UNIT/ML injection pen Inject 15 Units under the skin into the appropriate area as directed At Night As Needed.      Insulin Pen Needle 30G X 8 MM misc Use 3-4 times daily. 100 each 3    Insulin Syringe 31G X 5/16\" 0.3 ML misc 4 x daily 120 each 11    isosorbide mononitrate (IMDUR) 30 MG 24 hr tablet Take 1 tablet by mouth Daily.      KRILL OIL OMEGA-3 PO Take 1 capsule by mouth Daily.      Lancet Devices (LANCING DEVICE) misc USE AS INDICATED TO CORRELATE WITH STRIPS AND METER 1 each 1    Lancets 30G misc USE 4 X DAILY 120 each 11    metoprolol tartrate (LOPRESSOR) 50 MG tablet Take 1 tablet by mouth 2 (Two) Times a Day. 180 tablet 1    propafenone (RYTHMOL) 150 MG tablet Take 1 tablet by mouth Every 8 (Eight) Hours.      Red Yeast Rice Extract (RED YEAST RICE PO) Take 4 capsules by mouth Daily.      rivaroxaban (XARELTO) 15 MG tablet Take 1 tablet by mouth Daily. Indications: Atrial Fibrillation 30 tablet 0    Saw Palmetto, Serenoa repens, (SAW PALMETTO PO) Take 1 tablet by mouth Daily.      terazosin (HYTRIN) 2 MG capsule Take 1 capsule by mouth Every Night. 30 capsule 0    TURMERIC CURCUMIN PO Take 1,500 mg by mouth Daily.      vitamin B-12 (CYANOCOBALAMIN) 2500 MCG sublingual tablet tablet Place 5,000 mcg under the " "tongue 1 (One) Time Per Week.      vitamin E 100 UNIT capsule Take 1 capsule by mouth Daily.       No current facility-administered medications for this visit.       Review of Systems   Musculoskeletal:         Foot pain    All other systems reviewed and are negative.      OBJECTIVE    Ht 175.3 cm (69.02\")   Wt 78 kg (172 lb)   BMI 25.39 kg/m²     Body mass index is 25.39 kg/m².        Physical Exam  Vitals reviewed.   Constitutional:       Appearance: Normal appearance. He is well-developed.   HENT:      Head: Normocephalic and atraumatic.   Neck:      Trachea: Trachea and phonation normal.   Cardiovascular:      Pulses:           Dorsalis pedis pulses are 1+ on the right side.        Posterior tibial pulses are 1+ on the right side.   Pulmonary:      Effort: Pulmonary effort is normal. No respiratory distress.   Abdominal:      General: There is no distension.      Palpations: Abdomen is soft.   Musculoskeletal:        Feet:    Skin:     General: Skin is warm and dry.   Neurological:      Mental Status: He is alert and oriented to person, place, and time.      GCS: GCS eye subscore is 4. GCS verbal subscore is 5. GCS motor subscore is 6.   Psychiatric:         Speech: Speech normal.         Behavior: Behavior normal. Behavior is cooperative.         Thought Content: Thought content normal.         Judgment: Judgment normal.              Procedures    Reapplied Unna boot to the right lower extremity without difficulty.  Patient was neurovascularly intact      ASSESSMENT AND PLAN    Diagnoses and all orders for this visit:    1. Status post bunionectomy (Primary)      Body mass index is 25.39 kg/m².    Recommend follow-up with primary care to discuss BMI greater than 30    Continue antibiotics  Reapplied right lower extremity Unna boot  Follow-up 1 week  Contact office for worsening symptoms        This document has been electronically signed by Qasim LEONARDO, FNP-C, ONP-C on June 5, 2023 13:29 CDT     "

## 2023-06-12 ENCOUNTER — OFFICE VISIT (OUTPATIENT)
Dept: PODIATRY | Facility: CLINIC | Age: 78
End: 2023-06-12
Payer: MEDICARE

## 2023-06-12 VITALS
HEIGHT: 69 IN | BODY MASS INDEX: 25.48 KG/M2 | OXYGEN SATURATION: 95 % | SYSTOLIC BLOOD PRESSURE: 114 MMHG | HEART RATE: 77 BPM | DIASTOLIC BLOOD PRESSURE: 71 MMHG | WEIGHT: 172 LBS

## 2023-06-12 DIAGNOSIS — M20.11 HALLUX VALGUS, RIGHT: ICD-10-CM

## 2023-06-12 DIAGNOSIS — Z98.890 STATUS POST BUNIONECTOMY: Primary | ICD-10-CM

## 2023-06-12 NOTE — PROGRESS NOTES
Aj Card Jr.  1945  77 y.o. male   PCP: Jeet Martinez 04/18/2023  BS: 120 per patient     06/12/2023    Chief Complaint   Patient presents with   • Right Foot - Follow-up     Post op         History of Present Illness    Aj Card Jr. is a 77 y.o.male came to clinic for post operative appointment on right foot joint fusion om 03/13/2023. Patient also had a right ankle ORIF on 03/23/2023. Patient seems to be doing well. patient states there is some drainage, and his foot is tender.     Past Medical History:   Diagnosis Date   • Acute posthemorrhagic anemia    • Afib    • Allergic rhinitis    • Anxiety state    • Chest pain    • CKD (chronic kidney disease), stage II    • Coronary arteriosclerosis     3 stents, followed by Dr. Jeffers   • Diabetes mellitus     type 2   • Diverticular disease of colon    • Dysphagia    • Elevated cholesterol    • Epigastric pain    • GERD (gastroesophageal reflux disease)     esophagitis on Dexilant. Pt feels Nexium working better      • History of echocardiogram     Small left atrial enlargement with mild CLVH.Ef of 55-60%.Mitral valve mildly thickened.Av thickened.Left ventricle mildly dilated.Tricuspid intact.Mild aortic insufficiency. 12/07/2015       • History of echocardiogram     Mild diastolic dysfunction and mild left atrial enlargement, otherwise normal echo. EF> 55% 01/03/2012       • Hypercholesterolemia    • Hypertension    • Insomnia    • Irritable bowel syndrome    • Osteoarthritis of multiple joints    • Pain of right foot    • PONV (postoperative nausea and vomiting)          Past Surgical History:   Procedure Laterality Date   • ANKLE OPEN REDUCTION INTERNAL FIXATION Right 3/23/2023    Procedure: ANKLE OPEN REDUCTION INTERNAL FIXATION               (LATEX ALLERGY);  Surgeon: Mason Salazar DPM;  Location: [facility];  Service: Podiatry;  Laterality: Right;   • APPENDECTOMY       [facility] 1995       • CARDIAC  CATHETERIZATION      Successful PTCA of the OMB#2.Unsuccessful PTCA of the 1st OMB, secondary to difficulty in advancing the balloon. 12/08/2015       • CHOLECYSTECTOMY      [facility] [place] [facility] 1993       • CHOLECYSTECTOMY     • COLONOSCOPY  10/01/2012   • COLONOSCOPY N/A 12/11/2017    Procedure: COLONOSCOPY;  Surgeon: Bladimir Michael MD;  Location: [facility] ENDOSCOPY;  Service:    • COLONOSCOPY N/A 11/01/2022    Procedure: COLONOSCOPY;  Surgeon: Bladimir Michael MD;  Location: [facility] ENDOSCOPY;  Service: Gastroenterology;  Laterality: N/A;   • CORONARY ANGIOPLASTY      Successful PTCA & stenting LAD was done w/ deployment of a 2.5mm x23 Xience stent w/ reduction of stenosis from 90% to less than 0% stenosis with good LILLIAM 3 flow 02/17/2012       • CYSTOSCOPY N/A 4/5/2023    Procedure: CYSTOSCOPY         (LATEX ALLERGY);  Surgeon: Vinicio Dan MD;  Location: [facility] OR;  Service: Urology;  Laterality: N/A;   • ENDOSCOPY      with biopsy.  Mildly severe esophagitis. Gastritis. Normal examined duodenum.Several biopsies obtained in the lower third of the esophagus.Several biopsies obtained in the gastric antrum. 05/06/2016       • ENDOSCOPY      w tube. Normal esophagus. Gastritis in stomach. Biopsy taken. Gastric polyp found. Biopsy taken. Normal duodenum. 10/01/2012       • ENDOSCOPY AND COLONOSCOPY      Diverticulum in sigmoid colon & descending colon. Internal & external hemorrhoids found. 10/01/2012       • EYE SURGERY Bilateral     catarract   • HAND SURGERY Left      Corrective osteotomy of ring finger metacarpal. Malunited fracture of left ring finger 05/21/1985       • HERNIA REPAIR      Laparoscopic hernia repair. prepertitoneal bilateral inguinal hernia repair with mesh. 10/15/2009      • OTHER SURGICAL HISTORY      CORONARY ARTERY STENT    • SKIN TAG REMOVAL      Excision, viral skin tag,right upper eyelid 05/08/1995    • TOE FUSION Right 3/13/2023    Procedure: RIGHT FIRST TARSOMETATARSAL JOINT  ARTHRODESIS, FIRST METATARSOPHALANGEAL JOINT ARTHRODESIS, CALCANEAL BONE GRAFT AND ALL OTHER INDICATED PROCEDURES                (LATEX ALLERGY);  Surgeon: Mason Salazar DPM;  Location: [facility];  Service: Podiatry;  Laterality: Right;         Family History   Problem Relation Age of Onset   • Clotting disorder Other    • Lung disease Other    • Schizophrenia Sister    • Obesity Sister    • Tuberculosis Sister    • Arthritis Mother    • Diabetes Mother    • Heart disease Mother    • Hypertension Mother    • Obesity Mother    • Stroke Mother    • Thyroid disease Mother    • Tuberculosis Mother    • Arthritis Father    • Tuberculosis Father        Allergies   Allergen Reactions   • Brilinta [Ticagrelor] Shortness Of Breath   • Effient [Prasugrel] Shortness Of Breath   • Latex Itching   • Warfarin And Related Shortness Of Breath   • Ciprofloxacin Hives   • Lipitor [Atorvastatin] Swelling   • Norco [Hydrocodone-Acetaminophen] Confusion   • Adhesive Tape Rash   • Celexa [Citalopram Hydrobromide] Rash   • Crestor [Rosuvastatin Calcium] Rash   • Floxin [Ofloxacin] Rash   • Januvia [Sitagliptin] Rash   • Penicillins Rash   • Sulfa Antibiotics Rash       Social History     Socioeconomic History   • Marital status:    Tobacco Use   • Smoking status: Never     Passive exposure: Never   • Smokeless tobacco: Never   Vaping Use   • Vaping Use: Never used   Substance and Sexual Activity   • Alcohol use: No   • Drug use: Never   • Sexual activity: Defer         Current Outpatient Medications   Medication Sig Dispense Refill   • ALPRAZolam (XANAX) 0.5 MG tablet Take 1 tablet by mouth At Night As Needed for Anxiety. 60 tablet 0   • amLODIPine (NORVASC) 10 MG tablet Take 1 tablet by mouth Daily.     • ASPIRIN 81 PO Take 1 tablet by mouth Daily.     • Azelastine HCl 137 MCG/SPRAY solution Inhale 1 spray Daily. 30 mL 3   • coenzyme Q10 100 MG capsule Take 1 capsule by mouth Daily.     • dapagliflozin (Farxiga) 5 MG tablet  "tablet Take 1 tablet by mouth Daily.     • docusate sodium 100 MG capsule Take 1 capsule by mouth 2 (Two) Times a Day. 14 capsule 0   • doxycycline (VIBRAMYCIN) 100 MG capsule Take 1 capsule by mouth 2 (Two) Times a Day. 20 capsule 0   • Dulaglutide (Trulicity) 3 MG/0.5ML solution pen-injector Inject 3 mg under the skin into the appropriate area as directed 1 (One) Time Per Week. (Patient taking differently: Inject 1.5 mg under the skin into the appropriate area as directed 1 (One) Time Per Week.) 12 pen 3   • esomeprazole (nexIUM) 40 MG capsule Take 1 capsule by mouth Every Morning Before Breakfast. 30 capsule 3   • Glucose Blood (BLOOD GLUCOSE TEST) strip Use 4 x daily use any brand covered by insurance or same brand as before ICD10 code is E11.9 120 each 11   • Insulin Glargine (BASAGLAR KWIKPEN) 100 UNIT/ML injection pen Inject 15 Units under the skin into the appropriate area as directed At Night As Needed.     • Insulin Pen Needle 30G X 8 MM misc Use 3-4 times daily. 100 each 3   • Insulin Syringe 31G X 5/16\" 0.3 ML misc 4 x daily 120 each 11   • isosorbide mononitrate (IMDUR) 30 MG 24 hr tablet Take 1 tablet by mouth Daily.     • KRILL OIL OMEGA-3 PO Take 1 capsule by mouth Daily.     • Lancet Devices (LANCING DEVICE) misc USE AS INDICATED TO CORRELATE WITH STRIPS AND METER 1 each 1   • Lancets 30G misc USE 4 X DAILY 120 each 11   • metoprolol tartrate (LOPRESSOR) 50 MG tablet Take 1 tablet by mouth 2 (Two) Times a Day. 180 tablet 1   • propafenone (RYTHMOL) 150 MG tablet Take 1 tablet by mouth Every 8 (Eight) Hours.     • Red Yeast Rice Extract (RED YEAST RICE PO) Take 4 capsules by mouth Daily.     • rivaroxaban (XARELTO) 15 MG tablet Take 1 tablet by mouth Daily. Indications: Atrial Fibrillation 30 tablet 0   • Saw Palmetto, Serenoa repens, (SAW PALMETTO PO) Take 1 tablet by mouth Daily.     • terazosin (HYTRIN) 2 MG capsule Take 1 capsule by mouth Every Night. 30 capsule 0   • TURMERIC CURCUMIN PO Take " "1,500 mg by mouth Daily.     • vitamin B-12 (CYANOCOBALAMIN) 2500 MCG sublingual tablet tablet Place 5,000 mcg under the tongue 1 (One) Time Per Week.     • vitamin E 100 UNIT capsule Take 1 capsule by mouth Daily.       No current facility-administered medications for this visit.       Review of Systems   Musculoskeletal:         Foot pain    All other systems reviewed and are negative.      OBJECTIVE    /71   Pulse 77   Ht 175.3 cm (69.02\")   Wt 78 kg (172 lb)   SpO2 95%   BMI 25.39 kg/m²     Body mass index is 25.39 kg/m².        Physical Exam  Vitals reviewed.   Constitutional:       Appearance: Normal appearance. He is well-developed.   HENT:      Head: Normocephalic and atraumatic.   Neck:      Trachea: Trachea and phonation normal.   Cardiovascular:      Pulses:           Dorsalis pedis pulses are 1+ on the right side.        Posterior tibial pulses are 1+ on the right side.   Pulmonary:      Effort: Pulmonary effort is normal. No respiratory distress.   Abdominal:      General: There is no distension.      Palpations: Abdomen is soft.   Musculoskeletal:        Feet:    Skin:     General: Skin is warm and dry.   Neurological:      Mental Status: He is alert and oriented to person, place, and time.      GCS: GCS eye subscore is 4. GCS verbal subscore is 5. GCS motor subscore is 6.   Psychiatric:         Speech: Speech normal.         Behavior: Behavior normal. Behavior is cooperative.         Thought Content: Thought content normal.         Judgment: Judgment normal.              Procedures    Reapplied Unna boot to the right lower extremity without difficulty.  Patient was neurovascularly intact      ASSESSMENT AND PLAN    There are no diagnoses linked to this encounter.    Body mass index is 25.39 kg/m².    Recommend follow-up with primary care to discuss BMI greater than 30      Reapplied right lower extremity Unna boot  Follow-up 1 week  Contact office for worsening symptoms        This document " has been electronically signed by Qsaim LEONARDO, FNP-C, ONP-C on June 12, 2023 13:58 CDT

## 2023-06-13 PROBLEM — E11.9 CONTROLLED TYPE 2 DIABETES MELLITUS WITHOUT COMPLICATION, WITHOUT LONG-TERM CURRENT USE OF INSULIN: Status: ACTIVE | Noted: 2023-06-13

## 2023-06-19 ENCOUNTER — OFFICE VISIT (OUTPATIENT)
Dept: PODIATRY | Facility: CLINIC | Age: 78
End: 2023-06-19
Payer: MEDICARE

## 2023-06-19 VITALS
HEIGHT: 69 IN | BODY MASS INDEX: 25.48 KG/M2 | DIASTOLIC BLOOD PRESSURE: 83 MMHG | WEIGHT: 172 LBS | HEART RATE: 77 BPM | SYSTOLIC BLOOD PRESSURE: 129 MMHG | OXYGEN SATURATION: 98 %

## 2023-06-19 DIAGNOSIS — Z98.890 STATUS POST BUNIONECTOMY: Primary | ICD-10-CM

## 2023-06-19 DIAGNOSIS — Z98.890 S/P FOOT SURGERY, RIGHT: ICD-10-CM

## 2023-06-19 DIAGNOSIS — S82.851A CLOSED TRIMALLEOLAR FRACTURE OF RIGHT ANKLE, INITIAL ENCOUNTER: ICD-10-CM

## 2023-06-19 NOTE — PROGRESS NOTES
Aj Card Jr.  1945  77 y.o. male   PCP: Jeet Martinez 04/18/2023  BS: 140 per patient     06/19/2023    Chief Complaint   Patient presents with   • Right Foot - Follow-up     Post op       History of Present Illness    Aj Card Jr. is a 77 y.o.male came to clinic for post operative appointment on right foot joint fusion om 03/13/2023. Patient also had a right ankle ORIF on 03/23/2023. Xray obtained today.     Past Medical History:   Diagnosis Date   • Acute posthemorrhagic anemia    • Afib    • Allergic rhinitis    • Anxiety state    • Chest pain    • CKD (chronic kidney disease), stage II    • Coronary arteriosclerosis     3 stents, followed by Dr. Jeffers   • Diabetes mellitus     type 2   • Diverticular disease of colon    • Dysphagia    • Elevated cholesterol    • Epigastric pain    • GERD (gastroesophageal reflux disease)     esophagitis on Dexilant. Pt feels Nexium working better      • History of echocardiogram     Small left atrial enlargement with mild CLVH.Ef of 55-60%.Mitral valve mildly thickened.Av thickened.Left ventricle mildly dilated.Tricuspid intact.Mild aortic insufficiency. 12/07/2015       • History of echocardiogram     Mild diastolic dysfunction and mild left atrial enlargement, otherwise normal echo. EF> 55% 01/03/2012       • Hypercholesterolemia    • Hypertension    • Insomnia    • Irritable bowel syndrome    • Osteoarthritis of multiple joints    • Pain of right foot    • PONV (postoperative nausea and vomiting)          Past Surgical History:   Procedure Laterality Date   • ANKLE OPEN REDUCTION INTERNAL FIXATION Right 3/23/2023    Procedure: ANKLE OPEN REDUCTION INTERNAL FIXATION               (LATEX ALLERGY);  Surgeon: Mason Salazar DPM;  Location: [facility];  Service: Podiatry;  Laterality: Right;   • APPENDECTOMY       [facility] 1995       • CARDIAC CATHETERIZATION      Successful PTCA of the OMB#2.Unsuccessful PTCA of the 1st OMB,  secondary to difficulty in advancing the balloon. 12/08/2015       • CHOLECYSTECTOMY      [facility] 1993       • CHOLECYSTECTOMY     • COLONOSCOPY  10/01/2012   • COLONOSCOPY N/A 12/11/2017    Procedure: COLONOSCOPY;  Surgeon: Bladimir Michael MD;  Location: [facility] ENDOSCOPY;  Service:    • COLONOSCOPY N/A 11/01/2022    Procedure: COLONOSCOPY;  Surgeon: Bladimir Michael MD;  Location: [facility] ENDOSCOPY;  Service: Gastroenterology;  Laterality: N/A;   • CORONARY ANGIOPLASTY      Successful PTCA & stenting LAD was done w/ deployment of a 2.5mm x23 Xience stent w/ reduction of stenosis from 90% to less than 0% stenosis with good LILLIAM 3 flow 02/17/2012       • CYSTOSCOPY N/A 4/5/2023    Procedure: CYSTOSCOPY         (LATEX ALLERGY);  Surgeon: Vinicio Dan MD;  Location: [facility] OR;  Service: Urology;  Laterality: N/A;   • ENDOSCOPY      with biopsy.  Mildly severe esophagitis. Gastritis. Normal examined duodenum.Several biopsies obtained in the lower third of the esophagus.Several biopsies obtained in the gastric antrum. 05/06/2016       • ENDOSCOPY      w tube. Normal esophagus. Gastritis in stomach. Biopsy taken. Gastric polyp found. Biopsy taken. Normal duodenum. 10/01/2012       • ENDOSCOPY AND COLONOSCOPY      Diverticulum in sigmoid colon & descending colon. Internal & external hemorrhoids found. 10/01/2012       • EYE SURGERY Bilateral     catarract   • HAND SURGERY Left      Corrective osteotomy of ring finger metacarpal. Malunited fracture of left ring finger 05/21/1985       • HERNIA REPAIR      Laparoscopic hernia repair. prepertitoneal bilateral inguinal hernia repair with mesh. 10/15/2009      • OTHER SURGICAL HISTORY      CORONARY ARTERY STENT    • SKIN TAG REMOVAL      Excision, viral skin tag,right upper eyelid 05/08/1995    • TOE FUSION Right 3/13/2023    Procedure: RIGHT FIRST TARSOMETATARSAL JOINT ARTHRODESIS, FIRST METATARSOPHALANGEAL JOINT ARTHRODESIS, CALCANEAL BONE GRAFT AND ALL  OTHER INDICATED PROCEDURES                (LATEX ALLERGY);  Surgeon: Mason Salazar DPM;  Location: [facility];  Service: Podiatry;  Laterality: Right;         Family History   Problem Relation Age of Onset   • Clotting disorder Other    • Lung disease Other    • Schizophrenia Sister    • Obesity Sister    • Tuberculosis Sister    • Arthritis Mother    • Diabetes Mother    • Heart disease Mother    • Hypertension Mother    • Obesity Mother    • Stroke Mother    • Thyroid disease Mother    • Tuberculosis Mother    • Arthritis Father    • Tuberculosis Father        Allergies   Allergen Reactions   • Brilinta [Ticagrelor] Shortness Of Breath   • Effient [Prasugrel] Shortness Of Breath   • Latex Itching   • Warfarin And Related Shortness Of Breath   • Ciprofloxacin Hives   • Lipitor [Atorvastatin] Swelling   • Norco [Hydrocodone-Acetaminophen] Confusion   • Adhesive Tape Rash   • Celexa [Citalopram Hydrobromide] Rash   • Crestor [Rosuvastatin Calcium] Rash   • Floxin [Ofloxacin] Rash   • Januvia [Sitagliptin] Rash   • Penicillins Rash   • Sulfa Antibiotics Rash       Social History     Socioeconomic History   • Marital status:    Tobacco Use   • Smoking status: Never     Passive exposure: Never   • Smokeless tobacco: Never   Vaping Use   • Vaping Use: Never used   Substance and Sexual Activity   • Alcohol use: No   • Drug use: Never   • Sexual activity: Defer         Current Outpatient Medications   Medication Sig Dispense Refill   • ALPRAZolam (XANAX) 0.5 MG tablet Take 1 tablet by mouth At Night As Needed for Anxiety. 60 tablet 0   • amLODIPine (NORVASC) 10 MG tablet Take 1 tablet by mouth Daily.     • ASPIRIN 81 PO Take 1 tablet by mouth Daily.     • Azelastine HCl 137 MCG/SPRAY solution Inhale 1 spray Daily. 30 mL 3   • coenzyme Q10 100 MG capsule Take 1 capsule by mouth Daily.     • dapagliflozin (Farxiga) 5 MG tablet tablet Take 1 tablet by mouth Daily.     • docusate sodium 100 MG capsule Take 1 capsule  "by mouth 2 (Two) Times a Day. 14 capsule 0   • doxycycline (VIBRAMYCIN) 100 MG capsule Take 1 capsule by mouth 2 (Two) Times a Day. 20 capsule 0   • Dulaglutide (Trulicity) 3 MG/0.5ML solution pen-injector Inject 3 mg under the skin into the appropriate area as directed 1 (One) Time Per Week. (Patient taking differently: Inject 1.5 mg under the skin into the appropriate area as directed 1 (One) Time Per Week.) 12 pen 3   • esomeprazole (nexIUM) 40 MG capsule Take 1 capsule by mouth Every Morning Before Breakfast. 30 capsule 3   • Glucose Blood (BLOOD GLUCOSE TEST) strip Use 4 x daily use any brand covered by insurance or same brand as before ICD10 code is E11.9 120 each 11   • Insulin Glargine (BASAGLAR KWIKPEN) 100 UNIT/ML injection pen Inject 15 Units under the skin into the appropriate area as directed At Night As Needed.     • Insulin Pen Needle 30G X 8 MM misc Use 3-4 times daily. 100 each 3   • Insulin Syringe 31G X 5/16\" 0.3 ML misc 4 x daily 120 each 11   • isosorbide mononitrate (IMDUR) 30 MG 24 hr tablet Take 1 tablet by mouth Daily.     • KRILL OIL OMEGA-3 PO Take 1 capsule by mouth Daily.     • Lancet Devices (LANCING DEVICE) misc USE AS INDICATED TO CORRELATE WITH STRIPS AND METER 1 each 1   • Lancets 30G misc USE 4 X DAILY 120 each 11   • metoprolol tartrate (LOPRESSOR) 50 MG tablet Take 1 tablet by mouth 2 (Two) Times a Day. 180 tablet 1   • propafenone (RYTHMOL) 150 MG tablet Take 1 tablet by mouth Every 8 (Eight) Hours.     • Red Yeast Rice Extract (RED YEAST RICE PO) Take 4 capsules by mouth Daily.     • rivaroxaban (XARELTO) 15 MG tablet Take 1 tablet by mouth Daily. Indications: Atrial Fibrillation 30 tablet 0   • Saw Palmetto, Serenoa repens, (SAW PALMETTO PO) Take 1 tablet by mouth Daily.     • terazosin (HYTRIN) 2 MG capsule Take 1 capsule by mouth Every Night. 30 capsule 0   • TURMERIC CURCUMIN PO Take 1,500 mg by mouth Daily.     • vitamin B-12 (CYANOCOBALAMIN) 2500 MCG sublingual tablet " "tablet Place 5,000 mcg under the tongue 1 (One) Time Per Week.     • vitamin E 100 UNIT capsule Take 1 capsule by mouth Daily.       No current facility-administered medications for this visit.       Review of Systems   Musculoskeletal:         Foot pain    All other systems reviewed and are negative.      OBJECTIVE    /83   Pulse 77   Ht 175.3 cm (69.02\")   Wt 78 kg (172 lb)   SpO2 98%   BMI 25.39 kg/m²     Body mass index is 25.39 kg/m².        Physical Exam  Vitals reviewed.   Constitutional:       Appearance: Normal appearance. He is well-developed.   HENT:      Head: Normocephalic and atraumatic.   Neck:      Trachea: Trachea and phonation normal.   Cardiovascular:      Pulses:           Dorsalis pedis pulses are 1+ on the right side.        Posterior tibial pulses are 1+ on the right side.   Pulmonary:      Effort: Pulmonary effort is normal. No respiratory distress.   Abdominal:      General: There is no distension.      Palpations: Abdomen is soft.   Musculoskeletal:        Feet:    Skin:     General: Skin is warm and dry.   Neurological:      Mental Status: He is alert and oriented to person, place, and time.      GCS: GCS eye subscore is 4. GCS verbal subscore is 5. GCS motor subscore is 6.   Psychiatric:         Speech: Speech normal.         Behavior: Behavior normal. Behavior is cooperative.         Thought Content: Thought content normal.         Judgment: Judgment normal.              Procedures    Reapplied Unna boot to the right lower extremity without difficulty.  Patient was neurovascularly intact      ASSESSMENT AND PLAN    Diagnoses and all orders for this visit:    1. Status post bunionectomy (Primary)  -     XR Foot 3+ View Right  -     Ambulatory Referral to Wound Clinic    2. Closed trimalleolar fracture of right ankle, initial encounter  -     XR Ankle 3+ View Right  -     Ambulatory Referral to Wound Clinic    3. S/P foot surgery, right  -     Ambulatory Referral to Wound " Clinic      Body mass index is 25.39 kg/m².    Recommend follow-up with primary care to discuss BMI greater than 30      Reapplied right lower extremity Unna boot  Follow-up 1 week  Contact office for worsening symptoms      6/20/23  Reviewed photographs with Dr. Salazar of the patient's wound.  He recommended a referral to wound care and follow-up with us in 1 month with x-rays of the foot and ankle        This document has been electronically signed by Qasim LEONARDO, FNP-C, ONP-C on June 20, 2023 10:59 CDT

## 2023-06-30 PROBLEM — T81.31XA DISRUPTION OF EXTERNAL SURGICAL WOUND: Status: ACTIVE | Noted: 2023-06-30

## 2023-06-30 PROBLEM — S91.301A OPEN WOUND OF RIGHT FOOT: Status: ACTIVE | Noted: 2023-06-30

## 2023-07-11 ENCOUNTER — HOME HEALTH ADMISSION (OUTPATIENT)
Dept: HOME HEALTH SERVICES | Facility: HOME HEALTHCARE | Age: 78
End: 2023-07-11
Payer: MEDICARE

## 2023-07-11 PROBLEM — M86.271 SUBACUTE OSTEOMYELITIS OF RIGHT FOOT: Status: ACTIVE | Noted: 2023-07-11

## 2023-07-24 ENCOUNTER — INFUSION (OUTPATIENT)
Dept: ONCOLOGY | Facility: HOSPITAL | Age: 78
End: 2023-07-24
Payer: MEDICARE

## 2023-07-24 VITALS
TEMPERATURE: 97.5 F | SYSTOLIC BLOOD PRESSURE: 133 MMHG | RESPIRATION RATE: 18 BRPM | HEART RATE: 82 BPM | DIASTOLIC BLOOD PRESSURE: 69 MMHG

## 2023-07-24 DIAGNOSIS — M86.271 SUBACUTE OSTEOMYELITIS OF RIGHT FOOT: Primary | ICD-10-CM

## 2023-07-24 PROCEDURE — 96365 THER/PROPH/DIAG IV INF INIT: CPT | Performed by: NURSE PRACTITIONER

## 2023-07-24 PROCEDURE — 25010000002 CEFTRIAXONE PER 250 MG: Performed by: PODIATRIST

## 2023-07-24 RX ADMIN — CEFTRIAXONE SODIUM 2000 MG: 2 INJECTION, POWDER, FOR SOLUTION INTRAMUSCULAR; INTRAVENOUS at 10:54

## 2023-07-25 ENCOUNTER — INFUSION (OUTPATIENT)
Dept: ONCOLOGY | Facility: HOSPITAL | Age: 78
End: 2023-07-25
Payer: MEDICARE

## 2023-07-25 ENCOUNTER — OFFICE VISIT (OUTPATIENT)
Dept: PODIATRY | Facility: CLINIC | Age: 78
End: 2023-07-25
Payer: MEDICARE

## 2023-07-25 VITALS
SYSTOLIC BLOOD PRESSURE: 112 MMHG | BODY MASS INDEX: 25.1 KG/M2 | HEART RATE: 82 BPM | HEIGHT: 69 IN | DIASTOLIC BLOOD PRESSURE: 76 MMHG | OXYGEN SATURATION: 97 %

## 2023-07-25 VITALS
HEART RATE: 81 BPM | TEMPERATURE: 96.1 F | SYSTOLIC BLOOD PRESSURE: 117 MMHG | DIASTOLIC BLOOD PRESSURE: 64 MMHG | RESPIRATION RATE: 18 BRPM

## 2023-07-25 DIAGNOSIS — M86.271 SUBACUTE OSTEOMYELITIS OF RIGHT FOOT: ICD-10-CM

## 2023-07-25 DIAGNOSIS — Z98.890 S/P FOOT SURGERY, RIGHT: ICD-10-CM

## 2023-07-25 DIAGNOSIS — T81.31XD DISRUPTION OF EXTERNAL SURGICAL WOUND, SUBSEQUENT ENCOUNTER: Primary | ICD-10-CM

## 2023-07-25 DIAGNOSIS — M86.271 SUBACUTE OSTEOMYELITIS OF RIGHT FOOT: Primary | ICD-10-CM

## 2023-07-25 DIAGNOSIS — S91.301D OPEN WOUND OF RIGHT FOOT, SUBSEQUENT ENCOUNTER: ICD-10-CM

## 2023-07-25 PROCEDURE — 1160F RVW MEDS BY RX/DR IN RCRD: CPT | Performed by: NURSE PRACTITIONER

## 2023-07-25 PROCEDURE — 3078F DIAST BP <80 MM HG: CPT | Performed by: NURSE PRACTITIONER

## 2023-07-25 PROCEDURE — 1159F MED LIST DOCD IN RCRD: CPT | Performed by: NURSE PRACTITIONER

## 2023-07-25 PROCEDURE — 25010000002 CEFTRIAXONE PER 250 MG: Performed by: PODIATRIST

## 2023-07-25 PROCEDURE — 99024 POSTOP FOLLOW-UP VISIT: CPT | Performed by: NURSE PRACTITIONER

## 2023-07-25 PROCEDURE — 3074F SYST BP LT 130 MM HG: CPT | Performed by: NURSE PRACTITIONER

## 2023-07-25 PROCEDURE — 96365 THER/PROPH/DIAG IV INF INIT: CPT | Performed by: NURSE PRACTITIONER

## 2023-07-25 RX ADMIN — CEFTRIAXONE SODIUM 2000 MG: 2 INJECTION, POWDER, FOR SOLUTION INTRAMUSCULAR; INTRAVENOUS at 10:41

## 2023-07-25 NOTE — PROGRESS NOTES
Aj Card Jr.  1945  77 y.o. male  BS: 120      07/25/2023      Chief Complaint   Patient presents with    Right Foot - Follow-up, Pain       History of Present Illness    Aj Card Jr. is a 77 y.o.male came to clinic for post op appointment. DOS was on 07/06/2023. Patient states HH changed NPWT on 7-21-23. Pt is currently getting IV abx infusions through [facility] daily.    Past Medical History:   Diagnosis Date    Acute posthemorrhagic anemia     Afib     Allergic rhinitis     Anxiety state     Chest pain     CKD (chronic kidney disease), stage II     Coronary arteriosclerosis     3 stents, followed by Dr. Jeffers    Diabetes mellitus     type 2    Diverticular disease of colon     Dysphagia     Elevated cholesterol     Epigastric pain     GERD (gastroesophageal reflux disease)     esophagitis on Dexilant. Pt feels Nexium working better       History of echocardiogram     Small left atrial enlargement with mild CLVH.Ef of 55-60%.Mitral valve mildly thickened.Av thickened.Left ventricle mildly dilated.Tricuspid intact.Mild aortic insufficiency. 12/07/2015        History of echocardiogram     Mild diastolic dysfunction and mild left atrial enlargement, otherwise normal echo. EF> 55% 01/03/2012        Hypercholesterolemia     Hypertension     Insomnia     Irritable bowel syndrome     Osteoarthritis of multiple joints     Pain of right foot     PONV (postoperative nausea and vomiting)          Past Surgical History:   Procedure Laterality Date    ANKLE OPEN REDUCTION INTERNAL FIXATION Right 3/23/2023    Procedure: ANKLE OPEN REDUCTION INTERNAL FIXATION               (LATEX ALLERGY);  Surgeon: Mason Salazar DPM;  Location: [facility];  Service: Podiatry;  Laterality: Right;    APPENDECTOMY       [facility] 1995        BONE BIOPSY LEG Right 7/6/2023    Procedure: BONE BIOPSY LOWER EXTREMITY;  Surgeon: Mason Salazar DPM;  Location: [facility] OR;  Service: Podiatry;  Laterality:  Right;    CARDIAC CATHETERIZATION      Successful PTCA of the OMB#2.Unsuccessful PTCA of the 1st OMB, secondary to difficulty in advancing the balloon. 12/08/2015        CHOLECYSTECTOMY      [facility] [place] 1993        CHOLECYSTECTOMY      COLONOSCOPY  10/01/2012    COLONOSCOPY N/A 12/11/2017    Procedure: COLONOSCOPY;  Surgeon: Bladimir Michael MD;  Location: [facility] ENDOSCOPY;  Service:     COLONOSCOPY N/A 11/01/2022    Procedure: COLONOSCOPY;  Surgeon: Bladimir Michael MD;  Location: [facility] ENDOSCOPY;  Service: Gastroenterology;  Laterality: N/A;    CORONARY ANGIOPLASTY      Successful PTCA & stenting LAD was done w/ deployment of a 2.5mm x23 Xience stent w/ reduction of stenosis from 90% to less than 0% stenosis with good LILLIAM 3 flow 02/17/2012        CYSTOSCOPY N/A 4/5/2023    Procedure: CYSTOSCOPY         (LATEX ALLERGY);  Surgeon: Vinicio Dan MD;  Location: [facility] OR;  Service: Urology;  Laterality: N/A;    ENDOSCOPY      with biopsy.  Mildly severe esophagitis. Gastritis. Normal examined duodenum.Several biopsies obtained in the lower third of the esophagus.Several biopsies obtained in the gastric antrum. 05/06/2016        ENDOSCOPY      w tube. Normal esophagus. Gastritis in stomach. Biopsy taken. Gastric polyp found. Biopsy taken. Normal duodenum. 10/01/2012        ENDOSCOPY AND COLONOSCOPY      Diverticulum in sigmoid colon & descending colon. Internal & external hemorrhoids found. 10/01/2012        EYE SURGERY Bilateral     catarract    HAND SURGERY Left      Corrective osteotomy of ring finger metacarpal. Malunited fracture of left ring finger 05/21/1985        HARDWARE REMOVAL Right 7/6/2023    Procedure: HARDWARE REMOVAL  ALL OTHER INDICATED PROCEDURES;  Surgeon: Mason Salazar DPM;  Location: [facility] OR;  Service: Podiatry;  Laterality: Right;    HERNIA REPAIR      Laparoscopic hernia repair. prepertitoneal bilateral inguinal hernia repair with mesh. 10/15/2009       OTHER SURGICAL  HISTORY      CORONARY ARTERY STENT     PLACEMENT OF WOUND VAC Right 7/6/2023    Procedure: PLACEMENT OF WOUND VAC AND ALL OTHER INDICATED PROCEDURES            (latex allergy);  Surgeon: Mason Salazar DPM;  Location: [facility];  Service: Podiatry;  Laterality: Right;    SKIN TAG REMOVAL      Excision, viral skin tag,right upper eyelid 05/08/1995     TOE FUSION Right 3/13/2023    Procedure: RIGHT FIRST TARSOMETATARSAL JOINT ARTHRODESIS, FIRST METATARSOPHALANGEAL JOINT ARTHRODESIS, CALCANEAL BONE GRAFT AND ALL OTHER INDICATED PROCEDURES                (LATEX ALLERGY);  Surgeon: Mason Salazar DPM;  Location: [facility];  Service: Podiatry;  Laterality: Right;         Family History   Problem Relation Age of Onset    Clotting disorder Other     Lung disease Other     Schizophrenia Sister     Obesity Sister     Tuberculosis Sister     Arthritis Mother     Diabetes Mother     Heart disease Mother     Hypertension Mother     Obesity Mother     Stroke Mother     Thyroid disease Mother     Tuberculosis Mother     Arthritis Father     Tuberculosis Father        Allergies   Allergen Reactions    Brilinta [Ticagrelor] Shortness Of Breath    Effient [Prasugrel] Shortness Of Breath    Latex Itching    Norco [Hydrocodone-Acetaminophen] Confusion    Warfarin And Related Shortness Of Breath    Ciprofloxacin Hives    Lipitor [Atorvastatin] Swelling    Adhesive Tape Rash    Celexa [Citalopram Hydrobromide] Rash    Crestor [Rosuvastatin Calcium] Rash    Floxin [Ofloxacin] Rash    Januvia [Sitagliptin] Rash    Penicillins Rash    Sulfa Antibiotics Rash       Social History     Socioeconomic History    Marital status:    Tobacco Use    Smoking status: Never     Passive exposure: Never    Smokeless tobacco: Never   Vaping Use    Vaping Use: Never used   Substance and Sexual Activity    Alcohol use: No    Drug use: Never    Sexual activity: Defer         Current Outpatient Medications   Medication Sig Dispense Refill     "ALPRAZolam (XANAX) 0.5 MG tablet Take 1 tablet by mouth At Night As Needed for Anxiety. 60 tablet 0    amLODIPine (NORVASC) 10 MG tablet Take 1 tablet by mouth Daily. Indications: High Blood Pressure Disorder      ASPIRIN 81 PO Take 1 tablet by mouth Daily. Indications: HEART HEALTH      Azelastine HCl 137 MCG/SPRAY solution Inhale 1 spray Daily. 30 mL 3    benzonatate (TESSALON) 100 MG capsule Take 1 capsule 3 times a day by oral route for 10 days.      clopidogrel (PLAVIX) 75 MG tablet Take 1 tablet every day by oral route.      coenzyme Q10 100 MG capsule Take 1 capsule by mouth Daily. Indications: HEART HEALTH      dapagliflozin (Farxiga) 5 MG tablet tablet Take 1 tablet by mouth Daily. Indications: Type 2 Diabetes      doxycycline (VIBRAMYCIN) 100 MG capsule Take 1 capsule by mouth 2 (Two) Times a Day. 20 capsule 0    Dulaglutide (Trulicity) 3 MG/0.5ML solution pen-injector Inject 3 mg under the skin into the appropriate area as directed 1 (One) Time Per Week. (Patient taking differently: Inject 1.5 mg under the skin into the appropriate area as directed 1 (One) Time Per Week. LAST DOSE 6-  TAKING ON MONDAYS) 12 pen 3    Glucose Blood (BLOOD GLUCOSE TEST) strip Use 4 x daily use any brand covered by insurance or same brand as before ICD10 code is E11.9 120 each 11    Insulin Glargine (BASAGLAR KWIKPEN) 100 UNIT/ML injection pen Inject 15 Units under the skin into the appropriate area as directed At Night As Needed. Indications: T2DM      Insulin Pen Needle 30G X 8 MM misc Use 3-4 times daily. 100 each 3    Insulin Syringe 31G X 5/16\" 0.3 ML misc 4 x daily 120 each 11    isosorbide mononitrate (IMDUR) 30 MG 24 hr tablet Take 1 tablet by mouth Daily. Indications: Stable Angina Pectoris      KRILL OIL OMEGA-3 PO Take 1 capsule by mouth Daily. Indications: HEART [facility]      Lancet Devices (LANCING DEVICE) misc USE AS INDICATED TO CORRELATE WITH STRIPS AND METER 1 each 1    Lancets 30G misc USE 4 X DAILY 120 " each 11    lisinopril (PRINIVIL,ZESTRIL) 40 MG tablet Take 1 tablet every day by oral route.      metFORMIN (GLUCOPHAGE) 500 MG tablet Take 1 tablet twice a day by oral route.      metoprolol tartrate (LOPRESSOR) 50 MG tablet Take 1 tablet by mouth 2 (Two) Times a Day. 180 tablet 1    polyethylene glycol (MIRALAX) 17 g packet Take 17 g by mouth Daily As Needed (CONSTIPATION). Indications: Constipation      propafenone (RYTHMOL) 150 MG tablet Take 1 tablet by mouth Every 8 (Eight) Hours. Indications: Irregularity of Ventricular Heart Rhythm      Red Yeast Rice Extract (RED YEAST RICE PO) Take 4 capsules by mouth Daily. Indications: [facility]      rivaroxaban (XARELTO) 15 MG tablet Take 1 tablet by mouth Daily. Indications: Atrial Fibrillation 30 tablet 0    Saw Palmetto, Serenoa repens, (SAW PALMETTO PO) Take 1 tablet by mouth Daily. Indications: [facility]      terazosin (HYTRIN) 2 MG capsule Take 1 capsule by mouth Every Night. 30 capsule 0    traMADol (ULTRAM) 50 MG tablet Take 1 tablet by mouth Every 8 (Eight) Hours As Needed for Moderate Pain. Indications: Pain      traMADol (ULTRAM) 50 MG tablet Take 1 tablet by mouth Every 6 (Six) Hours As Needed for Moderate Pain. 30 tablet 0    triamcinolone acetonide (KENALOG-40) 40 MG/ML injection Take 1 mL by injection route.      TURMERIC CURCUMIN PO Take 1,500 mg by mouth Daily. Indications: HEART HEALTH      vitamin B-12 (CYANOCOBALAMIN) 2500 MCG sublingual tablet tablet Place 5,000 mcg under the tongue 1 (One) Time Per Week. Indications: Inadequate Vitamin B12      vitamin E 100 UNIT capsule Take 1 capsule by mouth Daily. Indications: [facility]       Current Facility-Administered Medications   Medication Dose Route Frequency Provider Last Rate Last Admin    sodium chloride 0.9 % flush 10 mL  10 mL Intravenous PRN Glen Jain MD           Review of Systems   Constitutional: Negative.    Respiratory: Negative.     Cardiovascular: Negative.   "  Musculoskeletal:         Foot pain   Skin:  Positive for wound.        NPWT in place   Neurological: Negative.    Hematological: Negative.    Psychiatric/Behavioral: Negative.         OBJECTIVE    /76   Pulse 82   Ht 175.3 cm (69\")   SpO2 97%   BMI 25.10 kg/m²     Physical Exam  Vitals reviewed.   Constitutional:       General: He is not in acute distress.     Appearance: Normal appearance.   HENT:      Head: Normocephalic.   Eyes:      Pupils: Pupils are equal, round, and reactive to light.   Cardiovascular:      Pulses:           Dorsalis pedis pulses are detected w/ Doppler on the right side.        Posterior tibial pulses are detected w/ Doppler on the right side.   Pulmonary:      Effort: No respiratory distress.   Musculoskeletal:         General: Swelling present. No signs of injury.      Cervical back: Neck supple.        Feet:    Skin:     General: Skin is warm and dry.      Findings: No erythema.   Neurological:      General: No focal deficit present.      Mental Status: He is alert.   Psychiatric:         Mood and Affect: Mood normal.         Behavior: Behavior normal.                    ASSESSMENT AND PLAN    Diagnoses and all orders for this visit:    1. Disruption of external surgical wound, subsequent encounter (Primary)  -     Ambulatory Referral to Wound Clinic    2. Open wound of right foot, subsequent encounter    3. Subacute osteomyelitis of right foot    4. S/P foot surgery, right      Referral to [facility] for wound care and NPWT care. Arranged to have pt seen in [facility] tomorrow with NPWT change.  APRN contacted.  Continue IV abx.  All questions answered.          This document has been electronically signed by JORDEN Ybarra on July 25, 2023 16:21 CDT    "

## 2023-07-26 ENCOUNTER — HOSPITAL ENCOUNTER (OUTPATIENT)
Dept: ULTRASOUND IMAGING | Facility: HOSPITAL | Age: 78
Discharge: HOME | End: 2023-07-26
Payer: MEDICARE

## 2023-07-26 ENCOUNTER — OUTSIDE FACILITY SERVICE (OUTPATIENT)
Dept: WOUND CARE | Facility: HOSPITAL | Age: 78
End: 2023-07-26
Payer: MEDICARE

## 2023-07-26 ENCOUNTER — OFFICE VISIT (OUTPATIENT)
Dept: WOUND CARE | Facility: HOSPITAL | Age: 78
End: 2023-07-26
Payer: MEDICARE

## 2023-07-26 ENCOUNTER — INFUSION (OUTPATIENT)
Dept: ONCOLOGY | Facility: HOSPITAL | Age: 78
End: 2023-07-26
Payer: MEDICARE

## 2023-07-26 VITALS
HEART RATE: 77 BPM | RESPIRATION RATE: 20 BRPM | DIASTOLIC BLOOD PRESSURE: 63 MMHG | TEMPERATURE: 97.5 F | SYSTOLIC BLOOD PRESSURE: 104 MMHG

## 2023-07-26 DIAGNOSIS — M86.271 SUBACUTE OSTEOMYELITIS OF RIGHT FOOT: ICD-10-CM

## 2023-07-26 DIAGNOSIS — M79.601 PAIN OF RIGHT UPPER EXTREMITY: ICD-10-CM

## 2023-07-26 DIAGNOSIS — M86.271 SUBACUTE OSTEOMYELITIS OF RIGHT FOOT: Primary | ICD-10-CM

## 2023-07-26 PROCEDURE — G0463 HOSPITAL OUTPT CLINIC VISIT: HCPCS

## 2023-07-26 PROCEDURE — 93971 EXTREMITY STUDY: CPT

## 2023-07-26 PROCEDURE — 97605 NEG PRS WND THER DME<=50SQCM: CPT

## 2023-07-26 PROCEDURE — 25010000002 CEFTRIAXONE PER 250 MG: Performed by: PODIATRIST

## 2023-07-26 RX ADMIN — CEFTRIAXONE SODIUM 2000 MG: 2 INJECTION, POWDER, FOR SOLUTION INTRAMUSCULAR; INTRAVENOUS at 11:09

## 2023-07-27 ENCOUNTER — HOME CARE VISIT (OUTPATIENT)
Dept: HOME HEALTH SERVICES | Facility: HOME HEALTHCARE | Age: 78
End: 2023-07-27
Payer: MEDICARE

## 2023-07-27 ENCOUNTER — INFUSION (OUTPATIENT)
Dept: ONCOLOGY | Facility: HOSPITAL | Age: 78
End: 2023-07-27
Payer: MEDICARE

## 2023-07-27 VITALS
RESPIRATION RATE: 20 BRPM | DIASTOLIC BLOOD PRESSURE: 65 MMHG | SYSTOLIC BLOOD PRESSURE: 113 MMHG | HEART RATE: 81 BPM | TEMPERATURE: 97.6 F

## 2023-07-27 DIAGNOSIS — M86.271 SUBACUTE OSTEOMYELITIS OF RIGHT FOOT: Primary | ICD-10-CM

## 2023-07-27 PROCEDURE — 25010000002 CEFTRIAXONE PER 250 MG: Performed by: PODIATRIST

## 2023-07-27 RX ADMIN — CEFTRIAXONE SODIUM 2000 MG: 2 INJECTION, POWDER, FOR SOLUTION INTRAMUSCULAR; INTRAVENOUS at 10:45

## 2023-07-28 ENCOUNTER — OFFICE VISIT (OUTPATIENT)
Dept: WOUND CARE | Facility: HOSPITAL | Age: 78
End: 2023-07-28
Payer: MEDICARE

## 2023-07-28 ENCOUNTER — INFUSION (OUTPATIENT)
Dept: ONCOLOGY | Facility: HOSPITAL | Age: 78
End: 2023-07-28
Payer: MEDICARE

## 2023-07-28 VITALS
TEMPERATURE: 96.8 F | HEART RATE: 89 BPM | SYSTOLIC BLOOD PRESSURE: 104 MMHG | RESPIRATION RATE: 18 BRPM | DIASTOLIC BLOOD PRESSURE: 62 MMHG

## 2023-07-28 DIAGNOSIS — M86.271 SUBACUTE OSTEOMYELITIS OF RIGHT FOOT: Primary | ICD-10-CM

## 2023-07-28 PROCEDURE — 25010000002 CEFTRIAXONE PER 250 MG: Performed by: PODIATRIST

## 2023-07-28 PROCEDURE — 97605 NEG PRS WND THER DME<=50SQCM: CPT

## 2023-07-28 RX ADMIN — CEFTRIAXONE SODIUM 2000 MG: 2 INJECTION, POWDER, FOR SOLUTION INTRAMUSCULAR; INTRAVENOUS at 11:28

## 2023-07-29 ENCOUNTER — INFUSION (OUTPATIENT)
Dept: ONCOLOGY | Facility: HOSPITAL | Age: 78
End: 2023-07-29
Payer: MEDICARE

## 2023-07-29 VITALS
SYSTOLIC BLOOD PRESSURE: 135 MMHG | DIASTOLIC BLOOD PRESSURE: 73 MMHG | RESPIRATION RATE: 18 BRPM | HEART RATE: 82 BPM | TEMPERATURE: 97.7 F

## 2023-07-29 DIAGNOSIS — M86.271 SUBACUTE OSTEOMYELITIS OF RIGHT FOOT: Primary | ICD-10-CM

## 2023-07-29 PROCEDURE — 25010000002 CEFTRIAXONE PER 250 MG: Performed by: PODIATRIST

## 2023-07-29 RX ADMIN — CEFTRIAXONE SODIUM 2000 MG: 2 INJECTION, POWDER, FOR SOLUTION INTRAMUSCULAR; INTRAVENOUS at 09:20

## 2023-07-30 ENCOUNTER — INFUSION (OUTPATIENT)
Dept: ONCOLOGY | Facility: HOSPITAL | Age: 78
End: 2023-07-30
Payer: MEDICARE

## 2023-07-30 VITALS
SYSTOLIC BLOOD PRESSURE: 138 MMHG | RESPIRATION RATE: 18 BRPM | DIASTOLIC BLOOD PRESSURE: 83 MMHG | TEMPERATURE: 97.8 F | HEART RATE: 82 BPM

## 2023-07-30 DIAGNOSIS — M86.271 SUBACUTE OSTEOMYELITIS OF RIGHT FOOT: Primary | ICD-10-CM

## 2023-07-30 PROCEDURE — 25010000002 CEFTRIAXONE PER 250 MG: Performed by: PODIATRIST

## 2023-07-30 RX ADMIN — CEFTRIAXONE SODIUM 2000 MG: 2 INJECTION, POWDER, FOR SOLUTION INTRAMUSCULAR; INTRAVENOUS at 09:08

## 2023-07-31 ENCOUNTER — INFUSION (OUTPATIENT)
Dept: ONCOLOGY | Facility: HOSPITAL | Age: 78
End: 2023-07-31
Payer: MEDICARE

## 2023-07-31 ENCOUNTER — OFFICE VISIT (OUTPATIENT)
Dept: WOUND CARE | Facility: HOSPITAL | Age: 78
End: 2023-07-31
Payer: MEDICARE

## 2023-07-31 VITALS
TEMPERATURE: 97.4 F | RESPIRATION RATE: 18 BRPM | DIASTOLIC BLOOD PRESSURE: 52 MMHG | HEART RATE: 72 BPM | SYSTOLIC BLOOD PRESSURE: 86 MMHG

## 2023-07-31 DIAGNOSIS — M86.271 SUBACUTE OSTEOMYELITIS OF RIGHT FOOT: Primary | ICD-10-CM

## 2023-07-31 PROCEDURE — 96365 THER/PROPH/DIAG IV INF INIT: CPT | Performed by: NURSE PRACTITIONER

## 2023-07-31 PROCEDURE — 25010000002 CEFTRIAXONE PER 250 MG: Performed by: PODIATRIST

## 2023-07-31 PROCEDURE — 97605 NEG PRS WND THER DME<=50SQCM: CPT

## 2023-07-31 RX ADMIN — CEFTRIAXONE SODIUM 2000 MG: 2 INJECTION, POWDER, FOR SOLUTION INTRAMUSCULAR; INTRAVENOUS at 10:47

## 2023-08-01 ENCOUNTER — INFUSION (OUTPATIENT)
Dept: ONCOLOGY | Facility: HOSPITAL | Age: 78
End: 2023-08-01
Payer: MEDICARE

## 2023-08-01 VITALS — HEART RATE: 85 BPM | SYSTOLIC BLOOD PRESSURE: 127 MMHG | DIASTOLIC BLOOD PRESSURE: 66 MMHG | TEMPERATURE: 97.4 F

## 2023-08-01 DIAGNOSIS — M86.271 SUBACUTE OSTEOMYELITIS OF RIGHT FOOT: Primary | ICD-10-CM

## 2023-08-01 PROCEDURE — 96365 THER/PROPH/DIAG IV INF INIT: CPT | Performed by: NURSE PRACTITIONER

## 2023-08-01 PROCEDURE — 25010000002 CEFTRIAXONE PER 250 MG: Performed by: PODIATRIST

## 2023-08-01 RX ADMIN — CEFTRIAXONE SODIUM 2000 MG: 2 INJECTION, POWDER, FOR SOLUTION INTRAMUSCULAR; INTRAVENOUS at 11:23

## 2023-08-02 ENCOUNTER — INFUSION (OUTPATIENT)
Dept: ONCOLOGY | Facility: HOSPITAL | Age: 78
End: 2023-08-02
Payer: MEDICARE

## 2023-08-02 ENCOUNTER — OFFICE VISIT (OUTPATIENT)
Dept: WOUND CARE | Facility: HOSPITAL | Age: 78
End: 2023-08-02
Payer: MEDICARE

## 2023-08-02 ENCOUNTER — OUTSIDE FACILITY SERVICE (OUTPATIENT)
Dept: WOUND CARE | Facility: HOSPITAL | Age: 78
End: 2023-08-02
Payer: MEDICARE

## 2023-08-02 VITALS — HEART RATE: 78 BPM | DIASTOLIC BLOOD PRESSURE: 67 MMHG | SYSTOLIC BLOOD PRESSURE: 134 MMHG | TEMPERATURE: 97 F

## 2023-08-02 DIAGNOSIS — M86.271 SUBACUTE OSTEOMYELITIS OF RIGHT FOOT: Primary | ICD-10-CM

## 2023-08-02 PROCEDURE — 97605 NEG PRS WND THER DME<=50SQCM: CPT

## 2023-08-02 PROCEDURE — 25010000002 CEFTRIAXONE PER 250 MG: Performed by: PODIATRIST

## 2023-08-02 RX ADMIN — CEFTRIAXONE SODIUM 2000 MG: 2 INJECTION, POWDER, FOR SOLUTION INTRAMUSCULAR; INTRAVENOUS at 11:09

## 2023-08-03 ENCOUNTER — INFUSION (OUTPATIENT)
Dept: ONCOLOGY | Facility: HOSPITAL | Age: 78
End: 2023-08-03
Payer: MEDICARE

## 2023-08-03 VITALS — TEMPERATURE: 96.8 F | HEART RATE: 88 BPM | DIASTOLIC BLOOD PRESSURE: 68 MMHG | SYSTOLIC BLOOD PRESSURE: 109 MMHG

## 2023-08-03 DIAGNOSIS — M86.271 SUBACUTE OSTEOMYELITIS OF RIGHT FOOT: Primary | ICD-10-CM

## 2023-08-03 PROCEDURE — 25010000002 CEFTRIAXONE PER 250 MG: Performed by: PODIATRIST

## 2023-08-03 RX ADMIN — CEFTRIAXONE SODIUM 2000 MG: 2 INJECTION, POWDER, FOR SOLUTION INTRAMUSCULAR; INTRAVENOUS at 10:52

## 2023-08-04 ENCOUNTER — OFFICE VISIT (OUTPATIENT)
Dept: WOUND CARE | Facility: HOSPITAL | Age: 78
End: 2023-08-04
Payer: MEDICARE

## 2023-08-04 ENCOUNTER — INFUSION (OUTPATIENT)
Dept: ONCOLOGY | Facility: HOSPITAL | Age: 78
End: 2023-08-04
Payer: MEDICARE

## 2023-08-04 VITALS
DIASTOLIC BLOOD PRESSURE: 69 MMHG | RESPIRATION RATE: 18 BRPM | SYSTOLIC BLOOD PRESSURE: 125 MMHG | HEART RATE: 80 BPM | TEMPERATURE: 97 F

## 2023-08-04 DIAGNOSIS — M86.271 SUBACUTE OSTEOMYELITIS OF RIGHT FOOT: Primary | ICD-10-CM

## 2023-08-04 PROCEDURE — 25010000002 CEFTRIAXONE PER 250 MG: Performed by: PODIATRIST

## 2023-08-04 PROCEDURE — 97605 NEG PRS WND THER DME<=50SQCM: CPT

## 2023-08-04 RX ADMIN — CEFTRIAXONE SODIUM 2000 MG: 2 INJECTION, POWDER, FOR SOLUTION INTRAMUSCULAR; INTRAVENOUS at 10:49

## 2023-08-05 ENCOUNTER — INFUSION (OUTPATIENT)
Dept: ONCOLOGY | Facility: HOSPITAL | Age: 78
End: 2023-08-05
Payer: MEDICARE

## 2023-08-05 VITALS — DIASTOLIC BLOOD PRESSURE: 82 MMHG | HEART RATE: 87 BPM | SYSTOLIC BLOOD PRESSURE: 113 MMHG

## 2023-08-05 DIAGNOSIS — M86.271 SUBACUTE OSTEOMYELITIS OF RIGHT FOOT: Primary | ICD-10-CM

## 2023-08-05 PROCEDURE — 25010000002 CEFTRIAXONE PER 250 MG: Performed by: PODIATRIST

## 2023-08-05 RX ADMIN — CEFTRIAXONE SODIUM 2000 MG: 2 INJECTION, POWDER, FOR SOLUTION INTRAMUSCULAR; INTRAVENOUS at 10:21

## 2023-08-06 ENCOUNTER — INFUSION (OUTPATIENT)
Dept: ONCOLOGY | Facility: HOSPITAL | Age: 78
End: 2023-08-06
Payer: MEDICARE

## 2023-08-06 VITALS — SYSTOLIC BLOOD PRESSURE: 125 MMHG | TEMPERATURE: 97.2 F | HEART RATE: 87 BPM | DIASTOLIC BLOOD PRESSURE: 67 MMHG

## 2023-08-06 DIAGNOSIS — M86.271 SUBACUTE OSTEOMYELITIS OF RIGHT FOOT: Primary | ICD-10-CM

## 2023-08-06 PROCEDURE — 25010000002 CEFTRIAXONE PER 250 MG: Performed by: PODIATRIST

## 2023-08-06 RX ADMIN — CEFTRIAXONE SODIUM 2000 MG: 2 INJECTION, POWDER, FOR SOLUTION INTRAMUSCULAR; INTRAVENOUS at 10:09

## 2023-08-07 ENCOUNTER — OFFICE VISIT (OUTPATIENT)
Dept: WOUND CARE | Facility: HOSPITAL | Age: 78
End: 2023-08-07
Payer: MEDICARE

## 2023-08-07 ENCOUNTER — INFUSION (OUTPATIENT)
Dept: ONCOLOGY | Facility: HOSPITAL | Age: 78
End: 2023-08-07
Payer: MEDICARE

## 2023-08-07 VITALS
DIASTOLIC BLOOD PRESSURE: 64 MMHG | OXYGEN SATURATION: 97 % | TEMPERATURE: 97 F | SYSTOLIC BLOOD PRESSURE: 103 MMHG | RESPIRATION RATE: 18 BRPM | HEART RATE: 78 BPM

## 2023-08-07 DIAGNOSIS — M86.271 SUBACUTE OSTEOMYELITIS OF RIGHT FOOT: Primary | ICD-10-CM

## 2023-08-07 PROCEDURE — 97605 NEG PRS WND THER DME<=50SQCM: CPT

## 2023-08-07 PROCEDURE — 25010000002 CEFTRIAXONE PER 250 MG: Performed by: PODIATRIST

## 2023-08-07 PROCEDURE — 96365 THER/PROPH/DIAG IV INF INIT: CPT | Performed by: NURSE PRACTITIONER

## 2023-08-07 RX ADMIN — CEFTRIAXONE SODIUM 2000 MG: 2 INJECTION, POWDER, FOR SOLUTION INTRAMUSCULAR; INTRAVENOUS at 11:36

## 2023-08-08 ENCOUNTER — INFUSION (OUTPATIENT)
Dept: ONCOLOGY | Facility: HOSPITAL | Age: 78
End: 2023-08-08
Payer: MEDICARE

## 2023-08-08 VITALS
SYSTOLIC BLOOD PRESSURE: 111 MMHG | TEMPERATURE: 97.2 F | DIASTOLIC BLOOD PRESSURE: 64 MMHG | RESPIRATION RATE: 18 BRPM | HEART RATE: 83 BPM

## 2023-08-08 DIAGNOSIS — M86.271 SUBACUTE OSTEOMYELITIS OF RIGHT FOOT: Primary | ICD-10-CM

## 2023-08-08 PROCEDURE — 25010000002 CEFTRIAXONE PER 250 MG: Performed by: PODIATRIST

## 2023-08-08 PROCEDURE — 96365 THER/PROPH/DIAG IV INF INIT: CPT | Performed by: NURSE PRACTITIONER

## 2023-08-08 RX ADMIN — CEFTRIAXONE SODIUM 2000 MG: 2 INJECTION, POWDER, FOR SOLUTION INTRAMUSCULAR; INTRAVENOUS at 11:52

## 2023-08-09 ENCOUNTER — INFUSION (OUTPATIENT)
Dept: ONCOLOGY | Facility: HOSPITAL | Age: 78
End: 2023-08-09
Payer: MEDICARE

## 2023-08-09 VITALS
HEART RATE: 85 BPM | RESPIRATION RATE: 16 BRPM | DIASTOLIC BLOOD PRESSURE: 77 MMHG | TEMPERATURE: 97.4 F | OXYGEN SATURATION: 98 % | SYSTOLIC BLOOD PRESSURE: 126 MMHG

## 2023-08-09 DIAGNOSIS — M86.271 SUBACUTE OSTEOMYELITIS OF RIGHT FOOT: Primary | ICD-10-CM

## 2023-08-09 PROCEDURE — 25010000002 CEFTRIAXONE PER 250 MG: Performed by: PODIATRIST

## 2023-08-09 RX ADMIN — CEFTRIAXONE SODIUM 2000 MG: 2 INJECTION, POWDER, FOR SOLUTION INTRAMUSCULAR; INTRAVENOUS at 11:38

## 2023-08-10 ENCOUNTER — INFUSION (OUTPATIENT)
Dept: ONCOLOGY | Facility: HOSPITAL | Age: 78
End: 2023-08-10
Payer: MEDICARE

## 2023-08-10 ENCOUNTER — OFFICE VISIT (OUTPATIENT)
Dept: WOUND CARE | Facility: HOSPITAL | Age: 78
End: 2023-08-10
Payer: MEDICARE

## 2023-08-10 VITALS
RESPIRATION RATE: 18 BRPM | OXYGEN SATURATION: 96 % | TEMPERATURE: 97.1 F | SYSTOLIC BLOOD PRESSURE: 113 MMHG | DIASTOLIC BLOOD PRESSURE: 67 MMHG | HEART RATE: 80 BPM

## 2023-08-10 DIAGNOSIS — M86.271 SUBACUTE OSTEOMYELITIS OF RIGHT FOOT: Primary | ICD-10-CM

## 2023-08-10 PROCEDURE — 97605 NEG PRS WND THER DME<=50SQCM: CPT

## 2023-08-10 PROCEDURE — 25010000002 CEFTRIAXONE PER 250 MG: Performed by: PODIATRIST

## 2023-08-10 RX ADMIN — CEFTRIAXONE SODIUM 2000 MG: 2 INJECTION, POWDER, FOR SOLUTION INTRAMUSCULAR; INTRAVENOUS at 10:16

## 2023-08-11 ENCOUNTER — INFUSION (OUTPATIENT)
Dept: ONCOLOGY | Facility: HOSPITAL | Age: 78
End: 2023-08-11
Payer: MEDICARE

## 2023-08-11 VITALS
SYSTOLIC BLOOD PRESSURE: 138 MMHG | DIASTOLIC BLOOD PRESSURE: 74 MMHG | HEART RATE: 82 BPM | TEMPERATURE: 96.7 F | RESPIRATION RATE: 18 BRPM

## 2023-08-11 DIAGNOSIS — M86.271 SUBACUTE OSTEOMYELITIS OF RIGHT FOOT: Primary | ICD-10-CM

## 2023-08-11 PROCEDURE — 25010000002 CEFTRIAXONE PER 250 MG: Performed by: PODIATRIST

## 2023-08-11 RX ADMIN — CEFTRIAXONE SODIUM 2000 MG: 2 INJECTION, POWDER, FOR SOLUTION INTRAMUSCULAR; INTRAVENOUS at 09:50

## 2023-08-11 NOTE — PROGRESS NOTES
PATIENT MEETS PROGRAM ELIGIBILITY REQUIREMENTS AND IS ENROLLED IN [facility] UNTIL THE END OF THE 2023 CALENDAR YEAR.    SENT TO MED REC  
4 = No assist / stand by assistance

## 2023-08-12 ENCOUNTER — INFUSION (OUTPATIENT)
Dept: ONCOLOGY | Facility: HOSPITAL | Age: 78
End: 2023-08-12
Payer: MEDICARE

## 2023-08-12 VITALS
HEART RATE: 84 BPM | TEMPERATURE: 96.7 F | RESPIRATION RATE: 20 BRPM | SYSTOLIC BLOOD PRESSURE: 120 MMHG | DIASTOLIC BLOOD PRESSURE: 67 MMHG

## 2023-08-12 DIAGNOSIS — M86.271 SUBACUTE OSTEOMYELITIS OF RIGHT FOOT: Primary | ICD-10-CM

## 2023-08-12 PROCEDURE — 25010000002 CEFTRIAXONE PER 250 MG: Performed by: PODIATRIST

## 2023-08-12 RX ADMIN — CEFTRIAXONE SODIUM 2000 MG: 2 INJECTION, POWDER, FOR SOLUTION INTRAMUSCULAR; INTRAVENOUS at 08:43

## 2023-08-13 ENCOUNTER — INFUSION (OUTPATIENT)
Dept: ONCOLOGY | Facility: HOSPITAL | Age: 78
End: 2023-08-13
Payer: MEDICARE

## 2023-08-13 VITALS
TEMPERATURE: 97.5 F | DIASTOLIC BLOOD PRESSURE: 66 MMHG | RESPIRATION RATE: 20 BRPM | HEART RATE: 76 BPM | SYSTOLIC BLOOD PRESSURE: 123 MMHG

## 2023-08-13 DIAGNOSIS — M86.271 SUBACUTE OSTEOMYELITIS OF RIGHT FOOT: Primary | ICD-10-CM

## 2023-08-13 PROCEDURE — 25010000002 CEFTRIAXONE PER 250 MG: Performed by: PODIATRIST

## 2023-08-13 RX ADMIN — CEFTRIAXONE SODIUM 2000 MG: 2 INJECTION, POWDER, FOR SOLUTION INTRAMUSCULAR; INTRAVENOUS at 08:29

## 2023-08-14 ENCOUNTER — OFFICE VISIT (OUTPATIENT)
Dept: WOUND CARE | Facility: HOSPITAL | Age: 78
End: 2023-08-14
Payer: MEDICARE

## 2023-08-14 ENCOUNTER — INFUSION (OUTPATIENT)
Dept: ONCOLOGY | Facility: HOSPITAL | Age: 78
End: 2023-08-14
Payer: MEDICARE

## 2023-08-14 ENCOUNTER — OUTSIDE FACILITY SERVICE (OUTPATIENT)
Dept: WOUND CARE | Facility: HOSPITAL | Age: 78
End: 2023-08-14
Payer: MEDICARE

## 2023-08-14 VITALS
DIASTOLIC BLOOD PRESSURE: 59 MMHG | RESPIRATION RATE: 18 BRPM | HEART RATE: 97 BPM | SYSTOLIC BLOOD PRESSURE: 105 MMHG | TEMPERATURE: 96.6 F

## 2023-08-14 DIAGNOSIS — M86.271 SUBACUTE OSTEOMYELITIS OF RIGHT FOOT: Primary | ICD-10-CM

## 2023-08-14 PROCEDURE — 25010000002 CEFTRIAXONE PER 250 MG: Performed by: PODIATRIST

## 2023-08-14 RX ADMIN — CEFTRIAXONE SODIUM 2000 MG: 2 INJECTION, POWDER, FOR SOLUTION INTRAMUSCULAR; INTRAVENOUS at 11:31

## 2023-08-15 ENCOUNTER — INFUSION (OUTPATIENT)
Dept: ONCOLOGY | Facility: HOSPITAL | Age: 78
End: 2023-08-15
Payer: MEDICARE

## 2023-08-15 VITALS
SYSTOLIC BLOOD PRESSURE: 138 MMHG | TEMPERATURE: 96.7 F | DIASTOLIC BLOOD PRESSURE: 73 MMHG | HEART RATE: 85 BPM | RESPIRATION RATE: 18 BRPM

## 2023-08-15 DIAGNOSIS — M86.271 SUBACUTE OSTEOMYELITIS OF RIGHT FOOT: Primary | ICD-10-CM

## 2023-08-15 PROCEDURE — 25010000002 CEFTRIAXONE PER 250 MG: Performed by: PODIATRIST

## 2023-08-15 RX ADMIN — CEFTRIAXONE SODIUM 2000 MG: 2 INJECTION, POWDER, FOR SOLUTION INTRAMUSCULAR; INTRAVENOUS at 10:49

## 2023-08-16 ENCOUNTER — INFUSION (OUTPATIENT)
Dept: ONCOLOGY | Facility: HOSPITAL | Age: 78
End: 2023-08-16
Payer: MEDICARE

## 2023-08-16 ENCOUNTER — OFFICE VISIT (OUTPATIENT)
Dept: WOUND CARE | Facility: HOSPITAL | Age: 78
End: 2023-08-16
Payer: MEDICARE

## 2023-08-16 VITALS
TEMPERATURE: 96.8 F | RESPIRATION RATE: 18 BRPM | SYSTOLIC BLOOD PRESSURE: 143 MMHG | DIASTOLIC BLOOD PRESSURE: 76 MMHG | HEART RATE: 77 BPM

## 2023-08-16 DIAGNOSIS — M86.271 SUBACUTE OSTEOMYELITIS OF RIGHT FOOT: Primary | ICD-10-CM

## 2023-08-16 PROCEDURE — 25010000002 CEFTRIAXONE PER 250 MG: Performed by: PODIATRIST

## 2023-08-16 RX ADMIN — CEFTRIAXONE SODIUM 2000 MG: 2 INJECTION, POWDER, FOR SOLUTION INTRAMUSCULAR; INTRAVENOUS at 10:45

## 2023-08-17 ENCOUNTER — INFUSION (OUTPATIENT)
Dept: ONCOLOGY | Facility: HOSPITAL | Age: 78
End: 2023-08-17
Payer: MEDICARE

## 2023-08-17 VITALS
HEART RATE: 82 BPM | DIASTOLIC BLOOD PRESSURE: 72 MMHG | RESPIRATION RATE: 18 BRPM | TEMPERATURE: 97.2 F | SYSTOLIC BLOOD PRESSURE: 130 MMHG

## 2023-08-17 DIAGNOSIS — M86.271 SUBACUTE OSTEOMYELITIS OF RIGHT FOOT: Primary | ICD-10-CM

## 2023-08-17 PROCEDURE — 25010000002 CEFTRIAXONE PER 250 MG: Performed by: PODIATRIST

## 2023-08-17 RX ADMIN — CEFTRIAXONE SODIUM 2000 MG: 2 INJECTION, POWDER, FOR SOLUTION INTRAMUSCULAR; INTRAVENOUS at 10:59

## 2023-08-18 ENCOUNTER — INFUSION (OUTPATIENT)
Dept: ONCOLOGY | Facility: HOSPITAL | Age: 78
End: 2023-08-18
Payer: MEDICARE

## 2023-08-18 VITALS
RESPIRATION RATE: 20 BRPM | DIASTOLIC BLOOD PRESSURE: 77 MMHG | HEART RATE: 85 BPM | TEMPERATURE: 96.7 F | SYSTOLIC BLOOD PRESSURE: 131 MMHG

## 2023-08-18 DIAGNOSIS — M86.271 SUBACUTE OSTEOMYELITIS OF RIGHT FOOT: Primary | ICD-10-CM

## 2023-08-18 PROCEDURE — 25010000002 CEFTRIAXONE PER 250 MG: Performed by: PODIATRIST

## 2023-08-18 RX ADMIN — CEFTRIAXONE SODIUM 2000 MG: 2 INJECTION, POWDER, FOR SOLUTION INTRAMUSCULAR; INTRAVENOUS at 10:48

## 2023-08-19 ENCOUNTER — INFUSION (OUTPATIENT)
Dept: ONCOLOGY | Facility: HOSPITAL | Age: 78
End: 2023-08-19
Payer: MEDICARE

## 2023-08-19 VITALS
SYSTOLIC BLOOD PRESSURE: 146 MMHG | HEART RATE: 76 BPM | TEMPERATURE: 96.8 F | RESPIRATION RATE: 18 BRPM | OXYGEN SATURATION: 98 % | DIASTOLIC BLOOD PRESSURE: 78 MMHG

## 2023-08-19 DIAGNOSIS — M86.271 SUBACUTE OSTEOMYELITIS OF RIGHT FOOT: Primary | ICD-10-CM

## 2023-08-19 PROCEDURE — 25010000002 CEFTRIAXONE PER 250 MG: Performed by: PODIATRIST

## 2023-08-19 RX ADMIN — CEFTRIAXONE SODIUM 2000 MG: 2 INJECTION, POWDER, FOR SOLUTION INTRAMUSCULAR; INTRAVENOUS at 08:35

## 2023-08-20 ENCOUNTER — INFUSION (OUTPATIENT)
Dept: ONCOLOGY | Facility: HOSPITAL | Age: 78
End: 2023-08-20
Payer: MEDICARE

## 2023-08-20 VITALS
OXYGEN SATURATION: 96 % | SYSTOLIC BLOOD PRESSURE: 153 MMHG | RESPIRATION RATE: 18 BRPM | TEMPERATURE: 97.4 F | DIASTOLIC BLOOD PRESSURE: 82 MMHG | HEART RATE: 80 BPM

## 2023-08-20 DIAGNOSIS — M86.271 SUBACUTE OSTEOMYELITIS OF RIGHT FOOT: Primary | ICD-10-CM

## 2023-08-20 PROCEDURE — 25010000002 CEFTRIAXONE PER 250 MG: Performed by: PODIATRIST

## 2023-08-20 RX ADMIN — CEFTRIAXONE SODIUM 2000 MG: 2 INJECTION, POWDER, FOR SOLUTION INTRAMUSCULAR; INTRAVENOUS at 08:33

## 2023-08-21 ENCOUNTER — OUTSIDE FACILITY SERVICE (OUTPATIENT)
Dept: WOUND CARE | Facility: HOSPITAL | Age: 78
End: 2023-08-21
Payer: MEDICARE

## 2023-08-21 ENCOUNTER — INFUSION (OUTPATIENT)
Dept: ONCOLOGY | Facility: HOSPITAL | Age: 78
End: 2023-08-21
Payer: MEDICARE

## 2023-08-21 ENCOUNTER — OFFICE VISIT (OUTPATIENT)
Dept: WOUND CARE | Facility: HOSPITAL | Age: 78
End: 2023-08-21
Payer: MEDICARE

## 2023-08-21 VITALS
DIASTOLIC BLOOD PRESSURE: 67 MMHG | HEART RATE: 74 BPM | TEMPERATURE: 97.3 F | SYSTOLIC BLOOD PRESSURE: 122 MMHG | RESPIRATION RATE: 18 BRPM

## 2023-08-21 DIAGNOSIS — M86.271 SUBACUTE OSTEOMYELITIS OF RIGHT FOOT: Primary | ICD-10-CM

## 2023-08-21 PROCEDURE — 25010000002 CEFTRIAXONE PER 250 MG: Performed by: PODIATRIST

## 2023-08-21 PROCEDURE — 97605 NEG PRS WND THER DME<=50SQCM: CPT

## 2023-08-21 PROCEDURE — 96365 THER/PROPH/DIAG IV INF INIT: CPT | Performed by: NURSE PRACTITIONER

## 2023-08-21 RX ADMIN — CEFTRIAXONE SODIUM 2000 MG: 2 INJECTION, POWDER, FOR SOLUTION INTRAMUSCULAR; INTRAVENOUS at 11:09

## 2023-08-22 ENCOUNTER — INFUSION (OUTPATIENT)
Dept: ONCOLOGY | Facility: HOSPITAL | Age: 78
End: 2023-08-22
Payer: MEDICARE

## 2023-08-22 VITALS
RESPIRATION RATE: 18 BRPM | SYSTOLIC BLOOD PRESSURE: 128 MMHG | HEART RATE: 85 BPM | DIASTOLIC BLOOD PRESSURE: 69 MMHG | TEMPERATURE: 96.6 F

## 2023-08-22 DIAGNOSIS — M86.271 SUBACUTE OSTEOMYELITIS OF RIGHT FOOT: Primary | ICD-10-CM

## 2023-08-22 PROCEDURE — 96365 THER/PROPH/DIAG IV INF INIT: CPT | Performed by: NURSE PRACTITIONER

## 2023-08-22 PROCEDURE — 25010000002 CEFTRIAXONE PER 250 MG: Performed by: PODIATRIST

## 2023-08-22 RX ADMIN — CEFTRIAXONE SODIUM 2000 MG: 2 INJECTION, POWDER, FOR SOLUTION INTRAMUSCULAR; INTRAVENOUS at 10:36

## 2023-08-23 ENCOUNTER — OUTSIDE FACILITY SERVICE (OUTPATIENT)
Dept: WOUND CARE | Facility: HOSPITAL | Age: 78
End: 2023-08-23
Payer: MEDICARE

## 2023-08-23 ENCOUNTER — INFUSION (OUTPATIENT)
Dept: ONCOLOGY | Facility: HOSPITAL | Age: 78
End: 2023-08-23
Payer: MEDICARE

## 2023-08-23 ENCOUNTER — OFFICE VISIT (OUTPATIENT)
Dept: WOUND CARE | Facility: HOSPITAL | Age: 78
End: 2023-08-23
Payer: MEDICARE

## 2023-08-23 VITALS
DIASTOLIC BLOOD PRESSURE: 66 MMHG | RESPIRATION RATE: 18 BRPM | HEART RATE: 86 BPM | TEMPERATURE: 97.2 F | SYSTOLIC BLOOD PRESSURE: 127 MMHG

## 2023-08-23 DIAGNOSIS — M86.271 SUBACUTE OSTEOMYELITIS OF RIGHT FOOT: Primary | ICD-10-CM

## 2023-08-23 PROCEDURE — 25010000002 CEFTRIAXONE PER 250 MG: Performed by: PODIATRIST

## 2023-08-23 RX ADMIN — CEFTRIAXONE SODIUM 2000 MG: 2 INJECTION, POWDER, FOR SOLUTION INTRAMUSCULAR; INTRAVENOUS at 10:39

## 2023-08-23 NOTE — NURSING NOTE
Spoke with Dr. Salazar office regarding pt's last abx infusion being today - we need an order to remove his PICC line. Per podiatry office, pt is a wound care pt and order needs to come from wound care.  Spoke with JORDEN Hurt - reyes to remove PICC line. Orders placed. Pt aware.

## 2023-08-24 ENCOUNTER — DOCUMENTATION (OUTPATIENT)
Dept: ENDOCRINOLOGY | Facility: CLINIC | Age: 78
End: 2023-08-24
Payer: MEDICARE

## 2023-08-28 RX ORDER — ESOMEPRAZOLE MAGNESIUM 40 MG/1
CAPSULE, DELAYED RELEASE ORAL
Qty: 30 CAPSULE | Refills: 0 | OUTPATIENT
Start: 2023-08-28

## 2023-08-29 RX ORDER — ESOMEPRAZOLE MAGNESIUM 40 MG/1
CAPSULE, DELAYED RELEASE ORAL
Qty: 30 CAPSULE | Refills: 0 | OUTPATIENT
Start: 2023-08-29

## 2023-08-29 NOTE — PROGRESS NOTES
Subjective:  Aj Card Jr. is a 77 y.o. male who presents for       Patient Active Problem List   Diagnosis    Type 2 diabetes mellitus without complication, without long-term current use of insulin    Essential hypertension    Mixed hyperlipidemia    Generalized anxiety disorder    History of colon polyps    Diverticulosis of large intestine without hemorrhage    Coronary artery disease with angina pectoris    Gastroesophageal reflux disease with esophagitis    Vitamin D deficiency    Overweight    Encounter for screening for endocrine disorder    Atopic dermatitis    High serum vitamin B12    Acute pain of right knee    Tear of medial meniscus of right knee, current    Atrial fibrillation with RVR    GERD (gastroesophageal reflux disease)    PAF (paroxysmal atrial fibrillation)    Uncontrolled type 2 diabetes mellitus with hyperglycemia    Gastroesophageal reflux disease    Chronic constipation    Stage 2 chronic kidney disease    Osteoarthritis of ankle and foot, right    Closed trimalleolar fracture of right ankle    Status post bunionectomy    Urinary retention    History of foot surgery    Chronic anticoagulation    Controlled type 2 diabetes mellitus without complication, without long-term current use of insulin    Disruption of external surgical wound    Open wound of right foot    Subacute osteomyelitis of right foot           Current Outpatient Medications:     ALPRAZolam (XANAX) 0.5 MG tablet, Take 1 tablet by mouth At Night As Needed for Anxiety., Disp: 60 tablet, Rfl: 0    amLODIPine (NORVASC) 10 MG tablet, Take 1 tablet by mouth Daily. Indications: High Blood Pressure Disorder, Disp: , Rfl:     ASPIRIN 81 PO, Take 1 tablet by mouth Daily. Indications: HEART HEALTH, Disp: , Rfl:     Azelastine HCl 137 MCG/SPRAY solution, Inhale 1 spray Daily., Disp: 30 mL, Rfl: 3    benzonatate (TESSALON) 100 MG capsule, Take 1 capsule 3 times a day by oral route for 10 days., Disp: , Rfl:     clopidogrel  "(PLAVIX) 75 MG tablet, Take 1 tablet every day by oral route., Disp: , Rfl:     coenzyme Q10 100 MG capsule, Take 1 capsule by mouth Daily. Indications: HEART HEALTH, Disp: , Rfl:     dapagliflozin (Farxiga) 5 MG tablet tablet, Take 1 tablet by mouth Daily. Indications: Type 2 Diabetes, Disp: , Rfl:     Dulaglutide (Trulicity) 3 MG/0.5ML solution pen-injector, Inject 3 mg under the skin into the appropriate area as directed 1 (One) Time Per Week. (Patient taking differently: Inject 0.25 mL under the skin into the appropriate area as directed 1 (One) Time Per Week. LAST DOSE 6- TAKING ON MONDAYS), Disp: 12 pen, Rfl: 3    esomeprazole (nexIUM) 40 MG capsule, Take 1 capsule by mouth Every Morning Before Breakfast., Disp: 90 capsule, Rfl: 1    Glucose Blood (BLOOD GLUCOSE TEST) strip, Use 4 x daily use any brand covered by insurance or same brand as before ICD10 code is E11.9, Disp: 120 each, Rfl: 11    Insulin Glargine (BASAGLAR KWIKPEN) 100 UNIT/ML injection pen, Inject 15 Units under the skin into the appropriate area as directed At Night As Needed. Indications: T2DM, Disp: , Rfl:     Insulin Pen Needle 30G X 8 MM misc, Use 3-4 times daily., Disp: 100 each, Rfl: 3    Insulin Syringe 31G X 5/16\" 0.3 ML misc, 4 x daily, Disp: 120 each, Rfl: 11    isosorbide mononitrate (IMDUR) 30 MG 24 hr tablet, Take 1 tablet by mouth Daily. Indications: Stable Angina Pectoris, Disp: , Rfl:     KRILL OIL OMEGA-3 PO, Take 1 capsule by mouth Daily. Indications: HEART HEALTH, Disp: , Rfl:     Lancet Devices (LANCING DEVICE) misc, USE AS INDICATED TO CORRELATE WITH STRIPS AND METER, Disp: 1 each, Rfl: 1    Lancets 30G misc, USE 4 X DAILY, Disp: 120 each, Rfl: 11    lisinopril (PRINIVIL,ZESTRIL) 40 MG tablet, Take 1 tablet by mouth Daily. Indications: High Blood Pressure Disorder, Disp: 90 tablet, Rfl: 1    metFORMIN (GLUCOPHAGE) 500 MG tablet, Take 1 tablet twice a day by oral route., Disp: , Rfl:     metoprolol tartrate " (LOPRESSOR) 50 MG tablet, Take 1 tablet by mouth 2 (Two) Times a Day., Disp: 180 tablet, Rfl: 1    polyethylene glycol (MIRALAX) 17 g packet, Take 17 g by mouth Daily As Needed (CONSTIPATION). Indications: Constipation, Disp: , Rfl:     propafenone (RYTHMOL) 150 MG tablet, Take 1 tablet by mouth Every 8 (Eight) Hours. Indications: Irregularity of Ventricular Heart Rhythm, Disp: , Rfl:     Red Yeast Rice Extract (RED YEAST RICE PO), Take 4 capsules by mouth Daily. Indications: HEART HEALTH, Disp: , Rfl:     rivaroxaban (XARELTO) 15 MG tablet, Take 1 tablet by mouth Daily. Indications: Atrial Fibrillation, Disp: 30 tablet, Rfl: 0    Saw Palmetto, Serenoa repens, (SAW PALMETTO PO), Take 1 tablet by mouth Daily. Indications: HEART HEALTH, Disp: , Rfl:     sucralfate (CARAFATE) 1 g tablet, Take 1 tablet by mouth 4 (Four) Times a Day., Disp: 120 tablet, Rfl: 3    terazosin (HYTRIN) 2 MG capsule, Take 1 capsule by mouth Every Night., Disp: 30 capsule, Rfl: 0    traMADol (ULTRAM) 50 MG tablet, Take 1 tablet by mouth Every 8 (Eight) Hours As Needed for Moderate Pain. Indications: Pain, Disp: , Rfl:     triamcinolone acetonide (KENALOG-40) 40 MG/ML injection, Take 1 mL by injection route., Disp: , Rfl:     TURMERIC CURCUMIN PO, Take 1,500 mg by mouth Daily. Indications: HEART HEALTH, Disp: , Rfl:     vitamin B-12 (CYANOCOBALAMIN) 2500 MCG sublingual tablet tablet, Place 5,000 mcg under the tongue 1 (One) Time Per Week. Indications: Inadequate Vitamin B12, Disp: , Rfl:     vitamin E 100 UNIT capsule, Take 1 capsule by mouth Daily. Indications: HEART HEALTH, Disp: , Rfl:     Current Facility-Administered Medications:     sodium chloride 0.9 % flush 10 mL, 10 mL, Intravenous, PRN, Glen Jain MD       Pt is 76 yo male with history of GERD DM type 2, Vitamin B12 deficiency, HLP, HTN, ADRIENNE, CAD sp stent x 3, atrial fibrillation with RVR   Esophagitis, Gastritis, sp appendectomy sp cholecystectomy sp hernia repair  surgery, sp hand surgery/ cataract surgery bilateral, abnormal EKG ( right ventricular dilation, LVH,  Grade 1 diastolic dysfunction, mild calcification of aortic valve)  CKD stage, diverticulosis, internal/external hemorrhoids, history of urinary retention. Sp right fusion surgery x 2.   ..   4/18/23 in office visit for recheck. Since last visit pt had 2 surgeries on right foot fusion on 3/13/23 and 3/23/23.  Pt was at a nursing home for his rehab.  He was admitted to [facility] From 3/30/23 to 4/7/23  For urinary retention and nausea/vomiting  Several days prior.  He was found to have 13814 in bladder scan.  He was also found to be in a fib with RVR. He was sent to ICU and cardizem was started and then transitioned to PO. For his urinary retention he had ratliff catheter placed and Urology was consulted. Pt had cystoscopy. His Ratliff catheter was discontinued and he was able to urinate.  Pt was discharged home instable condition and opted to not go back to Nursing home. Pt was given keflex 500 mg PO BID along with colace and terazosin 2 mg PO qhs . His last labwork on 4/6/23 showed CBC hemoglobin at 12.1 from 11.0 HCT at 36.0 from 33.0 WBC at 6.50 and RBC at 4.30. BMP showed glucose at 160 sodium at 134. His eliquis was changed to xarelto 15 mg daily.  is now seeing pt and is getting PT/OT.  He has his right foot bandaged.  He was prescribed Norco for pain he has also tried Percocet.  He states Norco made him feel crazy. He states his urination is better. His pain is stable. No fever no shortness of breath no abdominal pain     6/20/23 in office visit for recheck. Pt has been following up with Podiatry regarding his right lower bunionectomy. He currently jacques unna boot in place. Pt had labowrk on 5/17/23 that showed TSH normal Vitamin B12 normal hga1c at 6.20 from 7.10 CBC showed normal hemoglobin. Lipid panel showed triglycerides high at 322. HDL at 28 VLDL at 51. Hga1c at 6.20 from 7.10. CMP showed glucose at 141 sodium  at 135. GFR at 87.4. CBC showed normal hemoglobin. Pt has wound on foot and has upcoming appt with Podiatry.   Pt is feeling better he is gaining weight. His wound on right foot is still draining     9/20/23 in office visit for recheck. Pt saw Podiatry on 8/30/23 and 9/6/23.  Pt has labwork ordered on 6/26/23. Pt did have labwork on 6/28/23 that showed GFR at 85.1 with ESR at 31.  CRP is normal.   CBC is normal   his righ foot is healing. He continues to see wound care. Sugars are stable. No chest pain no dizziness.  Recently Saw Cardiologist. Pt reports no fever no chills.  Pt currently has boot on right foot.         Male  Problem  The patient's pertinent negatives include no genital injury, genital itching, genital lesions, pelvic pain, penile discharge, penile pain, priapism, scrotal swelling or testicular pain. This is a recurrent problem. The current episode started more than 1 month ago. The problem occurs constantly. The problem has been gradually improving. The patient is experiencing no pain. Pertinent negatives include no chest pain, chills, coughing, diarrhea, fever, headaches, nausea, shortness of breath, sore throat or vomiting. Nothing aggravates the symptoms. He has tried nothing for the symptoms. The treatment provided no relief. He is sexually active. He never uses condoms. There is no history of BPH, chlamydia, cryptorchidism, erectile aid use, erectile dysfunction, a femoral hernia, gonorrhea, herpes simplex, HIV, an inguinal hernia, kidney stones, prostatitis, sickle cell disease, syphilis or varicocele.   Heartburn  He reports no abdominal pain, no belching, no chest pain, no choking, no coughing, no dysphagia, no early satiety, no globus sensation, no heartburn, no hoarse voice, no nausea, no sore throat, no stridor, no tooth decay, no water brash or no wheezing. This is a recurrent problem. The current episode started more than 1 month ago. The problem occurs constantly. The problem has  been unchanged. Nothing aggravates the symptoms. Associated symptoms include fatigue. Pertinent negatives include no muscle weakness. He has tried a PPI for the symptoms. The treatment provided moderate relief. Past procedures do not include an abdominal ultrasound, an EGD, esophageal manometry, esophageal pH monitoring, H. pylori antibody titer or a UGI.   Coronary Artery Disease  Presents for follow-up visit. Pertinent negatives include no chest pain, chest pressure, chest tightness, dizziness, leg swelling, muscle weakness, palpitations, shortness of breath or weight gain. The symptoms have been stable. Compliance with diet is good. Compliance with exercise is good. Compliance with medications is good.   Chronic Kidney Disease  This is a chronic problem. The current episode started more than 1 year ago. The problem occurs constantly. Associated symptoms include arthralgias, fatigue, numbness and weakness. Pertinent negatives include no abdominal pain, chest pain, coughing, nausea or sore throat. He has tried nothing for the symptoms. The treatment provided no relief.   Atrial Fibrillation   Presents for follow-up visit. Symptoms include dizziness, palpitations, shortness of breath and weakness. Symptoms are negative for an AICD problem, bradycardia, chest pain, hemodynamic instability, hypertension, hypotension, pacemaker problem and tachycardia. The symptoms have been stable. Past medical history includes atrial fibrillation. There are no medication compliance problems.   Hypertension   This is a chronic problem. The current episode started more than 1 year ago. The problem is controlled. Pertinent negatives include no anxiety, blurred vision, chest pain, headaches, malaise/fatigue, neck pain, palpitations, peripheral edema, PND, shortness of breath or sweats. Risk factors for coronary artery disease include diabetes mellitus, dyslipidemia, sedentary lifestyle and male gender. There are no compliance problems.   Hypertensive end-organ damage includes CAD/MI. There is no history of angina, kidney disease, CVA, heart failure, left ventricular hypertrophy, PVD or retinopathy. There is no history of chronic renal disease, coarctation of the aorta, hyperaldosteronism, hypercortisolism, hyperparathyroidism, a hypertension causing med, pheochromocytoma, renovascular disease, sleep apnea or a thyroid problem.   Diabetes   He presents for his follow-up diabetic visit. He has type 2 diabetes mellitus. His disease course has been stable. Hypoglycemia symptoms include nervousness/anxiousness. Pertinent negatives for hypoglycemia include no confusion, dizziness, headaches or sweats. Pertinent negatives for diabetes include no blurred vision, no chest pain, no fatigue, no foot paresthesias, no foot ulcerations, no polydipsia, no polyphagia, no polyuria, no visual change, no weakness and no weight loss. Pertinent negatives for hypoglycemia complications include no blackouts and no hospitalization. Symptoms are stable. Pertinent negatives for diabetic complications include no CVA, impotence, PVD or retinopathy. Risk factors for coronary artery disease include diabetes mellitus, dyslipidemia, hypertension and male sex. Current diabetic treatment includes oral agent (dual therapy). He is compliant with treatment most of the time. His weight is stable. An ACE inhibitor/angiotensin II receptor blocker is being taken. He does not see a podiatrist.Eye exam is not current.   Anxiety   Presents for follow-up visit. Symptoms include excessive worry and nervous/anxious behavior. Patient reports no chest pain, compulsions, confusion, decreased concentration, depressed mood, dizziness, dry mouth, feeling of choking, hyperventilation, impotence, insomnia, irritability, malaise, muscle tension, nausea, obsessions, palpitations, panic, restlessness, shortness of breath or suicidal ideas.        Review of Systems  Review of Systems   Constitutional:   Positive for activity change and fatigue. Negative for appetite change, chills, diaphoresis and fever.   HENT:  Negative for congestion, postnasal drip, rhinorrhea, sinus pressure, sinus pain, sneezing, sore throat, trouble swallowing and voice change.    Respiratory:  Negative for cough, choking, chest tightness, shortness of breath, wheezing and stridor.    Cardiovascular:  Negative for chest pain.   Gastrointestinal:  Negative for diarrhea, nausea and vomiting.   Genitourinary:  Negative for pelvic pain, penile discharge, penile pain, scrotal swelling and testicular pain.   Musculoskeletal:  Positive for arthralgias.   Neurological:  Positive for weakness and numbness. Negative for headaches.   Psychiatric/Behavioral:  The patient is nervous/anxious.      Patient Active Problem List   Diagnosis    Type 2 diabetes mellitus without complication, without long-term current use of insulin    Essential hypertension    Mixed hyperlipidemia    Generalized anxiety disorder    History of colon polyps    Diverticulosis of large intestine without hemorrhage    Coronary artery disease with angina pectoris    Gastroesophageal reflux disease with esophagitis    Vitamin D deficiency    Overweight    Encounter for screening for endocrine disorder    Atopic dermatitis    High serum vitamin B12    Acute pain of right knee    Tear of medial meniscus of right knee, current    Atrial fibrillation with RVR    GERD (gastroesophageal reflux disease)    PAF (paroxysmal atrial fibrillation)    Uncontrolled type 2 diabetes mellitus with hyperglycemia    Gastroesophageal reflux disease    Chronic constipation    Stage 2 chronic kidney disease    Osteoarthritis of ankle and foot, right    Closed trimalleolar fracture of right ankle    Status post bunionectomy    Urinary retention    History of foot surgery    Chronic anticoagulation    Controlled type 2 diabetes mellitus without complication, without long-term current use of insulin     Disruption of external surgical wound    Open wound of right foot    Subacute osteomyelitis of right foot     Past Surgical History:   Procedure Laterality Date    ANKLE OPEN REDUCTION INTERNAL FIXATION Right 3/23/2023    Procedure: ANKLE OPEN REDUCTION INTERNAL FIXATION               (LATEX ALLERGY);  Surgeon: Mason Salazar DPM;  Location: [facility] OR;  Service: Podiatry;  Laterality: Right;    APPENDECTOMY       [facility] 1995        BONE BIOPSY LEG Right 7/6/2023    Procedure: BONE BIOPSY LOWER EXTREMITY;  Surgeon: Mason Salazar DPM;  Location: [facility] OR;  Service: Podiatry;  Laterality: Right;    CARDIAC CATHETERIZATION      Successful PTCA of the OMB#2.Unsuccessful PTCA of the 1st OMB, secondary to difficulty in advancing the balloon. 12/08/2015        CHOLECYSTECTOMY      [facility] 1993        CHOLECYSTECTOMY      COLONOSCOPY  10/01/2012    COLONOSCOPY N/A 12/11/2017    Procedure: COLONOSCOPY;  Surgeon: Bladimir Michael MD;  Location: [facility] ENDOSCOPY;  Service:     COLONOSCOPY N/A 11/01/2022    Procedure: COLONOSCOPY;  Surgeon: Bladimir Michael MD;  Location: [facility] ENDOSCOPY;  Service: Gastroenterology;  Laterality: N/A;    CORONARY ANGIOPLASTY      Successful PTCA & stenting LAD was done w/ deployment of a 2.5mm x23 Xience stent w/ reduction of stenosis from 90% to less than 0% stenosis with good LILLIAM 3 flow 02/17/2012        CYSTOSCOPY N/A 4/5/2023    Procedure: CYSTOSCOPY         (LATEX ALLERGY);  Surgeon: Vinicio Dan MD;  Location: [facility] OR;  Service: Urology;  Laterality: N/A;    ENDOSCOPY      with biopsy.  Mildly severe esophagitis. Gastritis. Normal examined duodenum.Several biopsies obtained in the lower third of the esophagus.Several biopsies obtained in the gastric antrum. 05/06/2016        ENDOSCOPY      w tube. Normal esophagus. Gastritis in stomach. Biopsy taken. Gastric polyp found. Biopsy taken. Normal duodenum. 10/01/2012        ENDOSCOPY AND  COLONOSCOPY      Diverticulum in sigmoid colon & descending colon. Internal & external hemorrhoids found. 10/01/2012        EYE SURGERY Bilateral     catarract    HAND SURGERY Left      Corrective osteotomy of ring finger metacarpal. Malunited fracture of left ring finger 05/21/1985        HARDWARE REMOVAL Right 7/6/2023    Procedure: HARDWARE REMOVAL  ALL OTHER INDICATED PROCEDURES;  Surgeon: Mason Salazar DPM;  Location: [facility];  Service: Podiatry;  Laterality: Right;    HERNIA REPAIR      Laparoscopic hernia repair. prepertitoneal bilateral inguinal hernia repair with mesh. 10/15/2009       OTHER SURGICAL HISTORY      CORONARY ARTERY STENT     PLACEMENT OF WOUND VAC Right 7/6/2023    Procedure: PLACEMENT OF WOUND VAC AND ALL OTHER INDICATED PROCEDURES            (latex allergy);  Surgeon: Mason Salazar DPM;  Location: [facility];  Service: Podiatry;  Laterality: Right;    SKIN TAG REMOVAL      Excision, viral skin tag,right upper eyelid 05/08/1995     TOE FUSION Right 3/13/2023    Procedure: RIGHT FIRST TARSOMETATARSAL JOINT ARTHRODESIS, FIRST METATARSOPHALANGEAL JOINT ARTHRODESIS, CALCANEAL BONE GRAFT AND ALL OTHER INDICATED PROCEDURES                (LATEX ALLERGY);  Surgeon: Mason Salazar DPM;  Location: [facility];  Service: Podiatry;  Laterality: Right;     Social History     Socioeconomic History    Marital status:    Tobacco Use    Smoking status: Never     Passive exposure: Never    Smokeless tobacco: Never   Vaping Use    Vaping Use: Never used   Substance and Sexual Activity    Alcohol use: No    Drug use: Never    Sexual activity: Defer     Family History   Problem Relation Age of Onset    Clotting disorder Other     Lung disease Other     Schizophrenia Sister     Obesity Sister     Tuberculosis Sister     Arthritis Mother     Diabetes Mother     Heart disease Mother     Hypertension Mother     Obesity Mother     Stroke Mother     Thyroid disease Mother     Tuberculosis Mother      "Arthritis Father     Tuberculosis Father      Admission on 2023, Discharged on 2023   Component Date Value Ref Range Status    Glucose 2023 152 (H)  70 - 130 mg/dL Final    RN NotifiedOperator: [id number] WESLEY REBECCAMeter ID: [id number]    QT Interval 2023 404  ms Final    QTC Interval 2023 439  ms Final    Reference Lab Report 2023    Final                    Value:[facility]  [address] [place] [address]  Phone: [phone] or [phone]  Fax: [phone]  Chris Lynn M.D., Medical Director    PATIENT NAME                                     LABORATORY NO.  1800   TURNER BLANCO JR.                       [id number]  [id number]                                 AGE                    SEX   SSN              CLIENT REF #  [facility]                   77        1945           [id number]      [id number]    Baskin                               REQUESTING M.D.           ATTENDING M.D.         COPY TO[address]                         [place] [address]                     DATE COLLECTED            DATE RECEIVED          DATE REPORTED  2023                2023             07/10/2023    DIAGNOSIS:  BONE BIOPSY, RIGHT TOE, FIRST RAY: Acute osteomyelitis with reactive bone    CLINICAL HISTORY:  Disruption                           of external surgical wound, subsequent encounter  Open wound of right foot, subsequent encounter    SPECIMENS RECEIVED:  BONE BIOPSY, RIGHT TOE, FIRST RAY    MICROSCOPIC DESCRIPTION:    RLLTissue blocks are prepared and slides are examined microscopically on all  specimens. See diagnosis for details.    Professional interpretation rendered by Chris Lynn M.D., F.C.A.P. at  [facility], [address] [place] [address].    GROSS DESCRIPTION:  Labeled \"right first ray bone\".  Consists of 2 pieces " of tan firm tissue measuring  1.3 x 1.0 x 0.5 cm in aggregate and is submitted as received entirely in 1 block  following decalcification.  GISELE    REVIEWED, DIAGNOSED AND ELECTRONICALLY  SIGNED BY:    Chris Lynn M.D., F.C.A.P.  CPT CODES:  31737, 87999      Hardware / Foreign Body Culture 07/06/2023 Light growth (2+) Enterobacter cloacae complex (A)   Final    Gram Stain 07/06/2023 Rare (1+) WBCs seen   Final    Gram Stain 07/06/2023 No organisms seen   Final    Glucose 07/06/2023 115  70 - 130 mg/dL Final    RN NotifiedOperator: [id number] AMADA Bedolla ID: [id number]   Lab on 06/28/2023   Component Date Value Ref Range Status    Glucose 06/28/2023 151 (H)  65 - 99 mg/dL Final    BUN 06/28/2023 12  8 - 23 mg/dL Final    Creatinine 06/28/2023 0.92  0.76 - 1.27 mg/dL Final    Sodium 06/28/2023 137  136 - 145 mmol/L Final    Potassium 06/28/2023 4.5  3.5 - 5.2 mmol/L Final    Chloride 06/28/2023 100  98 - 107 mmol/L Final    CO2 06/28/2023 26.2  22.0 - 29.0 mmol/L Final    Calcium 06/28/2023 9.7  8.6 - 10.5 mg/dL Final    Total Protein 06/28/2023 7.1  6.0 - 8.5 g/dL Final    Albumin 06/28/2023 4.1  3.5 - 5.2 g/dL Final    ALT (SGPT) 06/28/2023 7  1 - 41 U/L Final    AST (SGOT) 06/28/2023 14  1 - 40 U/L Final    Alkaline Phosphatase 06/28/2023 53  39 - 117 U/L Final    Total Bilirubin 06/28/2023 0.4  0.0 - 1.2 mg/dL Final    Globulin 06/28/2023 3.0  gm/dL Final    A/G Ratio 06/28/2023 1.4  g/dL Final    BUN/Creatinine Ratio 06/28/2023 13.0  7.0 - 25.0 Final    Anion Gap 06/28/2023 10.8  5.0 - 15.0 mmol/L Final    eGFR 06/28/2023 85.1  >60.0 mL/min/1.73 Final    C-Reactive Protein 06/28/2023 0.46  0.00 - 0.50 mg/dL Final    Sed Rate 06/28/2023 31 (H)  0 - 20 mm/hr Final    WBC 06/28/2023 6.34  3.40 - 10.80 10*3/mm3 Final    RBC 06/28/2023 4.74  4.14 - 5.80 10*6/mm3 Final    Hemoglobin 06/28/2023 13.4  13.0 - 17.7 g/dL Final    Hematocrit 06/28/2023 39.6  37.5 - 51.0 % Final    MCV 06/28/2023 83.5  79.0 -  97.0 fL Final    MCH 06/28/2023 28.3  26.6 - 33.0 pg Final    MCHC 06/28/2023 33.8  31.5 - 35.7 g/dL Final    RDW 06/28/2023 13.9  12.3 - 15.4 % Final    RDW-SD 06/28/2023 41.7  37.0 - 54.0 fl Final    MPV 06/28/2023 9.9  6.0 - 12.0 fL Final    Platelets 06/28/2023 222  140 - 450 10*3/mm3 Final    Neutrophil % 06/28/2023 64.8  42.7 - 76.0 % Final    Lymphocyte % 06/28/2023 23.2  19.6 - 45.3 % Final    Monocyte % 06/28/2023 7.4  5.0 - 12.0 % Final    Eosinophil % 06/28/2023 3.5  0.3 - 6.2 % Final    Basophil % 06/28/2023 0.6  0.0 - 1.5 % Final    Immature Grans % 06/28/2023 0.5  0.0 - 0.5 % Final    Neutrophils, Absolute 06/28/2023 4.11  1.70 - 7.00 10*3/mm3 Final    Lymphocytes, Absolute 06/28/2023 1.47  0.70 - 3.10 10*3/mm3 Final    Monocytes, Absolute 06/28/2023 0.47  0.10 - 0.90 10*3/mm3 Final    Eosinophils, Absolute 06/28/2023 0.22  0.00 - 0.40 10*3/mm3 Final    Basophils, Absolute 06/28/2023 0.04  0.00 - 0.20 10*3/mm3 Final    Immature Grans, Absolute 06/28/2023 0.03  0.00 - 0.05 10*3/mm3 Final    nRBC 06/28/2023 0.0  0.0 - 0.2 /100 WBC Final   Lab on 05/17/2023   Component Date Value Ref Range Status    WBC 05/17/2023 5.92  3.40 - 10.80 10*3/mm3 Final    RBC 05/17/2023 4.79  4.14 - 5.80 10*6/mm3 Final    Hemoglobin 05/17/2023 13.4  13.0 - 17.7 g/dL Final    Hematocrit 05/17/2023 41.2  37.5 - 51.0 % Final    MCV 05/17/2023 86.0  79.0 - 97.0 fL Final    MCH 05/17/2023 28.0  26.6 - 33.0 pg Final    MCHC 05/17/2023 32.5  31.5 - 35.7 g/dL Final    RDW 05/17/2023 14.2  12.3 - 15.4 % Final    RDW-SD 05/17/2023 44.3  37.0 - 54.0 fl Final    MPV 05/17/2023 10.0  6.0 - 12.0 fL Final    Platelets 05/17/2023 295  140 - 450 10*3/mm3 Final    Neutrophil % 05/17/2023 67.0  42.7 - 76.0 % Final    Lymphocyte % 05/17/2023 23.0  19.6 - 45.3 % Final    Monocyte % 05/17/2023 6.8  5.0 - 12.0 % Final    Eosinophil % 05/17/2023 2.2  0.3 - 6.2 % Final    Basophil % 05/17/2023 0.5  0.0 - 1.5 % Final    Immature Grans % 05/17/2023 0.5   0.0 - 0.5 % Final    Neutrophils, Absolute 05/17/2023 3.97  1.70 - 7.00 10*3/mm3 Final    Lymphocytes, Absolute 05/17/2023 1.36  0.70 - 3.10 10*3/mm3 Final    Monocytes, Absolute 05/17/2023 0.40  0.10 - 0.90 10*3/mm3 Final    Eosinophils, Absolute 05/17/2023 0.13  0.00 - 0.40 10*3/mm3 Final    Basophils, Absolute 05/17/2023 0.03  0.00 - 0.20 10*3/mm3 Final    Immature Grans, Absolute 05/17/2023 0.03  0.00 - 0.05 10*3/mm3 Final    nRBC 05/17/2023 0.0  0.0 - 0.2 /100 WBC Final    Glucose 05/17/2023 141 (H)  65 - 99 mg/dL Final    BUN 05/17/2023 14  8 - 23 mg/dL Final    Creatinine 05/17/2023 0.90  0.76 - 1.27 mg/dL Final    Sodium 05/17/2023 135 (L)  136 - 145 mmol/L Final    Potassium 05/17/2023 4.5  3.5 - 5.2 mmol/L Final    Chloride 05/17/2023 100  98 - 107 mmol/L Final    CO2 05/17/2023 25.0  22.0 - 29.0 mmol/L Final    Calcium 05/17/2023 10.1  8.6 - 10.5 mg/dL Final    Total Protein 05/17/2023 7.5  6.0 - 8.5 g/dL Final    Albumin 05/17/2023 4.2  3.5 - 5.2 g/dL Final    ALT (SGPT) 05/17/2023 7  1 - 41 U/L Final    AST (SGOT) 05/17/2023 14  1 - 40 U/L Final    Alkaline Phosphatase 05/17/2023 66  39 - 117 U/L Final    Total Bilirubin 05/17/2023 0.2  0.0 - 1.2 mg/dL Final    Globulin 05/17/2023 3.3  gm/dL Final    A/G Ratio 05/17/2023 1.3  g/dL Final    BUN/Creatinine Ratio 05/17/2023 15.6  7.0 - 25.0 Final    Anion Gap 05/17/2023 10.0  5.0 - 15.0 mmol/L Final    eGFR 05/17/2023 87.4  >60.0 mL/min/1.73 Final    Hemoglobin A1C 05/17/2023 6.20 (H)  4.80 - 5.60 % Final    Total Cholesterol 05/17/2023 149  0 - 200 mg/dL Final    Triglycerides 05/17/2023 322 (H)  0 - 150 mg/dL Final    HDL Cholesterol 05/17/2023 28 (L)  40 - 60 mg/dL Final    LDL Cholesterol  05/17/2023 70  0 - 100 mg/dL Final    VLDL Cholesterol 05/17/2023 51 (H)  5 - 40 mg/dL Final    LDL/HDL Ratio 05/17/2023 2.02   Final    Microalbumin/Creatinine Ratio 05/17/2023    Final    Unable to calculate    Creatinine, Urine 05/17/2023 50.4  mg/dL Final     Microalbumin, Urine 05/17/2023 <1.2  mg/dL Final    TSH 05/17/2023 2.140  0.270 - 4.200 uIU/mL Final    Vitamin B-12 05/17/2023 558  211 - 946 pg/mL Final   No results displayed because visit has over 200 results.      Admission on 03/23/2023, Discharged on 03/25/2023   Component Date Value Ref Range Status    Glucose 03/23/2023 150 (H)  70 - 130 mg/dL Final    : [id number] PATTI GARCIAMeter ID: [id number]    Glucose 03/23/2023 126  70 - 130 mg/dL Final    : [id number] GIRMA POLLARDeter ID: [id number]    QT Interval 03/23/2023 382  ms Final    QTC Interval 03/23/2023 446  ms Final    Glucose 03/23/2023 159 (H)  65 - 99 mg/dL Final    BUN 03/23/2023 27 (H)  8 - 23 mg/dL Final    Creatinine 03/23/2023 0.93  0.76 - 1.27 mg/dL Final    Sodium 03/23/2023 132 (L)  136 - 145 mmol/L Final    Potassium 03/23/2023 4.6  3.5 - 5.2 mmol/L Final    Chloride 03/23/2023 99  98 - 107 mmol/L Final    CO2 03/23/2023 23.0  22.0 - 29.0 mmol/L Final    Calcium 03/23/2023 8.7  8.6 - 10.5 mg/dL Final    BUN/Creatinine Ratio 03/23/2023 29.0 (H)  7.0 - 25.0 Final    Anion Gap 03/23/2023 10.0  5.0 - 15.0 mmol/L Final    eGFR 03/23/2023 84.0  >60.0 mL/min/1.73 Final    WBC 03/23/2023 5.28  3.40 - 10.80 10*3/mm3 Final    RBC 03/23/2023 3.87 (L)  4.14 - 5.80 10*6/mm3 Final    Hemoglobin 03/23/2023 10.9 (L)  13.0 - 17.7 g/dL Final    Hematocrit 03/23/2023 32.8 (L)  37.5 - 51.0 % Final    MCV 03/23/2023 84.8  79.0 - 97.0 fL Final    MCH 03/23/2023 28.2  26.6 - 33.0 pg Final    MCHC 03/23/2023 33.2  31.5 - 35.7 g/dL Final    RDW 03/23/2023 13.8  12.3 - 15.4 % Final    RDW-SD 03/23/2023 42.5  37.0 - 54.0 fl Final    MPV 03/23/2023 8.3  6.0 - 12.0 fL Final    Platelets 03/23/2023 252  140 - 450 10*3/mm3 Final    Neutrophil % 03/23/2023 86.8 (H)  42.7 - 76.0 % Final    Lymphocyte % 03/23/2023 9.5 (L)  19.6 - 45.3 % Final    Monocyte % 03/23/2023 2.5 (L)  5.0 - 12.0 % Final    Eosinophil % 03/23/2023 0.2 (L)  0.3 - 6.2 %  Final    Basophil % 03/23/2023 0.2  0.0 - 1.5 % Final    Immature Grans % 03/23/2023 0.8 (H)  0.0 - 0.5 % Final    Neutrophils, Absolute 03/23/2023 4.59  1.70 - 7.00 10*3/mm3 Final    Lymphocytes, Absolute 03/23/2023 0.50 (L)  0.70 - 3.10 10*3/mm3 Final    Monocytes, Absolute 03/23/2023 0.13  0.10 - 0.90 10*3/mm3 Final    Eosinophils, Absolute 03/23/2023 0.01  0.00 - 0.40 10*3/mm3 Final    Basophils, Absolute 03/23/2023 0.01  0.00 - 0.20 10*3/mm3 Final    Immature Grans, Absolute 03/23/2023 0.04  0.00 - 0.05 10*3/mm3 Final    nRBC 03/23/2023 0.0  0.0 - 0.2 /100 WBC Final    HS Troponin T 03/23/2023 15 (H)  <15 ng/L Final    QT Interval 03/23/2023 372  ms Final    QTC Interval 03/23/2023 429  ms Final    HS Troponin T 03/23/2023 15 (H)  <15 ng/L Final    Troponin T Delta 03/23/2023 0  >=-4 - <+4 ng/L Final    Target HR (85%) 03/24/2023 121  bpm Final    Max. Pred. HR (100%) 03/24/2023 142  bpm Final    EF(MOD-bp) 03/24/2023 61.4  % Final    LV GLOBAL STRAIN  03/24/2023 -13.1  % Final    LVIDd 03/24/2023 4.1  cm Final    LVIDs 03/24/2023 2.6  cm Final    IVSd 03/24/2023 1.30  cm Final    LVPWd 03/24/2023 1.10  cm Final    FS 03/24/2023 37.5  % Final    IVS/LVPW 03/24/2023 1.19  cm Final    ESV(cubed) 03/24/2023 17.1  ml Final    LV Sys Vol (BSA corrected) 03/24/2023 23.2  cm2 Final    EDV(cubed) 03/24/2023 70.4  ml Final    LV Velásquez Vol (BSA corrected) 03/24/2023 57.0  cm2 Final    LVOT area 03/24/2023 3.2  cm2 Final    LV mass(C)d 03/24/2023 173.1  grams Final    LVOT diam 03/24/2023 2.01  cm Final    EDV(MOD-sp2) 03/24/2023 62.4  ml Final    EDV(MOD-sp4) 03/24/2023 81.9  ml Final    ESV(MOD-sp2) 03/24/2023 22.6  ml Final    ESV(MOD-sp4) 03/24/2023 33.3  ml Final    SV(MOD-sp2) 03/24/2023 39.8  ml Final    SV(MOD-sp4) 03/24/2023 48.6  ml Final    SI(MOD-sp2) 03/24/2023 27.7  ml/m2 Final    SI(MOD-sp4) 03/24/2023 33.8  ml/m2 Final    EF(MOD-sp2) 03/24/2023 63.8  % Final    EF(MOD-sp4) 03/24/2023 59.3  % Final    MV E  max bay 03/24/2023 70.6  cm/sec Final    MV A max bay 03/24/2023 76.4  cm/sec Final    MV E/A 03/24/2023 0.92   Final    Med [place] 03/24/2023 8.2  cm/sec Final    Lat [place] 03/24/2023 11.4  cm/sec Final    Avg E/e' ratio 03/24/2023 7.20   Final    SV(LVOT) 03/24/2023 57.2  ml Final    RVIDd 03/24/2023 2.7  cm Final    TAPSE (>1.6) 03/24/2023 2.09  cm Final    LA dimension (2D)  03/24/2023 3.7  cm Final    LV V1 max 03/24/2023 88.8  cm/sec Final    LV V1 max PG 03/24/2023 3.2  mmHg Final    LV V1 mean PG 03/24/2023 1.76  mmHg Final    LV V1 VTI 03/24/2023 18.1  cm Final    Ao pk bay 03/24/2023 179.8  cm/sec Final    Ao max PG 03/24/2023 12.9  mmHg Final    Ao mean PG 03/24/2023 8.2  mmHg Final    Ao V2 VTI 03/24/2023 35.4  cm Final    MICHAEL(I,D) 03/24/2023 1.61  cm2 Final    MV max PG 03/24/2023 2.5  mmHg Final    MV mean PG 03/24/2023 1.40  mmHg Final    MV V2 VTI 03/24/2023 23.2  cm Final    MV P1/2t 03/24/2023 47.3  msec Final    MVA(P1/2t) 03/24/2023 4.6  cm2 Final    MVA(VTI) 03/24/2023 2.47  cm2 Final    MV dec slope 03/24/2023 418.2  cm/sec2 Final    MR max bay 03/24/2023 443.9  cm/sec Final    MR max PG 03/24/2023 78.8  mmHg Final    TR max bay 03/24/2023 256.5  cm/sec Final    TR max PG 03/24/2023 26.3  mmHg Final    RVSP(TR) 03/24/2023 29.3  mmHg Final    RAP systole 03/24/2023 3.0  mmHg Final    PA V2 max 03/24/2023 133.0  cm/sec Final    PI end-d bay 03/24/2023 80.1  cm/sec Final    Ao root diam 03/24/2023 2.9  cm Final    ACS 03/24/2023 1.17  cm Final    STJ 03/24/2023 2.5  cm Final    Hemoglobin A1C 03/23/2023 7.10 (H)  4.80 - 5.60 % Final    TSH 03/23/2023 1.200  0.270 - 4.200 uIU/mL Final    Glucose 03/23/2023 250 (H)  70 - 130 mg/dL Final    RN NotifiedOperator: [id number] ANATOLY SANTOSMeter ID: [id number]    Glucose 03/24/2023 142 (H)  65 - 99 mg/dL Final    BUN 03/24/2023 22  8 - 23 mg/dL Final    Creatinine 03/24/2023 0.88  0.76 - 1.27 mg/dL Final    Sodium 03/24/2023 135 (L)  136 -  145 mmol/L Final    Potassium 03/24/2023 4.4  3.5 - 5.2 mmol/L Final    Chloride 03/24/2023 100  98 - 107 mmol/L Final    CO2 03/24/2023 25.0  22.0 - 29.0 mmol/L Final    Calcium 03/24/2023 9.1  8.6 - 10.5 mg/dL Final    BUN/Creatinine Ratio 03/24/2023 25.0  7.0 - 25.0 Final    Anion Gap 03/24/2023 10.0  5.0 - 15.0 mmol/L Final    eGFR 03/24/2023 88.0  >60.0 mL/min/1.73 Final    HS Troponin T 03/24/2023 34 (H)  <15 ng/L Final    WBC 03/24/2023 7.91  3.40 - 10.80 10*3/mm3 Final    RBC 03/24/2023 3.78 (L)  4.14 - 5.80 10*6/mm3 Final    Hemoglobin 03/24/2023 10.7 (L)  13.0 - 17.7 g/dL Final    Hematocrit 03/24/2023 31.9 (L)  37.5 - 51.0 % Final    MCV 03/24/2023 84.4  79.0 - 97.0 fL Final    MCH 03/24/2023 28.3  26.6 - 33.0 pg Final    MCHC 03/24/2023 33.5  31.5 - 35.7 g/dL Final    RDW 03/24/2023 13.7  12.3 - 15.4 % Final    RDW-SD 03/24/2023 42.5  37.0 - 54.0 fl Final    MPV 03/24/2023 8.8  6.0 - 12.0 fL Final    Platelets 03/24/2023 296  140 - 450 10*3/mm3 Final    Neutrophil % 03/24/2023 69.0  42.7 - 76.0 % Final    Lymphocyte % 03/24/2023 20.4  19.6 - 45.3 % Final    Monocyte % 03/24/2023 9.1  5.0 - 12.0 % Final    Eosinophil % 03/24/2023 0.8  0.3 - 6.2 % Final    Basophil % 03/24/2023 0.3  0.0 - 1.5 % Final    Immature Grans % 03/24/2023 0.4  0.0 - 0.5 % Final    Neutrophils, Absolute 03/24/2023 5.47  1.70 - 7.00 10*3/mm3 Final    Lymphocytes, Absolute 03/24/2023 1.61  0.70 - 3.10 10*3/mm3 Final    Monocytes, Absolute 03/24/2023 0.72  0.10 - 0.90 10*3/mm3 Final    Eosinophils, Absolute 03/24/2023 0.06  0.00 - 0.40 10*3/mm3 Final    Basophils, Absolute 03/24/2023 0.02  0.00 - 0.20 10*3/mm3 Final    Immature Grans, Absolute 03/24/2023 0.03  0.00 - 0.05 10*3/mm3 Final    nRBC 03/24/2023 0.0  0.0 - 0.2 /100 WBC Final    Glucose 03/23/2023 193 (H)  70 - 130 mg/dL Final    RN NotifiedOperator: [id number] JIMMIE RICHARDSONBannerter ID: [id number]    Glucose 03/24/2023 134 (H)  70 - 130 mg/dL Final    RN NotifiedOperator:  [id number] MEHNAZ CORONADOISSAMeter ID: [id number]    Glucose 03/24/2023 193 (H)  70 - 130 mg/dL Final    Result Not ConfirmedOperator: [id number] ANJANA JAMESMeter ID: [id number]    COVID19 03/24/2023 Not Detected  Not Detected - Ref. Range Final    Glucose 03/24/2023 163 (H)  70 - 130 mg/dL Final    RN NotifiedOperator: [id number] LARISSA CABAWNNEEMeter ID: [id number]    Glucose 03/24/2023 181 (H)  70 - 130 mg/dL Final    RN NotifiedOperator: [id number] GILL LANEMeter ID: [id number]    Glucose 03/25/2023 140 (H)  65 - 99 mg/dL Final    BUN 03/25/2023 17  8 - 23 mg/dL Final    Creatinine 03/25/2023 1.04  0.76 - 1.27 mg/dL Final    Sodium 03/25/2023 134 (L)  136 - 145 mmol/L Final    Potassium 03/25/2023 4.3  3.5 - 5.2 mmol/L Final    Chloride 03/25/2023 100  98 - 107 mmol/L Final    CO2 03/25/2023 24.0  22.0 - 29.0 mmol/L Final    Calcium 03/25/2023 9.2  8.6 - 10.5 mg/dL Final    BUN/Creatinine Ratio 03/25/2023 16.3  7.0 - 25.0 Final    Anion Gap 03/25/2023 10.0  5.0 - 15.0 mmol/L Final    eGFR 03/25/2023 73.5  >60.0 mL/min/1.73 Final    WBC 03/25/2023 6.39  3.40 - 10.80 10*3/mm3 Final    RBC 03/25/2023 3.91 (L)  4.14 - 5.80 10*6/mm3 Final    Hemoglobin 03/25/2023 11.1 (L)  13.0 - 17.7 g/dL Final    Hematocrit 03/25/2023 33.2 (L)  37.5 - 51.0 % Final    MCV 03/25/2023 84.9  79.0 - 97.0 fL Final    MCH 03/25/2023 28.4  26.6 - 33.0 pg Final    MCHC 03/25/2023 33.4  31.5 - 35.7 g/dL Final    RDW 03/25/2023 13.7  12.3 - 15.4 % Final    RDW-SD 03/25/2023 42.3  37.0 - 54.0 fl Final    MPV 03/25/2023 8.6  6.0 - 12.0 fL Final    Platelets 03/25/2023 264  140 - 450 10*3/mm3 Final    Neutrophil % 03/25/2023 67.7  42.7 - 76.0 % Final    Lymphocyte % 03/25/2023 21.4  19.6 - 45.3 % Final    Monocyte % 03/25/2023 8.0  5.0 - 12.0 % Final    Eosinophil % 03/25/2023 1.7  0.3 - 6.2 % Final    Basophil % 03/25/2023 0.6  0.0 - 1.5 % Final    Immature Grans % 03/25/2023 0.6 (H)  0.0 - 0.5 % Final    Neutrophils, Absolute  03/25/2023 4.32  1.70 - 7.00 10*3/mm3 Final    Lymphocytes, Absolute 03/25/2023 1.37  0.70 - 3.10 10*3/mm3 Final    Monocytes, Absolute 03/25/2023 0.51  0.10 - 0.90 10*3/mm3 Final    Eosinophils, Absolute 03/25/2023 0.11  0.00 - 0.40 10*3/mm3 Final    Basophils, Absolute 03/25/2023 0.04  0.00 - 0.20 10*3/mm3 Final    Immature Grans, Absolute 03/25/2023 0.04  0.00 - 0.05 10*3/mm3 Final    nRBC 03/25/2023 0.0  0.0 - 0.2 /100 WBC Final    Glucose 03/25/2023 137 (H)  70 - 130 mg/dL Final    RN NotifiedOperator: [id number] TREADWELL MATIAHMeter ID: [id number]    Glucose 03/25/2023 197 (H)  70 - 130 mg/dL Final    Result Not ConfirmedOperator: [id number] [facility] HEATHERMeter ID: [id number]   Pre-Admission Testing on 03/22/2023   Component Date Value Ref Range Status    MRSA, PCR 03/22/2023 Negative  Negative Final      US Venous Doppler Upper Extremity Right (duplex)  Narrative: INDICATION:  Right arm pain.    TECHNIQUE:  High-resolution gray-scale imaging, color flow Doppler, compression, and  phasicity analysis of the deep veins of the right upper extremity were obtained.    FINDINGS:  Normal compression of the jugular vein.  Normal pulsatility and phasicity of the  subclavian vein.  Normal color flow and compressibility of the axillary,  brachial, basilic, and cephalic veins.  Impression: Normal right upper extremity venous duplex ultrasound.  No evidence of DVT.    [unfilled]  Immunization History   Administered Date(s) Administered    COVID-19 (MODERNA) 1st,2nd,3rd Dose Monovalent 01/26/2021, 04/13/2021, 01/25/2022    Fluad Quad 65+ 11/16/2020    Fluzone (or Fluarix & Flulaval for VFC) >6mos 10/31/2018, 10/31/2018    Fluzone High Dose =>65 Years ([employer] ONLY) 11/02/2016, 10/23/2019    Fluzone High-Dose 65+yrs 10/13/2022    Pneumococcal Conjugate 13-Valent (PCV13) 03/15/2018    Pneumococcal Polysaccharide (PPSV23) 08/15/2005, 03/14/2017    Pneumococcal, Unspecified 04/14/2015    Tdap 07/11/2020    Tetanus  "09/15/2015    influenza Split 12/08/2015       The following portions of the patient's history were reviewed and updated as appropriate: allergies, current medications, past family history, past medical history, past social history, past surgical history and problem list.        Physical Exam  /50 (BP Location: Left arm, Patient Position: Sitting, Cuff Size: Adult)   Pulse 76   Temp 97.3 °F (36.3 °C)   Ht 175.3 cm (69\")   Wt 83 kg (183 lb)   SpO2 97%   BMI 27.02 kg/m²         Physical Exam  Vitals and nursing note reviewed.   Constitutional:       Appearance: He is well-developed. He is not diaphoretic.   HENT:      Head: Normocephalic and atraumatic.      Right Ear: External ear normal.   Eyes:      Conjunctiva/sclera: Conjunctivae normal.      Pupils: Pupils are equal, round, and reactive to light.   Cardiovascular:      Rate and Rhythm: Normal rate and regular rhythm.      Heart sounds: Normal heart sounds. No murmur heard.  Pulmonary:      Effort: Pulmonary effort is normal. No respiratory distress.      Breath sounds: Normal breath sounds.   Abdominal:      General: Bowel sounds are normal. There is no distension.      Palpations: Abdomen is soft.      Tenderness: There is no abdominal tenderness.   Musculoskeletal:         General: Tenderness present. No deformity. Normal range of motion.      Cervical back: Normal range of motion and neck supple.      Comments: Has boot on right foot    Feet:      Comments: Right foot bandaged and is draining  Skin:     General: Skin is warm.      Coloration: Skin is not pale.      Findings: No erythema or rash.   Neurological:      Mental Status: He is alert and oriented to person, place, and time.      Cranial Nerves: No cranial nerve deficit.   Psychiatric:         Behavior: Behavior normal.       [unfilled]   Diagnosis Plan   1. PAF (paroxysmal atrial fibrillation)        2. Coronary artery disease with angina pectoris, unspecified vessel or lesion type, " unspecified whether native or transplanted heart        3. Essential hypertension        4. Mixed hyperlipidemia        5. Chronic anticoagulation        6. Vitamin D deficiency        7. Overweight        8. Stage 2 chronic kidney disease        9. Gastroesophageal reflux disease with esophagitis without hemorrhage        10. High serum vitamin B12        11. History of foot surgery                  -recommend labwork   -recommend COVID-19 vaccination  -urinary retention - reviewed last discharge summary. Urology following. Now on terazosin.  Had recent  Cystoscopy.  HH seeing pt and is with PT/OT. Urinary symptoms is stable   -sp right foot surgery/sp bunionectomy - Podiatry following. Was On Percocet PRNwas on tramado .   -dizzines/veritigo/tinnitus - referred to ENT n meclizine 12.5 mg every 8 hours PRN.     -constipation - on linzess 145 mcg PO q daily.   -CKD stage 2 - gave information. Continue to monitor   -DM type 2-  Endocrinology following  Pt could   not tolerate synjardy 100/1000 mg daily.  on trulcity 3 mg subqweekly  weekly On novoloin R 20-25  units with meals.  on basaglar 15 untis at bedtime.. On farxiga 5 mg daily.   -HLP  - on  Red yeast rice.on Vascepa 1 gram PO BID. Pt cannot tolerate statin   -GERD/internal/external hemorrhoids/diverticulosis   on nexium  40 mg daily. Gastroenterology following recommend high fiber diet. He will need to call for an appt GERD.  Advised to not eat at bedtime. On carafate 1 gram PO QID  -overweight - weght loss information provided. Counseled >5 minutes BMI at 27.02   -ADRIENNE/grieving  - pt is on xanax PO PRN. Advised pt to try and avoid taking Percocet and Xanax togeter   -HTN - . On lopressor 50 mg PO BID. on lisinopril 10 mg daily.  Cut back to 5 mg daily  Since BP hypotensive today  Pt advised to monitor blood pressure at home.   -pAF - cardiology following -currently rate controlled off eliquis 5 mg PO BID, now on xarelto 15 mg PO q daily.  lopressor 50 mg  every 12 hours, imdur 30 mg PO q daily.  on rhythmol 150 mg every 8 hours   -CAD-sp stent  Cardiology following. Aspirin 81 mg daily.   on xarelto 20 mg PO BID   Lopressor 50 mg PO BID, lisinopril 10 mg daily.  On imdur 30 mg PO q daily.  Off lipitor.  Advised pt to call regarding Cardiology   -vitamin D - vitamin D supplement daily.   -advised pt to be safe and call with questions and concerns  -advised to go to ER or call 911 if symptoms worrisome or severe  -advised to folllowup with referrals and Specialist   -advised pt to be safe during COVID-19 pandemic   I spent 35 minutes caring for Aj on this date of service. This time includes time spent by me in the following activities: preparing for the visit, reviewing tests, obtaining and/or reviewing a separately obtained history, performing a medically appropriate examination and/or evaluation, counseling and educating the patient/family/caregiver, ordering medications, tests, or procedures, referring and communicating with other health care professionals, documenting information in the medical record, independently interpreting results and communicating that information with the patient/family/caregiver and care coordination  -recheck in 3 months         This document has been electronically signed by Andreas Martinez MD on September 20, 2023 13:20 CDT

## 2023-08-30 ENCOUNTER — OFFICE VISIT (OUTPATIENT)
Dept: WOUND CARE | Facility: HOSPITAL | Age: 78
End: 2023-08-30
Payer: MEDICARE

## 2023-08-30 ENCOUNTER — OUTSIDE FACILITY SERVICE (OUTPATIENT)
Dept: WOUND CARE | Facility: HOSPITAL | Age: 78
End: 2023-08-30
Payer: MEDICARE

## 2023-09-06 ENCOUNTER — OFFICE VISIT (OUTPATIENT)
Dept: WOUND CARE | Facility: HOSPITAL | Age: 78
End: 2023-09-06
Payer: MEDICARE

## 2023-09-06 ENCOUNTER — OUTSIDE FACILITY SERVICE (OUTPATIENT)
Dept: WOUND CARE | Facility: HOSPITAL | Age: 78
End: 2023-09-06
Payer: MEDICARE

## 2023-09-06 PROCEDURE — 99024 POSTOP FOLLOW-UP VISIT: CPT | Performed by: NURSE PRACTITIONER

## 2023-09-07 RX ORDER — LISINOPRIL 5 MG/1
TABLET ORAL
Qty: 90 TABLET | Refills: 0 | OUTPATIENT
Start: 2023-09-07

## 2023-09-07 NOTE — TELEPHONE ENCOUNTER
discontinued on 4/7/2023 by Aristeo Christopher MD for the following reason: Stop Taking at Discharge

## 2023-09-13 ENCOUNTER — OFFICE VISIT (OUTPATIENT)
Dept: WOUND CARE | Facility: HOSPITAL | Age: 78
End: 2023-09-13
Payer: MEDICARE

## 2023-09-13 ENCOUNTER — OUTSIDE FACILITY SERVICE (OUTPATIENT)
Dept: WOUND CARE | Facility: HOSPITAL | Age: 78
End: 2023-09-13
Payer: MEDICARE

## 2023-09-13 PROCEDURE — 11042 DBRDMT SUBQ TIS 1ST 20SQCM/<: CPT | Performed by: NURSE PRACTITIONER

## 2023-09-13 RX ORDER — LISINOPRIL 5 MG/1
TABLET ORAL
Qty: 90 TABLET | Refills: 0 | OUTPATIENT
Start: 2023-09-13

## 2023-09-18 ENCOUNTER — OUTSIDE FACILITY SERVICE (OUTPATIENT)
Dept: WOUND CARE | Facility: HOSPITAL | Age: 78
End: 2023-09-18
Payer: MEDICARE

## 2023-09-18 ENCOUNTER — OFFICE VISIT (OUTPATIENT)
Dept: WOUND CARE | Facility: HOSPITAL | Age: 78
End: 2023-09-18
Payer: MEDICARE

## 2023-09-20 ENCOUNTER — OFFICE VISIT (OUTPATIENT)
Dept: FAMILY MEDICINE CLINIC | Facility: CLINIC | Age: 78
End: 2023-09-20
Payer: MEDICARE

## 2023-09-20 VITALS
TEMPERATURE: 97.3 F | BODY MASS INDEX: 27.11 KG/M2 | WEIGHT: 183 LBS | DIASTOLIC BLOOD PRESSURE: 50 MMHG | SYSTOLIC BLOOD PRESSURE: 102 MMHG | HEART RATE: 76 BPM | HEIGHT: 69 IN | OXYGEN SATURATION: 97 %

## 2023-09-20 DIAGNOSIS — I10 ESSENTIAL HYPERTENSION: ICD-10-CM

## 2023-09-20 DIAGNOSIS — Z79.01 CHRONIC ANTICOAGULATION: ICD-10-CM

## 2023-09-20 DIAGNOSIS — E66.3 OVERWEIGHT: ICD-10-CM

## 2023-09-20 DIAGNOSIS — R79.89 HIGH SERUM VITAMIN B12: ICD-10-CM

## 2023-09-20 DIAGNOSIS — E78.2 MIXED HYPERLIPIDEMIA: ICD-10-CM

## 2023-09-20 DIAGNOSIS — I48.0 PAF (PAROXYSMAL ATRIAL FIBRILLATION): Primary | ICD-10-CM

## 2023-09-20 DIAGNOSIS — K21.00 GASTROESOPHAGEAL REFLUX DISEASE WITH ESOPHAGITIS WITHOUT HEMORRHAGE: ICD-10-CM

## 2023-09-20 DIAGNOSIS — E55.9 VITAMIN D DEFICIENCY: ICD-10-CM

## 2023-09-20 DIAGNOSIS — N18.2 STAGE 2 CHRONIC KIDNEY DISEASE: ICD-10-CM

## 2023-09-20 DIAGNOSIS — Z98.890 HISTORY OF FOOT SURGERY: ICD-10-CM

## 2023-09-20 DIAGNOSIS — I25.119 CORONARY ARTERY DISEASE WITH ANGINA PECTORIS, UNSPECIFIED VESSEL OR LESION TYPE, UNSPECIFIED WHETHER NATIVE OR TRANSPLANTED HEART: ICD-10-CM

## 2023-09-20 PROCEDURE — 3078F DIAST BP <80 MM HG: CPT | Performed by: FAMILY MEDICINE

## 2023-09-20 PROCEDURE — 3074F SYST BP LT 130 MM HG: CPT | Performed by: FAMILY MEDICINE

## 2023-09-20 PROCEDURE — 99214 OFFICE O/P EST MOD 30 MIN: CPT | Performed by: FAMILY MEDICINE

## 2023-09-20 RX ORDER — SUCRALFATE 1 G/1
1 TABLET ORAL 4 TIMES DAILY
COMMUNITY
End: 2023-09-20 | Stop reason: SDUPTHER

## 2023-09-20 RX ORDER — LISINOPRIL 40 MG/1
40 TABLET ORAL DAILY
Qty: 90 TABLET | Refills: 1 | Status: SHIPPED | OUTPATIENT
Start: 2023-09-20

## 2023-09-20 RX ORDER — ESOMEPRAZOLE MAGNESIUM 40 MG/1
40 CAPSULE, DELAYED RELEASE ORAL
COMMUNITY
End: 2023-09-20 | Stop reason: SDUPTHER

## 2023-09-20 RX ORDER — ESOMEPRAZOLE MAGNESIUM 40 MG/1
40 CAPSULE, DELAYED RELEASE ORAL
Qty: 90 CAPSULE | Refills: 1 | Status: SHIPPED | OUTPATIENT
Start: 2023-09-20

## 2023-09-20 RX ORDER — SUCRALFATE 1 G/1
1 TABLET ORAL 4 TIMES DAILY
Qty: 120 TABLET | Refills: 3 | Status: SHIPPED | OUTPATIENT
Start: 2023-09-20

## 2023-09-25 ENCOUNTER — OFFICE VISIT (OUTPATIENT)
Dept: WOUND CARE | Facility: HOSPITAL | Age: 78
End: 2023-09-25

## 2023-09-25 ENCOUNTER — OUTSIDE FACILITY SERVICE (OUTPATIENT)
Dept: WOUND CARE | Facility: HOSPITAL | Age: 78
End: 2023-09-25
Payer: MEDICARE

## 2023-09-25 PROCEDURE — 11042 DBRDMT SUBQ TIS 1ST 20SQCM/<: CPT | Performed by: NURSE PRACTITIONER

## 2024-10-31 NOTE — HOME HEALTH
PT DISCHARGED TODAY WITH OUT A VISIT DUE TO PT WILL START GOING TO [facility] FOR [facility]. PT ALREADY GOES TO [facility] DAILY FOR ANTIBIOTIC INFUSIONS AND DECIDED TO GO TO [facility] WHILE IN [place]. Detail Level: Zone Hypertriglyceridemia Monitoring: I explained this is common when taking isotretinoin. We will monitor closely. Myalgia Treatment: I explained this is common when taking isotretinoin. If this worsens they will contact us. They may try OTC ibuprofen. Display Individual Monthly Dosage In The Note (If Yes Will Display All Dosages Which Are Not N/A): no Cheilitis Normal Treatment: I recommended application of Vaseline or Aquaphor numerous times a day (as often as every hour) and before going to bed. Dosing Month 3 (Required For Cumulative Dosing): 40mg Daily Cheilitis Aggressive Treatment: I recommended application of Vaseline or Aquaphor numerous times a day (as often as every hour) and before going to bed. I also prescribed a topical steroid for twice daily use. Include Validation In Note: Yes Dosing Month 2 (Required For Cumulative Dosing): 40mg BID What Is The Patient's Gender: Male Retinoid Dermatitis Normal Treatment: I recommended more frequent application of Cetaphil or CeraVe to the areas of dermatitis. Xerosis Normal Treatment: I recommended application of Cetaphil or CeraVe numerous times a day going to bed to all dry areas. Counseling Text: I reviewed the side effect in detail. Patient should get monthly blood tests, not donate blood, not drive at night if vision affected, and not share medication. Headache Monitoring: I recommended monitoring the headaches for now. There is no evidence of increased intracranial pressure. They were instructed to call if the headaches are worsening. Retinoid Dermatitis Aggressive Treatment: I recommended more frequent application of Cetaphil or CeraVe to the areas of dermatitis. I also prescribed a topical steroid for twice daily use until the dermatitis resolves. Female Pregnancy Counseling Text: Female patients should also be on two forms of birth control while taking this medication and for one month after their last dose. Xerosis Aggressive Treatment: I recommended application of Cetaphil or CeraVe numerous times a day going to bed to all dry areas. I also prescribed a topical steroid for twice daily use. Next Month's Dosage: 80mg Daily Nosebleeds Normal Treatment: I explained this is common when taking isotretinoin. I recommended saline mist in each nostril multiple times a day. If this worsens they will contact us. Use Therapeutic Ranged Or Therapeutic Target: please select Range or Target Myalgia Monitoring: I explained this is common when taking isotretinoin. If this worsens they will contact us. Kilograms Preamble Statement (Weight Entered In Details Tab): Reported Weight in kilograms: Patient Weight (Optional But Required For Cumulative Dose-Numbers And Decimals Only): 150 Lower Range (In Mg/Kg): 120 Female Completion Statement: After discussing her treatment course we decided to discontinue isotretinoin therapy at this time. I explained that she would need to continue her birth control methods for at least one month after the last dosage. She should also get a pregnancy test one month after the last dose. She shouldn't donate blood for one month after the last dose. She should call with any new symptoms of depression. Pounds Preamble Statement (Weight Entered In Details Tab): Reported Weight in pounds: Ipledge Number (Optional): Ipledge: [id number] Male Completion Statement: After discussing his treatment course we decided to discontinue isotretinoin therapy at this time. He shouldn't donate blood for one month after the last dose. He should call with any new symptoms of depression. Months Of Therapy Completed: 4 Weight Units: pounds Xerosis Normal Treatment: I recommended application of Cetaphil or CeraVe numerous times a day and before going to bed to all dry areas. Xerosis Aggressive Treatment: I recommended application of Cetaphil or CeraVe numerous times a day and before going to bed to all dry areas. I also prescribed a topical steroid for twice daily use. Target Cumulative Dosage (In Mg/Kg): 135

## (undated) DEVICE — GLV SURG SENSICARE POLYISPRN W/ALOE PF LF 6.5 GRN STRL

## (undated) DEVICE — PK CYSTO LF 60

## (undated) DEVICE — NDL HYPO ECLPS SFTY 25G 1 1/2IN

## (undated) DEVICE — ELECTRD GLD WR CUP 10MM 72IN

## (undated) DEVICE — BNDG ELAS ELITE V/CLOSE 4IN 5YD LF STRL

## (undated) DEVICE — SPNG LAP 18X18IN LF STRL PK/5

## (undated) DEVICE — BNDG ELAS CO-FLEX SLF ADHR 3IN5YD LF2 STRL

## (undated) DEVICE — SPNG GZ WOVN 4X4IN 12PLY 10/BX STRL

## (undated) DEVICE — STERILE POLYISOPRENE POWDER-FREE SURGICAL GLOVES: Brand: PROTEXIS

## (undated) DEVICE — SUT ETHLN 2/0 FS 18IN 664H

## (undated) DEVICE — SUT ETHLN 3/0 FS1 663G

## (undated) DEVICE — GLV SURG SENSICARE PI ORTHO SZ8 LF STRL

## (undated) DEVICE — PATIENT RETURN ELECTRODE, SINGLE-USE, CONTACT QUALITY MONITORING, ADULT, WITH 9FT CORD, FOR PATIENTS WEIGING OVER 33LBS. (15KG): Brand: MEGADYNE

## (undated) DEVICE — STPLR SKIN VISISTAT WD 35CT

## (undated) DEVICE — SOL IRR NACL 0.9PCT 3000ML

## (undated) DEVICE — SOL IRR NACL 0.9PCT BT 1000ML

## (undated) DEVICE — SPLNT PLSTR CAST 4IN

## (undated) DEVICE — PAD,ABDOMINAL,8"X10",ST,LF: Brand: MEDLINE

## (undated) DEVICE — STERILE POLYISOPRENE POWDER-FREE SURGICAL GLOVES WITH EMOLLIENT COATING: Brand: PROTEXIS

## (undated) DEVICE — PAD UNDERCAST WYTEX 4IN 4YD LF STRL

## (undated) DEVICE — SYRINGE, LUER LOCK, 10ML: Brand: MEDLINE

## (undated) DEVICE — CVR SURG EQUIP BND RECTG 36X28

## (undated) DEVICE — GOWN,AURORA,NOREINF,RAGLAN,XL,STERILE: Brand: MEDLINE

## (undated) DEVICE — BLD SAW LAPIPLASTY STRYKER 40X11MM 1P/U STRL

## (undated) DEVICE — CVR C/ARM MINI

## (undated) DEVICE — DRSNG WND GZ CURAD OIL EMULSION 3X8IN STRL PK/3

## (undated) DEVICE — GLV SURG SENSICARE PI ORTHO SZ7.5 LF STRL

## (undated) DEVICE — BNDG GZ SOF-FORM CONFRM 3X75IN LF STRL

## (undated) DEVICE — SOL PVPI SPRY BETADINE 3OZ

## (undated) DEVICE — SOL IRR H2O BTL 1000ML STRL

## (undated) DEVICE — DISPOSABLE BIPOLAR CODE, 12' (3.66 M): Brand: CONMED

## (undated) DEVICE — POSTN ELEV LEG PROCARE FM UNIV 45DEG 31.5X10IN

## (undated) DEVICE — Device

## (undated) DEVICE — PK POD 60

## (undated) DEVICE — SYR LL TP 10ML STRL

## (undated) DEVICE — GLV SURG SENSICARE PI ORTHO SZ6.5 LF STRL

## (undated) DEVICE — DRAINBAG,ANTI-REFLUX TOWER,L/F,2000ML,LL: Brand: MEDLINE

## (undated) DEVICE — TBG PENCL TELESCP MEGADYNE SMOKE EVAC 10FT

## (undated) DEVICE — CATHETER,FOLEY,100%SILICONE,16FR,10ML,LF: Brand: MEDLINE

## (undated) DEVICE — CUFF TOURNI 1BLADDER 1PRT 24IN STRL

## (undated) DEVICE — SUT MNCRYL 2/0 RB1 27IN UD Y266H

## (undated) DEVICE — GLV SURG NEOPRN SENSICARE PF SZ/6.5 LF EA/1PR

## (undated) DEVICE — COTTON UNDERCAST PADDING,REGULAR FINISH: Brand: WEBRIL

## (undated) DEVICE — CANN SMPL SOFTECH BIFLO ETCO2 A/M 7FT

## (undated) DEVICE — SPEEDRELEASE™ GUIDED RELEASE INSTRUMENT: Brand: SCALPEL

## (undated) DEVICE — CONTAINER,SPECIMEN,OR STERILE,4OZ: Brand: MEDLINE

## (undated) DEVICE — BNDG ESMARK 6INX9FT STRL

## (undated) DEVICE — 450 ML BOTTLE OF 0.05% CHLORHEXIDINE GLUCONATE IN 99.95% STERILE WATER FOR IRRIGATION, USP AND APPLICATOR.: Brand: IRRISEPT ANTIMICROBIAL WOUND LAVAGE

## (undated) DEVICE — SYS HARVST BONE/GRFT OSTEOAUGER 8MM

## (undated) DEVICE — PAD UNDERCAST WYTEX 6IN 4YD LF STRL

## (undated) DEVICE — CYSTO/BLADDER IRRIGATION SET, REGULATING CLAMP

## (undated) DEVICE — COVER,MAYO STAND,STERILE: Brand: MEDLINE

## (undated) DEVICE — LARGE TEAR CROSS CUT RASP (14.0 X 7.0MM)

## (undated) DEVICE — GOWN,PREVENTION PLUS,XLNG/XXLARGE,STRL: Brand: MEDLINE

## (undated) DEVICE — IMPLANTABLE DEVICE
Type: IMPLANTABLE DEVICE | Site: TOE FIRST | Status: NON-FUNCTIONAL
Removed: 2023-03-23

## (undated) DEVICE — INTENDED FOR TISSUE SEPARATION, AND OTHER PROCEDURES THAT REQUIRE A SHARP SURGICAL BLADE TO PUNCTURE OR CUT.: Brand: BARD-PARKER ® CARBON RIB-BACK BLADES

## (undated) DEVICE — BIT DRL TRIMIT 2.5MM

## (undated) DEVICE — PATIENT RETURN ELECTRODE, SINGLE-USE, CONTACT QUALITY MONITORING, ADULT, WITH 15 FT (4.5 M) CORD. FOR PATIENTS WEIGHING OVER 33LBS. (15KG): Brand: MEGADYNE

## (undated) DEVICE — ENDOSCOPIC SEAL URO 1 SIZE FITS ALL: Brand: ENDOSCOPIC SEAL

## (undated) DEVICE — ADHS SKIN PREMIERPRO EXOFIN TOPICAL HI/VISC .5ML

## (undated) DEVICE — TOWEL,OR,DSP,ST,BLUE,DLX,4/PK,20PK/CS: Brand: MEDLINE

## (undated) DEVICE — BIT DRL TRIMIT 3.5MM

## (undated) DEVICE — BNDG ELAS ELITE V/CLOSE 6IN 5YD LF STRL

## (undated) DEVICE — DRSNG WND GZ CURAD OIL EMULSION 3X3IN STRL

## (undated) DEVICE — SHEET,DRAPE,53X77,STERILE: Brand: MEDLINE